# Patient Record
Sex: FEMALE | Race: BLACK OR AFRICAN AMERICAN | NOT HISPANIC OR LATINO | URBAN - METROPOLITAN AREA
[De-identification: names, ages, dates, MRNs, and addresses within clinical notes are randomized per-mention and may not be internally consistent; named-entity substitution may affect disease eponyms.]

---

## 2017-03-02 ENCOUNTER — OUTPATIENT (OUTPATIENT)
Dept: OUTPATIENT SERVICES | Facility: HOSPITAL | Age: 72
LOS: 1 days | Discharge: HOME | End: 2017-03-02

## 2017-03-02 ENCOUNTER — APPOINTMENT (OUTPATIENT)
Dept: WOUND CARE | Facility: HOSPITAL | Age: 72
End: 2017-03-02

## 2017-03-02 DIAGNOSIS — L89.90 PRESSURE ULCER OF UNSPECIFIED SITE, UNSPECIFIED STAGE: ICD-10-CM

## 2017-03-02 PROBLEM — Z00.00 ENCOUNTER FOR PREVENTIVE HEALTH EXAMINATION: Status: ACTIVE | Noted: 2017-03-02

## 2017-03-07 DIAGNOSIS — Z79.899 OTHER LONG TERM (CURRENT) DRUG THERAPY: ICD-10-CM

## 2017-03-07 DIAGNOSIS — L89.90 PRESSURE ULCER OF UNSPECIFIED SITE, UNSPECIFIED STAGE: ICD-10-CM

## 2017-03-07 DIAGNOSIS — Z74.1 NEED FOR ASSISTANCE WITH PERSONAL CARE: ICD-10-CM

## 2017-03-07 DIAGNOSIS — I10 ESSENTIAL (PRIMARY) HYPERTENSION: ICD-10-CM

## 2017-03-07 DIAGNOSIS — M19.90 UNSPECIFIED OSTEOARTHRITIS, UNSPECIFIED SITE: ICD-10-CM

## 2017-03-07 DIAGNOSIS — L81.9 DISORDER OF PIGMENTATION, UNSPECIFIED: ICD-10-CM

## 2017-03-07 DIAGNOSIS — Z82.49 FAMILY HISTORY OF ISCHEMIC HEART DISEASE AND OTHER DISEASES OF THE CIRCULATORY SYSTEM: ICD-10-CM

## 2017-03-07 DIAGNOSIS — Z84.1 FAMILY HISTORY OF DISORDERS OF KIDNEY AND URETER: ICD-10-CM

## 2017-03-07 DIAGNOSIS — I89.0 LYMPHEDEMA, NOT ELSEWHERE CLASSIFIED: ICD-10-CM

## 2017-03-07 DIAGNOSIS — L97.112 NON-PRESSURE CHRONIC ULCER OF RIGHT THIGH WITH FAT LAYER EXPOSED: ICD-10-CM

## 2017-03-10 ENCOUNTER — APPOINTMENT (OUTPATIENT)
Dept: WOUND CARE | Facility: HOSPITAL | Age: 72
End: 2017-03-10

## 2017-03-10 ENCOUNTER — OUTPATIENT (OUTPATIENT)
Dept: OUTPATIENT SERVICES | Facility: HOSPITAL | Age: 72
LOS: 1 days | Discharge: HOME | End: 2017-03-10

## 2017-03-10 DIAGNOSIS — L89.90 PRESSURE ULCER OF UNSPECIFIED SITE, UNSPECIFIED STAGE: ICD-10-CM

## 2017-03-16 ENCOUNTER — OUTPATIENT (OUTPATIENT)
Dept: OUTPATIENT SERVICES | Facility: HOSPITAL | Age: 72
LOS: 1 days | Discharge: HOME | End: 2017-03-16

## 2017-03-16 ENCOUNTER — APPOINTMENT (OUTPATIENT)
Dept: WOUND CARE | Facility: HOSPITAL | Age: 72
End: 2017-03-16

## 2017-03-16 DIAGNOSIS — L89.90 PRESSURE ULCER OF UNSPECIFIED SITE, UNSPECIFIED STAGE: ICD-10-CM

## 2017-03-16 DIAGNOSIS — L97.112 NON-PRESSURE CHRONIC ULCER OF RIGHT THIGH WITH FAT LAYER EXPOSED: ICD-10-CM

## 2017-03-16 DIAGNOSIS — Z79.899 OTHER LONG TERM (CURRENT) DRUG THERAPY: ICD-10-CM

## 2017-03-16 DIAGNOSIS — I89.0 LYMPHEDEMA, NOT ELSEWHERE CLASSIFIED: ICD-10-CM

## 2017-03-16 DIAGNOSIS — I10 ESSENTIAL (PRIMARY) HYPERTENSION: ICD-10-CM

## 2017-03-21 DIAGNOSIS — I10 ESSENTIAL (PRIMARY) HYPERTENSION: ICD-10-CM

## 2017-03-21 DIAGNOSIS — L89.90 PRESSURE ULCER OF UNSPECIFIED SITE, UNSPECIFIED STAGE: ICD-10-CM

## 2017-03-21 DIAGNOSIS — I89.0 LYMPHEDEMA, NOT ELSEWHERE CLASSIFIED: ICD-10-CM

## 2017-03-21 DIAGNOSIS — Z79.899 OTHER LONG TERM (CURRENT) DRUG THERAPY: ICD-10-CM

## 2017-03-21 DIAGNOSIS — L97.112 NON-PRESSURE CHRONIC ULCER OF RIGHT THIGH WITH FAT LAYER EXPOSED: ICD-10-CM

## 2017-03-23 ENCOUNTER — OUTPATIENT (OUTPATIENT)
Dept: OUTPATIENT SERVICES | Facility: HOSPITAL | Age: 72
LOS: 1 days | Discharge: HOME | End: 2017-03-23

## 2017-03-23 ENCOUNTER — APPOINTMENT (OUTPATIENT)
Dept: WOUND CARE | Facility: HOSPITAL | Age: 72
End: 2017-03-23

## 2017-03-23 DIAGNOSIS — L89.90 PRESSURE ULCER OF UNSPECIFIED SITE, UNSPECIFIED STAGE: ICD-10-CM

## 2017-03-27 DIAGNOSIS — I89.0 LYMPHEDEMA, NOT ELSEWHERE CLASSIFIED: ICD-10-CM

## 2017-03-27 DIAGNOSIS — L89.90 PRESSURE ULCER OF UNSPECIFIED SITE, UNSPECIFIED STAGE: ICD-10-CM

## 2017-03-27 DIAGNOSIS — Z79.899 OTHER LONG TERM (CURRENT) DRUG THERAPY: ICD-10-CM

## 2017-03-27 DIAGNOSIS — L97.112 NON-PRESSURE CHRONIC ULCER OF RIGHT THIGH WITH FAT LAYER EXPOSED: ICD-10-CM

## 2017-03-27 DIAGNOSIS — I10 ESSENTIAL (PRIMARY) HYPERTENSION: ICD-10-CM

## 2017-03-30 ENCOUNTER — APPOINTMENT (OUTPATIENT)
Dept: WOUND CARE | Facility: HOSPITAL | Age: 72
End: 2017-03-30

## 2017-03-30 ENCOUNTER — OUTPATIENT (OUTPATIENT)
Dept: OUTPATIENT SERVICES | Facility: HOSPITAL | Age: 72
LOS: 1 days | Discharge: HOME | End: 2017-03-30

## 2017-03-30 DIAGNOSIS — L89.90 PRESSURE ULCER OF UNSPECIFIED SITE, UNSPECIFIED STAGE: ICD-10-CM

## 2017-04-05 ENCOUNTER — OUTPATIENT (OUTPATIENT)
Dept: OUTPATIENT SERVICES | Facility: HOSPITAL | Age: 72
LOS: 1 days | Discharge: HOME | End: 2017-04-05

## 2017-04-05 ENCOUNTER — APPOINTMENT (OUTPATIENT)
Dept: WOUND CARE | Facility: HOSPITAL | Age: 72
End: 2017-04-05

## 2017-04-05 DIAGNOSIS — I10 ESSENTIAL (PRIMARY) HYPERTENSION: ICD-10-CM

## 2017-04-05 DIAGNOSIS — L97.112 NON-PRESSURE CHRONIC ULCER OF RIGHT THIGH WITH FAT LAYER EXPOSED: ICD-10-CM

## 2017-04-05 DIAGNOSIS — L89.90 PRESSURE ULCER OF UNSPECIFIED SITE, UNSPECIFIED STAGE: ICD-10-CM

## 2017-04-05 DIAGNOSIS — I89.0 LYMPHEDEMA, NOT ELSEWHERE CLASSIFIED: ICD-10-CM

## 2017-04-05 DIAGNOSIS — Z79.899 OTHER LONG TERM (CURRENT) DRUG THERAPY: ICD-10-CM

## 2017-04-05 DIAGNOSIS — L92.2 GRANULOMA FACIALE [EOSINOPHILIC GRANULOMA OF SKIN]: ICD-10-CM

## 2017-04-07 DIAGNOSIS — I10 ESSENTIAL (PRIMARY) HYPERTENSION: ICD-10-CM

## 2017-04-07 DIAGNOSIS — L92.9 GRANULOMATOUS DISORDER OF THE SKIN AND SUBCUTANEOUS TISSUE, UNSPECIFIED: ICD-10-CM

## 2017-04-07 DIAGNOSIS — I89.0 LYMPHEDEMA, NOT ELSEWHERE CLASSIFIED: ICD-10-CM

## 2017-04-07 DIAGNOSIS — L89.90 PRESSURE ULCER OF UNSPECIFIED SITE, UNSPECIFIED STAGE: ICD-10-CM

## 2017-04-07 DIAGNOSIS — L97.112 NON-PRESSURE CHRONIC ULCER OF RIGHT THIGH WITH FAT LAYER EXPOSED: ICD-10-CM

## 2017-04-07 DIAGNOSIS — Z79.899 OTHER LONG TERM (CURRENT) DRUG THERAPY: ICD-10-CM

## 2017-04-12 ENCOUNTER — APPOINTMENT (OUTPATIENT)
Dept: WOUND CARE | Facility: HOSPITAL | Age: 72
End: 2017-04-12

## 2017-04-12 ENCOUNTER — OUTPATIENT (OUTPATIENT)
Dept: OUTPATIENT SERVICES | Facility: HOSPITAL | Age: 72
LOS: 1 days | Discharge: HOME | End: 2017-04-12

## 2017-04-12 DIAGNOSIS — L89.90 PRESSURE ULCER OF UNSPECIFIED SITE, UNSPECIFIED STAGE: ICD-10-CM

## 2017-04-14 DIAGNOSIS — L89.90 PRESSURE ULCER OF UNSPECIFIED SITE, UNSPECIFIED STAGE: ICD-10-CM

## 2017-04-14 DIAGNOSIS — L97.112 NON-PRESSURE CHRONIC ULCER OF RIGHT THIGH WITH FAT LAYER EXPOSED: ICD-10-CM

## 2017-04-14 DIAGNOSIS — Z79.899 OTHER LONG TERM (CURRENT) DRUG THERAPY: ICD-10-CM

## 2017-04-14 DIAGNOSIS — I89.0 LYMPHEDEMA, NOT ELSEWHERE CLASSIFIED: ICD-10-CM

## 2017-04-14 DIAGNOSIS — I10 ESSENTIAL (PRIMARY) HYPERTENSION: ICD-10-CM

## 2017-04-19 ENCOUNTER — OUTPATIENT (OUTPATIENT)
Dept: OUTPATIENT SERVICES | Facility: HOSPITAL | Age: 72
LOS: 1 days | Discharge: HOME | End: 2017-04-19

## 2017-04-19 ENCOUNTER — APPOINTMENT (OUTPATIENT)
Dept: WOUND CARE | Facility: HOSPITAL | Age: 72
End: 2017-04-19

## 2017-04-19 DIAGNOSIS — L89.90 PRESSURE ULCER OF UNSPECIFIED SITE, UNSPECIFIED STAGE: ICD-10-CM

## 2017-04-21 DIAGNOSIS — I89.0 LYMPHEDEMA, NOT ELSEWHERE CLASSIFIED: ICD-10-CM

## 2017-04-21 DIAGNOSIS — L97.112 NON-PRESSURE CHRONIC ULCER OF RIGHT THIGH WITH FAT LAYER EXPOSED: ICD-10-CM

## 2017-04-21 DIAGNOSIS — L89.90 PRESSURE ULCER OF UNSPECIFIED SITE, UNSPECIFIED STAGE: ICD-10-CM

## 2017-04-21 DIAGNOSIS — I10 ESSENTIAL (PRIMARY) HYPERTENSION: ICD-10-CM

## 2017-04-21 DIAGNOSIS — Z79.899 OTHER LONG TERM (CURRENT) DRUG THERAPY: ICD-10-CM

## 2017-04-26 ENCOUNTER — OUTPATIENT (OUTPATIENT)
Dept: OUTPATIENT SERVICES | Facility: HOSPITAL | Age: 72
LOS: 1 days | Discharge: HOME | End: 2017-04-26

## 2017-04-26 ENCOUNTER — APPOINTMENT (OUTPATIENT)
Dept: WOUND CARE | Facility: HOSPITAL | Age: 72
End: 2017-04-26

## 2017-04-26 DIAGNOSIS — L89.90 PRESSURE ULCER OF UNSPECIFIED SITE, UNSPECIFIED STAGE: ICD-10-CM

## 2017-05-03 ENCOUNTER — OUTPATIENT (OUTPATIENT)
Dept: OUTPATIENT SERVICES | Facility: HOSPITAL | Age: 72
LOS: 1 days | Discharge: HOME | End: 2017-05-03

## 2017-05-03 ENCOUNTER — APPOINTMENT (OUTPATIENT)
Dept: WOUND CARE | Facility: HOSPITAL | Age: 72
End: 2017-05-03

## 2017-05-03 DIAGNOSIS — L89.90 PRESSURE ULCER OF UNSPECIFIED SITE, UNSPECIFIED STAGE: ICD-10-CM

## 2017-05-03 DIAGNOSIS — Z79.899 OTHER LONG TERM (CURRENT) DRUG THERAPY: ICD-10-CM

## 2017-05-03 DIAGNOSIS — I89.0 LYMPHEDEMA, NOT ELSEWHERE CLASSIFIED: ICD-10-CM

## 2017-05-03 DIAGNOSIS — I10 ESSENTIAL (PRIMARY) HYPERTENSION: ICD-10-CM

## 2017-05-10 ENCOUNTER — OUTPATIENT (OUTPATIENT)
Dept: OUTPATIENT SERVICES | Facility: HOSPITAL | Age: 72
LOS: 1 days | Discharge: HOME | End: 2017-05-10

## 2017-05-10 ENCOUNTER — APPOINTMENT (OUTPATIENT)
Age: 72
End: 2017-05-10

## 2017-05-10 DIAGNOSIS — I10 ESSENTIAL (PRIMARY) HYPERTENSION: ICD-10-CM

## 2017-05-10 DIAGNOSIS — I89.0 LYMPHEDEMA, NOT ELSEWHERE CLASSIFIED: ICD-10-CM

## 2017-05-10 DIAGNOSIS — L89.90 PRESSURE ULCER OF UNSPECIFIED SITE, UNSPECIFIED STAGE: ICD-10-CM

## 2017-05-10 DIAGNOSIS — L97.112 NON-PRESSURE CHRONIC ULCER OF RIGHT THIGH WITH FAT LAYER EXPOSED: ICD-10-CM

## 2017-05-10 DIAGNOSIS — Z79.899 OTHER LONG TERM (CURRENT) DRUG THERAPY: ICD-10-CM

## 2017-05-17 ENCOUNTER — APPOINTMENT (OUTPATIENT)
Age: 72
End: 2017-05-17

## 2017-05-17 ENCOUNTER — OUTPATIENT (OUTPATIENT)
Dept: OUTPATIENT SERVICES | Facility: HOSPITAL | Age: 72
LOS: 1 days | Discharge: HOME | End: 2017-05-17

## 2017-05-17 DIAGNOSIS — L89.90 PRESSURE ULCER OF UNSPECIFIED SITE, UNSPECIFIED STAGE: ICD-10-CM

## 2017-05-19 DIAGNOSIS — I10 ESSENTIAL (PRIMARY) HYPERTENSION: ICD-10-CM

## 2017-05-19 DIAGNOSIS — Z79.899 OTHER LONG TERM (CURRENT) DRUG THERAPY: ICD-10-CM

## 2017-05-19 DIAGNOSIS — L97.112 NON-PRESSURE CHRONIC ULCER OF RIGHT THIGH WITH FAT LAYER EXPOSED: ICD-10-CM

## 2017-05-19 DIAGNOSIS — I89.0 LYMPHEDEMA, NOT ELSEWHERE CLASSIFIED: ICD-10-CM

## 2017-05-24 ENCOUNTER — OUTPATIENT (OUTPATIENT)
Dept: OUTPATIENT SERVICES | Facility: HOSPITAL | Age: 72
LOS: 1 days | Discharge: HOME | End: 2017-05-24

## 2017-05-24 ENCOUNTER — APPOINTMENT (OUTPATIENT)
Age: 72
End: 2017-05-24

## 2017-05-24 DIAGNOSIS — L89.90 PRESSURE ULCER OF UNSPECIFIED SITE, UNSPECIFIED STAGE: ICD-10-CM

## 2017-05-26 DIAGNOSIS — I89.0 LYMPHEDEMA, NOT ELSEWHERE CLASSIFIED: ICD-10-CM

## 2017-05-26 DIAGNOSIS — I10 ESSENTIAL (PRIMARY) HYPERTENSION: ICD-10-CM

## 2017-05-26 DIAGNOSIS — L97.112 NON-PRESSURE CHRONIC ULCER OF RIGHT THIGH WITH FAT LAYER EXPOSED: ICD-10-CM

## 2017-05-26 DIAGNOSIS — Z79.899 OTHER LONG TERM (CURRENT) DRUG THERAPY: ICD-10-CM

## 2017-05-26 DIAGNOSIS — L92.9 GRANULOMATOUS DISORDER OF THE SKIN AND SUBCUTANEOUS TISSUE, UNSPECIFIED: ICD-10-CM

## 2017-05-31 ENCOUNTER — OUTPATIENT (OUTPATIENT)
Dept: OUTPATIENT SERVICES | Facility: HOSPITAL | Age: 72
LOS: 1 days | Discharge: HOME | End: 2017-05-31

## 2017-05-31 ENCOUNTER — APPOINTMENT (OUTPATIENT)
Age: 72
End: 2017-05-31

## 2017-05-31 DIAGNOSIS — L89.90 PRESSURE ULCER OF UNSPECIFIED SITE, UNSPECIFIED STAGE: ICD-10-CM

## 2017-06-09 DIAGNOSIS — I10 ESSENTIAL (PRIMARY) HYPERTENSION: ICD-10-CM

## 2017-06-09 DIAGNOSIS — L97.112 NON-PRESSURE CHRONIC ULCER OF RIGHT THIGH WITH FAT LAYER EXPOSED: ICD-10-CM

## 2017-06-09 DIAGNOSIS — I89.0 LYMPHEDEMA, NOT ELSEWHERE CLASSIFIED: ICD-10-CM

## 2017-06-09 DIAGNOSIS — Z79.899 OTHER LONG TERM (CURRENT) DRUG THERAPY: ICD-10-CM

## 2017-06-14 ENCOUNTER — OUTPATIENT (OUTPATIENT)
Dept: OUTPATIENT SERVICES | Facility: HOSPITAL | Age: 72
LOS: 1 days | Discharge: HOME | End: 2017-06-14

## 2017-06-14 DIAGNOSIS — I10 ESSENTIAL (PRIMARY) HYPERTENSION: ICD-10-CM

## 2017-06-14 DIAGNOSIS — I89.0 LYMPHEDEMA, NOT ELSEWHERE CLASSIFIED: ICD-10-CM

## 2017-06-14 DIAGNOSIS — Z79.899 OTHER LONG TERM (CURRENT) DRUG THERAPY: ICD-10-CM

## 2017-06-14 DIAGNOSIS — L89.90 PRESSURE ULCER OF UNSPECIFIED SITE, UNSPECIFIED STAGE: ICD-10-CM

## 2017-06-14 DIAGNOSIS — L97.112 NON-PRESSURE CHRONIC ULCER OF RIGHT THIGH WITH FAT LAYER EXPOSED: ICD-10-CM

## 2017-06-28 ENCOUNTER — OUTPATIENT (OUTPATIENT)
Dept: OUTPATIENT SERVICES | Facility: HOSPITAL | Age: 72
LOS: 1 days | Discharge: HOME | End: 2017-06-28

## 2017-06-28 DIAGNOSIS — L89.90 PRESSURE ULCER OF UNSPECIFIED SITE, UNSPECIFIED STAGE: ICD-10-CM

## 2017-06-30 DIAGNOSIS — I89.0 LYMPHEDEMA, NOT ELSEWHERE CLASSIFIED: ICD-10-CM

## 2017-06-30 DIAGNOSIS — Z79.899 OTHER LONG TERM (CURRENT) DRUG THERAPY: ICD-10-CM

## 2017-06-30 DIAGNOSIS — I10 ESSENTIAL (PRIMARY) HYPERTENSION: ICD-10-CM

## 2017-06-30 DIAGNOSIS — L97.112 NON-PRESSURE CHRONIC ULCER OF RIGHT THIGH WITH FAT LAYER EXPOSED: ICD-10-CM

## 2017-07-12 ENCOUNTER — OUTPATIENT (OUTPATIENT)
Dept: OUTPATIENT SERVICES | Facility: HOSPITAL | Age: 72
LOS: 1 days | Discharge: HOME | End: 2017-07-12

## 2017-07-12 DIAGNOSIS — Z79.899 OTHER LONG TERM (CURRENT) DRUG THERAPY: ICD-10-CM

## 2017-07-12 DIAGNOSIS — I89.0 LYMPHEDEMA, NOT ELSEWHERE CLASSIFIED: ICD-10-CM

## 2017-07-12 DIAGNOSIS — L89.90 PRESSURE ULCER OF UNSPECIFIED SITE, UNSPECIFIED STAGE: ICD-10-CM

## 2017-07-12 DIAGNOSIS — L97.112 NON-PRESSURE CHRONIC ULCER OF RIGHT THIGH WITH FAT LAYER EXPOSED: ICD-10-CM

## 2017-07-12 DIAGNOSIS — I10 ESSENTIAL (PRIMARY) HYPERTENSION: ICD-10-CM

## 2017-07-26 ENCOUNTER — OUTPATIENT (OUTPATIENT)
Dept: OUTPATIENT SERVICES | Facility: HOSPITAL | Age: 72
LOS: 1 days | Discharge: HOME | End: 2017-07-26

## 2017-07-26 DIAGNOSIS — L89.90 PRESSURE ULCER OF UNSPECIFIED SITE, UNSPECIFIED STAGE: ICD-10-CM

## 2017-08-07 DIAGNOSIS — L97.112 NON-PRESSURE CHRONIC ULCER OF RIGHT THIGH WITH FAT LAYER EXPOSED: ICD-10-CM

## 2017-08-07 DIAGNOSIS — I89.0 LYMPHEDEMA, NOT ELSEWHERE CLASSIFIED: ICD-10-CM

## 2017-08-07 DIAGNOSIS — I10 ESSENTIAL (PRIMARY) HYPERTENSION: ICD-10-CM

## 2017-08-07 DIAGNOSIS — Z79.899 OTHER LONG TERM (CURRENT) DRUG THERAPY: ICD-10-CM

## 2017-08-09 ENCOUNTER — OUTPATIENT (OUTPATIENT)
Dept: OUTPATIENT SERVICES | Facility: HOSPITAL | Age: 72
LOS: 1 days | Discharge: HOME | End: 2017-08-09

## 2017-08-09 DIAGNOSIS — L89.899 PRESSURE ULCER OF OTHER SITE, UNSPECIFIED STAGE: ICD-10-CM

## 2017-08-11 DIAGNOSIS — I89.0 LYMPHEDEMA, NOT ELSEWHERE CLASSIFIED: ICD-10-CM

## 2017-08-11 DIAGNOSIS — L97.112 NON-PRESSURE CHRONIC ULCER OF RIGHT THIGH WITH FAT LAYER EXPOSED: ICD-10-CM

## 2017-08-11 DIAGNOSIS — Z79.899 OTHER LONG TERM (CURRENT) DRUG THERAPY: ICD-10-CM

## 2017-08-11 DIAGNOSIS — I10 ESSENTIAL (PRIMARY) HYPERTENSION: ICD-10-CM

## 2017-08-23 ENCOUNTER — RESULT REVIEW (OUTPATIENT)
Age: 72
End: 2017-08-23

## 2017-08-23 ENCOUNTER — OUTPATIENT (OUTPATIENT)
Dept: OUTPATIENT SERVICES | Facility: HOSPITAL | Age: 72
LOS: 1 days | Discharge: HOME | End: 2017-08-23
Payer: MEDICARE

## 2017-08-23 DIAGNOSIS — L89.90 PRESSURE ULCER OF UNSPECIFIED SITE, UNSPECIFIED STAGE: ICD-10-CM

## 2017-08-23 PROCEDURE — 88305 TISSUE EXAM BY PATHOLOGIST: CPT | Mod: 26

## 2017-08-25 DIAGNOSIS — I89.0 LYMPHEDEMA, NOT ELSEWHERE CLASSIFIED: ICD-10-CM

## 2017-08-25 DIAGNOSIS — Z79.899 OTHER LONG TERM (CURRENT) DRUG THERAPY: ICD-10-CM

## 2017-08-25 DIAGNOSIS — L92.2 GRANULOMA FACIALE [EOSINOPHILIC GRANULOMA OF SKIN]: ICD-10-CM

## 2017-08-25 DIAGNOSIS — L97.112 NON-PRESSURE CHRONIC ULCER OF RIGHT THIGH WITH FAT LAYER EXPOSED: ICD-10-CM

## 2017-08-25 DIAGNOSIS — I10 ESSENTIAL (PRIMARY) HYPERTENSION: ICD-10-CM

## 2017-08-25 LAB — SURGICAL PATHOLOGY FINAL REPORT - CH: SIGNIFICANT CHANGE UP

## 2017-08-30 ENCOUNTER — OUTPATIENT (OUTPATIENT)
Dept: OUTPATIENT SERVICES | Facility: HOSPITAL | Age: 72
LOS: 1 days | Discharge: HOME | End: 2017-08-30

## 2017-08-30 DIAGNOSIS — I89.0 LYMPHEDEMA, NOT ELSEWHERE CLASSIFIED: ICD-10-CM

## 2017-08-30 DIAGNOSIS — L97.112 NON-PRESSURE CHRONIC ULCER OF RIGHT THIGH WITH FAT LAYER EXPOSED: ICD-10-CM

## 2017-08-30 DIAGNOSIS — Z79.899 OTHER LONG TERM (CURRENT) DRUG THERAPY: ICD-10-CM

## 2017-08-30 DIAGNOSIS — I10 ESSENTIAL (PRIMARY) HYPERTENSION: ICD-10-CM

## 2017-08-30 DIAGNOSIS — L92.2 GRANULOMA FACIALE [EOSINOPHILIC GRANULOMA OF SKIN]: ICD-10-CM

## 2017-08-30 DIAGNOSIS — L89.90 PRESSURE ULCER OF UNSPECIFIED SITE, UNSPECIFIED STAGE: ICD-10-CM

## 2017-09-13 ENCOUNTER — OUTPATIENT (OUTPATIENT)
Dept: OUTPATIENT SERVICES | Facility: HOSPITAL | Age: 72
LOS: 1 days | Discharge: HOME | End: 2017-09-13

## 2017-09-13 DIAGNOSIS — L89.90 PRESSURE ULCER OF UNSPECIFIED SITE, UNSPECIFIED STAGE: ICD-10-CM

## 2017-09-18 DIAGNOSIS — Z79.899 OTHER LONG TERM (CURRENT) DRUG THERAPY: ICD-10-CM

## 2017-09-18 DIAGNOSIS — I89.0 LYMPHEDEMA, NOT ELSEWHERE CLASSIFIED: ICD-10-CM

## 2017-09-18 DIAGNOSIS — L97.112 NON-PRESSURE CHRONIC ULCER OF RIGHT THIGH WITH FAT LAYER EXPOSED: ICD-10-CM

## 2017-09-18 DIAGNOSIS — I10 ESSENTIAL (PRIMARY) HYPERTENSION: ICD-10-CM

## 2017-09-20 ENCOUNTER — OUTPATIENT (OUTPATIENT)
Dept: OUTPATIENT SERVICES | Facility: HOSPITAL | Age: 72
LOS: 1 days | Discharge: HOME | End: 2017-09-20

## 2017-09-20 DIAGNOSIS — L89.90 PRESSURE ULCER OF UNSPECIFIED SITE, UNSPECIFIED STAGE: ICD-10-CM

## 2017-09-22 DIAGNOSIS — I89.0 LYMPHEDEMA, NOT ELSEWHERE CLASSIFIED: ICD-10-CM

## 2017-09-22 DIAGNOSIS — L97.112 NON-PRESSURE CHRONIC ULCER OF RIGHT THIGH WITH FAT LAYER EXPOSED: ICD-10-CM

## 2017-09-22 DIAGNOSIS — Z79.899 OTHER LONG TERM (CURRENT) DRUG THERAPY: ICD-10-CM

## 2017-09-22 DIAGNOSIS — I10 ESSENTIAL (PRIMARY) HYPERTENSION: ICD-10-CM

## 2017-09-27 ENCOUNTER — OUTPATIENT (OUTPATIENT)
Dept: OUTPATIENT SERVICES | Facility: HOSPITAL | Age: 72
LOS: 1 days | Discharge: HOME | End: 2017-09-27

## 2017-09-27 DIAGNOSIS — L89.90 PRESSURE ULCER OF UNSPECIFIED SITE, UNSPECIFIED STAGE: ICD-10-CM

## 2017-09-29 DIAGNOSIS — L97.112 NON-PRESSURE CHRONIC ULCER OF RIGHT THIGH WITH FAT LAYER EXPOSED: ICD-10-CM

## 2017-09-29 DIAGNOSIS — I89.0 LYMPHEDEMA, NOT ELSEWHERE CLASSIFIED: ICD-10-CM

## 2017-09-29 DIAGNOSIS — Z79.899 OTHER LONG TERM (CURRENT) DRUG THERAPY: ICD-10-CM

## 2017-09-29 DIAGNOSIS — I10 ESSENTIAL (PRIMARY) HYPERTENSION: ICD-10-CM

## 2017-10-11 ENCOUNTER — OUTPATIENT (OUTPATIENT)
Dept: OUTPATIENT SERVICES | Facility: HOSPITAL | Age: 72
LOS: 1 days | Discharge: HOME | End: 2017-10-11

## 2017-10-11 DIAGNOSIS — L89.90 PRESSURE ULCER OF UNSPECIFIED SITE, UNSPECIFIED STAGE: ICD-10-CM

## 2017-10-13 DIAGNOSIS — Z79.899 OTHER LONG TERM (CURRENT) DRUG THERAPY: ICD-10-CM

## 2017-10-13 DIAGNOSIS — L97.112 NON-PRESSURE CHRONIC ULCER OF RIGHT THIGH WITH FAT LAYER EXPOSED: ICD-10-CM

## 2017-10-13 DIAGNOSIS — I89.0 LYMPHEDEMA, NOT ELSEWHERE CLASSIFIED: ICD-10-CM

## 2017-10-13 DIAGNOSIS — I10 ESSENTIAL (PRIMARY) HYPERTENSION: ICD-10-CM

## 2017-10-18 ENCOUNTER — OUTPATIENT (OUTPATIENT)
Dept: OUTPATIENT SERVICES | Facility: HOSPITAL | Age: 72
LOS: 1 days | Discharge: HOME | End: 2017-10-18

## 2017-10-18 DIAGNOSIS — L89.90 PRESSURE ULCER OF UNSPECIFIED SITE, UNSPECIFIED STAGE: ICD-10-CM

## 2017-10-20 LAB
CULTURE RESULTS: SIGNIFICANT CHANGE UP
SPECIMEN SOURCE: SIGNIFICANT CHANGE UP

## 2017-10-23 DIAGNOSIS — L89.90 PRESSURE ULCER OF UNSPECIFIED SITE, UNSPECIFIED STAGE: ICD-10-CM

## 2017-11-01 ENCOUNTER — OUTPATIENT (OUTPATIENT)
Dept: OUTPATIENT SERVICES | Facility: HOSPITAL | Age: 72
LOS: 1 days | Discharge: HOME | End: 2017-11-01

## 2017-11-01 DIAGNOSIS — L89.90 PRESSURE ULCER OF UNSPECIFIED SITE, UNSPECIFIED STAGE: ICD-10-CM

## 2017-11-02 DIAGNOSIS — Z79.899 OTHER LONG TERM (CURRENT) DRUG THERAPY: ICD-10-CM

## 2017-11-02 DIAGNOSIS — L97.112 NON-PRESSURE CHRONIC ULCER OF RIGHT THIGH WITH FAT LAYER EXPOSED: ICD-10-CM

## 2017-11-02 DIAGNOSIS — I10 ESSENTIAL (PRIMARY) HYPERTENSION: ICD-10-CM

## 2017-11-02 DIAGNOSIS — I89.0 LYMPHEDEMA, NOT ELSEWHERE CLASSIFIED: ICD-10-CM

## 2017-11-03 DIAGNOSIS — I89.0 LYMPHEDEMA, NOT ELSEWHERE CLASSIFIED: ICD-10-CM

## 2017-11-03 DIAGNOSIS — L97.112 NON-PRESSURE CHRONIC ULCER OF RIGHT THIGH WITH FAT LAYER EXPOSED: ICD-10-CM

## 2017-11-03 DIAGNOSIS — Z79.899 OTHER LONG TERM (CURRENT) DRUG THERAPY: ICD-10-CM

## 2017-11-03 DIAGNOSIS — I10 ESSENTIAL (PRIMARY) HYPERTENSION: ICD-10-CM

## 2017-11-08 ENCOUNTER — OUTPATIENT (OUTPATIENT)
Dept: OUTPATIENT SERVICES | Facility: HOSPITAL | Age: 72
LOS: 1 days | Discharge: HOME | End: 2017-11-08

## 2017-11-08 DIAGNOSIS — L89.90 PRESSURE ULCER OF UNSPECIFIED SITE, UNSPECIFIED STAGE: ICD-10-CM

## 2017-11-08 DIAGNOSIS — I10 ESSENTIAL (PRIMARY) HYPERTENSION: ICD-10-CM

## 2017-11-08 DIAGNOSIS — I89.0 LYMPHEDEMA, NOT ELSEWHERE CLASSIFIED: ICD-10-CM

## 2017-11-08 DIAGNOSIS — Z79.899 OTHER LONG TERM (CURRENT) DRUG THERAPY: ICD-10-CM

## 2017-11-08 DIAGNOSIS — L97.112 NON-PRESSURE CHRONIC ULCER OF RIGHT THIGH WITH FAT LAYER EXPOSED: ICD-10-CM

## 2017-11-15 ENCOUNTER — OUTPATIENT (OUTPATIENT)
Dept: OUTPATIENT SERVICES | Facility: HOSPITAL | Age: 72
LOS: 1 days | Discharge: HOME | End: 2017-11-15

## 2017-11-15 DIAGNOSIS — L89.90 PRESSURE ULCER OF UNSPECIFIED SITE, UNSPECIFIED STAGE: ICD-10-CM

## 2017-11-17 DIAGNOSIS — Z79.899 OTHER LONG TERM (CURRENT) DRUG THERAPY: ICD-10-CM

## 2017-11-17 DIAGNOSIS — I89.0 LYMPHEDEMA, NOT ELSEWHERE CLASSIFIED: ICD-10-CM

## 2017-11-17 DIAGNOSIS — I10 ESSENTIAL (PRIMARY) HYPERTENSION: ICD-10-CM

## 2017-11-17 DIAGNOSIS — L97.112 NON-PRESSURE CHRONIC ULCER OF RIGHT THIGH WITH FAT LAYER EXPOSED: ICD-10-CM

## 2017-11-29 ENCOUNTER — OUTPATIENT (OUTPATIENT)
Dept: OUTPATIENT SERVICES | Facility: HOSPITAL | Age: 72
LOS: 1 days | Discharge: HOME | End: 2017-11-29

## 2017-11-29 DIAGNOSIS — L89.90 PRESSURE ULCER OF UNSPECIFIED SITE, UNSPECIFIED STAGE: ICD-10-CM

## 2017-12-06 ENCOUNTER — OUTPATIENT (OUTPATIENT)
Dept: OUTPATIENT SERVICES | Facility: HOSPITAL | Age: 72
LOS: 1 days | Discharge: HOME | End: 2017-12-06

## 2017-12-06 DIAGNOSIS — L89.90 PRESSURE ULCER OF UNSPECIFIED SITE, UNSPECIFIED STAGE: ICD-10-CM

## 2017-12-07 DIAGNOSIS — I89.0 LYMPHEDEMA, NOT ELSEWHERE CLASSIFIED: ICD-10-CM

## 2017-12-07 DIAGNOSIS — L97.112 NON-PRESSURE CHRONIC ULCER OF RIGHT THIGH WITH FAT LAYER EXPOSED: ICD-10-CM

## 2017-12-07 DIAGNOSIS — I10 ESSENTIAL (PRIMARY) HYPERTENSION: ICD-10-CM

## 2017-12-07 DIAGNOSIS — Z79.899 OTHER LONG TERM (CURRENT) DRUG THERAPY: ICD-10-CM

## 2017-12-13 ENCOUNTER — OUTPATIENT (OUTPATIENT)
Dept: OUTPATIENT SERVICES | Facility: HOSPITAL | Age: 72
LOS: 1 days | Discharge: HOME | End: 2017-12-13

## 2017-12-13 DIAGNOSIS — I89.0 LYMPHEDEMA, NOT ELSEWHERE CLASSIFIED: ICD-10-CM

## 2017-12-13 DIAGNOSIS — Z79.899 OTHER LONG TERM (CURRENT) DRUG THERAPY: ICD-10-CM

## 2017-12-13 DIAGNOSIS — I10 ESSENTIAL (PRIMARY) HYPERTENSION: ICD-10-CM

## 2017-12-13 DIAGNOSIS — L97.112 NON-PRESSURE CHRONIC ULCER OF RIGHT THIGH WITH FAT LAYER EXPOSED: ICD-10-CM

## 2017-12-13 DIAGNOSIS — L89.90 PRESSURE ULCER OF UNSPECIFIED SITE, UNSPECIFIED STAGE: ICD-10-CM

## 2017-12-18 DIAGNOSIS — I89.0 LYMPHEDEMA, NOT ELSEWHERE CLASSIFIED: ICD-10-CM

## 2017-12-18 DIAGNOSIS — Z79.899 OTHER LONG TERM (CURRENT) DRUG THERAPY: ICD-10-CM

## 2017-12-18 DIAGNOSIS — L97.112 NON-PRESSURE CHRONIC ULCER OF RIGHT THIGH WITH FAT LAYER EXPOSED: ICD-10-CM

## 2017-12-18 DIAGNOSIS — I10 ESSENTIAL (PRIMARY) HYPERTENSION: ICD-10-CM

## 2017-12-27 ENCOUNTER — OUTPATIENT (OUTPATIENT)
Dept: OUTPATIENT SERVICES | Facility: HOSPITAL | Age: 72
LOS: 1 days | Discharge: HOME | End: 2017-12-27

## 2017-12-27 DIAGNOSIS — L89.90 PRESSURE ULCER OF UNSPECIFIED SITE, UNSPECIFIED STAGE: ICD-10-CM

## 2017-12-29 DIAGNOSIS — I89.0 LYMPHEDEMA, NOT ELSEWHERE CLASSIFIED: ICD-10-CM

## 2017-12-29 DIAGNOSIS — I10 ESSENTIAL (PRIMARY) HYPERTENSION: ICD-10-CM

## 2017-12-29 DIAGNOSIS — Z79.899 OTHER LONG TERM (CURRENT) DRUG THERAPY: ICD-10-CM

## 2017-12-29 DIAGNOSIS — L97.112 NON-PRESSURE CHRONIC ULCER OF RIGHT THIGH WITH FAT LAYER EXPOSED: ICD-10-CM

## 2018-01-10 ENCOUNTER — INPATIENT (INPATIENT)
Facility: HOSPITAL | Age: 73
LOS: 8 days | Discharge: HOME | End: 2018-01-19
Attending: INTERNAL MEDICINE | Admitting: INTERNAL MEDICINE
Payer: MEDICARE

## 2018-01-10 ENCOUNTER — OUTPATIENT (OUTPATIENT)
Dept: OUTPATIENT SERVICES | Facility: HOSPITAL | Age: 73
LOS: 1 days | Discharge: HOME | End: 2018-01-10

## 2018-01-10 VITALS
RESPIRATION RATE: 16 BRPM | HEART RATE: 84 BPM | SYSTOLIC BLOOD PRESSURE: 149 MMHG | TEMPERATURE: 98 F | WEIGHT: 293 LBS | HEIGHT: 63 IN | DIASTOLIC BLOOD PRESSURE: 66 MMHG | OXYGEN SATURATION: 99 %

## 2018-01-10 DIAGNOSIS — I10 ESSENTIAL (PRIMARY) HYPERTENSION: ICD-10-CM

## 2018-01-10 DIAGNOSIS — L03.115 CELLULITIS OF RIGHT LOWER LIMB: ICD-10-CM

## 2018-01-10 DIAGNOSIS — E66.01 MORBID (SEVERE) OBESITY DUE TO EXCESS CALORIES: ICD-10-CM

## 2018-01-10 DIAGNOSIS — D50.8 OTHER IRON DEFICIENCY ANEMIAS: ICD-10-CM

## 2018-01-10 DIAGNOSIS — L89.90 PRESSURE ULCER OF UNSPECIFIED SITE, UNSPECIFIED STAGE: ICD-10-CM

## 2018-01-10 LAB
ALBUMIN SERPL ELPH-MCNC: 3.4 G/DL — SIGNIFICANT CHANGE UP (ref 3.3–5)
ALP SERPL-CCNC: 120 U/L — SIGNIFICANT CHANGE UP (ref 40–120)
ALT FLD-CCNC: 49 U/L — SIGNIFICANT CHANGE UP (ref 12–78)
ANION GAP SERPL CALC-SCNC: 8 MMOL/L — SIGNIFICANT CHANGE UP (ref 5–17)
AST SERPL-CCNC: 26 U/L — SIGNIFICANT CHANGE UP (ref 15–37)
BILIRUB SERPL-MCNC: 0.8 MG/DL — SIGNIFICANT CHANGE UP (ref 0.2–1.2)
BUN SERPL-MCNC: 9 MG/DL — SIGNIFICANT CHANGE UP (ref 7–23)
CALCIUM SERPL-MCNC: 8.4 MG/DL — LOW (ref 8.5–10.1)
CHLORIDE SERPL-SCNC: 106 MMOL/L — SIGNIFICANT CHANGE UP (ref 96–108)
CO2 SERPL-SCNC: 26 MMOL/L — SIGNIFICANT CHANGE UP (ref 22–31)
CREAT SERPL-MCNC: 0.75 MG/DL — SIGNIFICANT CHANGE UP (ref 0.5–1.3)
GLUCOSE SERPL-MCNC: 114 MG/DL — HIGH (ref 70–99)
HCT VFR BLD CALC: 31.8 % — LOW (ref 34.5–45)
HGB BLD-MCNC: 10.6 G/DL — LOW (ref 11.5–15.5)
HYPOCHROMIA BLD QL: SLIGHT — SIGNIFICANT CHANGE UP
LACTATE SERPL-SCNC: 1.1 MMOL/L — SIGNIFICANT CHANGE UP (ref 0.7–2)
LYMPHOCYTES # BLD AUTO: 11 % — LOW (ref 13–44)
MCHC RBC-ENTMCNC: 29.2 PG — SIGNIFICANT CHANGE UP (ref 27–34)
MCHC RBC-ENTMCNC: 33.3 GM/DL — SIGNIFICANT CHANGE UP (ref 32–36)
MCV RBC AUTO: 87.5 FL — SIGNIFICANT CHANGE UP (ref 80–100)
MONOCYTES NFR BLD AUTO: 9 % — SIGNIFICANT CHANGE UP (ref 2–14)
NEUTROPHILS NFR BLD AUTO: 80 % — HIGH (ref 43–77)
PLAT MORPH BLD: NORMAL — SIGNIFICANT CHANGE UP
PLATELET # BLD AUTO: 210 K/UL — SIGNIFICANT CHANGE UP (ref 150–400)
POTASSIUM SERPL-MCNC: 3.5 MMOL/L — SIGNIFICANT CHANGE UP (ref 3.5–5.3)
POTASSIUM SERPL-SCNC: 3.5 MMOL/L — SIGNIFICANT CHANGE UP (ref 3.5–5.3)
PROT SERPL-MCNC: 8.1 GM/DL — SIGNIFICANT CHANGE UP (ref 6–8.3)
RBC # BLD: 3.64 M/UL — LOW (ref 3.8–5.2)
RBC # FLD: 13.8 % — SIGNIFICANT CHANGE UP (ref 11–15)
RBC BLD AUTO: SIGNIFICANT CHANGE UP
SODIUM SERPL-SCNC: 140 MMOL/L — SIGNIFICANT CHANGE UP (ref 135–145)
WBC # BLD: 12.6 K/UL — HIGH (ref 3.8–10.5)
WBC # FLD AUTO: 12.6 K/UL — HIGH (ref 3.8–10.5)

## 2018-01-10 PROCEDURE — 99285 EMERGENCY DEPT VISIT HI MDM: CPT

## 2018-01-10 PROCEDURE — 71045 X-RAY EXAM CHEST 1 VIEW: CPT | Mod: 26

## 2018-01-10 RX ORDER — FOLIC ACID 0.8 MG
1 TABLET ORAL DAILY
Qty: 0 | Refills: 0 | Status: DISCONTINUED | OUTPATIENT
Start: 2018-01-10 | End: 2018-01-19

## 2018-01-10 RX ORDER — CEFAZOLIN SODIUM 1 G
2000 VIAL (EA) INJECTION EVERY 8 HOURS
Qty: 0 | Refills: 0 | Status: DISCONTINUED | OUTPATIENT
Start: 2018-01-10 | End: 2018-01-11

## 2018-01-10 RX ORDER — VANCOMYCIN HCL 1 G
1000 VIAL (EA) INTRAVENOUS EVERY 12 HOURS
Qty: 0 | Refills: 0 | Status: DISCONTINUED | OUTPATIENT
Start: 2018-01-10 | End: 2018-01-19

## 2018-01-10 RX ORDER — LOSARTAN POTASSIUM 100 MG/1
25 TABLET, FILM COATED ORAL DAILY
Qty: 0 | Refills: 0 | Status: DISCONTINUED | OUTPATIENT
Start: 2018-01-10 | End: 2018-01-19

## 2018-01-10 RX ORDER — ENOXAPARIN SODIUM 100 MG/ML
40 INJECTION SUBCUTANEOUS EVERY 24 HOURS
Qty: 0 | Refills: 0 | Status: DISCONTINUED | OUTPATIENT
Start: 2018-01-10 | End: 2018-01-19

## 2018-01-10 RX ORDER — FUROSEMIDE 40 MG
40 TABLET ORAL DAILY
Qty: 0 | Refills: 0 | Status: DISCONTINUED | OUTPATIENT
Start: 2018-01-10 | End: 2018-01-19

## 2018-01-10 RX ADMIN — Medication 100 MILLIGRAM(S): at 20:35

## 2018-01-10 RX ADMIN — Medication 40 MILLIGRAM(S): at 20:36

## 2018-01-10 RX ADMIN — Medication 1 MILLIGRAM(S): at 20:36

## 2018-01-10 RX ADMIN — LOSARTAN POTASSIUM 25 MILLIGRAM(S): 100 TABLET, FILM COATED ORAL at 20:36

## 2018-01-10 RX ADMIN — Medication 250 MILLIGRAM(S): at 23:37

## 2018-01-10 RX ADMIN — ENOXAPARIN SODIUM 40 MILLIGRAM(S): 100 INJECTION SUBCUTANEOUS at 20:42

## 2018-01-10 RX ADMIN — Medication 100 MILLIGRAM(S): at 23:00

## 2018-01-10 NOTE — H&P ADULT - NSHPPHYSICALEXAM_GEN_ALL_CORE
GENERAL: NAD, well-groomed, Morbid Obesity  HEAD:  Atraumatic, Normocephalic  EYES: EOMI, PERRL., conjunctiva and sclera clear  ENMT:  Moist mucous membranes, , No lesions  NECK: Supple, No JVD, Normal thyroid  NERVOUS SYSTEM:  Alert & Oriented X3, Good concentration; Motor Strength 5/5 B/L upper and lower extremities; DTRs 2+ intact and symmetric  CHEST/LUNG: Clear to percussion bilaterally; No rales, rhonchi, wheezing, or rubs  HEART: Regular rate and rhythm; No murmurs, rubs, or gallops  ABDOMEN: Soft, Nontender, Nondistended; Bowel sounds present. Obese  EXTREMITIES:  2+ Peripheral Pulses, No clubbing, cyanosis, Massive edema both legs. Wound Rt. Leg, Calf.  LYMPH: No lymphadenopathy noted  SKIN: No rashes or lesions

## 2018-01-10 NOTE — ED PROVIDER NOTE - OBJECTIVE STATEMENT
71 y/o female with PMH HTN here for wound check RLE. pt states she had this wound for about a year and has been coming here to the wound clinic for check-up. pt states when she went today to the wound clinic, the dr told her it was infected and needs to be admitted for iv abx. pt reports fever last night, she took tylenol and it went down. pt has no other complaints. no change in sensation. no known trauma or injury.     ROS:  fever last night, No chills. No eye pain/changes in vision, No ear pain/sore throat/dysphagia, No chest pain/palpitations. No SOB/cough/. No abdominal pain, N/V/D, no black/bloody bm. No dysuria/frequency/discharge, No headache. No Dizziness.    +wound RLE. No numbness/tingling/weakness.

## 2018-01-10 NOTE — ED PROVIDER NOTE - MEDICAL DECISION MAKING DETAILS
71 y/o female sent from wound clinic for admission for infected wound. will order labs, blood cultures, lactate, iv abx

## 2018-01-10 NOTE — CONSULT NOTE ADULT - SUBJECTIVE AND OBJECTIVE BOX
71 y/o female with PMH HTN here for wound check RLE. pt states she had this wound for about a year and has been coming here to the wound clinic for check-up. pt states when she went today to the wound clinic, the dr told her it was infected and needs to be admitted for iv abx. pt reports fever last night, she took tylenol and it went down. pt has no other complaints. no change in sensation. no known trauma or injury.    ROS:  fever last night, No chills. No eye pain/changes in vision, No ear pain/sore throat/dysphagia, No chest pain/palpitations. No SOB/cough/. No abdominal pain, N/V/D, no black/bloody bm. No dysuria/frequency/discharge, No headache. No Dizziness.    +wound RLE. No numbness/tingling/weakness. (10 Kaden 2018 15:07)  has been coming to wound care for months             PAST MEDICAL & SURGICAL HISTORY:  Other iron deficiency anemia  Morbid obesity due to excess calories  Essential hypertension  No significant past surgical history      SOCHX:   never tobacco,  -no  alcohol    FMHX: FA/MO  -non contributory       Recent Travel:    Immunizations:    Allergies    No Known Allergies    Intolerances        MEDICATIONS  (STANDING):  enoxaparin Injectable 40 milliGRAM(s) SubCutaneous every 24 hours  folic acid 1 milliGRAM(s) Oral daily  furosemide   Injectable 40 milliGRAM(s) IV Push daily  losartan 25 milliGRAM(s) Oral daily    MEDICATIONS  (PRN):      REVIEW OF SYSTEMS:  CONSTITUTIONAL: plus  fever  no chills , weight loss, or fatigue  EYES: No eye pain, visual disturbances, or discharge  ENMT:  No difficulty hearing, tinnitus, vertigo; No sinus or throat pain  NECK: No pain or stiffness  BREASTS: No pain, masses, or nipple discharge  RESPIRATORY: No cough, wheezing, chills or hemoptysis; No shortness of breath  CARDIOVASCULAR: No chest pain, palpitations, dizziness, or leg swelling  GASTROINTESTINAL: No abdominal or epigastric pain. No nausea, vomiting, or hematemesis; No diarrhea or constipation. No melena or hematochezia.  GENITOURINARY: No dysuria, frequency, hematuria, or incontinence  NEUROLOGICAL: No headaches, memory loss, loss of strength, numbness, or tremors  SKIN:open wound leg   LYMPH NODES: No enlarged glands  ENDOCRINE: No heat or cold intolerance; No hair loss  MUSCULOSKELETAL: No joint pain or swelling; No muscle, back, or extremity pain  has pain rt leg   PSYCHIATRIC: No depression, anxiety, mood swings, or difficulty sleeping  HEME/LYMPH: No easy bruising, or bleeding gums  ALLERGY AND IMMUNOLOGIC: No hives or eczema    VITAL SIGNS:    T(C): 37.8 (01-10-18 @ 21:27), Max: 37.8 (01-10-18 @ 21:27)  T(F): 100.1 (01-10-18 @ 21:27), Max: 100.1 (01-10-18 @ 21:27)  HR: 100 (01-10-18 @ 21:27) (83 - 100)  BP: 145/75 (01-10-18 @ 21:27) (145/75 - 168/94)  RR: 16 (01-10-18 @ 21:27) (16 - 20)  SpO2: 99% (01-10-18 @ 21:27) (96% - 99%)    PHYSICAL EXAM:    GENERAL: NAD, well-groomed, well-developed  morbidly obese   HEAD:  Atraumatic, Normocephalic  EYES: EOMI, PERRLA, conjunctiva and sclera clear  ENMT:  Moist mucous membranes, NECK: Supple, No JVD, Normal thyroid  NERVOUS SYSTEM:  Alert & Oriented X3, Good concentration;   limited as leg is painful   CHEST/LUNG: Clear to auscultation bilaterally; No rales, rhonchi, wheezing bilaterally  HEART: Regular rate and rhythm; No murmurs, rubs, or gallops  ABDOMEN: Soft, Nontender, Nondistended; Bowel sounds present  EXTREMITIES:  2+ Peripheral Pulses, No clubbing, cyanosis, or edema  rt leg is huge hot red around the chronic wound   LYMPH: No lymphadenopathy noted  SKIN: open wound   BACK: no pressor sore     LABS:                         10.6   12.6  )-----------( 210      ( 10 Kaden 2018 18:58 )             31.8     01-10    140  |  106  |  9   ----------------------------<  114<H>  3.5   |  26  |  0.75    Ca    8.4<L>      10 Kaden 2018 18:58    TPro  8.1  /  Alb  3.4  /  TBili  0.8  /  DBili  x   /  AST  26  /  ALT  49  /  AlkPhos  120  01-10    LIVER FUNCTIONS - ( 10 Kaden 2018 18:58 )  Alb: 3.4 g/dL / Pro: 8.1 gm/dL / ALK PHOS: 120 U/L / ALT: 49 U/L / AST: 26 U/L / GGT: x                                                 Radiology:    < from: Xray Chest 1 View AP/PA. (01.10.18 @ 13:49) >  MPRESSION:   Negative for acute cardiopulmonary process.                MOHINDER PELAEZ M.D., ATTENDING RADIOLOGIST  This document has been electronically signed. Kaden 10 2018  2:09PM    < end of copied text >

## 2018-01-10 NOTE — CONSULT NOTE ADULT - ASSESSMENT
super infected chronic wound   no recent antibiotics   coming to wound care for month   consider re biopsy ?? calciphyllaxis ; say was bx by [facility] months ago ?? no result

## 2018-01-10 NOTE — ED ADULT NURSE NOTE - OBJECTIVE STATEMENT
pt states she was at wound center was told her wound  under right leg was infected was referred to the ER for admission, wound is clean  there is no redness or oozing noted

## 2018-01-10 NOTE — ED PROVIDER NOTE - PHYSICAL EXAMINATION
Gen: Alert, NAD, obese, not toxic  Head: NC, AT, PERRL, EOMI, normal lids/conjunctiva  ENT: B TM WNL, normal hearing, patent oropharynx without erythema/exudate, uvula midline  Neck: +supple, no tenderness/meningismus/JVD, +Trachea midline  Pulm: Bilateral BS, normal resp effort, no wheeze/stridor/retractions  CV: RRR, no M/R/G, +dist pulses  Abd: soft, NT/ND, +BS, no hepatosplenomegaly  Mskel: R leg with mild erythema no edema/cyanosis  Skin: [id number] wound posterior R thigh, no drainage, warmth or erythema  Neuro: AAOx3, no sensory/motor deficits, CN 2-12 intact

## 2018-01-10 NOTE — H&P ADULT - ASSESSMENT
73 y/o female with PMH HTN here for wound check RLE. pt states she had this wound for about a year and has been coming here to the wound clinic for check-up. pt states when she went today to the wound clinic, the dr told her it was infected and needs to be admitted for iv abx. pt reports fever last night, she took tylenol and it went down. pt has no other complaints. no change in sensation. no known trauma or injury.   Start on IV antibiotics, will get ID Consult. Wound Care F/U.

## 2018-01-10 NOTE — H&P ADULT - HISTORY OF PRESENT ILLNESS
· HPI : 73 y/o female with PMH HTN here for wound check RLE. pt states she had this wound for about a year and has been coming here to the wound clinic for check-up. pt states when she went today to the wound clinic, the dr told her it was infected and needs to be admitted for iv abx. pt reports fever last night, she took tylenol and it went down. pt has no other complaints. no change in sensation. no known trauma or injury.    ROS:  fever last night, No chills. No eye pain/changes in vision, No ear pain/sore throat/dysphagia, No chest pain/palpitations. No SOB/cough/. No abdominal pain, N/V/D, no black/bloody bm. No dysuria/frequency/discharge, No headache. No Dizziness.    +wound RLE. No numbness/tingling/weakness.

## 2018-01-10 NOTE — PROGRESS NOTE ADULT - SUBJECTIVE AND OBJECTIVE BOX
71 y/o female with PMH HTN here for wound check RLE. pt states she had this wound for about a year and has been coming here to the wound clinic for check-up. pt states when she went today to the wound clinic, the dr told her it was infected and needs to be admitted for iv abx. pt reports fever last night, she took tylenol and it went down. pt has no other complaints. no change in sensation. no known trauma or injury.    ROS:  fever last night, No chills. No eye pain/changes in vision, No ear pain/sore throat/dysphagia, No chest pain/palpitations. No SOB/cough/. No abdominal pain, N/V/D, no black/bloody bm. No dysuria/frequency/discharge, No headache. No Dizziness.    +wound RLE. No numbness/tingling/weakness. (10 Kaden 2018 15:07)      Allergies    No Known Allergies    Intolerances        MEDICATIONS  (STANDING):  enoxaparin Injectable 40 milliGRAM(s) SubCutaneous every 24 hours  folic acid 1 milliGRAM(s) Oral daily  furosemide   Injectable 40 milliGRAM(s) IV Push daily  losartan 25 milliGRAM(s) Oral daily    MEDICATIONS  (PRN):      REVIEW OF SYSTEMS:    CONSTITUTIONAL: last night  fever, chills,  no  weight loss, or fatigue  HEENT: No sore throat, runny nose, ear ache  RESPIRATORY: No cough, wheezing, No shortness of breath  CARDIOVASCULAR: No chest pain, palpitations, dizziness  GASTROINTESTINAL: No abdominal pain. No nausea, vomiting, diarrhea  GENITOURINARY: No dysuria, increase frequency, hematuria, or incontinence  NEUROLOGICAL: No headaches, memory loss, loss of strength, numbness, or tremors, no weakness  EXTREMITY: No pedal edema BLE  SKIN: No itching, burning, rashes, or lesions     VITAL SIGNS:  T(C): 37.2 (01-10-18 @ 20:33), Max: 37.2 (01-10-18 @ 20:33)  T(F): 99 (01-10-18 @ 20:33), Max: 99 (01-10-18 @ 20:33)  HR: 89 (01-10-18 @ 20:33) (83 - 89)  BP: 157/80 (01-10-18 @ 20:33) (149/66 - 168/94)  RR: 20 (01-10-18 @ 20:33) (16 - 20)  SpO2: 99% (01-10-18 @ 20:33) (96% - 99%)  Wt(kg): --    PHYSICAL EXAM:    GENERAL: not in any distress  HEENT: Neck is supple, normocephalic, atraumatic   CHEST/LUNG: Clear to auscultation bilaterally; No rales, rhonchi, wheezing  HEART: Regular rate and rhythm; No murmurs, rubs, or gallops  ABDOMEN: Soft, Nontender, Nondistended; Bowel sounds present, no rebound   EXTREMITIES:  2+ Peripheral Pulses, No clubbing, cyanosis, or edema  GENITOURINARY:   SKIN: No rashes or lesions  BACK: no pressor sore   NERVOUS SYSTEM:  Alert & Oriented X3, Good concentration  PSYCH: normal affect     LABS:                         10.6   12.6  )-----------( 210      ( 10 Kaden 2018 18:58 )             31.8     01-10    140  |  106  |  9   ----------------------------<  114<H>  3.5   |  26  |  0.75    Ca    8.4<L>      10 Kaden 2018 18:58    TPro  8.1  /  Alb  3.4  /  TBili  0.8  /  DBili  x   /  AST  26  /  ALT  49  /  AlkPhos  120  01-10    LIVER FUNCTIONS - ( 10 Kaden 2018 18:58 )  Alb: 3.4 g/dL / Pro: 8.1 gm/dL / ALK PHOS: 120 U/L / ALT: 49 U/L / AST: 26 U/L / GGT: x                                                 Radiology:    < from: Xray Chest 1 View AP/PA. (01.10.18 @ 13:49) >  IMPRESSION:   Negative for acute cardiopulmonary process.                MOHINDER PELAEZ M.D., ATTENDING RADIOLOGIST  This document has been electronically signed. Kaden 10 2018  2:09PM          < end of copied text >

## 2018-01-10 NOTE — ED PROVIDER NOTE - ATTENDING CONTRIBUTION TO CARE
I have seen the patient with the PA and agree with above examination and assessment and plan with the following addendum:    Focused PE:   General: NAD, alert and oriented  Head: Normocephalic, atraumatic  Eyes: PERRLA, EOMI  Cardiac: RRR, no murmurs, rubs or gallops  Resp: CTA, no wheezes, rales or ronchi  GI: Nondistended, nontender, no rebound or guarding  MSK: RLE has lymphedema, has decubitus ulcer 6cm x 6cm on R. posterior thigh. No discharge. Some erythema.   Neuro: Alert and oriented, no focal deficits. Normal gait  Ext: Non edematous, nontender.

## 2018-01-11 LAB
ANION GAP SERPL CALC-SCNC: 8 MMOL/L — SIGNIFICANT CHANGE UP (ref 5–17)
BASOPHILS # BLD AUTO: 0.1 K/UL — SIGNIFICANT CHANGE UP (ref 0–0.2)
BASOPHILS NFR BLD AUTO: 0.6 % — SIGNIFICANT CHANGE UP (ref 0–2)
BUN SERPL-MCNC: 10 MG/DL — SIGNIFICANT CHANGE UP (ref 7–23)
CALCIUM SERPL-MCNC: 8.2 MG/DL — LOW (ref 8.5–10.1)
CHLORIDE SERPL-SCNC: 104 MMOL/L — SIGNIFICANT CHANGE UP (ref 96–108)
CO2 SERPL-SCNC: 29 MMOL/L — SIGNIFICANT CHANGE UP (ref 22–31)
CREAT SERPL-MCNC: 0.87 MG/DL — SIGNIFICANT CHANGE UP (ref 0.5–1.3)
EOSINOPHIL # BLD AUTO: 0.2 K/UL — SIGNIFICANT CHANGE UP (ref 0–0.5)
EOSINOPHIL NFR BLD AUTO: 1.8 % — SIGNIFICANT CHANGE UP (ref 0–6)
GLUCOSE SERPL-MCNC: 109 MG/DL — HIGH (ref 70–99)
HCT VFR BLD CALC: 30.2 % — LOW (ref 34.5–45)
HGB BLD-MCNC: 9.5 G/DL — LOW (ref 11.5–15.5)
LYMPHOCYTES # BLD AUTO: 1.3 K/UL — SIGNIFICANT CHANGE UP (ref 1–3.3)
LYMPHOCYTES # BLD AUTO: 11.3 % — LOW (ref 13–44)
MCHC RBC-ENTMCNC: 27.6 PG — SIGNIFICANT CHANGE UP (ref 27–34)
MCHC RBC-ENTMCNC: 31.5 GM/DL — LOW (ref 32–36)
MCV RBC AUTO: 87.7 FL — SIGNIFICANT CHANGE UP (ref 80–100)
MONOCYTES # BLD AUTO: 1 K/UL — HIGH (ref 0–0.9)
MONOCYTES NFR BLD AUTO: 9.2 % — SIGNIFICANT CHANGE UP (ref 2–14)
NEUTROPHILS # BLD AUTO: 8.8 K/UL — HIGH (ref 1.8–7.4)
NEUTROPHILS NFR BLD AUTO: 77.2 % — HIGH (ref 43–77)
PLATELET # BLD AUTO: 203 K/UL — SIGNIFICANT CHANGE UP (ref 150–400)
POTASSIUM SERPL-MCNC: 3.4 MMOL/L — LOW (ref 3.5–5.3)
POTASSIUM SERPL-SCNC: 3.4 MMOL/L — LOW (ref 3.5–5.3)
RBC # BLD: 3.44 M/UL — LOW (ref 3.8–5.2)
RBC # FLD: 14.4 % — SIGNIFICANT CHANGE UP (ref 11–15)
SODIUM SERPL-SCNC: 141 MMOL/L — SIGNIFICANT CHANGE UP (ref 135–145)
WBC # BLD: 11.4 K/UL — HIGH (ref 3.8–10.5)
WBC # FLD AUTO: 11.4 K/UL — HIGH (ref 3.8–10.5)

## 2018-01-11 RX ORDER — ACETAMINOPHEN 500 MG
650 TABLET ORAL EVERY 6 HOURS
Qty: 0 | Refills: 0 | Status: DISCONTINUED | OUTPATIENT
Start: 2018-01-11 | End: 2018-01-19

## 2018-01-11 RX ORDER — PIPERACILLIN AND TAZOBACTAM 4; .5 G/20ML; G/20ML
3.38 INJECTION, POWDER, LYOPHILIZED, FOR SOLUTION INTRAVENOUS EVERY 8 HOURS
Qty: 0 | Refills: 0 | Status: DISCONTINUED | OUTPATIENT
Start: 2018-01-11 | End: 2018-01-19

## 2018-01-11 RX ORDER — POTASSIUM CHLORIDE 20 MEQ
40 PACKET (EA) ORAL ONCE
Qty: 0 | Refills: 0 | Status: COMPLETED | OUTPATIENT
Start: 2018-01-11 | End: 2018-01-11

## 2018-01-11 RX ADMIN — Medication 40 MILLIGRAM(S): at 05:34

## 2018-01-11 RX ADMIN — Medication 100 MILLIGRAM(S): at 15:58

## 2018-01-11 RX ADMIN — Medication 650 MILLIGRAM(S): at 18:52

## 2018-01-11 RX ADMIN — Medication 40 MILLIEQUIVALENT(S): at 12:18

## 2018-01-11 RX ADMIN — PIPERACILLIN AND TAZOBACTAM 12.5 GRAM(S): 4; .5 INJECTION, POWDER, LYOPHILIZED, FOR SOLUTION INTRAVENOUS at 23:21

## 2018-01-11 RX ADMIN — Medication 1 MILLIGRAM(S): at 12:18

## 2018-01-11 RX ADMIN — LOSARTAN POTASSIUM 25 MILLIGRAM(S): 100 TABLET, FILM COATED ORAL at 12:18

## 2018-01-11 RX ADMIN — Medication 100 MILLIGRAM(S): at 05:34

## 2018-01-11 RX ADMIN — ENOXAPARIN SODIUM 40 MILLIGRAM(S): 100 INJECTION SUBCUTANEOUS at 22:01

## 2018-01-11 RX ADMIN — Medication 250 MILLIGRAM(S): at 12:18

## 2018-01-11 RX ADMIN — Medication 250 MILLIGRAM(S): at 23:21

## 2018-01-11 NOTE — PROGRESS NOTE ADULT - ASSESSMENT
infected leg wound   persistent fever   need sx follow up ?? debride  ct leg am   will follow with you thanks

## 2018-01-11 NOTE — PROGRESS NOTE ADULT - SUBJECTIVE AND OBJECTIVE BOX
73 y/o female with PMH HTN here for wound check RLE. pt states she had this wound for about a year and has been coming here to the wound clinic for check-up. pt states when she went today to the wound clinic, the dr told her it was infected and needs to be admitted for iv abx. pt reports fever last night, she took tylenol and it went down.     +wound RLE. No numbness/tingling/weakness    No Known Allergies    Intolerances        MEDICATIONS  (STANDING):  ceFAZolin   IVPB 2000 milliGRAM(s) IV Intermittent every 8 hours  enoxaparin Injectable 40 milliGRAM(s) SubCutaneous every 24 hours  folic acid 1 milliGRAM(s) Oral daily  furosemide   Injectable 40 milliGRAM(s) IV Push daily  losartan 25 milliGRAM(s) Oral daily  vancomycin  IVPB 1000 milliGRAM(s) IV Intermittent every 12 hours    MEDICATIONS  (PRN):  acetaminophen   Tablet 650 milliGRAM(s) Oral every 6 hours PRN For Temp greater than 38 C (100.4 F)      REVIEW OF SYSTEMS:  feels better   decreased pain   still febrile   VITAL SIGNS:  T(C): 38.4 (01-11-18 @ 18:14), Max: 38.4 (01-11-18 @ 18:14)  T(F): 101.1 (01-11-18 @ 18:14), Max: 101.1 (01-11-18 @ 18:14)  HR: 86 (01-11-18 @ 18:14) (78 - 98)  BP: 122/49 (01-11-18 @ 18:14) (119/56 - 141/82)  RR: 17 (01-11-18 @ 18:14) (17 - 18)  SpO2: 98% (01-11-18 @ 18:14) (97% - 99%)  Wt(kg): --    PHYSICAL EXAM:    GENERAL: not in any distress  HEENT: Neck is supple, normocephalic, atraumatic   CHEST/LUNG: Clear to auscultation bilaterally; No rales, rhonchi, wheezing  HEART: Regular rate and rhythm; No murmurs, rubs, or gallops  ABDOMEN: Soft, Nontender, Nondistended; Bowel sounds present, no rebound   EXTREMITIES:  2+ Peripheral Pulses, No clubbing, cyanosis,  rt leg wound no change in status ; no change in status of erythema or induration  GENITOURINARY: NEGATIVE   SKIN: no change in status   BACK: no pressor sore   NERVOUS SYSTEM:  Alert & Oriented X3, Good concentration  PSYCH: normal affect     LABS:                         9.5    11.4  )-----------( 203      ( 11 Jan 2018 07:31 )             30.2     01-11    141  |  104  |  10  ----------------------------<  109<H>  3.4<L>   |  29  |  0.87    Ca    8.2<L>      11 Jan 2018 07:30    TPro  8.1  /  Alb  3.4  /  TBili  0.8  /  DBili  x   /  AST  26  /  ALT  49  /  AlkPhos  120  01-10    LIVER FUNCTIONS - ( 10 Kaden 2018 18:58 )  Alb: 3.4 g/dL / Pro: 8.1 gm/dL / ALK PHOS: 120 U/L / ALT: 49 U/L / AST: 26 U/L / GGT: x                                                 Radiology:

## 2018-01-11 NOTE — PROGRESS NOTE ADULT - SUBJECTIVE AND OBJECTIVE BOX
Patient is a 72y old  Female who presents with a chief complaint of Infected wound rt. leg (10 Kaden 2018 15:07)      INTERVAL HPI/OVERNIGHT EVENTS:    MEDICATIONS  (STANDING):  ceFAZolin   IVPB 2000 milliGRAM(s) IV Intermittent every 8 hours  enoxaparin Injectable 40 milliGRAM(s) SubCutaneous every 24 hours  folic acid 1 milliGRAM(s) Oral daily  furosemide   Injectable 40 milliGRAM(s) IV Push daily  losartan 25 milliGRAM(s) Oral daily  vancomycin  IVPB 1000 milliGRAM(s) IV Intermittent every 12 hours    MEDICATIONS  (PRN):      Allergies    No Known Allergies    Intolerances        REVIEW OF SYSTEMS:  CONSTITUTIONAL: No fever, weight loss, or fatigue  EYES: No eye pain, visual disturbances, or discharge  ENMT:  No difficulty hearing, tinnitus, vertigo; No sinus or throat pain  NECK: No pain or stiffness  BREASTS: No pain, masses, or nipple discharge  RESPIRATORY: No cough, wheezing, chills or hemoptysis; No shortness of breath  CARDIOVASCULAR: No chest pain, palpitations, dizziness, or leg swelling  GASTROINTESTINAL: No abdominal or epigastric pain. No nausea, vomiting, or hematemesis; No diarrhea or constipation. No melena or hematochezia.  GENITOURINARY: No dysuria, frequency, hematuria, or incontinence  NEUROLOGICAL: No headaches, memory loss, loss of strength, numbness, or tremors  SKIN: No itching, burning, rashes, or lesions   LYMPH NODES: No enlarged glands  ENDOCRINE: No heat or cold intolerance; No hair loss  MUSCULOSKELETAL: No joint pain or swelling; No muscle, back, or extremity pain  PSYCHIATRIC: No depression, anxiety, mood swings, or difficulty sleeping  HEME/LYMPH: No easy bruising, or bleeding gums  ALLERGY AND IMMUNOLOGIC: No hives or eczema    Vital Signs Last 24 Hrs  T(C): 37.9 (11 Jan 2018 04:47), Max: 38.3 (11 Jan 2018 00:53)  T(F): 100.3 (11 Jan 2018 04:47), Max: 100.9 (11 Jan 2018 00:53)  HR: 86 (11 Jan 2018 04:47) (83 - 100)  BP: 119/56 (11 Jan 2018 04:47) (119/56 - 168/94)  BP(mean): --  RR: 18 (11 Jan 2018 04:47) (16 - 20)  SpO2: 97% (11 Jan 2018 04:47) (96% - 99%)    PHYSICAL EXAM:  GENERAL: NAD, well-groomed, Obese  HEAD:  Atraumatic, Normocephalic  EYES: EOMI, PERRL, conjunctiva and sclera clear  ENMT:  Moist mucous membranes, Good dentition, No lesions  NECK: Supple, No JVD, Normal thyroid  NERVOUS SYSTEM:  Alert & Oriented X3, Good concentration; Motor Strength 5/5 B/L upper and lower extremities; DTRs 2+ intact and symmetric  CHEST/LUNG: Clear to percussion bilaterally; No rales, rhonchi, wheezing, or rubs  HEART: Regular rate and rhythm; No murmurs, rubs, or gallops  ABDOMEN: Soft, Nontender, Nondistended; Bowel sounds present. Obese  EXTREMITIES:  2+ Peripheral Pulses, No clubbing, cyanosis, or edema  LYMPH: No lymphadenopathy noted  SKIN: No rashes or lesions    LABS:                        9.5    11.4  )-----------( 203      ( 11 Jan 2018 07:31 )             30.2     01-11    141  |  104  |  10  ----------------------------<  109<H>  3.4<L>   |  29  |  0.87    Ca    8.2<L>      11 Jan 2018 07:30    TPro  8.1  /  Alb  3.4  /  TBili  0.8  /  DBili  x   /  AST  26  /  ALT  49  /  AlkPhos  120  01-10        CAPILLARY BLOOD GLUCOSE      RADIOLOGY & ADDITIONAL TESTS:  < from: Xray Chest 1 View AP/PA. (01.10.18 @ 13:49) >    EXAM:  XR CHEST AP OR PA 1V                            PROCEDURE DATE:  01/10/2018          INTERPRETATION:    DATE OF EXAM:  1/10/18.    COMPARISON: None.    CLINICAL INDICATION: 72-yo-female with right leg cellulitis. Assess for   chest process.    TECHNIQUE: Portable chest.    FINDINGS:  The study is limited by patient positioning and low lung volumes.  The cardiac and mediastinal contours are prominent, which may be due to   exaggeration from AP technique and shallow inspiration.   Increased reticular/interstitial markings seen bilaterally.  No clear-cut, bilateral focal infiltrates seen.  No significant pleural effusion. No pneumothorax.  There are degenerative changes of the thoracic spine.    IMPRESSION:   Negative for acute cardiopulmonary process.    MOHINDER PELAEZ M.D., ATTENDING RADIOLOGIST  This document has been electronically signed. Kaden 10 2018  2:09PM       < end of copied text >    Imaging Personally Reviewed:  [ ] YES  [ ] NO    Consultant(s) Notes Reviewed:  [ ] YES  [ ] NO    Care Discussed with Consultants/Other Providers [ ] YES  [ ] NO    PROBLEMS:  CELLULITIS IN ABSCESS OF RIGHT LEG  CELLULITIS RIGHT LEG  Cellulitis and abscess of right leg  Other iron deficiency anemia  Morbid obesity due to excess calories  Essential hypertension  Cellulitis and abscess of right leg      Care discussed with family,         [  ]   yes  [  ]  No    imp:    stable[ ]    unstable[  ]     improving [   ]       unchanged  [  ]                Plans:  Continue present plans  [  ]               New consult [  ]   specialty  .......               order annika[  ]    test name.                  Discharge Planning  [  ]

## 2018-01-11 NOTE — PROGRESS NOTE ADULT - ASSESSMENT
71 y/o female with PMH HTN here for wound check RLE. pt states she had this wound for about a year and has been coming here to the wound clinic for check-up. pt states when she went today to the wound clinic, the dr told her it was infected and needs to be admitted for iv abx. pt reports fever last night, she took tylenol and it went down. pt has no other complaints. no change in sensation. no known trauma or injury.   Start on IV antibiotics, will get ID Consult. Wound Care F/U.  01/11/18 : Pt. started on IV Ancef and Vancomycin . ID consult noted. continue monitoring. Wound care F/U.

## 2018-01-12 LAB
ANION GAP SERPL CALC-SCNC: 10 MMOL/L — SIGNIFICANT CHANGE UP (ref 5–17)
BUN SERPL-MCNC: 12 MG/DL — SIGNIFICANT CHANGE UP (ref 7–23)
CALCIUM SERPL-MCNC: 8.2 MG/DL — LOW (ref 8.5–10.1)
CHLORIDE SERPL-SCNC: 106 MMOL/L — SIGNIFICANT CHANGE UP (ref 96–108)
CO2 SERPL-SCNC: 24 MMOL/L — SIGNIFICANT CHANGE UP (ref 22–31)
CREAT SERPL-MCNC: 0.78 MG/DL — SIGNIFICANT CHANGE UP (ref 0.5–1.3)
GLUCOSE SERPL-MCNC: 98 MG/DL — SIGNIFICANT CHANGE UP (ref 70–99)
HCT VFR BLD CALC: 30.7 % — LOW (ref 34.5–45)
HGB BLD-MCNC: 9.9 G/DL — LOW (ref 11.5–15.5)
MCHC RBC-ENTMCNC: 28.2 PG — SIGNIFICANT CHANGE UP (ref 27–34)
MCHC RBC-ENTMCNC: 32.3 GM/DL — SIGNIFICANT CHANGE UP (ref 32–36)
MCV RBC AUTO: 87.3 FL — SIGNIFICANT CHANGE UP (ref 80–100)
PLATELET # BLD AUTO: 167 K/UL — SIGNIFICANT CHANGE UP (ref 150–400)
POTASSIUM SERPL-MCNC: 3.5 MMOL/L — SIGNIFICANT CHANGE UP (ref 3.5–5.3)
POTASSIUM SERPL-SCNC: 3.5 MMOL/L — SIGNIFICANT CHANGE UP (ref 3.5–5.3)
RBC # BLD: 3.52 M/UL — LOW (ref 3.8–5.2)
RBC # FLD: 14 % — SIGNIFICANT CHANGE UP (ref 11–15)
SODIUM SERPL-SCNC: 140 MMOL/L — SIGNIFICANT CHANGE UP (ref 135–145)
VANCOMYCIN TROUGH SERPL-MCNC: 7.7 UG/ML — LOW (ref 10–20)
WBC # BLD: 8.9 K/UL — SIGNIFICANT CHANGE UP (ref 3.8–10.5)
WBC # FLD AUTO: 8.9 K/UL — SIGNIFICANT CHANGE UP (ref 3.8–10.5)

## 2018-01-12 PROCEDURE — 73700 CT LOWER EXTREMITY W/O DYE: CPT | Mod: 26,RT

## 2018-01-12 RX ADMIN — PIPERACILLIN AND TAZOBACTAM 12.5 GRAM(S): 4; .5 INJECTION, POWDER, LYOPHILIZED, FOR SOLUTION INTRAVENOUS at 05:45

## 2018-01-12 RX ADMIN — PIPERACILLIN AND TAZOBACTAM 12.5 GRAM(S): 4; .5 INJECTION, POWDER, LYOPHILIZED, FOR SOLUTION INTRAVENOUS at 21:56

## 2018-01-12 RX ADMIN — Medication 1 MILLIGRAM(S): at 11:12

## 2018-01-12 RX ADMIN — Medication 250 MILLIGRAM(S): at 20:49

## 2018-01-12 RX ADMIN — ENOXAPARIN SODIUM 40 MILLIGRAM(S): 100 INJECTION SUBCUTANEOUS at 21:08

## 2018-01-12 RX ADMIN — PIPERACILLIN AND TAZOBACTAM 12.5 GRAM(S): 4; .5 INJECTION, POWDER, LYOPHILIZED, FOR SOLUTION INTRAVENOUS at 14:18

## 2018-01-12 RX ADMIN — LOSARTAN POTASSIUM 25 MILLIGRAM(S): 100 TABLET, FILM COATED ORAL at 12:23

## 2018-01-12 RX ADMIN — Medication 40 MILLIGRAM(S): at 05:45

## 2018-01-12 NOTE — DIETITIAN INITIAL EVALUATION ADULT. - ENERGY NEEDS
Height (cm): 160.02 (01-10)  Weight (kg): 146.6 (01-10)  BMI (kg/m2): 57.3 (01-10)  IBW: 52.1 kg        % IBW: 281%    UBW: 134.7    %UBW: 108% ( as per adm wt. 01-10)  edema noted

## 2018-01-12 NOTE — PROGRESS NOTE ADULT - SUBJECTIVE AND OBJECTIVE BOX
HPI:  · HPI : 71 y/o female with PMH HTN here for wound check RLE. pt states she had this wound for about a year and has been coming here to the wound clinic for check-up. pt states when she went today to the wound clinic, the dr told her it was infected and needs to be admitted for iv abx. pt reports fever last night, she took tylenol and it went down. pt has no other complaints. no change in sensation. no known trauma or injury.    ROS:  fever last night, No chills. No eye pain/changes in vision, No ear pain/sore throat/dysphagia, No chest pain/palpitations. No SOB/cough/. No abdominal pain, N/V/D, no black/bloody bm. No dysuria/frequency/discharge, No headache. No Dizziness.    +wound RLE. No numbness/tingling/weakness. (10 Kaden 2018 15:07)  low vanco level   missed dose am     Allergies    No Known Allergies    Intolerances        MEDICATIONS  (STANDING):  enoxaparin Injectable 40 milliGRAM(s) SubCutaneous every 24 hours  folic acid 1 milliGRAM(s) Oral daily  furosemide   Injectable 40 milliGRAM(s) IV Push daily  losartan 25 milliGRAM(s) Oral daily  piperacillin/tazobactam IVPB. 3.375 Gram(s) IV Intermittent every 8 hours  vancomycin  IVPB 1000 milliGRAM(s) IV Intermittent every 12 hours    MEDICATIONS  (PRN):  acetaminophen   Tablet 650 milliGRAM(s) Oral every 6 hours PRN For Temp greater than 38 C (100.4 F)      REVIEW OF SYSTEMS:    severe pain leg   burning itching all plus   VITAL SIGNS:  T(C): 37.8 (01-12-18 @ 19:27), Max: 37.8 (01-12-18 @ 19:27)  T(F): 100 (01-12-18 @ 19:27), Max: 100 (01-12-18 @ 19:27)  HR: 80 (01-12-18 @ 19:08) (77 - 92)  BP: 140/62 (01-12-18 @ 19:08) (107/51 - 152/87)  RR: 18 (01-12-18 @ 19:08) (16 - 18)  SpO2: 98% (01-12-18 @ 19:08) (97% - 99%)  Wt(kg): --    PHYSICAL EXAM:    GENERAL: not in any distress  HEENT: Neck is supple, normocephalic, atraumatic   CHEST/LUNG: Clear to auscultation bilaterally; No rales, rhonchi, wheezing  HEART: Regular rate and rhythm; No murmurs, rubs, or gallops  ABDOMEN: Soft, Nontender, Nondistended; Bowel sounds present, no rebound   EXTREMITIES:  2+ Peripheral Pulses, No clubbing, cyanosis, or edema  not seen today   SKIN: extensive ulcerations not really infected  BACK: no pressor sore   NERVOUS SYSTEM:  Alert & Oriented X3, Good concentration  PSYCH: normal affect     LABS:                         9.9    8.9   )-----------( 167      ( 12 Jan 2018 07:10 )             30.7     01-12    140  |  106  |  12  ----------------------------<  98  3.5   |  24  |  0.78    Ca    8.2<L>      12 Jan 2018 07:10                          Vancomycin Level, Trough: 7.7 ug/mL (01-12 @ 19:20)        Culture Results:   No growth to date. (01-11 @ 09:27)  Culture Results:   No growth to date. (01-11 @ 08:41)  Culture Results:   No growth to date. (01-10 @ 23:56)                Radiology:    < from: CT Lower Extremity No Cont, Right (01.12.18 @ 18:23) >  Impression: Marked subcutaneous edema and confluent fluid at the level of   the left distal thigh and knee which is only partially included on this   study and consistent with cellulitis. No CT evidenceof osteomyelitis.                MOY MCDUFFIE M.D., ATTENDING RADIOLOGIST  This document has been electronically signed. Jan 12 2018  9:06PM                < end of copied text >

## 2018-01-12 NOTE — DIETITIAN INITIAL EVALUATION ADULT. - NS AS NUTRI INTERV ED CONTENT
Purpose of the nutrition education/Educate on Low sodium / HTN nutrition therapy, wt. loss nutrition therapy Purpose of the nutrition education/Educate on Low sodium / HTN nutrition therapy, wt. loss nutrition therapy, provide Directory of ambulatory nutrition services for outpatient management for wt. loss

## 2018-01-12 NOTE — PROGRESS NOTE ADULT - ASSESSMENT
infected leg wound   persistent fever   optimize vanco   need sx follow up ?? debride  discussed with nursing   very poor access   will get midline for short course antibiotics   will follow with you thanks

## 2018-01-12 NOTE — PROGRESS NOTE ADULT - SUBJECTIVE AND OBJECTIVE BOX
Patient is a 72y old  Female who presents with a chief complaint of Infected wound rt. leg (10 Kaden 2018 15:07)      INTERVAL HPI/OVERNIGHT EVENTS: Afebrile.     MEDICATIONS  (STANDING):  enoxaparin Injectable 40 milliGRAM(s) SubCutaneous every 24 hours  folic acid 1 milliGRAM(s) Oral daily  furosemide   Injectable 40 milliGRAM(s) IV Push daily  losartan 25 milliGRAM(s) Oral daily  piperacillin/tazobactam IVPB. 3.375 Gram(s) IV Intermittent every 8 hours  vancomycin  IVPB 1000 milliGRAM(s) IV Intermittent every 12 hours    MEDICATIONS  (PRN):  acetaminophen   Tablet 650 milliGRAM(s) Oral every 6 hours PRN For Temp greater than 38 C (100.4 F)      Allergies    No Known Allergies    Intolerances        REVIEW OF SYSTEMS:  CONSTITUTIONAL: No fever, weight loss, or fatigue  EYES: No eye pain, visual disturbances, or discharge  ENMT:  No difficulty hearing, tinnitus, vertigo; No sinus or throat pain  NECK: No pain or stiffness  BREASTS: No pain, masses, or nipple discharge  RESPIRATORY: No cough, wheezing, chills or hemoptysis; No shortness of breath  CARDIOVASCULAR: No chest pain, palpitations, dizziness, or leg swelling  GASTROINTESTINAL: No abdominal or epigastric pain. No nausea, vomiting, or hematemesis; No diarrhea or constipation. No melena or hematochezia.  GENITOURINARY: No dysuria, frequency, hematuria, or incontinence  NEUROLOGICAL: No headaches, memory loss, loss of strength, numbness, or tremors  SKIN: No itching, burning, rashes, or lesions   LYMPH NODES: No enlarged glands  ENDOCRINE: No heat or cold intolerance; No hair loss  MUSCULOSKELETAL: No joint pain or swelling; No muscle, back, or extremity pain  PSYCHIATRIC: No depression, anxiety, mood swings, or difficulty sleeping  HEME/LYMPH: No easy bruising, or bleeding gums  ALLERGY AND IMMUNOLOGIC: No hives or eczema    Vital Signs Last 24 Hrs  T(C): 37 (12 Jan 2018 05:08), Max: 38.4 (11 Jan 2018 18:14)  T(F): 98.6 (12 Jan 2018 05:08), Max: 101.1 (11 Jan 2018 18:14)  HR: 77 (12 Jan 2018 05:08) (77 - 86)  BP: 107/51 (12 Jan 2018 05:08) (107/51 - 138/72)  BP(mean): --  RR: 16 (12 Jan 2018 05:08) (16 - 18)  SpO2: 98% (12 Jan 2018 05:08) (98% - 99%)    PHYSICAL EXAM:  GENERAL: NAD, well-groomed, Morbid Obesity.  HEAD:  Atraumatic, Normocephalic  EYES: EOMI, PERRL, conjunctiva and sclera clear  ENMT:  Moist mucous membranes, Good dentition, No lesions  NECK: Supple, No JVD, Normal thyroid  NERVOUS SYSTEM:  Alert & Oriented X3, Good concentration; Motor Strength 5/5 B/L upper and lower extremities; DTRs 2+ intact and symmetric  CHEST/LUNG: Clear to percussion bilaterally; No rales, rhonchi, wheezing, or rubs  HEART: Regular rate and rhythm; No murmurs, rubs, or gallops  ABDOMEN: Soft, Nontender, Nondistended; Bowel sounds present. Obese  EXTREMITIES:  2+ Peripheral Pulses, No clubbing, cyanosis, Bilateral edema.  LYMPH: No lymphadenopathy noted  SKIN: No rashes or lesions    LABS:                        9.9    8.9   )-----------( 167      ( 12 Jan 2018 07:10 )             30.7     01-12    140  |  106  |  12  ----------------------------<  98  3.5   |  24  |  0.78    Ca    8.2<L>      12 Jan 2018 07:10    TPro  8.1  /  Alb  3.4  /  TBili  0.8  /  DBili  x   /  AST  26  /  ALT  49  /  AlkPhos  120  01-10        CAPILLARY BLOOD GLUCOSE        Culture - Blood (collected 10 Kaden 2018 23:56)  Source: .Blood Blood  Preliminary Report (12 Jan 2018 01:05):    No growth to date.        RADIOLOGY & ADDITIONAL TESTS:    Imaging Personally Reviewed:  [ ] YES  [ ] NO    Consultant(s) Notes Reviewed:  [x ] YES  [ ] NO    Care Discussed with Consultants/Other Providers [ ] YES  [ ] NO    PROBLEMS:  CELLULITIS IN ABSCESS OF RIGHT LEG  CELLULITIS RIGHT LEG  Cellulitis and abscess of right leg  Other iron deficiency anemia  Morbid obesity due to excess calories  Essential hypertension  Cellulitis and abscess of right leg      Care discussed with family,         [  ]   yes  [  ]  No    imp:    stable[x ]    unstable[  ]     improving [   ]       unchanged  [  ]                Plans:  Continue present plans  [  ]               New consult [  ]   specialty  .......               order annika[  ]    test name.                  Discharge Planning  [  ]

## 2018-01-12 NOTE — PROGRESS NOTE ADULT - ASSESSMENT
73 y/o female with PMH HTN here for wound check RLE. pt states she had this wound for about a year and has been coming here to the wound clinic for check-up. pt states when she went today to the wound clinic, the dr told her it was infected and needs to be admitted for iv abx. pt reports fever last night, she took tylenol and it went down. pt has no other complaints. no change in sensation. no known trauma or injury.   Start on IV antibiotics, will get ID Consult. Wound Care F/U.  01/11/18 : Pt. started on IV Ancef and Vancomycin . ID consult noted. continue monitoring. Wound care F/U.  01/12/18 : Pt. on Zosyn and Vancomycin now. Afebrile at present. ID f/u noted for CT scan of rt. lower extremity.

## 2018-01-12 NOTE — DIETITIAN INITIAL EVALUATION ADULT. - OTHER INFO
Pt seen due to RN consult for BMI> 40.  Pt reports that she has been trying to follow low fat , calorie controlled diet by baking instead of frying, eating leaner meats & avoiding sweets.   Diet hx provided indicates pt to consume 1 slice of rivas, egg and 2 slices of toast for breakfast, fruit for lunch & regular meal for dinner c meat, starch & vegetable or hearty chicken soup.  Pt snacks on fruits and walnuts.  Pt reports to consume foods high in sodium ie., sausage, araiza.  Pt reports that she used to weigh 346 LBS and lost weight during hospitalization from december 2106-January 2017 when her weight had gone down to 302 LBS.  Pt had further lost weight through diet control and was 297 LBS until recent wt. gain.  Pt c good appetite, consuming > 75% of meals.  Pt was on Iron & folic acid supplement PTA.

## 2018-01-12 NOTE — DIETITIAN INITIAL EVALUATION ADULT. - ADHERENCE
fair/Low sodium, low fat diet, pt did the cooking, grand daughter did the shopping , pt lived c spouse and grand daughter PTA

## 2018-01-12 NOTE — DIETITIAN INITIAL EVALUATION ADULT. - PERTINENT LABORATORY DATA
01-12 Na140 mmol/L Glu 98 mg/dL K+ 3.5 mmol/L Cr  0.78 mg/dL BUN 12 mg/dL Est GFR (AA)89 mL/min/1.73M2 Est Phos n/a   Ca 8.2 mg/dL<L> Hgb 9.9 g/dL<L> Hct 30.7 %<L> mean cell volume, mean cell hemoglobin & mean cell hemoglobin concentration WNL 01-10 Glucose 114 mg/dL<H> Alb 3.4 g/dL<L>

## 2018-01-13 RX ORDER — ACETAMINOPHEN 500 MG
650 TABLET ORAL EVERY 6 HOURS
Qty: 0 | Refills: 0 | Status: DISCONTINUED | OUTPATIENT
Start: 2018-01-13 | End: 2018-01-19

## 2018-01-13 RX ADMIN — Medication 650 MILLIGRAM(S): at 23:50

## 2018-01-13 RX ADMIN — Medication 250 MILLIGRAM(S): at 11:36

## 2018-01-13 RX ADMIN — Medication 1 MILLIGRAM(S): at 11:35

## 2018-01-13 RX ADMIN — PIPERACILLIN AND TAZOBACTAM 12.5 GRAM(S): 4; .5 INJECTION, POWDER, LYOPHILIZED, FOR SOLUTION INTRAVENOUS at 05:25

## 2018-01-13 RX ADMIN — PIPERACILLIN AND TAZOBACTAM 12.5 GRAM(S): 4; .5 INJECTION, POWDER, LYOPHILIZED, FOR SOLUTION INTRAVENOUS at 15:11

## 2018-01-13 RX ADMIN — ENOXAPARIN SODIUM 40 MILLIGRAM(S): 100 INJECTION SUBCUTANEOUS at 22:20

## 2018-01-13 RX ADMIN — PIPERACILLIN AND TAZOBACTAM 12.5 GRAM(S): 4; .5 INJECTION, POWDER, LYOPHILIZED, FOR SOLUTION INTRAVENOUS at 22:20

## 2018-01-13 RX ADMIN — Medication 250 MILLIGRAM(S): at 23:39

## 2018-01-13 NOTE — PROGRESS NOTE ADULT - ASSESSMENT
infected leg wound   persistent fever   optimize vanco   need sx follow up ?? debride  discussed with nursing   very poor access   will get midline for short course antibiotics   will follow with you thanks infected leg wound   persistent fever   vanco missed dose yesterday level only 7   resumed and will recheck   discussed with nursing   very poor access   will get midline for short course antibiotics   will follow with you thanks

## 2018-01-13 NOTE — PROGRESS NOTE ADULT - SUBJECTIVE AND OBJECTIVE BOX
Patient is a 72y old  Female who presents with a chief complaint of Infected wound rt. leg (10 Kaden 2018 15:07) Patient was sent to ED by me for celulitis and lymphyangitis of the right thigh and rt leg, with non healing right thigh ulcer. Pt improved with the ABX, NO pain and no fevers.    piperacillin/tazobactam IVPB. 3.375  vancomycin  IVPB 1000  enoxaparin Injectable 40  furosemide   Injectable 40  losartan 25  piperacillin/tazobactam IVPB. 3.375  vancomycin  IVPB 1000    Allergies    No Known Allergies    Intolerances        Vital Signs Last 24 Hrs  T(C): 37.1 (13 Jan 2018 11:27), Max: 37.8 (12 Jan 2018 19:27)  T(F): 98.7 (13 Jan 2018 11:27), Max: 100 (12 Jan 2018 19:27)  HR: 77 (13 Jan 2018 11:27) (77 - 83)  BP: 107/50 (13 Jan 2018 11:27) (104/52 - 140/62)  BP(mean): --  RR: 18 (13 Jan 2018 11:27) (18 - 18)  SpO2: 98% (13 Jan 2018 11:27) (94% - 98%)  I&O's Detail    12 Jan 2018 07:01  -  13 Jan 2018 07:00  --------------------------------------------------------  IN:    Oral Fluid: 500 mL    Solution: 100 mL    Solution: 250 mL  Total IN: 850 mL    OUT:  Total OUT: 0 mL    Total NET: 850 mL      13 Jan 2018 07:01  -  13 Jan 2018 14:20  --------------------------------------------------------  IN:    Oral Fluid: 750 mL  Total IN: 750 mL    OUT:  Total OUT: 0 mL    Total NET: 750 mL          Physical Exam: Right thigh with localised lymphedema and ulcer on the right post thigh, the ulce is clean and stable. the cellulitis is improved,  General: NAD, resting comfortably in bed  Pulmonary: Nonlabored breathing, no respiratory distress  Cardiovascular: NSR  Abdominal: soft, NT/ND  Extremities: WWP  Pulses:   Right:                                                                          Left:  FEM [ ]2+ [ ]1+ [ ]doppler                                             FEM [ ]2+ [ ]1+ [ ]doppler    POP [ ]2+ [ ]1+ [ ]doppler                                             POP [ ]2+ [ ]1+ [ ]doppler    DP [ ]2+ [ ]1+ [ ]doppler                                                DP [ ]2+ [ ]1+ [ ]doppler  PT[ ]2+ [ ]1+ [ ]doppler                                                  PT [ ]2+ [ ]1+ [ ]doppler      LABS:                        9.9    8.9   )-----------( 167      ( 12 Jan 2018 07:10 )             30.7     01-12    140  |  106  |  12  ----------------------------<  98  3.5   |  24  |  0.78    Ca    8.2<L>      12 Jan 2018 07:10        CAPILLARY BLOOD GLUCOSE        Radiology and Additional Studies:    Assessment and Plan: 72yFemaleCELLULITIS IN ABSCESS OF RIGHT LEG  CELLULITIS RIGHT LEG    Other iron deficiency anemia  Morbid obesity due to excess calories  Essential hypertension  No pertinent past medical history  No significant past surgical history

## 2018-01-13 NOTE — PROGRESS NOTE ADULT - SUBJECTIVE AND OBJECTIVE BOX
Patient is a 72y old  Female who presents with a chief complaint of Infected wound rt. leg (10 Kaden 2018 15:07)      INTERVAL HPI/OVERNIGHT EVENTS: Leg edema improving.    MEDICATIONS  (STANDING):  enoxaparin Injectable 40 milliGRAM(s) SubCutaneous every 24 hours  folic acid 1 milliGRAM(s) Oral daily  furosemide   Injectable 40 milliGRAM(s) IV Push daily  losartan 25 milliGRAM(s) Oral daily  piperacillin/tazobactam IVPB. 3.375 Gram(s) IV Intermittent every 8 hours  vancomycin  IVPB 1000 milliGRAM(s) IV Intermittent every 12 hours    MEDICATIONS  (PRN):  acetaminophen   Tablet 650 milliGRAM(s) Oral every 6 hours PRN For Temp greater than 38 C (100.4 F)      Allergies    No Known Allergies    Intolerances        REVIEW OF SYSTEMS:  CONSTITUTIONAL: No fever, weight loss, or fatigue  EYES: No eye pain, visual disturbances, or discharge  ENMT:  No difficulty hearing, tinnitus, vertigo; No sinus or throat pain  NECK: No pain or stiffness  BREASTS: No pain, masses, or nipple discharge  RESPIRATORY: No cough, wheezing, chills or hemoptysis; No shortness of breath  CARDIOVASCULAR: No chest pain, palpitations, dizziness, or leg swelling  GASTROINTESTINAL: No abdominal or epigastric pain. No nausea, vomiting, or hematemesis; No diarrhea or constipation. No melena or hematochezia.  GENITOURINARY: No dysuria, frequency, hematuria, or incontinence  NEUROLOGICAL: No headaches, memory loss, loss of strength, numbness, or tremors  SKIN: No itching, burning, rashes, or lesions   LYMPH NODES: No enlarged glands  ENDOCRINE: No heat or cold intolerance; No hair loss  MUSCULOSKELETAL: No joint pain or swelling; No muscle, back, or extremity pain  PSYCHIATRIC: No depression, anxiety, mood swings, or difficulty sleeping  HEME/LYMPH: No easy bruising, or bleeding gums  ALLERGY AND IMMUNOLOGIC: No hives or eczema    Vital Signs Last 24 Hrs  T(C): 37.4 (13 Jan 2018 05:16), Max: 37.8 (12 Jan 2018 19:27)  T(F): 99.4 (13 Jan 2018 05:16), Max: 100 (12 Jan 2018 19:27)  HR: 81 (13 Jan 2018 05:16) (80 - 92)  BP: 104/52 (13 Jan 2018 05:16) (104/52 - 152/87)  BP(mean): --  RR: 18 (13 Jan 2018 05:16) (17 - 18)  SpO2: 94% (13 Jan 2018 05:16) (94% - 98%)    PHYSICAL EXAM:  GENERAL: NAD, well-groomed, well-developed  HEAD:  Atraumatic, Normocephalic  EYES: EOMI, PERRL, conjunctiva and sclera clear  ENMT:  Moist mucous membranes, Good dentition, No lesions  NECK: Supple, No JVD, Normal thyroid  NERVOUS SYSTEM:  Alert & Oriented X3, Good concentration; Motor Strength 5/5 B/L upper and lower extremities; DTRs 2+ intact and symmetric  CHEST/LUNG: Clear to percussion bilaterally; No rales, rhonchi, wheezing, or rubs  HEART: Regular rate and rhythm; No murmurs, rubs, or gallops  ABDOMEN: Soft, Nontender, Nondistended; Bowel sounds present  EXTREMITIES:  2+ Peripheral Pulses, No clubbing, cyanosis, or edema  LYMPH: No lymphadenopathy noted  SKIN: No rashes or lesions    LABS:                        9.9    8.9   )-----------( 167      ( 12 Jan 2018 07:10 )             30.7     01-12    140  |  106  |  12  ----------------------------<  98  3.5   |  24  |  0.78    Ca    8.2<L>      12 Jan 2018 07:10          CAPILLARY BLOOD GLUCOSE        Culture - Blood (collected 11 Jan 2018 09:27)  Source: .Blood  Preliminary Report (12 Jan 2018 10:01):    No growth to date.    Culture - Blood (collected 11 Jan 2018 08:41)  Source: .Blood Blood  Preliminary Report (12 Jan 2018 09:00):    No growth to date.    Culture - Blood (collected 10 Kaden 2018 23:56)  Source: .Blood Blood  Preliminary Report (12 Jan 2018 01:05):    No growth to date.        RADIOLOGY & ADDITIONAL TESTS:  < from: CT Lower Extremity No Cont, Right (01.12.18 @ 18:23) >    EXAM:  CT LWR EXT RT                            PROCEDURE DATE:  01/12/2018          INTERPRETATION:  Clinical information: Fever and infected wound.    Comparison: None available.    Technique: CT scan of the right femur was performed utilizing axial   helical imaging with coronal and sagittal reconstructions. No intravenous   contrast was administered. Three dimensional reconstructions were created.    Findings:    There is marked subcutaneous edema inclusive of confluent edema   posteriorly at the level of the distal femur and knee posteriorly which   is not fully included on this study. These findings are consistent with   cellulitis. Evaluation for soft tissue infection and a superimposed   abscess is markedly limited without intravenous contrast although no   definite focal collection is identified. There is no CT evidence of   osteomyelitis.     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 There is no evidence of   osteolysis. There is moderate spurring at the quadriceps insertion. 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     Impression: Marked subcutaneous edema and confluent fluid at the level of   the left distal thigh and knee which is only partially included on this   study and consistent with cellulitis. No CT evidenceof osteomyelitis.      MOY MCDUFFIE M.D., ATTENDING RADIOLOGIST  This document has been electronically signed. Jan 12 2018  9:06PM       < end of copied text >    Imaging Personally Reviewed:  [x ] YES  [ ] NO    Consultant(s) Notes Reviewed:  [x ] YES  [ ] NO    Care Discussed with Consultants/Other Providers [ ] YES  [ ] NO    PROBLEMS:  CELLULITIS IN ABSCESS OF RIGHT LEG  CELLULITIS RIGHT LEG  Cellulitis and abscess of right leg  Other iron deficiency anemia  Morbid obesity due to excess calories  Essential hypertension  Cellulitis and abscess of right leg      Care discussed with family,         [  ]   yes  [  ]  No    imp:    stable[ ]    unstable[  ]     improving [   ]       unchanged  [  ]                Plans:  Continue present plans  [  ]               New consult [  ]   specialty  .......               order annika[  ]    test name.                  Discharge Planning  [  ]

## 2018-01-13 NOTE — PROGRESS NOTE ADULT - SUBJECTIVE AND OBJECTIVE BOX
HPI:  · HPI : 73 y/o female with PMH HTN here for wound check RLE. pt states she had this wound for about a year and has been coming here to the wound clinic for check-up. pt states when she went today to the wound clinic, the dr told her it was infected and needs to be admitted for iv abx. pt reports fever last night,/discharge,      +wound RLE. No numbness/tingling/weakness. (10 Kaden 2018 15:07)  vascular note seen     Allergies    No Known Allergies    Intolerances        MEDICATIONS  (STANDING):  enoxaparin Injectable 40 milliGRAM(s) SubCutaneous every 24 hours  folic acid 1 milliGRAM(s) Oral daily  furosemide   Injectable 40 milliGRAM(s) IV Push daily  losartan 25 milliGRAM(s) Oral daily  piperacillin/tazobactam IVPB. 3.375 Gram(s) IV Intermittent every 8 hours  vancomycin  IVPB 1000 milliGRAM(s) IV Intermittent every 12 hours    MEDICATIONS  (PRN):  acetaminophen   Tablet 650 milliGRAM(s) Oral every 6 hours PRN For Temp greater than 38 C (100.4 F)  acetaminophen   Tablet. 650 milliGRAM(s) Oral every 6 hours PRN Mild Pain (1 - 3)      REVIEW OF SYSTEMS:    feels better ; leg is better     VITAL SIGNS:  T(C): 37.1 (01-14-18 @ 00:41), Max: 37.4 (01-13-18 @ 05:16)  T(F): 98.8 (01-14-18 @ 00:41), Max: 99.4 (01-13-18 @ 05:16)  HR: 75 (01-14-18 @ 00:41) (75 - 81)  BP: 100/52 (01-14-18 @ 00:41) (100/52 - 109/58)  RR: 18 (01-14-18 @ 00:41) (18 - 18)  SpO2: 99% (01-14-18 @ 00:41) (94% - 99%)  Wt(kg): --    PHYSICAL EXAM:    GENERAL: not in any distress  HEENT: Neck is supple, normocephalic, atraumatic   CHEST/LUNG: Clear to auscultation bilaterally; No rales, rhonchi, wheezing  HEART: Regular rate and rhythm; No murmurs, rubs, or gallops  ABDOMEN: Soft, Nontender, Nondistended; Bowel sounds present, no rebound   EXTREMITIES:  2+ Peripheral Pulses, No clubbing, cyanosis, or edema  rt>> left   wound plus   BACK: no pressor sore   NERVOUS SYSTEM:  Alert & Oriented X3, Good concentration  morbid obesity    LABS:                         9.9    8.9   )-----------( 167      ( 12 Jan 2018 07:10 )             30.7     01-12    140  |  106  |  12  ----------------------------<  98  3.5   |  24  |  0.78    Ca    8.2<L>      12 Jan 2018 07:10                          Vancomycin Level, Trough: 7.7 ug/mL (01-12 @ 19:20)        Culture Results:   No growth to date. (01-11 @ 09:27)  Culture Results:   No growth to date. (01-11 @ 08:41)  Culture Results:   No growth to date. (01-10 @ 23:56)                Radiology:

## 2018-01-13 NOTE — PROGRESS NOTE ADULT - ASSESSMENT
73 y/o female with PMH HTN here for wound check RLE. pt states she had this wound for about a year and has been coming here to the wound clinic for check-up. pt states when she went today to the wound clinic, the dr told her it was infected and needs to be admitted for iv abx. pt reports fever last night, she took tylenol and it went down. pt has no other complaints. no change in sensation. no known trauma or injury.   Start on IV antibiotics, will get ID Consult. Wound Care F/U.  01/11/18 : Pt. started on IV Ancef and Vancomycin . ID consult noted. continue monitoring. Wound care F/U.  01/12/18 : Pt. on Zosyn and Vancomycin now. Afebrile at present. ID f/u noted for CT scan of rt. lower extremity.  01/13/18 : CT scan rt. leg noted, Consistent with Cellulitis. No OM. Still has temp. On Zosyn and Vancomycin. ID f/u noted.

## 2018-01-14 LAB — VANCOMYCIN TROUGH SERPL-MCNC: 15.4 UG/ML — SIGNIFICANT CHANGE UP (ref 10–20)

## 2018-01-14 RX ADMIN — Medication 650 MILLIGRAM(S): at 00:16

## 2018-01-14 RX ADMIN — ENOXAPARIN SODIUM 40 MILLIGRAM(S): 100 INJECTION SUBCUTANEOUS at 21:00

## 2018-01-14 RX ADMIN — Medication 40 MILLIGRAM(S): at 11:53

## 2018-01-14 RX ADMIN — PIPERACILLIN AND TAZOBACTAM 12.5 GRAM(S): 4; .5 INJECTION, POWDER, LYOPHILIZED, FOR SOLUTION INTRAVENOUS at 21:00

## 2018-01-14 RX ADMIN — Medication 1 MILLIGRAM(S): at 11:53

## 2018-01-14 RX ADMIN — PIPERACILLIN AND TAZOBACTAM 12.5 GRAM(S): 4; .5 INJECTION, POWDER, LYOPHILIZED, FOR SOLUTION INTRAVENOUS at 05:09

## 2018-01-14 RX ADMIN — Medication 250 MILLIGRAM(S): at 11:52

## 2018-01-14 RX ADMIN — PIPERACILLIN AND TAZOBACTAM 12.5 GRAM(S): 4; .5 INJECTION, POWDER, LYOPHILIZED, FOR SOLUTION INTRAVENOUS at 14:19

## 2018-01-14 NOTE — PROGRESS NOTE ADULT - ASSESSMENT
71 y/o female with PMH HTN here for wound check RLE. pt states she had this wound for about a year and has been coming here to the wound clinic for check-up. pt states when she went today to the wound clinic, the dr told her it was infected and needs to be admitted for iv abx. pt reports fever last night, she took tylenol and it went down. pt has no other complaints. no change in sensation. no known trauma or injury.   Start on IV antibiotics, will get ID Consult. Wound Care F/U.  01/11/18 : Pt. started on IV Ancef and Vancomycin . ID consult noted. continue monitoring. Wound care F/U.  01/12/18 : Pt. on Zosyn and Vancomycin now. Afebrile at present. ID f/u noted for CT scan of rt. lower extremity.  01/13/18 : CT scan rt. leg noted, Consistent with Cellulitis. No OM. Still has temp. On Zosyn and Vancomycin. ID f/u noted.   01/14/18 : Afebrile , Cellulitis resolving, still has induration rt. thigh. Edema resolving. Continue IV antibiotics, ID f/u noted.

## 2018-01-14 NOTE — PROGRESS NOTE ADULT - SUBJECTIVE AND OBJECTIVE BOX
Patient is a 72y old  Female who presents with a chief complaint of Infected wound rt. leg (10 Kaden 2018 15:07)      INTERVAL HPI/OVERNIGHT EVENTS:    MEDICATIONS  (STANDING):  enoxaparin Injectable 40 milliGRAM(s) SubCutaneous every 24 hours  folic acid 1 milliGRAM(s) Oral daily  furosemide   Injectable 40 milliGRAM(s) IV Push daily  losartan 25 milliGRAM(s) Oral daily  piperacillin/tazobactam IVPB. 3.375 Gram(s) IV Intermittent every 8 hours  vancomycin  IVPB 1000 milliGRAM(s) IV Intermittent every 12 hours    MEDICATIONS  (PRN):  acetaminophen   Tablet 650 milliGRAM(s) Oral every 6 hours PRN For Temp greater than 38 C (100.4 F)  acetaminophen   Tablet. 650 milliGRAM(s) Oral every 6 hours PRN Mild Pain (1 - 3)      Allergies    No Known Allergies    Intolerances        REVIEW OF SYSTEMS:  CONSTITUTIONAL: No fever, weight loss, or fatigue  EYES: No eye pain, visual disturbances, or discharge  ENMT:  No difficulty hearing, tinnitus, vertigo; No sinus or throat pain  NECK: No pain or stiffness  BREASTS: No pain, masses, or nipple discharge  RESPIRATORY: No cough, wheezing, chills or hemoptysis; No shortness of breath  CARDIOVASCULAR: No chest pain, palpitations, dizziness, or leg swelling  GASTROINTESTINAL: No abdominal or epigastric pain. No nausea, vomiting, or hematemesis; No diarrhea or constipation. No melena or hematochezia.  GENITOURINARY: No dysuria, frequency, hematuria, or incontinence  NEUROLOGICAL: No headaches, memory loss, loss of strength, numbness, or tremors  SKIN: No itching, burning, rashes, or lesions   LYMPH NODES: No enlarged glands  ENDOCRINE: No heat or cold intolerance; No hair loss  MUSCULOSKELETAL: No joint pain or swelling; No muscle, back, or extremity pain  PSYCHIATRIC: No depression, anxiety, mood swings, or difficulty sleeping  HEME/LYMPH: No easy bruising, or bleeding gums  ALLERGY AND IMMUNOLOGIC: No hives or eczema    Vital Signs Last 24 Hrs  T(C): 37 (14 Jan 2018 04:50), Max: 37.3 (13 Jan 2018 16:29)  T(F): 98.6 (14 Jan 2018 04:50), Max: 99.1 (13 Jan 2018 16:29)  HR: 71 (14 Jan 2018 09:10) (68 - 77)  BP: 95/68 (14 Jan 2018 09:10) (95/68 - 109/58)  BP(mean): --  RR: 18 (14 Jan 2018 04:50) (18 - 18)  SpO2: 100% (14 Jan 2018 04:50) (98% - 100%)    PHYSICAL EXAM:  GENERAL: NAD, well-groomed, well-developed. Obese  HEAD:  Atraumatic, Normocephalic  EYES: EOMI, PERRL, conjunctiva and sclera clear  ENMT:  Moist mucous membranes, Good dentition, No lesions  NECK: Supple, No JVD, Normal thyroid  NERVOUS SYSTEM:  Alert & Oriented X3, Good concentration; Motor Strength 5/5 B/L upper and lower extremities; DTRs 2+ intact and symmetric  CHEST/LUNG: Clear to percussion bilaterally; No rales, rhonchi, wheezing, or rubs  HEART: Regular rate and rhythm; No murmurs, rubs, or gallops  ABDOMEN: Soft, Nontender, Nondistended; Bowel sounds present. Obese  EXTREMITIES:  2+ Peripheral Pulses, No clubbing, cyanosis, Edema improving  LYMPH: No lymphadenopathy noted  SKIN: No rashes . Cellulitis woth induration rt. thigh.    LABS:              CAPILLARY BLOOD GLUCOSE                      Culture - Blood (collected 11 Jan 2018 09:27)  Source: .Blood  Preliminary Report (12 Jan 2018 10:01):    No growth to date.    Culture - Blood (collected 11 Jan 2018 08:41)  Source: .Blood Blood  Preliminary Report (12 Jan 2018 09:00):    No growth to date.    Culture - Blood (collected 10 Kaden 2018 23:56)  Source: .Blood Blood  Preliminary Report (12 Jan 2018 01:05):    No growth to date.        RADIOLOGY & ADDITIONAL TESTS:  < from: CT Lower Extremity No Cont, Right (01.12.18 @ 18:23) >    EXAM:  CT LWR EXT RT                            PROCEDURE DATE:  01/12/2018          INTERPRETATION:  Clinical information: Fever and infected wound.    Comparison: None available.    Technique: CT scan of the right femur was performed utilizing axial   helical imaging with coronal and sagittal reconstructions. No intravenous   contrast was administered. Three dimensional reconstructions were created.    Findings:    There is marked subcutaneous edema inclusive of confluent edema   posteriorly at the level of the distal femur and knee posteriorly which   is not fully included on this study. These findings are consistent with   cellulitis. Evaluation for soft tissue infection and a superimposed   abscess is markedly limited without intravenous contrast although no   definite focal collection is identified. There is no CT evidence of   osteomyelitis.     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 There is no evidence of   osteolysis. There is moderate spurring at the quadriceps insertion. 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     Impression: Marked subcutaneous edema and confluent fluid at the level of   the left distal thigh and knee which is only partially included on this   study and consistent with cellulitis. No CT evidenceof osteomyelitis.                MOY MCDUFFIE M.D., ATTENDING RADIOLOGIST  This document has been electronically signed. Jan 12 2018  9:06PM                < end of copied text >    Imaging Personally Reviewed:  [ ] YES  [ ] NO    Consultant(s) Notes Reviewed:  [ ] YES  [ ] NO    Care Discussed with Consultants/Other Providers [ ] YES  [ ] NO    PROBLEMS:  CELLULITIS IN ABSCESS OF RIGHT LEG  CELLULITIS RIGHT LEG  Cellulitis and abscess of right leg  Other iron deficiency anemia  Morbid obesity due to excess calories  Essential hypertension  Cellulitis and abscess of right leg      Care discussed with family,         [  ]   yes  [  ]  No    imp:    stable[ ]    unstable[  ]     improving [   ]       unchanged  [  ]                Plans:  Continue present plans  [  ]               New consult [  ]   specialty  .......               order annika[  ]    test name.                  Discharge Planning  [  ]

## 2018-01-15 RX ADMIN — Medication 250 MILLIGRAM(S): at 23:18

## 2018-01-15 RX ADMIN — LOSARTAN POTASSIUM 25 MILLIGRAM(S): 100 TABLET, FILM COATED ORAL at 05:10

## 2018-01-15 RX ADMIN — Medication 650 MILLIGRAM(S): at 23:18

## 2018-01-15 RX ADMIN — Medication 40 MILLIGRAM(S): at 05:11

## 2018-01-15 RX ADMIN — Medication 250 MILLIGRAM(S): at 13:37

## 2018-01-15 RX ADMIN — PIPERACILLIN AND TAZOBACTAM 12.5 GRAM(S): 4; .5 INJECTION, POWDER, LYOPHILIZED, FOR SOLUTION INTRAVENOUS at 21:23

## 2018-01-15 RX ADMIN — ENOXAPARIN SODIUM 40 MILLIGRAM(S): 100 INJECTION SUBCUTANEOUS at 20:41

## 2018-01-15 RX ADMIN — Medication 1 MILLIGRAM(S): at 11:46

## 2018-01-15 RX ADMIN — Medication 650 MILLIGRAM(S): at 20:41

## 2018-01-15 RX ADMIN — PIPERACILLIN AND TAZOBACTAM 12.5 GRAM(S): 4; .5 INJECTION, POWDER, LYOPHILIZED, FOR SOLUTION INTRAVENOUS at 16:53

## 2018-01-15 RX ADMIN — Medication 250 MILLIGRAM(S): at 00:58

## 2018-01-15 NOTE — PROGRESS NOTE ADULT - ASSESSMENT
73 y/o female with PMH HTN here for wound check RLE. pt states she had this wound for about a year and has been coming here to the wound clinic for check-up. pt states when she went today to the wound clinic, the dr told her it was infected and needs to be admitted for iv abx. pt reports fever last night, she took tylenol and it went down. pt has no other complaints. no change in sensation. no known trauma or injury.   Start on IV antibiotics, will get ID Consult. Wound Care F/U.  01/11/18 : Pt. started on IV Ancef and Vancomycin . ID consult noted. continue monitoring. Wound care F/U.  01/12/18 : Pt. on Zosyn and Vancomycin now. Afebrile at present. ID f/u noted for CT scan of rt. lower extremity.  01/13/18 : CT scan rt. leg noted, Consistent with Cellulitis. No OM. Still has temp. On Zosyn and Vancomycin. ID f/u noted.   01/14/18 : Afebrile , Cellulitis resolving, still has induration rt. thigh. Edema resolving. Continue IV antibiotics, ID f/u noted.  01/15/18 : Afebrile. No IV access, needs midline. ID f/u.

## 2018-01-15 NOTE — PROGRESS NOTE ADULT - SUBJECTIVE AND OBJECTIVE BOX
HPI:  · HPI : 73 y/o female with PMH HTN here for wound check RLE. pt states she had this wound for about a year and has been coming here to the wound clinic for check-up. pt states when she went today to the wound clinic, the dr told her it was infected and needs to be admitted for iv abx. pt reports fever last night, she took tylenol and it went down. pt has no other complaints. no change in sensation. no known trauma or injury.    ROS:  fever last night, No chills. No eye pain/changes in vision, No ear pain/sore throat/dysphagia, No chest pain/palpitations. No SOB/cough/. No abdominal pain, N/V/D, no black/bloody bm. No dysuria/frequency/discharge, No headache. No Dizziness.    +wound RLE. No numbness/tingling/weakness. (10 Kaden 2018 15:07)      Allergies    No Known Allergies    Intolerances        MEDICATIONS  (STANDING):  enoxaparin Injectable 40 milliGRAM(s) SubCutaneous every 24 hours  folic acid 1 milliGRAM(s) Oral daily  furosemide   Injectable 40 milliGRAM(s) IV Push daily  losartan 25 milliGRAM(s) Oral daily  piperacillin/tazobactam IVPB. 3.375 Gram(s) IV Intermittent every 8 hours  vancomycin  IVPB 1000 milliGRAM(s) IV Intermittent every 12 hours    MEDICATIONS  (PRN):  acetaminophen   Tablet 650 milliGRAM(s) Oral every 6 hours PRN For Temp greater than 38 C (100.4 F)  acetaminophen   Tablet. 650 milliGRAM(s) Oral every 6 hours PRN Mild Pain (1 - 3)      REVIEW OF SYSTEMS:    CONSTITUTIONAL: No fever, chills, weight loss, or fatigue  HEENT: No sore throat, runny nose, ear ache  RESPIRATORY: No cough, wheezing, No shortness of breath  CARDIOVASCULAR: No chest pain, palpitations, dizziness  GASTROINTESTINAL: No abdominal pain. No nausea, vomiting, diarrhea  GENITOURINARY: No dysuria, increase frequency, hematuria, or incontinence  NEUROLOGICAL: No headaches, memory loss, loss of strength, numbness, or tremors, no weakness  EXTREMITY: No pedal edema BLE  SKIN: No itching, burning, rashes, or lesions     VITAL SIGNS:  T(C): 36.4 (01-15-18 @ 12:01), Max: 37 (01-15-18 @ 05:15)  T(F): 97.5 (01-15-18 @ 12:01), Max: 98.6 (01-15-18 @ 05:15)  HR: 76 (01-15-18 @ 12:01) (74 - 76)  BP: 130/76 (01-15-18 @ 12:01) (119/56 - 144/69)  RR: 18 (01-15-18 @ 12:01) (18 - 18)  SpO2: 99% (01-15-18 @ 12:01) (96% - 99%)  Wt(kg): --    PHYSICAL EXAM:    GENERAL: not in any distress  HEENT: Neck is supple, normocephalic, atraumatic   CHEST/LUNG: Clear to auscultation bilaterally; No rales, rhonchi, wheezing  HEART: Regular rate and rhythm; No murmurs, rubs, or gallops  ABDOMEN: Soft, Nontender, Nondistended; Bowel sounds present, no rebound   EXTREMITIES:  2+ Peripheral Pulses, No clubbing, cyanosis, or edema  GENITOURINARY:   SKIN: No rashes or lesions  BACK: no pressor sore   NERVOUS SYSTEM:  Alert & Oriented X3, Good concentration  PSYCH: normal affect     LABS:                               Vancomycin Level, Trough: 15.4 ug/mL (01-14 @ 20:42)        Culture Results:   No growth to date. (01-11 @ 09:27)  Culture Results:   No growth to date. (01-11 @ 08:41)  Culture Results:   No growth to date. (01-10 @ 23:56)                Radiology: HPI:  · HPI : 71 y/o female with PMH HTN here for wound check RLE. pt states she had this wound for about a year and has been coming here to the wound clinic for check-up. pt states when she went today to the wound clinic, the dr told her it was infected and needs to be admitted for iv abx. pt reports fever last night, she took tylenol and it went down. pt has no other complaints. no change in sensation. no known trauma or injury.     patient feels better   vascular note seen     Allergies    No Known Allergies    Intolerances        MEDICATIONS  (STANDING):  enoxaparin Injectable 40 milliGRAM(s) SubCutaneous every 24 hours  folic acid 1 milliGRAM(s) Oral daily  furosemide   Injectable 40 milliGRAM(s) IV Push daily  losartan 25 milliGRAM(s) Oral daily  piperacillin/tazobactam IVPB. 3.375 Gram(s) IV Intermittent every 8 hours  vancomycin  IVPB 1000 milliGRAM(s) IV Intermittent every 12 hours    MEDICATIONS  (PRN):  acetaminophen   Tablet 650 milliGRAM(s) Oral every 6 hours PRN For Temp greater than 38 C (100.4 F)  acetaminophen   Tablet. 650 milliGRAM(s) Oral every 6 hours PRN Mild Pain (1 - 3)      REVIEW OF SYSTEMS:  feels better     VITAL SIGNS:  T(C): 36.4 (01-15-18 @ 12:01), Max: 37 (01-15-18 @ 05:15)  T(F): 97.5 (01-15-18 @ 12:01), Max: 98.6 (01-15-18 @ 05:15)  HR: 76 (01-15-18 @ 12:01) (74 - 76)  BP: 130/76 (01-15-18 @ 12:01) (119/56 - 144/69)  RR: 18 (01-15-18 @ 12:01) (18 - 18)  SpO2: 99% (01-15-18 @ 12:01) (96% - 99%)  Wt(kg): --    PHYSICAL EXAM:    GENERAL: not in any distress  HEENT: Neck is supple, normocephalic, atraumatic   CHEST/LUNG: Clear to auscultation bilaterally; No rales, rhonchi, wheezing  HEART: Regular rate and rhythm; No murmurs, rubs, or gallops  ABDOMEN: Soft, Nontender, Nondistended; Bowel sounds present, no rebound   EXTREMITIES:  2+ Peripheral Pulses, No clubbing, cyanosis,   edema plus ; wound status quo   SKIN: large ulcer  BACK: no pressor sore   NERVOUS SYSTEM:  Alert & Oriented X3,LABS:                               Vancomycin Level, Trough: 15.4 ug/mL (01-14 @ 20:42)        Culture Results:   No growth to date. (01-11 @ 09:27)  Culture Results:   No growth to date. (01-11 @ 08:41)  Culture Results:   No growth to date. (01-10 @ 23:56)                Radiology:

## 2018-01-15 NOTE — PROGRESS NOTE ADULT - SUBJECTIVE AND OBJECTIVE BOX
Patient is a 72y old  Female who presents with a chief complaint of Infected wound rt. leg (10 Kaden 2018 15:07)      INTERVAL HPI/OVERNIGHT EVENTS:    MEDICATIONS  (STANDING):  enoxaparin Injectable 40 milliGRAM(s) SubCutaneous every 24 hours  folic acid 1 milliGRAM(s) Oral daily  furosemide   Injectable 40 milliGRAM(s) IV Push daily  losartan 25 milliGRAM(s) Oral daily  piperacillin/tazobactam IVPB. 3.375 Gram(s) IV Intermittent every 8 hours  vancomycin  IVPB 1000 milliGRAM(s) IV Intermittent every 12 hours    MEDICATIONS  (PRN):  acetaminophen   Tablet 650 milliGRAM(s) Oral every 6 hours PRN For Temp greater than 38 C (100.4 F)  acetaminophen   Tablet. 650 milliGRAM(s) Oral every 6 hours PRN Mild Pain (1 - 3)      Allergies    No Known Allergies    Intolerances        REVIEW OF SYSTEMS:  CONSTITUTIONAL: No fever, weight loss, or fatigue  EYES: No eye pain, visual disturbances, or discharge  ENMT:  No difficulty hearing, tinnitus, vertigo; No sinus or throat pain  NECK: No pain or stiffness  BREASTS: No pain, masses, or nipple discharge  RESPIRATORY: No cough, wheezing, chills or hemoptysis; No shortness of breath  CARDIOVASCULAR: No chest pain, palpitations, dizziness, or leg swelling  GASTROINTESTINAL: No abdominal or epigastric pain. No nausea, vomiting, or hematemesis; No diarrhea or constipation. No melena or hematochezia.  GENITOURINARY: No dysuria, frequency, hematuria, or incontinence  NEUROLOGICAL: No headaches, memory loss, loss of strength, numbness, or tremors  SKIN: No itching, burning, rashes, or lesions   LYMPH NODES: No enlarged glands  ENDOCRINE: No heat or cold intolerance; No hair loss  MUSCULOSKELETAL: No joint pain or swelling; No muscle, back, or extremity pain  PSYCHIATRIC: No depression, anxiety, mood swings, or difficulty sleeping  HEME/LYMPH: No easy bruising, or bleeding gums  ALLERGY AND IMMUNOLOGIC: No hives or eczema    Vital Signs Last 24 Hrs  T(C): 37 (15 Kaden 2018 05:15), Max: 37 (15 Kaden 2018 05:15)  T(F): 98.6 (15 Kaden 2018 05:15), Max: 98.6 (15 Kaden 2018 05:15)  HR: 74 (15 Kaden 2018 05:15) (71 - 74)  BP: 119/56 (15 Kaden 2018 05:15) (95/68 - 144/69)  BP(mean): --  RR: 18 (15 Kaden 2018 05:15) (16 - 18)  SpO2: 96% (15 Kaden 2018 05:15) (96% - 99%)    PHYSICAL EXAM:  GENERAL: NAD, well-groomed, well-developed  HEAD:  Atraumatic, Normocephalic  EYES: EOMI, PERRL, conjunctiva and sclera clear  ENMT:  Moist mucous membranes, Good dentition, No lesions  NECK: Supple, No JVD, Normal thyroid  NERVOUS SYSTEM:  Alert & Oriented X3, Good concentration; Motor Strength 5/5 B/L upper and lower extremities; DTRs 2+ intact and symmetric  CHEST/LUNG: Clear to percussion bilaterally; No rales, rhonchi, wheezing, or rubs  HEART: Regular rate and rhythm; No murmurs, rubs, or gallops  ABDOMEN: Soft, Nontender, Nondistended; Bowel sounds present  EXTREMITIES:  2+ Peripheral Pulses, No clubbing, cyanosis, or edema  LYMPH: No lymphadenopathy noted  SKIN: No rashes or lesions    LABS:              CAPILLARY BLOOD GLUCOSE                      Culture - Blood (collected 11 Jan 2018 09:27)  Source: .Blood  Preliminary Report (12 Jan 2018 10:01):    No growth to date.    Culture - Blood (collected 11 Jan 2018 08:41)  Source: .Blood Blood  Preliminary Report (12 Jan 2018 09:00):    No growth to date.    Culture - Blood (collected 10 Kaden 2018 23:56)  Source: .Blood Blood  Preliminary Report (12 Jan 2018 01:05):    No growth to date.        RADIOLOGY & ADDITIONAL TESTS:    Imaging Personally Reviewed:  [ ] YES  [ ] NO    Consultant(s) Notes Reviewed:  [ ] YES  [ ] NO    Care Discussed with Consultants/Other Providers [ ] YES  [ ] NO    PROBLEMS:  CELLULITIS IN ABSCESS OF RIGHT LEG  CELLULITIS RIGHT LEG  Cellulitis and abscess of right leg  Other iron deficiency anemia  Morbid obesity due to excess calories  Essential hypertension  Cellulitis and abscess of right leg      Care discussed with family,         [  ]   yes  [  ]  No    imp:    stable[ ]    unstable[  ]     improving [   ]       unchanged  [  ]                Plans:  Continue present plans  [  ]               New consult [  ]   specialty  .......               order annika[  ]    test name.                  Discharge Planning  [  ]

## 2018-01-15 NOTE — CHART NOTE - NSCHARTNOTEFT_GEN_A_CORE
Patient seen and exmined at bedside with Dr. Montoya. Patient with severe lymphedema admitted with cellulitis and abscess. Skin abscess to posterior right leg is likely a pressure wound from compression. Wound evaluated, improving from prior examinations. Recommend daily dressing change with rinse with normal saline and mepelex dressing. Advise patient to elevate RLE and allow edematous area to dangle without compression to allow healing.

## 2018-01-15 NOTE — PROGRESS NOTE ADULT - ASSESSMENT
infected leg wound ;;  Patient with severe lymphedema admitted with cellulitis and abscess. Skin abscess to posterior right leg is likely a pressure wound from compression. Wound evaluated, improving from prior examinations. Recommend daily dressing change with rinse with normal saline and mepelex dressing. Advise patient to elevate RLE and allow edematous area to dangle without compression to allow healing.  resumed and will recheck   vascular noted infected leg wound ;;  Patient with severe lymphedema admitted with cellulitis and abscess. Skin abscess to posterior right leg is likely a pressure wound from compression. Wound evaluated, improving from prior examinations. Recommend daily dressing change with rinse with normal saline and mepelex dressing. Advise patient to elevate RLE and allow edematous area to dangle without compression to allow healing.  good vanco levels now   vascular noted

## 2018-01-16 LAB
CULTURE RESULTS: SIGNIFICANT CHANGE UP
SPECIMEN SOURCE: SIGNIFICANT CHANGE UP
VANCOMYCIN TROUGH SERPL-MCNC: 16.4 UG/ML — SIGNIFICANT CHANGE UP (ref 10–20)

## 2018-01-16 RX ORDER — COLCHICINE 0.6 MG
0.6 TABLET ORAL DAILY
Qty: 0 | Refills: 0 | Status: DISCONTINUED | OUTPATIENT
Start: 2018-01-16 | End: 2018-01-19

## 2018-01-16 RX ADMIN — PIPERACILLIN AND TAZOBACTAM 12.5 GRAM(S): 4; .5 INJECTION, POWDER, LYOPHILIZED, FOR SOLUTION INTRAVENOUS at 18:32

## 2018-01-16 RX ADMIN — PIPERACILLIN AND TAZOBACTAM 12.5 GRAM(S): 4; .5 INJECTION, POWDER, LYOPHILIZED, FOR SOLUTION INTRAVENOUS at 05:34

## 2018-01-16 RX ADMIN — Medication 250 MILLIGRAM(S): at 13:04

## 2018-01-16 RX ADMIN — LOSARTAN POTASSIUM 25 MILLIGRAM(S): 100 TABLET, FILM COATED ORAL at 05:33

## 2018-01-16 RX ADMIN — PIPERACILLIN AND TAZOBACTAM 12.5 GRAM(S): 4; .5 INJECTION, POWDER, LYOPHILIZED, FOR SOLUTION INTRAVENOUS at 22:35

## 2018-01-16 RX ADMIN — ENOXAPARIN SODIUM 40 MILLIGRAM(S): 100 INJECTION SUBCUTANEOUS at 22:36

## 2018-01-16 RX ADMIN — Medication 40 MILLIGRAM(S): at 05:34

## 2018-01-16 RX ADMIN — Medication 1 MILLIGRAM(S): at 13:02

## 2018-01-16 RX ADMIN — Medication 0.6 MILLIGRAM(S): at 13:02

## 2018-01-16 NOTE — PROGRESS NOTE ADULT - SUBJECTIVE AND OBJECTIVE BOX
73 y/o female with PMH HTN here for wound check RLE. pt states she had this wound for about a year and has been coming here to the wound clinic for check-up. pt states when she went today to the wound clinic, the dr told her it was infected and needs to be admitted for iv abx. pt reports fever last night, she took tylenol and it went down. pt has no other complaints. no change in sensation. no known trauma or injury.     patient feels better   discussed with vascular dr banerjee today        MEDICATIONS  (STANDING):  colchicine 0.6 milliGRAM(s) Oral daily  enoxaparin Injectable 40 milliGRAM(s) SubCutaneous every 24 hours  folic acid 1 milliGRAM(s) Oral daily  furosemide   Injectable 40 milliGRAM(s) IV Push daily  losartan 25 milliGRAM(s) Oral daily  piperacillin/tazobactam IVPB. 3.375 Gram(s) IV Intermittent every 8 hours  vancomycin  IVPB 1000 milliGRAM(s) IV Intermittent every 12 hours    MEDICATIONS  (PRN):  acetaminophen   Tablet 650 milliGRAM(s) Oral every 6 hours PRN For Temp greater than 38 C (100.4 F)  acetaminophen   Tablet. 650 milliGRAM(s) Oral every 6 hours PRN Mild Pain (1 - 3)      REVIEW OF SYSTEMS:    no change in status     VITAL SIGNS:  T(C): 37.1 (01-16-18 @ 17:40), Max: 37.3 (01-15-18 @ 23:59)  T(F): 98.7 (01-16-18 @ 17:40), Max: 99.2 (01-15-18 @ 23:59)  HR: 78 (01-16-18 @ 17:40) (78 - 82)  BP: 126/67 (01-16-18 @ 17:40) (103/51 - 133/66)  RR: 17 (01-16-18 @ 17:40) (17 - 18)  SpO2: 96% (01-16-18 @ 17:40) (96% - 97%)  Wt(kg): --    PHYSICAL EXAM:    GENERAL: not in any distress  HEENT: Neck is supple, normocephalic, atraumatic   CHEST/LUNG: Clear to auscultation bilaterally; No rales, rhonchi, wheezing  HEART: Regular rate and rhythm; No murmurs, rubs, or gallops  ABDOMEN: Soft, Nontender, Nondistended; Bowel sounds present, no rebound   EXTREMITIES:  2+ Peripheral Pulses, No clubbing, cyanosis, or edema  leg is unchanged at this point   pain is no change in status   SKIN: No rashes or lesions  BACK: no pressor sore   NERVOUS SYSTEM:  Alert & Oriented X3,   LABS:                               Vancomycin Level, Trough: 15.4 ug/mL (01-14 @ 20:42)        Culture Results:   No growth at 5 days. (01-11 @ 09:27)  Culture Results:   No growth at 5 days. (01-11 @ 08:41)  Culture Results:   No growth at 5 days. (01-10 @ 23:56)                Radiology:

## 2018-01-16 NOTE — PROGRESS NOTE ADULT - ASSESSMENT
infected leg wound ;;  Patient with severe lymphedema admitted with cellulitis and abscess. Skin abscess to posterior right leg is likely a pressure wound from compression. Wound evaluated, improving from prior examinations. Recommend daily dressing change with rinse with normal saline and mepelex dressing. Advise patient to elevate RLE and allow edematous area to dangle without compression to allow healing.  good vanco levels now   vascular noted infected leg wound ;;  Patient with severe lymphedema admitted with cellulitis and abscess. Skin abscess to posterior right leg is likely a pressure wound from compression. Wound evaluated, improving from prior examinations. Recommend daily dressing change with rinse with normal saline and mepelex dressing. Advise patient to elevate RLE and allow edematous area to dangle without compression to allow healing.  good vanco levels now   vascular noted  discharge plan per vascular   is much better now

## 2018-01-16 NOTE — PROGRESS NOTE ADULT - SUBJECTIVE AND OBJECTIVE BOX
Patient is a 72y old  Unspecified who presents with a chief complaint of Infected wound rt. leg (10 Kaden 2018 15:07)      INTERVAL HPI/OVERNIGHT EVENTS:    MEDICATIONS  (STANDING):  enoxaparin Injectable 40 milliGRAM(s) SubCutaneous every 24 hours  folic acid 1 milliGRAM(s) Oral daily  furosemide   Injectable 40 milliGRAM(s) IV Push daily  losartan 25 milliGRAM(s) Oral daily  piperacillin/tazobactam IVPB. 3.375 Gram(s) IV Intermittent every 8 hours  vancomycin  IVPB 1000 milliGRAM(s) IV Intermittent every 12 hours    MEDICATIONS  (PRN):  acetaminophen   Tablet 650 milliGRAM(s) Oral every 6 hours PRN For Temp greater than 38 C (100.4 F)  acetaminophen   Tablet. 650 milliGRAM(s) Oral every 6 hours PRN Mild Pain (1 - 3)      Allergies    No Known Allergies    Intolerances        REVIEW OF SYSTEMS:  CONSTITUTIONAL: No fever, weight loss, or fatigue  EYES: No eye pain, visual disturbances, or discharge  ENMT:  No difficulty hearing, tinnitus, vertigo; No sinus or throat pain  NECK: No pain or stiffness  BREASTS: No pain, masses, or nipple discharge  RESPIRATORY: No cough, wheezing, chills or hemoptysis; No shortness of breath  CARDIOVASCULAR: No chest pain, palpitations, dizziness, or leg swelling  GASTROINTESTINAL: No abdominal or epigastric pain. No nausea, vomiting, or hematemesis; No diarrhea or constipation. No melena or hematochezia.  GENITOURINARY: No dysuria, frequency, hematuria, or incontinence  NEUROLOGICAL: No headaches, memory loss, loss of strength, numbness, or tremors  SKIN: No itching, burning, rashes, or lesions   LYMPH NODES: No enlarged glands  ENDOCRINE: No heat or cold intolerance; No hair loss  MUSCULOSKELETAL: No joint pain or swelling; No muscle, back, or extremity pain  PSYCHIATRIC: No depression, anxiety, mood swings, or difficulty sleeping  HEME/LYMPH: No easy bruising, or bleeding gums  ALLERGY AND IMMUNOLOGIC: No hives or eczema    Vital Signs Last 24 Hrs  T(C): 37 (16 Jan 2018 10:35), Max: 37.8 (15 Kaden 2018 17:45)  T(F): 98.6 (16 Jan 2018 10:35), Max: 100 (15 Kaden 2018 17:45)  HR: 82 (16 Jan 2018 10:35) (76 - 82)  BP: 103/51 (16 Jan 2018 10:35) (103/51 - 136/62)  BP(mean): --  RR: 17 (16 Jan 2018 10:35) (17 - 18)  SpO2: 97% (16 Jan 2018 10:35) (95% - 99%)    PHYSICAL EXAM:  GENERAL: NAD, well-groomed, Obese  HEAD:  Atraumatic, Normocephalic  EYES: EOMI, PERRL, conjunctiva and sclera clear  ENMT:  Moist mucous membranes, Good dentition, No lesions  NECK: Supple, No JVD, Normal thyroid  NERVOUS SYSTEM:  Alert & Oriented X3, Good concentration; Motor Strength 5/5 B/L upper and lower extremities; DTRs 2+ intact and symmetric  CHEST/LUNG: Clear to percussion bilaterally; No rales, rhonchi, wheezing, or rubs  HEART: Regular rate and rhythm; No murmurs, rubs, or gallops  ABDOMEN: Soft, Nontender, Nondistended; Bowel sounds present  EXTREMITIES:  2+ Peripheral Pulses, No clubbing, cyanosis, Lymphedema Rt. lower ext.  LYMPH: No lymphadenopathy noted  SKIN: No rashes or lesions    LABS:      CAPILLARY BLOOD GLUCOSE        Culture - Blood (collected 11 Jan 2018 09:27)  Source: .Blood  Final Report (16 Jan 2018 10:01):    No growth at 5 days.    Culture - Blood (collected 11 Jan 2018 08:41)  Source: .Blood Blood  Final Report (16 Jan 2018 09:01):    No growth at 5 days.    Culture - Blood (collected 10 Kaden 2018 23:56)  Source: .Blood Blood  Final Report (16 Jan 2018 01:01):    No growth at 5 days.        RADIOLOGY & ADDITIONAL TESTS:    Imaging Personally Reviewed:  [ ] YES  [ ] NO    Consultant(s) Notes Reviewed:  [ ] YES  [ ] NO    Care Discussed with Consultants/Other Providers [ ] YES  [ ] NO    PROBLEMS:  CELLULITIS IN ABSCESS OF RIGHT LEG  CELLULITIS RIGHT LEG  Cellulitis and abscess of right leg  Other iron deficiency anemia  Morbid obesity due to excess calories  Essential hypertension  Cellulitis and abscess of right leg      Care discussed with family,         [  ]   yes  [  ]  No    imp:    stable[ ]    unstable[  ]     improving [   ]       unchanged  [  ]                Plans:  Continue present plans  [  ]               New consult [  ]   specialty  .......               order annika[  ]    test name.                  Discharge Planning  [  ] Patient is a 72y old  Unspecified who presents with a chief complaint of Infected wound rt. leg (10 Kaden 2018 15:07)      INTERVAL HPI/OVERNIGHT EVENTS:    MEDICATIONS  (STANDING):  enoxaparin Injectable 40 milliGRAM(s) SubCutaneous every 24 hours  folic acid 1 milliGRAM(s) Oral daily  furosemide   Injectable 40 milliGRAM(s) IV Push daily  losartan 25 milliGRAM(s) Oral daily  piperacillin/tazobactam IVPB. 3.375 Gram(s) IV Intermittent every 8 hours  vancomycin  IVPB 1000 milliGRAM(s) IV Intermittent every 12 hours    MEDICATIONS  (PRN):  acetaminophen   Tablet 650 milliGRAM(s) Oral every 6 hours PRN For Temp greater than 38 C (100.4 F)  acetaminophen   Tablet. 650 milliGRAM(s) Oral every 6 hours PRN Mild Pain (1 - 3)      Allergies    No Known Allergies    Intolerances        REVIEW OF SYSTEMS:  CONSTITUTIONAL: No fever, weight loss, or fatigue  EYES: No eye pain, visual disturbances, or discharge  ENMT:  No difficulty hearing, tinnitus, vertigo; No sinus or throat pain  NECK: No pain or stiffness  BREASTS: No pain, masses, or nipple discharge  RESPIRATORY: No cough, wheezing, chills or hemoptysis; No shortness of breath  CARDIOVASCULAR: No chest pain, palpitations, dizziness, or leg swelling  GASTROINTESTINAL: No abdominal or epigastric pain. No nausea, vomiting, or hematemesis; No diarrhea or constipation. No melena or hematochezia.  GENITOURINARY: No dysuria, frequency, hematuria, or incontinence  NEUROLOGICAL: No headaches, memory loss, loss of strength, numbness, or tremors  SKIN: No itching, burning, rashes, or lesions   LYMPH NODES: No enlarged glands  ENDOCRINE: No heat or cold intolerance; No hair loss  MUSCULOSKELETAL:  No muscle, back, or extremity pain. C/O pain rt. elbow.  PSYCHIATRIC: No depression, anxiety, mood swings, or difficulty sleeping  HEME/LYMPH: No easy bruising, or bleeding gums  ALLERGY AND IMMUNOLOGIC: No hives or eczema    Vital Signs Last 24 Hrs  T(C): 37 (16 Jan 2018 10:35), Max: 37.8 (15 Kaden 2018 17:45)  T(F): 98.6 (16 Jan 2018 10:35), Max: 100 (15 Kaden 2018 17:45)  HR: 82 (16 Jan 2018 10:35) (76 - 82)  BP: 103/51 (16 Jan 2018 10:35) (103/51 - 136/62)  BP(mean): --  RR: 17 (16 Jan 2018 10:35) (17 - 18)  SpO2: 97% (16 Jan 2018 10:35) (95% - 99%)    PHYSICAL EXAM:  GENERAL: NAD, well-groomed, Obese  HEAD:  Atraumatic, Normocephalic  EYES: EOMI, PERRL, conjunctiva and sclera clear  ENMT:  Moist mucous membranes, Good dentition, No lesions  NECK: Supple, No JVD, Normal thyroid  NERVOUS SYSTEM:  Alert & Oriented X3, Good concentration; Motor Strength 5/5 B/L upper and lower extremities; DTRs 2+ intact and symmetric  CHEST/LUNG: Clear to percussion bilaterally; No rales, rhonchi, wheezing, or rubs  HEART: Regular rate and rhythm; No murmurs, rubs, or gallops  ABDOMEN: Soft, Nontender, Nondistended; Bowel sounds present  EXTREMITIES:  2+ Peripheral Pulses, No clubbing, cyanosis, Lymphedema Rt. lower ext. tender rt. elbow, no swelling, ROM restricted.  LYMPH: No lymphadenopathy noted  SKIN: No rashes or lesions    LABS:      CAPILLARY BLOOD GLUCOSE        Culture - Blood (collected 11 Jan 2018 09:27)  Source: .Blood  Final Report (16 Jan 2018 10:01):    No growth at 5 days.    Culture - Blood (collected 11 Jan 2018 08:41)  Source: .Blood Blood  Final Report (16 Jan 2018 09:01):    No growth at 5 days.    Culture - Blood (collected 10 Kaden 2018 23:56)  Source: .Blood Blood  Final Report (16 Jan 2018 01:01):    No growth at 5 days.        RADIOLOGY & ADDITIONAL TESTS:    Imaging Personally Reviewed:  [ ] YES  [ ] NO    Consultant(s) Notes Reviewed:  [ ] YES  [ ] NO    Care Discussed with Consultants/Other Providers [ ] YES  [ ] NO    PROBLEMS:  CELLULITIS IN ABSCESS OF RIGHT LEG  CELLULITIS RIGHT LEG  Cellulitis and abscess of right leg  Other iron deficiency anemia  Morbid obesity due to excess calories  Essential hypertension  Cellulitis and abscess of right leg      Care discussed with family,         [  ]   yes  [  ]  No    imp:    stable[ ]    unstable[  ]     improving [   ]       unchanged  [  ]                Plans:  Continue present plans  [  ]               New consult [  ]   specialty  .......               order annika[  ]    test name.                  Discharge Planning  [  ]

## 2018-01-16 NOTE — PROGRESS NOTE ADULT - ASSESSMENT
71 y/o female with PMH HTN here for wound check RLE. pt states she had this wound for about a year and has been coming here to the wound clinic for check-up. pt states when she went today to the wound clinic, the dr told her it was infected and needs to be admitted for iv abx. pt reports fever last night, she took tylenol and it went down. pt has no other complaints. no change in sensation. no known trauma or injury.   Start on IV antibiotics, will get ID Consult. Wound Care F/U.  01/11/18 : Pt. started on IV Ancef and Vancomycin . ID consult noted. continue monitoring. Wound care F/U.  01/12/18 : Pt. on Zosyn and Vancomycin now. Afebrile at present. ID f/u noted for CT scan of rt. lower extremity.  01/13/18 : CT scan rt. leg noted, Consistent with Cellulitis. No OM. Still has temp. On Zosyn and Vancomycin. ID f/u noted.   01/14/18 : Afebrile , Cellulitis resolving, still has induration rt. thigh. Edema resolving. Continue IV antibiotics, ID f/u noted.  01/15/18 : Afebrile. No IV access, needs midline. ID f/u.  01/16/18 : Lymphedema improving. Vascular surgery F/U noted. ID f/u noted. Continue IV antibiotics. 71 y/o female with PMH HTN here for wound check RLE. pt states she had this wound for about a year and has been coming here to the wound clinic for check-up. pt states when she went today to the wound clinic, the dr told her it was infected and needs to be admitted for iv abx. pt reports fever last night, she took tylenol and it went down. pt has no other complaints. no change in sensation. no known trauma or injury.   Start on IV antibiotics, will get ID Consult. Wound Care F/U.  01/11/18 : Pt. started on IV Ancef and Vancomycin . ID consult noted. continue monitoring. Wound care F/U.  01/12/18 : Pt. on Zosyn and Vancomycin now. Afebrile at present. ID f/u noted for CT scan of rt. lower extremity.  01/13/18 : CT scan rt. leg noted, Consistent with Cellulitis. No OM. Still has temp. On Zosyn and Vancomycin. ID f/u noted.   01/14/18 : Afebrile , Cellulitis resolving, still has induration rt. thigh. Edema resolving. Continue IV antibiotics, ID f/u noted.  01/15/18 : Afebrile. No IV access, needs midline. ID f/u.  01/16/18 : Lymphedema improving. Vascular surgery F/U noted. ID f/u noted. Continue IV antibiotics. Rt. elbow tenderness. Try colcrys.

## 2018-01-17 LAB
ANION GAP SERPL CALC-SCNC: 9 MMOL/L — SIGNIFICANT CHANGE UP (ref 5–17)
BUN SERPL-MCNC: 21 MG/DL — SIGNIFICANT CHANGE UP (ref 7–23)
CALCIUM SERPL-MCNC: 8 MG/DL — LOW (ref 8.5–10.1)
CHLORIDE SERPL-SCNC: 102 MMOL/L — SIGNIFICANT CHANGE UP (ref 96–108)
CO2 SERPL-SCNC: 28 MMOL/L — SIGNIFICANT CHANGE UP (ref 22–31)
CREAT SERPL-MCNC: 0.86 MG/DL — SIGNIFICANT CHANGE UP (ref 0.5–1.3)
GLUCOSE SERPL-MCNC: 102 MG/DL — HIGH (ref 70–99)
POTASSIUM SERPL-MCNC: 3.5 MMOL/L — SIGNIFICANT CHANGE UP (ref 3.5–5.3)
POTASSIUM SERPL-SCNC: 3.5 MMOL/L — SIGNIFICANT CHANGE UP (ref 3.5–5.3)
SODIUM SERPL-SCNC: 139 MMOL/L — SIGNIFICANT CHANGE UP (ref 135–145)

## 2018-01-17 RX ADMIN — Medication 250 MILLIGRAM(S): at 00:47

## 2018-01-17 RX ADMIN — Medication 250 MILLIGRAM(S): at 13:28

## 2018-01-17 RX ADMIN — PIPERACILLIN AND TAZOBACTAM 12.5 GRAM(S): 4; .5 INJECTION, POWDER, LYOPHILIZED, FOR SOLUTION INTRAVENOUS at 17:51

## 2018-01-17 RX ADMIN — Medication 40 MILLIGRAM(S): at 05:18

## 2018-01-17 RX ADMIN — Medication 0.6 MILLIGRAM(S): at 11:43

## 2018-01-17 RX ADMIN — Medication 1 MILLIGRAM(S): at 11:43

## 2018-01-17 RX ADMIN — PIPERACILLIN AND TAZOBACTAM 12.5 GRAM(S): 4; .5 INJECTION, POWDER, LYOPHILIZED, FOR SOLUTION INTRAVENOUS at 05:18

## 2018-01-17 RX ADMIN — ENOXAPARIN SODIUM 40 MILLIGRAM(S): 100 INJECTION SUBCUTANEOUS at 21:39

## 2018-01-17 RX ADMIN — Medication 250 MILLIGRAM(S): at 23:37

## 2018-01-17 RX ADMIN — LOSARTAN POTASSIUM 25 MILLIGRAM(S): 100 TABLET, FILM COATED ORAL at 05:19

## 2018-01-17 NOTE — PROGRESS NOTE ADULT - SUBJECTIVE AND OBJECTIVE BOX
Patient is a 72y old  Female who presents with a chief complaint of Infected wound rt. leg (10 Kaden 2018 15:07)      INTERVAL HPI/OVERNIGHT EVENTS: Improving cellulitis. Improving Rt. elbow pain.    MEDICATIONS  (STANDING):  colchicine 0.6 milliGRAM(s) Oral daily  enoxaparin Injectable 40 milliGRAM(s) SubCutaneous every 24 hours  folic acid 1 milliGRAM(s) Oral daily  furosemide   Injectable 40 milliGRAM(s) IV Push daily  losartan 25 milliGRAM(s) Oral daily  piperacillin/tazobactam IVPB. 3.375 Gram(s) IV Intermittent every 8 hours  vancomycin  IVPB 1000 milliGRAM(s) IV Intermittent every 12 hours    MEDICATIONS  (PRN):  acetaminophen   Tablet 650 milliGRAM(s) Oral every 6 hours PRN For Temp greater than 38 C (100.4 F)  acetaminophen   Tablet. 650 milliGRAM(s) Oral every 6 hours PRN Mild Pain (1 - 3)      Allergies    No Known Allergies    Intolerances        REVIEW OF SYSTEMS:  CONSTITUTIONAL: No fever, weight loss, or fatigue  EYES: No eye pain, visual disturbances, or discharge  ENMT:  No difficulty hearing, tinnitus, vertigo; No sinus or throat pain  NECK: No pain or stiffness  BREASTS: No pain, masses, or nipple discharge  RESPIRATORY: No cough, wheezing, chills or hemoptysis; No shortness of breath  CARDIOVASCULAR: No chest pain, palpitations, dizziness, or leg swelling  GASTROINTESTINAL: No abdominal or epigastric pain. No nausea, vomiting, or hematemesis; No diarrhea or constipation. No melena or hematochezia.  GENITOURINARY: No dysuria, frequency, hematuria, or incontinence  NEUROLOGICAL: No headaches, memory loss, loss of strength, numbness, or tremors  SKIN: No itching, burning, rashes, or lesions   LYMPH NODES: No enlarged glands  ENDOCRINE: No heat or cold intolerance; No hair loss  MUSCULOSKELETAL : rt. elbow pain improving.  PSYCHIATRIC: No depression, anxiety, mood swings, or difficulty sleeping  HEME/LYMPH: No easy bruising, or bleeding gums  ALLERGY AND IMMUNOLOGIC: No hives or eczema    Vital Signs Last 24 Hrs  T(C): 37.3 (17 Jan 2018 05:25), Max: 37.8 (16 Jan 2018 23:42)  T(F): 99.2 (17 Jan 2018 05:25), Max: 100 (16 Jan 2018 23:42)  HR: 80 (17 Jan 2018 05:25) (78 - 84)  BP: 141/58 (17 Jan 2018 05:25) (110/55 - 141/58)  BP(mean): --  RR: 18 (17 Jan 2018 05:25) (16 - 18)  SpO2: 98% (17 Jan 2018 05:25) (96% - 98%)    PHYSICAL EXAM:  GENERAL: NAD, well-groomed, well-developed, Obese  HEAD:  Atraumatic, Normocephalic  EYES: EOMI, PERRL, conjunctiva and sclera clear  ENMT:  Moist mucous membranes, Good dentition, No lesions  NECK: Supple, No JVD, Normal thyroid  NERVOUS SYSTEM:  Alert & Oriented X3, Good concentration; Motor Strength 5/5 B/L upper and lower extremities; DTRs 2+ intact and symmetric  CHEST/LUNG: Clear to percussion bilaterally; No rales, rhonchi, wheezing, or rubs  HEART: Regular rate and rhythm; No murmurs, rubs, or gallops  ABDOMEN: Soft, Nontender, Nondistended; Bowel sounds present  EXTREMITIES:  2+ Peripheral Pulses, No clubbing, cyanosis, or edema. ROM rt. elbow improved.  LYMPH: No lymphadenopathy noted  SKIN: No rashes or lesions    LABS:    01-17    139  |  102  |  21  ----------------------------<  102<H>  3.5   |  28  |  0.86    Ca    8.0<L>      17 Jan 2018 07:52          CAPILLARY BLOOD GLUCOSE      Culture - Blood (collected 11 Jan 2018 09:27)  Source: .Blood  Final Report (16 Jan 2018 10:01):    No growth at 5 days.    Culture - Blood (collected 11 Jan 2018 08:41)  Source: .Blood Blood  Final Report (16 Jan 2018 09:01):    No growth at 5 days.    Culture - Blood (collected 10 Kaden 2018 23:56)  Source: .Blood Blood  Final Report (16 Jan 2018 01:01):    No growth at 5 days.        RADIOLOGY & ADDITIONAL TESTS:    Imaging Personally Reviewed:  [ ] YES  [ ] NO    Consultant(s) Notes Reviewed:  [ ] YES  [ ] NO    Care Discussed with Consultants/Other Providers [ ] YES  [ ] NO    PROBLEMS:  CELLULITIS IN ABSCESS OF RIGHT LEG  CELLULITIS RIGHT LEG  Cellulitis and abscess of right leg  Other iron deficiency anemia  Morbid obesity due to excess calories  Essential hypertension  Cellulitis and abscess of right leg      Care discussed with family,         [  ]   yes  [  ]  No    imp:    stable[ ]    unstable[  ]     improving [   ]       unchanged  [  ]                Plans:  Continue present plans  [  ]               New consult [  ]   specialty  .......               order annika[  ]    test name.                  Discharge Planning  [  ]

## 2018-01-17 NOTE — PROGRESS NOTE ADULT - SUBJECTIVE AND OBJECTIVE BOX
71 y/o female with PMH HTN here for wound check RLE. pt states she had this wound for about a year and has been coming here to the wound clinic for check-up. pt states when she went today to the wound clinic, the dr told her it was infected and needs to be admitted for iv abx. pt reports fever last night, she took tylenol and it went down. pt has no other complaints. no change in sensation. no known trauma or injury.     patient feels better   vascular note seen     Allergies    No Known Allergies    Intolerances        MEDICATIONS  (STANDING):  colchicine 0.6 milliGRAM(s) Oral daily  enoxaparin Injectable 40 milliGRAM(s) SubCutaneous every 24 hours  folic acid 1 milliGRAM(s) Oral daily  furosemide   Injectable 40 milliGRAM(s) IV Push daily  losartan 25 milliGRAM(s) Oral daily  piperacillin/tazobactam IVPB. 3.375 Gram(s) IV Intermittent every 8 hours  vancomycin  IVPB 1000 milliGRAM(s) IV Intermittent every 12 hours    MEDICATIONS  (PRN):  acetaminophen   Tablet 650 milliGRAM(s) Oral every 6 hours PRN For Temp greater than 38 C (100.4 F)  acetaminophen   Tablet. 650 milliGRAM(s) Oral every 6 hours PRN Mild Pain (1 - 3)      REVIEW OF SYSTEMS:    CONSTITUTIONAL: No fever, chills, weight loss, or fatigue  HEENT: No sore throat, runny nose, ear ache  RESPIRATORY: No cough, wheezing, No shortness of breath  CARDIOVASCULAR: No chest pain, palpitations, dizziness  GASTROINTESTINAL: No abdominal pain. No nausea, vomiting, diarrhea  GENITOURINARY: No dysuria, increase frequency, hematuria, or incontinence  NEUROLOGICAL: No headaches, memory loss, loss of strength, numbness, or tremors, no weakness  EXTREMITY: No pedal edema BLE  SKIN: No itching, burning, rashes, or lesions     VITAL SIGNS:  T(C): 37 (01-17-18 @ 17:09), Max: 37.8 (01-16-18 @ 23:42)  T(F): 98.6 (01-17-18 @ 17:09), Max: 100 (01-16-18 @ 23:42)  HR: 76 (01-17-18 @ 17:09) (76 - 84)  BP: 108/55 (01-17-18 @ 17:09) (108/55 - 141/58)  RR: 16 (01-17-18 @ 17:09) (16 - 18)  SpO2: 97% (01-17-18 @ 17:09) (97% - 98%)  Wt(kg): --    PHYSICAL EXAM:    GENERAL: not in any distress  HEENT: Neck is supple, normocephalic, atraumatic   CHEST/LUNG: Clear to auscultation bilaterally; No rales, rhonchi, wheezing  HEART: Regular rate and rhythm; No murmurs, rubs, or gallops  ABDOMEN: Soft, Nontender, Nondistended; Bowel sounds present, no rebound   EXTREMITIES:  2+ Peripheral Pulses, No clubbing, cyanosis, or edema  GENITOURINARY:   SKIN: No rashes or lesions  BACK: no pressor sore   NERVOUS SYSTEM:  Alert & Oriented X3, Good concentration  PSYCH: normal affect     LABS:     01-17    139  |  102  |  21  ----------------------------<  102<H>  3.5   |  28  |  0.86    Ca    8.0<L>      17 Jan 2018 07:52                          Vancomycin Level, Trough: 16.4 ug/mL (01-16 @ 21:34)        Culture Results:   No growth at 5 days. (01-11 @ 09:27)  Culture Results:   No growth at 5 days. (01-11 @ 08:41)  Culture Results:   No growth at 5 days. (01-10 @ 23:56)                Radiology: 73 y/o female with PMH HTN here for wound check RLE. pt states she had this wound for about a year and has been coming here to the wound clinic for check-up. pt states when she went today to the wound clinic, the dr told her it was infected and needs to be admitted for iv abx. pt reports fever last night, she took tylenol and it went down. pt has no other complaints. no change in sensation. no known trauma or injury.     patient feels better   vascular note seen     Allergies    No Known Allergies    Intolerances        MEDICATIONS  (STANDING):  colchicine 0.6 milliGRAM(s) Oral daily  enoxaparin Injectable 40 milliGRAM(s) SubCutaneous every 24 hours  folic acid 1 milliGRAM(s) Oral daily  furosemide   Injectable 40 milliGRAM(s) IV Push daily  losartan 25 milliGRAM(s) Oral daily  piperacillin/tazobactam IVPB. 3.375 Gram(s) IV Intermittent every 8 hours  vancomycin  IVPB 1000 milliGRAM(s) IV Intermittent every 12 hours    MEDICATIONS  (PRN):  acetaminophen   Tablet 650 milliGRAM(s) Oral every 6 hours PRN For Temp greater than 38 C (100.4 F)  acetaminophen   Tablet. 650 milliGRAM(s) Oral every 6 hours PRN Mild Pain (1 - 3)      REVIEW OF SYSTEMS:    feels better   leg is still huge swollen     VITAL SIGNS:  T(C): 37 (01-17-18 @ 17:09), Max: 37.8 (01-16-18 @ 23:42)  T(F): 98.6 (01-17-18 @ 17:09), Max: 100 (01-16-18 @ 23:42)  HR: 76 (01-17-18 @ 17:09) (76 - 84)  BP: 108/55 (01-17-18 @ 17:09) (108/55 - 141/58)  RR: 16 (01-17-18 @ 17:09) (16 - 18)  SpO2: 97% (01-17-18 @ 17:09) (97% - 98%)  Wt(kg): --    PHYSICAL EXAM:    GENERAL: not in any distress  HEENT: Neck is supple, normocephalic, atraumatic   CHEST/LUNG: Clear to auscultation bilaterally; No rales, rhonchi, wheezing  HEART: Regular rate and rhythm; No murmurs, rubs, or gallops  ABDOMEN: Soft, Nontender, Nondistended; Bowel sounds present, no rebound   EXTREMITIES:  2+ Peripheral Pulses, No clubbing, cyanosis, or edema  rt leg huge indurated mass less hot ; superficial ulceration  SKIN: No rashes or lesions  BACK: no pressor sore   NERVOUS SYSTEM:  Alert & Oriented X3, Good concentration  PSYCH: normal affect     LABS:     01-17    139  |  102  |  21  ----------------------------<  102<H>  3.5   |  28  |  0.86    Ca    8.0<L>      17 Jan 2018 07:52                          Vancomycin Level, Trough: 16.4 ug/mL (01-16 @ 21:34)        Culture Results:   No growth at 5 days. (01-11 @ 09:27)  Culture Results:   No growth at 5 days. (01-11 @ 08:41)  Culture Results:   No growth at 5 days. (01-10 @ 23:56)                Radiology:

## 2018-01-17 NOTE — PROCEDURE NOTE - ADDITIONAL PROCEDURE DETAILS
Patient seen at bedside for midline catheter placement.  Consent obtained from patient after risks/benefits/alternatives explained.   Upper Left extremity prepped and draped in usual sterile fashion. Time out performed with RN. 4Fr 1 cm single lumen midline catheter placed using ultrasound guided seldinger technique, R basilic vein. Flushes well, with good venous return. Patient tolerated procedure well without complication and was left in no acute distress.

## 2018-01-17 NOTE — PROCEDURE NOTE - NSPROCDETAILS_GEN_ALL_CORE
Doug Arora   2017 12:15 PM   Office Visit   MRN: [id number]    Department:  [facility]   Dept Phone:  [phone]    Description:  Female : 1996   Provider:  Wild Lou PA-C           Reason for Visit     Constipation x 2-3 days, contipation and abdominal cramping    Pharyngitis x today, sore throat, pain to swallow      Allergies as of 2017     No Known Allergies      You were diagnosed with     Tonsillitis   [691153]       Other constipation   [564.09.ICD-9-CM]         Vital Signs     Blood Pressure Pulse Temperature Respirations Weight Oxygen Saturation    98/64 mmHg 60 36.9 °C (98.4 °F) 16 48.081 kg (106 lb) 99%    Smoking Status                   Never Smoker            Basic Information     Date Of Birth Sex Race Ethnicity Preferred Language    1996 Female White Non- English      Health Maintenance        Date Due Completion Dates    IMM HEP B VACCINE (1 of 3 - Primary Series) 1997 ---    IMM HEP A VACCINE (1 of 2 - Standard Series) 1998 ---    IMM HPV VACCINE (1 of 3 - Female 3 Dose Series) 2008 ---    IMM VARICELLA (CHICKENPOX) VACCINE (1 of 2 - 2 Dose Adolescent Series) 2010 ---    IMM MENINGOCOCCAL VACCINE (MCV4) (1 of 1) 2013 ---    IMM INFLUENZA (1) 2016 ---    IMM DTaP/Tdap/Td Vaccine (1 - Tdap) 2016 ---            Results     POCT Rapid Strep A      Component    Rapid Strep Screen    Negative    Internal Control Positive    Positive    Internal Control Negative    Negative                        Current Immunizations     No immunizations on file.      Below and/or attached are the medications your provider expects you to take. Review all of your home medications and newly ordered medications with your provider and/or pharmacist. Follow medication instructions as directed by your provider and/or pharmacist. Please keep your medication list with you and share with your provider. Update the information  when medications are discontinued, doses are changed, or new medications (including over-the-counter products) are added; and carry medication information at all times in the event of emergency situations     Allergies:  No Known Allergies          Medications  Valid as of: January 07, 2017 - 12:43 PM    Generic Name Brand Name Tablet Size Instructions for use    Azithromycin (Tab) ZITHROMAX 250 MG z-ruby; U.D.        Cefuroxime Axetil (Tab) CEFTIN 500 MG Take 1 Tab by mouth 2 times a day for 7 days.        HydrOXYzine HCl (Tab) ATARAX 25 MG Take 1 Tab by mouth at bedtime as needed for Anxiety (or difficulty sleeping).        Loratadine   Take  by mouth.        Propranolol HCl (Tab) INDERAL 10 MG Take 1 Tab by mouth 2 times a day as needed (for heart palpitations).        Triamcinolone Acetonide (Cream) KENALOG 0.1 % AAA twice daily for at least 2 weeks.  Restart with reoccurance of symptoms        .                 Medicines prescribed today were sent to:     [id number] - [place], [place] - [address]    [address] [facility] [address]    Phone: [phone] Fax: [phone]    Open 24 Hours?: No    [facility] (), [place] - [address]    [address] () [place] [address]    Phone: [phone] Fax: [phone]    Open 24 Hours?: No      Medication refill instructions:       If your prescription bottle indicates you have medication refills left, it is not necessary to call your provider’s office. Please contact your pharmacy and they will refill your medication.    If your prescription bottle indicates you do not have any refills left, you may request refills at any time through one of the following ways: The online Extraprise system (except Urgent Care), by calling your provider’s office, or by asking your pharmacy to contact your provider’s office with a refill request. Medication refills are processed only during regular business hours and may not be available until the next business  day. Your provider may request additional information or to have a follow-up visit with you prior to refilling your medication.   *Please Note: Medication refills are assigned a new Rx number when refilled electronically. Your pharmacy may indicate that no refills were authorized even though a new prescription for the same medication is available at the pharmacy. Please request the medicine by name with the pharmacy before contacting your provider for a refill.           Oyokey Access Code: [email or web address]  Expires: 2/6/2017 12:43 PM    Oyokey  A secure, online tool to manage your health information     [employer]’s Oyokey® is a secure, online tool that connects you to your personalized health information from the privacy of your home -- day or night - making it very easy for you to manage your healthcare. Once the activation process is completed, you can even access your medical information using the Oyokey karla, which is available for free in the Apple Karla store or Google Play store.     Oyokey provides the following levels of access (as shown below):   My Chart Features   [facility] Doctor [facility]  Specialists [facility]  Urgent  Care Non-[facility]  Primary Care  Doctor   Email your healthcare team securely and privately 24/7 X X X    Manage appointments: schedule your next appointment; view details of past/upcoming appointments X      Request prescription refills. X      View recent personal medical records, including lab and immunizations X X X X   View health record, including health history, allergies, medications X X X X   Read reports about your outpatient visits, procedures, consult and ER notes X X X X   See your discharge summary, which is a recap of your hospital and/or ER visit that includes your diagnosis, lab results, and care plan. X X       How to register for Oyokey:  1. Go to  [email or web address].  2. Click on the Sign Up Now box, which takes you to the New Member Sign Up page.  You will need to provide the following information:  a. Enter your Sokoos Access Code exactly as it appears at the top of this page. (You will not need to use this code after you’ve completed the sign-up process. If you do not sign up before the expiration date, you must request a new code.)   b. Enter your date of birth.   c. Enter your home email address.   d. Click Submit, and follow the next screen’s instructions.  3. Create a Yesmywinet ID. This will be your Sokoos login ID and cannot be changed, so think of one that is secure and easy to remember.  4. Create a Sokoos password. You can change your password at any time.  5. Enter your Password Reset Question and Answer. This can be used at a later time if you forget your password.   6. Enter your e-mail address. This allows you to receive e-mail notifications when new information is available in Sokoos.  7. Click Sign Up. You can now view your health information.    For assistance activating your Sokoos account, call [phone]         dressing applied/ultrasound utilization/secured in place/sterile technique, catheter placed/location identified, draped/prepped, sterile technique used/blood seen on insertion/flushes easily

## 2018-01-17 NOTE — PROGRESS NOTE ADULT - ASSESSMENT
infected leg wound ;;  Patient with severe lymphedema admitted with cellulitis and abscess. Skin abscess to posterior right leg is likely a pressure wound from compression. Wound evaluated, improving from prior examinations. Recommend daily dressing change with rinse with normal saline and mepelex dressing. Advise patient to elevate RLE and allow edematous area to dangle without compression to allow healing.  good vanco levels now   vascular noted  discharge plan per vascular   is much better now

## 2018-01-17 NOTE — PROCEDURE NOTE - PROCEDURE
<<-----Click on this checkbox to enter Procedure Midline catheter insertion  01/17/2018    Active  [id number]

## 2018-01-17 NOTE — PROGRESS NOTE ADULT - ASSESSMENT
73 y/o female with PMH HTN here for wound check RLE. pt states she had this wound for about a year and has been coming here to the wound clinic for check-up. pt states when she went today to the wound clinic, the dr told her it was infected and needs to be admitted for iv abx. pt reports fever last night, she took tylenol and it went down. pt has no other complaints. no change in sensation. no known trauma or injury.   Start on IV antibiotics, will get ID Consult. Wound Care F/U.  01/11/18 : Pt. started on IV Ancef and Vancomycin . ID consult noted. continue monitoring. Wound care F/U.  01/12/18 : Pt. on Zosyn and Vancomycin now. Afebrile at present. ID f/u noted for CT scan of rt. lower extremity.  01/13/18 : CT scan rt. leg noted, Consistent with Cellulitis. No OM. Still has temp. On Zosyn and Vancomycin. ID f/u noted.   01/14/18 : Afebrile , Cellulitis resolving, still has induration rt. thigh. Edema resolving. Continue IV antibiotics, ID f/u noted.  01/15/18 : Afebrile. No IV access, needs midline. ID f/u.  01/16/18 : Lymphedema improving. Vascular surgery F/U noted. ID f/u noted. Continue IV antibiotics. Rt. elbow tenderness. Try colcrys.  01/17/18 : ID f/u noted, Overall improvement in cellulitis. Rt. elbow pain and ROM better. Discharge planning as per Vascular surgery.

## 2018-01-18 DIAGNOSIS — M10.9 GOUT, UNSPECIFIED: ICD-10-CM

## 2018-01-18 RX ADMIN — PIPERACILLIN AND TAZOBACTAM 12.5 GRAM(S): 4; .5 INJECTION, POWDER, LYOPHILIZED, FOR SOLUTION INTRAVENOUS at 19:09

## 2018-01-18 RX ADMIN — Medication 1 MILLIGRAM(S): at 11:58

## 2018-01-18 RX ADMIN — Medication 250 MILLIGRAM(S): at 13:00

## 2018-01-18 RX ADMIN — ENOXAPARIN SODIUM 40 MILLIGRAM(S): 100 INJECTION SUBCUTANEOUS at 20:11

## 2018-01-18 RX ADMIN — Medication 0.6 MILLIGRAM(S): at 11:58

## 2018-01-18 RX ADMIN — PIPERACILLIN AND TAZOBACTAM 12.5 GRAM(S): 4; .5 INJECTION, POWDER, LYOPHILIZED, FOR SOLUTION INTRAVENOUS at 01:33

## 2018-01-18 RX ADMIN — PIPERACILLIN AND TAZOBACTAM 12.5 GRAM(S): 4; .5 INJECTION, POWDER, LYOPHILIZED, FOR SOLUTION INTRAVENOUS at 09:36

## 2018-01-18 RX ADMIN — Medication 40 MILLIGRAM(S): at 10:17

## 2018-01-18 RX ADMIN — Medication 250 MILLIGRAM(S): at 23:48

## 2018-01-18 NOTE — PROGRESS NOTE ADULT - SUBJECTIVE AND OBJECTIVE BOX
Patient is a 72y old  Female who presents with a chief complaint of Infected wound rt. leg (10 Kaden 2018 15:07)      INTERVAL HPI/OVERNIGHT EVENTS: Mid line inserted    MEDICATIONS  (STANDING):  colchicine 0.6 milliGRAM(s) Oral daily  enoxaparin Injectable 40 milliGRAM(s) SubCutaneous every 24 hours  folic acid 1 milliGRAM(s) Oral daily  furosemide   Injectable 40 milliGRAM(s) IV Push daily  losartan 25 milliGRAM(s) Oral daily  piperacillin/tazobactam IVPB. 3.375 Gram(s) IV Intermittent every 8 hours  vancomycin  IVPB 1000 milliGRAM(s) IV Intermittent every 12 hours    MEDICATIONS  (PRN):  acetaminophen   Tablet 650 milliGRAM(s) Oral every 6 hours PRN For Temp greater than 38 C (100.4 F)  acetaminophen   Tablet. 650 milliGRAM(s) Oral every 6 hours PRN Mild Pain (1 - 3)      Allergies    No Known Allergies    Intolerances        REVIEW OF SYSTEMS:  CONSTITUTIONAL: No fever, weight loss, or fatigue  EYES: No eye pain, visual disturbances, or discharge  ENMT:  No difficulty hearing, tinnitus, vertigo; No sinus or throat pain  NECK: No pain or stiffness  BREASTS: No pain, masses, or nipple discharge  RESPIRATORY: No cough, wheezing, chills or hemoptysis; No shortness of breath  CARDIOVASCULAR: No chest pain, palpitations, dizziness, or leg swelling  GASTROINTESTINAL: No abdominal or epigastric pain. No nausea, vomiting, or hematemesis; No diarrhea or constipation. No melena or hematochezia.  GENITOURINARY: No dysuria, frequency, hematuria, or incontinence  NEUROLOGICAL: No headaches, memory loss, loss of strength, numbness, or tremors  SKIN: No itching, burning, rashes, or lesions   LYMPH NODES: No enlarged glands  ENDOCRINE: No heat or cold intolerance; No hair loss  MUSCULOSKELETAL: No joint pain or swelling; No muscle, back, or extremity pain  PSYCHIATRIC: No depression, anxiety, mood swings, or difficulty sleeping  HEME/LYMPH: No easy bruising, or bleeding gums  ALLERGY AND IMMUNOLOGIC: No hives or eczema    Vital Signs Last 24 Hrs  T(C): 36.6 (18 Jan 2018 11:27), Max: 37 (17 Jan 2018 17:09)  T(F): 97.8 (18 Jan 2018 11:27), Max: 98.6 (17 Jan 2018 17:09)  HR: 79 (18 Jan 2018 11:27) (76 - 79)  BP: 114/61 (18 Jan 2018 11:27) (102/55 - 114/61)  BP(mean): --  RR: 16 (18 Jan 2018 11:27) (16 - 16)  SpO2: 98% (18 Jan 2018 11:27) (97% - 98%)    PHYSICAL EXAM:  GENERAL: NAD, well-groomed, Obese  HEAD:  Atraumatic, Normocephalic  EYES: EOMI, PERRL, conjunctiva and sclera clear  ENMT:  Moist mucous membranes, Good dentition, No lesions  NECK: Supple, No JVD, Normal thyroid  NERVOUS SYSTEM:  Alert & Oriented X 3, Good concentration; Motor Strength 5/5 B/L upper and lower extremities; DTRs 2+ intact and symmetric  CHEST/LUNG: Clear to percussion bilaterally; No rales, rhonchi, wheezing, or rubs  HEART: Regular rate and rhythm; No murmurs, rubs, or gallops  ABDOMEN: Soft, Nontender, Nondistended; Bowel sounds present  EXTREMITIES:  2+ Peripheral Pulses, No clubbing, cyanosis, or edema. Rt. elbow tenderness resolved. ROM normalized. Rt. pos. thigh induration present, Cellulitis resolved.  LYMPH: No lymphadenopathy noted  SKIN: No rashes or lesions    LABS:    01-17    139  |  102  |  21  ----------------------------<  102<H>  3.5   |  28  |  0.86    Ca    8.0<L>      17 Jan 2018 07:52          CAPILLARY BLOOD GLUCOSE        Culture - Blood (collected 11 Jan 2018 09:27)  Source: .Blood  Final Report (16 Jan 2018 10:01):    No growth at 5 days.    Culture - Blood (collected 11 Jan 2018 08:41)  Source: .Blood Blood  Final Report (16 Jan 2018 09:01):    No growth at 5 days.    Culture - Blood (collected 10 Kaden 2018 23:56)  Source: .Blood Blood  Final Report (16 Jan 2018 01:01):    No growth at 5 days.        RADIOLOGY & ADDITIONAL TESTS:    Imaging Personally Reviewed:  [ ] YES  [ ] NO    Consultant(s) Notes Reviewed:  [x ] YES  [ ] NO    Care Discussed with Consultants/Other Providers [ ] YES  [ ] NO    PROBLEMS:  CELLULITIS IN ABSCESS OF RIGHT LEG  CELLULITIS RIGHT LEG  Cellulitis and abscess of right leg  Other iron deficiency anemia  Morbid obesity due to excess calories  Essential hypertension  Cellulitis and abscess of right leg      Care discussed with family,         [  ]   yes  [  ]  No    imp:    stable[ ]    unstable[  ]     improving [   ]       unchanged  [  ]                Plans:  Continue present plans  [  ]               New consult [  ]   specialty  .......               order annika[  ]    test name.                  Discharge Planning  [  ]

## 2018-01-18 NOTE — PROGRESS NOTE ADULT - ASSESSMENT
73 y/o female with PMH HTN here for wound check RLE. pt states she had this wound for about a year and has been coming here to the wound clinic for check-up. pt states when she went today to the wound clinic, the dr told her it was infected and needs to be admitted for iv abx. pt reports fever last night, she took tylenol and it went down. pt has no other complaints. no change in sensation. no known trauma or injury.   Start on IV antibiotics, will get ID Consult. Wound Care F/U.  01/11/18 : Pt. started on IV Ancef and Vancomycin . ID consult noted. continue monitoring. Wound care F/U.  01/12/18 : Pt. on Zosyn and Vancomycin now. Afebrile at present. ID f/u noted for CT scan of rt. lower extremity.  01/13/18 : CT scan rt. leg noted, Consistent with Cellulitis. No OM. Still has temp. On Zosyn and Vancomycin. ID f/u noted.   01/14/18 : Afebrile , Cellulitis resolving, still has induration rt. thigh. Edema resolving. Continue IV antibiotics, ID f/u noted.  01/15/18 : Afebrile. No IV access, needs midline. ID f/u.  01/16/18 : Lymphedema improving. Vascular surgery F/U noted. ID f/u noted. Continue IV antibiotics. Rt. elbow tenderness. Try colcrys.  01/17/18 : ID f/u noted, Overall improvement in cellulitis. Rt. elbow pain and ROM better. Discharge planning as per Vascular surgery.  01/18/18 : Pt. alert, Rt. elbow pain and tenderness resolved. ROM normalized. Discussed with ID, Discharge planning if OK with surgery.

## 2018-01-19 ENCOUNTER — TRANSCRIPTION ENCOUNTER (OUTPATIENT)
Age: 73
End: 2018-01-19

## 2018-01-19 VITALS
DIASTOLIC BLOOD PRESSURE: 71 MMHG | TEMPERATURE: 98 F | HEART RATE: 77 BPM | OXYGEN SATURATION: 99 % | SYSTOLIC BLOOD PRESSURE: 129 MMHG | RESPIRATION RATE: 16 BRPM

## 2018-01-19 LAB — VANCOMYCIN TROUGH SERPL-MCNC: 16.5 UG/ML — SIGNIFICANT CHANGE UP (ref 10–20)

## 2018-01-19 RX ADMIN — LOSARTAN POTASSIUM 25 MILLIGRAM(S): 100 TABLET, FILM COATED ORAL at 05:42

## 2018-01-19 RX ADMIN — PIPERACILLIN AND TAZOBACTAM 12.5 GRAM(S): 4; .5 INJECTION, POWDER, LYOPHILIZED, FOR SOLUTION INTRAVENOUS at 02:11

## 2018-01-19 RX ADMIN — PIPERACILLIN AND TAZOBACTAM 12.5 GRAM(S): 4; .5 INJECTION, POWDER, LYOPHILIZED, FOR SOLUTION INTRAVENOUS at 09:00

## 2018-01-19 RX ADMIN — Medication 0.6 MILLIGRAM(S): at 12:42

## 2018-01-19 RX ADMIN — Medication 40 MILLIGRAM(S): at 05:42

## 2018-01-19 RX ADMIN — Medication 1 MILLIGRAM(S): at 12:42

## 2018-01-19 NOTE — PROGRESS NOTE ADULT - PROBLEM SELECTOR PLAN 4
Ferrous Sulfate.

## 2018-01-19 NOTE — DISCHARGE NOTE ADULT - MEDICATION SUMMARY - MEDICATIONS TO TAKE
I will START or STAY ON the medications listed below when I get home from the hospital:    losartan 25 mg oral tablet  -- 1 tab(s) by mouth once a day  -- Indication: For HTN    Lasix  -- 20 milligram(s) by mouth once a day  -- Indication: For HTN    ferrous sulfate  -- 325 milligram(s) by mouth 2 times a day  -- Indication: For Supplement    Augmentin 875 mg-125 mg oral tablet  -- 1 tab(s) by mouth every 12 hours  -- Indication: For Cellulitis and abscess of right leg    folic acid 1 mg oral tablet  -- 1 tab(s) by mouth once a day  -- Indication: For Supplemrnt

## 2018-01-19 NOTE — PROGRESS NOTE ADULT - ASSESSMENT
73 y/o female with PMH HTN here for wound check RLE. pt states she had this wound for about a year and has been coming here to the wound clinic for check-up. pt states when she went today to the wound clinic, the dr told her it was infected and needs to be admitted for iv abx. pt reports fever last night, she took tylenol and it went down. pt has no other complaints. no change in sensation. no known trauma or injury.   Start on IV antibiotics, will get ID Consult. Wound Care F/U.  01/11/18 : Pt. started on IV Ancef and Vancomycin . ID consult noted. continue monitoring. Wound care F/U.  01/12/18 : Pt. on Zosyn and Vancomycin now. Afebrile at present. ID f/u noted for CT scan of rt. lower extremity.  01/13/18 : CT scan rt. leg noted, Consistent with Cellulitis. No OM. Still has temp. On Zosyn and Vancomycin. ID f/u noted.   01/14/18 : Afebrile , Cellulitis resolving, still has induration rt. thigh. Edema resolving. Continue IV antibiotics, ID f/u noted.  01/15/18 : Afebrile. No IV access, needs midline. ID f/u.  01/16/18 : Lymphedema improving. Vascular surgery F/U noted. ID f/u noted. Continue IV antibiotics. Rt. elbow tenderness. Try colcrys.  01/17/18 : ID f/u noted, Overall improvement in cellulitis. Rt. elbow pain and ROM better. Discharge planning as per Vascular surgery.  01/18/18 : Pt. alert, Rt. elbow pain and tenderness resolved. ROM normalized. Discussed with ID, Discharge planning if OK with surgery.  01/19/18 : Alert, awake, Asymptomatic. Discharge home on Augmentin. To F/U with wound care, on Wednesday.

## 2018-01-19 NOTE — PROGRESS NOTE ADULT - PROBLEM SELECTOR PLAN 1
I.V. Antibiotics.

## 2018-01-19 NOTE — PROGRESS NOTE ADULT - PROBLEM SELECTOR PLAN 2
Monitor BP, Losartan.

## 2018-01-19 NOTE — DISCHARGE NOTE ADULT - SECONDARY DIAGNOSIS.
Essential hypertension Morbid obesity due to excess calories Other iron deficiency anemia Acute gout of right elbow, unspecified cause

## 2018-01-19 NOTE — DISCHARGE NOTE ADULT - HOSPITAL COURSE
73 y/o female with PMH HTN here for wound check RLE. pt states she had this wound for about a year and has been coming here to the wound clinic for check-up. pt states when she went today to the wound clinic, the dr told her it was infected and needs to be admitted for iv abx. pt reports fever last night, she took tylenol and it went down. pt has no other complaints. no change in sensation. no known trauma or injury.   Start on IV antibiotics, will get ID Consult. Wound Care F/U.  01/11/18 : Pt. started on IV Ancef and Vancomycin . ID consult noted. continue monitoring. Wound care F/U.  01/12/18 : Pt. on Zosyn and Vancomycin now. Afebrile at present. ID f/u noted for CT scan of rt. lower extremity.  01/13/18 : CT scan rt. leg noted, Consistent with Cellulitis. No OM. Still has temp. On Zosyn and Vancomycin. ID f/u noted.   01/14/18 : Afebrile , Cellulitis resolving, still has induration rt. thigh. Edema resolving. Continue IV antibiotics, ID f/u noted.  01/15/18 : Afebrile. No IV access, needs midline. ID f/u.  01/16/18 : Lymphedema improving. Vascular surgery F/U noted. ID f/u noted. Continue IV antibiotics. Rt. elbow tenderness. Try colcrys.  01/17/18 : ID f/u noted, Overall improvement in cellulitis. Rt. elbow pain and ROM better. Discharge planning as per Vascular surgery.  01/18/18 : Pt. alert, Rt. elbow pain and tenderness resolved. ROM normalized. Discussed with ID, Discharge planning if OK with surgery.  01/19/18 : Alert, awake, Asymptomatic. Discharge home on Augmentin. To F/U with wound care, on Wednesday.     Problem/Plan - 1:  ·  Problem: Cellulitis and abscess of right leg.  Discharge on Augmentin     Problem/Plan - 2:  ·  Problem: Essential hypertension.  Plan: Monitor BP, Losartan.      Problem/Plan - 3:  ·  Problem: Morbid obesity due to excess calories.  Plan: Nutrition consult.      Problem/Plan - 4:  ·  Problem: Other iron deficiency anemia.  Plan: Ferrous Sulfate.      Problem/Plan - 5:  ·  Problem: Acute gout of right elbow, unspecified cause.  Plan: Resolved.

## 2018-01-19 NOTE — DISCHARGE NOTE ADULT - PLAN OF CARE
Monitor BP, Continue Meds. Diet control Ferrous Sulfate. Resolving Augmentin, F/U with wound clinic. Resolved

## 2018-01-19 NOTE — DISCHARGE NOTE ADULT - PATIENT PORTAL LINK FT
“You can access the Follow[facility] Patient Portal, offered by [facility], by registering with the following website: [email or web address]”

## 2018-01-19 NOTE — PROGRESS NOTE ADULT - PROVIDER SPECIALTY LIST ADULT
Infectious Disease
Internal Medicine
Wound Care
Internal Medicine

## 2018-01-19 NOTE — PROGRESS NOTE ADULT - ASSESSMENT
infected leg wound ;;  Patient with severe lymphedema admitted with cellulitis and abscess. Skin abscess to posterior right leg is likely a pressure wound from compression. Wound evaluated, improving from prior examinations. Recommend daily dressing change with rinse with normal saline and mepelex dressing. Advise patient to elevate RLE and allow edematous area to dangle without compression to allow healing.  good vanco levels now   is much better now   discharge on oral   out patient follow up with vasular

## 2018-01-19 NOTE — PROGRESS NOTE ADULT - SUBJECTIVE AND OBJECTIVE BOX
Patient is a 72y old  Female who presents with a chief complaint of Infected wound rt. leg (10 Kaden 2018 15:07)      INTERVAL HPI/OVERNIGHT EVENTS:    MEDICATIONS  (STANDING):  colchicine 0.6 milliGRAM(s) Oral daily  enoxaparin Injectable 40 milliGRAM(s) SubCutaneous every 24 hours  folic acid 1 milliGRAM(s) Oral daily  furosemide   Injectable 40 milliGRAM(s) IV Push daily  losartan 25 milliGRAM(s) Oral daily  piperacillin/tazobactam IVPB. 3.375 Gram(s) IV Intermittent every 8 hours  vancomycin  IVPB 1000 milliGRAM(s) IV Intermittent every 12 hours    MEDICATIONS  (PRN):  acetaminophen   Tablet 650 milliGRAM(s) Oral every 6 hours PRN For Temp greater than 38 C (100.4 F)  acetaminophen   Tablet. 650 milliGRAM(s) Oral every 6 hours PRN Mild Pain (1 - 3)      Allergies    No Known Allergies    Intolerances        REVIEW OF SYSTEMS:  CONSTITUTIONAL: No fever, weight loss, or fatigue  EYES: No eye pain, visual disturbances, or discharge  ENMT:  No difficulty hearing, tinnitus, vertigo; No sinus or throat pain  NECK: No pain or stiffness  BREASTS: No pain, masses, or nipple discharge  RESPIRATORY: No cough, wheezing, chills or hemoptysis; No shortness of breath  CARDIOVASCULAR: No chest pain, palpitations, dizziness, or leg swelling  GASTROINTESTINAL: No abdominal or epigastric pain. No nausea, vomiting, or hematemesis; No diarrhea or constipation. No melena or hematochezia.  GENITOURINARY: No dysuria, frequency, hematuria, or incontinence  NEUROLOGICAL: No headaches, memory loss, loss of strength, numbness, or tremors  SKIN: No itching, burning, rashes, or lesions   LYMPH NODES: No enlarged glands  ENDOCRINE: No heat or cold intolerance; No hair loss  MUSCULOSKELETAL: No joint pain or swelling; No muscle, back, or extremity pain  PSYCHIATRIC: No depression, anxiety, mood swings, or difficulty sleeping  HEME/LYMPH: No easy bruising, or bleeding gums  ALLERGY AND IMMUNOLOGIC: No hives or eczema    Vital Signs Last 24 Hrs  T(C): 36.8 (19 Jan 2018 05:11), Max: 37 (18 Jan 2018 23:39)  T(F): 98.2 (19 Jan 2018 05:11), Max: 98.6 (18 Jan 2018 23:39)  HR: 76 (19 Jan 2018 05:11) (76 - 80)  BP: 132/57 (19 Jan 2018 05:11) (114/61 - 136/71)  BP(mean): --  RR: 16 (19 Jan 2018 05:11) (16 - 16)  SpO2: 98% (19 Jan 2018 05:11) (96% - 99%)    PHYSICAL EXAM:  GENERAL: NAD, well-groomed, well-developed, Obese  HEAD:  Atraumatic, Normocephalic  EYES: EOMI, PERRL, conjunctiva and sclera clear  ENMT:  Moist mucous membranes, Good dentition, No lesions  NECK: Supple, No JVD, Normal thyroid  NERVOUS SYSTEM:  Alert & Oriented Xx3, Good concentration; Motor Strength 5/5 B/L upper and lower extremities; DTRs 2+ intact and symmetric  CHEST/LUNG: Clear to percussion bilaterally; No rales, rhonchi, wheezing, or rubs  HEART: Regular rate and rhythm; No murmurs, rubs, or gallops  ABDOMEN: Soft, Nontender, Nondistended; Bowel sounds present  EXTREMITIES:  2+ Peripheral Pulses, No clubbing, cyanosis, or edema  LYMPH: No lymphadenopathy noted  SKIN: No rashes or lesions    LABS:      CAPILLARY BLOOD GLUCOSE    Culture - Blood (collected 11 Jan 2018 09:27)  Source: .Blood  Final Report (16 Jan 2018 10:01):    No growth at 5 days.    Culture - Blood (collected 11 Jan 2018 08:41)  Source: .Blood Blood  Final Report (16 Jan 2018 09:01):    No growth at 5 days.    Culture - Blood (collected 10 Kaden 2018 23:56)  Source: .Blood Blood  Final Report (16 Jan 2018 01:01):    No growth at 5 days.        RADIOLOGY & ADDITIONAL TESTS:    Imaging Personally Reviewed:  [ ] YES  [ ] NO    Consultant(s) Notes Reviewed:  [ ] YES  [ ] NO    Care Discussed with Consultants/Other Providers [ ] YES  [ ] NO    PROBLEMS:  CELLULITIS IN ABSCESS OF RIGHT LEG  CELLULITIS RIGHT LEG  Cellulitis and abscess of right leg  Acute gout of right elbow, unspecified cause  Other iron deficiency anemia  Morbid obesity due to excess calories  Essential hypertension  Cellulitis and abscess of right leg      Care discussed with family,         [  ]   yes  [  ]  No    imp:    stable[ ]    unstable[  ]     improving [   ]       unchanged  [  ]                Plans:  Continue present plans  [  ]               New consult [  ]   specialty  .......               order annika[  ]    test name.                  Discharge Planning  [  ]

## 2018-01-19 NOTE — PROGRESS NOTE ADULT - SUBJECTIVE AND OBJECTIVE BOX
71 y/o female with PMH HTN here for wound check RLE. pt states she had this wound for about a year and has been coming here to the wound clinic for check-up. pt states when she went today to the wound clinic, the dr told her it was infected and needs to be admitted for iv abx. pt reports fever last night, she took tylenol and it went down. pt has no other complaints. no change in sensation. no known trauma or injury.     patient feels better   vascular note seen   discussed with primary care physician re discharge     No Known Allergies    Intolerances        MEDICATIONS  (STANDING):  colchicine 0.6 milliGRAM(s) Oral daily  enoxaparin Injectable 40 milliGRAM(s) SubCutaneous every 24 hours  folic acid 1 milliGRAM(s) Oral daily  furosemide   Injectable 40 milliGRAM(s) IV Push daily  losartan 25 milliGRAM(s) Oral daily  piperacillin/tazobactam IVPB. 3.375 Gram(s) IV Intermittent every 8 hours  vancomycin  IVPB 1000 milliGRAM(s) IV Intermittent every 12 hours    MEDICATIONS  (PRN):  acetaminophen   Tablet 650 milliGRAM(s) Oral every 6 hours PRN For Temp greater than 38 C (100.4 F)  acetaminophen   Tablet. 650 milliGRAM(s) Oral every 6 hours PRN Mild Pain (1 - 3)      REVIEW OF SYSTEMS:    feels much better   no nausea vomiting diarrhea   no cough no short of breath   leg is back to baseline     VITAL SIGNS:  T(C): 36.7 (01-19-18 @ 11:10), Max: 37 (01-18-18 @ 23:39)  T(F): 98 (01-19-18 @ 11:10), Max: 98.6 (01-18-18 @ 23:39)  HR: 77 (01-19-18 @ 11:10) (76 - 80)  BP: 129/71 (01-19-18 @ 11:10) (118/47 - 136/71)  RR: 16 (01-19-18 @ 11:10) (16 - 16)  SpO2: 99% (01-19-18 @ 11:10) (96% - 99%)  Wt(kg): --    PHYSICAL EXAM:    GENERAL: not in any distress  HEENT: Neck is supple, normocephalic, atraumatic   CHEST/LUNG: Clear to auscultation bilaterally; No rales, rhonchi, wheezing  HEART: Regular rate and rhythm; No murmurs, rubs, or gallops  ABDOMEN: Soft, Nontender, Nondistended; Bowel sounds present, no rebound   EXTREMITIES:  2+ Peripheral Pulses, No clubbing, cyanosis, or edema  no change in status of large hard induration  SKIN: No rashes or lesions  BACK: no pressor sore   NERVOUS SYSTEM:  Alert & Oriented X3, Good concentration  PSYCH: normal affect     LABS:                               Vancomycin Level, Trough: 16.5 ug/mL (01-19 @ 10:53)                      Radiology:

## 2018-01-19 NOTE — PROGRESS NOTE ADULT - PROBLEM SELECTOR PROBLEM 2
Essential hypertension
Morbid obesity due to excess calories
Morbid obesity due to excess calories
Essential hypertension
Morbid obesity due to excess calories
Essential hypertension

## 2018-01-19 NOTE — PROGRESS NOTE ADULT - PROBLEM SELECTOR PLAN 3
Nutrition consult

## 2018-01-19 NOTE — PROGRESS NOTE ADULT - PROBLEM SELECTOR PROBLEM 1
Cellulitis and abscess of right leg
Essential hypertension
Other iron deficiency anemia
Cellulitis and abscess of right leg
Other iron deficiency anemia
Cellulitis and abscess of right leg
Cellulitis and abscess of right leg

## 2018-01-19 NOTE — PROGRESS NOTE ADULT - PROBLEM SELECTOR PROBLEM 4
Other iron deficiency anemia

## 2018-01-19 NOTE — DISCHARGE NOTE ADULT - CARE PLAN
Principal Discharge DX:	Cellulitis and abscess of right leg  Goal:	Resolving  Assessment and plan of treatment:	Augmentin, F/U with wound clinic.  Secondary Diagnosis:	Acute gout of right elbow, unspecified cause  Goal:	Resolved  Secondary Diagnosis:	Essential hypertension  Goal:	Monitor BP, Continue Meds.  Secondary Diagnosis:	Morbid obesity due to excess calories  Goal:	Diet control  Secondary Diagnosis:	Other iron deficiency anemia  Assessment and plan of treatment:	Ferrous Sulfate.

## 2018-01-19 NOTE — DISCHARGE NOTE ADULT - CARE PROVIDER_API CALL
Vee Baker), Internal Medicine  [address]  [address]  [place] [address]  Phone: [phone]  Fax: [phone]    Toñito Montoya), Surgery  [address]  [address] [place] [address]  Phone: [phone]  Fax: [phone]

## 2018-01-23 DIAGNOSIS — L03.115 CELLULITIS OF RIGHT LOWER LIMB: ICD-10-CM

## 2018-01-23 DIAGNOSIS — I89.0 LYMPHEDEMA, NOT ELSEWHERE CLASSIFIED: ICD-10-CM

## 2018-01-23 DIAGNOSIS — D50.9 IRON DEFICIENCY ANEMIA, UNSPECIFIED: ICD-10-CM

## 2018-01-23 DIAGNOSIS — L02.415 CUTANEOUS ABSCESS OF RIGHT LOWER LIMB: ICD-10-CM

## 2018-01-23 DIAGNOSIS — M10.021 IDIOPATHIC GOUT, RIGHT ELBOW: ICD-10-CM

## 2018-01-23 DIAGNOSIS — I10 ESSENTIAL (PRIMARY) HYPERTENSION: ICD-10-CM

## 2018-01-23 DIAGNOSIS — L97.119 NON-PRESSURE CHRONIC ULCER OF RIGHT THIGH WITH UNSPECIFIED SEVERITY: ICD-10-CM

## 2018-01-23 DIAGNOSIS — E66.01 MORBID (SEVERE) OBESITY DUE TO EXCESS CALORIES: ICD-10-CM

## 2018-01-23 DIAGNOSIS — R23.4 CHANGES IN SKIN TEXTURE: ICD-10-CM

## 2018-01-24 ENCOUNTER — OUTPATIENT (OUTPATIENT)
Dept: OUTPATIENT SERVICES | Facility: HOSPITAL | Age: 73
LOS: 1 days | Discharge: HOME | End: 2018-01-24

## 2018-01-24 DIAGNOSIS — L89.90 PRESSURE ULCER OF UNSPECIFIED SITE, UNSPECIFIED STAGE: ICD-10-CM

## 2018-02-01 DIAGNOSIS — Z84.1 FAMILY HISTORY OF DISORDERS OF KIDNEY AND URETER: ICD-10-CM

## 2018-02-01 DIAGNOSIS — Z79.899 OTHER LONG TERM (CURRENT) DRUG THERAPY: ICD-10-CM

## 2018-02-01 DIAGNOSIS — I89.0 LYMPHEDEMA, NOT ELSEWHERE CLASSIFIED: ICD-10-CM

## 2018-02-01 DIAGNOSIS — I10 ESSENTIAL (PRIMARY) HYPERTENSION: ICD-10-CM

## 2018-02-01 DIAGNOSIS — L97.112 NON-PRESSURE CHRONIC ULCER OF RIGHT THIGH WITH FAT LAYER EXPOSED: ICD-10-CM

## 2018-02-14 ENCOUNTER — OUTPATIENT (OUTPATIENT)
Dept: OUTPATIENT SERVICES | Facility: HOSPITAL | Age: 73
LOS: 1 days | Discharge: HOME | End: 2018-02-14

## 2018-02-14 DIAGNOSIS — L89.90 PRESSURE ULCER OF UNSPECIFIED SITE, UNSPECIFIED STAGE: ICD-10-CM

## 2018-02-21 ENCOUNTER — OUTPATIENT (OUTPATIENT)
Dept: OUTPATIENT SERVICES | Facility: HOSPITAL | Age: 73
LOS: 1 days | Discharge: HOME | End: 2018-02-21

## 2018-02-21 DIAGNOSIS — L89.90 PRESSURE ULCER OF UNSPECIFIED SITE, UNSPECIFIED STAGE: ICD-10-CM

## 2018-02-22 DIAGNOSIS — I89.0 LYMPHEDEMA, NOT ELSEWHERE CLASSIFIED: ICD-10-CM

## 2018-02-22 DIAGNOSIS — R22.41 LOCALIZED SWELLING, MASS AND LUMP, RIGHT LOWER LIMB: ICD-10-CM

## 2018-02-22 DIAGNOSIS — Z79.899 OTHER LONG TERM (CURRENT) DRUG THERAPY: ICD-10-CM

## 2018-02-22 DIAGNOSIS — I10 ESSENTIAL (PRIMARY) HYPERTENSION: ICD-10-CM

## 2018-02-22 DIAGNOSIS — L97.122 NON-PRESSURE CHRONIC ULCER OF LEFT THIGH WITH FAT LAYER EXPOSED: ICD-10-CM

## 2018-02-27 DIAGNOSIS — I10 ESSENTIAL (PRIMARY) HYPERTENSION: ICD-10-CM

## 2018-02-27 DIAGNOSIS — I89.0 LYMPHEDEMA, NOT ELSEWHERE CLASSIFIED: ICD-10-CM

## 2018-02-27 DIAGNOSIS — L97.112 NON-PRESSURE CHRONIC ULCER OF RIGHT THIGH WITH FAT LAYER EXPOSED: ICD-10-CM

## 2018-02-27 DIAGNOSIS — Z79.899 OTHER LONG TERM (CURRENT) DRUG THERAPY: ICD-10-CM

## 2018-02-27 DIAGNOSIS — R22.41 LOCALIZED SWELLING, MASS AND LUMP, RIGHT LOWER LIMB: ICD-10-CM

## 2018-02-28 ENCOUNTER — OUTPATIENT (OUTPATIENT)
Dept: OUTPATIENT SERVICES | Facility: HOSPITAL | Age: 73
LOS: 1 days | Discharge: HOME | End: 2018-02-28

## 2018-02-28 DIAGNOSIS — L89.90 PRESSURE ULCER OF UNSPECIFIED SITE, UNSPECIFIED STAGE: ICD-10-CM

## 2018-02-28 DIAGNOSIS — Z79.899 OTHER LONG TERM (CURRENT) DRUG THERAPY: ICD-10-CM

## 2018-02-28 DIAGNOSIS — R22.41 LOCALIZED SWELLING, MASS AND LUMP, RIGHT LOWER LIMB: ICD-10-CM

## 2018-02-28 DIAGNOSIS — L97.112 NON-PRESSURE CHRONIC ULCER OF RIGHT THIGH WITH FAT LAYER EXPOSED: ICD-10-CM

## 2018-02-28 DIAGNOSIS — I89.0 LYMPHEDEMA, NOT ELSEWHERE CLASSIFIED: ICD-10-CM

## 2018-02-28 DIAGNOSIS — I10 ESSENTIAL (PRIMARY) HYPERTENSION: ICD-10-CM

## 2018-03-02 ENCOUNTER — OUTPATIENT (OUTPATIENT)
Dept: OUTPATIENT SERVICES | Facility: HOSPITAL | Age: 73
LOS: 1 days | Discharge: HOME | End: 2018-03-02

## 2018-03-02 VITALS
HEART RATE: 80 BPM | TEMPERATURE: 98 F | DIASTOLIC BLOOD PRESSURE: 83 MMHG | SYSTOLIC BLOOD PRESSURE: 148 MMHG | HEIGHT: 63 IN | RESPIRATION RATE: 16 BRPM | WEIGHT: 293 LBS | OXYGEN SATURATION: 99 %

## 2018-03-02 DIAGNOSIS — Z96.653 PRESENCE OF ARTIFICIAL KNEE JOINT, BILATERAL: Chronic | ICD-10-CM

## 2018-03-02 DIAGNOSIS — Z01.818 ENCOUNTER FOR OTHER PREPROCEDURAL EXAMINATION: ICD-10-CM

## 2018-03-02 DIAGNOSIS — I10 ESSENTIAL (PRIMARY) HYPERTENSION: ICD-10-CM

## 2018-03-02 DIAGNOSIS — E66.01 MORBID (SEVERE) OBESITY DUE TO EXCESS CALORIES: ICD-10-CM

## 2018-03-02 DIAGNOSIS — C49.22 MALIGNANT NEOPLASM OF CONNECTIVE AND SOFT TISSUE OF LEFT LOWER LIMB, INCLUDING HIP: ICD-10-CM

## 2018-03-02 DIAGNOSIS — D50.8 OTHER IRON DEFICIENCY ANEMIAS: ICD-10-CM

## 2018-03-02 RX ORDER — FOLIC ACID 0.8 MG
1 TABLET ORAL
Qty: 0 | Refills: 0 | COMMUNITY

## 2018-03-02 RX ORDER — FERROUS SULFATE 325(65) MG
325 TABLET ORAL
Qty: 0 | Refills: 0 | COMMUNITY

## 2018-03-02 NOTE — H&P PST ADULT - NSANTHOSAYNRD_GEN_A_CORE
No. ASHISH screening performed.  STOP BANG Legend: 0-2 = LOW Risk; 3-4 = INTERMEDIATE Risk; 5-8 = HIGH Risk

## 2018-03-12 ENCOUNTER — INPATIENT (INPATIENT)
Facility: HOSPITAL | Age: 73
LOS: 1 days | Discharge: HOME | End: 2018-03-14
Attending: SURGERY | Admitting: SURGERY
Payer: MEDICARE

## 2018-03-12 ENCOUNTER — RESULT REVIEW (OUTPATIENT)
Age: 73
End: 2018-03-12

## 2018-03-12 VITALS
SYSTOLIC BLOOD PRESSURE: 154 MMHG | OXYGEN SATURATION: 100 % | HEIGHT: 63 IN | WEIGHT: 293 LBS | RESPIRATION RATE: 18 BRPM | HEART RATE: 76 BPM | TEMPERATURE: 98 F | DIASTOLIC BLOOD PRESSURE: 77 MMHG

## 2018-03-12 DIAGNOSIS — Z96.653 PRESENCE OF ARTIFICIAL KNEE JOINT, BILATERAL: Chronic | ICD-10-CM

## 2018-03-12 PROCEDURE — 88304 TISSUE EXAM BY PATHOLOGIST: CPT | Mod: 26

## 2018-03-12 PROCEDURE — 88305 TISSUE EXAM BY PATHOLOGIST: CPT | Mod: 26

## 2018-03-12 RX ORDER — FUROSEMIDE 40 MG
20 TABLET ORAL
Qty: 0 | Refills: 0 | COMMUNITY

## 2018-03-12 RX ORDER — HYDROMORPHONE HYDROCHLORIDE 2 MG/ML
1 INJECTION INTRAMUSCULAR; INTRAVENOUS; SUBCUTANEOUS ONCE
Qty: 0 | Refills: 0 | Status: DISCONTINUED | OUTPATIENT
Start: 2018-03-12 | End: 2018-03-14

## 2018-03-12 RX ORDER — OXYCODONE AND ACETAMINOPHEN 5; 325 MG/1; MG/1
1 TABLET ORAL EVERY 4 HOURS
Qty: 0 | Refills: 0 | Status: DISCONTINUED | OUTPATIENT
Start: 2018-03-12 | End: 2018-03-14

## 2018-03-12 RX ORDER — SODIUM CHLORIDE 9 MG/ML
1000 INJECTION, SOLUTION INTRAVENOUS
Qty: 0 | Refills: 0 | Status: DISCONTINUED | OUTPATIENT
Start: 2018-03-12 | End: 2018-03-13

## 2018-03-12 RX ORDER — HEPARIN SODIUM 5000 [USP'U]/ML
5000 INJECTION INTRAVENOUS; SUBCUTANEOUS EVERY 12 HOURS
Qty: 0 | Refills: 0 | Status: DISCONTINUED | OUTPATIENT
Start: 2018-03-12 | End: 2018-03-14

## 2018-03-12 RX ORDER — CEFAZOLIN SODIUM 1 G
3000 VIAL (EA) INJECTION ONCE
Qty: 0 | Refills: 0 | Status: COMPLETED | OUTPATIENT
Start: 2018-03-12 | End: 2018-03-13

## 2018-03-12 RX ORDER — LOSARTAN POTASSIUM 100 MG/1
1 TABLET, FILM COATED ORAL
Qty: 0 | Refills: 0 | COMMUNITY

## 2018-03-12 RX ORDER — ONDANSETRON 8 MG/1
4 TABLET, FILM COATED ORAL EVERY 6 HOURS
Qty: 0 | Refills: 0 | Status: DISCONTINUED | OUTPATIENT
Start: 2018-03-12 | End: 2018-03-14

## 2018-03-12 RX ORDER — ACETAMINOPHEN WITH CODEINE 300MG-30MG
1 TABLET ORAL
Qty: 24 | Refills: 0 | OUTPATIENT
Start: 2018-03-12

## 2018-03-12 RX ADMIN — SODIUM CHLORIDE 75 MILLILITER(S): 9 INJECTION, SOLUTION INTRAVENOUS at 20:30

## 2018-03-12 NOTE — PROGRESS NOTE ADULT - SUBJECTIVE AND OBJECTIVE BOX
Surgical Postop Check:    73yo Female seen and examined at bedside. Patient resting comfortably, denies pain or complaints. Tolerating reg. diet.   Denies chest pain, shortness of breath, dizziness.    Vital Signs Last 24 Hrs  T(F): 95.5 (03-12-18 @ 22:00), Max: 98 (03-12-18 @ 15:29)  HR: 77 (03-12-18 @ 22:00)  BP: 130/64 (03-12-18 @ 22:00)  RR: 18 (03-12-18 @ 22:00)  SpO2: 96% (03-12-18 @ 22:00)    Physical Exam:    GENERAL: A&Ox3, NAD  CHEST/LUNG: Clear to auscultation bilaterally, respirations nonlabored  HEART: S1S2, Regular rate and rhythm  ABDOMEN: Morbidly Obese, ND/NT, soft, +BS  EXTREMITIES: 2+ peripheral pulses b/l. Calf soft, NT b/l. Dressing to right thigh C/D/I.    Assessment: 72F with right thigh mass s/p Biopsy of right thigh mass POD 0, admitted for observation overnight, hemodynamically stable.    Plan:  -Reg diet  -Continue local wound care  -pain management prn  -Prophylactic measures: DVT prophylaxis, encourage Incentive Spirometer, OOB, Ambulating  -continue current medical management/supportive care  -D/C planning in AM  -Discussed with Dr. Marquez

## 2018-03-12 NOTE — BRIEF OPERATIVE NOTE - PROCEDURE
<<-----Click on this checkbox to enter Procedure Biopsy of mass of right thigh  03/12/2018    Active  REE

## 2018-03-12 NOTE — ASU DISCHARGE PLAN (ADULT/PEDIATRIC). - MEDICATION SUMMARY - MEDICATIONS TO TAKE
I will START or STAY ON the medications listed below when I get home from the hospital:    Tylenol with Codeine #3 oral tablet  -- 1 tab(s) by mouth every 6 hours MDD:4  -- Caution federal law prohibits the transfer of this drug to any person other  than the person for whom it was prescribed.  May cause drowsiness.  Alcohol may intensify this effect.  Use care when operating dangerous machinery.  This product contains acetaminophen.  Do not use  with any other product containing acetaminophen to prevent possible liver damage.  Using more of this medication than prescribed may cause serious breathing problems.    -- Indication: For pain

## 2018-03-13 ENCOUNTER — TRANSCRIPTION ENCOUNTER (OUTPATIENT)
Age: 73
End: 2018-03-13

## 2018-03-13 RX ORDER — FUROSEMIDE 40 MG
1 TABLET ORAL
Qty: 0 | Refills: 0 | COMMUNITY
Start: 2018-03-13

## 2018-03-13 RX ORDER — LOSARTAN POTASSIUM 100 MG/1
25 TABLET, FILM COATED ORAL DAILY
Qty: 0 | Refills: 0 | Status: DISCONTINUED | OUTPATIENT
Start: 2018-03-13 | End: 2018-03-14

## 2018-03-13 RX ORDER — FUROSEMIDE 40 MG
20 TABLET ORAL DAILY
Qty: 0 | Refills: 0 | Status: DISCONTINUED | OUTPATIENT
Start: 2018-03-13 | End: 2018-03-14

## 2018-03-13 RX ORDER — LOSARTAN POTASSIUM 100 MG/1
1 TABLET, FILM COATED ORAL
Qty: 0 | Refills: 0 | COMMUNITY
Start: 2018-03-13

## 2018-03-13 RX ADMIN — OXYCODONE AND ACETAMINOPHEN 1 TABLET(S): 5; 325 TABLET ORAL at 12:20

## 2018-03-13 RX ADMIN — HEPARIN SODIUM 5000 UNIT(S): 5000 INJECTION INTRAVENOUS; SUBCUTANEOUS at 17:58

## 2018-03-13 RX ADMIN — OXYCODONE AND ACETAMINOPHEN 1 TABLET(S): 5; 325 TABLET ORAL at 05:24

## 2018-03-13 RX ADMIN — HEPARIN SODIUM 5000 UNIT(S): 5000 INJECTION INTRAVENOUS; SUBCUTANEOUS at 05:21

## 2018-03-13 RX ADMIN — OXYCODONE AND ACETAMINOPHEN 1 TABLET(S): 5; 325 TABLET ORAL at 06:24

## 2018-03-13 RX ADMIN — Medication 200 MILLIGRAM(S): at 03:12

## 2018-03-13 RX ADMIN — OXYCODONE AND ACETAMINOPHEN 1 TABLET(S): 5; 325 TABLET ORAL at 01:20

## 2018-03-13 NOTE — DISCHARGE NOTE ADULT - PATIENT PORTAL LINK FT
You can access the [facility] Patient Portal, offered by [facility], by registering with the following website: [email or web address]

## 2018-03-13 NOTE — DISCHARGE NOTE ADULT - CARE PROVIDER_API CALL
Estephania Marquez), Surgery  [address] [facility] [address] [place] [address]  Phone: [phone]  Fax: [phone]

## 2018-03-13 NOTE — DISCHARGE NOTE ADULT - MEDICATION SUMMARY - MEDICATIONS TO TAKE
I will START or STAY ON the medications listed below when I get home from the hospital:    Tylenol with Codeine #3 oral tablet  -- 1 tab(s) by mouth every 6 hours MDD:4  -- Caution federal law prohibits the transfer of this drug to any person other  than the person for whom it was prescribed.  May cause drowsiness.  Alcohol may intensify this effect.  Use care when operating dangerous machinery.  This product contains acetaminophen.  Do not use  with any other product containing acetaminophen to prevent possible liver damage.  Using more of this medication than prescribed may cause serious breathing problems.    -- Indication: For EXCISON OF RIGHT THIGH MASS    losartan 25 mg oral tablet  -- 1 tab(s) by mouth once a day  -- Indication: For hypertension    furosemide 20 mg oral tablet  -- 1 tab(s) by mouth once a day  -- Indication: For hypertension

## 2018-03-13 NOTE — DISCHARGE NOTE ADULT - NS AS ACTIVITY OBS
No Heavy lifting/straining/Walking-Indoors allowed/Do not make important decisions/Do not drive or operate machinery

## 2018-03-13 NOTE — DISCHARGE NOTE ADULT - PLAN OF CARE
Drink plenty of fluids to prevent constipation and fruit juices without pulp that are high in vitamin C. Rest as needed. Do not lift anything heavier than 10 pounds. You may take motrin or advil for mild pain as needed, in addition to prescribed narcotic medication. Call for any fever over 101, nausea, vomiting, severe pain, no passing of gas or bowel movement. Keep dressing dry. healing of the wound continue medical treatment

## 2018-03-13 NOTE — DISCHARGE NOTE ADULT - HOSPITAL COURSE
72 year old female with large right thigh mass admitted s/p attempted excision. Biopsy was taken in the OR. Postoperatively patients pain was well controlled and she was cleared for discharge when she was able to ambulate and services were arranged.

## 2018-03-13 NOTE — PROGRESS NOTE ADULT - SUBJECTIVE AND OBJECTIVE BOX
Patient seen and examined at bedside. Admits to pain, somewhat well controlled with medication.     Vital Signs Last 24 Hrs  T(F): 97.8 (03-13-18 @ 12:22), Max: 98.4 (03-13-18 @ 00:00)  HR: 76 (03-13-18 @ 12:22)  BP: 141/72 (03-13-18 @ 12:22)  RR: 17 (03-13-18 @ 12:22)  SpO2: 99% (03-13-18 @ 12:22)    GENERAL: Alert, NAD  CHEST/LUNG: respirations nonlabored  EXTREMITIES: Dressing clean dry and intact. Surgical site covered with dry sterile dressing and should be changed every other day. Old area of skin necrosis/ pressure injury to be dressing with Alginate and covered with DSD.     I&O's Detail    12 Mar 2018 07:01  -  13 Mar 2018 07:00  --------------------------------------------------------  IN:    lactated ringers.: 75 mL  Total IN: 75 mL    OUT:  Total OUT: 0 mL    Total NET: 75 mL    72y old Female s/p biopsy of right lower extremity mass, POD#0  with PMH Other iron deficiency anemia, Morbid obesity due to excess calories, Essential hypertension    - continue local wound care with dry sterile dressing every other day to right Surgical site. Continue Calcium Alginate to pressure ulcer/ DTI on RLE.   - DVT prophylaxis, Incentive Spirometer, OOB, Ambulating, pain control  - discharge planning in AM  - discussed with Dr. Marquez

## 2018-03-13 NOTE — DISCHARGE NOTE ADULT - CARE PLAN
Principal Discharge DX:	Mass of right lower extremity  Assessment and plan of treatment:	Drink plenty of fluids to prevent constipation and fruit juices without pulp that are high in vitamin C. Rest as needed. Do not lift anything heavier than 10 pounds. You may take motrin or advil for mild pain as needed, in addition to prescribed narcotic medication. Call for any fever over 101, nausea, vomiting, severe pain, no passing of gas or bowel movement. Keep dressing dry.  Secondary Diagnosis:	Essential hypertension Principal Discharge DX:	Mass of right lower extremity  Goal:	healing of the wound  Assessment and plan of treatment:	Drink plenty of fluids to prevent constipation and fruit juices without pulp that are high in vitamin C. Rest as needed. Do not lift anything heavier than 10 pounds. You may take motrin or advil for mild pain as needed, in addition to prescribed narcotic medication. Call for any fever over 101, nausea, vomiting, severe pain, no passing of gas or bowel movement. Keep dressing dry.  Secondary Diagnosis:	Essential hypertension  Assessment and plan of treatment:	continue medical treatment

## 2018-03-14 VITALS
SYSTOLIC BLOOD PRESSURE: 125 MMHG | RESPIRATION RATE: 15 BRPM | OXYGEN SATURATION: 98 % | TEMPERATURE: 96 F | DIASTOLIC BLOOD PRESSURE: 71 MMHG | HEART RATE: 70 BPM

## 2018-03-14 LAB
ANION GAP SERPL CALC-SCNC: 7 MMOL/L — SIGNIFICANT CHANGE UP (ref 5–17)
BUN SERPL-MCNC: 17 MG/DL — SIGNIFICANT CHANGE UP (ref 7–23)
CALCIUM SERPL-MCNC: 7.9 MG/DL — LOW (ref 8.5–10.1)
CHLORIDE SERPL-SCNC: 108 MMOL/L — SIGNIFICANT CHANGE UP (ref 96–108)
CO2 SERPL-SCNC: 26 MMOL/L — SIGNIFICANT CHANGE UP (ref 22–31)
CREAT SERPL-MCNC: 0.7 MG/DL — SIGNIFICANT CHANGE UP (ref 0.5–1.3)
GLUCOSE SERPL-MCNC: 100 MG/DL — HIGH (ref 70–99)
HCT VFR BLD CALC: 29.3 % — LOW (ref 34.5–45)
HGB BLD-MCNC: 9.1 G/DL — LOW (ref 11.5–15.5)
MCHC RBC-ENTMCNC: 26.7 PG — LOW (ref 27–34)
MCHC RBC-ENTMCNC: 31.1 GM/DL — LOW (ref 32–36)
MCV RBC AUTO: 85.9 FL — SIGNIFICANT CHANGE UP (ref 80–100)
NRBC # BLD: 0 /100 WBCS — SIGNIFICANT CHANGE UP (ref 0–0)
PLATELET # BLD AUTO: 225 K/UL — SIGNIFICANT CHANGE UP (ref 150–400)
POTASSIUM SERPL-MCNC: 4 MMOL/L — SIGNIFICANT CHANGE UP (ref 3.5–5.3)
POTASSIUM SERPL-SCNC: 4 MMOL/L — SIGNIFICANT CHANGE UP (ref 3.5–5.3)
RBC # BLD: 3.41 M/UL — LOW (ref 3.8–5.2)
RBC # FLD: 15.9 % — HIGH (ref 10.3–14.5)
SODIUM SERPL-SCNC: 141 MMOL/L — SIGNIFICANT CHANGE UP (ref 135–145)
WBC # BLD: 6.46 K/UL — SIGNIFICANT CHANGE UP (ref 3.8–10.5)
WBC # FLD AUTO: 6.46 K/UL — SIGNIFICANT CHANGE UP (ref 3.8–10.5)

## 2018-03-14 RX ADMIN — Medication 20 MILLIGRAM(S): at 06:07

## 2018-03-14 RX ADMIN — OXYCODONE AND ACETAMINOPHEN 1 TABLET(S): 5; 325 TABLET ORAL at 08:53

## 2018-03-14 RX ADMIN — LOSARTAN POTASSIUM 25 MILLIGRAM(S): 100 TABLET, FILM COATED ORAL at 06:07

## 2018-03-14 RX ADMIN — HEPARIN SODIUM 5000 UNIT(S): 5000 INJECTION INTRAVENOUS; SUBCUTANEOUS at 06:07

## 2018-03-14 RX ADMIN — OXYCODONE AND ACETAMINOPHEN 1 TABLET(S): 5; 325 TABLET ORAL at 09:32

## 2018-03-19 ENCOUNTER — TRANSCRIPTION ENCOUNTER (OUTPATIENT)
Age: 73
End: 2018-03-19

## 2018-03-19 NOTE — PATIENT PROFILE ADULT. - FUNCTIONAL SCREEN CURRENT LEVEL: BATHING, MLM
- Admit to Burn ICU  - LWC: Silvadene, Adaptic, Kerlix and gauze bid   - Analgesics for Pain  - Versed 0.5 mg bid prn for wound care  - IVF: LR @ 60cc/hr  - IV Nafcillin  - STAT Labs
(0) independent

## 2018-03-23 LAB — SURGICAL PATHOLOGY FINAL REPORT - CH: SIGNIFICANT CHANGE UP

## 2018-03-26 DIAGNOSIS — R22.41 LOCALIZED SWELLING, MASS AND LUMP, RIGHT LOWER LIMB: ICD-10-CM

## 2018-03-26 DIAGNOSIS — Z96.653 PRESENCE OF ARTIFICIAL KNEE JOINT, BILATERAL: ICD-10-CM

## 2018-03-26 DIAGNOSIS — I10 ESSENTIAL (PRIMARY) HYPERTENSION: ICD-10-CM

## 2018-03-26 DIAGNOSIS — D50.9 IRON DEFICIENCY ANEMIA, UNSPECIFIED: ICD-10-CM

## 2018-03-26 DIAGNOSIS — E66.01 MORBID (SEVERE) OBESITY DUE TO EXCESS CALORIES: ICD-10-CM

## 2018-03-28 ENCOUNTER — OUTPATIENT (OUTPATIENT)
Dept: OUTPATIENT SERVICES | Facility: HOSPITAL | Age: 73
LOS: 1 days | Discharge: HOME | End: 2018-03-28

## 2018-03-28 DIAGNOSIS — L89.90 PRESSURE ULCER OF UNSPECIFIED SITE, UNSPECIFIED STAGE: ICD-10-CM

## 2018-03-28 DIAGNOSIS — Z96.653 PRESENCE OF ARTIFICIAL KNEE JOINT, BILATERAL: Chronic | ICD-10-CM

## 2018-04-02 DIAGNOSIS — R22.41 LOCALIZED SWELLING, MASS AND LUMP, RIGHT LOWER LIMB: ICD-10-CM

## 2018-04-02 DIAGNOSIS — I10 ESSENTIAL (PRIMARY) HYPERTENSION: ICD-10-CM

## 2018-04-02 DIAGNOSIS — Z79.899 OTHER LONG TERM (CURRENT) DRUG THERAPY: ICD-10-CM

## 2018-04-02 DIAGNOSIS — L97.112 NON-PRESSURE CHRONIC ULCER OF RIGHT THIGH WITH FAT LAYER EXPOSED: ICD-10-CM

## 2018-04-02 DIAGNOSIS — I89.0 LYMPHEDEMA, NOT ELSEWHERE CLASSIFIED: ICD-10-CM

## 2018-04-05 ENCOUNTER — OUTPATIENT (OUTPATIENT)
Dept: OUTPATIENT SERVICES | Facility: HOSPITAL | Age: 73
LOS: 1 days | Discharge: HOME | End: 2018-04-05

## 2018-04-05 DIAGNOSIS — L89.90 PRESSURE ULCER OF UNSPECIFIED SITE, UNSPECIFIED STAGE: ICD-10-CM

## 2018-04-05 DIAGNOSIS — Z96.653 PRESENCE OF ARTIFICIAL KNEE JOINT, BILATERAL: Chronic | ICD-10-CM

## 2018-04-12 ENCOUNTER — OUTPATIENT (OUTPATIENT)
Dept: OUTPATIENT SERVICES | Facility: HOSPITAL | Age: 73
LOS: 1 days | Discharge: HOME | End: 2018-04-12

## 2018-04-12 DIAGNOSIS — L89.90 PRESSURE ULCER OF UNSPECIFIED SITE, UNSPECIFIED STAGE: ICD-10-CM

## 2018-04-12 DIAGNOSIS — Z96.653 PRESENCE OF ARTIFICIAL KNEE JOINT, BILATERAL: Chronic | ICD-10-CM

## 2018-04-13 DIAGNOSIS — I89.0 LYMPHEDEMA, NOT ELSEWHERE CLASSIFIED: ICD-10-CM

## 2018-04-13 DIAGNOSIS — L97.112 NON-PRESSURE CHRONIC ULCER OF RIGHT THIGH WITH FAT LAYER EXPOSED: ICD-10-CM

## 2018-04-13 DIAGNOSIS — Z79.899 OTHER LONG TERM (CURRENT) DRUG THERAPY: ICD-10-CM

## 2018-04-13 DIAGNOSIS — I10 ESSENTIAL (PRIMARY) HYPERTENSION: ICD-10-CM

## 2018-04-18 DIAGNOSIS — L97.112 NON-PRESSURE CHRONIC ULCER OF RIGHT THIGH WITH FAT LAYER EXPOSED: ICD-10-CM

## 2018-04-18 DIAGNOSIS — I10 ESSENTIAL (PRIMARY) HYPERTENSION: ICD-10-CM

## 2018-04-18 DIAGNOSIS — Z79.899 OTHER LONG TERM (CURRENT) DRUG THERAPY: ICD-10-CM

## 2018-04-18 DIAGNOSIS — I89.0 LYMPHEDEMA, NOT ELSEWHERE CLASSIFIED: ICD-10-CM

## 2018-05-10 ENCOUNTER — OUTPATIENT (OUTPATIENT)
Dept: OUTPATIENT SERVICES | Facility: HOSPITAL | Age: 73
LOS: 1 days | Discharge: HOME | End: 2018-05-10

## 2018-05-10 DIAGNOSIS — Z96.653 PRESENCE OF ARTIFICIAL KNEE JOINT, BILATERAL: Chronic | ICD-10-CM

## 2018-05-10 DIAGNOSIS — Z79.899 OTHER LONG TERM (CURRENT) DRUG THERAPY: ICD-10-CM

## 2018-05-10 DIAGNOSIS — I89.0 LYMPHEDEMA, NOT ELSEWHERE CLASSIFIED: ICD-10-CM

## 2018-05-10 DIAGNOSIS — L97.112 NON-PRESSURE CHRONIC ULCER OF RIGHT THIGH WITH FAT LAYER EXPOSED: ICD-10-CM

## 2018-05-10 DIAGNOSIS — L89.90 PRESSURE ULCER OF UNSPECIFIED SITE, UNSPECIFIED STAGE: ICD-10-CM

## 2018-05-10 DIAGNOSIS — I10 ESSENTIAL (PRIMARY) HYPERTENSION: ICD-10-CM

## 2018-05-12 LAB
CULTURE RESULTS: SIGNIFICANT CHANGE UP
SPECIMEN SOURCE: SIGNIFICANT CHANGE UP

## 2018-05-17 ENCOUNTER — OUTPATIENT (OUTPATIENT)
Dept: OUTPATIENT SERVICES | Facility: HOSPITAL | Age: 73
LOS: 1 days | Discharge: HOME | End: 2018-05-17

## 2018-05-17 DIAGNOSIS — Z96.653 PRESENCE OF ARTIFICIAL KNEE JOINT, BILATERAL: Chronic | ICD-10-CM

## 2018-05-17 DIAGNOSIS — L89.90 PRESSURE ULCER OF UNSPECIFIED SITE, UNSPECIFIED STAGE: ICD-10-CM

## 2018-05-22 DIAGNOSIS — Z79.899 OTHER LONG TERM (CURRENT) DRUG THERAPY: ICD-10-CM

## 2018-05-22 DIAGNOSIS — L97.115: ICD-10-CM

## 2018-05-22 DIAGNOSIS — L03.115 CELLULITIS OF RIGHT LOWER LIMB: ICD-10-CM

## 2018-05-22 DIAGNOSIS — I10 ESSENTIAL (PRIMARY) HYPERTENSION: ICD-10-CM

## 2018-05-22 DIAGNOSIS — I89.0 LYMPHEDEMA, NOT ELSEWHERE CLASSIFIED: ICD-10-CM

## 2018-05-22 DIAGNOSIS — L97.112 NON-PRESSURE CHRONIC ULCER OF RIGHT THIGH WITH FAT LAYER EXPOSED: ICD-10-CM

## 2018-05-24 ENCOUNTER — OUTPATIENT (OUTPATIENT)
Dept: OUTPATIENT SERVICES | Facility: HOSPITAL | Age: 73
LOS: 1 days | Discharge: HOME | End: 2018-05-24

## 2018-05-24 DIAGNOSIS — L89.90 PRESSURE ULCER OF UNSPECIFIED SITE, UNSPECIFIED STAGE: ICD-10-CM

## 2018-05-24 DIAGNOSIS — Z96.653 PRESENCE OF ARTIFICIAL KNEE JOINT, BILATERAL: Chronic | ICD-10-CM

## 2018-05-31 ENCOUNTER — OUTPATIENT (OUTPATIENT)
Dept: OUTPATIENT SERVICES | Facility: HOSPITAL | Age: 73
LOS: 1 days | Discharge: HOME | End: 2018-05-31

## 2018-05-31 DIAGNOSIS — I89.0 LYMPHEDEMA, NOT ELSEWHERE CLASSIFIED: ICD-10-CM

## 2018-05-31 DIAGNOSIS — L97.115: ICD-10-CM

## 2018-05-31 DIAGNOSIS — L97.112 NON-PRESSURE CHRONIC ULCER OF RIGHT THIGH WITH FAT LAYER EXPOSED: ICD-10-CM

## 2018-05-31 DIAGNOSIS — Z79.899 OTHER LONG TERM (CURRENT) DRUG THERAPY: ICD-10-CM

## 2018-05-31 DIAGNOSIS — I10 ESSENTIAL (PRIMARY) HYPERTENSION: ICD-10-CM

## 2018-05-31 DIAGNOSIS — Z96.653 PRESENCE OF ARTIFICIAL KNEE JOINT, BILATERAL: Chronic | ICD-10-CM

## 2018-05-31 DIAGNOSIS — L89.90 PRESSURE ULCER OF UNSPECIFIED SITE, UNSPECIFIED STAGE: ICD-10-CM

## 2018-05-31 DIAGNOSIS — L03.115 CELLULITIS OF RIGHT LOWER LIMB: ICD-10-CM

## 2018-06-01 DIAGNOSIS — L03.115 CELLULITIS OF RIGHT LOWER LIMB: ICD-10-CM

## 2018-06-01 DIAGNOSIS — L97.115: ICD-10-CM

## 2018-06-01 DIAGNOSIS — I89.0 LYMPHEDEMA, NOT ELSEWHERE CLASSIFIED: ICD-10-CM

## 2018-06-01 DIAGNOSIS — Z79.899 OTHER LONG TERM (CURRENT) DRUG THERAPY: ICD-10-CM

## 2018-06-01 DIAGNOSIS — L97.112 NON-PRESSURE CHRONIC ULCER OF RIGHT THIGH WITH FAT LAYER EXPOSED: ICD-10-CM

## 2018-06-01 DIAGNOSIS — I10 ESSENTIAL (PRIMARY) HYPERTENSION: ICD-10-CM

## 2018-06-07 ENCOUNTER — OUTPATIENT (OUTPATIENT)
Dept: OUTPATIENT SERVICES | Facility: HOSPITAL | Age: 73
LOS: 1 days | Discharge: HOME | End: 2018-06-07

## 2018-06-07 DIAGNOSIS — L89.90 PRESSURE ULCER OF UNSPECIFIED SITE, UNSPECIFIED STAGE: ICD-10-CM

## 2018-06-07 DIAGNOSIS — Z96.653 PRESENCE OF ARTIFICIAL KNEE JOINT, BILATERAL: Chronic | ICD-10-CM

## 2018-06-11 DIAGNOSIS — I10 ESSENTIAL (PRIMARY) HYPERTENSION: ICD-10-CM

## 2018-06-11 DIAGNOSIS — L97.115: ICD-10-CM

## 2018-06-11 DIAGNOSIS — Z79.899 OTHER LONG TERM (CURRENT) DRUG THERAPY: ICD-10-CM

## 2018-06-11 DIAGNOSIS — L97.112 NON-PRESSURE CHRONIC ULCER OF RIGHT THIGH WITH FAT LAYER EXPOSED: ICD-10-CM

## 2018-06-11 DIAGNOSIS — I89.0 LYMPHEDEMA, NOT ELSEWHERE CLASSIFIED: ICD-10-CM

## 2018-06-11 DIAGNOSIS — L03.115 CELLULITIS OF RIGHT LOWER LIMB: ICD-10-CM

## 2018-06-12 NOTE — PATIENT PROFILE ADULT. - TEACHING/LEARNING FACTORS IMPACT ABILITY TO LEARN
LEVEMIR AND NOVOLOG    PT IS REQUESTING SAMPLES OF BOTH OF THESE INSULINS.  CAN YOU CHECK TO SEE IF WE HAVE ANY AND CALL THE PATIENT BACK -[phone].      SHE WOULD LIKE TO  THE SAMPLES ON Thursday.   
Returned pt's call and informed her that I have placed samples in the fridge for her to  at her convenience  
none

## 2018-06-21 ENCOUNTER — OUTPATIENT (OUTPATIENT)
Dept: OUTPATIENT SERVICES | Facility: HOSPITAL | Age: 73
LOS: 1 days | Discharge: HOME | End: 2018-06-21

## 2018-06-21 DIAGNOSIS — Z96.653 PRESENCE OF ARTIFICIAL KNEE JOINT, BILATERAL: Chronic | ICD-10-CM

## 2018-06-21 DIAGNOSIS — L89.90 PRESSURE ULCER OF UNSPECIFIED SITE, UNSPECIFIED STAGE: ICD-10-CM

## 2018-06-22 ENCOUNTER — INPATIENT (INPATIENT)
Facility: HOSPITAL | Age: 73
LOS: 7 days | Discharge: HOME HEALTH | End: 2018-06-30
Attending: INTERNAL MEDICINE | Admitting: INTERNAL MEDICINE
Payer: MEDICARE

## 2018-06-22 VITALS
RESPIRATION RATE: 18 BRPM | SYSTOLIC BLOOD PRESSURE: 145 MMHG | OXYGEN SATURATION: 100 % | TEMPERATURE: 99 F | HEART RATE: 94 BPM | DIASTOLIC BLOOD PRESSURE: 67 MMHG

## 2018-06-22 DIAGNOSIS — Z96.653 PRESENCE OF ARTIFICIAL KNEE JOINT, BILATERAL: Chronic | ICD-10-CM

## 2018-06-22 LAB
BASE EXCESS BLDV CALC-SCNC: 0.4 MMOL/L — SIGNIFICANT CHANGE UP
BASOPHILS # BLD AUTO: 0.07 K/UL — SIGNIFICANT CHANGE UP (ref 0–0.2)
BASOPHILS NFR BLD AUTO: 0.4 % — SIGNIFICANT CHANGE UP (ref 0–2)
BLOOD GAS VENOUS - CREATININE: 0.71 MG/DL — SIGNIFICANT CHANGE UP (ref 0.5–1.3)
CHLORIDE BLDV-SCNC: 104 MMOL/L — SIGNIFICANT CHANGE UP (ref 96–108)
EOSINOPHIL # BLD AUTO: 0.14 K/UL — SIGNIFICANT CHANGE UP (ref 0–0.5)
EOSINOPHIL NFR BLD AUTO: 0.7 % — SIGNIFICANT CHANGE UP (ref 0–6)
GAS PNL BLDV: 138 MMOL/L — SIGNIFICANT CHANGE UP (ref 136–146)
GLUCOSE BLDV-MCNC: 96 — SIGNIFICANT CHANGE UP (ref 70–99)
HCO3 BLDV-SCNC: 24 MMOL/L — SIGNIFICANT CHANGE UP (ref 20–27)
HCT VFR BLD CALC: 28.1 % — LOW (ref 34.5–45)
HCT VFR BLDV CALC: 30.6 % — LOW (ref 34.5–45)
HGB BLD-MCNC: 9.3 G/DL — LOW (ref 11.5–15.5)
HGB BLDV-MCNC: 9.9 G/DL — LOW (ref 11.5–15.5)
IMM GRANULOCYTES # BLD AUTO: 0.17 # — SIGNIFICANT CHANGE UP
IMM GRANULOCYTES NFR BLD AUTO: 0.9 % — SIGNIFICANT CHANGE UP (ref 0–1.5)
LACTATE BLDV-MCNC: 1.8 MMOL/L — SIGNIFICANT CHANGE UP (ref 0.5–2)
LYMPHOCYTES # BLD AUTO: 11.1 % — LOW (ref 13–44)
LYMPHOCYTES # BLD AUTO: 2.13 K/UL — SIGNIFICANT CHANGE UP (ref 1–3.3)
MCHC RBC-ENTMCNC: 26.2 PG — LOW (ref 27–34)
MCHC RBC-ENTMCNC: 33.1 % — SIGNIFICANT CHANGE UP (ref 32–36)
MCV RBC AUTO: 79.2 FL — LOW (ref 80–100)
MONOCYTES # BLD AUTO: 1.58 K/UL — HIGH (ref 0–0.9)
MONOCYTES NFR BLD AUTO: 8.2 % — SIGNIFICANT CHANGE UP (ref 2–14)
NEUTROPHILS # BLD AUTO: 15.1 K/UL — HIGH (ref 1.8–7.4)
NEUTROPHILS NFR BLD AUTO: 78.7 % — HIGH (ref 43–77)
NRBC # FLD: 0 — SIGNIFICANT CHANGE UP
PCO2 BLDV: 45 MMHG — SIGNIFICANT CHANGE UP (ref 41–51)
PH BLDV: 7.37 PH — SIGNIFICANT CHANGE UP (ref 7.32–7.43)
PLATELET # BLD AUTO: 402 K/UL — HIGH (ref 150–400)
PMV BLD: 9.7 FL — SIGNIFICANT CHANGE UP (ref 7–13)
PO2 BLDV: 26 MMHG — LOW (ref 35–40)
POTASSIUM BLDV-SCNC: 5 MMOL/L — HIGH (ref 3.4–4.5)
RBC # BLD: 3.55 M/UL — LOW (ref 3.8–5.2)
RBC # FLD: 15.9 % — HIGH (ref 10.3–14.5)
REVIEW TO FOLLOW: YES — SIGNIFICANT CHANGE UP
SAO2 % BLDV: 39.4 % — LOW (ref 60–85)
WBC # BLD: 19.19 K/UL — HIGH (ref 3.8–10.5)
WBC # FLD AUTO: 19.19 K/UL — HIGH (ref 3.8–10.5)

## 2018-06-22 RX ORDER — ACETAMINOPHEN 500 MG
975 TABLET ORAL ONCE
Qty: 0 | Refills: 0 | Status: COMPLETED | OUTPATIENT
Start: 2018-06-22 | End: 2018-06-22

## 2018-06-22 RX ORDER — VANCOMYCIN HCL 1 G
1000 VIAL (EA) INTRAVENOUS ONCE
Qty: 0 | Refills: 0 | Status: COMPLETED | OUTPATIENT
Start: 2018-06-22 | End: 2018-06-23

## 2018-06-22 RX ORDER — PIPERACILLIN AND TAZOBACTAM 4; .5 G/20ML; G/20ML
3.38 INJECTION, POWDER, LYOPHILIZED, FOR SOLUTION INTRAVENOUS ONCE
Qty: 0 | Refills: 0 | Status: COMPLETED | OUTPATIENT
Start: 2018-06-22 | End: 2018-06-22

## 2018-06-22 RX ADMIN — Medication 975 MILLIGRAM(S): at 23:42

## 2018-06-22 RX ADMIN — PIPERACILLIN AND TAZOBACTAM 200 GRAM(S): 4; .5 INJECTION, POWDER, LYOPHILIZED, FOR SOLUTION INTRAVENOUS at 23:42

## 2018-06-22 NOTE — ED PROVIDER NOTE - ATTENDING CONTRIBUTION TO CARE
[place]  pt with RLE lymphedema   here with drainage from wound over rt calf  (being cared for by wound care) also with pain and increased swelling  (area chronically swollen  so unclear how much worse it is)    exam  R calf swollen overlying erythema  and warmth  small wound with packing in center  distal pulse in tack able to range ankle      Plan IV antibiotics  CT to eval for deep infection [place]  pt with RLE lymphedema   here with drainage from wound over rt calf  (being cared for by wound care) also with pain and increased swelling  (area chronically swollen  so unclear how much worse it is)    exam  R calf swollen overlying erythema  and warmth  small wound with packing in center  distal pulse in tack able to range ankle      Plan IV antibiotics  CT to eval for deep infection  signed over prior to completion of evauation

## 2018-06-22 NOTE — ED PROVIDER NOTE - OBJECTIVE STATEMENT
74 yo F PMHx HTN, chronic lymphedema with RLE wound being cared for by wound care MD and VNS p/w RLE wound with purulent discharge, pain, and swelling. RLE is typically larger than left but pt states for the past several days she has had worsening swelling in the leg and drainage. A visiting nurse changed the dressing today and there was pus and malodorous discharge. She has been on PO Ciprofloxacin 500 mg BID for the past 3 weeks for the wound. She saw her wound care MD yesterday.

## 2018-06-22 NOTE — ED PROVIDER NOTE - MEDICAL DECISION MAKING DETAILS
74 yo F PMHx HTN, chronic lymphedema with RLE wound being cared for by wound care MD and VNS p/w RLE wound with purulent discharge, pain, and swelling with overlying cellulitis, concern for deeper abscess, will obtain basic labs + cultures, IVF, CT lower ext, reevaluate

## 2018-06-22 NOTE — ED ADULT TRIAGE NOTE - CHIEF COMPLAINT QUOTE
Pt with vascular disease lymphedema to lower extremities. Pt had a procedure to right leg which left a wound to right lower leg. pt states its draining and pus like drainage with odor.

## 2018-06-22 NOTE — ED PROVIDER NOTE - PHYSICAL EXAMINATION
Gen: AAOx3, non-toxic  Head: NCAT  HEENT: EOMI, oral mucosa moist, normal conjunctiva  Lung: CTAB, no respiratory distress, no wheezes/rhonchi/rales B/L, speaking in full sentences  CV: RRR, no murmurs, rubs or gallops  Abd: soft, NTND, no guarding  MSK: no visible deformities, RLE with taut, shiny, erythematous skin, warm to touch  Neuro: No focal sensory or motor deficits  Skin: Warm, well perfused, no rash  Psych: normal affect.   ~Cesar Pond M.D. Resident

## 2018-06-22 NOTE — ED PROVIDER NOTE - NS ED ROS FT
GENERAL: No fever or chills, EYES: no change in vision, HEENT: no trouble swallowing or speaking, CARDIAC: no chest pain, PULMONARY: no cough or SOB, GI: no abdominal pain, no nausea, no vomiting, no diarrhea or constipation, : No changes in urination, SKIN: no rashes, NEURO: no headache,  MSK: RLE wound and pain ~Cesar Pond M.D. Resident

## 2018-06-22 NOTE — ED ADULT NURSE NOTE - OBJECTIVE STATEMENT
Pt rcvd to room 27 c/o pain to RLE, Pt rcvd to room 27 c/o pain to RLE, pt has hx of lymphedema to RLE.. had a recent procedure done to RLE and has also had a wound for past 2 years as per daughter. Pt also has mass to calve that has not been removed due to vascularity of area. Ambulatory with cane/walker at home. Arrives with dressing to R colton. States she's been having purulent discharge from wound and was told she had low grade fevers with chills. Currently afebrile. Evaluated by MD. Awaiting lab orders. Will continue to monitor.

## 2018-06-22 NOTE — ED PROVIDER NOTE - PROGRESS NOTE DETAILS
Cesar Pond M.D. Resident: Called Dr. Marquez regarding patient's CT read. Cesar Pond M.D. Resident: Called Dr. Marquez regarding patient's CT read. Will admit patient to medicine for antibiotic treatment. Cesar Pond M.D. Resident: Called Dr. Gutierrez regarding admission, no response, will call back. Cesar Pond M.D. Resident: Called Dr. Gutierrez regarding admission, no response, will admit to hospitalist.

## 2018-06-23 DIAGNOSIS — L03.90 CELLULITIS, UNSPECIFIED: ICD-10-CM

## 2018-06-23 DIAGNOSIS — I10 ESSENTIAL (PRIMARY) HYPERTENSION: ICD-10-CM

## 2018-06-23 DIAGNOSIS — Z29.9 ENCOUNTER FOR PROPHYLACTIC MEASURES, UNSPECIFIED: ICD-10-CM

## 2018-06-23 DIAGNOSIS — L03.115 CELLULITIS OF RIGHT LOWER LIMB: ICD-10-CM

## 2018-06-23 DIAGNOSIS — R79.89 OTHER SPECIFIED ABNORMAL FINDINGS OF BLOOD CHEMISTRY: ICD-10-CM

## 2018-06-23 DIAGNOSIS — A41.9 SEPSIS, UNSPECIFIED ORGANISM: ICD-10-CM

## 2018-06-23 DIAGNOSIS — E66.01 MORBID (SEVERE) OBESITY DUE TO EXCESS CALORIES: ICD-10-CM

## 2018-06-23 LAB
ALBUMIN SERPL ELPH-MCNC: 3.3 G/DL — SIGNIFICANT CHANGE UP (ref 3.3–5)
ALP SERPL-CCNC: 195 U/L — HIGH (ref 40–120)
ALT FLD-CCNC: 53 U/L — HIGH (ref 4–33)
AST SERPL-CCNC: 39 U/L — HIGH (ref 4–32)
BASOPHILS NFR SPEC: 0 % — SIGNIFICANT CHANGE UP (ref 0–2)
BILIRUB SERPL-MCNC: 0.7 MG/DL — SIGNIFICANT CHANGE UP (ref 0.2–1.2)
BUN SERPL-MCNC: 11 MG/DL — SIGNIFICANT CHANGE UP (ref 7–23)
CALCIUM SERPL-MCNC: 8.9 MG/DL — SIGNIFICANT CHANGE UP (ref 8.4–10.5)
CHLORIDE SERPL-SCNC: 98 MMOL/L — SIGNIFICANT CHANGE UP (ref 98–107)
CO2 SERPL-SCNC: 24 MMOL/L — SIGNIFICANT CHANGE UP (ref 22–31)
CREAT SERPL-MCNC: 0.7 MG/DL — SIGNIFICANT CHANGE UP (ref 0.5–1.3)
EOSINOPHIL NFR FLD: 1.7 % — SIGNIFICANT CHANGE UP (ref 0–6)
GIANT PLATELETS BLD QL SMEAR: PRESENT — SIGNIFICANT CHANGE UP
GLUCOSE SERPL-MCNC: 97 MG/DL — SIGNIFICANT CHANGE UP (ref 70–99)
LYMPHOCYTES NFR SPEC AUTO: 1.7 % — LOW (ref 13–44)
MANUAL SMEAR VERIFICATION: SIGNIFICANT CHANGE UP
METAMYELOCYTES # FLD: 0.9 % — SIGNIFICANT CHANGE UP (ref 0–1)
MONOCYTES NFR BLD: 11.3 % — HIGH (ref 2–9)
MORPHOLOGY BLD-IMP: NORMAL — SIGNIFICANT CHANGE UP
MYELOCYTES NFR BLD: 0.9 % — HIGH (ref 0–0)
NEUTROPHIL AB SER-ACNC: 73.1 % — SIGNIFICANT CHANGE UP (ref 43–77)
PLATELET COUNT - ESTIMATE: NORMAL — SIGNIFICANT CHANGE UP
POTASSIUM SERPL-MCNC: 3.9 MMOL/L — SIGNIFICANT CHANGE UP (ref 3.5–5.3)
POTASSIUM SERPL-SCNC: 3.9 MMOL/L — SIGNIFICANT CHANGE UP (ref 3.5–5.3)
PROT SERPL-MCNC: 8.5 G/DL — HIGH (ref 6–8.3)
SODIUM SERPL-SCNC: 138 MMOL/L — SIGNIFICANT CHANGE UP (ref 135–145)
VARIANT LYMPHS # BLD: 10.4 % — SIGNIFICANT CHANGE UP

## 2018-06-23 PROCEDURE — 73701 CT LOWER EXTREMITY W/DYE: CPT | Mod: 26,RT

## 2018-06-23 PROCEDURE — 99222 1ST HOSP IP/OBS MODERATE 55: CPT | Mod: GC

## 2018-06-23 RX ORDER — PIPERACILLIN AND TAZOBACTAM 4; .5 G/20ML; G/20ML
3.38 INJECTION, POWDER, LYOPHILIZED, FOR SOLUTION INTRAVENOUS EVERY 8 HOURS
Qty: 0 | Refills: 0 | Status: DISCONTINUED | OUTPATIENT
Start: 2018-06-23 | End: 2018-06-28

## 2018-06-23 RX ORDER — SODIUM CHLORIDE 9 MG/ML
1000 INJECTION INTRAMUSCULAR; INTRAVENOUS; SUBCUTANEOUS
Qty: 0 | Refills: 0 | Status: DISCONTINUED | OUTPATIENT
Start: 2018-06-23 | End: 2018-06-27

## 2018-06-23 RX ORDER — ACETAMINOPHEN 500 MG
650 TABLET ORAL EVERY 6 HOURS
Qty: 0 | Refills: 0 | Status: DISCONTINUED | OUTPATIENT
Start: 2018-06-23 | End: 2018-06-30

## 2018-06-23 RX ORDER — ACETAMINOPHEN 500 MG
975 TABLET ORAL ONCE
Qty: 0 | Refills: 0 | Status: COMPLETED | OUTPATIENT
Start: 2018-06-23 | End: 2018-06-23

## 2018-06-23 RX ORDER — VANCOMYCIN HCL 1 G
1000 VIAL (EA) INTRAVENOUS EVERY 12 HOURS
Qty: 0 | Refills: 0 | Status: DISCONTINUED | OUTPATIENT
Start: 2018-06-23 | End: 2018-06-24

## 2018-06-23 RX ORDER — ENOXAPARIN SODIUM 100 MG/ML
40 INJECTION SUBCUTANEOUS EVERY 24 HOURS
Qty: 0 | Refills: 0 | Status: DISCONTINUED | OUTPATIENT
Start: 2018-06-23 | End: 2018-06-30

## 2018-06-23 RX ORDER — OXYCODONE AND ACETAMINOPHEN 5; 325 MG/1; MG/1
1 TABLET ORAL EVERY 6 HOURS
Qty: 0 | Refills: 0 | Status: DISCONTINUED | OUTPATIENT
Start: 2018-06-23 | End: 2018-06-30

## 2018-06-23 RX ADMIN — SODIUM CHLORIDE 100 MILLILITER(S): 9 INJECTION INTRAMUSCULAR; INTRAVENOUS; SUBCUTANEOUS at 14:52

## 2018-06-23 RX ADMIN — ENOXAPARIN SODIUM 40 MILLIGRAM(S): 100 INJECTION SUBCUTANEOUS at 13:07

## 2018-06-23 RX ADMIN — Medication 975 MILLIGRAM(S): at 07:34

## 2018-06-23 RX ADMIN — Medication 250 MILLIGRAM(S): at 01:26

## 2018-06-23 RX ADMIN — OXYCODONE AND ACETAMINOPHEN 1 TABLET(S): 5; 325 TABLET ORAL at 22:05

## 2018-06-23 RX ADMIN — Medication 975 MILLIGRAM(S): at 00:42

## 2018-06-23 RX ADMIN — OXYCODONE AND ACETAMINOPHEN 1 TABLET(S): 5; 325 TABLET ORAL at 14:42

## 2018-06-23 RX ADMIN — PIPERACILLIN AND TAZOBACTAM 25 GRAM(S): 4; .5 INJECTION, POWDER, LYOPHILIZED, FOR SOLUTION INTRAVENOUS at 13:07

## 2018-06-23 RX ADMIN — OXYCODONE AND ACETAMINOPHEN 1 TABLET(S): 5; 325 TABLET ORAL at 21:24

## 2018-06-23 RX ADMIN — Medication 250 MILLIGRAM(S): at 16:58

## 2018-06-23 RX ADMIN — PIPERACILLIN AND TAZOBACTAM 25 GRAM(S): 4; .5 INJECTION, POWDER, LYOPHILIZED, FOR SOLUTION INTRAVENOUS at 22:28

## 2018-06-23 NOTE — H&P ADULT - NSHPSOCIALHISTORY_GEN_ALL_CORE
Pt lives at home with  and granddaughter, she feels safe. Has never smoked, no illicit drug use and no alcohol. Pt does not currently work

## 2018-06-23 NOTE — H&P ADULT - PROBLEM SELECTOR PLAN 3
- unknown origin, last LFTs in March normal  - will trend CMP daily, likely in setting of sepsis  - no signs of abdominal pain or jaundice

## 2018-06-23 NOTE — H&P ADULT - NSHPLABSRESULTS_GEN_ALL_CORE
06-22    138  |  98  |  11  ----------------------------<  97  3.9   |  24  |  0.70    Ca    8.9      22 Jun 2018 23:20    TPro  8.5<H>  /  Alb  3.3  /  TBili  0.7  /  DBili  x   /  AST  39<H>  /  ALT  53<H>  /  AlkPhos  195<H>  06-22                          9.3    19.19 )-----------( 402      ( 22 Jun 2018 23:20 )             28.1     LIVER FUNCTIONS - ( 22 Jun 2018 23:20 )  Alb: 3.3 g/dL / Pro: 8.5 g/dL / ALK PHOS: 195 u/L / ALT: 53 u/L / AST: 39 u/L / GGT: x 06-22    138  |  98  |  11  ----------------------------<  97  3.9   |  24  |  0.70    Ca    8.9      22 Jun 2018 23:20    TPro  8.5<H>  /  Alb  3.3  /  TBili  0.7  /  DBili  x   /  AST  39<H>  /  ALT  53<H>  /  AlkPhos  195<H>  06-22                          9.3    19.19 )-----------( 402      ( 22 Jun 2018 23:20 )             28.1     LIVER FUNCTIONS - ( 22 Jun 2018 23:20 )  Alb: 3.3 g/dL / Pro: 8.5 g/dL / ALK PHOS: 195 u/L / ALT: 53 u/L / AST: 39 u/L / GGT: x      CT RLE:  Increased extent of stranding and skin thickening in the right thigh soft   tissue since 1/12/2018, which represent edema and/or cellulitis.   Recommend clinical correlation. A 3.4 x 2.6 x 5.2 cm rim-enhancing fluid   collection containing a focus of air.    Nonspecific lymphadenopathy in the visualized right proximal anteromedial   thigh soft tissue.

## 2018-06-23 NOTE — PROGRESS NOTE ADULT - SUBJECTIVE AND OBJECTIVE BOX
Patient was seen and evaluated at bedside in the ED with Dr COLLETTE Almeida. Low clinical suspicion for necrotizing soft tissue infection. Patient most recently had wound care by Dr. Estephania Marquez. Patient advised to follow up with him or another wound care surgeon regarding wound. No intervention warranted at this time.

## 2018-06-23 NOTE — H&P ADULT - PROBLEM SELECTOR PLAN 2
- follow up blood and wound cultures  - tylenol for fever/pain  - IVF @100 x 12 hours  - broad spectrum abx  - monitor labs daily

## 2018-06-23 NOTE — H&P ADULT - NSHPPHYSICALEXAM_GEN_ALL_CORE
PHYSICAL EXAM:  GENERAL: morbidly obese  HEAD: Atraumatic, Normocephalic  EYES: EOMI, PERRLA, conjunctiva and sclera clear  NECK: Supple, No JVD  CHEST/LUNG: Clear to auscultation bilaterally; No wheezes/rales/rhonchi  HEART: Regular rate and rhythm; No murmurs, rubs, or gallops  ABDOMEN: soft, Nontender, Nondistended; Bowel sounds present  EXTREMITIES: marked lymphedema of RLE with skin thickening in the postero-medial area. Pt has a wound currently with packing in that area. Foul purulent material is draining around the packing. Marked erythema of the RLE in this same area.   PSYCH: reactive affect  NEUROLOGY: AAOx3, non-focal  SKIN: see above

## 2018-06-23 NOTE — H&P ADULT - ASSESSMENT
74 yo AA with F PMHx of HTN, chronic lymphedema with RLE wound. now p/w worsening of her RLE wound with purulent discharge a/w foul odor, pain, and swelling

## 2018-06-23 NOTE — H&P ADULT - HISTORY OF PRESENT ILLNESS
72 yo AA with F PMHx of HTN, chronic lymphedema with RLE wound. now p/w worsening of her RLE wound with purulent discharge a/w foul odor, pain, and swelling. RLE is typically larger than left but pt states for the past several days she has had worsening swelling in the leg and purulent drainage. A visiting nurse changed the dressing this morning and there was pus and malodorous discharge when the wound was uncovered. She has been on PO Ciprofloxacin 500 mg BID for the past 3 weeks for the wound as given to her by her wound MD but there has been progressive worsening of the area. The patient denies any trauma to the area, she has not bathed in stagnant water or in a hot tube. Pt has had mild fevers and chills at home as well. Otherwise no localizing symptoms no CP/SOB/HA/Cough/Urinary retention/dysuria.  Pt at home is ambulatory, able to perform most of her ADLs, their granddaughter lives with them and helps her with cooking/cleaning.    Of note, pt was here in March of 2018 when she underwent a biopsy of her RLE for eval of lymphadema cause which was determined to be her morbid obesity as the biopsies showed edema and necrotic fat tissue.    In the ED, pt hemodynaimcally stable, Tmax 100.6, given vanc/zosyn and cultures sent.

## 2018-06-23 NOTE — H&P ADULT - ATTENDING COMMENTS
73 F morbid obesity, a/w sepsis 2/2 RLE cellulitis vs abscess.  c/w broad spectrum abx, follow-up surgery for role of I & d, wound draining now.  Wound Care to see.

## 2018-06-23 NOTE — H&P ADULT - NSHPREVIEWOFSYSTEMS_GEN_ALL_CORE
Constitutional: +fevers/chills  HEENT: denies visual changes, hearing changes, rhinitis, odynophagia, or dysphagia  Cardiovascular: denies palpitations, chest pain, edema  Respiratory: denies SOB, wheezing  Gastrointestinal: denies N/V/D, abdominal pain, hematochezia, melena  : denies dysuria, hematuria  MSK: denies weakness, joint pain  Skin: + worsening RLE edema and purulence  Psych: denies hallucinations, anxiety, depression  Neuro: denies tremors, sensory or motor deficits

## 2018-06-23 NOTE — H&P ADULT - PROBLEM SELECTOR PLAN 1
- significant purulence concern for worsening infection  - c/w van/zosyn in the setting of no response to clinda x 3 weeks, as pt now may be resistant  - wound care team consulted  - surgery consulted by ED, will follow up if any role to I&d of fluid pocket identified on CT

## 2018-06-24 ENCOUNTER — TRANSCRIPTION ENCOUNTER (OUTPATIENT)
Age: 73
End: 2018-06-24

## 2018-06-24 LAB
ALBUMIN SERPL ELPH-MCNC: 2.7 G/DL — LOW (ref 3.3–5)
ALP SERPL-CCNC: 194 U/L — HIGH (ref 40–120)
ALT FLD-CCNC: 40 U/L — HIGH (ref 4–33)
AST SERPL-CCNC: 38 U/L — HIGH (ref 4–32)
BASOPHILS # BLD AUTO: 0.06 K/UL — SIGNIFICANT CHANGE UP (ref 0–0.2)
BASOPHILS NFR BLD AUTO: 0.4 % — SIGNIFICANT CHANGE UP (ref 0–2)
BILIRUB SERPL-MCNC: 0.7 MG/DL — SIGNIFICANT CHANGE UP (ref 0.2–1.2)
BUN SERPL-MCNC: 11 MG/DL — SIGNIFICANT CHANGE UP (ref 7–23)
CALCIUM SERPL-MCNC: 8.1 MG/DL — LOW (ref 8.4–10.5)
CHLORIDE SERPL-SCNC: 100 MMOL/L — SIGNIFICANT CHANGE UP (ref 98–107)
CO2 SERPL-SCNC: 19 MMOL/L — LOW (ref 22–31)
CREAT SERPL-MCNC: 0.75 MG/DL — SIGNIFICANT CHANGE UP (ref 0.5–1.3)
EOSINOPHIL # BLD AUTO: 0.39 K/UL — SIGNIFICANT CHANGE UP (ref 0–0.5)
EOSINOPHIL NFR BLD AUTO: 2.5 % — SIGNIFICANT CHANGE UP (ref 0–6)
GLUCOSE SERPL-MCNC: 111 MG/DL — HIGH (ref 70–99)
GRAM STN SPEC: SIGNIFICANT CHANGE UP
HCT VFR BLD CALC: 27.1 % — LOW (ref 34.5–45)
HGB BLD-MCNC: 8.8 G/DL — LOW (ref 11.5–15.5)
IMM GRANULOCYTES # BLD AUTO: 0.18 # — SIGNIFICANT CHANGE UP
IMM GRANULOCYTES NFR BLD AUTO: 1.1 % — SIGNIFICANT CHANGE UP (ref 0–1.5)
INR BLD: 1.12 — SIGNIFICANT CHANGE UP (ref 0.88–1.17)
LYMPHOCYTES # BLD AUTO: 1.96 K/UL — SIGNIFICANT CHANGE UP (ref 1–3.3)
LYMPHOCYTES # BLD AUTO: 12.4 % — LOW (ref 13–44)
MAGNESIUM SERPL-MCNC: 2.2 MG/DL — SIGNIFICANT CHANGE UP (ref 1.6–2.6)
MCHC RBC-ENTMCNC: 27 PG — SIGNIFICANT CHANGE UP (ref 27–34)
MCHC RBC-ENTMCNC: 32.5 % — SIGNIFICANT CHANGE UP (ref 32–36)
MCV RBC AUTO: 83.1 FL — SIGNIFICANT CHANGE UP (ref 80–100)
MONOCYTES # BLD AUTO: 1.29 K/UL — HIGH (ref 0–0.9)
MONOCYTES NFR BLD AUTO: 8.2 % — SIGNIFICANT CHANGE UP (ref 2–14)
NEUTROPHILS # BLD AUTO: 11.92 K/UL — HIGH (ref 1.8–7.4)
NEUTROPHILS NFR BLD AUTO: 75.4 % — SIGNIFICANT CHANGE UP (ref 43–77)
NRBC # FLD: 0 — SIGNIFICANT CHANGE UP
PHOSPHATE SERPL-MCNC: 3.6 MG/DL — SIGNIFICANT CHANGE UP (ref 2.5–4.5)
PLATELET # BLD AUTO: 327 K/UL — SIGNIFICANT CHANGE UP (ref 150–400)
PMV BLD: 10.9 FL — SIGNIFICANT CHANGE UP (ref 7–13)
POTASSIUM SERPL-MCNC: 3.8 MMOL/L — SIGNIFICANT CHANGE UP (ref 3.5–5.3)
POTASSIUM SERPL-SCNC: 3.8 MMOL/L — SIGNIFICANT CHANGE UP (ref 3.5–5.3)
PROT SERPL-MCNC: 7.6 G/DL — SIGNIFICANT CHANGE UP (ref 6–8.3)
PROTHROM AB SERPL-ACNC: 12.9 SEC — SIGNIFICANT CHANGE UP (ref 9.8–13.1)
RBC # BLD: 3.26 M/UL — LOW (ref 3.8–5.2)
RBC # FLD: 15.9 % — HIGH (ref 10.3–14.5)
SODIUM SERPL-SCNC: 134 MMOL/L — LOW (ref 135–145)
SPECIMEN SOURCE: SIGNIFICANT CHANGE UP
SPECIMEN SOURCE: SIGNIFICANT CHANGE UP
VANCOMYCIN TROUGH SERPL-MCNC: 9 UG/ML — LOW (ref 10–20)
WBC # BLD: 15.8 K/UL — HIGH (ref 3.8–10.5)
WBC # FLD AUTO: 15.8 K/UL — HIGH (ref 3.8–10.5)

## 2018-06-24 PROCEDURE — 99232 SBSQ HOSP IP/OBS MODERATE 35: CPT | Mod: GC

## 2018-06-24 RX ORDER — VANCOMYCIN HCL 1 G
1250 VIAL (EA) INTRAVENOUS EVERY 12 HOURS
Qty: 0 | Refills: 0 | Status: DISCONTINUED | OUTPATIENT
Start: 2018-06-24 | End: 2018-06-27

## 2018-06-24 RX ORDER — LOSARTAN POTASSIUM 100 MG/1
25 TABLET, FILM COATED ORAL DAILY
Qty: 0 | Refills: 0 | Status: DISCONTINUED | OUTPATIENT
Start: 2018-06-24 | End: 2018-06-27

## 2018-06-24 RX ORDER — FUROSEMIDE 40 MG
20 TABLET ORAL DAILY
Qty: 0 | Refills: 0 | Status: DISCONTINUED | OUTPATIENT
Start: 2018-06-24 | End: 2018-06-27

## 2018-06-24 RX ADMIN — OXYCODONE AND ACETAMINOPHEN 1 TABLET(S): 5; 325 TABLET ORAL at 12:21

## 2018-06-24 RX ADMIN — PIPERACILLIN AND TAZOBACTAM 25 GRAM(S): 4; .5 INJECTION, POWDER, LYOPHILIZED, FOR SOLUTION INTRAVENOUS at 13:12

## 2018-06-24 RX ADMIN — PIPERACILLIN AND TAZOBACTAM 25 GRAM(S): 4; .5 INJECTION, POWDER, LYOPHILIZED, FOR SOLUTION INTRAVENOUS at 06:47

## 2018-06-24 RX ADMIN — OXYCODONE AND ACETAMINOPHEN 1 TABLET(S): 5; 325 TABLET ORAL at 20:40

## 2018-06-24 RX ADMIN — Medication 250 MILLIGRAM(S): at 05:04

## 2018-06-24 RX ADMIN — OXYCODONE AND ACETAMINOPHEN 1 TABLET(S): 5; 325 TABLET ORAL at 11:51

## 2018-06-24 RX ADMIN — ENOXAPARIN SODIUM 40 MILLIGRAM(S): 100 INJECTION SUBCUTANEOUS at 13:12

## 2018-06-24 RX ADMIN — PIPERACILLIN AND TAZOBACTAM 25 GRAM(S): 4; .5 INJECTION, POWDER, LYOPHILIZED, FOR SOLUTION INTRAVENOUS at 23:00

## 2018-06-24 RX ADMIN — Medication 20 MILLIGRAM(S): at 13:12

## 2018-06-24 RX ADMIN — LOSARTAN POTASSIUM 25 MILLIGRAM(S): 100 TABLET, FILM COATED ORAL at 11:51

## 2018-06-24 RX ADMIN — Medication 166.67 MILLIGRAM(S): at 19:49

## 2018-06-24 RX ADMIN — OXYCODONE AND ACETAMINOPHEN 1 TABLET(S): 5; 325 TABLET ORAL at 19:51

## 2018-06-24 NOTE — PROGRESS NOTE ADULT - PROBLEM SELECTOR PLAN 3
- unknown origin, last LFTs in March normal  - will trend CMP daily, likely in setting of sepsis  - no signs of abdominal pain or jaundice - unknown origin, last LFTs in March normal, possibly due to sepsis vs fatty steatosis?   - trend CMP daily  - no signs of abdominal pain or jaundice - unknown origin, last LFTs in March normal, possibly due to sepsis vs fatty steatosis?   - trend CMP daily  - no signs of abdominal pain or jaundice  -if further concerns consider RUQ US

## 2018-06-24 NOTE — DISCHARGE NOTE ADULT - OTHER SIGNIFICANT FINDINGS
< from: CT Lower Extremity w/ IV Cont, Right (06.23.18 @ 02:01) >  Soft tissue: Extensive stranding predominantly along the posteromedial   right thigh/knee soft tissue with overlying skin thickening, increased in   extent since prior study. Stranding extending inferiorly to the right leg   soft tissue. Drainage/sinus tract along the posteromedial aspect of the   distal thigh soft tissue. A 3.4 x 2.6 x 5.2 cm rim-enhancing fluid   collection containing a focus of air in the right medial knee soft tissue.    Diffuse muscle bulk loss with fatty replacement. Nonspecific, enlarged   lymph nodes measuring up to 2.8 x 1.8 cm in the right anteromedial   proximal thigh soft tissue. Achilles enthesopathy.    Bones: Status post right total knee arthroplasty. Artifact from the   prosthesis degrading images. Plantar calcaneal spur. No obvious bone   erosion or periosteal reaction.    IMPRESSION:     Increased extent of stranding and skin thickening in the right thigh soft   tissue since 1/12/2018, which represent edema and/or cellulitis.   Recommend clinical correlation. A 3.4 x 2.6 x 5.2 cm rim-enhancing fluid   collection containing a focus of air.    Nonspecific lymphadenopathy in the visualized right proximal anteromedial   thigh soft tissue.    < end of copied text > < from: CT Lower Extremity w/ IV Cont, Right (06.23.18 @ 02:01) >    IMPRESSION:     Increased extent of stranding and skin thickening in the right thigh soft   tissue since 1/12/2018, which represent edema and/or cellulitis.   Recommend clinical correlation. A 3.4 x 2.6 x 5.2 cm rim-enhancing fluid   collection containing a focus of air.    Nonspecific lymphadenopathy in the visualized right proximal anteromedial   thigh soft tissue.    < end of copied text >      < from: US Duplex Venous Lower Ext Ltd, Right (06.27.18 @ 10:38) >  IMPRESSION:     No evidence of right lower extremity deep venous thrombosis.      < end of copied text >

## 2018-06-24 NOTE — DISCHARGE NOTE ADULT - MEDICATION SUMMARY - MEDICATIONS TO STOP TAKING
I will STOP taking the medications listed below when I get home from the hospital:    Tylenol with Codeine #3 oral tablet  -- 1 tab(s) by mouth every 6 hours MDD:4  -- Caution federal law prohibits the transfer of this drug to any person other  than the person for whom it was prescribed.  May cause drowsiness.  Alcohol may intensify this effect.  Use care when operating dangerous machinery.  This product contains acetaminophen.  Do not use  with any other product containing acetaminophen to prevent possible liver damage.  Using more of this medication than prescribed may cause serious breathing problems. I will STOP taking the medications listed below when I get home from the hospital:    Tylenol with Codeine #3 oral tablet  -- 1 tab(s) by mouth every 6 hours MDD:4  -- Caution federal law prohibits the transfer of this drug to any person other  than the person for whom it was prescribed.  May cause drowsiness.  Alcohol may intensify this effect.  Use care when operating dangerous machinery.  This product contains acetaminophen.  Do not use  with any other product containing acetaminophen to prevent possible liver damage.  Using more of this medication than prescribed may cause serious breathing problems.    losartan 25 mg oral tablet  -- 1 tab(s) by mouth once a day    furosemide 20 mg oral tablet  -- 1 tab(s) by mouth once a day

## 2018-06-24 NOTE — DISCHARGE NOTE ADULT - HOME CARE AGENCY
[facility] [place] [phone]. Nurse to visit on the day following discharge. Other appropriate services to be arranged thereafter.   Please contact the home care agency at the above phone number if you have not heard from them by approximately 12 noon on the day after your hospital discharge. [facility]  [phone] .   Nurse to call and visit day after discharge 7/1/2018

## 2018-06-24 NOTE — DISCHARGE NOTE ADULT - CONDITIONS AT DISCHARGE
Ms Aquino is stable for discharge Home, alert and oriented x 4; can be out of bed with the Walker and one Person's help; vital signs are stable; pain mis managed with Dilaudid as needed.  Wound Care did evaluate the Patient's Skin , she has mixed venous and surgical , 2cm x2cm x 9.4cm , fibrinous and small amount of serosanguinous drainage with hypopigmentation; at 6 O'clock 4cmaway.  Right Medial Surgical 1cm x0.6cm x10.5cm large amount serosanguinous , no odor ; at 5-7 [id number] away.  The Right Medial Thigh has a Frictional wound which opens and closes since 33990.5cm x3cm x0.4cm, irregular border with clots ; Normal saline , dried, 3M barrier Film and Medihoney at the base of the Wound, Mepilex Foam with Borders coverage.  Discharge Instructions and Supplies for dressing changes are provided.

## 2018-06-24 NOTE — PROGRESS NOTE ADULT - PROBLEM SELECTOR PLAN 5
- hold lasix and losartan in setting of sepsis and low/normal pressures -sBP well controlled and stable  -c/w home PO lasix 20mg qd and losartan 25mg qd

## 2018-06-24 NOTE — PROGRESS NOTE ADULT - PROBLEM SELECTOR PLAN 1
- significant purulence concern for worsening infection  - c/w van/zosyn in the setting of no response to clinda x 3 weeks, as pt now may be resistant  - wound care team consulted  - surgery consulted by ED, will follow up if any role to I&d of fluid pocket identified on CT - significant purulence concern for worsening infection, prior 5/2018 w/ multi-organism w/ group B strep on R medial leg  - c/w van/zosyn in the setting of no response to clinda x 3 weeks, as pt now may be resistant  - wound care team consulted  - surgery consulted by ED, will follow up if any role to I&d of fluid pocket identified on CT -on admission w/ significant purulence, CT w/ fluid collection R medial knee (3.4 x2.6 x5.2), had prior 5/2018 culture w/ multi-organism w/ group B strep on R medial leg, has chronic lymphedema for the last 2 yrs w/ unknown etiology, euvolemic on exam but on home lasix  - c/w van/zosyn (6/23-)(day 2) in the setting of no response to cipro and clinda x 3 weeks, as pt now may be resistant  -c/w wound care team recs  -f/u surgery recs if any role to I&d of fluid pocket identified on CT\  -f/u TTE  -restart home PO lasix 20mg qd, uptitrate if increased vol status -on admission w/ significant purulence, CT w/ fluid collection R medial knee (3.4 x2.6 x5.2), had prior 5/2018 culture w/ multi-organism w/ group B strep on R medial leg, has chronic lymphedema for the last 2 yrs w/ unknown etiology, euvolemic on exam but on home lasix  - c/w van/zosyn (6/23-)(day 2) in the setting of no response to cipro x 3 weeks, as pt now may be resistant  -c/w wound care team recs  -f/u surgery recs if any role to I&d of fluid pocket identified on CT  -f/u TTE  -restart home PO lasix 20mg qd, uptitrate if increased vol status -on admission w/ significant purulence, CT w/ fluid collection R medial knee (3.4 x2.6 x5.2), had prior 5/2018 culture w/ multi-organism w/ group B strep on R medial leg, has chronic lymphedema for the last 2 yrs w/ unknown etiology, euvolemic on exam but on home lasix  - c/w van/zosyn (6/23-)(day 2) in the setting of no response to cipro x 3 weeks, as pt now may be resistant  -c/w wound care team recs  -surgery recs: no interventions at this time needed  -f/u TTE, r/o HF, no prior TTE but no sx of HF either but on lasix  -restart home PO lasix 20mg qd, uptitrate if increased vol status -on admission w/ significant purulence, CT w/ fluid collection R medial knee (3.4 x2.6 x5.2), had prior 5/2018 culture w/ multi-organism w/ group B strep on R medial leg, has chronic lymphedema for the last 2 yrs w/ unknown etiology, euvolemic on exam but on home lasix  - c/w van/zosyn (6/23-)(day 2) in the setting of no response to cipro x 3 weeks, as pt now may be resistant, can probably downgrade to PO gram + coverage when cultures come back to guide management  -c/w wound care team recs  -surgery recs: no interventions at this time needed  -f/u TTE, r/o HF, no prior TTE but no sx of HF either but on lasix  -restart home PO lasix 20mg qd, uptitrate if increased vol status

## 2018-06-24 NOTE — DISCHARGE NOTE ADULT - HOSPITAL COURSE
72 yo AA with F PMHx of HTN, chronic lymphedema with RLE wound. now p/w worsening of her RLE wound with purulent discharge a/w foul odor, pain, and swelling. RLE is typically larger than left but pt states for the past several days she has had worsening swelling in the leg and purulent drainage. A visiting nurse changed the dressing this morning and there was pus and malodorous discharge when the wound was uncovered. She has been on PO Ciprofloxacin 500 mg BID for the past 3 weeks for the wound as given to her by her wound MD but there has been progressive worsening of the area. The patient denies any trauma to the area, she has not bathed in stagnant water or in a hot tube. Pt has had mild fevers and chills at home as well. Otherwise no localizing symptoms no CP/SOB/HA/Cough/Urinary retention/dysuria. Pt at home is ambulatory, able to perform most of her ADLs, their granddaughter lives with them and helps her with cooking/cleaning. Of note, pt was here in March of 2018 when she underwent a biopsy of her RLE for eval of lymphadema cause which was determined to be her morbid obesity as the biopsies showed edema and necrotic fat tissue. In the ED, pt hemodynaimcally stable, Tmax 100.6, given vanc/zosyn and cultures sent. CT of LE w/ increased stranding and skin thickening of R thigh soft tissue edema/cellulitis w/ 3.4 x 2.6x 5.2 rim enhancing fluid collection, Currently still has massive lymphedema with R>L, with BC showing no growth till date for now and WBC downtrending and afebrile throughout the hospital course for now. Restarted on home PO Lasix 20mg and Losartan 25mg and pending a baseline Echocardiogram but euvolemic on exam. 74 yo AA with F PMHx of HTN, chronic lymphedema with RLE wound now p/w worsening of her RLE wound with purulent discharge a/w foul odor, pain, and swelling. RLE is typically larger than left but pt states for the past several days she has had worsening swelling in the leg and purulent drainage. A visiting nurse changed the dressing this morning and there was pus and malodorous discharge when the wound was uncovered. She has been on PO Ciprofloxacin 500 mg BID for the past 3 weeks for the wound as given to her by her wound MD but there has been progressive worsening of the area. The patient denies any trauma to the area, she has not bathed in stagnant water or in a hot tube. Pt has had mild fevers and chills at home as well. Otherwise no localizing symptoms no CP/SOB/HA/Cough/Urinary retention/dysuria. Pt at home is ambulatory, able to perform most of her ADLs, their granddaughter lives with them and helps her with cooking/cleaning. Of note, pt was here in March of 2018 when she underwent a biopsy of her RLE for eval of lymphadema cause which was determined to be her morbid obesity as the biopsies showed edema and necrotic fat tissue. In the ED, pt hemodynaimcally stable, Tmax 100.6, given vanc/zosyn and cultures sent. CT of LE w/ increased stranding and skin thickening of R thigh soft tissue edema/cellulitis w/ 3.4 x 2.6x 5.2 rim enhancing fluid collection, Currently still has massive lymphedema with R>L, with BC showing no growth till date for now and WBC downtrending and afebrile throughout the hospital course for now. Restarted on home PO Lasix 20mg and Losartan 25mg and pending a baseline Echocardiogram but euvolemic on exam. 72 yo AA with F PMHx of HTN, and chronic lymphedema s/p surgical procedure that removed excess tissue who presented with worsening of her RLE surgical site wound.  She had already been on PO Ciprofloxacin 500 mg BID for the past 3 weeks for the wound. At admission, patient was found to meet SIRS criteria and was started on vanc/zosyn. Blood and wound cultures were negative. Sepsis resolved throughout hospital course. CT was positive for a 3.4 x 2.6x 5.2 rim enhancing fluid collection at the site of the wound. Duplex of the right lower extremity showed no evidence of DVT. Per vascular consult recommendation, no acute vascular surgical intervention was needed. Patient developed CAROL likely related to vancomycin and zosyn so antibiotics were switched to clindamycin. Patient's creatinine trended down during this hospitalization. Patient is currently hemodynamically stable and ready for discharge. 74 yo AA with F PMHx of HTN, and chronic lymphedema s/p surgical procedure that removed excess tissue who presented with worsening of her RLE surgical site wound.  She had already been on PO Ciprofloxacin 500 mg BID for the past 3 weeks for the wound. At admission, patient was found to meet SIRS criteria and was started on vanc/zosyn. Blood and wound cultures were negative. Sepsis resolved throughout hospital course. CT was positive for a 3.4 x 2.6x 5.2 rim enhancing fluid collection at the site of the wound. Duplex of the right lower extremity showed no evidence of DVT. Per vascular consult recommendation, no acute vascular surgical intervention was needed. Patient developed CAROL likely related to vancomycin and zosyn so antibiotics were switched to clindamycin and lasix and losartan stopped. Patient's creatinine trended down during this hospitalization. Patient is currently hemodynamically stable and ready for discharge.

## 2018-06-24 NOTE — DISCHARGE NOTE ADULT - PLAN OF CARE
Treated with vancomycin and zosyn and improved leukocytosis and stable vital signs, would followup closely with Dr. Marquez Need to followup closely with Dr. Maruqez, your wound care doctor within 1 week of discharge. Would need to continue antibiotic ________________for _____________ days/weeks. Continue with the lasix 20mg once a day and the Echo showed_________________. Ongoing management You were admitted for a worsening surgical site wound on your right lower extremity. You were treated wound care,  vancomycin, and zosyn with improved leukocytosis and stable vital signs. You had a vancomycin related acute kidney injury, so you were switched to clindamycin. Please continue IV clindamycin for an additional ___ days for a total of 14 days of antibiotics.  Please would followup closely with Dr. Marquez for wound care and your outpatient primary care provider. Resolved During your hospitalization, you were found to have an acute kidney injury likely related to receiving vancomycin. You were switched to Clindamycin and your creatinine levels improved. Please follow up with your outpatient primary care provider. You were found to have a fever and a high heart rate, meeting SIRS criteria. You were given broad spectrum antibiotics. Your symptoms resolved and your blood and wound cultures were found to be negative. Please follow up with your outpatient primary care provider Your blood pressure medications were initially held in the setting of sepsis but they were restarted once your symptoms resolved. Please continue your home doses of lasix and losartan as prescribed. Please follow up with your outpatient primary care physician. You were admitted for a worsening surgical site wound on your right lower extremity. You were treated wound care,  vancomycin, and zosyn with improved leukocytosis and stable vital signs. You had a vancomycin related acute kidney injury, so you were switched to clindamycin. Please continue IV clindamycin for an additional 6 days for a total of 14 days of antibiotics (STOP: July 6th, 2018). Please continue wound care as indicated below. Please followup closely with Dr. Marquez for wound care and your outpatient primary care provider. During your hospitalization, you were found to have an acute kidney injury related to receiving vancomycin. You were switched to Clindamycin and your creatinine levels improved. Please follow up with your outpatient primary care provider. During your hospitalization, you were found to have an acute kidney injury related to receiving vancomycin. You were switched to Clindamycin and your creatinine levels improved. Please avoid NSAIDs if possible. Please follow up with your outpatient primary care provider. improved During your hospitalization, you were found to have an acute kidney injury related to receiving vancomycin. You were switched to Clindamycin and your creatinine levels improved with fluids. Please avoid NSAIDs: no motrin, ibuprofen, aleve, advil. Please follow up with your outpatient primary care provider. stop losartan and lasix given your elevated kidney function  followup with your PMD within 1 week to recheck your BP and kidney function

## 2018-06-24 NOTE — DISCHARGE NOTE ADULT - INSTRUCTIONS
Low Salt , low Cholestreol Topical Recommendations     Right proximal and distal lower leg: Clean with Saf-Clens. Rinse with NS. Pat dry. Apply Liquid barrier film to periwound skin. Lighly pack depth with Aquacel AG Ribbon, Cover with foam with borders. Change daily or PRN.     Right medial thigh: Clean with Saf-Clens. Rinse with NS. Pat dry. Apply Liquid barrier film to periwound skin. Apply Medihoney gel to wound bed. Cover with foam with borders. Change daily.     Continue low air loss bed therapy, continue heel elevation with Z-flex fluidized positioning boots while in bed,  continue moisture management with barrier creams & single breathable pad, continue measures to decrease friction/shear/pressure. Continue with nutritional support as per dietary/orders.  Plan discussed with patient. Patient educated on topical wound  therapy and on dietary recommendations (high protein, low sugar diet) to optimize wound healing. Questions answers.     Patient  on the importance of weight shift every hour  while in the bed or chair.

## 2018-06-24 NOTE — PROGRESS NOTE ADULT - SUBJECTIVE AND OBJECTIVE BOX
Wyatt Borrego  PGY1 Internal Medicine  Pager [phone] or [phone]    Patient is a 73y old  Female who presents with a chief complaint of RLE drainage (23 Jun 2018 10:12)    SUBJECTIVE / OVERNIGHT EVENTS:  RLE still actively draining, no f/n/v/d/CP/SOB    MEDICATIONS  (STANDING):  enoxaparin Injectable 40 milliGRAM(s) SubCutaneous every 24 hours  piperacillin/tazobactam IVPB. 3.375 Gram(s) IV Intermittent every 8 hours  sodium chloride 0.9%. 1000 milliLiter(s) (100 mL/Hr) IV Continuous <Continuous>  vancomycin  IVPB 1000 milliGRAM(s) IV Intermittent every 12 hours    MEDICATIONS  (PRN):  acetaminophen   Tablet 650 milliGRAM(s) Oral every 6 hours PRN For Temp greater than 38 C (100.4 F)  acetaminophen   Tablet. 650 milliGRAM(s) Oral every 6 hours PRN Mild Pain (1 - 3)  oxyCODONE    5 mG/acetaminophen 325 mG 1 Tablet(s) Oral every 6 hours PRN moderate-severe pain      T(C): 37.8 (06-23-18 @ 23:10), Max: 38.1 (06-23-18 @ 07:23)  HR: 94 (06-23-18 @ 23:10) (84 - 94)  BP: 109/53 (06-23-18 @ 23:10) (109/53 - 130/60)  RR: 18 (06-23-18 @ 23:10) (18 - 19)  SpO2: 99% (06-23-18 @ 23:10) (96% - 99%)  CAPILLARY BLOOD GLUCOSE        I&O's Summary    PHYSICAL EXAM:  	GENERAL: morbidly obese  	HEAD: Atraumatic, Normocephalic  	EYES: EOMI, PERRLA, conjunctiva and sclera clear  	NECK: Supple, No JVD  	CHEST/LUNG: Clear to auscultation bilaterally; No wheezes/rales/rhonchi  	HEART: Regular rate and rhythm; No murmurs, rubs, or gallops  	ABDOMEN: soft, Nontender, Nondistended; Bowel sounds present  	EXTREMITIES: marked lymphedema of RLE with skin thickening in the postero-medial area. Pt has a wound currently with packing in that area. Foul purulent material is draining around the packing. Marked erythema of the RLE in this same area.   	PSYCH: reactive affect  	NEUROLOGY: AAOx3, non-focal  SKIN: see above    LABS:                        9.3    19.19 )-----------( 402      ( 22 Jun 2018 23:20 )             28.1     06-22    138  |  98  |  11  ----------------------------<  97  3.9   |  24  |  0.70    Ca    8.9      22 Jun 2018 23:20    TPro  8.5<H>  /  Alb  3.3  /  TBili  0.7  /  DBili  x   /  AST  39<H>  /  ALT  53<H>  /  AlkPhos  195<H>  06-22              Micro:      RADIOLOGY & ADDITIONAL TESTS:    Imaging Personally Reviewed:    Consultant(s) Notes Reviewed:      Care Discussed with Consultants/Other Providers:

## 2018-06-24 NOTE — PROGRESS NOTE ADULT - PROBLEM SELECTOR PLAN 6
- lovenox 40 qd -DVT ppx: lovenox 40 qd -DVT ppx: lovenox 40 qd\  -Dispo: pending PT sal, did come from home, uses a walker to ambualte

## 2018-06-24 NOTE — DISCHARGE NOTE ADULT - MEDICATION SUMMARY - MEDICATIONS TO TAKE
I will START or STAY ON the medications listed below when I get home from the hospital:    oxyCODONE-acetaminophen 5 mg-325 mg oral tablet  -- 1 tab(s) by mouth every 6 hours, As needed, moderate-severe pain MDD:4 tabs  -- Indication: For Pain    losartan 25 mg oral tablet  -- 1 tab(s) by mouth once a day  -- Indication: For hypertension    furosemide 20 mg oral tablet  -- 1 tab(s) by mouth once a day  -- Indication: For hypertension    clindamycin 300 mg oral capsule  -- 2 cap(s) by mouth every 8 hours   -- Finish all this medication unless otherwise directed by prescriber.  Medication should be taken with plenty of water.    -- Indication: For Cellulitis and abscess of right lower extremity I will START or STAY ON the medications listed below when I get home from the hospital:    oxyCODONE-acetaminophen 5 mg-325 mg oral tablet  -- 1 tab(s) by mouth every 6 hours, As needed, moderate-severe pain MDD:4 tabs  -- Indication: For Severe pain    clindamycin 300 mg oral capsule  -- 2 cap(s) by mouth every 8 hours   -- Finish all this medication unless otherwise directed by prescriber.  Medication should be taken with plenty of water.    -- Indication: For Cellulitis and abscess of right lower extremity

## 2018-06-24 NOTE — DISCHARGE NOTE ADULT - CARE PLAN
Principal Discharge DX:	Cellulitis of right lower extremity  Goal:	Treated with vancomycin and zosyn and improved leukocytosis and stable vital signs, would followup closely with Dr. Marquez  Assessment and plan of treatment:	Need to followup closely with Dr. Marquez, your wound care doctor within 1 week of discharge. Would need to continue antibiotic ________________for _____________ days/weeks. Continue with the lasix 20mg once a day and the Echo showed_________________. Principal Discharge DX:	Cellulitis and abscess of right lower extremity  Goal:	Ongoing management  Assessment and plan of treatment:	You were admitted for a worsening surgical site wound on your right lower extremity. You were treated wound care,  vancomycin, and zosyn with improved leukocytosis and stable vital signs. You had a vancomycin related acute kidney injury, so you were switched to clindamycin. Please continue IV clindamycin for an additional ___ days for a total of 14 days of antibiotics.  Please would followup closely with Dr. Marquez for wound care and your outpatient primary care provider.  Secondary Diagnosis:	CAROL (acute kidney injury)  Goal:	Resolved  Assessment and plan of treatment:	During your hospitalization, you were found to have an acute kidney injury likely related to receiving vancomycin. You were switched to Clindamycin and your creatinine levels improved. Please follow up with your outpatient primary care provider.  Secondary Diagnosis:	Sepsis due to cellulitis  Goal:	Resolved  Assessment and plan of treatment:	You were found to have a fever and a high heart rate, meeting SIRS criteria. You were given broad spectrum antibiotics. Your symptoms resolved and your blood and wound cultures were found to be negative. Please follow up with your outpatient primary care provider  Secondary Diagnosis:	Essential hypertension  Goal:	Ongoing management  Assessment and plan of treatment:	Your blood pressure medications were initially held in the setting of sepsis but they were restarted once your symptoms resolved. Please continue your home doses of lasix and losartan as prescribed. Please follow up with your outpatient primary care physician. Principal Discharge DX:	Cellulitis and abscess of right lower extremity  Goal:	Ongoing management  Assessment and plan of treatment:	You were admitted for a worsening surgical site wound on your right lower extremity. You were treated wound care,  vancomycin, and zosyn with improved leukocytosis and stable vital signs. You had a vancomycin related acute kidney injury, so you were switched to clindamycin. Please continue IV clindamycin for an additional 6 days for a total of 14 days of antibiotics (STOP: July 6th, 2018). Please continue wound care as indicated below. Please followup closely with Dr. Marquez for wound care and your outpatient primary care provider.  Secondary Diagnosis:	CAROL (acute kidney injury)  Goal:	Resolved  Assessment and plan of treatment:	During your hospitalization, you were found to have an acute kidney injury related to receiving vancomycin. You were switched to Clindamycin and your creatinine levels improved. Please follow up with your outpatient primary care provider.  Secondary Diagnosis:	Sepsis due to cellulitis  Goal:	Resolved  Assessment and plan of treatment:	You were found to have a fever and a high heart rate, meeting SIRS criteria. You were given broad spectrum antibiotics. Your symptoms resolved and your blood and wound cultures were found to be negative. Please follow up with your outpatient primary care provider  Secondary Diagnosis:	Essential hypertension  Goal:	Ongoing management  Assessment and plan of treatment:	Your blood pressure medications were initially held in the setting of sepsis but they were restarted once your symptoms resolved. Please continue your home doses of lasix and losartan as prescribed. Please follow up with your outpatient primary care physician. Principal Discharge DX:	Cellulitis and abscess of right lower extremity  Goal:	Ongoing management  Assessment and plan of treatment:	You were admitted for a worsening surgical site wound on your right lower extremity. You were treated wound care,  vancomycin, and zosyn with improved leukocytosis and stable vital signs. You had a vancomycin related acute kidney injury, so you were switched to clindamycin. Please continue IV clindamycin for an additional 6 days for a total of 14 days of antibiotics (STOP: July 6th, 2018). Please continue wound care as indicated below. Please followup closely with Dr. Marquez for wound care and your outpatient primary care provider.  Secondary Diagnosis:	CAROL (acute kidney injury)  Goal:	Resolved  Assessment and plan of treatment:	During your hospitalization, you were found to have an acute kidney injury related to receiving vancomycin. You were switched to Clindamycin and your creatinine levels improved. Please avoid NSAIDs if possible. Please follow up with your outpatient primary care provider.  Secondary Diagnosis:	Sepsis due to cellulitis  Goal:	Resolved  Assessment and plan of treatment:	You were found to have a fever and a high heart rate, meeting SIRS criteria. You were given broad spectrum antibiotics. Your symptoms resolved and your blood and wound cultures were found to be negative. Please follow up with your outpatient primary care provider  Secondary Diagnosis:	Essential hypertension  Goal:	Ongoing management  Assessment and plan of treatment:	Your blood pressure medications were initially held in the setting of sepsis but they were restarted once your symptoms resolved. Please continue your home doses of lasix and losartan as prescribed. Please follow up with your outpatient primary care physician. Principal Discharge DX:	Cellulitis and abscess of right lower extremity  Goal:	Ongoing management  Assessment and plan of treatment:	You were admitted for a worsening surgical site wound on your right lower extremity. You were treated wound care,  vancomycin, and zosyn with improved leukocytosis and stable vital signs. You had a vancomycin related acute kidney injury, so you were switched to clindamycin. Please continue IV clindamycin for an additional 6 days for a total of 14 days of antibiotics (STOP: July 6th, 2018). Please continue wound care as indicated below. Please followup closely with Dr. Marquez for wound care and your outpatient primary care provider.  Secondary Diagnosis:	CAROL (acute kidney injury)  Goal:	improved  Assessment and plan of treatment:	During your hospitalization, you were found to have an acute kidney injury related to receiving vancomycin. You were switched to Clindamycin and your creatinine levels improved with fluids. Please avoid NSAIDs: no motrin, ibuprofen, aleve, advil. Please follow up with your outpatient primary care provider.  Secondary Diagnosis:	Essential hypertension  Goal:	Resolved  Assessment and plan of treatment:	stop losartan and lasix given your elevated kidney function  followup with your PMD within 1 week to recheck your BP and kidney function

## 2018-06-24 NOTE — DISCHARGE NOTE ADULT - PATIENT PORTAL LINK FT
You can access the [facility] Patient Portal, offered by , by registering with the following website: [email or web address]

## 2018-06-24 NOTE — DISCHARGE NOTE ADULT - ADDITIONAL INSTRUCTIONS
Topical Recommendations     Right proximal and distal lower leg: Clean with Saf-Clens. Rinse with NS. Pat dry. Apply Liquid barrier film to periwound skin. Lighly pack depth with Aquacel AG Ribbon, Cover with foam with borders. Change daily or PRN.     Right medial thigh: Clean with Saf-Clens. Rinse with NS. Pat dry. Apply Liquid barrier film to periwound skin. Apply Medihoney gel to wound bed. Cover with foam with borders. Change daily.     Continue low air loss bed therapy, continue heel elevation with Z-flex fluidized positioning boots while in bed,  continue moisture management with barrier creams & single breathable pad, continue measures to decrease friction/shear/pressure. Continue with nutritional support as per dietary/orders.  Plan discussed with patient. Patient educated on topical wound  therapy and on dietary recommendations (high protein, low sugar diet) to optimize wound healing. Questions answers.     Patient  on the importance of weight shift every hour  while in the bed or chair.

## 2018-06-24 NOTE — DISCHARGE NOTE ADULT - CARE PROVIDER_API CALL
Estephania Marquez), Surgery  [address] [facility]  [address] [place] [address]  Phone: [phone]  Fax: [phone] Estephania Marquez), Surgery  [address] [facility]  [address]  [place] [address]  Phone: [phone]  Fax: [phone]    Vee Baker), Internal Medicine  [address] [facility]  [address] [place] [address]  Phone: [phone]  Fax: [phone]

## 2018-06-24 NOTE — PROGRESS NOTE ADULT - ASSESSMENT
74 yo AA with F PMHx of HTN, chronic lymphedema with RLE wound. now p/w worsening of her RLE wound with purulent discharge a/w foul odor, pain, and swelling 72 yo AA with F PMHx of HTN, chronic lymphedema with RLE wound. now p/w worsening of her RLE wound with purulent discharge a/w foul odor, pain, and swelling w/ fluid collection in R medial knee on CT and currently on Zosyn/Vanco

## 2018-06-25 DIAGNOSIS — R74.0 NONSPECIFIC ELEVATION OF LEVELS OF TRANSAMINASE AND LACTIC ACID DEHYDROGENASE [LDH]: ICD-10-CM

## 2018-06-25 DIAGNOSIS — L03.115 CELLULITIS OF RIGHT LOWER LIMB: ICD-10-CM

## 2018-06-25 DIAGNOSIS — L03.90 CELLULITIS, UNSPECIFIED: ICD-10-CM

## 2018-06-25 LAB
ALBUMIN SERPL ELPH-MCNC: 2.8 G/DL — LOW (ref 3.3–5)
ALP SERPL-CCNC: 185 U/L — HIGH (ref 40–120)
ALT FLD-CCNC: 38 U/L — HIGH (ref 4–33)
AST SERPL-CCNC: 33 U/L — HIGH (ref 4–32)
BASOPHILS # BLD AUTO: 0.05 K/UL — SIGNIFICANT CHANGE UP (ref 0–0.2)
BASOPHILS NFR BLD AUTO: 0.3 % — SIGNIFICANT CHANGE UP (ref 0–2)
BILIRUB SERPL-MCNC: 0.6 MG/DL — SIGNIFICANT CHANGE UP (ref 0.2–1.2)
BUN SERPL-MCNC: 10 MG/DL — SIGNIFICANT CHANGE UP (ref 7–23)
CALCIUM SERPL-MCNC: 8.3 MG/DL — LOW (ref 8.4–10.5)
CHLORIDE SERPL-SCNC: 98 MMOL/L — SIGNIFICANT CHANGE UP (ref 98–107)
CO2 SERPL-SCNC: 24 MMOL/L — SIGNIFICANT CHANGE UP (ref 22–31)
CREAT SERPL-MCNC: 0.78 MG/DL — SIGNIFICANT CHANGE UP (ref 0.5–1.3)
EOSINOPHIL # BLD AUTO: 0.49 K/UL — SIGNIFICANT CHANGE UP (ref 0–0.5)
EOSINOPHIL NFR BLD AUTO: 3.2 % — SIGNIFICANT CHANGE UP (ref 0–6)
GLUCOSE SERPL-MCNC: 127 MG/DL — HIGH (ref 70–99)
HCT VFR BLD CALC: 28 % — LOW (ref 34.5–45)
HGB BLD-MCNC: 9 G/DL — LOW (ref 11.5–15.5)
IMM GRANULOCYTES # BLD AUTO: 0.27 # — SIGNIFICANT CHANGE UP
IMM GRANULOCYTES NFR BLD AUTO: 1.8 % — HIGH (ref 0–1.5)
LYMPHOCYTES # BLD AUTO: 1.96 K/UL — SIGNIFICANT CHANGE UP (ref 1–3.3)
LYMPHOCYTES # BLD AUTO: 13 % — SIGNIFICANT CHANGE UP (ref 13–44)
MAGNESIUM SERPL-MCNC: 2.1 MG/DL — SIGNIFICANT CHANGE UP (ref 1.6–2.6)
MCHC RBC-ENTMCNC: 26.9 PG — LOW (ref 27–34)
MCHC RBC-ENTMCNC: 32.1 % — SIGNIFICANT CHANGE UP (ref 32–36)
MCV RBC AUTO: 83.6 FL — SIGNIFICANT CHANGE UP (ref 80–100)
MONOCYTES # BLD AUTO: 1.27 K/UL — HIGH (ref 0–0.9)
MONOCYTES NFR BLD AUTO: 8.4 % — SIGNIFICANT CHANGE UP (ref 2–14)
NEUTROPHILS # BLD AUTO: 11.06 K/UL — HIGH (ref 1.8–7.4)
NEUTROPHILS NFR BLD AUTO: 73.3 % — SIGNIFICANT CHANGE UP (ref 43–77)
NRBC # FLD: 0 — SIGNIFICANT CHANGE UP
PHOSPHATE SERPL-MCNC: 3.8 MG/DL — SIGNIFICANT CHANGE UP (ref 2.5–4.5)
PLATELET # BLD AUTO: 395 K/UL — SIGNIFICANT CHANGE UP (ref 150–400)
PMV BLD: 9.9 FL — SIGNIFICANT CHANGE UP (ref 7–13)
POTASSIUM SERPL-MCNC: 4.1 MMOL/L — SIGNIFICANT CHANGE UP (ref 3.5–5.3)
POTASSIUM SERPL-SCNC: 4.1 MMOL/L — SIGNIFICANT CHANGE UP (ref 3.5–5.3)
PROT SERPL-MCNC: 7.8 G/DL — SIGNIFICANT CHANGE UP (ref 6–8.3)
RBC # BLD: 3.35 M/UL — LOW (ref 3.8–5.2)
RBC # FLD: 15.8 % — HIGH (ref 10.3–14.5)
SODIUM SERPL-SCNC: 134 MMOL/L — LOW (ref 135–145)
WBC # BLD: 15.1 K/UL — HIGH (ref 3.8–10.5)
WBC # FLD AUTO: 15.1 K/UL — HIGH (ref 3.8–10.5)

## 2018-06-25 PROCEDURE — 99233 SBSQ HOSP IP/OBS HIGH 50: CPT | Mod: GC

## 2018-06-25 RX ADMIN — PIPERACILLIN AND TAZOBACTAM 25 GRAM(S): 4; .5 INJECTION, POWDER, LYOPHILIZED, FOR SOLUTION INTRAVENOUS at 16:16

## 2018-06-25 RX ADMIN — Medication 20 MILLIGRAM(S): at 06:02

## 2018-06-25 RX ADMIN — PIPERACILLIN AND TAZOBACTAM 25 GRAM(S): 4; .5 INJECTION, POWDER, LYOPHILIZED, FOR SOLUTION INTRAVENOUS at 08:26

## 2018-06-25 RX ADMIN — OXYCODONE AND ACETAMINOPHEN 1 TABLET(S): 5; 325 TABLET ORAL at 06:02

## 2018-06-25 RX ADMIN — ENOXAPARIN SODIUM 40 MILLIGRAM(S): 100 INJECTION SUBCUTANEOUS at 13:07

## 2018-06-25 RX ADMIN — Medication 166.67 MILLIGRAM(S): at 17:28

## 2018-06-25 RX ADMIN — LOSARTAN POTASSIUM 25 MILLIGRAM(S): 100 TABLET, FILM COATED ORAL at 06:02

## 2018-06-25 RX ADMIN — OXYCODONE AND ACETAMINOPHEN 1 TABLET(S): 5; 325 TABLET ORAL at 18:28

## 2018-06-25 RX ADMIN — OXYCODONE AND ACETAMINOPHEN 1 TABLET(S): 5; 325 TABLET ORAL at 17:28

## 2018-06-25 RX ADMIN — OXYCODONE AND ACETAMINOPHEN 1 TABLET(S): 5; 325 TABLET ORAL at 06:45

## 2018-06-25 RX ADMIN — Medication 166.67 MILLIGRAM(S): at 06:02

## 2018-06-25 RX ADMIN — PIPERACILLIN AND TAZOBACTAM 25 GRAM(S): 4; .5 INJECTION, POWDER, LYOPHILIZED, FOR SOLUTION INTRAVENOUS at 22:43

## 2018-06-25 NOTE — PROGRESS NOTE ADULT - PROBLEM SELECTOR PLAN 2
Continue current regimen and medications. -Patient's abscess appears to be clinically improving as there is decreased erythema and swelling.   -Today is day 4 of Vancomycin 1.25 mg and zosyn 3.375 mg   -WBC is 15 which is trending down from __  -Consult wound care

## 2018-06-25 NOTE — PROGRESS NOTE ADULT - PROBLEM SELECTOR PROBLEM 1
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

## 2018-06-25 NOTE — PROGRESS NOTE ADULT - ASSESSMENT
Patient is a 73 year old female with a history of HTN and chronic lymphedema s/p debridement on __ who presented with increased swelling, redness, and purulent discharge from her right medial knee wound secondary to cellulitis with abscess. Patient is a 73 year old female with a history of HTN and chronic lymphedema with right medial knee wound who presented with increased swelling, redness, and purulent discharge from her wound secondary to cellulitis with abscess.

## 2018-06-25 NOTE — PHYSICAL THERAPY INITIAL EVALUATION ADULT - GENERAL OBSERVATIONS, REHAB EVAL
Patient received seated at the EOB , (+) IV fluids, (+) RLE swelling ,(+) Lymphedema , morbidly obese, (+) dressing on RLE.

## 2018-06-25 NOTE — PROGRESS NOTE ADULT - PROBLEM SELECTOR PLAN 6
-Patient on DVT prophylaxis: lovenox 40 mg SQ q24h  Disposition: Patient had come from home. Pending PT evaluation.

## 2018-06-25 NOTE — PROGRESS NOTE ADULT - PROBLEM SELECTOR PLAN 3
Continue current regimen and medications. -Blood pressure is controlled at 116/65  -Continue furosemide 20 mg PO daily, losartan 25 mg PO daily  -Currently on DASH diet

## 2018-06-25 NOTE — PROGRESS NOTE ADULT - ATTENDING COMMENTS
Patient was seen and examined personally by me. I have discussed the plan and reviewed the resident's note and agree with the above physical exam findings including assessment and plan except as indicated below.    Pending wound care consult  Continue IV antibiotics, WBC downtrending, Bcx NGTD  PT: home with no needs

## 2018-06-25 NOTE — PHYSICAL THERAPY INITIAL EVALUATION ADULT - ACTIVE RANGE OF MOTION EXAMINATION, REHAB EVAL
except L shoulder active assisted flexion 0-80 degrees/bilateral upper extremity Active ROM was WFL (within functional limits)/bilateral  lower extremity Active ROM was WFL (within functional limits)

## 2018-06-25 NOTE — PROGRESS NOTE ADULT - PROBLEM SELECTOR PLAN 4
Continue current regimen and medications. -DASH/TLC diet. Restricting sodium and cholesterol stable, likely elevated due to hepatic steatosis

## 2018-06-25 NOTE — PHYSICAL THERAPY INITIAL EVALUATION ADULT - DISCHARGE DISPOSITION, PT EVAL
home/no skilled PT needs/pt refused PT intervention at home, almost functioning at baseline now as per patient.

## 2018-06-25 NOTE — PROGRESS NOTE ADULT - PROBLEM SELECTOR PLAN 5
-Patient on DVT prophylaxis: lovenox 40 mg SQ q24h  Disposition: Patient had come from home. Pending PT evaluation. -DASH/TLC diet. Restricting sodium and cholesterol

## 2018-06-25 NOTE — PHYSICAL THERAPY INITIAL EVALUATION ADULT - PERTINENT HX OF CURRENT PROBLEM, REHAB EVAL
This is a 74 yo AA with F PMHx of HTN, chronic lymphedema with RLE wound. now p/w worsening of her RLE wound with purulent discharge a/w foul odor, pain, and swelling

## 2018-06-25 NOTE — PROGRESS NOTE ADULT - PROBLEM SELECTOR PLAN 1
excision of right thigh mass -Patient met SIRS criteria on __  -Currently no longer septic -Patient met SIRS criteria on at admission  -Currently no longer septic. Afebrile, downtrending leukocytosis, and normal heart rate and respiratory rate.   -Tylenol PRN and oxycodone PRN for fever and pain

## 2018-06-26 DIAGNOSIS — I10 ESSENTIAL (PRIMARY) HYPERTENSION: ICD-10-CM

## 2018-06-26 DIAGNOSIS — I89.0 LYMPHEDEMA, NOT ELSEWHERE CLASSIFIED: ICD-10-CM

## 2018-06-26 DIAGNOSIS — Z79.899 OTHER LONG TERM (CURRENT) DRUG THERAPY: ICD-10-CM

## 2018-06-26 DIAGNOSIS — L97.112 NON-PRESSURE CHRONIC ULCER OF RIGHT THIGH WITH FAT LAYER EXPOSED: ICD-10-CM

## 2018-06-26 DIAGNOSIS — L97.115: ICD-10-CM

## 2018-06-26 DIAGNOSIS — L03.115 CELLULITIS OF RIGHT LOWER LIMB: ICD-10-CM

## 2018-06-26 LAB
BASOPHILS # BLD AUTO: 0.04 K/UL — SIGNIFICANT CHANGE UP (ref 0–0.2)
BASOPHILS NFR BLD AUTO: 0.3 % — SIGNIFICANT CHANGE UP (ref 0–2)
BUN SERPL-MCNC: 11 MG/DL — SIGNIFICANT CHANGE UP (ref 7–23)
CALCIUM SERPL-MCNC: 8 MG/DL — LOW (ref 8.4–10.5)
CHLORIDE SERPL-SCNC: 98 MMOL/L — SIGNIFICANT CHANGE UP (ref 98–107)
CO2 SERPL-SCNC: 25 MMOL/L — SIGNIFICANT CHANGE UP (ref 22–31)
CREAT SERPL-MCNC: 0.9 MG/DL — SIGNIFICANT CHANGE UP (ref 0.5–1.3)
EOSINOPHIL # BLD AUTO: 0.66 K/UL — HIGH (ref 0–0.5)
EOSINOPHIL NFR BLD AUTO: 4.8 % — SIGNIFICANT CHANGE UP (ref 0–6)
GLUCOSE SERPL-MCNC: 110 MG/DL — HIGH (ref 70–99)
HCT VFR BLD CALC: 26.5 % — LOW (ref 34.5–45)
HGB BLD-MCNC: 8.4 G/DL — LOW (ref 11.5–15.5)
IMM GRANULOCYTES # BLD AUTO: 0.22 # — SIGNIFICANT CHANGE UP
IMM GRANULOCYTES NFR BLD AUTO: 1.6 % — HIGH (ref 0–1.5)
LYMPHOCYTES # BLD AUTO: 1.8 K/UL — SIGNIFICANT CHANGE UP (ref 1–3.3)
LYMPHOCYTES # BLD AUTO: 13 % — SIGNIFICANT CHANGE UP (ref 13–44)
MAGNESIUM SERPL-MCNC: 2.1 MG/DL — SIGNIFICANT CHANGE UP (ref 1.6–2.6)
MCHC RBC-ENTMCNC: 26.5 PG — LOW (ref 27–34)
MCHC RBC-ENTMCNC: 31.7 % — LOW (ref 32–36)
MCV RBC AUTO: 83.6 FL — SIGNIFICANT CHANGE UP (ref 80–100)
MONOCYTES # BLD AUTO: 1.26 K/UL — HIGH (ref 0–0.9)
MONOCYTES NFR BLD AUTO: 9.1 % — SIGNIFICANT CHANGE UP (ref 2–14)
NEUTROPHILS # BLD AUTO: 9.82 K/UL — HIGH (ref 1.8–7.4)
NEUTROPHILS NFR BLD AUTO: 71.2 % — SIGNIFICANT CHANGE UP (ref 43–77)
NRBC # FLD: 0 — SIGNIFICANT CHANGE UP
PHOSPHATE SERPL-MCNC: 3.8 MG/DL — SIGNIFICANT CHANGE UP (ref 2.5–4.5)
PLATELET # BLD AUTO: 411 K/UL — HIGH (ref 150–400)
PMV BLD: 9.6 FL — SIGNIFICANT CHANGE UP (ref 7–13)
POTASSIUM SERPL-MCNC: 3.6 MMOL/L — SIGNIFICANT CHANGE UP (ref 3.5–5.3)
POTASSIUM SERPL-SCNC: 3.6 MMOL/L — SIGNIFICANT CHANGE UP (ref 3.5–5.3)
RBC # BLD: 3.17 M/UL — LOW (ref 3.8–5.2)
RBC # FLD: 15.8 % — HIGH (ref 10.3–14.5)
SODIUM SERPL-SCNC: 133 MMOL/L — LOW (ref 135–145)
VANCOMYCIN TROUGH SERPL-MCNC: 14.1 UG/ML — SIGNIFICANT CHANGE UP (ref 10–20)
WBC # BLD: 13.8 K/UL — HIGH (ref 3.8–10.5)
WBC # FLD AUTO: 13.8 K/UL — HIGH (ref 3.8–10.5)

## 2018-06-26 PROCEDURE — 99223 1ST HOSP IP/OBS HIGH 75: CPT

## 2018-06-26 PROCEDURE — 99233 SBSQ HOSP IP/OBS HIGH 50: CPT | Mod: GC

## 2018-06-26 RX ADMIN — ENOXAPARIN SODIUM 40 MILLIGRAM(S): 100 INJECTION SUBCUTANEOUS at 13:53

## 2018-06-26 RX ADMIN — Medication 166.67 MILLIGRAM(S): at 08:09

## 2018-06-26 RX ADMIN — Medication 166.67 MILLIGRAM(S): at 18:21

## 2018-06-26 RX ADMIN — OXYCODONE AND ACETAMINOPHEN 1 TABLET(S): 5; 325 TABLET ORAL at 08:39

## 2018-06-26 RX ADMIN — OXYCODONE AND ACETAMINOPHEN 1 TABLET(S): 5; 325 TABLET ORAL at 08:09

## 2018-06-26 RX ADMIN — OXYCODONE AND ACETAMINOPHEN 1 TABLET(S): 5; 325 TABLET ORAL at 23:21

## 2018-06-26 RX ADMIN — OXYCODONE AND ACETAMINOPHEN 1 TABLET(S): 5; 325 TABLET ORAL at 15:17

## 2018-06-26 RX ADMIN — OXYCODONE AND ACETAMINOPHEN 1 TABLET(S): 5; 325 TABLET ORAL at 15:32

## 2018-06-26 RX ADMIN — PIPERACILLIN AND TAZOBACTAM 25 GRAM(S): 4; .5 INJECTION, POWDER, LYOPHILIZED, FOR SOLUTION INTRAVENOUS at 13:51

## 2018-06-26 RX ADMIN — LOSARTAN POTASSIUM 25 MILLIGRAM(S): 100 TABLET, FILM COATED ORAL at 05:00

## 2018-06-26 RX ADMIN — Medication 650 MILLIGRAM(S): at 04:55

## 2018-06-26 RX ADMIN — Medication 650 MILLIGRAM(S): at 05:25

## 2018-06-26 RX ADMIN — Medication 20 MILLIGRAM(S): at 08:09

## 2018-06-26 RX ADMIN — OXYCODONE AND ACETAMINOPHEN 1 TABLET(S): 5; 325 TABLET ORAL at 23:51

## 2018-06-26 RX ADMIN — PIPERACILLIN AND TAZOBACTAM 25 GRAM(S): 4; .5 INJECTION, POWDER, LYOPHILIZED, FOR SOLUTION INTRAVENOUS at 04:54

## 2018-06-26 RX ADMIN — PIPERACILLIN AND TAZOBACTAM 25 GRAM(S): 4; .5 INJECTION, POWDER, LYOPHILIZED, FOR SOLUTION INTRAVENOUS at 22:08

## 2018-06-26 NOTE — PROGRESS NOTE ADULT - ASSESSMENT
Patient is a 73 year old female with a history of HTN and chronic lymphedema with right medial knee wound who presented with increased swelling, redness, and purulent discharge from her wound secondary to cellulitis with abscess.

## 2018-06-26 NOTE — PROGRESS NOTE ADULT - ATTENDING COMMENTS
Patient was seen and examined personally by me. I have discussed the plan and reviewed the resident's note and agree with the above physical exam findings including assessment and plan except as indicated below.    Appreciate wound consult-recommended vascular sx consult  Wound MD, Dr. Butts also consulted  Continue IV antibiotics, WBC downtrending, Bcx NGTD.  Will likely need prolonged course of Abx given rim enhancing fluid collection seen on CT leg  PT: home with no needs

## 2018-06-26 NOTE — PROGRESS NOTE ADULT - SUBJECTIVE AND OBJECTIVE BOX
CHIEF COMPLAINT: Increased swelling and pain of right medial knee    Interval Events:  No acute events overnight. Reports 0/10 leg pain. Denies fever/chills, N/V.     REVIEW OF SYSTEMS:  Constitutional: [x] negative [ ] fevers [ ] chills [ ] weight loss [ ] weight gain  HEENT: [ ] negative [ ] dry eyes [ ] eye irritation [ ] postnasal drip [ ] nasal congestion  CV: [x] negative  [ ] chest pain [ ] orthopnea [ ] palpitations [ ] murmur  Resp: [x] negative [ ] cough [ ] shortness of breath [ ] dyspnea [ ] wheezing [ ] sputum [ ] hemoptysis  GI: [ ] negative [ ] nausea [ ] vomiting [ ] diarrhea [ ] constipation [ ] abd pain [ ] dysphagia   : [ ] negative [ ] dysuria [ ] nocturia [ ] hematuria [ ] increased urinary frequency  Musculoskeletal: [ ] negative [ ] back pain [ ] myalgias [ ] arthralgias [ ] fracture  Skin: [ ] negative [ ] rash [ ] itch [x] lesion  Neurological: [ ] negative [ ] headache [ ] dizziness [ ] syncope [ ] weakness [ ] numbness  Psychiatric: [x] negative [ ] anxiety [ ] depression  Endocrine: [ ] negative [ ] diabetes [ ] thyroid problem  Hematologic/Lymphatic: [ ] negative [ ] anemia [ ] bleeding problem  Allergic/Immunologic: [ ] negative [ ] itchy eyes [ ] nasal discharge [ ] hives [ ] angioedema  [ ] All other systems negative  [ ] Unable to assess ROS because ________    OBJECTIVE:  ICU Vital Signs Last 24 Hrs  T(C): 37.2 (26 Jun 2018 04:50), Max: 37.7 (25 Jun 2018 22:10)  T(F): 99 (26 Jun 2018 04:50), Max: 99.8 (25 Jun 2018 22:10)  HR: 85 (26 Jun 2018 04:50) (83 - 85)  BP: 115/60 (26 Jun 2018 04:50) (115/60 - 139/74)  RR: 18 (26 Jun 2018 04:50) (18 - 18)  SpO2: 100% (26 Jun 2018 04:50) (98% - 100%)        06-25 @ 07:01  -  06-26 @ 07:00  --------------------------------------------------------  IN: 550 mL / OUT: 0 mL / NET: 550 mL        PHYSICAL EXAM:  GENERAL: NAD, well-developed  HEAD:  Atraumatic, Normocephalic  EYES: EOMI, PERRLA, conjunctiva and sclera clear  NECK: Supple, No JVD  CHEST/LUNG: Clear to auscultation bilaterally; No wheeze  HEART: Regular rate and rhythm; No murmurs, rubs, or gallops  ABDOMEN: Soft, Nontender, Nondistended; Bowel sounds present  EXTREMITIES: Medial aspect of right knee is bandaged and appears swollen compared to the left leg. Patient had lesion that was bleeding despite being packed. Tenderness upon palpation of wound.   PSYCH: AAOx3. Pleasant. Appropriate mood and affect.   NEUROLOGY: CN grossly intact non-focal      HOSPITAL MEDICATIONS:  enoxaparin Injectable 40 milliGRAM(s) SubCutaneous every 24 hours    piperacillin/tazobactam IVPB. 3.375 Gram(s) IV Intermittent every 8 hours  vancomycin  IVPB 1250 milliGRAM(s) IV Intermittent every 12 hours    furosemide    Tablet 20 milliGRAM(s) Oral daily  losartan 25 milliGRAM(s) Oral daily        acetaminophen   Tablet 650 milliGRAM(s) Oral every 6 hours PRN  acetaminophen   Tablet. 650 milliGRAM(s) Oral every 6 hours PRN  oxyCODONE    5 mG/acetaminophen 325 mG 1 Tablet(s) Oral every 6 hours PRN          sodium chloride 0.9%. 1000 milliLiter(s) IV Continuous <Continuous>            LABS:                        8.4    13.80 )-----------( 411      ( 26 Jun 2018 05:12 )             26.5     Hgb Trend: 8.4<--, 9.0<--, 8.8<--, 9.3<--  06-26    133<L>  |  98  |  11  ----------------------------<  110<H>  3.6   |  25  |  0.90    Ca    8.0<L>      26 Jun 2018 05:12  Phos  3.8     06-26  Mg     2.1     06-26    TPro  7.8  /  Alb  2.8<L>  /  TBili  0.6  /  DBili  x   /  AST  33<H>  /  ALT  38<H>  /  AlkPhos  185<H>  06-25    Creatinine Trend: 0.90<--, 0.78<--, 0.75<--, 0.70<-- CHIEF COMPLAINT: Increased swelling and pain of right medial knee    Interval Events:  No acute events overnight. Reports 0/10 leg pain. Denies fever/chills, N/V.     REVIEW OF SYSTEMS:  Constitutional: [x] negative [ ] fevers [ ] chills [ ] weight loss [ ] weight gain  HEENT: [ ] negative [ ] dry eyes [ ] eye irritation [ ] postnasal drip [ ] nasal congestion  CV: [x] negative  [ ] chest pain [ ] orthopnea [ ] palpitations [ ] murmur  Resp: [x] negative [ ] cough [ ] shortness of breath [ ] dyspnea [ ] wheezing [ ] sputum [ ] hemoptysis  GI: [ ] negative [ ] nausea [ ] vomiting [ ] diarrhea [ ] constipation [ ] abd pain [ ] dysphagia   : [ ] negative [ ] dysuria [ ] nocturia [ ] hematuria [ ] increased urinary frequency  Musculoskeletal: [ ] negative [ ] back pain [ ] myalgias [ ] arthralgias [ ] fracture  Skin: [ ] negative [ ] rash [ ] itch [x] lesion  Neurological: [ ] negative [ ] headache [ ] dizziness [ ] syncope [ ] weakness [ ] numbness  Psychiatric: [x] negative [ ] anxiety [ ] depression  Endocrine: [ ] negative [ ] diabetes [ ] thyroid problem  Hematologic/Lymphatic: [ ] negative [ ] anemia [ ] bleeding problem  Allergic/Immunologic: [ ] negative [ ] itchy eyes [ ] nasal discharge [ ] hives [ ] angioedema  [ ] All other systems negative  [ ] Unable to assess ROS because ________    OBJECTIVE:  ICU Vital Signs Last 24 Hrs  T(C): 37.2 (26 Jun 2018 04:50), Max: 37.7 (25 Jun 2018 22:10)  T(F): 99 (26 Jun 2018 04:50), Max: 99.8 (25 Jun 2018 22:10)  HR: 85 (26 Jun 2018 04:50) (83 - 85)  BP: 115/60 (26 Jun 2018 04:50) (115/60 - 139/74)  RR: 18 (26 Jun 2018 04:50) (18 - 18)  SpO2: 100% (26 Jun 2018 04:50) (98% - 100%)        06-25 @ 07:01  -  06-26 @ 07:00  --------------------------------------------------------  IN: 550 mL / OUT: 0 mL / NET: 550 mL        PHYSICAL EXAM:  GENERAL: NAD, well-developed, obese  HEAD:  Atraumatic, Normocephalic  EYES: EOMI, PERRLA, conjunctiva and sclera clear  NECK: Supple, No JVD  CHEST/LUNG: Clear to auscultation bilaterally; No wheeze  HEART: Regular rate and rhythm; No murmurs, rubs, or gallops  ABDOMEN: Soft, Nontender, Nondistended; Bowel sounds present  EXTREMITIES: Medial aspect of right knee is bandaged and appears swollen compared to the left leg. Patient had lesion that was bleeding despite being packed. Tenderness upon palpation of wound.   PSYCH: AAOx3. Pleasant. Appropriate mood and affect.   NEUROLOGY: CN grossly intact non-focal      HOSPITAL MEDICATIONS:  enoxaparin Injectable 40 milliGRAM(s) SubCutaneous every 24 hours    piperacillin/tazobactam IVPB. 3.375 Gram(s) IV Intermittent every 8 hours  vancomycin  IVPB 1250 milliGRAM(s) IV Intermittent every 12 hours    furosemide    Tablet 20 milliGRAM(s) Oral daily  losartan 25 milliGRAM(s) Oral daily        acetaminophen   Tablet 650 milliGRAM(s) Oral every 6 hours PRN  acetaminophen   Tablet. 650 milliGRAM(s) Oral every 6 hours PRN  oxyCODONE    5 mG/acetaminophen 325 mG 1 Tablet(s) Oral every 6 hours PRN          sodium chloride 0.9%. 1000 milliLiter(s) IV Continuous <Continuous>            LABS:                        8.4    13.80 )-----------( 411      ( 26 Jun 2018 05:12 )             26.5     Hgb Trend: 8.4<--, 9.0<--, 8.8<--, 9.3<--  06-26    133<L>  |  98  |  11  ----------------------------<  110<H>  3.6   |  25  |  0.90    Ca    8.0<L>      26 Jun 2018 05:12  Phos  3.8     06-26  Mg     2.1     06-26    TPro  7.8  /  Alb  2.8<L>  /  TBili  0.6  /  DBili  x   /  AST  33<H>  /  ALT  38<H>  /  AlkPhos  185<H>  06-25    Creatinine Trend: 0.90<--, 0.78<--, 0.75<--, 0.70<--

## 2018-06-26 NOTE — CONSULT NOTE ADULT - SUBJECTIVE AND OBJECTIVE BOX
please see full consult to follow  suggest dopplers and packing changes twice a day with ace and tubi compression

## 2018-06-26 NOTE — PROGRESS NOTE ADULT - PROBLEM SELECTOR PLAN 6
-Patient on DVT prophylaxis: lovenox 40 mg SQ q24h  Disposition:  Patient's wound is profusely bleeding and must be addressed before discharge. Consulted surgery

## 2018-06-26 NOTE — PROGRESS NOTE ADULT - PROBLEM SELECTOR PLAN 3
-Blood pressure is controlled   -Continue furosemide 20 mg PO daily, losartan 25 mg PO daily  -Currently on DASH diet

## 2018-06-26 NOTE — PROGRESS NOTE ADULT - PROBLEM SELECTOR PLAN 2
-Patient's wound is profusely bleeding despite packing.   -Consult surgery   -Today is day 4 of Vancomycin 1.25 mg and zosyn 3.375 mg   -Per wound care, patient's wound is bleeding profusely. recommends surgery consult before patient is discharged.

## 2018-06-26 NOTE — PROGRESS NOTE ADULT - PROBLEM SELECTOR PLAN 1
-Patient met SIRS criteria on at admission  -Currently no longer septic. Afebrile, downtrending leukocytosis, and normal heart rate and respiratory rate. WBC is 13.8  -Tylenol PRN and oxycodone PRN for fever and pain

## 2018-06-26 NOTE — ADVANCED PRACTICE NURSE CONSULT - REASON FOR CONSULT
Patient seen on skin care rounds after wound care referral received for assessment of skin impairment and recommendations of topical management. Chart reviewed: Serum albumin 2.8g/dl, Total protein 7.8, BMI 64.8kg/m2, WBC 13.80, Cooper 14-20, patient interviewed, Patient reports that she underwent a " lymphedema procedure" in [facility] on March 2018. Report procedure was not able to be completed as she was told " there were too many vessels in the area", subsequently they stitch the surgical site and since then it never healed. She reports that she has had cellulitis in the right leg in the past, and has being treated two times in the hospital for wound infection. She reports she follows up weekly last 6/22/2018 with Dr. Platt and Dr. Douglas in [place] wound care clinic. She also endorses receiving VNS for wound care and currently packing with " a dressing in a brown bottle". Patient H/O of HTN, chronic lymphedema with RLE wound. Patient admitted with worsening right leg wound (purulent drainage and foul smell). Patient is being seeing by surgery for right leg wound (low suspicion of necrotizing fascitis as per team), PT and IM. Patient seen on skin care rounds after wound care referral received for assessment of skin impairment and recommendations of topical management. Chart reviewed: Serum albumin 2.8g/dl, Total protein 7.8, see results of CT of the right lower extremities, BMI 64.8kg/m2, WBC 13.80, Cooper 14-20, patient interviewed, Patient reports that she underwent a " lymphedema procedure" in [facility] on March 2018. Report procedure was not able to be completed as she was told " there were too many vessels in the area", subsequently they stitch the surgical site and since then it never healed. She reports that she has had cellulitis in the right leg in the past, and has being treated two times in the hospital for wound infection. She reports she follows up weekly last 6/22/2018 with Dr. Platt and Dr. Douglas in [place] wound care clinic. She also endorses receiving VNS for wound care and currently packing with " a dressing in a brown bottle". Patient H/O of HTN, chronic lymphedema with RLE wound. Patient admitted with worsening right leg wound (purulent drainage and foul smell). Patient is being seeing by surgery for right leg wound (low suspicion of necrotizing fascitis as per team), PT and IM. Patient seen on skin care rounds after wound care referral received for assessment of skin impairment and recommendations of topical management. Chart reviewed: Serum albumin 2.8g/dl, Total protein 7.8, see results of CT of the right lower extremities, BMI 64.8kg/m2, WBC 13.80, Cooper 14-20, patient interviewed, Patient reports that she underwent a " lymphedema procedure" in [facility] on March 2018. Report procedure was not able to be completed as she was told " there were too many vessels in the area", subsequently they stitch the surgical site and since then it never healed. She reports that she has had cellulitis in the right leg in the past, and has being treated two times in the hospital for wound infection. She reports she follows up weekly last 6/22/2018 with Dr. Platt and Dr. Douglas in [place] wound care clinic. She also endorses receiving VNS for wound care and currently packing with " a dressing in a brown bottle". Patient H/O of HTN, chronic lymphedema with RLE wound. Patient admitted with worsening right leg wound (purulent drainage and foul smell). Patient is being seeing by surgery for right leg wound (low suspicion of necrotizing fascitis as per team), PT and IM. Patient receiving IV antibiotics, pending wound culture results.

## 2018-06-26 NOTE — ADVANCED PRACTICE NURSE CONSULT - ASSESSMENT
A&Ox4, up and rocio in room with one person assistance, continent of urine and stool. Skin warm, dry with increased moisture in intertriginous folds, adequate skin turgor, scattered areas of hyperpigmentation and hypopigmentation, dryness in bilateral plantar feet.     risks for Moisture Associated dermatitis in bilateral breasts/pannus and groin. No suspected candidiasis. Skin intact.     Bilateral lower extremities with scattered areas of hyper and hypopigmentation. Scar in left knee. Lichenification. Dry, flaky skin. TNo temperature changes noted. Lymphedema of both lower extremities. Capillary refill <3 seconds. DP/PT pulses 2+, with biphasic doppler sounds. Patient reports legs pain upon wound cleansing.     Right medial lower leg along linear scar two open ulcers     Proximal right medial lower leg with mixed venous ulcer and surgical wound measures [id number]. No dead space. Wound bed unable to be visualized due to narrow opening and depth. Wound walls noted to be with granulation and yellow translucent fibrin film. Small serosanguinous drainage. No odor. Periwound skin with scar tissue and hypopigmentation No increased warmth, no erythema, no induration Goals of care: Monitor for tissue type changes, Lightly pack depth, manage bioburden protect periwound skin, manage exudate.      At 6 o'clock 4cm away from proximal wound distal right medial lower leg with mixed venous ulcer and surgical wound measures 1cmx0.6cmx10.5cm. No dead space. Wound bed unable to be visualized due to narrow opening and depth. Gross clot is visualized at the surface of the wound. Large sanguinous drainage. No odor.  Periwound skin with scar tissue and hypopigmentation. Induration and tenderness from 5-7 o'clock extends 3cm. No increased warmth, no erythema. Goals of care: Monitor for tissue type changes, Lightly pack depth, protect periwound skin, manage exudate, achieve hemostasis.    Right medial thigh frictional wound(Patient reports she was told it was a " bed sore" at [facility], now appearing as a healing frictional wound, patient with excess skin from lymphedema measuring 1.4yzo5xbz8.4cm (Patient reports onset was in 2016) and she reports wound heals and then reopen. Measures 1.9ugw0opf5.4cm. Irregular borders. Tissue type presents as 100% flat agranular and scattered fibrin film. Scant serosanguinous drainage. No odor. Periwound skin with hypopigmentation. Goals fo care: Decrease friction, monitor for tissue type changes, decrease/manage bioburden protect periwound skin.     Notify Intern Dr. Borrego about moderate sanguinous drainage and gross blood clot observed at distal wound and the concern for vessel damage at the site. Findings discussed with Attending. A&Ox4, up and rocio in room with one person assistance, obesity, continent of urine and stool. Skin warm, dry with increased moisture in intertriginous folds, adequate skin turgor, scattered areas of hyperpigmentation and hypopigmentation, dryness in bilateral plantar feet.     risks for Moisture Associated dermatitis in bilateral breasts/pannus and groin. No suspected candidiasis. Skin intact.     Bilateral lower extremities with scattered areas of hyper and hypopigmentation. Scar in left knee. Lichenification. Dry, flaky skin. No temperature changes noted. Lymphedema of both lower extremities. Capillary refill <3 seconds. DP/PT pulses 2+, with biphasic doppler sounds. Patient reports legs pain upon wound cleansing.     Right medial lower leg along linear scar two open ulcers     Proximal right medial lower leg with mixed venous ulcer and surgical wound measures 7sgg4ofo0.4cm. No dead space. Wound bed unable to be visualized due to narrow opening and depth. Wound walls noted to be with granulation and yellow translucent fibrin film. Small serosanguinous drainage. No odor. Periwound skin with scar tissue and hypopigmentation No increased warmth, no erythema, no induration Goals of care: Monitor for tissue type changes, Lightly pack depth, manage bioburden protect periwound skin, manage exudate.      At 6 o'clock 4cm away from proximal wound distal right medial lower leg with mixed venous ulcer and surgical wound measures 1cmx0.6cmx10.5cm. No dead space. Wound bed unable to be visualized due to narrow opening and depth. Gross clot is visualized at the surface of the wound. Large sanguinous drainage. No odor.  Periwound skin with scar tissue and hypopigmentation. Induration and tenderness from 5-7 o'clock extends 3cm. No increased warmth, no erythema. Goals of care: Monitor for tissue type changes, Lightly pack depth, protect periwound skin, manage exudate, achieve hemostasis.    Right medial thigh frictional wound(Patient reports she was told it was a " bed sore" at [facility], now appearing as a healing frictional wound, patient with excess skin from lymphedema measuring 1.8jku6afi1.4cm (Patient reports onset was in 2016) and she reports wound heals and then reopen. Measures 1.8lwp9hoy8.4cm. Irregular borders. Tissue type presents as 100% flat agranular and scattered fibrin film. Scant serosanguinous drainage. No odor. Periwound skin with hypopigmentation. Goals fo care: Decrease friction, monitor for tissue type changes, decrease/manage bioburden protect periwound skin.     Notify Intern Dr. Mitchell about moderate sanguinous drainage and gross blood clot observed at distal wound and the concern for vessel damage at the site. Findings discussed with Attending. A&Ox4, up and rocio in room with one person assistance, obesity, continent of urine and stool. Skin warm, dry with increased moisture in intertriginous folds, adequate skin turgor, scattered areas of hyperpigmentation and hypopigmentation, dryness in bilateral plantar feet.     risks for Moisture Associated dermatitis in bilateral breasts/pannus and groin. No suspected candidiasis. Skin intact.     Bilateral lower extremities with scattered areas of hyper and hypopigmentation. Scar in left knee. Lichenification. Dry, flaky skin. No temperature changes noted. Lymphedema of both lower extremities. Capillary refill <3 seconds. DP/PT pulses 2+, with biphasic doppler sounds. Patient reports legs pain upon wound cleansing.     Right medial lower leg along linear scar two open ulcers     Proximal right medial lower leg with mixed venous ulcer and surgical wound measures 1ozm9fbu3.4cm. No dead space. Wound bed unable to be visualized due to narrow opening and depth. Wound walls noted to be with granulation and yellow translucent fibrin film. Small serosanguinous drainage. No odor. Periwound skin with scar tissue and hypopigmentation No increased warmth, no erythema, no induration Goals of care: Monitor for tissue type changes, Lightly pack depth, manage bioburden protect periwound skin, manage exudate.      At 6 o'clock 4cm away from proximal wound distal right medial lower leg with mixed venous ulcer and surgical wound measures 1cmx0.6cmx10.5cm. No dead space. Wound bed unable to be visualized due to narrow opening and depth. Gross clot is visualized at the surface of the wound. Large sanguinous drainage. No odor.  Periwound skin with scar tissue and hypopigmentation. Induration and tenderness from 5-7 o'clock extends 3cm. No increased warmth, no erythema. Goals of care: Monitor for tissue type changes, Lightly pack depth, protect periwound skin, manage exudate, achieve hemostasis.    Right medial thigh frictional wound(Patient reports she was told it was a " bed sore" at [facility], now appearing as a healing frictional wound, patient with excess skin from lymphedema measuring 1.9eco7ulo3.4cm (Patient reports onset was in 2016) and she reports wound heals and then reopen. Measures 1.7aiq6rbh1.4cm. Irregular borders. Tissue type presents as 100% flat agranular and scattered fibrin film. Scant serosanguinous drainage. No odor. Periwound skin with hypopigmentation. Goals fo care: Decrease friction, monitor for tissue type changes, decrease/manage bioburden protect periwound skin.     Unable to use Hemostatic dressing (Kaltostat) due to narrow ulcer openings.    Notify Intern Dr. Mitchell about moderate sanguinous drainage and gross blood clot observed at distal wound and the concern for vessel damage at the site. Findings discussed with Attending.

## 2018-06-26 NOTE — ADVANCED PRACTICE NURSE CONSULT - RECOMMEDATIONS
Will recommend Vascular consult to evaluate right leg wounds Will recommend Vascular consult to evaluate right leg wounds and CT findings   Continue to follow up with wound care clinic     Topical Recommendations     Right proximal and distal lower leg: Clean with Saf-Clens. Rinse with NS. Pat dry. Apply Liquid barrier film to periwound skin. Lighly pack depth with Aquacel AG Ribbon, Cover with foam with borders. Change daily or PRN.     Right medial thigh: Clean with Saf-Clens. Rinse with NS. Pat dry. Apply Liquid barrier film to periwound skin. Apply Medihoney gel to wound bed. Cover with foam with borders. Change daily.     Continue low air loss bed therapy, continue heel elevation with Z-flex fluidized positioning boots while in bed,  continue moisture management with barrier creams & single breathable pad, continue measures to decrease friction/shear/pressure. Continue with nutritional support as per dietary/orders.  Plan discussed with patient. Patient educated on topical wound  therapy and on dietary recommendations (high protein, low sugar diet) to optimize wound healing. Questions answers.     Patient  on the importance of weight shift every hour  while in the bed or chair.     Please contact Wound Care Service Line if we can be of further assistance (ext [phone]). Will recommend Vascular consult to evaluate right leg wounds and CT findings   Continue to follow up with wound care clinic   Continue to Monitor for signs of bleeding at the wound site, patient receiving Lovenox     Topical Recommendations     Right proximal and distal lower leg: Clean with Saf-Clens. Rinse with NS. Pat dry. Apply Liquid barrier film to periwound skin. Lighly pack depth with Aquacel AG Ribbon, Cover with foam with borders. Change daily or PRN.     Right medial thigh: Clean with Saf-Clens. Rinse with NS. Pat dry. Apply Liquid barrier film to periwound skin. Apply Medihoney gel to wound bed. Cover with foam with borders. Change daily.     Continue low air loss bed therapy, continue heel elevation with Z-flex fluidized positioning boots while in bed,  continue moisture management with barrier creams & single breathable pad, continue measures to decrease friction/shear/pressure. Continue with nutritional support as per dietary/orders.  Plan discussed with patient. Patient educated on topical wound  therapy and on dietary recommendations (high protein, low sugar diet) to optimize wound healing. Questions answers.     Patient  on the importance of weight shift every hour  while in the bed or chair.    Please contact Wound Care Service Line if we can be of further assistance (ext [phone]).

## 2018-06-27 DIAGNOSIS — N17.9 ACUTE KIDNEY FAILURE, UNSPECIFIED: ICD-10-CM

## 2018-06-27 LAB
BASOPHILS # BLD AUTO: 0.04 K/UL — SIGNIFICANT CHANGE UP (ref 0–0.2)
BASOPHILS NFR BLD AUTO: 0.4 % — SIGNIFICANT CHANGE UP (ref 0–2)
BUN SERPL-MCNC: 16 MG/DL — SIGNIFICANT CHANGE UP (ref 7–23)
CALCIUM SERPL-MCNC: 8.4 MG/DL — SIGNIFICANT CHANGE UP (ref 8.4–10.5)
CHLORIDE SERPL-SCNC: 99 MMOL/L — SIGNIFICANT CHANGE UP (ref 98–107)
CO2 SERPL-SCNC: 23 MMOL/L — SIGNIFICANT CHANGE UP (ref 22–31)
CREAT SERPL-MCNC: 1.37 MG/DL — HIGH (ref 0.5–1.3)
EOSINOPHIL # BLD AUTO: 0.7 K/UL — HIGH (ref 0–0.5)
EOSINOPHIL NFR BLD AUTO: 6.6 % — HIGH (ref 0–6)
GLUCOSE SERPL-MCNC: 107 MG/DL — HIGH (ref 70–99)
HCT VFR BLD CALC: 26.8 % — LOW (ref 34.5–45)
HGB BLD-MCNC: 8.3 G/DL — LOW (ref 11.5–15.5)
IMM GRANULOCYTES # BLD AUTO: 0.15 # — SIGNIFICANT CHANGE UP
IMM GRANULOCYTES NFR BLD AUTO: 1.4 % — SIGNIFICANT CHANGE UP (ref 0–1.5)
LYMPHOCYTES # BLD AUTO: 1.45 K/UL — SIGNIFICANT CHANGE UP (ref 1–3.3)
LYMPHOCYTES # BLD AUTO: 13.6 % — SIGNIFICANT CHANGE UP (ref 13–44)
MCHC RBC-ENTMCNC: 26.3 PG — LOW (ref 27–34)
MCHC RBC-ENTMCNC: 31 % — LOW (ref 32–36)
MCV RBC AUTO: 85.1 FL — SIGNIFICANT CHANGE UP (ref 80–100)
MONOCYTES # BLD AUTO: 1.19 K/UL — HIGH (ref 0–0.9)
MONOCYTES NFR BLD AUTO: 11.2 % — SIGNIFICANT CHANGE UP (ref 2–14)
NEUTROPHILS # BLD AUTO: 7.11 K/UL — SIGNIFICANT CHANGE UP (ref 1.8–7.4)
NEUTROPHILS NFR BLD AUTO: 66.8 % — SIGNIFICANT CHANGE UP (ref 43–77)
NRBC # FLD: 0 — SIGNIFICANT CHANGE UP
PLATELET # BLD AUTO: 428 K/UL — HIGH (ref 150–400)
PMV BLD: 9.5 FL — SIGNIFICANT CHANGE UP (ref 7–13)
POTASSIUM SERPL-MCNC: 3.9 MMOL/L — SIGNIFICANT CHANGE UP (ref 3.5–5.3)
POTASSIUM SERPL-SCNC: 3.9 MMOL/L — SIGNIFICANT CHANGE UP (ref 3.5–5.3)
RBC # BLD: 3.15 M/UL — LOW (ref 3.8–5.2)
RBC # FLD: 15.9 % — HIGH (ref 10.3–14.5)
SODIUM SERPL-SCNC: 137 MMOL/L — SIGNIFICANT CHANGE UP (ref 135–145)
VANCOMYCIN FLD-MCNC: 24 UG/ML — SIGNIFICANT CHANGE UP
WBC # BLD: 10.64 K/UL — HIGH (ref 3.8–10.5)
WBC # FLD AUTO: 10.64 K/UL — HIGH (ref 3.8–10.5)

## 2018-06-27 PROCEDURE — 93971 EXTREMITY STUDY: CPT | Mod: 26,LT

## 2018-06-27 PROCEDURE — 99233 SBSQ HOSP IP/OBS HIGH 50: CPT | Mod: GC

## 2018-06-27 RX ORDER — SODIUM CHLORIDE 9 MG/ML
250 INJECTION INTRAMUSCULAR; INTRAVENOUS; SUBCUTANEOUS ONCE
Qty: 0 | Refills: 0 | Status: COMPLETED | OUTPATIENT
Start: 2018-06-27 | End: 2018-06-27

## 2018-06-27 RX ADMIN — OXYCODONE AND ACETAMINOPHEN 1 TABLET(S): 5; 325 TABLET ORAL at 22:44

## 2018-06-27 RX ADMIN — Medication 100 MILLIGRAM(S): at 11:11

## 2018-06-27 RX ADMIN — ENOXAPARIN SODIUM 40 MILLIGRAM(S): 100 INJECTION SUBCUTANEOUS at 13:27

## 2018-06-27 RX ADMIN — Medication 166.67 MILLIGRAM(S): at 06:47

## 2018-06-27 RX ADMIN — PIPERACILLIN AND TAZOBACTAM 25 GRAM(S): 4; .5 INJECTION, POWDER, LYOPHILIZED, FOR SOLUTION INTRAVENOUS at 14:17

## 2018-06-27 RX ADMIN — PIPERACILLIN AND TAZOBACTAM 25 GRAM(S): 4; .5 INJECTION, POWDER, LYOPHILIZED, FOR SOLUTION INTRAVENOUS at 22:53

## 2018-06-27 RX ADMIN — SODIUM CHLORIDE 1000 MILLILITER(S): 9 INJECTION INTRAMUSCULAR; INTRAVENOUS; SUBCUTANEOUS at 09:03

## 2018-06-27 RX ADMIN — OXYCODONE AND ACETAMINOPHEN 1 TABLET(S): 5; 325 TABLET ORAL at 12:10

## 2018-06-27 RX ADMIN — Medication 100 MILLIGRAM(S): at 22:08

## 2018-06-27 RX ADMIN — OXYCODONE AND ACETAMINOPHEN 1 TABLET(S): 5; 325 TABLET ORAL at 22:14

## 2018-06-27 RX ADMIN — OXYCODONE AND ACETAMINOPHEN 1 TABLET(S): 5; 325 TABLET ORAL at 11:19

## 2018-06-27 RX ADMIN — PIPERACILLIN AND TAZOBACTAM 25 GRAM(S): 4; .5 INJECTION, POWDER, LYOPHILIZED, FOR SOLUTION INTRAVENOUS at 04:46

## 2018-06-27 NOTE — PROGRESS NOTE ADULT - ATTENDING COMMENTS
Patient was seen and examined personally by me. I have discussed the plan and reviewed the resident's note and agree with the above physical exam findings including assessment and plan except as indicated below.    Appreciate wound consult, mild oozing from Right leg  B/l pedal pulses intact, will hold off vascular consult and US RLE negative for DVT  CAROL possible medication induced. DC Vanc/Zosyn, switch to Clinda, check vanco level, hold lasix and losartan  Will likely need prolonged course of Abx given rim enhancing fluid collection seen on CT leg-will likely do 10-14 days total  PT: home with no needs  Dispo pending improvement in Cr

## 2018-06-27 NOTE — PROGRESS NOTE ADULT - PROBLEM SELECTOR PLAN 2
-Patient's wound is bleeding despite packing.   -Per wound care rec, suggest doppler and packing changes BID  -Today is day 4 of Vancomycin 1.25 mg and zosyn 3.375 mg   -Per wound care, patient's wound is bleeding profusely. recommends surgery consult before patient is discharged. -Patient's wound is bleeding despite packing.   -Per wound care rec, will get dopplers of right leg today   -Today is day 4 of Vancomycin 1.25 mg and zosyn 3.375 mg   -Per wound care, patient's wound is bleeding profusely. recommends surgery consult before patient is discharged. -Patient's wound is bleeding despite packing.   -Per wound care rec, patient received duplex. Findings showed no evidence of DVT    -Today is day 4 of Vancomycin 1.25 mg and zosyn 3.375 mg   -Started clindamycin   -Per wound care, patient's wound is bleeding profusely. recommends surgery consult before patient is discharged.

## 2018-06-27 NOTE — PROGRESS NOTE ADULT - SUBJECTIVE AND OBJECTIVE BOX
CHIEF COMPLAINT: increased swelling and pain of right medial knee    Interval Events:  No acute events overnight. Denies leg pain, fever/chills, or N/V. Reports constipation, last bowel movement was last night.     REVIEW OF SYSTEMS:  Constitutional: [x] negative [ ] fevers [ ] chills [ ] weight loss [ ] weight gain  HEENT: [ ] negative [ ] dry eyes [ ] eye irritation [ ] postnasal drip [ ] nasal congestion  CV: [x] negative  [ ] chest pain [ ] orthopnea [ ] palpitations [ ] murmur  Resp: [x] negative [ ] cough [ ] shortness of breath [ ] dyspnea [ ] wheezing [ ] sputum [ ] hemoptysis  GI: [ ] negative [ ] nausea [ ] vomiting [ ] diarrhea [x] constipation [ ] abd pain [ ] dysphagia   : [ ] negative [ ] dysuria [ ] nocturia [ ] hematuria [ ] increased urinary frequency  Musculoskeletal: [ ] negative [ ] back pain [ ] myalgias [ ] arthralgias [ ] fracture  Skin: [ ] negative [ ] rash [ ] itch  Neurological: [ ] negative [ ] headache [ ] dizziness [ ] syncope [ ] weakness [ ] numbness  Psychiatric: [x] negative [ ] anxiety [ ] depression  Endocrine: [ ] negative [ ] diabetes [ ] thyroid problem  Hematologic/Lymphatic: [ ] negative [ ] anemia [ ] bleeding problem  Allergic/Immunologic: [ ] negative [ ] itchy eyes [ ] nasal discharge [ ] hives [ ] angioedema  [ ] All other systems negative  [ ] Unable to assess ROS because ________    OBJECTIVE:  ICU Vital Signs Last 24 Hrs  T(C): 37.2 (27 Jun 2018 04:37), Max: 37.2 (27 Jun 2018 04:37)  T(F): 98.9 (27 Jun 2018 04:37), Max: 98.9 (27 Jun 2018 04:37)  HR: 74 (27 Jun 2018 04:37) (74 - 79)  BP: 98/52 (27 Jun 2018 04:37) (98/52 - 119/63)  RR: 18 (27 Jun 2018 04:37) (18 - 18)  SpO2: 100% (27 Jun 2018 04:37) (100% - 100%)        06-26 @ 07:01  -  06-27 @ 07:00  --------------------------------------------------------  IN: 300 mL / OUT: 0 mL / NET: 300 mL      CAPILLARY BLOOD GLUCOSE          PHYSICAL EXAM:  General:   HEENT:   Lymph Nodes:  Neck:   Respiratory:   Cardiovascular:   Abdomen:   Extremities:   Skin:   Neurological:  Psychiatry:    LINES:    HOSPITAL MEDICATIONS:  enoxaparin Injectable 40 milliGRAM(s) SubCutaneous every 24 hours    piperacillin/tazobactam IVPB. 3.375 Gram(s) IV Intermittent every 8 hours  vancomycin  IVPB 1250 milliGRAM(s) IV Intermittent every 12 hours    furosemide    Tablet 20 milliGRAM(s) Oral daily  losartan 25 milliGRAM(s) Oral daily        acetaminophen   Tablet 650 milliGRAM(s) Oral every 6 hours PRN  acetaminophen   Tablet. 650 milliGRAM(s) Oral every 6 hours PRN  oxyCODONE    5 mG/acetaminophen 325 mG 1 Tablet(s) Oral every 6 hours PRN          sodium chloride 0.9%. 1000 milliLiter(s) IV Continuous <Continuous>            LABS:                        8.3    10.64 )-----------( 428      ( 27 Jun 2018 05:30 )             26.8     Hgb Trend: 8.3<--, 8.4<--, 9.0<--, 8.8<--, 9.3<--  06-27    137  |  99  |  16  ----------------------------<  107<H>  3.9   |  23  |  1.37<H>    Ca    8.4      27 Jun 2018 05:30  Phos  3.8     06-26  Mg     2.1     06-26      Creatinine Trend: 1.37<--, 0.90<--, 0.78<--, 0.75<--, 0.70<--            MICROBIOLOGY:     RADIOLOGY:  [ ] Reviewed and interpreted by me    EKG: CHIEF COMPLAINT: increased swelling and pain of right medial knee    Interval Events:  No acute events overnight. Denies leg pain, fever/chills, or N/V. Reports constipation, last bowel movement was last night.     REVIEW OF SYSTEMS:  Constitutional: [x] negative [ ] fevers [ ] chills [ ] weight loss [ ] weight gain  HEENT: [ ] negative [ ] dry eyes [ ] eye irritation [ ] postnasal drip [ ] nasal congestion  CV: [x] negative  [ ] chest pain [ ] orthopnea [ ] palpitations [ ] murmur  Resp: [x] negative [ ] cough [ ] shortness of breath [ ] dyspnea [ ] wheezing [ ] sputum [ ] hemoptysis  GI: [ ] negative [ ] nausea [ ] vomiting [ ] diarrhea [x] constipation [ ] abd pain [ ] dysphagia   : [ ] negative [ ] dysuria [ ] nocturia [ ] hematuria [ ] increased urinary frequency  Musculoskeletal: [ ] negative [ ] back pain [ ] myalgias [ ] arthralgias [ ] fracture  Skin: [ ] negative [ ] rash [ ] itch  Neurological: [ ] negative [ ] headache [ ] dizziness [ ] syncope [ ] weakness [ ] numbness  Psychiatric: [x] negative [ ] anxiety [ ] depression  Endocrine: [ ] negative [ ] diabetes [ ] thyroid problem  Hematologic/Lymphatic: [ ] negative [ ] anemia [ ] bleeding problem  Allergic/Immunologic: [ ] negative [ ] itchy eyes [ ] nasal discharge [ ] hives [ ] angioedema  [ ] All other systems negative  [ ] Unable to assess ROS because ________    OBJECTIVE:  ICU Vital Signs Last 24 Hrs  T(C): 37.2 (27 Jun 2018 04:37), Max: 37.2 (27 Jun 2018 04:37)  T(F): 98.9 (27 Jun 2018 04:37), Max: 98.9 (27 Jun 2018 04:37)  HR: 74 (27 Jun 2018 04:37) (74 - 79)  BP: 98/52 (27 Jun 2018 04:37) (98/52 - 119/63)  RR: 18 (27 Jun 2018 04:37) (18 - 18)  SpO2: 100% (27 Jun 2018 04:37) (100% - 100%)        06-26 @ 07:01  -  06-27 @ 07:00  --------------------------------------------------------  IN: 300 mL / OUT: 0 mL / NET: 300 mL      PHYSICAL EXAM:  GENERAL: NAD, well-developed  HEAD:  Atraumatic, Normocephalic  EYES: conjunctiva and sclera clear. Pupils round and equal  NECK: Supple, No JVD, no cervical or supraclavicular lymphadenpathy  CHEST/LUNG: Clear to auscultation bilaterally; No wheezes or rubs  HEART: Regular rate and rhythm; No murmurs, rubs, or gallops. Radial pulses 2+   ABDOMEN: Soft, Nontender, Nondistended; Bowel sounds present  EXTREMITIES:  Right lower extremity wound bleeding when pressure applied. Mildly tender upon palpation. Right lower extremity larger than left lower extremity. Pedal pulses 2+ bilaterally.   PSYCH:  AAOx3, pleasant  NEUROLOGY: non-focal, CN grossly intact      HOSPITAL MEDICATIONS:  enoxaparin Injectable 40 milliGRAM(s) SubCutaneous every 24 hours    piperacillin/tazobactam IVPB. 3.375 Gram(s) IV Intermittent every 8 hours  vancomycin  IVPB 1250 milliGRAM(s) IV Intermittent every 12 hours    furosemide    Tablet 20 milliGRAM(s) Oral daily  losartan 25 milliGRAM(s) Oral daily        acetaminophen   Tablet 650 milliGRAM(s) Oral every 6 hours PRN  acetaminophen   Tablet. 650 milliGRAM(s) Oral every 6 hours PRN  oxyCODONE    5 mG/acetaminophen 325 mG 1 Tablet(s) Oral every 6 hours PRN          sodium chloride 0.9%. 1000 milliLiter(s) IV Continuous <Continuous>            LABS:                        8.3    10.64 )-----------( 428      ( 27 Jun 2018 05:30 )             26.8     Hgb Trend: 8.3<--, 8.4<--, 9.0<--, 8.8<--, 9.3<--  06-27    137  |  99  |  16  ----------------------------<  107<H>  3.9   |  23  |  1.37<H>    Ca    8.4      27 Jun 2018 05:30  Phos  3.8     06-26  Mg     2.1     06-26      Creatinine Trend: 1.37<--, 0.90<--, 0.78<--, 0.75<--, 0.70<--            MICROBIOLOGY:     RADIOLOGY:  [ ] Reviewed and interpreted by me    EKG:

## 2018-06-27 NOTE — PROGRESS NOTE ADULT - PROBLEM SELECTOR PLAN 1
-Patient met SIRS criteria on at admission  -Currently no longer septic. Afebrile, downtrending leukocytosis, and normal heart rate and respiratory rate. WBC is 10.64  -Tylenol PRN and oxycodone PRN for fever and pain

## 2018-06-28 LAB
-  CEFTRIAXONE: SIGNIFICANT CHANGE UP
-  CLINDAMYCIN: SIGNIFICANT CHANGE UP
-  ERYTHROMYCIN: SIGNIFICANT CHANGE UP
-  PENICILLIN G: SIGNIFICANT CHANGE UP
-  VANCOMYCIN: SIGNIFICANT CHANGE UP
APPEARANCE UR: CLEAR — SIGNIFICANT CHANGE UP
BACTERIA # UR AUTO: SIGNIFICANT CHANGE UP
BACTERIA BLD CULT: SIGNIFICANT CHANGE UP
BILIRUB UR-MCNC: NEGATIVE — SIGNIFICANT CHANGE UP
BLOOD UR QL VISUAL: NEGATIVE — SIGNIFICANT CHANGE UP
BUN SERPL-MCNC: 20 MG/DL — SIGNIFICANT CHANGE UP (ref 7–23)
CALCIUM SERPL-MCNC: 8.4 MG/DL — SIGNIFICANT CHANGE UP (ref 8.4–10.5)
CHLORIDE SERPL-SCNC: 101 MMOL/L — SIGNIFICANT CHANGE UP (ref 98–107)
CO2 SERPL-SCNC: 21 MMOL/L — LOW (ref 22–31)
COLOR SPEC: SIGNIFICANT CHANGE UP
CREAT SERPL-MCNC: 1.63 MG/DL — HIGH (ref 0.5–1.3)
CULTURE RESULTS: SIGNIFICANT CHANGE UP
GLUCOSE SERPL-MCNC: 96 MG/DL — SIGNIFICANT CHANGE UP (ref 70–99)
GLUCOSE UR-MCNC: NEGATIVE — SIGNIFICANT CHANGE UP
GRAM STN SPEC: SIGNIFICANT CHANGE UP
HCT VFR BLD CALC: 26.8 % — LOW (ref 34.5–45)
HGB BLD-MCNC: 8.5 G/DL — LOW (ref 11.5–15.5)
KETONES UR-MCNC: NEGATIVE — SIGNIFICANT CHANGE UP
LEUKOCYTE ESTERASE UR-ACNC: SIGNIFICANT CHANGE UP
MCHC RBC-ENTMCNC: 25.9 PG — LOW (ref 27–34)
MCHC RBC-ENTMCNC: 31.7 % — LOW (ref 32–36)
MCV RBC AUTO: 81.7 FL — SIGNIFICANT CHANGE UP (ref 80–100)
METHOD TYPE: SIGNIFICANT CHANGE UP
NITRITE UR-MCNC: NEGATIVE — SIGNIFICANT CHANGE UP
NRBC # FLD: 0 — SIGNIFICANT CHANGE UP
ORGANISM # SPEC MICROSCOPIC CNT: SIGNIFICANT CHANGE UP
OSMOLALITY UR: 198 MOSMO/KG — SIGNIFICANT CHANGE UP (ref 50–1200)
PH UR: 6 — SIGNIFICANT CHANGE UP (ref 4.6–8)
PLATELET # BLD AUTO: 439 K/UL — HIGH (ref 150–400)
PMV BLD: 9.3 FL — SIGNIFICANT CHANGE UP (ref 7–13)
POTASSIUM SERPL-MCNC: 4.5 MMOL/L — SIGNIFICANT CHANGE UP (ref 3.5–5.3)
POTASSIUM SERPL-SCNC: 4.5 MMOL/L — SIGNIFICANT CHANGE UP (ref 3.5–5.3)
PROT UR-MCNC: 10 MG/DL — SIGNIFICANT CHANGE UP
RBC # BLD: 3.28 M/UL — LOW (ref 3.8–5.2)
RBC # FLD: 16.2 % — HIGH (ref 10.3–14.5)
RBC CASTS # UR COMP ASSIST: SIGNIFICANT CHANGE UP (ref 0–?)
SODIUM SERPL-SCNC: 138 MMOL/L — SIGNIFICANT CHANGE UP (ref 135–145)
SODIUM UR-SCNC: 30 MMOL/L — SIGNIFICANT CHANGE UP
SP GR SPEC: 1.01 — SIGNIFICANT CHANGE UP (ref 1–1.04)
SQUAMOUS # UR AUTO: SIGNIFICANT CHANGE UP
UROBILINOGEN FLD QL: NORMAL MG/DL — SIGNIFICANT CHANGE UP
WBC # BLD: 10.14 K/UL — SIGNIFICANT CHANGE UP (ref 3.8–10.5)
WBC # FLD AUTO: 10.14 K/UL — SIGNIFICANT CHANGE UP (ref 3.8–10.5)
WBC UR QL: SIGNIFICANT CHANGE UP (ref 0–?)

## 2018-06-28 PROCEDURE — 99233 SBSQ HOSP IP/OBS HIGH 50: CPT | Mod: GC

## 2018-06-28 RX ORDER — SODIUM CHLORIDE 9 MG/ML
1 INJECTION INTRAMUSCULAR; INTRAVENOUS; SUBCUTANEOUS ONCE
Qty: 0 | Refills: 0 | Status: DISCONTINUED | OUTPATIENT
Start: 2018-06-28 | End: 2018-06-28

## 2018-06-28 RX ORDER — SODIUM CHLORIDE 9 MG/ML
1000 INJECTION INTRAMUSCULAR; INTRAVENOUS; SUBCUTANEOUS ONCE
Qty: 0 | Refills: 0 | Status: COMPLETED | OUTPATIENT
Start: 2018-06-28 | End: 2018-06-28

## 2018-06-28 RX ORDER — SODIUM CHLORIDE 9 MG/ML
1000 INJECTION INTRAMUSCULAR; INTRAVENOUS; SUBCUTANEOUS
Qty: 0 | Refills: 0 | Status: DISCONTINUED | OUTPATIENT
Start: 2018-06-28 | End: 2018-06-29

## 2018-06-28 RX ADMIN — OXYCODONE AND ACETAMINOPHEN 1 TABLET(S): 5; 325 TABLET ORAL at 11:08

## 2018-06-28 RX ADMIN — OXYCODONE AND ACETAMINOPHEN 1 TABLET(S): 5; 325 TABLET ORAL at 21:45

## 2018-06-28 RX ADMIN — OXYCODONE AND ACETAMINOPHEN 1 TABLET(S): 5; 325 TABLET ORAL at 12:04

## 2018-06-28 RX ADMIN — ENOXAPARIN SODIUM 40 MILLIGRAM(S): 100 INJECTION SUBCUTANEOUS at 13:48

## 2018-06-28 RX ADMIN — Medication 100 MILLIGRAM(S): at 21:10

## 2018-06-28 RX ADMIN — Medication 100 MILLIGRAM(S): at 05:12

## 2018-06-28 RX ADMIN — Medication 650 MILLIGRAM(S): at 03:55

## 2018-06-28 RX ADMIN — PIPERACILLIN AND TAZOBACTAM 25 GRAM(S): 4; .5 INJECTION, POWDER, LYOPHILIZED, FOR SOLUTION INTRAVENOUS at 06:00

## 2018-06-28 RX ADMIN — Medication 650 MILLIGRAM(S): at 04:25

## 2018-06-28 RX ADMIN — SODIUM CHLORIDE 100 MILLILITER(S): 9 INJECTION INTRAMUSCULAR; INTRAVENOUS; SUBCUTANEOUS at 18:00

## 2018-06-28 RX ADMIN — Medication 100 MILLIGRAM(S): at 13:48

## 2018-06-28 RX ADMIN — SODIUM CHLORIDE 1000 MILLILITER(S): 9 INJECTION INTRAMUSCULAR; INTRAVENOUS; SUBCUTANEOUS at 11:25

## 2018-06-28 RX ADMIN — OXYCODONE AND ACETAMINOPHEN 1 TABLET(S): 5; 325 TABLET ORAL at 21:15

## 2018-06-28 NOTE — PROGRESS NOTE ADULT - PROBLEM SELECTOR PLAN 3
Patient's creatinine increased to 1.63 (yesterday was 1.37)   -Vancomycin level within appropriate range at 24  -Patient was given NS fluid bolus  -switched to clindamycin   -held losartan so doesn't exacerbate CAROL -Blood pressure is controlled   -Continue furosemide 20 mg PO daily, losartan 25 mg PO daily  -Currently on DASH diet

## 2018-06-28 NOTE — PROGRESS NOTE ADULT - PROBLEM SELECTOR PLAN 2
-Pending wound care recs.   -duplex findings showed no evidence of DVT    -Today is day 2 of Clindamycin wound care recs appreciated, will call vascular consult  -duplex findings showed no evidence of DVT    -Today is day 2 of Clindamycin, day 6 total Abx

## 2018-06-28 NOTE — PROGRESS NOTE ADULT - PROBLEM SELECTOR PLAN 6
-DASH/TLC diet. Restricting sodium and cholesterol -Patient on DVT prophylaxis: lovenox 40 mg SQ q24h    Disposition: patient's creatinine is uptrending.

## 2018-06-28 NOTE — PROGRESS NOTE ADULT - PROBLEM SELECTOR PLAN 7
-Patient on DVT prophylaxis: lovenox 40 mg SQ q24h    Disposition: patient's creatinine is uptrending.
-Patient on DVT prophylaxis: lovenox 40 mg SQ q24h  Disposition:  Patient's wound is profusely bleeding and must be addressed before discharge. Will obtain dopplers.

## 2018-06-28 NOTE — CONSULT NOTE ADULT - SUBJECTIVE AND OBJECTIVE BOX
[facility] Vascular Surgery Consultation     Patient is a 73y old  Female who presents with a chief complaint of RLE drainage       HPI:  74 yo AA with F PMHx of HTN, chronic lymphedema with RLE wound p/w worsening of her RLE wound with purulent discharge a/w foul odor, pain, and swelling. RLE is typically larger than left but pt states for the past several days she has had worsening swelling in the leg and purulent drainage. A visiting nurse changed the dressing there was pus and malodorous discharge when the wound was uncovered. She has been on PO Ciprofloxacin 500 mg BID for the past 3 weeks for the wound as given to her by her wound MD but there has been progressive worsening of the area. The patient denies any trauma to the area, she has not bathed in stagnant water or in a hot tube. Pt has had mild fevers and chills at home as well. Otherwise no localizing symptoms no CP/SOB/HA/Cough/Urinary retention/dysuria.  Pt at home is ambulatory, able to perform most of her ADLs, their granddaughter lives with them and helps her with cooking/cleaning.    Of note, pt was here in 2018 when she underwent a biopsy of her RLE for eval of lymphadema cause which was determined to be her morbid obesity as the biopsies showed edema and necrotic fat tissue.    In the ED, pt hemodynaimcally stable, Tmax 100.6, given vanc/zosyn and cultures sent. (2018 10:12)      PAST MEDICAL & SURGICAL HISTORY:  Other iron deficiency anemia  Morbid obesity due to excess calories  Essential hypertension  H/O total knee replacement, bilateral      FAMILY HISTORY:  No pertinent family history in first degree relatives      SOCIAL HISTORY:    MEDICATIONS  (STANDING):  clindamycin IVPB      clindamycin IVPB 600 milliGRAM(s) IV Intermittent every 8 hours  enoxaparin Injectable 40 milliGRAM(s) SubCutaneous every 24 hours  sodium chloride 0.9%. 1000 milliLiter(s) (100 mL/Hr) IV Continuous <Continuous>    MEDICATIONS  (PRN):  acetaminophen   Tablet 650 milliGRAM(s) Oral every 6 hours PRN For Temp greater than 38 C (100.4 F)  acetaminophen   Tablet. 650 milliGRAM(s) Oral every 6 hours PRN Mild Pain (1 - 3)  oxyCODONE    5 mG/acetaminophen 325 mG 1 Tablet(s) Oral every 6 hours PRN moderate-severe pain    Allergies    No Known Allergies    Intolerances        Vital Signs Last 24 Hrs  T(C): 37 (2018 13:57), Max: 37 (2018 21:43)  T(F): 98.6 (2018 13:57), Max: 98.6 (2018 21:43)  HR: 77 (2018 13:57) (70 - 77)  BP: 145/80 (2018 13:57) (105/51 - 145/80)  BP(mean): --  RR: 18 (2018 13:57) (18 - 18)  SpO2: 100% (2018 13:57) (100% - 100%)  Daily     Daily Weight in k.7 (2018 02:05)                            8.5    10.14 )-----------( 439      ( 2018 07:35 )             26.8     06-28    138  |  101  |  20  ----------------------------<  96  4.5   |  21<L>  |  1.63<H>    Ca    8.4      2018 05:38        Urinalysis Basic - ( 2018 10:26 )    Color: PLYEL / Appearance: CLEAR / S.008 / pH: 6.0  Gluc: NEGATIVE / Ketone: NEGATIVE  / Bili: NEGATIVE / Urobili: NORMAL mg/dL   Blood: NEGATIVE / Protein: 10 mg/dL / Nitrite: NEGATIVE   Leuk Esterase: TRACE / RBC: 0-2 / WBC 2-5   Sq Epi: OCC / Non Sq Epi: x / Bacteria: FEW        Radiographic Findings:       Assessment: [facility] Vascular Surgery Consultation     Patient is a 73y old  Female who presents with a chief complaint of RLE drainage       HPI:  74 yo AA with F PMHx of HTN, chronic lymphedema with RLE wound p/w worsening of her RLE wound with purulent discharge a/w foul odor, pain, and swelling. RLE is typically larger than left but pt states for the past several days she has had worsening swelling in the leg and purulent drainage. A visiting nurse changed the dressing there was pus and malodorous discharge when the wound was uncovered. She has been on PO Ciprofloxacin 500 mg BID for the past 3 weeks for the wound as given to her by her wound MD but there has been progressive worsening of the area. She is ambulatory at baseline. She has been following with a wound care doctor at [place] who recommended topical treatment. She was then referred to a plastic surgeon (Dr. Oakes) for evaluation, who recommended no surgical intervention and continue supportive care for the cellulitis in her wound in the medial aspect of RLE just below knee. She was seen by a surgeon at a hospital in [facility], attempted to perform removal of the excess tissue (?panniculectomy) but it was aborted. She had 8 stiches from that surgery. The wound has not healed. Today, the medical team called vascular surgery consult to assess the blood flow to the wound and possibly provide vascular surgery intervention.     PAST MEDICAL & SURGICAL HISTORY:  Other iron deficiency anemia  Morbid obesity due to excess calories  Essential hypertension  H/O total knee replacement, bilateral      FAMILY HISTORY:  No pertinent family history in first degree relatives      SOCIAL HISTORY: not smoker     MEDICATIONS  (STANDING):  clindamycin IVPB      clindamycin IVPB 600 milliGRAM(s) IV Intermittent every 8 hours  enoxaparin Injectable 40 milliGRAM(s) SubCutaneous every 24 hours  sodium chloride 0.9%. 1000 milliLiter(s) (100 mL/Hr) IV Continuous <Continuous>    MEDICATIONS  (PRN):  acetaminophen   Tablet 650 milliGRAM(s) Oral every 6 hours PRN For Temp greater than 38 C (100.4 F)  acetaminophen   Tablet. 650 milliGRAM(s) Oral every 6 hours PRN Mild Pain (1 - 3)  oxyCODONE    5 mG/acetaminophen 325 mG 1 Tablet(s) Oral every 6 hours PRN moderate-severe pain    Allergies    No Known Allergies    Intolerances        Vital Signs Last 24 Hrs  T(C): 37 (2018 13:57), Max: 37 (2018 21:43)  T(F): 98.6 (2018 13:57), Max: 98.6 (2018 21:43)  HR: 77 (2018 13:57) (70 - 77)  BP: 145/80 (2018 13:57) (105/51 - 145/80)  BP(mean): --  RR: 18 (2018 13:57) (18 - 18)  SpO2: 100% (2018 13:57) (100% - 100%)  Daily     Daily Weight in k.7 (2018 02:05)      Physical exam:   Gen: NAD  Resp: CTA b/l   CV: RRR  Abd: soft, obese  Ext: warm extremities, palp DP/PT, motor and sensation intact, tamayo of skin with induration in the medial right leg just below knee. No pus, no fluctuance. dressing clean. Normal exam in LLE.   No skin breakdown or ulcers in feet                         8.5    10.14 )-----------( 439      ( 2018 07:35 )             26.8     06-    138  |  101  |  20  ----------------------------<  96  4.5   |  21<L>  |  1.63<H>    Ca    8.4      2018 05:38        Urinalysis Basic - ( 2018 10:26 )    Color: PLYEL / Appearance: CLEAR / S.008 / pH: 6.0  Gluc: NEGATIVE / Ketone: NEGATIVE  / Bili: NEGATIVE / Urobili: NORMAL mg/dL   Blood: NEGATIVE / Protein: 10 mg/dL / Nitrite: NEGATIVE   Leuk Esterase: TRACE / RBC: 0-2 / WBC 2-5   Sq Epi: OCC / Non Sq Epi: x / Bacteria: FEW        Radiographic Findings:   FINDINGS:    There is normal compressibility of the right common femoral, femoral and   popliteal veins. The right peroneal vein is not identified, otherwise no   calf vein thrombosis is detected.    The contralateral common femoral vein is patent.    Doppler examination shows normal spontaneous and phasic flow.    IMPRESSION:     No evidence of right lower extremity deep venous thrombosis.      Assessment:   This is a 73 year old female with morbid obesity, has had surgery in medial side of RLE, and the wound is not healing due to infection, this is consistent with surgical site infection not PVD as the pulses are palpable and the symptoms are not consistent with PVD    - Continue local wound care, continue IV Abx, appreciate wound care (Dr. Butts) recommendation. Keep legs elevated   - Call back with any questions. No vascular intervention needed for the patient.     [id number] [facility] Vascular Surgery Consultation     Patient is a 73y old  Female who presents with a chief complaint of RLE drainage       HPI:  74 yo AA with F PMHx of HTN, chronic lymphedema with RLE wound p/w worsening of her RLE wound with purulent discharge a/w foul odor, pain, and swelling. RLE is typically larger than left but pt states for the past several days she has had worsening swelling in the leg and purulent drainage. A visiting nurse changed the dressing there was pus and malodorous discharge when the wound was uncovered. She has been on PO Ciprofloxacin 500 mg BID for the past 3 weeks for the wound as given to her by her wound MD but there has been progressive worsening of the area. She is ambulatory at baseline. She has been following with a wound care doctor at [place] who recommended topical treatment. She was then referred to a plastic surgeon (Dr. Oakes) for evaluation, who recommended no surgical intervention and continue supportive care for the cellulitis in her wound in the medial aspect of RLE just below knee. She was seen by a surgeon at a hospital in [facility], attempted to perform removal of the excess tissue (?panniculectomy) but it was aborted. She had 8 stiches from that surgery. The wound has not healed. Today, the medical team called vascular surgery consult to assess the blood flow to the wound and possibly provide vascular surgery intervention.     PAST MEDICAL & SURGICAL HISTORY:  Other iron deficiency anemia  Morbid obesity due to excess calories  Essential hypertension  H/O total knee replacement, bilateral      FAMILY HISTORY:  No pertinent family history in first degree relatives      SOCIAL HISTORY: not smoker     MEDICATIONS  (STANDING):  clindamycin IVPB      clindamycin IVPB 600 milliGRAM(s) IV Intermittent every 8 hours  enoxaparin Injectable 40 milliGRAM(s) SubCutaneous every 24 hours  sodium chloride 0.9%. 1000 milliLiter(s) (100 mL/Hr) IV Continuous <Continuous>    MEDICATIONS  (PRN):  acetaminophen   Tablet 650 milliGRAM(s) Oral every 6 hours PRN For Temp greater than 38 C (100.4 F)  acetaminophen   Tablet. 650 milliGRAM(s) Oral every 6 hours PRN Mild Pain (1 - 3)  oxyCODONE    5 mG/acetaminophen 325 mG 1 Tablet(s) Oral every 6 hours PRN moderate-severe pain    Allergies    No Known Allergies    REVIEW OF SYSTEMS:  CONSTITUTIONAL: No weakness, fevers or chills  EYES/ENT: No visual changes;  No vertigo or throat pain   NECK: No pain or stiffness  RESPIRATORY: no shortness of breath  CARDIOVASCULAR: No chest pain or palpitations at present  GASTROINTESTINAL: No abdominal or epigastric pain. No nausea, vomiting, or hematemesis; No diarrhea or constipation. No melena or hematochezia.  GENITOURINARY: No dysuria, frequency, foamy urine, urinary urgency, incontinence or hematuria  NEUROLOGICAL: No numbness or weakness  SKIN: r leg wound  All other review of systems is negative unless indicated above.      Vital Signs Last 24 Hrs  T(C): 37 (2018 13:57), Max: 37 (2018 21:43)  T(F): 98.6 (2018 13:57), Max: 98.6 (2018 21:43)  HR: 77 (2018 13:57) (70 - 77)  BP: 145/80 (2018 13:57) (105/51 - 145/80)  BP(mean): --  RR: 18 (2018 13:57) (18 - 18)  SpO2: 100% (2018 13:57) (100% - 100%)  Daily     Daily Weight in k.7 (2018 02:05)      Physical exam:   Gen: NAD  Resp: CTA b/l   CV: RRR  Abd: soft, obese  Ext: warm extremities, palp DP/PT, motor and sensation intact, tamayo of skin with induration in the medial right leg just below knee. No pus, no fluctuance. dressing clean. Normal exam in LLE.   No skin breakdown or ulcers in feet   Neuro: AOX3  Psych calm                         8.5    10.14 )-----------( 439      ( 2018 07:35 )             26.8     06-28    138  |  101  |  20  ----------------------------<  96  4.5   |  21<L>  |  1.63<H>    Ca    8.4      2018 05:38        Urinalysis Basic - ( 2018 10:26 )    Color: PLYEL / Appearance: CLEAR / S.008 / pH: 6.0  Gluc: NEGATIVE / Ketone: NEGATIVE  / Bili: NEGATIVE / Urobili: NORMAL mg/dL   Blood: NEGATIVE / Protein: 10 mg/dL / Nitrite: NEGATIVE   Leuk Esterase: TRACE / RBC: 0-2 / WBC 2-5   Sq Epi: OCC / Non Sq Epi: x / Bacteria: FEW        Radiographic Findings:   FINDINGS:    There is normal compressibility of the right common femoral, femoral and   popliteal veins. The right peroneal vein is not identified, otherwise no   calf vein thrombosis is detected.    The contralateral common femoral vein is patent.    Doppler examination shows normal spontaneous and phasic flow.    IMPRESSION:     No evidence of right lower extremity deep venous thrombosis.      Assessment:   This is a 73 year old female with morbid obesity, has had surgery in medial side of RLE, and the wound is not healing due to infection, this is consistent with surgical site infection not PVD as the pulses are palpable and the symptoms are not consistent with PVD    - Continue local wound care, continue IV Abx, appreciate wound care (Dr. Butts) recommendation. Keep legs elevated   - Call back with any questions. No vascular intervention needed for the patient.     [id number]

## 2018-06-28 NOTE — PROGRESS NOTE ADULT - PROBLEM SELECTOR PLAN 4
-Blood pressure is controlled   -Continue furosemide 20 mg PO daily, losartan 25 mg PO daily  -Currently on DASH diet stable, likely elevated due to hepatic steatosis

## 2018-06-28 NOTE — CONSULT NOTE ADULT - ATTENDING COMMENTS
Pt. was seen and examined. Agree with above. Plan d/w the team.  morbidly obese pt. s/p R LE plastic surgery now with wound infection.   Pt. has palpable pulses through out the extremity  denies symptoms of claudication or rest pain  NO sings of PVD based on history and exam  no need for vascular tests at this time

## 2018-06-28 NOTE — PROGRESS NOTE ADULT - PROBLEM SELECTOR PLAN 1
-Patient met SIRS criteria on at admission. Now resolved.  -Currently no longer septic. Afebrile, downtrending leukocytosis WBC is 10.14  -Tylenol PRN and oxycodone PRN for fever and pain Patient's creatinine increased to 1.63 (yesterday was 1.37)   -Vancomycin level within appropriate range at 24  -Patient was given NS fluid bolus  -switched to clindamycin   -held losartan so doesn't exacerbate CAROL Patient's creatinine increased to 1.63 (yesterday was 1.37)   -Vancomycin level elevated at 24  -Patient was given NS fluid bolus  -switched to clindamycin   -held losartan so doesn't exacerbate CAROL

## 2018-06-28 NOTE — PROGRESS NOTE ADULT - SUBJECTIVE AND OBJECTIVE BOX
CHIEF COMPLAINT: Increased swelling and pain of right medial knee    Interval Events:  No acute events overnight. Denies fever/chills, N/V, leg pain, dysuria, or hematuria.     REVIEW OF SYSTEMS:  Constitutional: [x] negative [ ] fevers [ ] chills [ ] weight loss [ ] weight gain  HEENT: [ ] negative [ ] dry eyes [ ] eye irritation [ ] postnasal drip [ ] nasal congestion  CV: [x] negative  [ ] chest pain [ ] orthopnea [ ] palpitations [ ] murmur  Resp: [x] negative [ ] cough [ ] shortness of breath [ ] dyspnea [ ] wheezing [ ] sputum [ ] hemoptysis  GI: [ ] negative [ ] nausea [ ] vomiting [ ] diarrhea [ ] constipation [ ] abd pain [ ] dysphagia   : [ ] negative [ ] dysuria [ ] nocturia [ ] hematuria [ ] increased urinary frequency  Musculoskeletal: [ ] negative [ ] back pain [ ] myalgias [ ] arthralgias [ ] fracture  Skin: [ ] negative [ ] rash [ ] itch  Neurological: [ ] negative [ ] headache [ ] dizziness [ ] syncope [ ] weakness [ ] numbness  Psychiatric: [x] negative [ ] anxiety [ ] depression  Endocrine: [ ] negative [ ] diabetes [ ] thyroid problem  Hematologic/Lymphatic: [ ] negative [ ] anemia [ ] bleeding problem  Allergic/Immunologic: [ ] negative [ ] itchy eyes [ ] nasal discharge [ ] hives [ ] angioedema  [ ] All other systems negative  [ ] Unable to assess ROS because ________    OBJECTIVE:  ICU Vital Signs Last 24 Hrs  T(C): 36.5 (28 Jun 2018 05:11), Max: 37 (27 Jun 2018 21:43)  T(F): 97.7 (28 Jun 2018 05:11), Max: 98.6 (27 Jun 2018 21:43)  HR: 73 (28 Jun 2018 05:11) (73 - 78)  BP: 105/51 (28 Jun 2018 05:11) (105/51 - 154/78)  RR: 18 (28 Jun 2018 05:11) (18 - 18)  SpO2: 100% (28 Jun 2018 05:11) (100% - 100%)        06-27 @ 07:01  -  06-28 @ 07:00  --------------------------------------------------------  IN: 1250 mL / OUT: 1800 mL / NET: -550 m          PHYSICAL EXAM:  GENERAL: NAD, well-developed  HEAD:  Atraumatic, Normocephalic  EYES:  conjunctiva and sclera clear, pupils round and equal  NECK: Supple, No JVD, no cervical lymphadenopathy  CHEST/LUNG: Clear to auscultation bilaterally; No wheezes  HEART: Regular rate and rhythm; No murmurs, rubs, or gallops  ABDOMEN: Soft, Nontender, Nondistended; Bowel sounds present  EXTREMITIES: Swelling of left lower extremity, wound is covered with bandages and compressed. No pain to palpation.   PSYCH: pleasant, appropriate mood and affect  NEUROLOGY: CN grossly intact, non-focal      HOSPITAL MEDICATIONS:  enoxaparin Injectable 40 milliGRAM(s) SubCutaneous every 24 hours    clindamycin IVPB      clindamycin IVPB 600 milliGRAM(s) IV Intermittent every 8 hours          acetaminophen   Tablet 650 milliGRAM(s) Oral every 6 hours PRN  acetaminophen   Tablet. 650 milliGRAM(s) Oral every 6 hours PRN  oxyCODONE    5 mG/acetaminophen 325 mG 1 Tablet(s) Oral every 6 hours PRN          sodium chloride 0.9% Bolus 1 milliLiter(s) IV Bolus once            LABS:                        8.5    10.14 )-----------( 439      ( 28 Jun 2018 07:35 )             26.8     Hgb Trend: 8.5<--, 8.3<--, 8.4<--, 9.0<--, 8.8<--  06-28    138  |  101  |  20  ----------------------------<  96  4.5   |  21<L>  |  1.63<H>    Ca    8.4      28 Jun 2018 05:38      Creatinine Trend: 1.63<--, 1.37<--, 0.90<--, 0.78<--, 0.75<--, 0.70<--            MICROBIOLOGY:     RADIOLOGY:  [ ] Reviewed and interpreted by me    EKG: CHIEF COMPLAINT: Increased swelling and pain of right medial knee    Interval Events:  No acute events overnight. Denies fever/chills, N/V, leg pain, dysuria, or hematuria.     REVIEW OF SYSTEMS:  Constitutional: [x] negative [ ] fevers [ ] chills [ ] weight loss [ ] weight gain  HEENT: [ ] negative [ ] dry eyes [ ] eye irritation [ ] postnasal drip [ ] nasal congestion  CV: [x] negative  [ ] chest pain [ ] orthopnea [ ] palpitations [ ] murmur  Resp: [x] negative [ ] cough [ ] shortness of breath [ ] dyspnea [ ] wheezing [ ] sputum [ ] hemoptysis  GI: [ ] negative [ ] nausea [ ] vomiting [ ] diarrhea [ ] constipation [ ] abd pain [ ] dysphagia   : [ ] negative [ ] dysuria [ ] nocturia [ ] hematuria [ ] increased urinary frequency  Musculoskeletal: [ ] negative [ ] back pain [ ] myalgias [ ] arthralgias [ ] fracture  Skin: [ ] negative [ ] rash [ ] itch  Neurological: [ ] negative [ ] headache [ ] dizziness [ ] syncope [ ] weakness [ ] numbness  Psychiatric: [x] negative [ ] anxiety [ ] depression  Endocrine: [ ] negative [ ] diabetes [ ] thyroid problem  Hematologic/Lymphatic: [ ] negative [ ] anemia [ ] bleeding problem  Allergic/Immunologic: [ ] negative [ ] itchy eyes [ ] nasal discharge [ ] hives [ ] angioedema  [ ] All other systems negative  [ ] Unable to assess ROS because ________    OBJECTIVE:  ICU Vital Signs Last 24 Hrs  T(C): 36.5 (28 Jun 2018 05:11), Max: 37 (27 Jun 2018 21:43)  T(F): 97.7 (28 Jun 2018 05:11), Max: 98.6 (27 Jun 2018 21:43)  HR: 73 (28 Jun 2018 05:11) (73 - 78)  BP: 105/51 (28 Jun 2018 05:11) (105/51 - 154/78)  RR: 18 (28 Jun 2018 05:11) (18 - 18)  SpO2: 100% (28 Jun 2018 05:11) (100% - 100%)        06-27 @ 07:01  -  06-28 @ 07:00  --------------------------------------------------------  IN: 1250 mL / OUT: 1800 mL / NET: -550 m          PHYSICAL EXAM:  GENERAL: NAD, well-developed  EYES:  conjunctiva and sclera clear, pupils round and equal  NECK: Supple, No JVD, no cervical lymphadenopathy  CHEST/LUNG: Clear to auscultation bilaterally; No wheezes  HEART: Regular rate and rhythm; No murmurs, rubs, or gallops  ABDOMEN: Soft, Nontender, Nondistended; Bowel sounds present  EXTREMITIES: Swelling of left lower extremity> right, wound is covered with bandages and compressed, no bleeding. No pain to palpation.   PSYCH: pleasant, appropriate mood and affect  NEUROLOGY: CN grossly intact, non-focal      HOSPITAL MEDICATIONS:  enoxaparin Injectable 40 milliGRAM(s) SubCutaneous every 24 hours    clindamycin IVPB      clindamycin IVPB 600 milliGRAM(s) IV Intermittent every 8 hours          acetaminophen   Tablet 650 milliGRAM(s) Oral every 6 hours PRN  acetaminophen   Tablet. 650 milliGRAM(s) Oral every 6 hours PRN  oxyCODONE    5 mG/acetaminophen 325 mG 1 Tablet(s) Oral every 6 hours PRN          sodium chloride 0.9% Bolus 1 milliLiter(s) IV Bolus once            LABS:                        8.5    10.14 )-----------( 439      ( 28 Jun 2018 07:35 )             26.8     Hgb Trend: 8.5<--, 8.3<--, 8.4<--, 9.0<--, 8.8<--  06-28    138  |  101  |  20  ----------------------------<  96  4.5   |  21<L>  |  1.63<H>    Ca    8.4      28 Jun 2018 05:38      Creatinine Trend: 1.63<--, 1.37<--, 0.90<--, 0.78<--, 0.75<--, 0.70<--            MICROBIOLOGY:     RADIOLOGY:  [ ] Reviewed and interpreted by me    EKG:

## 2018-06-28 NOTE — PROGRESS NOTE ADULT - ATTENDING COMMENTS
Patient was seen and examined personally by me. I have discussed the plan and reviewed the resident's note and agree with the above physical exam findings including assessment and plan except as indicated below.    Mercedes worsening, suspect medication induced. All offending agents dced. Check FeNa, UA neg for blood or infection  Continue clindamycin-day 6 of total Abx  Will likely need prolonged course of Abx given rim enhancing fluid collection seen on CT leg, vascular consult, total duration for Abx TBD  PT: home with no needs  Dispo pending improvement in Cr Patient was seen and examined personally by me. I have discussed the plan and reviewed the resident's note and agree with the above physical exam findings including assessment and plan except as indicated below.    CAROL worsening, ? AIN, suspect medication induced. All offending agents dced. Check FeNa, UA neg for blood or infection  Continue clindamycin-day 6 of total Abx  Will likely need prolonged course of Abx given rim enhancing fluid collection seen on CT leg, vascular consult, total duration for Abx TBD  PT: home with no needs  Dispo pending improvement in Cr

## 2018-06-29 LAB
BASOPHILS # BLD AUTO: 0.05 K/UL — SIGNIFICANT CHANGE UP (ref 0–0.2)
BASOPHILS NFR BLD AUTO: 0.5 % — SIGNIFICANT CHANGE UP (ref 0–2)
BUN SERPL-MCNC: 20 MG/DL — SIGNIFICANT CHANGE UP (ref 7–23)
CALCIUM SERPL-MCNC: 8.3 MG/DL — LOW (ref 8.4–10.5)
CHLORIDE SERPL-SCNC: 102 MMOL/L — SIGNIFICANT CHANGE UP (ref 98–107)
CO2 SERPL-SCNC: 25 MMOL/L — SIGNIFICANT CHANGE UP (ref 22–31)
CREAT SERPL-MCNC: 1.61 MG/DL — HIGH (ref 0.5–1.3)
EOSINOPHIL # BLD AUTO: 0.61 K/UL — HIGH (ref 0–0.5)
EOSINOPHIL NFR BLD AUTO: 6.6 % — HIGH (ref 0–6)
GLUCOSE SERPL-MCNC: 102 MG/DL — HIGH (ref 70–99)
HCT VFR BLD CALC: 28.9 % — LOW (ref 34.5–45)
HGB BLD-MCNC: 8.8 G/DL — LOW (ref 11.5–15.5)
IMM GRANULOCYTES # BLD AUTO: 0.12 # — SIGNIFICANT CHANGE UP
IMM GRANULOCYTES NFR BLD AUTO: 1.3 % — SIGNIFICANT CHANGE UP (ref 0–1.5)
LYMPHOCYTES # BLD AUTO: 1.43 K/UL — SIGNIFICANT CHANGE UP (ref 1–3.3)
LYMPHOCYTES # BLD AUTO: 15.6 % — SIGNIFICANT CHANGE UP (ref 13–44)
MCHC RBC-ENTMCNC: 26.1 PG — LOW (ref 27–34)
MCHC RBC-ENTMCNC: 30.4 % — LOW (ref 32–36)
MCV RBC AUTO: 85.8 FL — SIGNIFICANT CHANGE UP (ref 80–100)
MONOCYTES # BLD AUTO: 0.91 K/UL — HIGH (ref 0–0.9)
MONOCYTES NFR BLD AUTO: 9.9 % — SIGNIFICANT CHANGE UP (ref 2–14)
NEUTROPHILS # BLD AUTO: 6.06 K/UL — SIGNIFICANT CHANGE UP (ref 1.8–7.4)
NEUTROPHILS NFR BLD AUTO: 66.1 % — SIGNIFICANT CHANGE UP (ref 43–77)
NRBC # FLD: 0 — SIGNIFICANT CHANGE UP
PLATELET # BLD AUTO: 494 K/UL — HIGH (ref 150–400)
PMV BLD: 9.4 FL — SIGNIFICANT CHANGE UP (ref 7–13)
POTASSIUM SERPL-MCNC: 4 MMOL/L — SIGNIFICANT CHANGE UP (ref 3.5–5.3)
POTASSIUM SERPL-SCNC: 4 MMOL/L — SIGNIFICANT CHANGE UP (ref 3.5–5.3)
RBC # BLD: 3.37 M/UL — LOW (ref 3.8–5.2)
RBC # FLD: 16.4 % — HIGH (ref 10.3–14.5)
SODIUM SERPL-SCNC: 139 MMOL/L — SIGNIFICANT CHANGE UP (ref 135–145)
WBC # BLD: 9.18 K/UL — SIGNIFICANT CHANGE UP (ref 3.8–10.5)
WBC # FLD AUTO: 9.18 K/UL — SIGNIFICANT CHANGE UP (ref 3.8–10.5)

## 2018-06-29 PROCEDURE — 99233 SBSQ HOSP IP/OBS HIGH 50: CPT | Mod: GC

## 2018-06-29 PROCEDURE — 99221 1ST HOSP IP/OBS SF/LOW 40: CPT

## 2018-06-29 RX ORDER — SODIUM CHLORIDE 9 MG/ML
1000 INJECTION INTRAMUSCULAR; INTRAVENOUS; SUBCUTANEOUS
Qty: 0 | Refills: 0 | Status: DISCONTINUED | OUTPATIENT
Start: 2018-06-29 | End: 2018-06-30

## 2018-06-29 RX ORDER — OXYCODONE AND ACETAMINOPHEN 5; 325 MG/1; MG/1
1 TABLET ORAL ONCE
Qty: 0 | Refills: 0 | Status: DISCONTINUED | OUTPATIENT
Start: 2018-06-29 | End: 2018-06-29

## 2018-06-29 RX ADMIN — ENOXAPARIN SODIUM 40 MILLIGRAM(S): 100 INJECTION SUBCUTANEOUS at 13:26

## 2018-06-29 RX ADMIN — Medication 100 MILLIGRAM(S): at 22:44

## 2018-06-29 RX ADMIN — Medication 100 MILLIGRAM(S): at 05:52

## 2018-06-29 RX ADMIN — OXYCODONE AND ACETAMINOPHEN 1 TABLET(S): 5; 325 TABLET ORAL at 23:50

## 2018-06-29 RX ADMIN — SODIUM CHLORIDE 100 MILLILITER(S): 9 INJECTION INTRAMUSCULAR; INTRAVENOUS; SUBCUTANEOUS at 22:44

## 2018-06-29 RX ADMIN — Medication 100 MILLIGRAM(S): at 13:25

## 2018-06-29 RX ADMIN — OXYCODONE AND ACETAMINOPHEN 1 TABLET(S): 5; 325 TABLET ORAL at 23:11

## 2018-06-29 RX ADMIN — OXYCODONE AND ACETAMINOPHEN 1 TABLET(S): 5; 325 TABLET ORAL at 02:17

## 2018-06-29 RX ADMIN — OXYCODONE AND ACETAMINOPHEN 1 TABLET(S): 5; 325 TABLET ORAL at 01:47

## 2018-06-29 RX ADMIN — SODIUM CHLORIDE 100 MILLILITER(S): 9 INJECTION INTRAMUSCULAR; INTRAVENOUS; SUBCUTANEOUS at 13:26

## 2018-06-29 RX ADMIN — OXYCODONE AND ACETAMINOPHEN 1 TABLET(S): 5; 325 TABLET ORAL at 16:00

## 2018-06-29 RX ADMIN — OXYCODONE AND ACETAMINOPHEN 1 TABLET(S): 5; 325 TABLET ORAL at 15:07

## 2018-06-29 NOTE — PROGRESS NOTE ADULT - SUBJECTIVE AND OBJECTIVE BOX
CHIEF COMPLAINT: Increased swelling and pain of right medial knee    Interval Events:  No acute events overnight. Denies fever/chills, N/V, leg pain, or dysuria. Reports increased urination since being on maintenance fluids.     REVIEW OF SYSTEMS:  Constitutional: [x] negative [ ] fevers [ ] chills [ ] weight loss [ ] weight gain  HEENT: [ ] negative [ ] dry eyes [ ] eye irritation [ ] postnasal drip [ ] nasal congestion  CV: [x] negative  [ ] chest pain [ ] orthopnea [ ] palpitations [ ] murmur  Resp: [x] negative [ ] cough [ ] shortness of breath [ ] dyspnea [ ] wheezing [ ] sputum [ ] hemoptysis  GI: [x] negative [ ] nausea [ ] vomiting [ ] diarrhea [ ] constipation [ ] abd pain [ ] dysphagia   : [ ] negative [ ] dysuria [ ] nocturia [ ] hematuria [ ] increased urinary frequency  Musculoskeletal: [ ] negative [ ] back pain [ ] myalgias [ ] arthralgias [ ] fracture  Skin: [ ] negative [ ] rash [ ] itch  Neurological: [x] negative [ ] headache [ ] dizziness [ ] syncope [ ] weakness [ ] numbness  Psychiatric: [ ] negative [ ] anxiety [ ] depression  Endocrine: [ ] negative [ ] diabetes [ ] thyroid problem  Hematologic/Lymphatic: [ ] negative [ ] anemia [ ] bleeding problem  Allergic/Immunologic: [ ] negative [ ] itchy eyes [ ] nasal discharge [ ] hives [ ] angioedema  [ ] All other systems negative  [ ] Unable to assess ROS because ________    OBJECTIVE:  ICU Vital Signs Last 24 Hrs  T(C): 36.9 (2018 05:50), Max: 37 (2018 13:57)  T(F): 98.4 (2018 05:50), Max: 98.6 (2018 13:57)  HR: 75 (2018 05:50) (70 - 78)  BP: 126/63 (2018 05:50) (113/64 - 145/80)  RR: 16 (2018 05:50) (16 - 18)  SpO2: 95% (2018 05:50) (95% - 100%)        - @ 07:01  -  - @ 07:00  --------------------------------------------------------  IN: 2350 mL / OUT: 2000 mL / NET: 350 mL     @ 07: @ 08:43  --------------------------------------------------------  IN: 550 mL / OUT: 400 mL / NET: 150 mL      CAPILLARY BLOOD GLUCOSE        PHYSICAL EXAM:  GENERAL: NAD, well-developed  HEAD:  Atraumatic, Normocephalic  EYES: Pupils round and equal, conjunctiva and sclera clear  NECK: Supple, No JVD, no cervical lymphadenopathy  CHEST/LUNG: Clear to auscultation bilaterally; No wheeze or rales  HEART: S1 and S2 present, regular rate and rhythm; No murmurs, rubs, or gallops  ABDOMEN: Soft, Nontender, Nondistended; Bowel sounds present, no suprapubic pain  EXTREMITIES:  Swelling of left lower extremity. Right wound is covered with bandages and compressed. No pain to palpation over bandage. Pedal pulses 2+ bilaterally  PSYCH: Pleasant, appropriate mood and affect  NEUROLOGY: CN grossly intact, non-focal    HOSPITAL MEDICATIONS:  enoxaparin Injectable 40 milliGRAM(s) SubCutaneous every 24 hours    clindamycin IVPB      clindamycin IVPB 600 milliGRAM(s) IV Intermittent every 8 hours          acetaminophen   Tablet 650 milliGRAM(s) Oral every 6 hours PRN  acetaminophen   Tablet. 650 milliGRAM(s) Oral every 6 hours PRN  oxyCODONE    5 mG/acetaminophen 325 mG 1 Tablet(s) Oral every 6 hours PRN          sodium chloride 0.9%. 1000 milliLiter(s) IV Continuous <Continuous>            LABS:                        8.8    9.18  )-----------( 494      ( 2018 07:00 )             28.9     Hgb Trend: 8.8<--, 8.5<--, 8.3<--, 8.4<--, 9.0<--      139  |  102  |  20  ----------------------------<  102<H>  4.0   |  25  |  1.61<H>    Ca    8.3<L>      2018 07:00      Creatinine Trend: 1.61<--, 1.63<--, 1.37<--, 0.90<--, 0.78<--, 0.75<--    Urinalysis Basic - ( 2018 10:26 )    Color: PLYEL / Appearance: CLEAR / S.008 / pH: 6.0  Gluc: NEGATIVE / Ketone: NEGATIVE  / Bili: NEGATIVE / Urobili: NORMAL mg/dL   Blood: NEGATIVE / Protein: 10 mg/dL / Nitrite: NEGATIVE   Leuk Esterase: TRACE / RBC: 0-2 / WBC 2-5   Sq Epi: OCC / Non Sq Epi: x / Bacteria: FEW

## 2018-06-29 NOTE — PROGRESS NOTE ADULT - PROBLEM SELECTOR PLAN 6
-Patient on DVT prophylaxis: lovenox 40 mg SQ q24h    Disposition: Creatinine starting to down trend

## 2018-06-29 NOTE — PROGRESS NOTE ADULT - PROBLEM SELECTOR PLAN 2
-Per Vascular, no vascular intervention needed. SHI not needed as not likely arterial flow issue.   -f/u wound care rec  -duplex findings showed no evidence of DVT    -Today is day 3 of Clindamycin, day 7 total Abx

## 2018-06-29 NOTE — PROGRESS NOTE ADULT - PROBLEM SELECTOR PLAN 1
-Creatinine downtrending today to 1.61 (yesterday 1.63)  -CAROL likely secondary to AIN from vancomycin vs. prerenal  -Patient was given NS maintenace fluid for 12 hours, rate 100 mL/hr  -held losartan so doesn't exacerbate CAROL  -Day 3 of Clindamycin

## 2018-06-29 NOTE — PROGRESS NOTE ADULT - ATTENDING COMMENTS
Patient was seen and examined personally by me. I have discussed the plan and reviewed the resident's note and agree with the above physical exam findings including assessment and plan except as indicated below.    Cr slightly improved from yesterday, suspect AIN from medications: losartan, lasix and Vanc d/yolanda. check FeNa, c/w fluids  Continue clindamycin-day 7 of total Abx  Will likely need prolonged course of Abx given rim enhancing fluid collection seen on CT leg, appreciate vasc recs  PT: home with no needs  Dispo pending improvement in Cr, possible DC home tmw with wound care

## 2018-06-30 VITALS
DIASTOLIC BLOOD PRESSURE: 75 MMHG | SYSTOLIC BLOOD PRESSURE: 141 MMHG | OXYGEN SATURATION: 97 % | RESPIRATION RATE: 18 BRPM | TEMPERATURE: 98 F | HEART RATE: 74 BPM

## 2018-06-30 LAB
BUN SERPL-MCNC: 23 MG/DL — SIGNIFICANT CHANGE UP (ref 7–23)
CALCIUM SERPL-MCNC: 8.3 MG/DL — LOW (ref 8.4–10.5)
CHLORIDE SERPL-SCNC: 103 MMOL/L — SIGNIFICANT CHANGE UP (ref 98–107)
CO2 SERPL-SCNC: 24 MMOL/L — SIGNIFICANT CHANGE UP (ref 22–31)
CREAT ?TM UR-MCNC: 49.9 MG/DL — SIGNIFICANT CHANGE UP
CREAT SERPL-MCNC: 1.54 MG/DL — HIGH (ref 0.5–1.3)
GLUCOSE SERPL-MCNC: 95 MG/DL — SIGNIFICANT CHANGE UP (ref 70–99)
HCT VFR BLD CALC: 27.7 % — LOW (ref 34.5–45)
HGB BLD-MCNC: 8.6 G/DL — LOW (ref 11.5–15.5)
MCHC RBC-ENTMCNC: 26.4 PG — LOW (ref 27–34)
MCHC RBC-ENTMCNC: 31 % — LOW (ref 32–36)
MCV RBC AUTO: 85 FL — SIGNIFICANT CHANGE UP (ref 80–100)
NRBC # FLD: 0 — SIGNIFICANT CHANGE UP
PLATELET # BLD AUTO: 492 K/UL — HIGH (ref 150–400)
PMV BLD: 9.5 FL — SIGNIFICANT CHANGE UP (ref 7–13)
POTASSIUM SERPL-MCNC: 4.5 MMOL/L — SIGNIFICANT CHANGE UP (ref 3.5–5.3)
POTASSIUM SERPL-SCNC: 4.5 MMOL/L — SIGNIFICANT CHANGE UP (ref 3.5–5.3)
RBC # BLD: 3.26 M/UL — LOW (ref 3.8–5.2)
RBC # FLD: 16.4 % — HIGH (ref 10.3–14.5)
SODIUM SERPL-SCNC: 139 MMOL/L — SIGNIFICANT CHANGE UP (ref 135–145)
SODIUM UR-SCNC: 74 MMOL/L — SIGNIFICANT CHANGE UP
UUN UR-MCNC: 371.9 MG/DL — SIGNIFICANT CHANGE UP
WBC # BLD: 9.93 K/UL — SIGNIFICANT CHANGE UP (ref 3.8–10.5)
WBC # FLD AUTO: 9.93 K/UL — SIGNIFICANT CHANGE UP (ref 3.8–10.5)

## 2018-06-30 PROCEDURE — 99239 HOSP IP/OBS DSCHRG MGMT >30: CPT

## 2018-06-30 RX ADMIN — Medication 100 MILLIGRAM(S): at 06:24

## 2018-06-30 RX ADMIN — ENOXAPARIN SODIUM 40 MILLIGRAM(S): 100 INJECTION SUBCUTANEOUS at 14:12

## 2018-06-30 RX ADMIN — OXYCODONE AND ACETAMINOPHEN 1 TABLET(S): 5; 325 TABLET ORAL at 06:24

## 2018-06-30 RX ADMIN — OXYCODONE AND ACETAMINOPHEN 1 TABLET(S): 5; 325 TABLET ORAL at 07:19

## 2018-06-30 RX ADMIN — Medication 100 MILLIGRAM(S): at 14:13

## 2018-06-30 NOTE — PROGRESS NOTE ADULT - PROBLEM SELECTOR PLAN 2
-Day 4 Clindamycin   -Day 8 total antibiotics  -f/u wound care rec  -duplex findings showed no evidence of DVT

## 2018-06-30 NOTE — PROGRESS NOTE ADULT - PROBLEM SELECTOR PLAN 1
-Creatinine downtrending today to 1.54 (yesterday 1.61)  -Patient's FeNa calculated to be 1.6% (Shaheen 74, Ucreatinine 49.9, creatinine 1.54, Na+ 139) indicating an intrinsic acute kidney injury. The etiology is likely vancomycin induced.   -held losartan so doesn't exacerbate CAROL

## 2018-06-30 NOTE — PROGRESS NOTE ADULT - ASSESSMENT
Patient is a 73 year old female with a history of HTN and chronic lymphedema s/p tissue removal surgery of right leg who presented with increased swelling, redness, and purulent discharge from her surgical site wound secondary to cellulitis with abscess.

## 2018-06-30 NOTE — PROGRESS NOTE ADULT - PROBLEM SELECTOR PLAN 3
-Blood pressure is 144/84  -furosemide 20 mg PO daily and losartan 25 mg PO daily were held due to CAROL   -Currently on DASH diet

## 2018-06-30 NOTE — PROGRESS NOTE ADULT - PROVIDER SPECIALTY LIST ADULT
Internal Medicine
Surgery
Internal Medicine

## 2018-06-30 NOTE — PROGRESS NOTE ADULT - SUBJECTIVE AND OBJECTIVE BOX
CHIEF COMPLAINT:  Increased swelling and pain of right medial knee    Interval Events:  No acute events overnight. denies fever/chills, N/V, leg pain, dysuria, or urinary retention    REVIEW OF SYSTEMS:  Constitutional: [x] negative [ ] fevers [ ] chills [ ] weight loss [ ] weight gain  HEENT: [ ] negative [ ] dry eyes [ ] eye irritation [ ] postnasal drip [ ] nasal congestion  CV: [x] negative  [ ] chest pain [ ] orthopnea [ ] palpitations [ ] murmur  Resp: [x] negative [ ] cough [ ] shortness of breath [ ] dyspnea [ ] wheezing [ ] sputum [ ] hemoptysis  GI: [ ] negative [ ] nausea [ ] vomiting [ ] diarrhea [ ] constipation [ ] abd pain [ ] dysphagia   : [x] negative [ ] dysuria [ ] nocturia [ ] hematuria [ ] increased urinary frequency  Musculoskeletal: [ ] negative [ ] back pain [ ] myalgias [ ] arthralgias [ ] fracture  Skin: [ ] negative [ ] rash [ ] itch [x] wound  Neurological: [ ] negative [ ] headache [ ] dizziness [ ] syncope [ ] weakness [ ] numbness  Psychiatric: [ ] negative [ ] anxiety [ ] depression  Endocrine: [ ] negative [ ] diabetes [ ] thyroid problem  Hematologic/Lymphatic: [ ] negative [ ] anemia [ ] bleeding problem  Allergic/Immunologic: [ ] negative [ ] itchy eyes [ ] nasal discharge [ ] hives [ ] angioedema  [ ] All other systems negative  [ ] Unable to assess ROS because ________    OBJECTIVE:  T(C): 36.8 (2018 06:21), Max: 36.8 (2018 15:32)  T(F): 98.3 (2018 06:21), Max: 98.3 (2018 15:32)  HR: 75 (2018 06:21) (75 - 76)  BP: 144/84 (2018 06:21) (129/70 - 144/84)  RR: 18 (2018 06:21) (17 - 18)  SpO2: 100% (2018 06:21) (99% - 100%)         @ 07:01  -   @ 07:00  --------------------------------------------------------  IN: 2200 mL / OUT: 2250 mL / NET: -50 mL      CAPILLARY BLOOD GLUCOSE        PHYSICAL EXAM:  GENERAL: NAD, well-developed, morbidly obese  HEAD:  Atraumatic, Normocephalic  EYES:  conjunctiva and sclera clear  NECK: Supple, No JVD, no cervical lymphadenopathy  CHEST/LUNG: Clear to auscultation bilaterally; No wheeze  HEART: S1 and S2 present, regular rate and rhythm; No murmurs, rubs, or gallops  ABDOMEN: Soft, Nontender, Nondistended; Bowel sounds present  EXTREMITIES: Swelling of right lower extremity, right wound present in right medial knee. Covered with bandage. No pain to palpation over bandage. Pedal pulses 2+ bilaterally  PSYCH: Pleasant, appropriate mood and affect  NEUROLOGY: CN grossly intact        HOSPITAL MEDICATIONS:  enoxaparin Injectable 40 milliGRAM(s) SubCutaneous every 24 hours    clindamycin IVPB      clindamycin IVPB 600 milliGRAM(s) IV Intermittent every 8 hours          acetaminophen   Tablet 650 milliGRAM(s) Oral every 6 hours PRN  acetaminophen   Tablet. 650 milliGRAM(s) Oral every 6 hours PRN  oxyCODONE    5 mG/acetaminophen 325 mG 1 Tablet(s) Oral every 6 hours PRN          sodium chloride 0.9%. 1000 milliLiter(s) IV Continuous <Continuous>            LABS:                        8.6    9.93  )-----------( 492      ( 2018 06:44 )             27.7     Hgb Trend: 8.6<--, 8.8<--, 8.5<--, 8.3<--, 8.4<--  06-30    139  |  103  |  23  ----------------------------<  95  4.5   |  24  |  1.54<H>    Ca    8.3<L>      2018 06:00      Creatinine Trend: 1.54<--, 1.61<--, 1.63<--, 1.37<--, 0.90<--, 0.78<--    Urinalysis Basic - ( 2018 10:26 )    Color: PLYEL / Appearance: CLEAR / S.008 / pH: 6.0  Gluc: NEGATIVE / Ketone: NEGATIVE  / Bili: NEGATIVE / Urobili: NORMAL mg/dL   Blood: NEGATIVE / Protein: 10 mg/dL / Nitrite: NEGATIVE   Leuk Esterase: TRACE / RBC: 0-2 / WBC 2-5   Sq Epi: OCC / Non Sq Epi: x / Bacteria: FEW

## 2018-06-30 NOTE — PROGRESS NOTE ADULT - NSHPATTENDINGPLANDISCUSS_GEN_ALL_CORE
patient, HS team

## 2018-06-30 NOTE — PROGRESS NOTE ADULT - ATTENDING COMMENTS
Patient was seen and examined personally by me. I have discussed the plan and reviewed the resident's note and agree with the above physical exam findings including assessment and plan except as indicated below.    Cr downtrending, suspect AIN from medications: losartan, lasix and Vanc d/yolanda. Advised repeat BMP within 1 week  Continue clindamycin-day 8 of 14 total Abx given rim enhancing fluid collection seen on CT leg, appreciate vasc recs  PT: home with no needs  Dispo stable for DC home today with wound care. DC time: 35 min  Home wound care set up by [facility]

## 2018-06-30 NOTE — PROGRESS NOTE ADULT - PROBLEM SELECTOR PLAN 6
-Patient on DVT prophylaxis: lovenox 40 mg SQ q24h    Disposition: Creatinine starting to down trend.

## 2018-07-12 ENCOUNTER — OUTPATIENT (OUTPATIENT)
Dept: OUTPATIENT SERVICES | Facility: HOSPITAL | Age: 73
LOS: 1 days | Discharge: HOME | End: 2018-07-12

## 2018-07-12 DIAGNOSIS — Z96.653 PRESENCE OF ARTIFICIAL KNEE JOINT, BILATERAL: Chronic | ICD-10-CM

## 2018-07-12 DIAGNOSIS — L89.90 PRESSURE ULCER OF UNSPECIFIED SITE, UNSPECIFIED STAGE: ICD-10-CM

## 2018-07-17 DIAGNOSIS — I89.0 LYMPHEDEMA, NOT ELSEWHERE CLASSIFIED: ICD-10-CM

## 2018-07-17 DIAGNOSIS — I10 ESSENTIAL (PRIMARY) HYPERTENSION: ICD-10-CM

## 2018-07-17 DIAGNOSIS — L03.115 CELLULITIS OF RIGHT LOWER LIMB: ICD-10-CM

## 2018-07-17 DIAGNOSIS — L97.112 NON-PRESSURE CHRONIC ULCER OF RIGHT THIGH WITH FAT LAYER EXPOSED: ICD-10-CM

## 2018-07-17 DIAGNOSIS — Z84.1 FAMILY HISTORY OF DISORDERS OF KIDNEY AND URETER: ICD-10-CM

## 2018-07-17 DIAGNOSIS — L97.511 NON-PRESSURE CHRONIC ULCER OF OTHER PART OF RIGHT FOOT LIMITED TO BREAKDOWN OF SKIN: ICD-10-CM

## 2018-07-17 DIAGNOSIS — Z82.49 FAMILY HISTORY OF ISCHEMIC HEART DISEASE AND OTHER DISEASES OF THE CIRCULATORY SYSTEM: ICD-10-CM

## 2018-07-17 DIAGNOSIS — Z79.899 OTHER LONG TERM (CURRENT) DRUG THERAPY: ICD-10-CM

## 2018-07-18 PROBLEM — D50.8 OTHER IRON DEFICIENCY ANEMIAS: Chronic | Status: ACTIVE | Noted: 2018-01-10

## 2018-07-18 PROBLEM — I10 ESSENTIAL (PRIMARY) HYPERTENSION: Chronic | Status: ACTIVE | Noted: 2018-01-10

## 2018-07-18 PROBLEM — E66.01 MORBID (SEVERE) OBESITY DUE TO EXCESS CALORIES: Chronic | Status: ACTIVE | Noted: 2018-01-10

## 2018-07-19 ENCOUNTER — OUTPATIENT (OUTPATIENT)
Dept: OUTPATIENT SERVICES | Facility: HOSPITAL | Age: 73
LOS: 1 days | Discharge: HOME | End: 2018-07-19

## 2018-07-19 DIAGNOSIS — L97.511 NON-PRESSURE CHRONIC ULCER OF OTHER PART OF RIGHT FOOT LIMITED TO BREAKDOWN OF SKIN: ICD-10-CM

## 2018-07-19 DIAGNOSIS — I89.0 LYMPHEDEMA, NOT ELSEWHERE CLASSIFIED: ICD-10-CM

## 2018-07-19 DIAGNOSIS — L97.112 NON-PRESSURE CHRONIC ULCER OF RIGHT THIGH WITH FAT LAYER EXPOSED: ICD-10-CM

## 2018-07-19 DIAGNOSIS — L03.115 CELLULITIS OF RIGHT LOWER LIMB: ICD-10-CM

## 2018-07-19 DIAGNOSIS — Z79.899 OTHER LONG TERM (CURRENT) DRUG THERAPY: ICD-10-CM

## 2018-07-19 DIAGNOSIS — Z96.653 PRESENCE OF ARTIFICIAL KNEE JOINT, BILATERAL: Chronic | ICD-10-CM

## 2018-07-19 DIAGNOSIS — L89.90 PRESSURE ULCER OF UNSPECIFIED SITE, UNSPECIFIED STAGE: ICD-10-CM

## 2018-07-19 DIAGNOSIS — I10 ESSENTIAL (PRIMARY) HYPERTENSION: ICD-10-CM

## 2018-07-26 ENCOUNTER — OUTPATIENT (OUTPATIENT)
Dept: OUTPATIENT SERVICES | Facility: HOSPITAL | Age: 73
LOS: 1 days | Discharge: HOME | End: 2018-07-26

## 2018-07-26 DIAGNOSIS — Z79.899 OTHER LONG TERM (CURRENT) DRUG THERAPY: ICD-10-CM

## 2018-07-26 DIAGNOSIS — L89.90 PRESSURE ULCER OF UNSPECIFIED SITE, UNSPECIFIED STAGE: ICD-10-CM

## 2018-07-26 DIAGNOSIS — I89.0 LYMPHEDEMA, NOT ELSEWHERE CLASSIFIED: ICD-10-CM

## 2018-07-26 DIAGNOSIS — L97.112 NON-PRESSURE CHRONIC ULCER OF RIGHT THIGH WITH FAT LAYER EXPOSED: ICD-10-CM

## 2018-07-26 DIAGNOSIS — I10 ESSENTIAL (PRIMARY) HYPERTENSION: ICD-10-CM

## 2018-07-26 DIAGNOSIS — L97.511 NON-PRESSURE CHRONIC ULCER OF OTHER PART OF RIGHT FOOT LIMITED TO BREAKDOWN OF SKIN: ICD-10-CM

## 2018-07-26 DIAGNOSIS — L03.115 CELLULITIS OF RIGHT LOWER LIMB: ICD-10-CM

## 2018-07-26 DIAGNOSIS — Z96.653 PRESENCE OF ARTIFICIAL KNEE JOINT, BILATERAL: Chronic | ICD-10-CM

## 2018-08-09 ENCOUNTER — OUTPATIENT (OUTPATIENT)
Dept: OUTPATIENT SERVICES | Facility: HOSPITAL | Age: 73
LOS: 1 days | Discharge: HOME | End: 2018-08-09

## 2018-08-09 DIAGNOSIS — L89.90 PRESSURE ULCER OF UNSPECIFIED SITE, UNSPECIFIED STAGE: ICD-10-CM

## 2018-08-09 DIAGNOSIS — Z96.653 PRESENCE OF ARTIFICIAL KNEE JOINT, BILATERAL: Chronic | ICD-10-CM

## 2018-08-15 DIAGNOSIS — L97.111 NON-PRESSURE CHRONIC ULCER OF RIGHT THIGH LIMITED TO BREAKDOWN OF SKIN: ICD-10-CM

## 2018-08-15 DIAGNOSIS — I89.0 LYMPHEDEMA, NOT ELSEWHERE CLASSIFIED: ICD-10-CM

## 2018-08-15 DIAGNOSIS — Z79.899 OTHER LONG TERM (CURRENT) DRUG THERAPY: ICD-10-CM

## 2018-08-15 DIAGNOSIS — L03.115 CELLULITIS OF RIGHT LOWER LIMB: ICD-10-CM

## 2018-08-15 DIAGNOSIS — L97.112 NON-PRESSURE CHRONIC ULCER OF RIGHT THIGH WITH FAT LAYER EXPOSED: ICD-10-CM

## 2018-08-15 DIAGNOSIS — I10 ESSENTIAL (PRIMARY) HYPERTENSION: ICD-10-CM

## 2018-08-23 ENCOUNTER — OUTPATIENT (OUTPATIENT)
Dept: OUTPATIENT SERVICES | Facility: HOSPITAL | Age: 73
LOS: 1 days | Discharge: HOME | End: 2018-08-23

## 2018-08-23 DIAGNOSIS — Z96.653 PRESENCE OF ARTIFICIAL KNEE JOINT, BILATERAL: Chronic | ICD-10-CM

## 2018-08-23 DIAGNOSIS — L89.90 PRESSURE ULCER OF UNSPECIFIED SITE, UNSPECIFIED STAGE: ICD-10-CM

## 2018-08-31 DIAGNOSIS — Z79.899 OTHER LONG TERM (CURRENT) DRUG THERAPY: ICD-10-CM

## 2018-08-31 DIAGNOSIS — L03.115 CELLULITIS OF RIGHT LOWER LIMB: ICD-10-CM

## 2018-08-31 DIAGNOSIS — L97.111 NON-PRESSURE CHRONIC ULCER OF RIGHT THIGH LIMITED TO BREAKDOWN OF SKIN: ICD-10-CM

## 2018-08-31 DIAGNOSIS — L97.112 NON-PRESSURE CHRONIC ULCER OF RIGHT THIGH WITH FAT LAYER EXPOSED: ICD-10-CM

## 2018-08-31 DIAGNOSIS — I10 ESSENTIAL (PRIMARY) HYPERTENSION: ICD-10-CM

## 2018-08-31 DIAGNOSIS — I89.0 LYMPHEDEMA, NOT ELSEWHERE CLASSIFIED: ICD-10-CM

## 2018-09-06 ENCOUNTER — OUTPATIENT (OUTPATIENT)
Dept: OUTPATIENT SERVICES | Facility: HOSPITAL | Age: 73
LOS: 1 days | Discharge: HOME | End: 2018-09-06

## 2018-09-06 DIAGNOSIS — Z96.653 PRESENCE OF ARTIFICIAL KNEE JOINT, BILATERAL: Chronic | ICD-10-CM

## 2018-09-06 DIAGNOSIS — L89.90 PRESSURE ULCER OF UNSPECIFIED SITE, UNSPECIFIED STAGE: ICD-10-CM

## 2018-09-11 DIAGNOSIS — L03.115 CELLULITIS OF RIGHT LOWER LIMB: ICD-10-CM

## 2018-09-11 DIAGNOSIS — I10 ESSENTIAL (PRIMARY) HYPERTENSION: ICD-10-CM

## 2018-09-11 DIAGNOSIS — Z79.899 OTHER LONG TERM (CURRENT) DRUG THERAPY: ICD-10-CM

## 2018-09-11 DIAGNOSIS — L97.111 NON-PRESSURE CHRONIC ULCER OF RIGHT THIGH LIMITED TO BREAKDOWN OF SKIN: ICD-10-CM

## 2018-09-11 DIAGNOSIS — I89.0 LYMPHEDEMA, NOT ELSEWHERE CLASSIFIED: ICD-10-CM

## 2018-09-11 DIAGNOSIS — L97.112 NON-PRESSURE CHRONIC ULCER OF RIGHT THIGH WITH FAT LAYER EXPOSED: ICD-10-CM

## 2018-09-13 ENCOUNTER — OUTPATIENT (OUTPATIENT)
Dept: OUTPATIENT SERVICES | Facility: HOSPITAL | Age: 73
LOS: 1 days | Discharge: HOME | End: 2018-09-13

## 2018-09-13 DIAGNOSIS — Z96.653 PRESENCE OF ARTIFICIAL KNEE JOINT, BILATERAL: Chronic | ICD-10-CM

## 2018-09-13 DIAGNOSIS — L89.90 PRESSURE ULCER OF UNSPECIFIED SITE, UNSPECIFIED STAGE: ICD-10-CM

## 2018-09-20 DIAGNOSIS — I10 ESSENTIAL (PRIMARY) HYPERTENSION: ICD-10-CM

## 2018-09-20 DIAGNOSIS — I89.0 LYMPHEDEMA, NOT ELSEWHERE CLASSIFIED: ICD-10-CM

## 2018-09-20 DIAGNOSIS — L97.112 NON-PRESSURE CHRONIC ULCER OF RIGHT THIGH WITH FAT LAYER EXPOSED: ICD-10-CM

## 2018-09-20 DIAGNOSIS — L97.111 NON-PRESSURE CHRONIC ULCER OF RIGHT THIGH LIMITED TO BREAKDOWN OF SKIN: ICD-10-CM

## 2018-09-20 DIAGNOSIS — L03.115 CELLULITIS OF RIGHT LOWER LIMB: ICD-10-CM

## 2018-09-20 DIAGNOSIS — Z79.899 OTHER LONG TERM (CURRENT) DRUG THERAPY: ICD-10-CM

## 2018-09-27 ENCOUNTER — OUTPATIENT (OUTPATIENT)
Dept: OUTPATIENT SERVICES | Facility: HOSPITAL | Age: 73
LOS: 1 days | Discharge: HOME | End: 2018-09-27

## 2018-09-27 DIAGNOSIS — Z96.653 PRESENCE OF ARTIFICIAL KNEE JOINT, BILATERAL: Chronic | ICD-10-CM

## 2018-09-27 DIAGNOSIS — L89.90 PRESSURE ULCER OF UNSPECIFIED SITE, UNSPECIFIED STAGE: ICD-10-CM

## 2018-10-03 DIAGNOSIS — L97.111 NON-PRESSURE CHRONIC ULCER OF RIGHT THIGH LIMITED TO BREAKDOWN OF SKIN: ICD-10-CM

## 2018-10-03 DIAGNOSIS — E66.01 MORBID (SEVERE) OBESITY DUE TO EXCESS CALORIES: ICD-10-CM

## 2018-10-03 DIAGNOSIS — I89.0 LYMPHEDEMA, NOT ELSEWHERE CLASSIFIED: ICD-10-CM

## 2018-10-03 DIAGNOSIS — I10 ESSENTIAL (PRIMARY) HYPERTENSION: ICD-10-CM

## 2018-10-03 DIAGNOSIS — L03.115 CELLULITIS OF RIGHT LOWER LIMB: ICD-10-CM

## 2018-10-03 DIAGNOSIS — L97.112 NON-PRESSURE CHRONIC ULCER OF RIGHT THIGH WITH FAT LAYER EXPOSED: ICD-10-CM

## 2018-10-03 DIAGNOSIS — Z79.899 OTHER LONG TERM (CURRENT) DRUG THERAPY: ICD-10-CM

## 2018-10-18 ENCOUNTER — OUTPATIENT (OUTPATIENT)
Dept: OUTPATIENT SERVICES | Facility: HOSPITAL | Age: 73
LOS: 1 days | Discharge: HOME | End: 2018-10-18

## 2018-10-18 DIAGNOSIS — L89.90 PRESSURE ULCER OF UNSPECIFIED SITE, UNSPECIFIED STAGE: ICD-10-CM

## 2018-10-18 DIAGNOSIS — Z96.653 PRESENCE OF ARTIFICIAL KNEE JOINT, BILATERAL: Chronic | ICD-10-CM

## 2018-10-21 NOTE — PROGRESS NOTE ADULT - PROBLEM SELECTOR PROBLEM 3
Morbid obesity due to excess calories
Morbid obesity due to excess calories
Cellulitis and abscess of right leg
Essential hypertension
Morbid obesity due to excess calories
Self

## 2018-10-23 DIAGNOSIS — Z79.899 OTHER LONG TERM (CURRENT) DRUG THERAPY: ICD-10-CM

## 2018-10-23 DIAGNOSIS — I89.0 LYMPHEDEMA, NOT ELSEWHERE CLASSIFIED: ICD-10-CM

## 2018-10-23 DIAGNOSIS — E66.01 MORBID (SEVERE) OBESITY DUE TO EXCESS CALORIES: ICD-10-CM

## 2018-10-23 DIAGNOSIS — L97.112 NON-PRESSURE CHRONIC ULCER OF RIGHT THIGH WITH FAT LAYER EXPOSED: ICD-10-CM

## 2018-10-23 DIAGNOSIS — L03.115 CELLULITIS OF RIGHT LOWER LIMB: ICD-10-CM

## 2018-10-23 DIAGNOSIS — I10 ESSENTIAL (PRIMARY) HYPERTENSION: ICD-10-CM

## 2018-10-23 DIAGNOSIS — Z97.11: ICD-10-CM

## 2018-11-01 ENCOUNTER — OUTPATIENT (OUTPATIENT)
Dept: OUTPATIENT SERVICES | Facility: HOSPITAL | Age: 73
LOS: 1 days | Discharge: HOME | End: 2018-11-01

## 2018-11-01 DIAGNOSIS — E66.01 MORBID (SEVERE) OBESITY DUE TO EXCESS CALORIES: ICD-10-CM

## 2018-11-01 DIAGNOSIS — L89.90 PRESSURE ULCER OF UNSPECIFIED SITE, UNSPECIFIED STAGE: ICD-10-CM

## 2018-11-01 DIAGNOSIS — I89.0 LYMPHEDEMA, NOT ELSEWHERE CLASSIFIED: ICD-10-CM

## 2018-11-01 DIAGNOSIS — Z79.899 OTHER LONG TERM (CURRENT) DRUG THERAPY: ICD-10-CM

## 2018-11-01 DIAGNOSIS — Z96.653 PRESENCE OF ARTIFICIAL KNEE JOINT, BILATERAL: Chronic | ICD-10-CM

## 2018-11-01 DIAGNOSIS — L97.112 NON-PRESSURE CHRONIC ULCER OF RIGHT THIGH WITH FAT LAYER EXPOSED: ICD-10-CM

## 2018-11-01 DIAGNOSIS — I10 ESSENTIAL (PRIMARY) HYPERTENSION: ICD-10-CM

## 2018-11-01 DIAGNOSIS — L03.115 CELLULITIS OF RIGHT LOWER LIMB: ICD-10-CM

## 2018-11-29 ENCOUNTER — OUTPATIENT (OUTPATIENT)
Dept: OUTPATIENT SERVICES | Facility: HOSPITAL | Age: 73
LOS: 1 days | Discharge: HOME | End: 2018-11-29

## 2018-11-29 DIAGNOSIS — L89.90 PRESSURE ULCER OF UNSPECIFIED SITE, UNSPECIFIED STAGE: ICD-10-CM

## 2018-11-29 DIAGNOSIS — Z96.653 PRESENCE OF ARTIFICIAL KNEE JOINT, BILATERAL: Chronic | ICD-10-CM

## 2018-11-30 DIAGNOSIS — I10 ESSENTIAL (PRIMARY) HYPERTENSION: ICD-10-CM

## 2018-11-30 DIAGNOSIS — I89.0 LYMPHEDEMA, NOT ELSEWHERE CLASSIFIED: ICD-10-CM

## 2018-11-30 DIAGNOSIS — L97.112 NON-PRESSURE CHRONIC ULCER OF RIGHT THIGH WITH FAT LAYER EXPOSED: ICD-10-CM

## 2018-11-30 DIAGNOSIS — L03.115 CELLULITIS OF RIGHT LOWER LIMB: ICD-10-CM

## 2018-11-30 DIAGNOSIS — E66.01 MORBID (SEVERE) OBESITY DUE TO EXCESS CALORIES: ICD-10-CM

## 2018-11-30 DIAGNOSIS — Z79.899 OTHER LONG TERM (CURRENT) DRUG THERAPY: ICD-10-CM

## 2018-12-13 ENCOUNTER — OUTPATIENT (OUTPATIENT)
Dept: OUTPATIENT SERVICES | Facility: HOSPITAL | Age: 73
LOS: 1 days | Discharge: HOME | End: 2018-12-13

## 2018-12-13 DIAGNOSIS — Z96.653 PRESENCE OF ARTIFICIAL KNEE JOINT, BILATERAL: Chronic | ICD-10-CM

## 2018-12-13 DIAGNOSIS — L89.90 PRESSURE ULCER OF UNSPECIFIED SITE, UNSPECIFIED STAGE: ICD-10-CM

## 2018-12-18 DIAGNOSIS — I89.0 LYMPHEDEMA, NOT ELSEWHERE CLASSIFIED: ICD-10-CM

## 2018-12-18 DIAGNOSIS — I10 ESSENTIAL (PRIMARY) HYPERTENSION: ICD-10-CM

## 2018-12-18 DIAGNOSIS — Z79.899 OTHER LONG TERM (CURRENT) DRUG THERAPY: ICD-10-CM

## 2018-12-18 DIAGNOSIS — L97.112 NON-PRESSURE CHRONIC ULCER OF RIGHT THIGH WITH FAT LAYER EXPOSED: ICD-10-CM

## 2018-12-18 DIAGNOSIS — L03.115 CELLULITIS OF RIGHT LOWER LIMB: ICD-10-CM

## 2018-12-18 DIAGNOSIS — E66.01 MORBID (SEVERE) OBESITY DUE TO EXCESS CALORIES: ICD-10-CM

## 2018-12-18 DIAGNOSIS — L97.111 NON-PRESSURE CHRONIC ULCER OF RIGHT THIGH LIMITED TO BREAKDOWN OF SKIN: ICD-10-CM

## 2019-01-03 ENCOUNTER — OUTPATIENT (OUTPATIENT)
Dept: OUTPATIENT SERVICES | Facility: HOSPITAL | Age: 74
LOS: 1 days | Discharge: HOME | End: 2019-01-03

## 2019-01-03 DIAGNOSIS — Z96.653 PRESENCE OF ARTIFICIAL KNEE JOINT, BILATERAL: Chronic | ICD-10-CM

## 2019-01-03 DIAGNOSIS — L89.90 PRESSURE ULCER OF UNSPECIFIED SITE, UNSPECIFIED STAGE: ICD-10-CM

## 2019-01-15 DIAGNOSIS — I10 ESSENTIAL (PRIMARY) HYPERTENSION: ICD-10-CM

## 2019-01-15 DIAGNOSIS — Z79.899 OTHER LONG TERM (CURRENT) DRUG THERAPY: ICD-10-CM

## 2019-01-15 DIAGNOSIS — I89.0 LYMPHEDEMA, NOT ELSEWHERE CLASSIFIED: ICD-10-CM

## 2019-01-15 DIAGNOSIS — L03.115 CELLULITIS OF RIGHT LOWER LIMB: ICD-10-CM

## 2019-01-15 DIAGNOSIS — L97.111 NON-PRESSURE CHRONIC ULCER OF RIGHT THIGH LIMITED TO BREAKDOWN OF SKIN: ICD-10-CM

## 2019-01-15 DIAGNOSIS — L97.112 NON-PRESSURE CHRONIC ULCER OF RIGHT THIGH WITH FAT LAYER EXPOSED: ICD-10-CM

## 2019-01-15 DIAGNOSIS — E66.01 MORBID (SEVERE) OBESITY DUE TO EXCESS CALORIES: ICD-10-CM

## 2019-01-24 ENCOUNTER — OUTPATIENT (OUTPATIENT)
Dept: OUTPATIENT SERVICES | Facility: HOSPITAL | Age: 74
LOS: 1 days | Discharge: HOME | End: 2019-01-24

## 2019-01-24 DIAGNOSIS — L89.90 PRESSURE ULCER OF UNSPECIFIED SITE, UNSPECIFIED STAGE: ICD-10-CM

## 2019-01-24 DIAGNOSIS — Z96.653 PRESENCE OF ARTIFICIAL KNEE JOINT, BILATERAL: Chronic | ICD-10-CM

## 2019-01-31 DIAGNOSIS — E66.01 MORBID (SEVERE) OBESITY DUE TO EXCESS CALORIES: ICD-10-CM

## 2019-01-31 DIAGNOSIS — Z79.899 OTHER LONG TERM (CURRENT) DRUG THERAPY: ICD-10-CM

## 2019-01-31 DIAGNOSIS — L97.112 NON-PRESSURE CHRONIC ULCER OF RIGHT THIGH WITH FAT LAYER EXPOSED: ICD-10-CM

## 2019-01-31 DIAGNOSIS — I89.0 LYMPHEDEMA, NOT ELSEWHERE CLASSIFIED: ICD-10-CM

## 2019-01-31 DIAGNOSIS — L03.115 CELLULITIS OF RIGHT LOWER LIMB: ICD-10-CM

## 2019-01-31 DIAGNOSIS — L97.111 NON-PRESSURE CHRONIC ULCER OF RIGHT THIGH LIMITED TO BREAKDOWN OF SKIN: ICD-10-CM

## 2019-01-31 DIAGNOSIS — I10 ESSENTIAL (PRIMARY) HYPERTENSION: ICD-10-CM

## 2019-02-21 ENCOUNTER — OUTPATIENT (OUTPATIENT)
Dept: OUTPATIENT SERVICES | Facility: HOSPITAL | Age: 74
LOS: 1 days | Discharge: HOME | End: 2019-02-21

## 2019-02-21 DIAGNOSIS — L89.90 PRESSURE ULCER OF UNSPECIFIED SITE, UNSPECIFIED STAGE: ICD-10-CM

## 2019-02-21 DIAGNOSIS — Z96.653 PRESENCE OF ARTIFICIAL KNEE JOINT, BILATERAL: Chronic | ICD-10-CM

## 2019-03-05 DIAGNOSIS — I89.0 LYMPHEDEMA, NOT ELSEWHERE CLASSIFIED: ICD-10-CM

## 2019-03-05 DIAGNOSIS — I10 ESSENTIAL (PRIMARY) HYPERTENSION: ICD-10-CM

## 2019-03-05 DIAGNOSIS — Z79.899 OTHER LONG TERM (CURRENT) DRUG THERAPY: ICD-10-CM

## 2019-03-05 DIAGNOSIS — E66.01 MORBID (SEVERE) OBESITY DUE TO EXCESS CALORIES: ICD-10-CM

## 2019-03-05 DIAGNOSIS — L97.112 NON-PRESSURE CHRONIC ULCER OF RIGHT THIGH WITH FAT LAYER EXPOSED: ICD-10-CM

## 2019-03-05 DIAGNOSIS — L03.115 CELLULITIS OF RIGHT LOWER LIMB: ICD-10-CM

## 2019-03-05 DIAGNOSIS — L97.111 NON-PRESSURE CHRONIC ULCER OF RIGHT THIGH LIMITED TO BREAKDOWN OF SKIN: ICD-10-CM

## 2019-03-14 ENCOUNTER — OUTPATIENT (OUTPATIENT)
Dept: OUTPATIENT SERVICES | Facility: HOSPITAL | Age: 74
LOS: 1 days | Discharge: HOME | End: 2019-03-14

## 2019-03-14 DIAGNOSIS — L89.90 PRESSURE ULCER OF UNSPECIFIED SITE, UNSPECIFIED STAGE: ICD-10-CM

## 2019-03-14 DIAGNOSIS — Z96.653 PRESENCE OF ARTIFICIAL KNEE JOINT, BILATERAL: Chronic | ICD-10-CM

## 2019-03-26 DIAGNOSIS — L97.112 NON-PRESSURE CHRONIC ULCER OF RIGHT THIGH WITH FAT LAYER EXPOSED: ICD-10-CM

## 2019-03-26 DIAGNOSIS — I10 ESSENTIAL (PRIMARY) HYPERTENSION: ICD-10-CM

## 2019-03-26 DIAGNOSIS — I89.0 LYMPHEDEMA, NOT ELSEWHERE CLASSIFIED: ICD-10-CM

## 2019-03-26 DIAGNOSIS — L03.115 CELLULITIS OF RIGHT LOWER LIMB: ICD-10-CM

## 2019-03-26 DIAGNOSIS — E66.01 MORBID (SEVERE) OBESITY DUE TO EXCESS CALORIES: ICD-10-CM

## 2019-03-26 DIAGNOSIS — Z79.899 OTHER LONG TERM (CURRENT) DRUG THERAPY: ICD-10-CM

## 2019-04-04 ENCOUNTER — OUTPATIENT (OUTPATIENT)
Dept: OUTPATIENT SERVICES | Facility: HOSPITAL | Age: 74
LOS: 1 days | Discharge: HOME | End: 2019-04-04

## 2019-04-04 DIAGNOSIS — Z96.653 PRESENCE OF ARTIFICIAL KNEE JOINT, BILATERAL: Chronic | ICD-10-CM

## 2019-04-04 DIAGNOSIS — L89.90 PRESSURE ULCER OF UNSPECIFIED SITE, UNSPECIFIED STAGE: ICD-10-CM

## 2019-04-10 DIAGNOSIS — L03.115 CELLULITIS OF RIGHT LOWER LIMB: ICD-10-CM

## 2019-04-10 DIAGNOSIS — I89.0 LYMPHEDEMA, NOT ELSEWHERE CLASSIFIED: ICD-10-CM

## 2019-04-10 DIAGNOSIS — E66.01 MORBID (SEVERE) OBESITY DUE TO EXCESS CALORIES: ICD-10-CM

## 2019-04-10 DIAGNOSIS — Z79.899 OTHER LONG TERM (CURRENT) DRUG THERAPY: ICD-10-CM

## 2019-04-10 DIAGNOSIS — L97.112 NON-PRESSURE CHRONIC ULCER OF RIGHT THIGH WITH FAT LAYER EXPOSED: ICD-10-CM

## 2019-04-10 DIAGNOSIS — I10 ESSENTIAL (PRIMARY) HYPERTENSION: ICD-10-CM

## 2019-04-25 ENCOUNTER — OUTPATIENT (OUTPATIENT)
Dept: OUTPATIENT SERVICES | Facility: HOSPITAL | Age: 74
LOS: 1 days | Discharge: HOME | End: 2019-04-25

## 2019-04-25 DIAGNOSIS — L89.90 PRESSURE ULCER OF UNSPECIFIED SITE, UNSPECIFIED STAGE: ICD-10-CM

## 2019-04-25 DIAGNOSIS — Z96.653 PRESENCE OF ARTIFICIAL KNEE JOINT, BILATERAL: Chronic | ICD-10-CM

## 2019-05-02 DIAGNOSIS — I89.0 LYMPHEDEMA, NOT ELSEWHERE CLASSIFIED: ICD-10-CM

## 2019-05-02 DIAGNOSIS — I10 ESSENTIAL (PRIMARY) HYPERTENSION: ICD-10-CM

## 2019-05-02 DIAGNOSIS — E66.01 MORBID (SEVERE) OBESITY DUE TO EXCESS CALORIES: ICD-10-CM

## 2019-05-02 DIAGNOSIS — L97.112 NON-PRESSURE CHRONIC ULCER OF RIGHT THIGH WITH FAT LAYER EXPOSED: ICD-10-CM

## 2019-05-02 DIAGNOSIS — Z79.899 OTHER LONG TERM (CURRENT) DRUG THERAPY: ICD-10-CM

## 2019-05-02 DIAGNOSIS — L03.115 CELLULITIS OF RIGHT LOWER LIMB: ICD-10-CM

## 2019-05-23 ENCOUNTER — OUTPATIENT (OUTPATIENT)
Dept: OUTPATIENT SERVICES | Facility: HOSPITAL | Age: 74
LOS: 1 days | Discharge: HOME | End: 2019-05-23

## 2019-05-23 DIAGNOSIS — L89.90 PRESSURE ULCER OF UNSPECIFIED SITE, UNSPECIFIED STAGE: ICD-10-CM

## 2019-05-23 DIAGNOSIS — Z96.653 PRESENCE OF ARTIFICIAL KNEE JOINT, BILATERAL: Chronic | ICD-10-CM

## 2019-06-07 DIAGNOSIS — I89.0 LYMPHEDEMA, NOT ELSEWHERE CLASSIFIED: ICD-10-CM

## 2019-06-07 DIAGNOSIS — E66.01 MORBID (SEVERE) OBESITY DUE TO EXCESS CALORIES: ICD-10-CM

## 2019-06-07 DIAGNOSIS — I10 ESSENTIAL (PRIMARY) HYPERTENSION: ICD-10-CM

## 2019-06-07 DIAGNOSIS — Z79.899 OTHER LONG TERM (CURRENT) DRUG THERAPY: ICD-10-CM

## 2019-06-07 DIAGNOSIS — L03.115 CELLULITIS OF RIGHT LOWER LIMB: ICD-10-CM

## 2019-06-07 DIAGNOSIS — L97.112 NON-PRESSURE CHRONIC ULCER OF RIGHT THIGH WITH FAT LAYER EXPOSED: ICD-10-CM

## 2019-06-20 ENCOUNTER — OUTPATIENT (OUTPATIENT)
Dept: OUTPATIENT SERVICES | Facility: HOSPITAL | Age: 74
LOS: 1 days | Discharge: HOME | End: 2019-06-20

## 2019-06-20 DIAGNOSIS — Z96.653 PRESENCE OF ARTIFICIAL KNEE JOINT, BILATERAL: Chronic | ICD-10-CM

## 2019-06-20 DIAGNOSIS — L89.90 PRESSURE ULCER OF UNSPECIFIED SITE, UNSPECIFIED STAGE: ICD-10-CM

## 2019-06-25 DIAGNOSIS — Z79.899 OTHER LONG TERM (CURRENT) DRUG THERAPY: ICD-10-CM

## 2019-06-25 DIAGNOSIS — L97.112 NON-PRESSURE CHRONIC ULCER OF RIGHT THIGH WITH FAT LAYER EXPOSED: ICD-10-CM

## 2019-06-25 DIAGNOSIS — I89.0 LYMPHEDEMA, NOT ELSEWHERE CLASSIFIED: ICD-10-CM

## 2019-06-25 DIAGNOSIS — I10 ESSENTIAL (PRIMARY) HYPERTENSION: ICD-10-CM

## 2019-06-25 DIAGNOSIS — E66.01 MORBID (SEVERE) OBESITY DUE TO EXCESS CALORIES: ICD-10-CM

## 2019-07-18 ENCOUNTER — OUTPATIENT (OUTPATIENT)
Dept: OUTPATIENT SERVICES | Facility: HOSPITAL | Age: 74
LOS: 1 days | Discharge: HOME | End: 2019-07-18

## 2019-07-18 DIAGNOSIS — L89.90 PRESSURE ULCER OF UNSPECIFIED SITE, UNSPECIFIED STAGE: ICD-10-CM

## 2019-07-18 DIAGNOSIS — I89.0 LYMPHEDEMA, NOT ELSEWHERE CLASSIFIED: ICD-10-CM

## 2019-07-18 DIAGNOSIS — L03.115 CELLULITIS OF RIGHT LOWER LIMB: ICD-10-CM

## 2019-07-18 DIAGNOSIS — Z79.899 OTHER LONG TERM (CURRENT) DRUG THERAPY: ICD-10-CM

## 2019-07-18 DIAGNOSIS — L97.112 NON-PRESSURE CHRONIC ULCER OF RIGHT THIGH WITH FAT LAYER EXPOSED: ICD-10-CM

## 2019-07-18 DIAGNOSIS — E66.01 MORBID (SEVERE) OBESITY DUE TO EXCESS CALORIES: ICD-10-CM

## 2019-07-18 DIAGNOSIS — I10 ESSENTIAL (PRIMARY) HYPERTENSION: ICD-10-CM

## 2019-07-18 DIAGNOSIS — Z96.653 PRESENCE OF ARTIFICIAL KNEE JOINT, BILATERAL: Chronic | ICD-10-CM

## 2019-08-15 ENCOUNTER — OUTPATIENT (OUTPATIENT)
Dept: OUTPATIENT SERVICES | Facility: HOSPITAL | Age: 74
LOS: 1 days | Discharge: HOME | End: 2019-08-15

## 2019-08-15 DIAGNOSIS — L89.90 PRESSURE ULCER OF UNSPECIFIED SITE, UNSPECIFIED STAGE: ICD-10-CM

## 2019-08-15 DIAGNOSIS — Z96.653 PRESENCE OF ARTIFICIAL KNEE JOINT, BILATERAL: Chronic | ICD-10-CM

## 2019-08-30 DIAGNOSIS — I10 ESSENTIAL (PRIMARY) HYPERTENSION: ICD-10-CM

## 2019-08-30 DIAGNOSIS — E66.01 MORBID (SEVERE) OBESITY DUE TO EXCESS CALORIES: ICD-10-CM

## 2019-08-30 DIAGNOSIS — I89.0 LYMPHEDEMA, NOT ELSEWHERE CLASSIFIED: ICD-10-CM

## 2019-08-30 DIAGNOSIS — L03.115 CELLULITIS OF RIGHT LOWER LIMB: ICD-10-CM

## 2019-08-30 DIAGNOSIS — L97.112 NON-PRESSURE CHRONIC ULCER OF RIGHT THIGH WITH FAT LAYER EXPOSED: ICD-10-CM

## 2019-08-30 DIAGNOSIS — Z79.899 OTHER LONG TERM (CURRENT) DRUG THERAPY: ICD-10-CM

## 2019-09-05 ENCOUNTER — OUTPATIENT (OUTPATIENT)
Dept: OUTPATIENT SERVICES | Facility: HOSPITAL | Age: 74
LOS: 1 days | Discharge: HOME | End: 2019-09-05

## 2019-09-05 DIAGNOSIS — Z96.653 PRESENCE OF ARTIFICIAL KNEE JOINT, BILATERAL: Chronic | ICD-10-CM

## 2019-09-05 DIAGNOSIS — L89.90 PRESSURE ULCER OF UNSPECIFIED SITE, UNSPECIFIED STAGE: ICD-10-CM

## 2019-09-13 DIAGNOSIS — L03.115 CELLULITIS OF RIGHT LOWER LIMB: ICD-10-CM

## 2019-09-13 DIAGNOSIS — I89.0 LYMPHEDEMA, NOT ELSEWHERE CLASSIFIED: ICD-10-CM

## 2019-09-13 DIAGNOSIS — Z79.899 OTHER LONG TERM (CURRENT) DRUG THERAPY: ICD-10-CM

## 2019-09-13 DIAGNOSIS — I10 ESSENTIAL (PRIMARY) HYPERTENSION: ICD-10-CM

## 2019-09-13 DIAGNOSIS — E66.01 MORBID (SEVERE) OBESITY DUE TO EXCESS CALORIES: ICD-10-CM

## 2019-09-13 DIAGNOSIS — L97.112 NON-PRESSURE CHRONIC ULCER OF RIGHT THIGH WITH FAT LAYER EXPOSED: ICD-10-CM

## 2019-09-26 ENCOUNTER — OUTPATIENT (OUTPATIENT)
Dept: OUTPATIENT SERVICES | Facility: HOSPITAL | Age: 74
LOS: 1 days | Discharge: HOME | End: 2019-09-26

## 2019-09-26 DIAGNOSIS — L89.90 PRESSURE ULCER OF UNSPECIFIED SITE, UNSPECIFIED STAGE: ICD-10-CM

## 2019-09-26 DIAGNOSIS — Z96.653 PRESENCE OF ARTIFICIAL KNEE JOINT, BILATERAL: Chronic | ICD-10-CM

## 2019-10-02 DIAGNOSIS — L03.115 CELLULITIS OF RIGHT LOWER LIMB: ICD-10-CM

## 2019-10-02 DIAGNOSIS — Z79.899 OTHER LONG TERM (CURRENT) DRUG THERAPY: ICD-10-CM

## 2019-10-02 DIAGNOSIS — I10 ESSENTIAL (PRIMARY) HYPERTENSION: ICD-10-CM

## 2019-10-02 DIAGNOSIS — L97.112 NON-PRESSURE CHRONIC ULCER OF RIGHT THIGH WITH FAT LAYER EXPOSED: ICD-10-CM

## 2019-10-02 DIAGNOSIS — I89.0 LYMPHEDEMA, NOT ELSEWHERE CLASSIFIED: ICD-10-CM

## 2019-10-02 DIAGNOSIS — E66.01 MORBID (SEVERE) OBESITY DUE TO EXCESS CALORIES: ICD-10-CM

## 2019-10-24 ENCOUNTER — OUTPATIENT (OUTPATIENT)
Dept: OUTPATIENT SERVICES | Facility: HOSPITAL | Age: 74
LOS: 1 days | Discharge: HOME | End: 2019-10-24

## 2019-10-24 DIAGNOSIS — L89.90 PRESSURE ULCER OF UNSPECIFIED SITE, UNSPECIFIED STAGE: ICD-10-CM

## 2019-10-24 DIAGNOSIS — Z96.653 PRESENCE OF ARTIFICIAL KNEE JOINT, BILATERAL: Chronic | ICD-10-CM

## 2019-11-01 DIAGNOSIS — L97.112 NON-PRESSURE CHRONIC ULCER OF RIGHT THIGH WITH FAT LAYER EXPOSED: ICD-10-CM

## 2019-11-01 DIAGNOSIS — L03.115 CELLULITIS OF RIGHT LOWER LIMB: ICD-10-CM

## 2019-11-01 DIAGNOSIS — I89.0 LYMPHEDEMA, NOT ELSEWHERE CLASSIFIED: ICD-10-CM

## 2019-11-01 DIAGNOSIS — Z79.899 OTHER LONG TERM (CURRENT) DRUG THERAPY: ICD-10-CM

## 2019-11-01 DIAGNOSIS — E66.01 MORBID (SEVERE) OBESITY DUE TO EXCESS CALORIES: ICD-10-CM

## 2019-11-01 DIAGNOSIS — I10 ESSENTIAL (PRIMARY) HYPERTENSION: ICD-10-CM

## 2019-11-14 ENCOUNTER — OUTPATIENT (OUTPATIENT)
Dept: OUTPATIENT SERVICES | Facility: HOSPITAL | Age: 74
LOS: 1 days | Discharge: HOME | End: 2019-11-14

## 2019-11-14 DIAGNOSIS — Z96.653 PRESENCE OF ARTIFICIAL KNEE JOINT, BILATERAL: Chronic | ICD-10-CM

## 2019-11-14 DIAGNOSIS — L89.90 PRESSURE ULCER OF UNSPECIFIED SITE, UNSPECIFIED STAGE: ICD-10-CM

## 2019-11-29 DIAGNOSIS — E66.01 MORBID (SEVERE) OBESITY DUE TO EXCESS CALORIES: ICD-10-CM

## 2019-11-29 DIAGNOSIS — Z79.899 OTHER LONG TERM (CURRENT) DRUG THERAPY: ICD-10-CM

## 2019-11-29 DIAGNOSIS — I10 ESSENTIAL (PRIMARY) HYPERTENSION: ICD-10-CM

## 2019-11-29 DIAGNOSIS — L03.115 CELLULITIS OF RIGHT LOWER LIMB: ICD-10-CM

## 2019-11-29 DIAGNOSIS — L97.112 NON-PRESSURE CHRONIC ULCER OF RIGHT THIGH WITH FAT LAYER EXPOSED: ICD-10-CM

## 2019-11-29 DIAGNOSIS — I89.0 LYMPHEDEMA, NOT ELSEWHERE CLASSIFIED: ICD-10-CM

## 2019-12-05 ENCOUNTER — OUTPATIENT (OUTPATIENT)
Dept: OUTPATIENT SERVICES | Facility: HOSPITAL | Age: 74
LOS: 1 days | Discharge: HOME | End: 2019-12-05

## 2019-12-05 DIAGNOSIS — Z96.653 PRESENCE OF ARTIFICIAL KNEE JOINT, BILATERAL: Chronic | ICD-10-CM

## 2019-12-05 DIAGNOSIS — L89.90 PRESSURE ULCER OF UNSPECIFIED SITE, UNSPECIFIED STAGE: ICD-10-CM

## 2019-12-20 DIAGNOSIS — L03.115 CELLULITIS OF RIGHT LOWER LIMB: ICD-10-CM

## 2019-12-20 DIAGNOSIS — I89.0 LYMPHEDEMA, NOT ELSEWHERE CLASSIFIED: ICD-10-CM

## 2019-12-20 DIAGNOSIS — I10 ESSENTIAL (PRIMARY) HYPERTENSION: ICD-10-CM

## 2019-12-20 DIAGNOSIS — E66.01 MORBID (SEVERE) OBESITY DUE TO EXCESS CALORIES: ICD-10-CM

## 2019-12-20 DIAGNOSIS — Z79.899 OTHER LONG TERM (CURRENT) DRUG THERAPY: ICD-10-CM

## 2019-12-20 DIAGNOSIS — L97.112 NON-PRESSURE CHRONIC ULCER OF RIGHT THIGH WITH FAT LAYER EXPOSED: ICD-10-CM

## 2020-01-02 ENCOUNTER — OUTPATIENT (OUTPATIENT)
Dept: OUTPATIENT SERVICES | Facility: HOSPITAL | Age: 75
LOS: 1 days | Discharge: HOME | End: 2020-01-02

## 2020-01-02 DIAGNOSIS — L89.90 PRESSURE ULCER OF UNSPECIFIED SITE, UNSPECIFIED STAGE: ICD-10-CM

## 2020-01-02 DIAGNOSIS — Z96.653 PRESENCE OF ARTIFICIAL KNEE JOINT, BILATERAL: Chronic | ICD-10-CM

## 2020-01-28 DIAGNOSIS — Z79.899 OTHER LONG TERM (CURRENT) DRUG THERAPY: ICD-10-CM

## 2020-01-28 DIAGNOSIS — I10 ESSENTIAL (PRIMARY) HYPERTENSION: ICD-10-CM

## 2020-01-28 DIAGNOSIS — I89.0 LYMPHEDEMA, NOT ELSEWHERE CLASSIFIED: ICD-10-CM

## 2020-01-28 DIAGNOSIS — E66.01 MORBID (SEVERE) OBESITY DUE TO EXCESS CALORIES: ICD-10-CM

## 2020-01-28 DIAGNOSIS — L97.112 NON-PRESSURE CHRONIC ULCER OF RIGHT THIGH WITH FAT LAYER EXPOSED: ICD-10-CM

## 2020-01-30 ENCOUNTER — OUTPATIENT (OUTPATIENT)
Dept: OUTPATIENT SERVICES | Facility: HOSPITAL | Age: 75
LOS: 1 days | Discharge: HOME | End: 2020-01-30

## 2020-01-30 DIAGNOSIS — Z96.653 PRESENCE OF ARTIFICIAL KNEE JOINT, BILATERAL: Chronic | ICD-10-CM

## 2020-01-30 DIAGNOSIS — L89.90 PRESSURE ULCER OF UNSPECIFIED SITE, UNSPECIFIED STAGE: ICD-10-CM

## 2020-02-06 ENCOUNTER — OUTPATIENT (OUTPATIENT)
Dept: OUTPATIENT SERVICES | Facility: HOSPITAL | Age: 75
LOS: 1 days | Discharge: HOME | End: 2020-02-06

## 2020-02-06 DIAGNOSIS — I89.0 LYMPHEDEMA, NOT ELSEWHERE CLASSIFIED: ICD-10-CM

## 2020-02-06 DIAGNOSIS — L97.112 NON-PRESSURE CHRONIC ULCER OF RIGHT THIGH WITH FAT LAYER EXPOSED: ICD-10-CM

## 2020-02-06 DIAGNOSIS — I10 ESSENTIAL (PRIMARY) HYPERTENSION: ICD-10-CM

## 2020-02-06 DIAGNOSIS — Z79.899 OTHER LONG TERM (CURRENT) DRUG THERAPY: ICD-10-CM

## 2020-02-06 DIAGNOSIS — L89.90 PRESSURE ULCER OF UNSPECIFIED SITE, UNSPECIFIED STAGE: ICD-10-CM

## 2020-02-06 DIAGNOSIS — L03.115 CELLULITIS OF RIGHT LOWER LIMB: ICD-10-CM

## 2020-02-06 DIAGNOSIS — E66.01 MORBID (SEVERE) OBESITY DUE TO EXCESS CALORIES: ICD-10-CM

## 2020-02-06 DIAGNOSIS — Z96.653 PRESENCE OF ARTIFICIAL KNEE JOINT, BILATERAL: Chronic | ICD-10-CM

## 2020-02-13 ENCOUNTER — OUTPATIENT (OUTPATIENT)
Dept: OUTPATIENT SERVICES | Facility: HOSPITAL | Age: 75
LOS: 1 days | Discharge: HOME | End: 2020-02-13

## 2020-02-13 DIAGNOSIS — Z96.653 PRESENCE OF ARTIFICIAL KNEE JOINT, BILATERAL: Chronic | ICD-10-CM

## 2020-02-13 DIAGNOSIS — L89.90 PRESSURE ULCER OF UNSPECIFIED SITE, UNSPECIFIED STAGE: ICD-10-CM

## 2020-02-20 ENCOUNTER — OUTPATIENT (OUTPATIENT)
Dept: OUTPATIENT SERVICES | Facility: HOSPITAL | Age: 75
LOS: 1 days | Discharge: HOME | End: 2020-02-20

## 2020-02-20 DIAGNOSIS — Z96.653 PRESENCE OF ARTIFICIAL KNEE JOINT, BILATERAL: Chronic | ICD-10-CM

## 2020-02-20 DIAGNOSIS — L89.90 PRESSURE ULCER OF UNSPECIFIED SITE, UNSPECIFIED STAGE: ICD-10-CM

## 2020-02-25 DIAGNOSIS — Z79.899 OTHER LONG TERM (CURRENT) DRUG THERAPY: ICD-10-CM

## 2020-02-25 DIAGNOSIS — I10 ESSENTIAL (PRIMARY) HYPERTENSION: ICD-10-CM

## 2020-02-25 DIAGNOSIS — L03.115 CELLULITIS OF RIGHT LOWER LIMB: ICD-10-CM

## 2020-02-25 DIAGNOSIS — I89.0 LYMPHEDEMA, NOT ELSEWHERE CLASSIFIED: ICD-10-CM

## 2020-02-25 DIAGNOSIS — L97.112 NON-PRESSURE CHRONIC ULCER OF RIGHT THIGH WITH FAT LAYER EXPOSED: ICD-10-CM

## 2020-02-25 DIAGNOSIS — Z82.49 FAMILY HISTORY OF ISCHEMIC HEART DISEASE AND OTHER DISEASES OF THE CIRCULATORY SYSTEM: ICD-10-CM

## 2020-02-25 DIAGNOSIS — Z84.1 FAMILY HISTORY OF DISORDERS OF KIDNEY AND URETER: ICD-10-CM

## 2020-02-25 DIAGNOSIS — E66.01 MORBID (SEVERE) OBESITY DUE TO EXCESS CALORIES: ICD-10-CM

## 2020-02-27 ENCOUNTER — OUTPATIENT (OUTPATIENT)
Dept: OUTPATIENT SERVICES | Facility: HOSPITAL | Age: 75
LOS: 1 days | Discharge: HOME | End: 2020-02-27

## 2020-02-27 DIAGNOSIS — Z96.653 PRESENCE OF ARTIFICIAL KNEE JOINT, BILATERAL: Chronic | ICD-10-CM

## 2020-02-27 DIAGNOSIS — L89.90 PRESSURE ULCER OF UNSPECIFIED SITE, UNSPECIFIED STAGE: ICD-10-CM

## 2020-03-03 DIAGNOSIS — Z79.899 OTHER LONG TERM (CURRENT) DRUG THERAPY: ICD-10-CM

## 2020-03-03 DIAGNOSIS — L97.112 NON-PRESSURE CHRONIC ULCER OF RIGHT THIGH WITH FAT LAYER EXPOSED: ICD-10-CM

## 2020-03-03 DIAGNOSIS — E66.01 MORBID (SEVERE) OBESITY DUE TO EXCESS CALORIES: ICD-10-CM

## 2020-03-03 DIAGNOSIS — I89.0 LYMPHEDEMA, NOT ELSEWHERE CLASSIFIED: ICD-10-CM

## 2020-03-03 DIAGNOSIS — I10 ESSENTIAL (PRIMARY) HYPERTENSION: ICD-10-CM

## 2020-03-03 DIAGNOSIS — L03.115 CELLULITIS OF RIGHT LOWER LIMB: ICD-10-CM

## 2020-03-05 ENCOUNTER — OUTPATIENT (OUTPATIENT)
Dept: OUTPATIENT SERVICES | Facility: HOSPITAL | Age: 75
LOS: 1 days | Discharge: HOME | End: 2020-03-05

## 2020-03-05 DIAGNOSIS — L03.115 CELLULITIS OF RIGHT LOWER LIMB: ICD-10-CM

## 2020-03-05 DIAGNOSIS — Z96.653 PRESENCE OF ARTIFICIAL KNEE JOINT, BILATERAL: Chronic | ICD-10-CM

## 2020-03-05 DIAGNOSIS — L97.112 NON-PRESSURE CHRONIC ULCER OF RIGHT THIGH WITH FAT LAYER EXPOSED: ICD-10-CM

## 2020-03-05 DIAGNOSIS — E66.01 MORBID (SEVERE) OBESITY DUE TO EXCESS CALORIES: ICD-10-CM

## 2020-03-05 DIAGNOSIS — I10 ESSENTIAL (PRIMARY) HYPERTENSION: ICD-10-CM

## 2020-03-05 DIAGNOSIS — L89.90 PRESSURE ULCER OF UNSPECIFIED SITE, UNSPECIFIED STAGE: ICD-10-CM

## 2020-03-05 DIAGNOSIS — I89.0 LYMPHEDEMA, NOT ELSEWHERE CLASSIFIED: ICD-10-CM

## 2020-03-05 DIAGNOSIS — Z79.899 OTHER LONG TERM (CURRENT) DRUG THERAPY: ICD-10-CM

## 2020-03-13 DIAGNOSIS — L03.115 CELLULITIS OF RIGHT LOWER LIMB: ICD-10-CM

## 2020-03-13 DIAGNOSIS — I10 ESSENTIAL (PRIMARY) HYPERTENSION: ICD-10-CM

## 2020-03-13 DIAGNOSIS — E66.01 MORBID (SEVERE) OBESITY DUE TO EXCESS CALORIES: ICD-10-CM

## 2020-03-13 DIAGNOSIS — L97.112 NON-PRESSURE CHRONIC ULCER OF RIGHT THIGH WITH FAT LAYER EXPOSED: ICD-10-CM

## 2020-03-13 DIAGNOSIS — Z79.899 OTHER LONG TERM (CURRENT) DRUG THERAPY: ICD-10-CM

## 2020-03-13 DIAGNOSIS — I89.0 LYMPHEDEMA, NOT ELSEWHERE CLASSIFIED: ICD-10-CM

## 2020-06-04 ENCOUNTER — OUTPATIENT (OUTPATIENT)
Dept: OUTPATIENT SERVICES | Facility: HOSPITAL | Age: 75
LOS: 1 days | Discharge: HOME | End: 2020-06-04

## 2020-06-04 DIAGNOSIS — L89.90 PRESSURE ULCER OF UNSPECIFIED SITE, UNSPECIFIED STAGE: ICD-10-CM

## 2020-06-04 DIAGNOSIS — Z96.653 PRESENCE OF ARTIFICIAL KNEE JOINT, BILATERAL: Chronic | ICD-10-CM

## 2020-06-04 LAB
GRAM STN FLD: SIGNIFICANT CHANGE UP
SPECIMEN SOURCE: SIGNIFICANT CHANGE UP

## 2020-06-05 DIAGNOSIS — I89.0 LYMPHEDEMA, NOT ELSEWHERE CLASSIFIED: ICD-10-CM

## 2020-06-05 DIAGNOSIS — L97.112 NON-PRESSURE CHRONIC ULCER OF RIGHT THIGH WITH FAT LAYER EXPOSED: ICD-10-CM

## 2020-06-05 DIAGNOSIS — Z79.899 OTHER LONG TERM (CURRENT) DRUG THERAPY: ICD-10-CM

## 2020-06-05 DIAGNOSIS — E66.9 OBESITY, UNSPECIFIED: ICD-10-CM

## 2020-06-05 DIAGNOSIS — L03.115 CELLULITIS OF RIGHT LOWER LIMB: ICD-10-CM

## 2020-06-05 DIAGNOSIS — I10 ESSENTIAL (PRIMARY) HYPERTENSION: ICD-10-CM

## 2020-06-06 LAB
-  AMIKACIN: SIGNIFICANT CHANGE UP
-  AMOXICILLIN/CLAVULANIC ACID: SIGNIFICANT CHANGE UP
-  AMPICILLIN/SULBACTAM: SIGNIFICANT CHANGE UP
-  AMPICILLIN: SIGNIFICANT CHANGE UP
-  AZTREONAM: SIGNIFICANT CHANGE UP
-  CEFAZOLIN: SIGNIFICANT CHANGE UP
-  CEFEPIME: SIGNIFICANT CHANGE UP
-  CEFOXITIN: SIGNIFICANT CHANGE UP
-  CEFTAZIDIME: SIGNIFICANT CHANGE UP
-  CEFTRIAXONE: SIGNIFICANT CHANGE UP
-  CIPROFLOXACIN: SIGNIFICANT CHANGE UP
-  ERTAPENEM: SIGNIFICANT CHANGE UP
-  GENTAMICIN: SIGNIFICANT CHANGE UP
-  IMIPENEM: SIGNIFICANT CHANGE UP
-  LEVOFLOXACIN: SIGNIFICANT CHANGE UP
-  LEVOFLOXACIN: SIGNIFICANT CHANGE UP
-  MEROPENEM: SIGNIFICANT CHANGE UP
-  PIPERACILLIN/TAZOBACTAM: SIGNIFICANT CHANGE UP
-  TOBRAMYCIN: SIGNIFICANT CHANGE UP
-  TRIMETHOPRIM/SULFAMETHOXAZOLE: SIGNIFICANT CHANGE UP
-  TRIMETHOPRIM/SULFAMETHOXAZOLE: SIGNIFICANT CHANGE UP
CULTURE RESULTS: SIGNIFICANT CHANGE UP
GRAM STN FLD: SIGNIFICANT CHANGE UP
METHOD TYPE: SIGNIFICANT CHANGE UP
METHOD TYPE: SIGNIFICANT CHANGE UP
ORGANISM # SPEC MICROSCOPIC CNT: SIGNIFICANT CHANGE UP
SPECIMEN SOURCE: SIGNIFICANT CHANGE UP

## 2020-06-25 ENCOUNTER — OUTPATIENT (OUTPATIENT)
Dept: OUTPATIENT SERVICES | Facility: HOSPITAL | Age: 75
LOS: 1 days | Discharge: HOME | End: 2020-06-25

## 2020-06-25 DIAGNOSIS — L89.90 PRESSURE ULCER OF UNSPECIFIED SITE, UNSPECIFIED STAGE: ICD-10-CM

## 2020-06-25 DIAGNOSIS — Z96.653 PRESENCE OF ARTIFICIAL KNEE JOINT, BILATERAL: Chronic | ICD-10-CM

## 2020-07-02 DIAGNOSIS — I89.0 LYMPHEDEMA, NOT ELSEWHERE CLASSIFIED: ICD-10-CM

## 2020-07-02 DIAGNOSIS — Z79.899 OTHER LONG TERM (CURRENT) DRUG THERAPY: ICD-10-CM

## 2020-07-02 DIAGNOSIS — L97.112 NON-PRESSURE CHRONIC ULCER OF RIGHT THIGH WITH FAT LAYER EXPOSED: ICD-10-CM

## 2020-07-02 DIAGNOSIS — L03.115 CELLULITIS OF RIGHT LOWER LIMB: ICD-10-CM

## 2020-07-02 DIAGNOSIS — I10 ESSENTIAL (PRIMARY) HYPERTENSION: ICD-10-CM

## 2020-07-02 DIAGNOSIS — E66.9 OBESITY, UNSPECIFIED: ICD-10-CM

## 2020-07-23 ENCOUNTER — OUTPATIENT (OUTPATIENT)
Dept: OUTPATIENT SERVICES | Facility: HOSPITAL | Age: 75
LOS: 1 days | Discharge: HOME | End: 2020-07-23

## 2020-07-23 DIAGNOSIS — L89.90 PRESSURE ULCER OF UNSPECIFIED SITE, UNSPECIFIED STAGE: ICD-10-CM

## 2020-07-23 DIAGNOSIS — Z96.653 PRESENCE OF ARTIFICIAL KNEE JOINT, BILATERAL: Chronic | ICD-10-CM

## 2020-08-04 DIAGNOSIS — I10 ESSENTIAL (PRIMARY) HYPERTENSION: ICD-10-CM

## 2020-08-04 DIAGNOSIS — E66.01 MORBID (SEVERE) OBESITY DUE TO EXCESS CALORIES: ICD-10-CM

## 2020-08-04 DIAGNOSIS — L03.115 CELLULITIS OF RIGHT LOWER LIMB: ICD-10-CM

## 2020-08-04 DIAGNOSIS — I89.0 LYMPHEDEMA, NOT ELSEWHERE CLASSIFIED: ICD-10-CM

## 2020-08-04 DIAGNOSIS — Z79.899 OTHER LONG TERM (CURRENT) DRUG THERAPY: ICD-10-CM

## 2020-08-04 DIAGNOSIS — L97.112 NON-PRESSURE CHRONIC ULCER OF RIGHT THIGH WITH FAT LAYER EXPOSED: ICD-10-CM

## 2020-08-20 ENCOUNTER — OUTPATIENT (OUTPATIENT)
Dept: OUTPATIENT SERVICES | Facility: HOSPITAL | Age: 75
LOS: 1 days | Discharge: HOME | End: 2020-08-20

## 2020-08-20 DIAGNOSIS — Z96.653 PRESENCE OF ARTIFICIAL KNEE JOINT, BILATERAL: Chronic | ICD-10-CM

## 2020-08-20 DIAGNOSIS — L89.90 PRESSURE ULCER OF UNSPECIFIED SITE, UNSPECIFIED STAGE: ICD-10-CM

## 2020-08-25 DIAGNOSIS — E66.01 MORBID (SEVERE) OBESITY DUE TO EXCESS CALORIES: ICD-10-CM

## 2020-08-25 DIAGNOSIS — I89.0 LYMPHEDEMA, NOT ELSEWHERE CLASSIFIED: ICD-10-CM

## 2020-08-25 DIAGNOSIS — I10 ESSENTIAL (PRIMARY) HYPERTENSION: ICD-10-CM

## 2020-08-25 DIAGNOSIS — L97.112 NON-PRESSURE CHRONIC ULCER OF RIGHT THIGH WITH FAT LAYER EXPOSED: ICD-10-CM

## 2020-08-25 DIAGNOSIS — L03.115 CELLULITIS OF RIGHT LOWER LIMB: ICD-10-CM

## 2020-08-25 DIAGNOSIS — Z79.899 OTHER LONG TERM (CURRENT) DRUG THERAPY: ICD-10-CM

## 2020-09-17 ENCOUNTER — OUTPATIENT (OUTPATIENT)
Dept: OUTPATIENT SERVICES | Facility: HOSPITAL | Age: 75
LOS: 1 days | Discharge: HOME | End: 2020-09-17

## 2020-09-17 DIAGNOSIS — L89.90 PRESSURE ULCER OF UNSPECIFIED SITE, UNSPECIFIED STAGE: ICD-10-CM

## 2020-09-17 DIAGNOSIS — Z96.653 PRESENCE OF ARTIFICIAL KNEE JOINT, BILATERAL: Chronic | ICD-10-CM

## 2020-09-23 DIAGNOSIS — I10 ESSENTIAL (PRIMARY) HYPERTENSION: ICD-10-CM

## 2020-09-23 DIAGNOSIS — I89.0 LYMPHEDEMA, NOT ELSEWHERE CLASSIFIED: ICD-10-CM

## 2020-09-23 DIAGNOSIS — E66.01 MORBID (SEVERE) OBESITY DUE TO EXCESS CALORIES: ICD-10-CM

## 2020-09-23 DIAGNOSIS — L03.115 CELLULITIS OF RIGHT LOWER LIMB: ICD-10-CM

## 2020-09-23 DIAGNOSIS — Z79.899 OTHER LONG TERM (CURRENT) DRUG THERAPY: ICD-10-CM

## 2020-09-23 DIAGNOSIS — L97.112 NON-PRESSURE CHRONIC ULCER OF RIGHT THIGH WITH FAT LAYER EXPOSED: ICD-10-CM

## 2020-10-15 ENCOUNTER — OUTPATIENT (OUTPATIENT)
Dept: OUTPATIENT SERVICES | Facility: HOSPITAL | Age: 75
LOS: 1 days | Discharge: HOME | End: 2020-10-15

## 2020-10-15 DIAGNOSIS — L89.90 PRESSURE ULCER OF UNSPECIFIED SITE, UNSPECIFIED STAGE: ICD-10-CM

## 2020-10-15 DIAGNOSIS — Z96.653 PRESENCE OF ARTIFICIAL KNEE JOINT, BILATERAL: Chronic | ICD-10-CM

## 2020-10-22 DIAGNOSIS — L97.112 NON-PRESSURE CHRONIC ULCER OF RIGHT THIGH WITH FAT LAYER EXPOSED: ICD-10-CM

## 2020-10-22 DIAGNOSIS — E66.01 MORBID (SEVERE) OBESITY DUE TO EXCESS CALORIES: ICD-10-CM

## 2020-10-22 DIAGNOSIS — L03.115 CELLULITIS OF RIGHT LOWER LIMB: ICD-10-CM

## 2020-10-22 DIAGNOSIS — I89.0 LYMPHEDEMA, NOT ELSEWHERE CLASSIFIED: ICD-10-CM

## 2020-10-22 DIAGNOSIS — Z79.899 OTHER LONG TERM (CURRENT) DRUG THERAPY: ICD-10-CM

## 2020-10-22 DIAGNOSIS — I10 ESSENTIAL (PRIMARY) HYPERTENSION: ICD-10-CM

## 2020-11-05 ENCOUNTER — OUTPATIENT (OUTPATIENT)
Dept: OUTPATIENT SERVICES | Facility: HOSPITAL | Age: 75
LOS: 1 days | Discharge: HOME | End: 2020-11-05

## 2020-11-05 DIAGNOSIS — L89.90 PRESSURE ULCER OF UNSPECIFIED SITE, UNSPECIFIED STAGE: ICD-10-CM

## 2020-11-05 DIAGNOSIS — Z96.653 PRESENCE OF ARTIFICIAL KNEE JOINT, BILATERAL: Chronic | ICD-10-CM

## 2020-11-11 DIAGNOSIS — L03.115 CELLULITIS OF RIGHT LOWER LIMB: ICD-10-CM

## 2020-11-11 DIAGNOSIS — I89.0 LYMPHEDEMA, NOT ELSEWHERE CLASSIFIED: ICD-10-CM

## 2020-11-11 DIAGNOSIS — Z79.899 OTHER LONG TERM (CURRENT) DRUG THERAPY: ICD-10-CM

## 2020-11-11 DIAGNOSIS — L97.112 NON-PRESSURE CHRONIC ULCER OF RIGHT THIGH WITH FAT LAYER EXPOSED: ICD-10-CM

## 2020-11-11 DIAGNOSIS — I10 ESSENTIAL (PRIMARY) HYPERTENSION: ICD-10-CM

## 2020-11-11 DIAGNOSIS — E66.1 DRUG-INDUCED OBESITY: ICD-10-CM

## 2020-12-03 ENCOUNTER — OUTPATIENT (OUTPATIENT)
Dept: OUTPATIENT SERVICES | Facility: HOSPITAL | Age: 75
LOS: 1 days | Discharge: HOME | End: 2020-12-03

## 2020-12-03 DIAGNOSIS — Z96.653 PRESENCE OF ARTIFICIAL KNEE JOINT, BILATERAL: Chronic | ICD-10-CM

## 2020-12-03 DIAGNOSIS — L89.90 PRESSURE ULCER OF UNSPECIFIED SITE, UNSPECIFIED STAGE: ICD-10-CM

## 2020-12-04 DIAGNOSIS — I89.0 LYMPHEDEMA, NOT ELSEWHERE CLASSIFIED: ICD-10-CM

## 2020-12-04 DIAGNOSIS — L97.112 NON-PRESSURE CHRONIC ULCER OF RIGHT THIGH WITH FAT LAYER EXPOSED: ICD-10-CM

## 2020-12-04 DIAGNOSIS — E66.01 MORBID (SEVERE) OBESITY DUE TO EXCESS CALORIES: ICD-10-CM

## 2020-12-04 DIAGNOSIS — L03.115 CELLULITIS OF RIGHT LOWER LIMB: ICD-10-CM

## 2020-12-04 DIAGNOSIS — Z79.899 OTHER LONG TERM (CURRENT) DRUG THERAPY: ICD-10-CM

## 2020-12-04 DIAGNOSIS — I10 ESSENTIAL (PRIMARY) HYPERTENSION: ICD-10-CM

## 2020-12-05 ENCOUNTER — INPATIENT (INPATIENT)
Facility: HOSPITAL | Age: 75
LOS: 15 days | Discharge: NURSING, REHAB OR LONG-TERM CARE | End: 2020-12-21
Attending: INTERNAL MEDICINE | Admitting: INTERNAL MEDICINE
Payer: MEDICARE

## 2020-12-05 VITALS
SYSTOLIC BLOOD PRESSURE: 113 MMHG | OXYGEN SATURATION: 98 % | TEMPERATURE: 99 F | DIASTOLIC BLOOD PRESSURE: 56 MMHG | HEART RATE: 100 BPM | WEIGHT: 293 LBS | RESPIRATION RATE: 17 BRPM | HEIGHT: 63 IN

## 2020-12-05 DIAGNOSIS — Z96.653 PRESENCE OF ARTIFICIAL KNEE JOINT, BILATERAL: Chronic | ICD-10-CM

## 2020-12-05 LAB
ANION GAP SERPL CALC-SCNC: 8 MMOL/L — SIGNIFICANT CHANGE UP (ref 5–17)
APTT BLD: 33.2 SEC — SIGNIFICANT CHANGE UP (ref 27.5–35.5)
BUN SERPL-MCNC: 20 MG/DL — SIGNIFICANT CHANGE UP (ref 7–23)
CALCIUM SERPL-MCNC: 8.7 MG/DL — SIGNIFICANT CHANGE UP (ref 8.5–10.1)
CHLORIDE SERPL-SCNC: 106 MMOL/L — SIGNIFICANT CHANGE UP (ref 96–108)
CO2 SERPL-SCNC: 25 MMOL/L — SIGNIFICANT CHANGE UP (ref 22–31)
CREAT SERPL-MCNC: 1.11 MG/DL — SIGNIFICANT CHANGE UP (ref 0.5–1.3)
GLUCOSE SERPL-MCNC: 91 MG/DL — SIGNIFICANT CHANGE UP (ref 70–99)
HCT VFR BLD CALC: 33.9 % — LOW (ref 34.5–45)
HGB BLD-MCNC: 10.7 G/DL — LOW (ref 11.5–15.5)
INR BLD: 1.69 RATIO — HIGH (ref 0.88–1.16)
MCHC RBC-ENTMCNC: 28 PG — SIGNIFICANT CHANGE UP (ref 27–34)
MCHC RBC-ENTMCNC: 31.6 GM/DL — LOW (ref 32–36)
MCV RBC AUTO: 88.7 FL — SIGNIFICANT CHANGE UP (ref 80–100)
NRBC # BLD: 0 /100 WBCS — SIGNIFICANT CHANGE UP (ref 0–0)
PLATELET # BLD AUTO: 220 K/UL — SIGNIFICANT CHANGE UP (ref 150–400)
POTASSIUM SERPL-MCNC: 3.4 MMOL/L — LOW (ref 3.5–5.3)
POTASSIUM SERPL-SCNC: 3.4 MMOL/L — LOW (ref 3.5–5.3)
PROTHROM AB SERPL-ACNC: 19.1 SEC — HIGH (ref 10.6–13.6)
RBC # BLD: 3.82 M/UL — SIGNIFICANT CHANGE UP (ref 3.8–5.2)
RBC # FLD: 15.5 % — HIGH (ref 10.3–14.5)
SODIUM SERPL-SCNC: 139 MMOL/L — SIGNIFICANT CHANGE UP (ref 135–145)
WBC # BLD: 9.32 K/UL — SIGNIFICANT CHANGE UP (ref 3.8–10.5)
WBC # FLD AUTO: 9.32 K/UL — SIGNIFICANT CHANGE UP (ref 3.8–10.5)

## 2020-12-05 PROCEDURE — 99222 1ST HOSP IP/OBS MODERATE 55: CPT | Mod: AI

## 2020-12-05 PROCEDURE — 73701 CT LOWER EXTREMITY W/DYE: CPT | Mod: 26,RT,MA

## 2020-12-05 PROCEDURE — 99285 EMERGENCY DEPT VISIT HI MDM: CPT | Mod: CS

## 2020-12-05 PROCEDURE — 93010 ELECTROCARDIOGRAM REPORT: CPT

## 2020-12-05 RX ORDER — CEFTRIAXONE 500 MG/1
1000 INJECTION, POWDER, FOR SOLUTION INTRAMUSCULAR; INTRAVENOUS EVERY 24 HOURS
Refills: 0 | Status: COMPLETED | OUTPATIENT
Start: 2020-12-05 | End: 2020-12-12

## 2020-12-05 RX ORDER — CEFTRIAXONE 500 MG/1
1000 INJECTION, POWDER, FOR SOLUTION INTRAMUSCULAR; INTRAVENOUS ONCE
Refills: 0 | Status: COMPLETED | OUTPATIENT
Start: 2020-12-05 | End: 2020-12-05

## 2020-12-05 RX ORDER — MORPHINE SULFATE 50 MG/1
4 CAPSULE, EXTENDED RELEASE ORAL ONCE
Refills: 0 | Status: DISCONTINUED | OUTPATIENT
Start: 2020-12-05 | End: 2020-12-05

## 2020-12-05 RX ORDER — ENOXAPARIN SODIUM 100 MG/ML
40 INJECTION SUBCUTANEOUS DAILY
Refills: 0 | Status: DISCONTINUED | OUTPATIENT
Start: 2020-12-05 | End: 2020-12-13

## 2020-12-05 RX ORDER — SODIUM CHLORIDE 9 MG/ML
1000 INJECTION INTRAMUSCULAR; INTRAVENOUS; SUBCUTANEOUS ONCE
Refills: 0 | Status: COMPLETED | OUTPATIENT
Start: 2020-12-05 | End: 2020-12-05

## 2020-12-05 RX ADMIN — CEFTRIAXONE 1000 MILLIGRAM(S): 500 INJECTION, POWDER, FOR SOLUTION INTRAMUSCULAR; INTRAVENOUS at 23:00

## 2020-12-05 RX ADMIN — SODIUM CHLORIDE 1000 MILLILITER(S): 9 INJECTION INTRAMUSCULAR; INTRAVENOUS; SUBCUTANEOUS at 16:38

## 2020-12-05 RX ADMIN — MORPHINE SULFATE 4 MILLIGRAM(S): 50 CAPSULE, EXTENDED RELEASE ORAL at 16:38

## 2020-12-05 RX ADMIN — Medication 100 MILLIGRAM(S): at 23:14

## 2020-12-05 RX ADMIN — CEFTRIAXONE 100 MILLIGRAM(S): 500 INJECTION, POWDER, FOR SOLUTION INTRAMUSCULAR; INTRAVENOUS at 22:14

## 2020-12-05 RX ADMIN — SODIUM CHLORIDE 1000 MILLILITER(S): 9 INJECTION INTRAMUSCULAR; INTRAVENOUS; SUBCUTANEOUS at 21:55

## 2020-12-05 RX ADMIN — MORPHINE SULFATE 4 MILLIGRAM(S): 50 CAPSULE, EXTENDED RELEASE ORAL at 21:55

## 2020-12-05 NOTE — ED PROVIDER NOTE - OBJECTIVE STATEMENT
74 y/o F with a PMHx of HTN and Lymphedema on the RT lower extremities w/ chronic wounds presents to the ED c/o pain, swelling and warmth to the area since Thursday. Patient states that on Thursday she went for her routine cleaning and debriefing and since then had progressively worsening symptoms. Denies fever, recent travel or illness.

## 2020-12-05 NOTE — H&P ADULT - NSICDXPASTMEDICALHX_GEN_ALL_CORE_FT
PAST MEDICAL HISTORY:  Essential hypertension     Morbid obesity due to excess calories     Other iron deficiency anemia

## 2020-12-05 NOTE — H&P ADULT - HISTORY OF PRESENT ILLNESS
Pt is a 76 y/o female w/pmhx of HTN, chronic lymphedema with RLE wound.  Pt states thursday she went for wound care and debrieding and since has been geting worse with pain, swelling and redness and comes for evaluation.  pt denies any fever, chills, sob, cp, palpitations, n/v/d/c no travel or sick contacts.

## 2020-12-05 NOTE — ED ADULT NURSE NOTE - OBJECTIVE STATEMENT
Pt c/o pain to lle,.  Pt with lymphedema to lle and existing wound s/p surgery to relieve lymphedema.  Pt reports that MD cleans wound regularly every other month.  Pt had wound cleaned 3 days ago which caused significant pain which has worsened.  Pt also noted to have significant lymphedema to rle.

## 2020-12-05 NOTE — ED ADULT NURSE NOTE - ED STAT RN HANDOFF DETAILS
assisting primary RN Bebe. Report endorsed to hold RN Belkys. Safety checks completed this shift. Safety rounds completed hourly.  IV sites remains WDL. Fall & skin precautions in place. Any issues endorsed to hold RN for follow up. Awaiting bed assignment. primary RN aware that pt needs EKG, covid swab, and revital prior to going to 1D. verbal order for blum given by Dr. Medley, awaiting documented order. Dr. Medley, primary RN, and hold RN aware

## 2020-12-05 NOTE — ED ADULT TRIAGE NOTE - CHIEF COMPLAINT QUOTE
74 y/o female with PMH OF HTN. Presents to the ED with c/c of painful wound on the right leg. pt has lymphedema on the right leg and had wound care at this facility done on Thursday and ever since pt has been complaining of extreme pain unrelieved by the topical lidocaine prescribed.

## 2020-12-05 NOTE — ED ADULT NURSE NOTE - NSIMPLEMENTINTERV_GEN_ALL_ED
Implemented All Fall Risk Interventions:  Martin to call system. Call bell, personal items and telephone within reach. Instruct patient to call for assistance. Room bathroom lighting operational. Non-slip footwear when patient is off stretcher. Physically safe environment: no spills, clutter or unnecessary equipment. Stretcher in lowest position, wheels locked, appropriate side rails in place. Provide visual cue, wrist band, yellow gown, etc. Monitor gait and stability. Monitor for mental status changes and reorient to person, place, and time. Review medications for side effects contributing to fall risk. Reinforce activity limits and safety measures with patient and family.

## 2020-12-05 NOTE — ED ADULT TRIAGE NOTE - TEMPERATURE IN FAHRENHEIT (DEGREES F)
99.1

## 2020-12-05 NOTE — ED PROVIDER NOTE - MUSCULOSKELETAL, MLM
Spine appears normal, range of motion is not limited, no muscle or joint tenderness. Markedly large RT lower extremities with 2 open wound in the posterior thigh and calf with no drainage. Medial calf with significant erythema, induration and tenderness.

## 2020-12-05 NOTE — H&P ADULT - NSHPLABSRESULTS_GEN_ALL_CORE
LABS:                        10.7   9.32  )-----------( 220      ( 05 Dec 2020 17:04 )             33.9     12-05    139  |  106  |  20  ----------------------------<  91  3.4<L>   |  25  |  1.11    Ca    8.7      05 Dec 2020 17:04      PT/INR - ( 05 Dec 2020 17:04 )   PT: 19.1 sec;   INR: 1.69 ratio         PTT - ( 05 Dec 2020 17:04 )  PTT:33.2 sec    CAPILLARY BLOOD GLUCOSE            RADIOLOGY & ADDITIONAL TESTS:    Imaging Personally Reviewed:  [ X] YES  [ ] NO

## 2020-12-05 NOTE — H&P ADULT - NSHPPHYSICALEXAM_GEN_ALL_CORE
Vital Signs Last 24 Hrs  T(C): 36.7 (06 Dec 2020 05:25), Max: 37.9 (06 Dec 2020 00:02)  T(F): 98.1 (06 Dec 2020 05:25), Max: 100.2 (06 Dec 2020 00:02)  HR: 105 (06 Dec 2020 05:25) (90 - 112)  BP: 98/58 (06 Dec 2020 05:25) (98/52 - 142/80)  BP(mean): --  RR: 18 (06 Dec 2020 05:25) (17 - 19)  SpO2: 95% (06 Dec 2020 05:25) (95% - 100%)    PHYSICAL EXAM:    GENERAL: NAD, well-groomed, obese female  HEAD:  Atraumatic, Normocephalic  EYES: EOMI, PERRLA, conjunctiva and sclera clear  ENMT: No tonsillar erythema, exudates, or enlargement; Moist mucous membranes, No lesions  NECK: Supple, No JVD, Normal thyroid  NERVOUS SYSTEM:  Alert & Oriented X3,Motor Strength 5/5 B/L upper and lower extremities; DTRs 2+ intact and symmetric  CHEST/LUNG: Clear to percussion bilaterally; No rales, rhonchi, wheezing, or rubs  HEART: Regular rate and rhythm; No rubs, or gallops, +S1,S2  ABDOMEN: Soft, Nontender, Nondistended; Bowel sounds present  EXTREMITIES:  2+ Peripheral Pulses, No clubbing, cyanosis, +rt le large lymphedema, w/2 open wounds no drainage, +erythema and mild warmth  LYMPH: No cervical adenopathy  RECTAL: deferred  BREAST: deferred  : deferred  SKIN: as above    IMPROVE VTE Individual Risk Assessment        RISK                                                          Points  [  ] Previous VTE                                                3  [  ] Thrombophilia                                             2  [  ] Lower limb paralysis                                    2        (unable to hold up >15 seconds)    [  ] Current Cancer                                             2         (within 6 months)  [  ] Immobilization > 24 hrs                              1  [  ] ICU/CCU stay > 24 hours                            1  [  ] Age > 60                                                    1  IMPROVE VTE Score _________

## 2020-12-05 NOTE — ED ADULT NURSE NOTE - CHPI ED NUR SYMPTOMS NEG
no drainage/no bleeding at site/no pain/no vomiting/no fever/no blood in mucus/no chills/no purulent drainage/no rectal pain/no redness

## 2020-12-06 DIAGNOSIS — Z29.9 ENCOUNTER FOR PROPHYLACTIC MEASURES, UNSPECIFIED: ICD-10-CM

## 2020-12-06 LAB
ALBUMIN SERPL ELPH-MCNC: 2.4 G/DL — LOW (ref 3.3–5)
ALP SERPL-CCNC: 60 U/L — SIGNIFICANT CHANGE UP (ref 40–120)
ALT FLD-CCNC: 24 U/L — SIGNIFICANT CHANGE UP (ref 12–78)
ANION GAP SERPL CALC-SCNC: 10 MMOL/L — SIGNIFICANT CHANGE UP (ref 5–17)
AST SERPL-CCNC: 21 U/L — SIGNIFICANT CHANGE UP (ref 15–37)
BILIRUB SERPL-MCNC: 0.9 MG/DL — SIGNIFICANT CHANGE UP (ref 0.2–1.2)
BUN SERPL-MCNC: 21 MG/DL — SIGNIFICANT CHANGE UP (ref 7–23)
CALCIUM SERPL-MCNC: 8.4 MG/DL — LOW (ref 8.5–10.1)
CHLORIDE SERPL-SCNC: 108 MMOL/L — SIGNIFICANT CHANGE UP (ref 96–108)
CO2 SERPL-SCNC: 22 MMOL/L — SIGNIFICANT CHANGE UP (ref 22–31)
CREAT SERPL-MCNC: 1.08 MG/DL — SIGNIFICANT CHANGE UP (ref 0.5–1.3)
FLUAV AG NPH QL: SIGNIFICANT CHANGE UP COUNTS
FLUBV AG NPH QL: SIGNIFICANT CHANGE UP COUNTS
GLUCOSE SERPL-MCNC: 99 MG/DL — SIGNIFICANT CHANGE UP (ref 70–99)
HCV AB S/CO SERPL IA: 0.06 S/CO — SIGNIFICANT CHANGE UP (ref 0–0.99)
HCV AB SERPL-IMP: SIGNIFICANT CHANGE UP
POTASSIUM SERPL-MCNC: 3.4 MMOL/L — LOW (ref 3.5–5.3)
POTASSIUM SERPL-SCNC: 3.4 MMOL/L — LOW (ref 3.5–5.3)
PROT SERPL-MCNC: 7.2 GM/DL — SIGNIFICANT CHANGE UP (ref 6–8.3)
RSV RNA NPH QL NAA+NON-PROBE: SIGNIFICANT CHANGE UP COUNTS
SARS-COV-2 IGG SERPL QL IA: NEGATIVE — SIGNIFICANT CHANGE UP
SARS-COV-2 IGM SERPL IA-ACNC: <0.1 INDEX — SIGNIFICANT CHANGE UP
SARS-COV-2 RNA SPEC QL NAA+PROBE: SIGNIFICANT CHANGE UP COUNTS
SODIUM SERPL-SCNC: 140 MMOL/L — SIGNIFICANT CHANGE UP (ref 135–145)

## 2020-12-06 PROCEDURE — 71045 X-RAY EXAM CHEST 1 VIEW: CPT | Mod: 26

## 2020-12-06 PROCEDURE — 99232 SBSQ HOSP IP/OBS MODERATE 35: CPT

## 2020-12-06 RX ORDER — ACETAMINOPHEN 500 MG
650 TABLET ORAL ONCE
Refills: 0 | Status: COMPLETED | OUTPATIENT
Start: 2020-12-06 | End: 2020-12-06

## 2020-12-06 RX ORDER — ACETAMINOPHEN 500 MG
650 TABLET ORAL EVERY 4 HOURS
Refills: 0 | Status: DISCONTINUED | OUTPATIENT
Start: 2020-12-06 | End: 2020-12-12

## 2020-12-06 RX ORDER — ACETAMINOPHEN 500 MG
650 TABLET ORAL EVERY 6 HOURS
Refills: 0 | Status: DISCONTINUED | OUTPATIENT
Start: 2020-12-06 | End: 2020-12-06

## 2020-12-06 RX ADMIN — ENOXAPARIN SODIUM 40 MILLIGRAM(S): 100 INJECTION SUBCUTANEOUS at 01:14

## 2020-12-06 RX ADMIN — Medication 100 MILLIGRAM(S): at 08:22

## 2020-12-06 RX ADMIN — Medication 650 MILLIGRAM(S): at 09:25

## 2020-12-06 RX ADMIN — Medication 650 MILLIGRAM(S): at 21:33

## 2020-12-06 RX ADMIN — Medication 650 MILLIGRAM(S): at 17:07

## 2020-12-06 RX ADMIN — Medication 650 MILLIGRAM(S): at 18:06

## 2020-12-06 RX ADMIN — CEFTRIAXONE 100 MILLIGRAM(S): 500 INJECTION, POWDER, FOR SOLUTION INTRAMUSCULAR; INTRAVENOUS at 00:42

## 2020-12-06 RX ADMIN — Medication 100 MILLIGRAM(S): at 01:14

## 2020-12-06 RX ADMIN — ENOXAPARIN SODIUM 40 MILLIGRAM(S): 100 INJECTION SUBCUTANEOUS at 12:07

## 2020-12-06 RX ADMIN — Medication 100 MILLIGRAM(S): at 17:20

## 2020-12-06 RX ADMIN — Medication 650 MILLIGRAM(S): at 09:55

## 2020-12-06 NOTE — PHYSICAL THERAPY INITIAL EVALUATION ADULT - GENERAL OBSERVATIONS, REHAB EVAL
Pt seen supine, AAOx4, +blum catheter, RLE wounds c dressing, comfortable in no distress. Daughter Cassia present.

## 2020-12-06 NOTE — PHYSICAL THERAPY INITIAL EVALUATION ADULT - ACTIVE RANGE OF MOTION EXAMINATION, REHAB EVAL
LLE WFL; R knee 0-20 degrees; otherwise WFL./bilateral upper extremity Active ROM was WFL (within functional limits)

## 2020-12-06 NOTE — CONSULT NOTE ADULT - SUBJECTIVE AND OBJECTIVE BOX
GENERAL SURGERY CONSULT NOTE    Patient is a 75y old  Female who presents with a chief complaint of wound check (06 Dec 2020 10:48)    HPI: Pt is a 74 y/o female w/pmhx of HTN, chronic lymphedema with RLE wound.  Pt states thursday she went for wound care and debrieding and since has been geting worse with pain, swelling and redness and comes for evaluation.  pt denies any fever, chills, sob, cp, palpitations, n/v/d/c no travel or sick contacts. (05 Dec 2020 22:55)    Patient is known to Dr. Marquez for wound care follow-up. Recently had RLE wound debrided in office. She states that her redness and swelling has gone down significantly since her arrival to the ED. Denies pain currently. No numbness, burning or paresthesias in the lower extremities.       PAST MEDICAL & SURGICAL HISTORY:  Morbid obesity due to excess calories  Chronic Lymphedema  Essential hypertension  H/O total knee replacement, bilateral  Other iron deficiency anemia      Review of Systems:    I have reviewed 9 systems with the patient and the only positive findings were current shortness of breath and tachypnea. Denies headache, fever, chills, chest pain, palpiations, nausea, vomiting, diarrhea, or urinary symptoms.     MEDICATIONS  (STANDING):  cefTRIAXone   IVPB 1000 milliGRAM(s) IV Intermittent every 24 hours  clindamycin IVPB 600 milliGRAM(s) IV Intermittent every 8 hours  enoxaparin Injectable 40 milliGRAM(s) SubCutaneous daily    MEDICATIONS  (PRN):  acetaminophen   Tablet .. 650 milliGRAM(s) Oral every 6 hours PRN Moderate Pain (4 - 6), Severe Pain (7 - 10)      Allergies: No Known Allergies      FAMILY HISTORY:  No pertinent family history in first degree relatives    PHYSICAL EXAM:     Vital Signs Last 24 Hrs  T(C): 36.7 (06 Dec 2020 05:25), Max: 37.9 (06 Dec 2020 00:02)  T(F): 98.1 (06 Dec 2020 05:25), Max: 100.2 (06 Dec 2020 00:02)  HR: 107 (06 Dec 2020 14:39) (90 - 112)  BP: 107/63 (06 Dec 2020 14:39) (98/52 - 142/80)  BP(mean): --  RR: 18 (06 Dec 2020 14:39) (18 - 19)  SpO2: 94% (06 Dec 2020 14:39) (94% - 100%)    GENERAL: NAD, well-groomed, obese female  NEURO:  Alert & Oriented X3,Motor Strength 5/5 B/L upper extremities; Lower Extrem 4/5.   CHEST/LUNG: breathing labored with + inspiratory wheeze, no rales or rhonchi.   HEART: Regular rate and rhythm  ABDOMEN: Soft, Nontender, Nondistended; Bowel sounds present  EXTREMITIES:  2+ Peripheral Pulses, No clubbing, cyanosis, +rt le large lymphedema, w/2 open wounds no drainage, +erythema and mild warmth, wounds dressed with mepilex earlier this afternoon. Difficult to fully examine due to stretcher.       LABS:                        10.7   9.32  )-----------( 220      ( 05 Dec 2020 17:04 )             33.9     12-06    140  |  108  |  21  ----------------------------<  99  3.4<L>   |  22  |  1.08    Ca    8.4<L>      06 Dec 2020 12:02    TPro  7.2  /  Alb  2.4<L>  /  TBili  0.9  /  DBili  x   /  AST  21  /  ALT  24  /  AlkPhos  60  12-06    PT/INR - ( 05 Dec 2020 17:04 )   PT: 19.1 sec;   INR: 1.69 ratio    PTT - ( 05 Dec 2020 17:04 )  PTT:33.2 sec      RADIOLOGY & ADDITIONAL STUDIES:

## 2020-12-06 NOTE — PATIENT PROFILE ADULT - OVER THE PAST TWO WEEKS, HAVE YOU FELT LITTLE INTEREST OR PLEASURE IN DOING THINGS?
"Returned call to patient.   Patient stated that she has had worsening of fluid in ankles since visit with  On Friday. She stated that it was painful to walk and she has been sleeping in recliner with her legs elevated, which has helped slightly.   Patient also complains of "severe headaches" on and off with a pain rating of 6.   Patient also complained of extreme fatigue Friday afternoon and night, where she could not "keep her eyes open."   Patient was unsure if this all should be something she should be worried about.   I informed patient I would fwd message to Dr. Nguyễn a high priority and see what she thought.   Pt verbalized understanding.     Message routed to Dr. Nguyễn.         ----- Message from James Polo sent at 2/20/2017  8:13 AM CST -----  Contact: self  Pt states her ankles and legs are swelling, pt also mention having headaches, pt has some concerns and would like to discuss with nurse.  Contact number [phone]    " no

## 2020-12-06 NOTE — PROGRESS NOTE ADULT - SUBJECTIVE AND OBJECTIVE BOX
Patient is a 75y old  Female who presents with a chief complaint of wound check (05 Dec 2020 22:55)      INTERVAL HPI/OVERNIGHT EVENTS: no events     MEDICATIONS  (STANDING):  cefTRIAXone   IVPB 1000 milliGRAM(s) IV Intermittent every 24 hours  clindamycin IVPB 600 milliGRAM(s) IV Intermittent every 8 hours  enoxaparin Injectable 40 milliGRAM(s) SubCutaneous daily    MEDICATIONS  (PRN):  acetaminophen   Tablet .. 650 milliGRAM(s) Oral every 6 hours PRN Moderate Pain (4 - 6), Severe Pain (7 - 10)      Allergies    No Known Allergies    Intolerances       Vital Signs Last 24 Hrs  T(C): 36.7 (06 Dec 2020 05:25), Max: 37.9 (06 Dec 2020 00:02)  T(F): 98.1 (06 Dec 2020 05:25), Max: 100.2 (06 Dec 2020 00:02)  HR: 105 (06 Dec 2020 05:25) (90 - 112)  BP: 98/58 (06 Dec 2020 05:25) (98/52 - 142/80)  BP(mean): --  RR: 18 (06 Dec 2020 05:25) (17 - 19)  SpO2: 95% (06 Dec 2020 05:25) (95% - 100%)    PHYSICAL EXAM:  GENERAL: NAD, well-groomed, obese female  HEAD:  Atraumatic, Normocephalic  EYES: EOMI, PERRLA, conjunctiva and sclera clear  ENMT: No tonsillar erythema, exudates, or enlargement; Moist mucous membranes, No lesions  NECK: Supple, No JVD, Normal thyroid  NERVOUS SYSTEM:  Alert & Oriented X3,Motor Strength 5/5 B/L upper and lower extremities; DTRs 2+ intact and symmetric  CHEST/LUNG: Clear to percussion bilaterally; No rales, rhonchi, wheezing, or rubs  HEART: Regular rate and rhythm; No rubs, or gallops, +S1,S2  ABDOMEN: Soft, Nontender, Nondistended; Bowel sounds present  EXTREMITIES:  2+ Peripheral Pulses, No clubbing, cyanosis, +rt le large lymphedema, w/2 open wounds no drainage, +erythema and mild warmth    LABS:                        10.7   9.32  )-----------( 220      ( 05 Dec 2020 17:04 )             33.9     12-05    139  |  106  |  20  ----------------------------<  91  3.4<L>   |  25  |  1.11    Ca    8.7      05 Dec 2020 17:04      PT/INR - ( 05 Dec 2020 17:04 )   PT: 19.1 sec;   INR: 1.69 ratio         PTT - ( 05 Dec 2020 17:04 )  PTT:33.2 sec    CAPILLARY BLOOD GLUCOSE          RADIOLOGY & ADDITIONAL TESTS:    Imaging Personally Reviewed:  [ X] YES  [ ] NO    Consultant(s) Notes Reviewed:  [ X] YES  [ ] NO    Care Discussed with Consultants/Other Providers [X ] YES  [ ] NO

## 2020-12-06 NOTE — PHYSICAL THERAPY INITIAL EVALUATION ADULT - CRITERIA FOR SKILLED THERAPEUTIC INTERVENTIONS
rehab potential/predicted duration of therapy intervention/functional limitations in following categories/impairments found/risk reduction/prevention/therapy frequency

## 2020-12-06 NOTE — PHYSICAL THERAPY INITIAL EVALUATION ADULT - ADDITIONAL COMMENTS
Pt lives in a private home with her . There are 4 steps to enter c marshall rails going up that are far apart. Inside there are 13 steps c R rail going up. Prior to admission pt reports she was independent in all ADLs and ambulation with device.

## 2020-12-07 LAB
ALBUMIN SERPL ELPH-MCNC: 2.2 G/DL — LOW (ref 3.3–5)
ALP SERPL-CCNC: 70 U/L — SIGNIFICANT CHANGE UP (ref 40–120)
ALT FLD-CCNC: 31 U/L — SIGNIFICANT CHANGE UP (ref 12–78)
ANION GAP SERPL CALC-SCNC: 11 MMOL/L — SIGNIFICANT CHANGE UP (ref 5–17)
ANION GAP SERPL CALC-SCNC: 8 MMOL/L — SIGNIFICANT CHANGE UP (ref 5–17)
ANISOCYTOSIS BLD QL: SLIGHT — SIGNIFICANT CHANGE UP
APPEARANCE UR: ABNORMAL
APPEARANCE UR: ABNORMAL
AST SERPL-CCNC: 38 U/L — HIGH (ref 15–37)
BACTERIA # UR AUTO: ABNORMAL
BACTERIA # UR AUTO: ABNORMAL
BASOPHILS # BLD AUTO: 0 K/UL — SIGNIFICANT CHANGE UP (ref 0–0.2)
BASOPHILS # BLD AUTO: 0 K/UL — SIGNIFICANT CHANGE UP (ref 0–0.2)
BASOPHILS NFR BLD AUTO: 0 % — SIGNIFICANT CHANGE UP (ref 0–2)
BASOPHILS NFR BLD AUTO: 0 % — SIGNIFICANT CHANGE UP (ref 0–2)
BILIRUB SERPL-MCNC: 1.3 MG/DL — HIGH (ref 0.2–1.2)
BILIRUB UR-MCNC: ABNORMAL
BILIRUB UR-MCNC: ABNORMAL
BUN SERPL-MCNC: 22 MG/DL — SIGNIFICANT CHANGE UP (ref 7–23)
BUN SERPL-MCNC: 24 MG/DL — HIGH (ref 7–23)
CALCIUM SERPL-MCNC: 8.1 MG/DL — LOW (ref 8.5–10.1)
CALCIUM SERPL-MCNC: 8.1 MG/DL — LOW (ref 8.5–10.1)
CHLORIDE SERPL-SCNC: 106 MMOL/L — SIGNIFICANT CHANGE UP (ref 96–108)
CHLORIDE SERPL-SCNC: 106 MMOL/L — SIGNIFICANT CHANGE UP (ref 96–108)
CO2 SERPL-SCNC: 22 MMOL/L — SIGNIFICANT CHANGE UP (ref 22–31)
CO2 SERPL-SCNC: 23 MMOL/L — SIGNIFICANT CHANGE UP (ref 22–31)
COLOR SPEC: ABNORMAL
COLOR SPEC: YELLOW — SIGNIFICANT CHANGE UP
CREAT SERPL-MCNC: 1.3 MG/DL — SIGNIFICANT CHANGE UP (ref 0.5–1.3)
CREAT SERPL-MCNC: 1.35 MG/DL — HIGH (ref 0.5–1.3)
DIFF PNL FLD: ABNORMAL
DIFF PNL FLD: ABNORMAL
DOHLE BOD BLD QL SMEAR: PRESENT — SIGNIFICANT CHANGE UP
EOSINOPHIL # BLD AUTO: 0.07 K/UL — SIGNIFICANT CHANGE UP (ref 0–0.5)
EOSINOPHIL # BLD AUTO: 0.08 K/UL — SIGNIFICANT CHANGE UP (ref 0–0.5)
EOSINOPHIL NFR BLD AUTO: 1 % — SIGNIFICANT CHANGE UP (ref 0–6)
EOSINOPHIL NFR BLD AUTO: 1 % — SIGNIFICANT CHANGE UP (ref 0–6)
EPI CELLS # UR: ABNORMAL
EPI CELLS # UR: SIGNIFICANT CHANGE UP
GLUCOSE BLDC GLUCOMTR-MCNC: 138 MG/DL — HIGH (ref 70–99)
GLUCOSE SERPL-MCNC: 120 MG/DL — HIGH (ref 70–99)
GLUCOSE SERPL-MCNC: 124 MG/DL — HIGH (ref 70–99)
GLUCOSE UR QL: NEGATIVE MG/DL — SIGNIFICANT CHANGE UP
GLUCOSE UR QL: NEGATIVE MG/DL — SIGNIFICANT CHANGE UP
HCT VFR BLD CALC: 29.2 % — LOW (ref 34.5–45)
HCT VFR BLD CALC: 30.2 % — LOW (ref 34.5–45)
HGB BLD-MCNC: 9.7 G/DL — LOW (ref 11.5–15.5)
HGB BLD-MCNC: 9.8 G/DL — LOW (ref 11.5–15.5)
HYPOCHROMIA BLD QL: SLIGHT — SIGNIFICANT CHANGE UP
KETONES UR-MCNC: ABNORMAL
KETONES UR-MCNC: ABNORMAL
LACTATE SERPL-SCNC: 2.3 MMOL/L — HIGH (ref 0.7–2)
LACTATE SERPL-SCNC: 2.4 MMOL/L — HIGH (ref 0.7–2)
LEUKOCYTE ESTERASE UR-ACNC: ABNORMAL
LEUKOCYTE ESTERASE UR-ACNC: ABNORMAL
LYMPHOCYTES # BLD AUTO: 0.33 K/UL — LOW (ref 1–3.3)
LYMPHOCYTES # BLD AUTO: 0.64 K/UL — LOW (ref 1–3.3)
LYMPHOCYTES # BLD AUTO: 5 % — LOW (ref 13–44)
LYMPHOCYTES # BLD AUTO: 8 % — LOW (ref 13–44)
MACROCYTES BLD QL: SLIGHT — SIGNIFICANT CHANGE UP
MAGNESIUM SERPL-MCNC: 1.7 MG/DL — SIGNIFICANT CHANGE UP (ref 1.6–2.6)
MAGNESIUM SERPL-MCNC: 1.9 MG/DL — SIGNIFICANT CHANGE UP (ref 1.6–2.6)
MANUAL SMEAR VERIFICATION: SIGNIFICANT CHANGE UP
MANUAL SMEAR VERIFICATION: SIGNIFICANT CHANGE UP
MCHC RBC-ENTMCNC: 28.2 PG — SIGNIFICANT CHANGE UP (ref 27–34)
MCHC RBC-ENTMCNC: 28.4 PG — SIGNIFICANT CHANGE UP (ref 27–34)
MCHC RBC-ENTMCNC: 32.5 GM/DL — SIGNIFICANT CHANGE UP (ref 32–36)
MCHC RBC-ENTMCNC: 33.2 GM/DL — SIGNIFICANT CHANGE UP (ref 32–36)
MCV RBC AUTO: 85.6 FL — SIGNIFICANT CHANGE UP (ref 80–100)
MCV RBC AUTO: 86.8 FL — SIGNIFICANT CHANGE UP (ref 80–100)
METAMYELOCYTES # FLD: 2 % — HIGH (ref 0–0)
MONOCYTES # BLD AUTO: 0.56 K/UL — SIGNIFICANT CHANGE UP (ref 0–0.9)
MONOCYTES # BLD AUTO: 0.73 K/UL — SIGNIFICANT CHANGE UP (ref 0–0.9)
MONOCYTES NFR BLD AUTO: 11 % — SIGNIFICANT CHANGE UP (ref 2–14)
MONOCYTES NFR BLD AUTO: 7 % — SIGNIFICANT CHANGE UP (ref 2–14)
NEUTROPHILS # BLD AUTO: 5.51 K/UL — SIGNIFICANT CHANGE UP (ref 1.8–7.4)
NEUTROPHILS # BLD AUTO: 6.38 K/UL — SIGNIFICANT CHANGE UP (ref 1.8–7.4)
NEUTROPHILS NFR BLD AUTO: 60 % — SIGNIFICANT CHANGE UP (ref 43–77)
NEUTROPHILS NFR BLD AUTO: 79 % — HIGH (ref 43–77)
NEUTS BAND # BLD: 20 % — HIGH (ref 0–8)
NEUTS BAND # BLD: 4 % — SIGNIFICANT CHANGE UP (ref 0–8)
NEUTS VAC BLD QL SMEAR: SLIGHT — SIGNIFICANT CHANGE UP
NITRITE UR-MCNC: NEGATIVE — SIGNIFICANT CHANGE UP
NITRITE UR-MCNC: POSITIVE
NRBC # BLD: 0 /100 — SIGNIFICANT CHANGE UP (ref 0–0)
NRBC # BLD: 0 /100 — SIGNIFICANT CHANGE UP (ref 0–0)
NRBC # BLD: SIGNIFICANT CHANGE UP /100 WBCS (ref 0–0)
NRBC # BLD: SIGNIFICANT CHANGE UP /100 WBCS (ref 0–0)
NT-PROBNP SERPL-SCNC: 2428 PG/ML — HIGH (ref 0–125)
PH UR: 5 — SIGNIFICANT CHANGE UP (ref 5–8)
PH UR: 5 — SIGNIFICANT CHANGE UP (ref 5–8)
PHOSPHATE SERPL-MCNC: 2.5 MG/DL — SIGNIFICANT CHANGE UP (ref 2.5–4.5)
PHOSPHATE SERPL-MCNC: 3.6 MG/DL — SIGNIFICANT CHANGE UP (ref 2.5–4.5)
PLAT MORPH BLD: NORMAL — SIGNIFICANT CHANGE UP
PLAT MORPH BLD: NORMAL — SIGNIFICANT CHANGE UP
PLATELET # BLD AUTO: 162 K/UL — SIGNIFICANT CHANGE UP (ref 150–400)
PLATELET # BLD AUTO: 175 K/UL — SIGNIFICANT CHANGE UP (ref 150–400)
POTASSIUM SERPL-MCNC: 3.3 MMOL/L — LOW (ref 3.5–5.3)
POTASSIUM SERPL-MCNC: 3.9 MMOL/L — SIGNIFICANT CHANGE UP (ref 3.5–5.3)
POTASSIUM SERPL-SCNC: 3.3 MMOL/L — LOW (ref 3.5–5.3)
POTASSIUM SERPL-SCNC: 3.9 MMOL/L — SIGNIFICANT CHANGE UP (ref 3.5–5.3)
PROCALCITONIN SERPL-MCNC: 23 NG/ML — HIGH (ref 0.02–0.1)
PROT SERPL-MCNC: 6.7 GM/DL — SIGNIFICANT CHANGE UP (ref 6–8.3)
PROT UR-MCNC: 100 MG/DL
PROT UR-MCNC: 100 MG/DL
RBC # BLD: 3.41 M/UL — LOW (ref 3.8–5.2)
RBC # BLD: 3.48 M/UL — LOW (ref 3.8–5.2)
RBC # FLD: 15.3 % — HIGH (ref 10.3–14.5)
RBC # FLD: 15.7 % — HIGH (ref 10.3–14.5)
RBC BLD AUTO: ABNORMAL
RBC BLD AUTO: SIGNIFICANT CHANGE UP
RBC CASTS # UR COMP ASSIST: ABNORMAL /HPF (ref 0–4)
RBC CASTS # UR COMP ASSIST: ABNORMAL /HPF (ref 0–4)
SMUDGE CELLS # BLD: PRESENT — SIGNIFICANT CHANGE UP
SMUDGE CELLS # BLD: PRESENT — SIGNIFICANT CHANGE UP
SODIUM SERPL-SCNC: 137 MMOL/L — SIGNIFICANT CHANGE UP (ref 135–145)
SODIUM SERPL-SCNC: 139 MMOL/L — SIGNIFICANT CHANGE UP (ref 135–145)
SP GR SPEC: 1.01 — SIGNIFICANT CHANGE UP (ref 1.01–1.02)
SP GR SPEC: 1.01 — SIGNIFICANT CHANGE UP (ref 1.01–1.02)
TOXIC GRANULES BLD QL SMEAR: PRESENT — SIGNIFICANT CHANGE UP
UROBILINOGEN FLD QL: 8 MG/DL
UROBILINOGEN FLD QL: 8 MG/DL
VARIANT LYMPHS # BLD: 2 % — SIGNIFICANT CHANGE UP (ref 0–6)
WBC # BLD: 6.64 K/UL — SIGNIFICANT CHANGE UP (ref 3.8–10.5)
WBC # BLD: 7.98 K/UL — SIGNIFICANT CHANGE UP (ref 3.8–10.5)
WBC # FLD AUTO: 6.64 K/UL — SIGNIFICANT CHANGE UP (ref 3.8–10.5)
WBC # FLD AUTO: 7.98 K/UL — SIGNIFICANT CHANGE UP (ref 3.8–10.5)
WBC UR QL: ABNORMAL
WBC UR QL: SIGNIFICANT CHANGE UP

## 2020-12-07 PROCEDURE — 99232 SBSQ HOSP IP/OBS MODERATE 35: CPT

## 2020-12-07 PROCEDURE — 73700 CT LOWER EXTREMITY W/O DYE: CPT | Mod: 26,RT

## 2020-12-07 PROCEDURE — 71045 X-RAY EXAM CHEST 1 VIEW: CPT | Mod: 26

## 2020-12-07 PROCEDURE — 74176 CT ABD & PELVIS W/O CONTRAST: CPT | Mod: 26

## 2020-12-07 RX ORDER — SODIUM CHLORIDE 9 MG/ML
1000 INJECTION, SOLUTION INTRAVENOUS ONCE
Refills: 0 | Status: COMPLETED | OUTPATIENT
Start: 2020-12-07 | End: 2020-12-07

## 2020-12-07 RX ORDER — IPRATROPIUM/ALBUTEROL SULFATE 18-103MCG
3 AEROSOL WITH ADAPTER (GRAM) INHALATION EVERY 6 HOURS
Refills: 0 | Status: DISCONTINUED | OUTPATIENT
Start: 2020-12-07 | End: 2020-12-08

## 2020-12-07 RX ORDER — POTASSIUM CHLORIDE 20 MEQ
40 PACKET (EA) ORAL ONCE
Refills: 0 | Status: COMPLETED | OUTPATIENT
Start: 2020-12-07 | End: 2020-12-07

## 2020-12-07 RX ORDER — IBUPROFEN 200 MG
400 TABLET ORAL ONCE
Refills: 0 | Status: COMPLETED | OUTPATIENT
Start: 2020-12-07 | End: 2020-12-07

## 2020-12-07 RX ADMIN — Medication 650 MILLIGRAM(S): at 01:37

## 2020-12-07 RX ADMIN — Medication 650 MILLIGRAM(S): at 13:07

## 2020-12-07 RX ADMIN — Medication 100 MILLIGRAM(S): at 10:22

## 2020-12-07 RX ADMIN — Medication 650 MILLIGRAM(S): at 09:00

## 2020-12-07 RX ADMIN — SODIUM CHLORIDE 2000 MILLILITER(S): 9 INJECTION, SOLUTION INTRAVENOUS at 02:45

## 2020-12-07 RX ADMIN — Medication 40 MILLIEQUIVALENT(S): at 05:02

## 2020-12-07 RX ADMIN — ENOXAPARIN SODIUM 40 MILLIGRAM(S): 100 INJECTION SUBCUTANEOUS at 11:20

## 2020-12-07 RX ADMIN — Medication 650 MILLIGRAM(S): at 14:00

## 2020-12-07 RX ADMIN — Medication 650 MILLIGRAM(S): at 18:55

## 2020-12-07 RX ADMIN — CEFTRIAXONE 100 MILLIGRAM(S): 500 INJECTION, POWDER, FOR SOLUTION INTRAMUSCULAR; INTRAVENOUS at 00:04

## 2020-12-07 RX ADMIN — Medication 650 MILLIGRAM(S): at 08:12

## 2020-12-07 RX ADMIN — Medication 100 MILLIGRAM(S): at 17:38

## 2020-12-07 RX ADMIN — Medication 650 MILLIGRAM(S): at 17:55

## 2020-12-07 RX ADMIN — Medication 100 MILLIGRAM(S): at 00:42

## 2020-12-07 RX ADMIN — CEFTRIAXONE 100 MILLIGRAM(S): 500 INJECTION, POWDER, FOR SOLUTION INTRAMUSCULAR; INTRAVENOUS at 23:20

## 2020-12-07 NOTE — CHART NOTE - NSCHARTNOTEFT_GEN_A_CORE
RRT called for hypotension (77/) / fever(103) / shortness of breath     HPI:  Pt is a 74 y/o female w/pmhx of HTN, chronic lymphedema with RLE wound.  Pt states Thursday she went for wound care and debriding and since has been getting worse with pain, swelling and redness and comes for evaluation.  pt denies any fever, chills, sob, cp, palpitations, n/v/d/c no travel or sick contacts. (05 Dec 2020 22:55)    ICU Vital Signs Last 24 Hrs  T(C): 39.5 (07 Dec 2020 01:35), Max: 39.5 (06 Dec 2020 23:53)  T(F): 103.1 (07 Dec 2020 01:35), Max: 103.2 (06 Dec 2020 23:53)  HR: 109 (06 Dec 2020 23:05) (105 - 115)  BP: 112/68 (06 Dec 2020 23:05) (98/58 - 149/76)  RR: 18 (06 Dec 2020 23:05) (18 - 18)  SpO2: 97% (06 Dec 2020 23:05) (94% - 97%)    EXAM:  General: Pt alert, oriented able to speak in full sentences  Pulm: CTA B/L diminished at bases   CV: S1S2   Abdomen: soft, nontender  Extremities R thigh with wound + purulent discharge , thigh swollen, area marked- redness does not appear to be spread outside demarcation     Assessment & Plan  ===================  75 year old female admitted with cellulitis 2/2 procedure Thursday     Plan as discussed with Dr Bain & medical PA   - LR bolus X 1 stat   - repeat CT scan of lower extremity   -LABS CBC/ lactate BMP  - coooling blanket turned on Pt fever decreased to 101    if no improvement in BP - will consider move to ICU for vasopressors RRT called for hypotension (77/) / fever(103) / shortness of breath     HPI:  Pt is a 76 y/o female w/pmhx of HTN, chronic lymphedema with RLE wound.  Pt states Thursday she went for wound care and debriding and since has been getting worse with pain, swelling and redness and comes for evaluation.  pt denies any fever, chills, sob, cp, palpitations, n/v/d/c no travel or sick contacts. (05 Dec 2020 22:55)    ICU Vital Signs Last 24 Hrs  T(C): 39.5 (07 Dec 2020 01:35), Max: 39.5 (06 Dec 2020 23:53)  T(F): 103.1 (07 Dec 2020 01:35), Max: 103.2 (06 Dec 2020 23:53)  HR: 109 (06 Dec 2020 23:05) (105 - 115)  BP: 112/68 (06 Dec 2020 23:05) (98/58 - 149/76)  RR: 18 (06 Dec 2020 23:05) (18 - 18)  SpO2: 97% (06 Dec 2020 23:05) (94% - 97%)    EXAM:  General: Pt alert, oriented able to speak in full sentences  Pulm: CTA B/L diminished at bases   CV: S1S2   Abdomen: soft, nontender  Extremities R thigh with wound + purulent discharge , thigh swollen, area marked- redness does not appear to be spread outside demarcation     Assessment & Plan  ===================  75 year old female admitted with cellulitis 2/2 procedure Thursday     Plan as discussed with Dr Bain & medical PA   - LR bolus X 1 stat   - repeat CT scan of lower extremity   -LABS CBC/ lactate BMP  - coooling blanket turned on Pt fever decreased to 101    if no improvement in BP - will consider move to ICU for vasopressors    04:00 reevaluate patient after 500cc bbolus BP 99/55 (70)   patient awake alert in no distress, patient will remain on medical floor

## 2020-12-07 NOTE — PROGRESS NOTE ADULT - SUBJECTIVE AND OBJECTIVE BOX
Patient seen and examined at bedside resting comfortably.  States she is feeling well and her right leg pain has improved.  Tolerating diet. Admits to ambulating with PT this morning.  Denies fever, chills, N/V/D, CP, SOB.    Vital Signs Last 24 Hrs  T(F): 98 (12-07-20 @ 15:00), Max: 103.2 (12-06-20 @ 23:53)  HR: 95 (12-07-20 @ 11:44)  BP: 105/66 (12-07-20 @ 11:44)  RR: 22 (12-07-20 @ 11:44)  SpO2: 94% (12-07-20 @ 11:44)  POCT Blood Glucose.: 138 mg/dL (07 Dec 2020 02:25)    PHYSICAL EXAM:  GENERAL: Alert, NAD  CHEST/LUNG: respirations nonlabored  HEART: Regular rate and rhythm; S1 & S2 appreciated  ABDOMEN: soft, NT/ND  : blum catheter draining kiera colored urine  EXTREMITIES: no calf tenderness, lymphedema of Right lower extremity. RLE dressing c/d/i, erythema improved, no warmth, softly tender to palpation.    I&O's Detail    06 Dec 2020 07:01  -  07 Dec 2020 07:00  --------------------------------------------------------  IN:    IV PiggyBack: 50 mL    IV PiggyBack: 50 mL    Lactated Ringers Bolus: 1000 mL  Total IN: 1100 mL    OUT:    Indwelling Catheter - Urethral (mL): 950 mL  Total OUT: 950 mL    Total NET: 150 mL    LABS:                        9.8    7.98  )-----------( 175      ( 07 Dec 2020 08:13 )             30.2     12-07    139  |  106  |  24<H>  ----------------------------<  120<H>  3.9   |  22  |  1.30    Ca    8.1<L>      07 Dec 2020 08:13  Phos  3.6     12-07  Mg     1.7     12-07    TPro  6.7  /  Alb  2.2<L>  /  TBili  1.3<H>  /  DBili  x   /  AST  38<H>  /  ALT  31  /  AlkPhos  70  12-07    PT/INR - ( 05 Dec 2020 17:04 )   PT: 19.1 sec;   INR: 1.69 ratio    PTT - ( 05 Dec 2020 17:04 )  PTT:33.2 sec    RADIOLOGY & ADDITIONAL STUDIES:    CT Abdomen and Pelvis No Cont (12.07.20 @ 03:33)   XAM:  CT ABDOMEN AND PELVIS                            PROCEDURE DATE:  12/07/2020          INTERPRETATION:  CLINICAL INFORMATION:  Abscess  COMPARISON: None.  PROCEDURE:  CT of the Abdomen and Pelvis was performed without intravenous contrast.  Intravenous contrast: None.  Oral contrast: None.  Sagittal and coronal reformats were performed.    FINDINGS:  LIMITATIONS: None.    LOWER CHEST: Within normal limits.  ABDOMINAL WALL: Within normal limits.  VESSELS: Within normal limits.  BOWEL: No evidence of obstruction. Normal distal esophagus, stomach and duodenum. Colonic diverticulosis. Appendix normal.    LIVER: Within normal limits.  BILE DUCTS: Normal caliber  GALLBLADDER: Within normal limits.  SPLEEN: Within normal limits.  PANCREAS: Within normal limits.  ADRENALS: Within normal limits.  KIDNEYS/URETERS: No renal stones or hydronephrosis.    BLADDER: Decompressed by a Blum catheter and containing air.  REPRODUCTIVE ORGANS: Within normal limits.    PERITONEUM: No ascites.  RETROPERITONEUM/LYMPH NODES: No lymphadenopathy.    BONES: Thoracolumbar spinal degeneration.      CT Lower Extremity No Cont, Right (12.07.20 @ 03:33)   EXAM:  CT LWR EXT RT                            PROCEDURE DATE:  12/07/2020      INTERPRETATION:  CLINICAL INFORMATION: Right leg swelling. History of lymphedema in the right leg. Rule out abscess.    TECHNIQUE: CT of the right lower extremity from the hip to the foot was performed with coronal and sagittal reformats without the administration of intravenous contrast.    COMPARISON: CT of the right lower extremity from 12/5/2020 and 6/23/2018.    FINDINGS:    Bone: No fracture or dislocation is demonstrated. The patient is status post a total right knee arthroplasty. The hardware is intact without evidence of a periprosthetic fracture or dislocation. No periprosthetic lucency is seen to suggest loosening. Osteitis condensans ilii is noted. Degenerative changes of the visualized lower lumbar spine and sacroiliac joints are seen.    Soft tissues: Marked soft tissue swelling and subcutaneous inflammatory change is again seen in the medial right thigh and calf, not significantly changed. No associated focal fluid collection is seen to suggest an abscess. There is no evidence of subcutaneous gas. A 2.3 cm skin defect is again noted in the posteromedial left calf which extends approximately 1.0 cm deep to the skin surface. Scarring isseen in the anterior knee subcutaneous tissues. Moderate circumferential soft tissue swelling and subcutaneous inflammatory change in the right lower leg has slightly worsened. Mild subcutaneous inflammatory change and soft tissue swelling in the heel has slightly worsened. Enlarged right inguinal lymph nodes are similar in size.    Miscellaneous: The uterus is unremarkable. No adnexal masses are present. The urinary bladder is collapsed around a Blum catheter balloon. Excreted intravenous contrast is seen within the urinary bladder. A rectal tube is noted. The appendix is unremarkable.    IMPRESSION:  1. Marked soft tissue swelling and subcutaneous inflammatory change in the medial right thigh and calf, not significantly changed. No associated focal fluid collection to suggest an abscess. No evidence of subcutaneous gas.  2. Slight worsening of moderate circumferential soft tissue swelling and subcutaneous inflammatory change in the right lower leg and mild subcutaneous inflammatory change in the right heel.    ASSESSMENT   76yo F w PMH of HTN, chronic lymphedema, admitted with RLE wounds w/cellulitis    PLAN   - local wound care per nursing  - continue Rocephin and Clindamycin  - continue medical management and supportive care  - stable per surgery, will sign off. please reconsult PRN  - will f/u with Dr. Marquez OP  - will discuss with Dr. Marquez

## 2020-12-07 NOTE — PROGRESS NOTE ADULT - ASSESSMENT
74 y/o female w/pmhx of HTN, chronic lymphedema with RLE wounds w/cellulitis.    A/P  Cellulitis of right lower extremity.    - blood cultures: NGTD  - ceftriaxone/clinda  - wound care consult Dr Marquez.   - poss ID eval.    Wheezing.  - nebs treatment.    Morbid obesity due to excess calories.    - nutrition consult.   - weight reduction.    Essential hypertension.    - monitor.     Preventive measure.    - sq lovenox.

## 2020-12-07 NOTE — CHART NOTE - NSCHARTNOTEFT_GEN_A_CORE
Rapid response called overhead  Patient seen and examined at the bedside  RRT called for hypotension  Patient complains of mild dyspnea - improved with oxygen via nasal cannula  Admitted for cellulitis to RLE - hx of chronic lymphedema  Patient states that she recently had a debridement on the leg 5 days ago  Initial vitals 70/43    Lungs clear on respiratory exam.  RLE with chronic swelling in the upper leg    Marked area with mild erythema, painful to palpation on exams  Started patient on IV bolus LR  Vitals improved to - > 95/45   SpO2 99% on 2L via NC however patient appears to have increased work of breathing  Will send for urgent CT scan of the RLE to assess for collection/hematoma  Will continue to monitor  Case discussed with ICU PA - will determine if patient needs transfer

## 2020-12-07 NOTE — PROGRESS NOTE ADULT - SUBJECTIVE AND OBJECTIVE BOX
Patient is a 75y old  Female who presents with a chief complaint of wound check (06 Dec 2020 16:09), complain of wheezing, denies any chest pain.    OVERNIGHT EVENTS: none    MEDICATIONS  (STANDING):  cefTRIAXone   IVPB 1000 milliGRAM(s) IV Intermittent every 24 hours  clindamycin IVPB 600 milliGRAM(s) IV Intermittent every 8 hours  enoxaparin Injectable 40 milliGRAM(s) SubCutaneous daily    MEDICATIONS  (PRN):  acetaminophen   Tablet .. 650 milliGRAM(s) Oral every 4 hours PRN Temp greater or equal to 38C (100.4F), Moderate Pain (4 - 6), Severe Pain (7 - 10)    REVIEW OF SYSTEMS:  CONSTITUTIONAL: No fever,    EYES: No eye pain,    ENMT:   No sinus or throat pain  NECK: No pain or stiffness  RESPIRATORY:  wheezing,  No shortness of breath  CARDIOVASCULAR: b/l leg swelling  GASTROINTESTINAL: No abdominal or epigastric pain.   GENITOURINARY: No dysuria, frequency, hematuria, or incontinence  NEUROLOGICAL: No headaches, memory loss,  SKIN: No itching, burning, rashes, or lesions      Vital Signs Last 24 Hrs  T(C): 37.7 (07 Dec 2020 11:44), Max: 39.5 (06 Dec 2020 23:53)  T(F): 99.8 (07 Dec 2020 11:44), Max: 103.2 (06 Dec 2020 23:53)  HR: 95 (07 Dec 2020 11:44) (89 - 115)  BP: 105/66 (07 Dec 2020 11:44) (83/45 - 149/76)  BP(mean): --  RR: 22 (07 Dec 2020 11:44) (18 - 22)  SpO2: 94% (07 Dec 2020 11:44) (94% - 100%)    PHYSICAL EXAM:  GENERAL: NAD, obese female  HEAD:  Atraumatic, Normocephalic  EYES: PERRLA, conjunctiva and sclera clear  ENMT: Moist mucous membranes, No lesions  NECK: Supple, No JVD, Normal thyroid  NERVOUS SYSTEM:  Alert & Oriented X 3, Motor Strength 5/5 B/L upper and lower extremities;   CHEST/LUNG: Fair air entry bilaterally, (+) wheezing, or rubs  HEART: Regular rate and rhythm; No rubs, or gallops, +S1,S2  ABDOMEN: Soft, Nontender, Nondistended; Bowel sounds present  EXTREMITIES:  2+ Peripheral Pulses, No clubbing, cyanosis, +rt le large lymphedema, 2/2 open wounds no drainage, (+) erythema and mild warmth.      LABS:                        9.8    7.98  )-----------( 175      ( 07 Dec 2020 08:13 )             30.2     12-    139  |  106  |  24<H>  ----------------------------<  120<H>  3.9   |  22  |  1.30    Ca    8.1<L>      07 Dec 2020 08:13  Phos  3.6       Mg     1.7         TPro  6.7  /  Alb  2.2<L>  /  TBili  1.3<H>  /  DBili  x   /  AST  38<H>  /  ALT  31  /  AlkPhos  70  12-    PT/INR - ( 05 Dec 2020 17:04 )   PT: 19.1 sec;   INR: 1.69 ratio         PTT - ( 05 Dec 2020 17:04 )  PTT:33.2 sec   cardiac markers   Urinalysis Basic - ( 07 Dec 2020 03:06 )    Color: Yellow / Appearance: Slightly Turbid / S.015 / pH: x  Gluc: x / Ketone: Trace  / Bili: Small / Urobili: 8 mg/dL   Blood: x / Protein: 100 mg/dL / Nitrite: Negative   Leuk Esterase: Small / RBC: 11-25 /HPF / WBC 3-5   Sq Epi: x / Non Sq Epi: Few / Bacteria: Few      CAPILLARY BLOOD GLUCOSE      POCT Blood Glucose.: 138 mg/dL (07 Dec 2020 02:25)    RADIOLOGY & ADDITIONAL TESTS:    Imaging Personally Reviewed:  [ x] YES  [ ] NO    Consultant(s) Notes Reviewed:  [ x] YES  [ ] NO    Care Discussed with Consultants/Other Providers [ ] YES  [ ] NO

## 2020-12-08 LAB
CULTURE RESULTS: SIGNIFICANT CHANGE UP
SPECIMEN SOURCE: SIGNIFICANT CHANGE UP

## 2020-12-08 PROCEDURE — 99232 SBSQ HOSP IP/OBS MODERATE 35: CPT

## 2020-12-08 RX ORDER — IPRATROPIUM/ALBUTEROL SULFATE 18-103MCG
3 AEROSOL WITH ADAPTER (GRAM) INHALATION THREE TIMES A DAY
Refills: 0 | Status: DISCONTINUED | OUTPATIENT
Start: 2020-12-08 | End: 2020-12-09

## 2020-12-08 RX ADMIN — Medication 3 MILLILITER(S): at 09:54

## 2020-12-08 RX ADMIN — Medication 100 MILLIGRAM(S): at 18:50

## 2020-12-08 RX ADMIN — Medication 20 MILLIGRAM(S): at 18:50

## 2020-12-08 RX ADMIN — Medication 100 MILLIGRAM(S): at 09:42

## 2020-12-08 RX ADMIN — Medication 3 MILLILITER(S): at 21:39

## 2020-12-08 RX ADMIN — ENOXAPARIN SODIUM 40 MILLIGRAM(S): 100 INJECTION SUBCUTANEOUS at 11:11

## 2020-12-08 RX ADMIN — Medication 100 MILLIGRAM(S): at 00:27

## 2020-12-08 RX ADMIN — CEFTRIAXONE 100 MILLIGRAM(S): 500 INJECTION, POWDER, FOR SOLUTION INTRAMUSCULAR; INTRAVENOUS at 23:04

## 2020-12-08 NOTE — PROGRESS NOTE ADULT - SUBJECTIVE AND OBJECTIVE BOX
Patient is a 75y old  Female who presents with a chief complaint of wound check (08 Dec 2020 11:38), feeling better today.       MEDICATIONS  (STANDING):  albuterol/ipratropium for Nebulization. 3 milliLiter(s) Nebulizer three times a day  cefTRIAXone   IVPB 1000 milliGRAM(s) IV Intermittent every 24 hours  clindamycin IVPB 600 milliGRAM(s) IV Intermittent every 8 hours  enoxaparin Injectable 40 milliGRAM(s) SubCutaneous daily    MEDICATIONS  (PRN):  acetaminophen   Tablet .. 650 milliGRAM(s) Oral every 4 hours PRN Temp greater or equal to 38C (100.4F), Moderate Pain (4 - 6), Severe Pain (7 - 10)        REVIEW OF SYSTEMS:  CONSTITUTIONAL: No fever, weight loss, or fatigue  EYES: No eye pain, visual disturbances, or discharge  ENMT:    No sinus or throat pain  NECK: No pain or stiffness  RESPIRATORY:  wheezing, No shortness of breath  CARDIOVASCULAR:  leg swelling  GASTROINTESTINAL: No abdominal or epigastric pain. No nausea, vomiting, or hematemesis;    GENITOURINARY: No dysuria, frequency, hematuria, or incontinence  NEUROLOGICAL: No headaches, memory loss, loss of strength, numbness, or tremors  SKIN: No itching, burning, rashes, or lesions      Vital Signs Last 24 Hrs  T(C): 37.1 (08 Dec 2020 12:05), Max: 38 (07 Dec 2020 17:00)  T(F): 98.7 (08 Dec 2020 12:05), Max: 100.4 (07 Dec 2020 17:00)  HR: 98 (08 Dec 2020 12:05) (65 - 98)  BP: 115/73 (08 Dec 2020 12:05) (91/66 - 134/84)  BP(mean): --  RR: 18 (08 Dec 2020 12:05) (18 - 20)  SpO2: 98% (08 Dec 2020 12:05) (95% - 100%)    PHYSICAL EXAM:   GENERAL: NAD, obese female  HEAD:  Atraumatic, Normocephalic  EYES: PERRLA, conjunctiva and sclera clear  ENMT: Moist mucous membranes, No lesions  NECK: Supple, No JVD, Normal thyroid  NERVOUS SYSTEM:  Alert & Oriented X 3, Motor Strength 5/5 B/L upper and lower extremities;   CHEST/LUNG: Fair air entry bilaterally, (+) wheezing, or rubs  HEART: Regular rate and rhythm; No rubs, or gallops, +S1,S2  ABDOMEN: Soft, Nontender, Nondistended; Bowel sounds present  EXTREMITIES:  2+ Peripheral Pulses, B/L LE R>L, right LE large lymphedema, 2/2 open wounds no drainage,    LABS:                        9.8    7.98  )-----------( 175      ( 07 Dec 2020 08:13 )             30.2     12-    139  |  106  |  24<H>  ----------------------------<  120<H>  3.9   |  22  |  1.30    Ca    8.1<L>      07 Dec 2020 08:13  Phos  3.6     12-  Mg     1.7         TPro  6.7  /  Alb  2.2<L>  /  TBili  1.3<H>  /  DBili  x   /  AST  38<H>  /  ALT  31  /  AlkPhos  70  12       cardiac markers   Urinalysis Basic - ( 07 Dec 2020 21:44 )    Color: Faby / Appearance: Slightly Turbid / S.015 / pH: x  Gluc: x / Ketone: Trace  / Bili: Small / Urobili: 8 mg/dL   Blood: x / Protein: 100 mg/dL / Nitrite: Positive   Leuk Esterase: Small / RBC: 25-50 /HPF / WBC 6-10   Sq Epi: x / Non Sq Epi: Moderate / Bacteria: Many      CAPILLARY BLOOD GLUCOSE        Cultures    RADIOLOGY & ADDITIONAL TESTS:    Imaging Personally Reviewed:  [ ] YES  [ ] NO    Consultant(s) Notes Reviewed:  [ ] YES  [ ] NO    Care Discussed with Consultants/Other Providers [ ] YES  [ ] NO

## 2020-12-08 NOTE — DIETITIAN INITIAL EVALUATION ADULT. - PERTINENT MEDS FT
MEDICATIONS  (STANDING):  albuterol/ipratropium for Nebulization. 3 milliLiter(s) Nebulizer three times a day  cefTRIAXone   IVPB 1000 milliGRAM(s) IV Intermittent every 24 hours  clindamycin IVPB 600 milliGRAM(s) IV Intermittent every 8 hours  enoxaparin Injectable 40 milliGRAM(s) SubCutaneous daily    MEDICATIONS  (PRN):  acetaminophen   Tablet .. 650 milliGRAM(s) Oral every 4 hours PRN Temp greater or equal to 38C (100.4F), Moderate Pain (4 - 6), Severe Pain (7 - 10)

## 2020-12-08 NOTE — DIETITIAN INITIAL EVALUATION ADULT. - PERTINENT LABORATORY DATA
12-07 Na139 mmol/L Glu 120 mg/dL<H> K+ 3.9 mmol/L Cr  1.30 mg/dL BUN 24 mg/dL<H> 12-07 Phos 3.6 mg/dL 12-07 Alb 2.2 g/dL<L>

## 2020-12-08 NOTE — CHART NOTE - TREATMENT: THE FOLLOWING DIET HAS BEEN RECOMMENDED
Diet, DASH/TLC:   Sodium & Cholesterol Restricted  Soft (12-08-20 @ 11:55) [Pending Verification By Attending]  Diet, DASH/TLC:   Sodium & Cholesterol Restricted (12-06-20 @ 03:45) [Active]

## 2020-12-08 NOTE — DIETITIAN INITIAL EVALUATION ADULT. - OTHER INFO
Pt adm for wound check, per chart review, PMHx includes HTN, chronic lymphedema w/RLE wound. Met w/pt at bedside, pt reports good appetite and PO intake. Pt denies N/V/D/C or difficulty swallowing. Pt reports mild difficulty chewing hard foods due to gums being sore. Pt reports having her own natural teeth. Pt receptive to offer of Soft foods; dietary kitchen notified. PTA pt reports eating well, avoiding fatty foods. Pt reports her grand dtr does the food shopping and pt cooks herself. Pt reports UBW of ~350# w/some fluctuations due to ongoing edema. Diet education on Low Sodium, DASH/TLC portion-controlled diet provided and compliance encouraged. Encouraged pt to maintain good PO intake w/adequate lean protein w/meals/snacks to aide in wound healing. Discussed food sources of high sodium content. Pt verbalized comprehension.

## 2020-12-09 LAB
CRP SERPL-MCNC: 31.61 MG/DL — HIGH (ref 0–0.4)
ERYTHROCYTE [SEDIMENTATION RATE] IN BLOOD: 108 MM/HR — HIGH (ref 0–20)
HCT VFR BLD CALC: 27.3 % — LOW (ref 34.5–45)
HGB BLD-MCNC: 9 G/DL — LOW (ref 11.5–15.5)
LACTATE SERPL-SCNC: 1.8 MMOL/L — SIGNIFICANT CHANGE UP (ref 0.7–2)
MCHC RBC-ENTMCNC: 27.7 PG — SIGNIFICANT CHANGE UP (ref 27–34)
MCHC RBC-ENTMCNC: 33 GM/DL — SIGNIFICANT CHANGE UP (ref 32–36)
MCV RBC AUTO: 84 FL — SIGNIFICANT CHANGE UP (ref 80–100)
NRBC # BLD: 0 /100 WBCS — SIGNIFICANT CHANGE UP (ref 0–0)
PLATELET # BLD AUTO: 245 K/UL — SIGNIFICANT CHANGE UP (ref 150–400)
PROCALCITONIN SERPL-MCNC: 7.99 NG/ML — HIGH (ref 0.02–0.1)
RBC # BLD: 3.25 M/UL — LOW (ref 3.8–5.2)
RBC # FLD: 16.1 % — HIGH (ref 10.3–14.5)
WBC # BLD: 16.79 K/UL — HIGH (ref 3.8–10.5)
WBC # FLD AUTO: 16.79 K/UL — HIGH (ref 3.8–10.5)

## 2020-12-09 PROCEDURE — 99233 SBSQ HOSP IP/OBS HIGH 50: CPT

## 2020-12-09 PROCEDURE — 99223 1ST HOSP IP/OBS HIGH 75: CPT

## 2020-12-09 RX ORDER — VANCOMYCIN HCL 1 G
1000 VIAL (EA) INTRAVENOUS EVERY 12 HOURS
Refills: 0 | Status: DISCONTINUED | OUTPATIENT
Start: 2020-12-09 | End: 2020-12-11

## 2020-12-09 RX ORDER — IPRATROPIUM/ALBUTEROL SULFATE 18-103MCG
3 AEROSOL WITH ADAPTER (GRAM) INHALATION EVERY 8 HOURS
Refills: 0 | Status: DISCONTINUED | OUTPATIENT
Start: 2020-12-09 | End: 2020-12-21

## 2020-12-09 RX ADMIN — Medication 3 MILLILITER(S): at 23:28

## 2020-12-09 RX ADMIN — ENOXAPARIN SODIUM 40 MILLIGRAM(S): 100 INJECTION SUBCUTANEOUS at 12:54

## 2020-12-09 RX ADMIN — Medication 3 MILLILITER(S): at 05:27

## 2020-12-09 RX ADMIN — CEFTRIAXONE 100 MILLIGRAM(S): 500 INJECTION, POWDER, FOR SOLUTION INTRAMUSCULAR; INTRAVENOUS at 23:58

## 2020-12-09 RX ADMIN — Medication 100 MILLIGRAM(S): at 08:55

## 2020-12-09 RX ADMIN — Medication 20 MILLIGRAM(S): at 05:21

## 2020-12-09 RX ADMIN — Medication 250 MILLIGRAM(S): at 19:11

## 2020-12-09 RX ADMIN — Medication 650 MILLIGRAM(S): at 23:30

## 2020-12-09 RX ADMIN — Medication 100 MILLIGRAM(S): at 00:41

## 2020-12-09 NOTE — PROGRESS NOTE ADULT - SUBJECTIVE AND OBJECTIVE BOX
Patient is a 75y old  Female who presents with a chief complaint of wound check (08 Dec 2020 11:38), feeling better today.       MEDICATIONS  (STANDING):  albuterol/ipratropium for Nebulization. 3 milliLiter(s) Nebulizer three times a day  cefTRIAXone   IVPB 1000 milliGRAM(s) IV Intermittent every 24 hours  clindamycin IVPB 600 milliGRAM(s) IV Intermittent every 8 hours  enoxaparin Injectable 40 milliGRAM(s) SubCutaneous daily    MEDICATIONS  (PRN):  acetaminophen   Tablet .. 650 milliGRAM(s) Oral every 4 hours PRN Temp greater or equal to 38C (100.4F), Moderate Pain (4 - 6), Severe Pain (7 - 10)        REVIEW OF SYSTEMS:  CONSTITUTIONAL: No fever, weight loss, or fatigue  EYES: No eye pain, visual disturbances, or discharge  ENMT:    No sinus or throat pain  NECK: No pain or stiffness  RESPIRATORY:  wheezing, No shortness of breath  CARDIOVASCULAR:  leg swelling  GASTROINTESTINAL: No abdominal or epigastric pain. No nausea, vomiting, or hematemesis;    GENITOURINARY: No dysuria, frequency, hematuria, or incontinence  NEUROLOGICAL: No headaches, memory loss, loss of strength, numbness, or tremors  SKIN: No itching, burning, rashes, or lesions      Vital Signs Last 24 Hrs  T(C): 36.7 (09 Dec 2020 17:33), Max: 37.5 (09 Dec 2020 00:11)  T(F): 98 (09 Dec 2020 17:33), Max: 99.5 (09 Dec 2020 00:11)  HR: 85 (09 Dec 2020 17:33) (82 - 90)  BP: 122/75 (09 Dec 2020 17:33) (91/53 - 122/75)  BP(mean): --  RR: 18 (09 Dec 2020 17:33) (18 - 19)  SpO2: 98% (09 Dec 2020 17:33) (98% - 100%)    PHYSICAL EXAM:   GENERAL: NAD, obese female  HEAD:  Atraumatic, Normocephalic  EYES: PERRLA, conjunctiva and sclera clear  ENMT: Moist mucous membranes, No lesions  NECK: Supple, No JVD, Normal thyroid  NERVOUS SYSTEM:  Alert & Oriented X 3, Motor Strength 5/5 B/L upper and lower extremities;   CHEST/LUNG: Fair air entry bilaterally, (+) wheezing, or rubs  HEART: Regular rate and rhythm; No rubs, or gallops, +S1,S2  ABDOMEN: Soft, Nontender, Nondistended; Bowel sounds present  EXTREMITIES:  2+ Peripheral Pulses, B/L LE R>L, right LE large lymphedema, 2/2 open wounds no drainage,    LABS:                        9.8    7.98  )-----------( 175      ( 07 Dec 2020 08:13 )             30.2     12-    139  |  106  |  24<H>  ----------------------------<  120<H>  3.9   |  22  |  1.30    Ca    8.1<L>      07 Dec 2020 08:13  Phos  3.6     12-  Mg     1.7         TPro  6.7  /  Alb  2.2<L>  /  TBili  1.3<H>  /  DBili  x   /  AST  38<H>  /  ALT  31  /  AlkPhos  70  12       cardiac markers   Urinalysis Basic - ( 07 Dec 2020 21:44 )    Color: Faby / Appearance: Slightly Turbid / S.015 / pH: x  Gluc: x / Ketone: Trace  / Bili: Small / Urobili: 8 mg/dL   Blood: x / Protein: 100 mg/dL / Nitrite: Positive   Leuk Esterase: Small / RBC: 25-50 /HPF / WBC 6-10   Sq Epi: x / Non Sq Epi: Moderate / Bacteria: Many      CAPILLARY BLOOD GLUCOSE        Cultures    RADIOLOGY & ADDITIONAL TESTS:    Imaging Personally Reviewed:  [ ] YES  [ ] NO    Consultant(s) Notes Reviewed:  [ ] YES  [ ] NO    Care Discussed with Consultants/Other Providers [ ] YES  [ ] NO Patient is a 75y old  Female who presents with a chief complaint of wound check (08 Dec 2020 11:38), feeling better today.    Patient seen and examined bedside.   no overnight events.   states pain is controlled   +constipation     Denies fever, chills, N/V, dizziness, HA, cough, CP, palpitations, SOB, abdominal pain, dysuria.       MEDICATIONS  (STANDING):  albuterol/ipratropium for Nebulization. 3 milliLiter(s) Nebulizer three times a day  cefTRIAXone   IVPB 1000 milliGRAM(s) IV Intermittent every 24 hours  clindamycin IVPB 600 milliGRAM(s) IV Intermittent every 8 hours  enoxaparin Injectable 40 milliGRAM(s) SubCutaneous daily    MEDICATIONS  (PRN):  acetaminophen   Tablet .. 650 milliGRAM(s) Oral every 4 hours PRN Temp greater or equal to 38C (100.4F), Moderate Pain (4 - 6), Severe Pain (7 - 10)         Vital Signs Last 24 Hrs  T(C): 36.7 (09 Dec 2020 17:33), Max: 37.5 (09 Dec 2020 00:11)  T(F): 98 (09 Dec 2020 17:33), Max: 99.5 (09 Dec 2020 00:11)  HR: 85 (09 Dec 2020 17:33) (82 - 90)  BP: 122/75 (09 Dec 2020 17:33) (91/53 - 122/75)  BP(mean): --  RR: 18 (09 Dec 2020 17:33) (18 - 19)  SpO2: 98% (09 Dec 2020 17:33) (98% - 100%)    PHYSICAL EXAM:   GENERAL: NAD, obese female  HEAD:  Atraumatic, Normocephalic  EYES: PERRLA, conjunctiva and sclera clear  ENMT: Moist mucous membranes, No lesions  NECK: Supple, No JVD  NERVOUS SYSTEM:  Alert & Oriented X 3, no focal neuro deficits   CHEST/LUNG: good b/l air entry, + scattered exp wheezing   HEART: Regular rate and rhythm; No rubs, or gallops, +S1,S2  ABDOMEN: Soft, Nontender, Nondistended; Bowel sounds present  EXTREMITIES:  2+ Peripheral Pulses b/l, right LE  open wounds with  drainage,  b/l lymphedema     LABS:                                   9.0    16.79 )-----------( 245      ( 09 Dec 2020 10:36 )             27.3   12-    138  |  x   |  x   ----------------------------<  x   x    |  x   |  1.09      TPro  x   /  Alb  x   /  TBili  0.8  /  DBili  x   /  AST  x   /  ALT  x   /  AlkPhos  x   12-         cardiac markers   Urinalysis Basic - ( 07 Dec 2020 21:44 )    Color: Faby / Appearance: Slightly Turbid / S.015 / pH: x  Gluc: x / Ketone: Trace  / Bili: Small / Urobili: 8 mg/dL   Blood: x / Protein: 100 mg/dL / Nitrite: Positive   Leuk Esterase: Small / RBC: 25-50 /HPF / WBC 6-10   Sq Epi: x / Non Sq Epi: Moderate / Bacteria: Many      CAPILLARY BLOOD GLUCOSE        Cultures    RADIOLOGY & ADDITIONAL TESTS:    Imaging Personally Reviewed:  [ ] YES  [ ] NO    Consultant(s) Notes Reviewed:  [x ] YES  [ ] NO    Care Discussed with Consultants/Other Providers [x ] YES  [ ] NO    Care discussed in detail with patient.  All questions and concerns addressed

## 2020-12-09 NOTE — CONSULT NOTE ADULT - SUBJECTIVE AND OBJECTIVE BOX
Patient is a 75y old  Female who presents with a chief complaint of wound check (08 Dec 2020 16:49)      HPI:  Pt is a 74 y/o female w/pmhx of HTN, chronic lymphedema with RLE wound.  Pt states thursday she went for wound care and debrieding and since has been geting worse with pain, swelling and redness and comes for evaluation.  pt denies any fever, chills, sob, cp, palpitations, n/v/d/c no travel or sick contacts. (05 Dec 2020 22:55)      ID consulted for workup and antibiotic management     PAST MEDICAL & SURGICAL HISTORY:  Other iron deficiency anemia    Morbid obesity due to excess calories    Essential hypertension    H/O total knee replacement, bilateral        Allergies  No Known Allergies        ANTIMICROBIALS:  cefTRIAXone   IVPB 1000 every 24 hours  clindamycin IVPB 600 every 8 hours      MEDICATIONS  (STANDING):  cefTRIAXone   IVPB   100 mL/Hr IV Intermittent (20 @ 23:04)   100 mL/Hr IV Intermittent (20 @ 23:20)   100 mL/Hr IV Intermittent (20 @ 00:04)   100 mL/Hr IV Intermittent (20 @ 00:42)    cefTRIAXone   IVPB   100 mL/Hr IV Intermittent (20 @ 22:14)    clindamycin IVPB   100 mL/Hr IV Intermittent (20 @ 08:55)   100 mL/Hr IV Intermittent (20 @ 00:41)   100 mL/Hr IV Intermittent (20 @ 18:50)   100 mL/Hr IV Intermittent (20 @ 09:42)   100 mL/Hr IV Intermittent (20 @ 00:27)   100 mL/Hr IV Intermittent (20 @ 17:38)   100 mL/Hr IV Intermittent (20 @ 10:22)   100 mL/Hr IV Intermittent (20 @ 00:42)   100 mL/Hr IV Intermittent (20 @ 17:20)   100 mL/Hr IV Intermittent (20 @ 08:22)   100 mL/Hr IV Intermittent (20 @ 01:14)    clindamycin IVPB   100 mL/Hr IV Intermittent (20 @ 23:14)        OTHER MEDS: MEDICATIONS  (STANDING):  acetaminophen   Tablet .. 650 every 4 hours PRN  albuterol/ipratropium for Nebulization. 3 three times a day  enoxaparin Injectable 40 daily  methylPREDNISolone sodium succinate Injectable 20 two times a day      SOCIAL HISTORY:       FAMILY HISTORY:  No pertinent family history in first degree relatives        REVIEW OF SYSTEMS  [  ] ROS unobtainable because:    [  ] All other systems negative except as noted below:	    Constitutional:  [ ] fever [ ] chills  [ ] weight loss  [ ] weakness  Skin:  [ ] rash [ ] phlebitis	  Eyes: [ ] icterus [ ] pain  [ ] discharge	  ENMT: [ ] sore throat  [ ] thrush [ ] ulcers [ ] exudates  Respiratory: [ ] dyspnea [ ] hemoptysis [ ] cough [ ] sputum	  Cardiovascular:  [ ] chest pain [ ] palpitations [ ] edema	  Gastrointestinal:  [ ] nausea [ ] vomiting [ ] diarrhea [ ] constipation [ ] pain	  Genitourinary:  [ ] dysuria [ ] frequency [ ] hematuria [ ] discharge [ ] flank pain  [ ] incontinence  Musculoskeletal:  [ ] myalgias [ ] arthralgias [ ] arthritis  [ ] back pain  Neurological:  [ ] headache [ ] seizures  [ ] confusion/altered mental status  Psychiatric:  [ ] anxiety [ ] depression	  Hematology/Lymphatics:  [ ] lymphadenopathy  Endocrine:  [ ] adrenal [ ] thyroid  Allergic/Immunologic:	 [ ] transplant [ ] seasonal    Vital Signs Last 24 Hrs  T(F): 97.9 (20 @ 11:16), Max: 103.2 (20 @ 23:53)    Vital Signs Last 24 Hrs  HR: 85 (20 @ 11:16) (82 - 104)  BP: 103/70 (20 @ 11:16) (91/53 - 132/82)  RR: 18 (20 @ 11:16)  SpO2: 100% (20 @ 11:16) (98% - 100%)  Wt(kg): --    PHYSICAL EXAM:  Constitutional: non-toxic, no distress  HEAD/EYES: anicteric, no conjunctival injection  ENT:  supple, no thrush  Cardiovascular:   normal S1, S2, no murmur, no edema  Respiratory:  clear BS bilaterally, no wheezes, no rales  GI:  soft, non-tender, normal bowel sounds  :  no blum, no CVA tenderness  Musculoskeletal:  no synovitis, normal ROM  Neurologic: awake and alert, normal strength, no focal findings  Skin:  no rash, no erythema, no phlebitis  Heme/Onc: no lymphadenopathy   Psychiatric:  awake, alert, appropriate mood          WBC Count: 16.79 K/uL ( @ 10:36)  WBC Count: 7.98 K/uL ( @ 08:13)  WBC Count: 6.64 K/uL ( @ 02:48)  WBC Count: 9.32 K/uL ( @ 17:04)                            9.0    16.79 )-----------( 245      ( 09 Dec 2020 10:36 )             27.3           138  |  x   |  x   ----------------------------<  x   x    |  x   |  1.09      TPro  x   /  Alb  x   /  TBili  0.8  /  DBili  x   /  AST  x   /  ALT  x   /  AlkPhos  x         Creatinine Trend: 1.09<--, 1.30<--, 1.35<--, 1.08<--, 1.11<--    Urinalysis Basic - ( 07 Dec 2020 21:44 )    Color: Faby / Appearance: Slightly Turbid / S.015 / pH: x  Gluc: x / Ketone: Trace  / Bili: Small / Urobili: 8 mg/dL   Blood: x / Protein: 100 mg/dL / Nitrite: Positive   Leuk Esterase: Small / RBC: 25-50 /HPF / WBC 6-10   Sq Epi: x / Non Sq Epi: Moderate / Bacteria: Many        MICROBIOLOGY:    Culture - Urine (collected 20 @ 08:16)  Source: .Urine Catheterized  Final Report (20 @ 07:12):    <10,000 CFU/mL Normal Urogenital Andreea            v                    Procalcitonin, Serum: 23.00 (20 @ 08:18)    RADIOLOGY:               Patient is a 75y old  Female who presents with a chief complaint of wound check (08 Dec 2020 16:49)      HPI:  Pt is a 76 y/o female w/pmhx of HTN, chronic lymphedema with RLE wound.  Pt states thursday she went for wound care and debrieding and since has been geting worse with pain, swelling and redness and comes for evaluation.  pt denies any fever, chills, sob, cp, palpitations, n/v/d/c no travel or sick contacts. (05 Dec 2020 22:55)    Above reviewed. Pt has had a chronic leg wound due to excess weight/chronic lymphedema nd following in wound care center. had small debridement and since then continued to get worse with severe pain and swelling. Had been febrile on admission and persistently initially. She still complains of pain and also had wheezing/difficulty breathing and thus was started on steroids and her WBC went up around the same time.   ID consulted for workup and antibiotic management     PAST MEDICAL & SURGICAL HISTORY:  Other iron deficiency anemia    Morbid obesity due to excess calories    Essential hypertension    H/O total knee replacement, bilateral        Allergies  No Known Allergies        ANTIMICROBIALS:  cefTRIAXone   IVPB 1000 every 24 hours  clindamycin IVPB 600 every 8 hours      MEDICATIONS  (STANDING):  cefTRIAXone   IVPB   100 mL/Hr IV Intermittent (20 @ 23:04)   100 mL/Hr IV Intermittent (20 @ 23:20)   100 mL/Hr IV Intermittent (20 @ 00:04)   100 mL/Hr IV Intermittent (20 @ 00:42)    cefTRIAXone   IVPB   100 mL/Hr IV Intermittent (20 @ 22:14)    clindamycin IVPB   100 mL/Hr IV Intermittent (20 @ 08:55)   100 mL/Hr IV Intermittent (20 @ 00:41)   100 mL/Hr IV Intermittent (20 @ 18:50)   100 mL/Hr IV Intermittent (20 @ 09:42)   100 mL/Hr IV Intermittent (20 @ 00:27)   100 mL/Hr IV Intermittent (20 @ 17:38)   100 mL/Hr IV Intermittent (20 @ 10:22)   100 mL/Hr IV Intermittent (20 @ 00:42)   100 mL/Hr IV Intermittent (20 @ 17:20)   100 mL/Hr IV Intermittent (20 @ 08:22)   100 mL/Hr IV Intermittent (20 @ 01:14)    clindamycin IVPB   100 mL/Hr IV Intermittent (20 @ 23:14)        OTHER MEDS: MEDICATIONS  (STANDING):  acetaminophen   Tablet .. 650 every 4 hours PRN  albuterol/ipratropium for Nebulization. 3 three times a day  enoxaparin Injectable 40 daily  methylPREDNISolone sodium succinate Injectable 20 two times a day      SOCIAL HISTORY:     Denies smoking drinking or drugs    FAMILY HISTORY:  Mom had ESRD      REVIEW OF SYSTEMS  [  ] ROS unobtainable because:    [ X ] All other systems negative except as noted below:	    Constitutional:  [ X] fever [ ] chills  [ ] weight loss  [ ] weakness  Skin:  [ X] rash [ ] phlebitis	  Eyes: [ ] icterus [ ] pain  [ ] discharge	  ENMT: [ ] sore throat  [ ] thrush [ ] ulcers [ ] exudates  Respiratory: [X ] dyspnea [ ] hemoptysis [ ] cough [ ] sputum	  Cardiovascular:  [ ] chest pain [ ] palpitations [ ] edema	  Gastrointestinal:  [ ] nausea [ ] vomiting [ ] diarrhea [ ] constipation [ ] pain	  Genitourinary:  [ ] dysuria [ ] frequency [ ] hematuria [ ] discharge [ ] flank pain  [ ] incontinence  Musculoskeletal:  [ ] myalgias [ ] arthralgias [ ] arthritis  [ ] back pain  Neurological:  [ ] headache [ ] seizures  [ ] confusion/altered mental status  Psychiatric:  [ ] anxiety [ ] depression	  Hematology/Lymphatics:  [ ] lymphadenopathy  Endocrine:  [ ] adrenal [ ] thyroid  Allergic/Immunologic:	 [ ] transplant [ ] seasonal    Vital Signs Last 24 Hrs  T(F): 97.9 (20 @ 11:16), Max: 103.2 (20 @ 23:53)    Vital Signs Last 24 Hrs  HR: 85 (20 @ 11:16) (82 - 104)  BP: 103/70 (20 @ 11:16) (91/53 - 132/82)  RR: 18 (20 @ 11:16)  SpO2: 100% (20 @ 11:16) (98% - 100%)  Wt(kg): --    PHYSICAL EXAM:  Constitutional: non-toxic, no distress, obese  HEAD/EYES: anicteric, no conjunctival injection  ENT:  supple, no thrush  Cardiovascular:   normal S1, S2, no murmur  Respiratory:  clear BS bilaterally, + wheezes, no rales  GI:  soft, non-tender, normal bowel sounds  :  no blum, no CVA tenderness  Musculoskeletal: large lower extremities with chronic lymphedema   Neurologic: awake and alert, normal strength, no focal findings  Skin:  the right LE is warm, mildly tender, edematous and there is a large and deep wound/ulcer with pus at the base of the ulcer  Heme/Onc: no cervical lymphadenopathy   Psychiatric:  awake, alert, appropriate mood          WBC Count: 16.79 K/uL ( @ 10:36)  WBC Count: 7.98 K/uL ( @ 08:13)  WBC Count: 6.64 K/uL ( @ 02:48)  WBC Count: 9.32 K/uL ( @ 17:04)    Manual Differential (20 @ 08:13)    Band Neutrophils %: 20.0 %                              9.0    16.79 )-----------( 245      ( 09 Dec 2020 10:36 )             27.3           138  |  x   |  x   ----------------------------<  x   x    |  x   |  1.09      TPro  x   /  Alb  x   /  TBili  0.8  /  DBili  x   /  AST  x   /  ALT  x   /  AlkPhos  x         Creatinine Trend: 1.09<--, 1.30<--, 1.35<--, 1.08<--, 1.11<--    Urinalysis Basic - ( 07 Dec 2020 21:44 )    Color: Faby / Appearance: Slightly Turbid / S.015 / pH: x  Gluc: x / Ketone: Trace  / Bili: Small / Urobili: 8 mg/dL   Blood: x / Protein: 100 mg/dL / Nitrite: Positive   Leuk Esterase: Small / RBC: 25-50 /HPF / WBC 6-10   Sq Epi: x / Non Sq Epi: Moderate / Bacteria: Many        MICROBIOLOGY:    Culture - Urine (collected 20 @ 08:16)  Source: .Urine Catheterized  Final Report (20 @ 07:12):    <10,000 CFU/mL Normal Urogenital Andreea    Procalcitonin, Serum: 23.00 (20 @ 08:18)    RADIOLOGY:  CT Lower Extremity No Cont, Right (20 @ 03:33)  IMPRESSION:  1. Marked soft tissue swelling and subcutaneous inflammatory change in the medial right thigh and calf, not significantly changed. No associated focal fluid collection to suggest an abscess. No evidence of subcutaneous gas.  2. Slight worsening of moderate circumferential soft tissue swelling and subcutaneous inflammatory change in the right lower leg and mild subcutaneous inflammatory change in the right heel.

## 2020-12-09 NOTE — PROGRESS NOTE ADULT - ASSESSMENT
74 y/o female w/pmhx of HTN, chronic lymphedema with RLE wounds w/cellulitis.    A/P  Cellulitis of right lower extremity.    - blood cultures: NGTD  - ceftriaxone/clinda  - wound care consult Dr Marquez noted.   - surgical team has signed off.    Wheezing.  - nebs treatment.  - add low dose steroids.    Morbid obesity due to excess calories.    - nutrition consult.   - weight reduction.    Essential hypertension.    - monitor.     Preventive measure.    - sq lovenox.     PT eval was completed.     76 y/o female w/pmhx of HTN, chronic lymphedema with RLE wounds w/cellulitis.    A/P  Cellulitis with open draining wound of right lower extremity.    - blood cultures: NGTD  -cángel gomezo , follow vanc trough (pre 4th dose)   change clindamycin to ceftriaxone   -will discuss with vascular re need for debridement     Wheezing, atelectasis?  OHS?   - nebs treatment, steroids     Morbid obesity due to excess calories.    - nutrition consult.   - weight reduction.    Essential hypertension.    - monitor.     Preventive measure.    - sq lovenox.   PT   fall precautions   HOBE

## 2020-12-09 NOTE — CONSULT NOTE ADULT - ASSESSMENT
ASSESSMENT: 74 y/o female w/pmhx of HTN, chronic lymphedema, here now with RLE wounds w/cellulitis    PLAN:    - local wound care  - continue IV antibiotics   - will evaluate once patient in regular bed and able to adequately evaluate the posterior aspect of the RLE   - will d/w Dr. Marquez          Surgery Team   [id number]
74 y/o female w/pmhx of HTN, chronic lymphedema with RLE wounds w/cellulitis.  Fever, tachycardia  Normal WBC initially but today jumped to 16 with bandemia (perhaps in the setting of solumedrol)  Blood culture negative  UA positive but she denies any urinary symptoms   CT (I personally reviewed) lower extremity with marked edema and inflammatory changes  She has been on clinda and ceftriaxone   The wound appears to need more debridement and would benefit from a scraping and then culture of the base of the wound  Would continue antibiotics but change clindamycin to vanco for better and more optimal MRSA coverage     Suggest:  -stop clindamycin   -start Vancomycin 2g q24hrs  -obtain Vanco trough before the 3rd dose  -Continue ceftriaxone   -wound care evaluation   -question for wound care regarding wound vac to help with healing   -weight loss program (consider [facility] obesity clinic referral once ready for discharge)   -offloading to ensure no further wound development     Discussed with Dr. Roldan and wound care    Que Barnes DO  Cell: [phone]  Pager [phone]  Infectious Disease Attending  After 5pm/weekends please call [phone] for all inquiries and new consults

## 2020-12-10 LAB
ALBUMIN SERPL ELPH-MCNC: 1.7 G/DL — LOW (ref 3.3–5)
ALP SERPL-CCNC: 126 U/L — HIGH (ref 40–120)
ALT FLD-CCNC: 58 U/L — SIGNIFICANT CHANGE UP (ref 12–78)
ANION GAP SERPL CALC-SCNC: 8 MMOL/L — SIGNIFICANT CHANGE UP (ref 5–17)
ANISOCYTOSIS BLD QL: SLIGHT — SIGNIFICANT CHANGE UP
AST SERPL-CCNC: 49 U/L — HIGH (ref 15–37)
BASOPHILS # BLD AUTO: 0 K/UL — SIGNIFICANT CHANGE UP (ref 0–0.2)
BASOPHILS NFR BLD AUTO: 0 % — SIGNIFICANT CHANGE UP (ref 0–2)
BILIRUB SERPL-MCNC: 0.4 MG/DL — SIGNIFICANT CHANGE UP (ref 0.2–1.2)
BUN SERPL-MCNC: 41 MG/DL — HIGH (ref 7–23)
CALCIUM SERPL-MCNC: 8.4 MG/DL — LOW (ref 8.5–10.1)
CHLORIDE SERPL-SCNC: 106 MMOL/L — SIGNIFICANT CHANGE UP (ref 96–108)
CO2 SERPL-SCNC: 21 MMOL/L — LOW (ref 22–31)
CREAT SERPL-MCNC: 0.86 MG/DL — SIGNIFICANT CHANGE UP (ref 0.5–1.3)
DOHLE BOD BLD QL SMEAR: PRESENT — SIGNIFICANT CHANGE UP
ELLIPTOCYTES BLD QL SMEAR: SLIGHT — SIGNIFICANT CHANGE UP
EOSINOPHIL # BLD AUTO: 0 K/UL — SIGNIFICANT CHANGE UP (ref 0–0.5)
EOSINOPHIL NFR BLD AUTO: 0 % — SIGNIFICANT CHANGE UP (ref 0–6)
GLUCOSE SERPL-MCNC: 197 MG/DL — HIGH (ref 70–99)
HCT VFR BLD CALC: 30.8 % — LOW (ref 34.5–45)
HGB BLD-MCNC: 9.9 G/DL — LOW (ref 11.5–15.5)
HYPOCHROMIA BLD QL: SLIGHT — SIGNIFICANT CHANGE UP
LYMPHOCYTES # BLD AUTO: 1.3 K/UL — SIGNIFICANT CHANGE UP (ref 1–3.3)
LYMPHOCYTES # BLD AUTO: 5 % — LOW (ref 13–44)
MACROCYTES BLD QL: SLIGHT — SIGNIFICANT CHANGE UP
MANUAL SMEAR VERIFICATION: SIGNIFICANT CHANGE UP
MCHC RBC-ENTMCNC: 27.9 PG — SIGNIFICANT CHANGE UP (ref 27–34)
MCHC RBC-ENTMCNC: 32.1 GM/DL — SIGNIFICANT CHANGE UP (ref 32–36)
MCV RBC AUTO: 86.8 FL — SIGNIFICANT CHANGE UP (ref 80–100)
METAMYELOCYTES # FLD: 2 % — HIGH (ref 0–0)
MONOCYTES # BLD AUTO: 1.56 K/UL — HIGH (ref 0–0.9)
MONOCYTES NFR BLD AUTO: 6 % — SIGNIFICANT CHANGE UP (ref 2–14)
NEUTROPHILS # BLD AUTO: 22.42 K/UL — HIGH (ref 1.8–7.4)
NEUTROPHILS NFR BLD AUTO: 84 % — HIGH (ref 43–77)
NEUTS BAND # BLD: 2 % — SIGNIFICANT CHANGE UP (ref 0–8)
NEUTS VAC BLD QL SMEAR: SLIGHT — SIGNIFICANT CHANGE UP
NRBC # BLD: 0 /100 — SIGNIFICANT CHANGE UP (ref 0–0)
NRBC # BLD: SIGNIFICANT CHANGE UP /100 WBCS (ref 0–0)
OVALOCYTES BLD QL SMEAR: SLIGHT — SIGNIFICANT CHANGE UP
PHOSPHATE SERPL-MCNC: 3.5 MG/DL — SIGNIFICANT CHANGE UP (ref 2.5–4.5)
PLAT MORPH BLD: NORMAL — SIGNIFICANT CHANGE UP
PLATELET # BLD AUTO: 257 K/UL — SIGNIFICANT CHANGE UP (ref 150–400)
PLATELET COUNT - ESTIMATE: NORMAL — SIGNIFICANT CHANGE UP
POIKILOCYTOSIS BLD QL AUTO: SLIGHT — SIGNIFICANT CHANGE UP
POLYCHROMASIA BLD QL SMEAR: SLIGHT — SIGNIFICANT CHANGE UP
POTASSIUM SERPL-MCNC: 4.3 MMOL/L — SIGNIFICANT CHANGE UP (ref 3.5–5.3)
POTASSIUM SERPL-SCNC: 4.3 MMOL/L — SIGNIFICANT CHANGE UP (ref 3.5–5.3)
PROT SERPL-MCNC: 6.7 GM/DL — SIGNIFICANT CHANGE UP (ref 6–8.3)
RBC # BLD: 3.55 M/UL — LOW (ref 3.8–5.2)
RBC # FLD: 16 % — HIGH (ref 10.3–14.5)
RBC BLD AUTO: ABNORMAL
SARS-COV-2 RNA SPEC QL NAA+PROBE: SIGNIFICANT CHANGE UP
SCHISTOCYTES BLD QL AUTO: SLIGHT — SIGNIFICANT CHANGE UP
SMUDGE CELLS # BLD: PRESENT — SIGNIFICANT CHANGE UP
SODIUM SERPL-SCNC: 135 MMOL/L — SIGNIFICANT CHANGE UP (ref 135–145)
TARGETS BLD QL SMEAR: SLIGHT — SIGNIFICANT CHANGE UP
TOXIC GRANULES BLD QL SMEAR: PRESENT — SIGNIFICANT CHANGE UP
VARIANT LYMPHS # BLD: 1 % — SIGNIFICANT CHANGE UP (ref 0–6)
WBC # BLD: 26.07 K/UL — HIGH (ref 3.8–10.5)
WBC # FLD AUTO: 26.07 K/UL — HIGH (ref 3.8–10.5)

## 2020-12-10 PROCEDURE — 99232 SBSQ HOSP IP/OBS MODERATE 35: CPT

## 2020-12-10 RX ORDER — POLYETHYLENE GLYCOL 3350 17 G/17G
17 POWDER, FOR SOLUTION ORAL DAILY
Refills: 0 | Status: DISCONTINUED | OUTPATIENT
Start: 2020-12-10 | End: 2020-12-21

## 2020-12-10 RX ORDER — SENNA PLUS 8.6 MG/1
2 TABLET ORAL AT BEDTIME
Refills: 0 | Status: DISCONTINUED | OUTPATIENT
Start: 2020-12-10 | End: 2020-12-21

## 2020-12-10 RX ORDER — MULTIVIT WITH MIN/MFOLATE/K2 340-15/3 G
1 POWDER (GRAM) ORAL ONCE
Refills: 0 | Status: COMPLETED | OUTPATIENT
Start: 2020-12-10 | End: 2020-12-10

## 2020-12-10 RX ADMIN — POLYETHYLENE GLYCOL 3350 17 GRAM(S): 17 POWDER, FOR SOLUTION ORAL at 11:39

## 2020-12-10 RX ADMIN — Medication 3 MILLILITER(S): at 14:34

## 2020-12-10 RX ADMIN — Medication 250 MILLIGRAM(S): at 06:04

## 2020-12-10 RX ADMIN — Medication 250 MILLIGRAM(S): at 17:19

## 2020-12-10 RX ADMIN — ENOXAPARIN SODIUM 40 MILLIGRAM(S): 100 INJECTION SUBCUTANEOUS at 11:39

## 2020-12-10 RX ADMIN — Medication 650 MILLIGRAM(S): at 00:30

## 2020-12-10 RX ADMIN — Medication 1 BOTTLE: at 08:26

## 2020-12-10 RX ADMIN — Medication 3 MILLILITER(S): at 23:34

## 2020-12-10 RX ADMIN — SENNA PLUS 2 TABLET(S): 8.6 TABLET ORAL at 21:37

## 2020-12-10 RX ADMIN — Medication 20 MILLIGRAM(S): at 06:05

## 2020-12-10 RX ADMIN — Medication 3 MILLILITER(S): at 05:13

## 2020-12-10 NOTE — PROGRESS NOTE ADULT - ASSESSMENT
74 y/o female w/pmhx of HTN, chronic lymphedema with RLE wounds w/cellulitis.    A/P  Cellulitis with open draining wound of right lower extremity.    - blood cultures: NGTD  -cángel gomezo , follow vanc trough (pre 4th dose)   change clindamycin to ceftriaxone   -will discuss with vascular re need for debridement     Wheezing, atelectasis?  OHS?   - nebs treatment, steroids     Morbid obesity due to excess calories.    - nutrition consult.   - weight reduction.    Essential hypertension.    - monitor.     Preventive measure.    - sq lovenox.   PT   fall precautions   HOBE      76 y/o female w/pmhx of HTN, chronic lymphedema with RLE wounds w/cellulitis.    Assessment and Plan:     Cellulitis with open draining wound of right lower extremity   massive chronic lymphedema   elevated WBC with bandemia   - blood cultures: NGTD  A1c 6.3%  -c.w vanco , follow vanc trough (pre 4th dose)   continue with  ceftriaxone   discussed with Dr. Marquez, no need for debridement at this time, continue with abx, daily wound care dressing changes  analgesics prn     Wheezing, atelectasis?  OHS?   - nebs treatment, wheezing resolved   s/p short course of steroids  , will d/c steroids     Morbid obesity due to excess calories.    - nutrition eval appreciated   - weight reduction.    Essential hypertension, controlled off meds   - monitor.     Preventive measure.    - sq lovenox. -dvt ppx  PT :PRASHANT  fall precautions   HOBE      76 y/o female w/pmhx of HTN, chronic lymphedema with RLE wounds w/cellulitis.    Assessment and Plan:     Cellulitis with open draining wound of right lower extremity   massive chronic lymphedema   elevated WBC with bandemia   - blood cultures: NGTD  A1c 6.3%  -c.w vanco , follow vanc trough (pre 4th dose)   continue with  ceftriaxone   discussed with Dr. Marquez, no need for debridement at this time, continue with abx, daily wound care dressing changes  analgesics prn     Wheezing, atelectasis?  OHS?   - nebs treatment, wheezing resolved   s/p short course of steroids  , will d/c steroids     Morbid obesity due to excess calories.    - nutrition eval appreciated   - weight reduction.    Essential hypertension, controlled off meds   - monitor.     Hypophosphatemia --replete and monitor     Preventive measure.    - sq lovenox. -dvt ppx  PT :PRASHANT  fall precautions   HOBE

## 2020-12-10 NOTE — PROGRESS NOTE ADULT - SUBJECTIVE AND OBJECTIVE BOX
HOD # 5    SUBJECTIVE:  74 y/o F seen and examined at bedside. Pt c/o mild RLE discomfort stating inciting pain when touched, however in NAD. PT tolerating dash diet without N/V. PT denies any fevers, chills, chest pain, shortness of breath, abdominal pain, nausea, vomiting or diarrhea.    Vital Signs Last 24 Hrs  T(C): 36.8 (10 Dec 2020 04:35), Max: 36.8 (10 Dec 2020 04:35)  T(F): 98.2 (10 Dec 2020 04:35), Max: 98.2 (10 Dec 2020 04:35)  HR: 86 (10 Dec 2020 05:47) (75 - 86)  BP: 102/69 (10 Dec 2020 04:35) (102/69 - 122/75)  BP(mean): --  RR: 18 (10 Dec 2020 04:35) (18 - 18)  SpO2: 95% (10 Dec 2020 05:47) (95% - 100%)    PHYSICAL EXAM:  GENERAL: Morbidly obese, in NAD  CHEST/LUNG: CTABL, no ronchi, rales or wheezing  HEART: RRR, no MRGs  EXTREMITIES: RLE: Markedly enlarged RLE, warm , mildly ttp, dressing in place c/d/i, previously outlined area of receding erythema/cellulitis noted,   LLE: enlarged LLE,       I&O's Summary    09 Dec 2020 07:01  -  10 Dec 2020 07:00  --------------------------------------------------------  IN: 668 mL / OUT: 900 mL / NET: -232 mL      I&O's Detail    09 Dec 2020 07:01  -  10 Dec 2020 07:00  --------------------------------------------------------  IN:    Oral Fluid: 668 mL  Total IN: 668 mL    OUT:    Voided (mL): 900 mL  Total OUT: 900 mL    Total NET: -232 mL    MEDICATIONS  (STANDING):  albuterol/ipratropium for Nebulization 3 milliLiter(s) Nebulizer every 8 hours  cefTRIAXone   IVPB 1000 milliGRAM(s) IV Intermittent every 24 hours  enoxaparin Injectable 40 milliGRAM(s) SubCutaneous daily  polyethylene glycol 3350 17 Gram(s) Oral daily  predniSONE   Tablet 20 milliGRAM(s) Oral daily  senna 2 Tablet(s) Oral at bedtime  vancomycin  IVPB 1000 milliGRAM(s) IV Intermittent every 12 hours    MEDICATIONS  (PRN):  acetaminophen   Tablet .. 650 milliGRAM(s) Oral every 4 hours PRN Temp greater or equal to 38C (100.4F), Moderate Pain (4 - 6), Severe Pain (7 - 10)    LABS:                        9.9    26.07 )-----------( 257      ( 10 Dec 2020 08:15 )             30.8     12-10    135  |  106  |  41<H>  ----------------------------<  197<H>  4.3   |  21<L>  |  0.86    Ca    8.4<L>      10 Dec 2020 07:44  Phos  3.5     12-10    TPro  6.7  /  Alb  1.7<L>  /  TBili  0.4  /  DBili  x   /  AST  49<H>  /  ALT  58  /  AlkPhos  126<H>  12-10    PT/INR - ( 09 Dec 2020 07:56 )   PT: 15.3 sec;   INR: 1.34 ratio        ASSESSMENT  74 y/o F HOD # 5 admitted for fevers, with RLE cellulitis, known from local wound care s/p debridement of RLE wound, now with elevated wbc of 16.79 with bandemia, however afebrile, possibly due to use of prednisone, also with UA positive for UTI as possible source of wbc,     PLAN  - daily wound care, dressing changes  - cont care per primary team  - pain control, supportive care  - IV abx  - f/u am labs, trend wbc  - OOB, with assistance, PT   - to be discussed with Dr. Marquez

## 2020-12-10 NOTE — PROGRESS NOTE ADULT - ASSESSMENT
74 y/o female w/pmhx of HTN, chronic lymphedema with RLE wounds w/cellulitis.  Fever, tachycardia  Normal WBC initially but today jumped to 16 with bandemia (perhaps in the setting of solumedrol)  Blood culture negative  UA positive but she denies any urinary symptoms   CT (I personally reviewed) lower extremity with marked edema and inflammatory changes  She has been on clinda and ceftriaxone   The wound appears to need more debridement and would benefit from a scraping and then culture of the base of the wound  Would continue antibiotics but change clindamycin to vanco for better and more optimal MRSA coverage     12/10: WBC rising but perhaps in the setting of steroids though to this extent is questionable, afebrile, evaluated again by surgery who wants to watch on antibiotics, though she had a positive UA she denies any dysuria or increased frequency. Discussed with hospitalist to perhaps stop steroids and if WBC remains elevated to repeat blood cultures and obtain CT right LE with IV contrast.    Suggest:  -continue Vancomycin 1g q12hrs  -vanco trough tomorrow AM with target trough 10-15  -Continue ceftriaxone   -wound care evaluation appreciated  -question for wound care regarding wound vac to help with healing once infection better controlled  -obtain CBC with diff tomorrow, if not improved please send 2 sets of blood cultures and CT LE with IV contrast   -weight loss program (consider [facility] clinic referral once ready for discharge)   -offloading to ensure no further wound development   -HbA1c in the AM  -good but not too tight glycemic control especially in the setting of steroids use and infection    Discussed with Dr. Roldan and wound care PA    Que Barnes DO  Cell: [phone]  Pager [phone]  Infectious Disease Attending  After 5pm/weekends please call [phone] for all inquiries and new consults

## 2020-12-10 NOTE — PROGRESS NOTE ADULT - SUBJECTIVE AND OBJECTIVE BOX
ALICIA GONZALEZ  MRN-[id number]    Follow Up:  ID following for cellulitis and wound    Interval History: afebrile, WBC still elevated, not complaining of SOB    ROS:    [ ] Unobtainable because:  [ X] All other systems negative    Constitutional: no fever, no chills  Head: no trauma  Eyes: no vision changes, no eye pain  ENT:  no sore throat, no rhinorrhea  Cardiovascular:  no chest pain, no palpitation  Respiratory:  no SOB, no cough  GI:  no abd pain, no vomiting, no diarrhea  urinary: no dysuria, no hematuria, no flank pain  musculoskeletal:  no joint pain, no joint swelling  skin:  no rash  neurology:  no headache, no seizure, no change in mental status  psych: no anxiety, no depression         Allergies  No Known Allergies        ANTIMICROBIALS:  cefTRIAXone   IVPB 1000 every 24 hours  vancomycin  IVPB 1000 every 12 hours      OTHER MEDS:  acetaminophen   Tablet .. 650 milliGRAM(s) Oral every 4 hours PRN  albuterol/ipratropium for Nebulization 3 milliLiter(s) Nebulizer every 8 hours  enoxaparin Injectable 40 milliGRAM(s) SubCutaneous daily  polyethylene glycol 3350 17 Gram(s) Oral daily  predniSONE   Tablet 20 milliGRAM(s) Oral daily  senna 2 Tablet(s) Oral at bedtime      Physical Exam:  Vital Signs Last 24 Hrs  T(C): 36.3 (10 Dec 2020 17:03), Max: 36.8 (10 Dec 2020 04:35)  T(F): 97.4 (10 Dec 2020 17:03), Max: 98.2 (10 Dec 2020 04:35)  HR: 80 (10 Dec 2020 17:03) (75 - 86)  BP: 122/78 (10 Dec 2020 17:03) (102/69 - 122/78)  BP(mean): --  RR: 18 (10 Dec 2020 17:03) (17 - 18)  SpO2: 100% (10 Dec 2020 17:03) (95% - 100%)    General:    NAD,  non toxic, morbidly obese  Head: atraumatic, normocephalic  Eye: normal sclera and conjunctiva  ENT:    no oral lesions, neck supple  Cardio:     regular S1, S2,  no murmur  Respiratory:    clear b/l,    no wheezing today  abd:     soft,   BS +,   no tenderness  :   no CVAT,  no suprapubic tenderness,   no  blum  Musculoskeletal:   no joint swelling,   +edema  vascular: no central lines, +PIV   Skin:    large and deep ulcer on the posteromedial aspect of right leg with some discharge, chronic lymphedema, the surrounding skin is still warm and edematous   Neurologic:     no focal deficit  psych: normal affect    WBC Count: 26.07 K/uL (12-10 @ 08:15)  WBC Count: 16.79 K/uL (12-09 @ 10:36)  WBC Count: 7.98 K/uL (12-07 @ 08:13)  WBC Count: 6.64 K/uL (12-07 @ 02:48)  WBC Count: 9.32 K/uL (12-05 @ 17:04)                            9.9    26.07 )-----------( 257      ( 10 Dec 2020 08:15 )             30.8       12-10    135  |  106  |  41<H>  ----------------------------<  197<H>  4.3   |  21<L>  |  0.86    Ca    8.4<L>      10 Dec 2020 07:44  Phos  3.5     12-10    TPro  6.7  /  Alb  1.7<L>  /  TBili  0.4  /  DBili  x   /  AST  49<H>  /  ALT  58  /  AlkPhos  126<H>  12-10          Creatinine Trend: 0.86<--, 1.09<--, 1.30<--, 1.35<--, 1.08<--, 1.11<--  Lactate, Blood: 1.8 mmol/L (12-09-20 @ 10:36)      MICROBIOLOGY:    .Urine Catheterized  12-07-20   <10,000 CFU/mL Normal Urogenital Andreea  --  --      .Blood Blood-Peripheral  12-06-20   No growth to date.  --  --    C-Reactive Protein, Serum: 31.61 (12-09)    Procalcitonin, Serum: 7.99 (12-09-20 @ 14:51)  Procalcitonin, Serum: 23.00 (12-07-20 @ 08:18)      RADIOLOGY:

## 2020-12-10 NOTE — PROGRESS NOTE ADULT - SUBJECTIVE AND OBJECTIVE BOX
Patient is a 75y old  Female who presents with a chief complaint of wound check (08 Dec 2020 11:38), feeling better today.    Patient seen and examined bedside.   no overnight events.   states pain is controlled   + large BM     Denies fever, chills, N/V, dizziness, HA, cough, CP, palpitations, SOB, abdominal pain, dysuria.       MEDICATIONS  (STANDING):  albuterol/ipratropium for Nebulization. 3 milliLiter(s) Nebulizer three times a day  cefTRIAXone   IVPB 1000 milliGRAM(s) IV Intermittent every 24 hours  clindamycin IVPB 600 milliGRAM(s) IV Intermittent every 8 hours  enoxaparin Injectable 40 milliGRAM(s) SubCutaneous daily    MEDICATIONS  (PRN):  acetaminophen   Tablet .. 650 milliGRAM(s) Oral every 4 hours PRN Temp greater or equal to 38C (100.4F), Moderate Pain (4 - 6), Severe Pain (7 - 10)         Vital Signs Last 24 Hrs  T(C): 36.3 (10 Dec 2020 17:03), Max: 36.8 (10 Dec 2020 04:35)  T(F): 97.4 (10 Dec 2020 17:03), Max: 98.2 (10 Dec 2020 04:35)  HR: 80 (10 Dec 2020 17:03) (75 - 86)  BP: 122/78 (10 Dec 2020 17:03) (102/69 - 122/78)  BP(mean): --  RR: 18 (10 Dec 2020 17:03) (17 - 18)  SpO2: 100% (10 Dec 2020 17:03) (95% - 100%)    PHYSICAL EXAM:   GENERAL: NAD, obese female  HEAD:  Atraumatic, Normocephalic  EYES: PERRLA, conjunctiva and sclera clear  ENMT: Moist mucous membranes, No lesions  NECK: Supple, No JVD  NERVOUS SYSTEM:  Alert & Oriented X 3, no focal neuro deficits   CHEST/LUNG: good b/l air entry, + scattered exp wheezing   HEART: Regular rate and rhythm; No rubs, or gallops, +S1,S2  ABDOMEN: Soft, Nontender, Nondistended; Bowel sounds present  EXTREMITIES:  2+ Peripheral Pulses b/l, right LE  open wounds with  drainage,  b/l lymphedema     LABS:                                              9.9    26.07 )-----------( 257      ( 10 Dec 2020 08:15 )             30.8   12-10    135  |  106  |  41<H>  ----------------------------<  197<H>  4.3   |  21<L>  |  0.86    Ca    8.4<L>      10 Dec 2020 07:44  Phos  3.5     12-10    TPro  6.7  /  Alb  1.7<L>  /  TBili  0.4  /  DBili  x   /  AST  49<H>  /  ALT  58  /  AlkPhos  126<H>  12-10       cardiac markers   Urinalysis Basic - ( 07 Dec 2020 21:44 )    Color: Faby / Appearance: Slightly Turbid / S.015 / pH: x  Gluc: x / Ketone: Trace  / Bili: Small / Urobili: 8 mg/dL   Blood: x / Protein: 100 mg/dL / Nitrite: Positive   Leuk Esterase: Small / RBC: 25-50 /HPF / WBC 6-10   Sq Epi: x / Non Sq Epi: Moderate / Bacteria: Many      CAPILLARY BLOOD GLUCOSE        Cultures    RADIOLOGY & ADDITIONAL TESTS:    Imaging Personally Reviewed:  [ ] YES  [ ] NO    Consultant(s) Notes Reviewed:  [x ] YES  [ ] NO    Care Discussed with Consultants/Other Providers [x ] YES  [ ] NO    Care discussed in detail with patient.  All questions and concerns addressed

## 2020-12-11 LAB
A1C WITH ESTIMATED AVERAGE GLUCOSE RESULT: 6.3 % — HIGH (ref 4–5.6)
ANION GAP SERPL CALC-SCNC: 3 MMOL/L — LOW (ref 5–17)
BASOPHILS # BLD AUTO: 0.16 K/UL — SIGNIFICANT CHANGE UP (ref 0–0.2)
BASOPHILS NFR BLD AUTO: 0.6 % — SIGNIFICANT CHANGE UP (ref 0–2)
BUN SERPL-MCNC: 35 MG/DL — HIGH (ref 7–23)
CALCIUM SERPL-MCNC: 8.4 MG/DL — LOW (ref 8.5–10.1)
CHLORIDE SERPL-SCNC: 102 MMOL/L — SIGNIFICANT CHANGE UP (ref 96–108)
CO2 SERPL-SCNC: 30 MMOL/L — SIGNIFICANT CHANGE UP (ref 22–31)
CREAT SERPL-MCNC: 0.74 MG/DL — SIGNIFICANT CHANGE UP (ref 0.5–1.3)
CULTURE RESULTS: SIGNIFICANT CHANGE UP
CULTURE RESULTS: SIGNIFICANT CHANGE UP
EOSINOPHIL # BLD AUTO: 0.01 K/UL — SIGNIFICANT CHANGE UP (ref 0–0.5)
EOSINOPHIL NFR BLD AUTO: 0 % — SIGNIFICANT CHANGE UP (ref 0–6)
ESTIMATED AVERAGE GLUCOSE: 134 MG/DL — HIGH (ref 68–114)
GLUCOSE SERPL-MCNC: 192 MG/DL — HIGH (ref 70–99)
HCT VFR BLD CALC: 29.6 % — LOW (ref 34.5–45)
HGB BLD-MCNC: 9.2 G/DL — LOW (ref 11.5–15.5)
IMM GRANULOCYTES NFR BLD AUTO: 8.6 % — HIGH (ref 0–1.5)
LYMPHOCYTES # BLD AUTO: 1.6 K/UL — SIGNIFICANT CHANGE UP (ref 1–3.3)
LYMPHOCYTES # BLD AUTO: 6 % — LOW (ref 13–44)
MAGNESIUM SERPL-MCNC: 2.7 MG/DL — HIGH (ref 1.6–2.6)
MCHC RBC-ENTMCNC: 27.7 PG — SIGNIFICANT CHANGE UP (ref 27–34)
MCHC RBC-ENTMCNC: 31.1 GM/DL — LOW (ref 32–36)
MCV RBC AUTO: 89.2 FL — SIGNIFICANT CHANGE UP (ref 80–100)
MONOCYTES # BLD AUTO: 1.07 K/UL — HIGH (ref 0–0.9)
MONOCYTES NFR BLD AUTO: 4 % — SIGNIFICANT CHANGE UP (ref 2–14)
NEUTROPHILS # BLD AUTO: 21.39 K/UL — HIGH (ref 1.8–7.4)
NEUTROPHILS NFR BLD AUTO: 80.8 % — HIGH (ref 43–77)
NRBC # BLD: 0 /100 WBCS — SIGNIFICANT CHANGE UP (ref 0–0)
PHOSPHATE SERPL-MCNC: 2.4 MG/DL — LOW (ref 2.5–4.5)
PLATELET # BLD AUTO: 295 K/UL — SIGNIFICANT CHANGE UP (ref 150–400)
POTASSIUM SERPL-MCNC: 4.4 MMOL/L — SIGNIFICANT CHANGE UP (ref 3.5–5.3)
POTASSIUM SERPL-SCNC: 4.4 MMOL/L — SIGNIFICANT CHANGE UP (ref 3.5–5.3)
RBC # BLD: 3.32 M/UL — LOW (ref 3.8–5.2)
RBC # FLD: 16.3 % — HIGH (ref 10.3–14.5)
SODIUM SERPL-SCNC: 135 MMOL/L — SIGNIFICANT CHANGE UP (ref 135–145)
SPECIMEN SOURCE: SIGNIFICANT CHANGE UP
SPECIMEN SOURCE: SIGNIFICANT CHANGE UP
VANCOMYCIN TROUGH SERPL-MCNC: 6.1 UG/ML — LOW (ref 10–20)
WBC # BLD: 26.52 K/UL — HIGH (ref 3.8–10.5)
WBC # FLD AUTO: 26.52 K/UL — HIGH (ref 3.8–10.5)

## 2020-12-11 PROCEDURE — 73701 CT LOWER EXTREMITY W/DYE: CPT | Mod: 26,RT

## 2020-12-11 PROCEDURE — 99233 SBSQ HOSP IP/OBS HIGH 50: CPT

## 2020-12-11 PROCEDURE — 99232 SBSQ HOSP IP/OBS MODERATE 35: CPT

## 2020-12-11 RX ORDER — VANCOMYCIN HCL 1 G
1250 VIAL (EA) INTRAVENOUS EVERY 12 HOURS
Refills: 0 | Status: DISCONTINUED | OUTPATIENT
Start: 2020-12-11 | End: 2020-12-14

## 2020-12-11 RX ADMIN — Medication 3 MILLILITER(S): at 05:23

## 2020-12-11 RX ADMIN — POLYETHYLENE GLYCOL 3350 17 GRAM(S): 17 POWDER, FOR SOLUTION ORAL at 13:04

## 2020-12-11 RX ADMIN — Medication 250 MILLIGRAM(S): at 07:04

## 2020-12-11 RX ADMIN — ENOXAPARIN SODIUM 40 MILLIGRAM(S): 100 INJECTION SUBCUTANEOUS at 13:05

## 2020-12-11 RX ADMIN — Medication 650 MILLIGRAM(S): at 07:12

## 2020-12-11 RX ADMIN — Medication 3 MILLILITER(S): at 21:00

## 2020-12-11 RX ADMIN — CEFTRIAXONE 100 MILLIGRAM(S): 500 INJECTION, POWDER, FOR SOLUTION INTRAMUSCULAR; INTRAVENOUS at 23:31

## 2020-12-11 RX ADMIN — Medication 166.67 MILLIGRAM(S): at 19:32

## 2020-12-11 RX ADMIN — Medication 62.5 MILLIMOLE(S): at 13:04

## 2020-12-11 RX ADMIN — SENNA PLUS 2 TABLET(S): 8.6 TABLET ORAL at 21:27

## 2020-12-11 NOTE — PROGRESS NOTE ADULT - SUBJECTIVE AND OBJECTIVE BOX
Patient is a 75y old  Female who presents with a chief complaint of wound check (08 Dec 2020 11:38), feeling better today.    Patient seen and examined bedside.   no overnight events.   states pain is controlled   no new complaints.     Denies fever, chills, N/V, dizziness, HA, cough, CP, palpitations, SOB, abdominal pain, dysuria.       MEDICATIONS  (STANDING):  albuterol/ipratropium for Nebulization. 3 milliLiter(s) Nebulizer three times a day  cefTRIAXone   IVPB 1000 milliGRAM(s) IV Intermittent every 24 hours  clindamycin IVPB 600 milliGRAM(s) IV Intermittent every 8 hours  enoxaparin Injectable 40 milliGRAM(s) SubCutaneous daily    MEDICATIONS  (PRN):  acetaminophen   Tablet .. 650 milliGRAM(s) Oral every 4 hours PRN Temp greater or equal to 38C (100.4F), Moderate Pain (4 - 6), Severe Pain (7 - 10)         Vital Signs Last 24 Hrs  T(C): 37.4 (11 Dec 2020 17:13), Max: 37.4 (11 Dec 2020 17:13)  T(F): 99.3 (11 Dec 2020 17:13), Max: 99.3 (11 Dec 2020 17:13)  HR: 92 (11 Dec 2020 17:13) (81 - 92)  BP: 143/66 (11 Dec 2020 17:13) (103/68 - 143/66)  BP(mean): --  RR: 18 (11 Dec 2020 17:13) (18 - 19)  SpO2: 95% (11 Dec 2020 17:13) (95% - 100%)    PHYSICAL EXAM:   GENERAL: NAD, obese female  HEAD:  Atraumatic, Normocephalic  EYES: PERRLA, conjunctiva and sclera clear  ENMT: Moist mucous membranes, No lesions  NECK: Supple, No JVD  NERVOUS SYSTEM:  Alert & Oriented X 3, no focal neuro deficits   CHEST/LUNG: good b/l air entry, + scattered exp wheezing   HEART: Regular rate and rhythm; No rubs, or gallops, +S1,S2  ABDOMEN: Soft, Nontender, Nondistended; Bowel sounds present  EXTREMITIES:  2+ Peripheral Pulses b/l, right LE  wound dressing c/d/i    b/l massive lymphedema     LABS:                                   9.2    26.52 )-----------( 295      ( 11 Dec 2020 06:23 )             29.6       135  |  102  |  35<H>  ----------------------------<  192<H>  4.4   |  30  |  0.74    Ca    8.4<L>      11 Dec 2020 06:22  Phos  2.4       Mg     2.7         TPro  6.7  /  Alb  1.7<L>  /  TBili  0.4  /  DBili  x   /  AST  49<H>  /  ALT  58  /  AlkPhos  126<H>  12-10       cardiac markers   Urinalysis Basic - ( 07 Dec 2020 21:44 )    Color: Faby / Appearance: Slightly Turbid / S.015 / pH: x  Gluc: x / Ketone: Trace  / Bili: Small / Urobili: 8 mg/dL   Blood: x / Protein: 100 mg/dL / Nitrite: Positive   Leuk Esterase: Small / RBC: 25-50 /HPF / WBC 6-10   Sq Epi: x / Non Sq Epi: Moderate / Bacteria: Many      Vancomycin Level, Trough: 6.1 ug/mL (20 @ 06:22)    .Urine Catheterized  20   <10,000 CFU/mL Normal Urogenital Andreea  --  --      .Blood Blood-Peripheral  20   No growth to date.  --  --      COVID-19 PCR: NotDetec ()    C-Reactive Protein, Serum: 31.61 ()          Procalcitonin, Serum: 7.99 (20 @ 14:51)  Procalcitonin, Serum: 23.00 (20 @ 08:18)        Cultures  Culture - Blood (20 @ 12:00)    Specimen Source: .Blood Blood-Peripheral    Culture Results:   No Growth Final          RADIOLOGY & ADDITIONAL TESTS:  < from: CT Lower Extremity w/ IV Cont, Right (20 @ 14:54) >  INTERPRETATION:  CT OF THE RIGHT LOWER EXTREMITY    CLINICAL INFORMATION: Right lower extremity draining wound in patient with morbid obesity. Increasing white blood cell count. Evaluate for soft tissue infection and compared to prior study.  TECHNIQUE: Multidetector CT of the right lower extremity was performed from the level of the hip through the level of the ankle. The study was performed following the intravenous administration of 90 ml Omnipaque 350 (10 ml discarded) . Multiplanar reformats were generated for review.    COMPARISON: Right lower extremity CT 2020.    FINDINGS:    BONE: Patient status post right total knee arthroplasty. No CT evidence of acute hardware complication. No acute fracture or dislocation. No cortical destruction or periosteal new bone formation.    SOFT TISSUE: As on prior examination, there is extensive swelling along the medial aspect of the right lower extremity, favored to represent massive localized lymphedema (MLL). A soft tissue wound is seen along the posteromedial aspect of the MLL mass that measures approximately 24 x 20 mm, slightly increased in size when compared to prior study. As on prior examination, there is diffuse enhancement and subcutaneous edema along the medial thigh. No rim-enhancing treatable fluid collection is identified. No tracking subcutaneous emphysema. Unchanged appearance of the muscles. Unchanged neurovascular structures.        IMPRESSION:  1.  Massive localized lymphedema of the right lower extremity, overall similar in appearance to prior study.  2.  Soft tissue wound is again seen along the posteromedial aspect of the massive localized lymphedema swelling. Superimposed cellulitis is difficult to exclude on imaging. No subcutaneous abscess or tracking subcutaneous emphysema.    < end of copied text >      Imaging Personally Reviewed:  [ ] YES  [ ] NO    Consultant(s) Notes Reviewed:  [x ] YES  [ ] NO    Care Discussed with Consultants/Other Providers [x ] YES  [ ] NO    Care discussed in detail with patient.  All questions and concerns addressed

## 2020-12-11 NOTE — PROGRESS NOTE ADULT - ASSESSMENT
74 y/o female w/pmhx of HTN, chronic lymphedema with RLE wounds w/cellulitis.    UTI ruled out.   UCx no growth  patient asymptomatic     Assessment and Plan:     Cellulitis with open draining wound of right lower extremity   massive chronic lymphedema   elevated WBC with bandemia   remains afebrile   - blood cultures: NGTD  A1c 6.3%  -vanc trough was subtherapeutic, vanc dose increased to 1500mg Q12H   follow vanc trough prior to 4th dose, goal is 15-20  continue with  ceftriaxone   discussed with Dr. Marquez, no need for debridement at this time, continue with abx, daily wound care dressing changes  analgesics prn   discussed with ID, due to persistently elevated WBC with bandemia, blood Cx repeated today ; CT RLE done today w/o e/o abscess or tracking subcut emphysema   12/11 CT RLE C+: Massive localized lymphedema of the right lower extremity, overall similar in appearance to prior study.  Soft tissue wound is again seen along the posteromedial aspect of the massive localized lymphedema swelling. Superimposed cellulitis is difficult to exclude on imaging. No subcutaneous abscess or tracking subcutaneous emphysema.  continue with wound care  follow repeat blood Cx         Wheezing, atelectasis?  OHS?   - nebs treatment, wheezing resolved   s/p short course of steroids  , will d/c steroids     Morbid obesity due to excess calories.    - nutrition eval appreciated   -weight loss program (consider [facility] obesity clinic referral once ready for discharge)       Essential hypertension, controlled off meds   - monitor.     Hypophosphatemia --replete and monitor     Preventive measure.    - sq lovenox. -dvt ppx  PT :PRASHANT  fall precautions   HOBE      76 y/o female w/pmhx of HTN, chronic lymphedema with RLE wounds w/cellulitis.    UTI ruled out.   UCx no growth  patient asymptomatic     Assessment and Plan:     Cellulitis with open draining wound of right lower extremity   massive chronic lymphedema   elevated WBC with bandemia   remains afebrile   - blood cultures: NGTD  A1c 6.3%  -vanc trough was subtherapeutic, vanc dose increased to 1250mg Q12H   follow vanc trough prior to 4th dose, goal is 15-20  continue with  ceftriaxone   discussed with Dr. Marquez, no need for debridement at this time, continue with abx, daily wound care dressing changes  analgesics prn   discussed with ID, due to persistently elevated WBC with bandemia, blood Cx repeated today ; CT RLE done today w/o e/o abscess or tracking subcut emphysema   12/11 CT RLE C+: Massive localized lymphedema of the right lower extremity, overall similar in appearance to prior study.  Soft tissue wound is again seen along the posteromedial aspect of the massive localized lymphedema swelling. Superimposed cellulitis is difficult to exclude on imaging. No subcutaneous abscess or tracking subcutaneous emphysema.  continue with wound care  follow repeat blood Cx         Wheezing, atelectasis?  OHS?   - nebs treatment, wheezing resolved   s/p short course of steroids  , will d/c steroids     Morbid obesity due to excess calories.    - nutrition eval appreciated   -weight loss program (consider [facility] obesity clinic referral once ready for discharge)       Essential hypertension, controlled off meds   - monitor.     Hypophosphatemia --replete and monitor     Preventive measure.    - sq lovenox. -dvt ppx  PT :PRASHANT  fall precautions   HOBE

## 2020-12-11 NOTE — PROGRESS NOTE ADULT - SUBJECTIVE AND OBJECTIVE BOX
ALICIA GONZALEZ  MRN-[id number]    Follow Up:  right lower extremity cellulitis and wound    Interval History: The pt is in bed, no distress, on supplemental O2 via nasal cannula, states that she gets short of breath at times without supplemental O2. The pt is afebrile, WBC 26.52, yesterday 26.07 with bandemia, Vanco trough 6.1 this am. Pt's right lower extremity wound is clean with no drainage.     ROS:    [ ] Unobtainable because:  [ ] All other systems negative    Constitutional: no fever, no chills  Head: no trauma  Eyes: no vision changes, no eye pain  ENT:  no sore throat, no rhinorrhea  Cardiovascular:  no chest pain, no palpitation  Respiratory:  no SOB at the moment, no cough  GI:  no abd pain, no vomiting, no diarrhea  urinary: no dysuria, no hematuria, no flank pain  musculoskeletal:  no joint pain, no joint swelling  skin:  no rash  neurology:  no headache, no seizure, no change in mental status  psych: no anxiety, no depression         Allergies  No Known Allergies        ANTIMICROBIALS:  cefTRIAXone   IVPB 1000 every 24 hours  vancomycin  IVPB 1000 every 12 hours      OTHER MEDS:  acetaminophen   Tablet .. 650 milliGRAM(s) Oral every 4 hours PRN  albuterol/ipratropium for Nebulization 3 milliLiter(s) Nebulizer every 8 hours  enoxaparin Injectable 40 milliGRAM(s) SubCutaneous daily  polyethylene glycol 3350 17 Gram(s) Oral daily  senna 2 Tablet(s) Oral at bedtime  sodium phosphate IVPB 15 milliMole(s) IV Intermittent once      Vital Signs Last 24 Hrs  T(C): 36.5 (11 Dec 2020 05:08), Max: 36.7 (11 Dec 2020 00:05)  T(F): 97.7 (11 Dec 2020 05:08), Max: 98.1 (11 Dec 2020 00:05)  HR: 86 (11 Dec 2020 06:05) (80 - 89)  BP: 125/78 (11 Dec 2020 05:08) (108/72 - 125/78)  BP(mean): --  RR: 18 (11 Dec 2020 05:08) (17 - 18)  SpO2: 98% (11 Dec 2020 06:05) (95% - 100%)    Physical Exam:  General:    NAD,  non toxic, morbidly obese  Head: atraumatic, normocephalic  Eye: normal sclera and conjunctiva  ENT:    no oral lesions, neck supple  Cardio:     regular S1, S2,  no murmur  Respiratory:    clear b/l,    no wheezing, no rales ro rhonchi   abd:     soft,   BS +,   no tenderness  :   no CVAT,  no suprapubic tenderness,   no  blum  Musculoskeletal:   no joint swelling,   +edema  vascular: no central lines, +PIV   Skin:    large and deep ulcer on the posteromedial aspect of right leg with no discharge, chronic lymphedema, the surrounding skin is still warm  Neurologic:     no focal deficit  psych: normal affect    WBC Count: 26.52 K/uL (12-11 @ 06:23)  WBC Count: 26.07 K/uL (12-10 @ 08:15)  WBC Count: 16.79 K/uL (12-09 @ 10:36)  WBC Count: 7.98 K/uL (12-07 @ 08:13)  WBC Count: 6.64 K/uL (12-07 @ 02:48)  WBC Count: 9.32 K/uL (12-05 @ 17:04)                            9.2    26.52 )-----------( 295      ( 11 Dec 2020 06:23 )             29.6       12-11    135  |  102  |  35<H>  ----------------------------<  192<H>  4.4   |  30  |  0.74    Ca    8.4<L>      11 Dec 2020 06:22  Phos  2.4     12-11  Mg     2.7     12-11    TPro  6.7  /  Alb  1.7<L>  /  TBili  0.4  /  DBili  x   /  AST  49<H>  /  ALT  58  /  AlkPhos  126<H>  12-10          Creatinine Trend: 0.74<--, 0.86<--, 1.09<--, 1.30<--, 1.35<--, 1.08<--  Lactate, Blood: 1.8 mmol/L (12-09-20 @ 10:36)      MICROBIOLOGY:  Vancomycin Level, Trough: 6.1 ug/mL (12-11-20 @ 06:22)  v  .Urine Catheterized  12-07-20   <10,000 CFU/mL Normal Urogenital Andreea  --  --      .Blood Blood-Peripheral  12-06-20   No growth to date.  --  --      COVID-19 PCR: NotDetec (12-09)    C-Reactive Protein, Serum: 31.61 (12-09)          Procalcitonin, Serum: 7.99 (12-09-20 @ 14:51)  Procalcitonin, Serum: 23.00 (12-07-20 @ 08:18)      RADIOLOGY:

## 2020-12-11 NOTE — PROGRESS NOTE ADULT - ASSESSMENT
74 y/o female w/pmhx of HTN, chronic lymphedema with RLE wounds w/cellulitis.  Fever, tachycardia  Normal WBC initially but today jumped to 16 with bandemia (perhaps in the setting of solumedrol)  Blood culture negative  UA positive but she denies any urinary symptoms   CT (I personally reviewed) lower extremity with marked edema and inflammatory changes  She has been on clinda and ceftriaxone   The wound appears to need more debridement and would benefit from a scraping and then culture of the base of the wound  Would continue antibiotics but change clindamycin to vanco for better and more optimal MRSA coverage     12/10: WBC rising but perhaps in the setting of steroids though to this extent is questionable, afebrile, evaluated again by surgery who wants to watch on antibiotics, though she had a positive UA she denies any dysuria or increased frequency. Discussed with hospitalist to perhaps stop steroids and if WBC remains elevated to repeat blood cultures and obtain CT right LE with IV contrast.  12/11: WBC increased slightly, steroids stopped, since WBC still increasing will obtain blood cultures and CT right LE with IV contrast, afebrile, right thigh wound is clean with no drainage.         Suggest:  - increase Vancomycin dose from 1g q12hrs to 1250 mg q 12 hours  -vanco trough target trough 10-15  -Continue ceftriaxone   -wound care evaluation appreciated  -question for wound care regarding wound vac to help with healing once infection better controlled  -send 2 sets of blood cultures and CT LE with IV contrast   -weight loss program (consider [facility] obesity clinic referral once ready for discharge)   -offloading to ensure no further wound development   -HbA1c   -good but not too tight glycemic control especially in the setting of steroids use and infection   74 y/o female w/pmhx of HTN, chronic lymphedema with RLE wounds w/cellulitis.  Fever, tachycardia  Normal WBC initially but today jumped to 16 with bandemia (perhaps in the setting of solumedrol)  Blood culture negative  UA positive but she denies any urinary symptoms   CT (I personally reviewed) lower extremity with marked edema and inflammatory changes  She has been on clinda and ceftriaxone   The wound appears to need more debridement and would benefit from a scraping and then culture of the base of the wound  Would continue antibiotics but change clindamycin to vanco for better and more optimal MRSA coverage     12/10: WBC rising but perhaps in the setting of steroids though to this extent is questionable, afebrile, evaluated again by surgery who wants to watch on antibiotics, though she had a positive UA she denies any dysuria or increased frequency. Discussed with hospitalist to perhaps stop steroids and if WBC remains elevated to repeat blood cultures and obtain CT right LE with IV contrast.  12/11: WBC increased slightly, steroids stopped, since WBC still increasing will obtain blood cultures and CT right LE with IV contrast, afebrile, right thigh wound is clean with no drainage.   Attending Addendum--  Case reviewed with NP Dolores Macias. Her note reviewed and modified as appropriate.   Patient personally assessed and examined.  CT report reviewed no deep drainable focus. Leukocytosis could be still related to steroids, though still has some early forms on diff which would not be readily explicable with steroids effect.  However their presence at this point in time seems to be in contrast to clinical picture which is one of stability. Depending on CBC/AM and clinical course will revisit transfer to rehab in am.         Suggest:  - increase Vancomycin dose from 1g q12hrs to 1250 mg q 12 hours  -vanco trough target trough 10-15  -Continue ceftriaxone   -wound care evaluation appreciated  -question for wound care regarding wound vac to help with healing once infection better controlled  -send 2 sets of blood cultures and CT LE with IV contrast   -weight loss program (consider [facility] obesity clinic referral once ready for discharge)   -offloading to ensure no further wound development   -HbA1c   -good but not too tight glycemic control especially in the setting of recent  steroids use and infection

## 2020-12-12 LAB
ANION GAP SERPL CALC-SCNC: 4 MMOL/L — LOW (ref 5–17)
BASOPHILS # BLD AUTO: 0.15 K/UL — SIGNIFICANT CHANGE UP (ref 0–0.2)
BASOPHILS NFR BLD AUTO: 0.5 % — SIGNIFICANT CHANGE UP (ref 0–2)
BUN SERPL-MCNC: 25 MG/DL — HIGH (ref 7–23)
CALCIUM SERPL-MCNC: 8.4 MG/DL — LOW (ref 8.5–10.1)
CHLORIDE SERPL-SCNC: 101 MMOL/L — SIGNIFICANT CHANGE UP (ref 96–108)
CO2 SERPL-SCNC: 29 MMOL/L — SIGNIFICANT CHANGE UP (ref 22–31)
CREAT SERPL-MCNC: 0.68 MG/DL — SIGNIFICANT CHANGE UP (ref 0.5–1.3)
CRP SERPL-MCNC: 17.02 MG/DL — HIGH (ref 0–0.4)
EOSINOPHIL # BLD AUTO: 0.12 K/UL — SIGNIFICANT CHANGE UP (ref 0–0.5)
EOSINOPHIL NFR BLD AUTO: 0.4 % — SIGNIFICANT CHANGE UP (ref 0–6)
ERYTHROCYTE [SEDIMENTATION RATE] IN BLOOD: 104 MM/HR — HIGH (ref 0–20)
GLUCOSE SERPL-MCNC: 133 MG/DL — HIGH (ref 70–99)
HCT VFR BLD CALC: 27.8 % — LOW (ref 34.5–45)
HGB BLD-MCNC: 8.9 G/DL — LOW (ref 11.5–15.5)
IMM GRANULOCYTES NFR BLD AUTO: 8.8 % — HIGH (ref 0–1.5)
LYMPHOCYTES # BLD AUTO: 1.17 K/UL — SIGNIFICANT CHANGE UP (ref 1–3.3)
LYMPHOCYTES # BLD AUTO: 4.2 % — LOW (ref 13–44)
MAGNESIUM SERPL-MCNC: 2.5 MG/DL — SIGNIFICANT CHANGE UP (ref 1.6–2.6)
MCHC RBC-ENTMCNC: 27.6 PG — SIGNIFICANT CHANGE UP (ref 27–34)
MCHC RBC-ENTMCNC: 32 GM/DL — SIGNIFICANT CHANGE UP (ref 32–36)
MCV RBC AUTO: 86.3 FL — SIGNIFICANT CHANGE UP (ref 80–100)
MONOCYTES # BLD AUTO: 0.67 K/UL — SIGNIFICANT CHANGE UP (ref 0–0.9)
MONOCYTES NFR BLD AUTO: 2.4 % — SIGNIFICANT CHANGE UP (ref 2–14)
NEUTROPHILS # BLD AUTO: 23.22 K/UL — HIGH (ref 1.8–7.4)
NEUTROPHILS NFR BLD AUTO: 83.7 % — HIGH (ref 43–77)
NRBC # BLD: 0 /100 WBCS — SIGNIFICANT CHANGE UP (ref 0–0)
PHOSPHATE SERPL-MCNC: 2.9 MG/DL — SIGNIFICANT CHANGE UP (ref 2.5–4.5)
PLATELET # BLD AUTO: 314 K/UL — SIGNIFICANT CHANGE UP (ref 150–400)
POTASSIUM SERPL-MCNC: 4.5 MMOL/L — SIGNIFICANT CHANGE UP (ref 3.5–5.3)
POTASSIUM SERPL-SCNC: 4.5 MMOL/L — SIGNIFICANT CHANGE UP (ref 3.5–5.3)
RBC # BLD: 3.22 M/UL — LOW (ref 3.8–5.2)
RBC # FLD: 15.9 % — HIGH (ref 10.3–14.5)
SODIUM SERPL-SCNC: 134 MMOL/L — LOW (ref 135–145)
VANCOMYCIN TROUGH SERPL-MCNC: 10.9 UG/ML — SIGNIFICANT CHANGE UP (ref 10–20)
VANCOMYCIN TROUGH SERPL-MCNC: 17.5 UG/ML — SIGNIFICANT CHANGE UP (ref 10–20)
WBC # BLD: 27.77 K/UL — HIGH (ref 3.8–10.5)
WBC # FLD AUTO: 27.77 K/UL — HIGH (ref 3.8–10.5)

## 2020-12-12 PROCEDURE — 99233 SBSQ HOSP IP/OBS HIGH 50: CPT

## 2020-12-12 PROCEDURE — 99232 SBSQ HOSP IP/OBS MODERATE 35: CPT

## 2020-12-12 RX ORDER — ACETAMINOPHEN 500 MG
975 TABLET ORAL EVERY 8 HOURS
Refills: 0 | Status: DISCONTINUED | OUTPATIENT
Start: 2020-12-12 | End: 2020-12-21

## 2020-12-12 RX ORDER — BUDESONIDE AND FORMOTEROL FUMARATE DIHYDRATE 160; 4.5 UG/1; UG/1
2 AEROSOL RESPIRATORY (INHALATION)
Refills: 0 | Status: DISCONTINUED | OUTPATIENT
Start: 2020-12-12 | End: 2020-12-21

## 2020-12-12 RX ORDER — IBUPROFEN 200 MG
400 TABLET ORAL ONCE
Refills: 0 | Status: COMPLETED | OUTPATIENT
Start: 2020-12-12 | End: 2020-12-12

## 2020-12-12 RX ORDER — SODIUM CHLORIDE 9 MG/ML
1000 INJECTION INTRAMUSCULAR; INTRAVENOUS; SUBCUTANEOUS
Refills: 0 | Status: DISCONTINUED | OUTPATIENT
Start: 2020-12-12 | End: 2020-12-13

## 2020-12-12 RX ADMIN — Medication 650 MILLIGRAM(S): at 01:21

## 2020-12-12 RX ADMIN — Medication 3 MILLILITER(S): at 14:01

## 2020-12-12 RX ADMIN — SODIUM CHLORIDE 45 MILLILITER(S): 9 INJECTION INTRAMUSCULAR; INTRAVENOUS; SUBCUTANEOUS at 22:48

## 2020-12-12 RX ADMIN — POLYETHYLENE GLYCOL 3350 17 GRAM(S): 17 POWDER, FOR SOLUTION ORAL at 12:09

## 2020-12-12 RX ADMIN — Medication 3 MILLILITER(S): at 05:14

## 2020-12-12 RX ADMIN — Medication 400 MILLIGRAM(S): at 06:17

## 2020-12-12 RX ADMIN — ENOXAPARIN SODIUM 40 MILLIGRAM(S): 100 INJECTION SUBCUTANEOUS at 12:09

## 2020-12-12 RX ADMIN — Medication 30 MILLILITER(S): at 06:15

## 2020-12-12 RX ADMIN — Medication 400 MILLIGRAM(S): at 06:50

## 2020-12-12 RX ADMIN — Medication 3 MILLILITER(S): at 21:24

## 2020-12-12 RX ADMIN — Medication 166.67 MILLIGRAM(S): at 18:51

## 2020-12-12 RX ADMIN — Medication 650 MILLIGRAM(S): at 02:00

## 2020-12-12 RX ADMIN — Medication 975 MILLIGRAM(S): at 23:30

## 2020-12-12 RX ADMIN — Medication 975 MILLIGRAM(S): at 22:54

## 2020-12-12 RX ADMIN — SENNA PLUS 2 TABLET(S): 8.6 TABLET ORAL at 21:33

## 2020-12-12 RX ADMIN — Medication 166.67 MILLIGRAM(S): at 05:27

## 2020-12-12 RX ADMIN — CEFTRIAXONE 100 MILLIGRAM(S): 500 INJECTION, POWDER, FOR SOLUTION INTRAMUSCULAR; INTRAVENOUS at 23:02

## 2020-12-12 NOTE — PROGRESS NOTE ADULT - SUBJECTIVE AND OBJECTIVE BOX
[facility]  Division of Infectious Diseases  [phone]    Name: ALICIA GONZALEZ  Age: 75y  Gender: Female  MRN: [id number]    Interval History--  Notes reviewed. Patient feel fine. Last night had hallucinations "I thought graciela barlow was in here"  Denies fevers, chills, or rigors. Denies pain. No other new issues.     Past Medical History--  Other iron deficiency anemia    Morbid obesity due to excess calories    Essential hypertension    No pertinent past medical history    H/O total knee replacement, bilateral    No significant past surgical history        For details regarding the patient's social history, family history, and other miscellaneous elements, please refer the initial infectious diseases consultation and/or the admitting history and physical examination for this admission.    Allergies    No Known Allergies    Intolerances        Medications--  Antibiotics:  cefTRIAXone   IVPB 1000 milliGRAM(s) IV Intermittent every 24 hours  vancomycin  IVPB 1250 milliGRAM(s) IV Intermittent every 12 hours    Immunologic:    Other:  acetaminophen   Tablet .. PRN  albuterol/ipratropium for Nebulization  aluminum hydroxide/magnesium hydroxide/simethicone Suspension PRN  enoxaparin Injectable  polyethylene glycol 3350  senna      Review of Systems--  A 10-point review of systems was obtained.   Review of systems otherwise unchanged compared to prior visit except as previously noted.    Physical Examination--  Vital Signs: T(F): 98.7 (12-12-20 @ 05:08), Max: 99.8 (12-12-20 @ 00:43)  HR: 85 (12-12-20 @ 05:41)  BP: 111/70 (12-12-20 @ 05:08)  RR: 18 (12-12-20 @ 05:08)  SpO2: 98% (12-12-20 @ 05:41)  Wt(kg): --  General: Nontoxic-appearing Female in no acute distress. Massively obese.  HEENT: AT/NC. Anicteric. Conjunctiva pink and moist. Oropharynx clear.  Neck: Not rigid. No sense of mass.  Nodes: None palpable.  Lungs: Exam limited by body habitus. Grossly clear bilaterally.  Heart: Exam limited by body habitus. Grossly regular rate and rhythm.   Abdomen: Exam limited by body habitus. Soft. Nondistended. Nontender. No sense of mass.  Extremities: No cyanosis or clubbing. Edema/lymphedema no change. Ulcer no change posterior-medial aspect of calf.  Cellulitis appears to have completely receded from inked margins with only chronic appearing skin changes remaining.   Skin: Warm. Dry. Good turgor. No rash. No vasculitic stigmata.  Psychiatric: Appropriate affect and mood for situation.       Laboratory Studies--  CBC                        8.9    27.77 )-----------( 314      ( 12 Dec 2020 08:10 )             27.8     Auto Neutrophil #: 23.22 K/uL    Auto Lymphocyte #: 1.17 K/uL    Auto Monocyte #: 0.67 K/uL    Auto Eosinophil #: 0.12 K/uL    Auto Basophil #: 0.15 K/uL    Auto Neutrophil %: 83.7: Differential percentages must be correlated with absolute numbers for  clinical significance. %    Auto Lymphocyte %: 4.2 %    Auto Monocyte %: 2.4 %    Auto Eosinophil %: 0.4 %    Auto Basophil %: 0.5 %    Auto Immature Granulocyte %: 8.8 %    Nucleated RBC: 0 /100 WBCs      Chemistries  12-12    134<L>  |  101  |  25<H>  ----------------------------<  133<H>  4.5   |  29  |  0.68    Ca    8.4<L>      12 Dec 2020 08:10  Phos  2.9     12-12  Mg     2.5     12-12        Culture Data    Culture - Urine (collected 07 Dec 2020 08:16)  Source: .Urine Catheterized  Final Report (08 Dec 2020 07:12):    <10,000 CFU/mL Normal Urogenital Andreea    Culture - Blood (collected 06 Dec 2020 12:00)  Source: .Blood Blood-Peripheral  Final Report (11 Dec 2020 13:01):    No Growth Final    Culture - Blood (collected 06 Dec 2020 12:00)  Source: .Blood Blood-Peripheral  Final Report (11 Dec 2020 13:01):    No Growth Final

## 2020-12-12 NOTE — PROGRESS NOTE ADULT - SUBJECTIVE AND OBJECTIVE BOX
Patient is a 75y old  Female who presents with a chief complaint of wound check (08 Dec 2020 11:38), feeling better today.    Patient seen and examined bedside.   no overnight events.   states pain is controlled   no new complaints.     Denies fever, chills, N/V, dizziness, HA, cough, CP, palpitations, SOB, abdominal pain, dysuria.       MEDICATIONS  (STANDING):  albuterol/ipratropium for Nebulization. 3 milliLiter(s) Nebulizer three times a day  cefTRIAXone   IVPB 1000 milliGRAM(s) IV Intermittent every 24 hours  clindamycin IVPB 600 milliGRAM(s) IV Intermittent every 8 hours  enoxaparin Injectable 40 milliGRAM(s) SubCutaneous daily    MEDICATIONS  (PRN):  acetaminophen   Tablet .. 650 milliGRAM(s) Oral every 4 hours PRN Temp greater or equal to 38C (100.4F), Moderate Pain (4 - 6), Severe Pain (7 - 10)         Vital Signs Last 24 Hrs  T(C): 36.6 (12 Dec 2020 18:08), Max: 37.7 (12 Dec 2020 00:43)  T(F): 97.9 (12 Dec 2020 18:08), Max: 99.8 (12 Dec 2020 00:43)  HR: 88 (12 Dec 2020 18:08) (76 - 88)  BP: 118/76 (12 Dec 2020 18:08) (111/70 - 144/79)  BP(mean): --  RR: 17 (12 Dec 2020 18:08) (17 - 18)  SpO2: 95% (12 Dec 2020 18:08) (95% - 98%)    PHYSICAL EXAM:   GENERAL: NAD, obese female  HEAD:  Atraumatic, Normocephalic  EYES: PERRLA, conjunctiva and sclera clear  ENMT: Moist mucous membranes, No lesions  NECK: Supple, No JVD  NERVOUS SYSTEM:  Alert & Oriented X 3, no focal neuro deficits   CHEST/LUNG: good b/l air entry, + scattered exp wheezing   HEART: Regular rate and rhythm; No rubs, or gallops, +S1,S2  ABDOMEN: Soft, Nontender, Nondistended; Bowel sounds present  EXTREMITIES:  2+ Peripheral Pulses b/l, right LE  wound dressing c/d/i    b/l massive lymphedema     LABS:                                         8.9    27.77 )-----------( 314      ( 12 Dec 2020 08:10 )             27.8   12-12    134<L>  |  101  |  25<H>  ----------------------------<  133<H>  4.5   |  29  |  0.68    Ca    8.4<L>      12 Dec 2020 08:10  Phos  2.9     12-12  Mg     2.5     12-12         cardiac markers   Urinalysis Basic - ( 07 Dec 2020 21:44 )    Color: Faby / Appearance: Slightly Turbid / S.015 / pH: x  Gluc: x / Ketone: Trace  / Bili: Small / Urobili: 8 mg/dL   Blood: x / Protein: 100 mg/dL / Nitrite: Positive   Leuk Esterase: Small / RBC: 25-50 /HPF / WBC 6-10   Sq Epi: x / Non Sq Epi: Moderate / Bacteria: Many      Vancomycin Level, Trough: 6.1 ug/mL (20 @ 06:22)    .Urine Catheterized  20   <10,000 CFU/mL Normal Urogenital Andreea  --  --      .Blood Blood-Peripheral  20   No growth to date.  --  --      COVID-19 PCR: NotDetec ()    C-Reactive Protein, Serum: 31.61 ()          Procalcitonin, Serum: 7.99 (20 @ 14:51)  Procalcitonin, Serum: 23.00 (20 @ 08:18)        Cultures  Culture - Blood (20 @ 12:00)    Specimen Source: .Blood Blood-Peripheral    Culture Results:   No Growth Final          RADIOLOGY & ADDITIONAL TESTS:  < from: CT Lower Extremity w/ IV Cont, Right (20 @ 14:54) >  INTERPRETATION:  CT OF THE RIGHT LOWER EXTREMITY    CLINICAL INFORMATION: Right lower extremity draining wound in patient with morbid obesity. Increasing white blood cell count. Evaluate for soft tissue infection and compared to prior study.  TECHNIQUE: Multidetector CT of the right lower extremity was performed from the level of the hip through the level of the ankle. The study was performed following the intravenous administration of 90 ml Omnipaque 350 (10 ml discarded) . Multiplanar reformats were generated for review.    COMPARISON: Right lower extremity CT 2020.    FINDINGS:    BONE: Patient status post right total knee arthroplasty. No CT evidence of acute hardware complication. No acute fracture or dislocation. No cortical destruction or periosteal new bone formation.    SOFT TISSUE: As on prior examination, there is extensive swelling along the medial aspect of the right lower extremity, favored to represent massive localized lymphedema (MLL). A soft tissue wound is seen along the posteromedial aspect of the MLL mass that measures approximately 24 x 20 mm, slightly increased in size when compared to prior study. As on prior examination, there is diffuse enhancement and subcutaneous edema along the medial thigh. No rim-enhancing treatable fluid collection is identified. No tracking subcutaneous emphysema. Unchanged appearance of the muscles. Unchanged neurovascular structures.        IMPRESSION:  1.  Massive localized lymphedema of the right lower extremity, overall similar in appearance to prior study.  2.  Soft tissue wound is again seen along the posteromedial aspect of the massive localized lymphedema swelling. Superimposed cellulitis is difficult to exclude on imaging. No subcutaneous abscess or tracking subcutaneous emphysema.    < end of copied text >      Imaging Personally Reviewed:  [ ] YES  [ ] NO    Consultant(s) Notes Reviewed:  [x ] YES  [ ] NO    Care Discussed with Consultants/Other Providers [x ] YES  [ ] NO    Care discussed in detail with patient.  All questions and concerns addressed

## 2020-12-12 NOTE — PROGRESS NOTE ADULT - ASSESSMENT
74 y/o female w/pmhx of HTN, chronic lymphedema with RLE wounds w/cellulitis.    UTI ruled out.   UCx no growth  patient asymptomatic     Assessment and Plan:     Cellulitis with open draining wound of right lower extremity , draining from wound is very minimal now   massive chronic lymphedema   increasing  WBC with bandemia   remains afebrile   A1c 6.3%  - blood cultures: NGTD, repeat blood Cx also no growth   discussed with Dr. Marquez, no need for debridement at this time, continue with abx, daily wound care dressing changes  continue with vanc 1250mg Q12H   follow vanc trough prior to 4th dose, goal is 15-20  continue with  ceftriaxone   analgesics prn   discussed with ID, due to persistently elevated WBC with bandemia, blood Cx repeated today ;   12/11 CT RLE done today w/o e/o abscess or tracking subcut emphysema       Wheezing, atelectasis?  OHS?   - nebs treatment, wheezing resolved   s/p short steroid course   added symbicort 2/2     Morbid obesity due to excess calories.    - nutrition eval appreciated   -weight loss program (consider [facility] obesity clinic referral once ready for discharge)       Essential hypertension, controlled off meds   - monitor.     Hypophosphatemia --repleted     Preventive measure.    - sq lovenox. -dvt ppx  PT :PRASHANT  fall precautions   HOBE

## 2020-12-12 NOTE — PROGRESS NOTE ADULT - ASSESSMENT
76 y/o female w/pmhx of HTN, chronic lymphedema with RLE wounds w/cellulitis.  Fever, tachycardia  Normal WBC initially but today jumped to 16 with bandemia (perhaps in the setting of solumedrol)  Blood culture negative  UA positive but she denies any urinary symptoms   CT (I personally reviewed) lower extremity with marked edema and inflammatory changes  She has been on clinda and ceftriaxone   The wound appears to need more debridement and would benefit from a scraping and then culture of the base of the wound  Would continue antibiotics but change clindamycin to vanco for better and more optimal MRSA coverage     12/10: WBC rising but perhaps in the setting of steroids though to this extent is questionable, afebrile, evaluated again by surgery who wants to watch on antibiotics, though she had a positive UA she denies any dysuria or increased frequency. Discussed with hospitalist to perhaps stop steroids and if WBC remains elevated to repeat blood cultures and obtain CT right LE with IV contrast.  12/11: WBC increased slightly, steroids stopped, since WBC still increasing will obtain blood cultures and CT right LE with IV contrast, afebrile, right thigh wound is clean with no drainage.   12/12: WBC about the same, still with some early forms. This remains in luna contrast to clinical picture which is one of stability and improvement in LE exam. If cx negative at 24h would no object to transfer to rehab to rehab where she could continue to be observed, complete a course of oral antibiotics, and have her WBC monitored.      Suggestions--  Continye Vancomycin dose from 1g q12hrs to 1250 mg q 12 hours for now with trough target trough 10-15  Continue ceftriaxone for now.  Today is day 7 of antibiotics Would hope to limit to 10 days, with Augmentin 875mg PO Q12H given absent recovery of MRSA here (not gross purulence or abscess to suggest Staphylococcal infection).  F/U Cx data  Will review w Dr. Roldan.    I'm available for issues over the remainder of the weekend, please call ([phone]) if ID input needed.     Hugo Gaines MD  Attending Physician  [facility]  Division of Infectious Diseases  [phone]

## 2020-12-13 LAB
ANION GAP SERPL CALC-SCNC: 7 MMOL/L — SIGNIFICANT CHANGE UP (ref 5–17)
ANISOCYTOSIS BLD QL: SLIGHT — SIGNIFICANT CHANGE UP
BASOPHILS # BLD AUTO: 0 K/UL — SIGNIFICANT CHANGE UP (ref 0–0.2)
BASOPHILS NFR BLD AUTO: 0 % — SIGNIFICANT CHANGE UP (ref 0–2)
BUN SERPL-MCNC: 20 MG/DL — SIGNIFICANT CHANGE UP (ref 7–23)
CALCIUM SERPL-MCNC: 8.2 MG/DL — LOW (ref 8.5–10.1)
CHLORIDE SERPL-SCNC: 100 MMOL/L — SIGNIFICANT CHANGE UP (ref 96–108)
CK SERPL-CCNC: 15 U/L — LOW (ref 26–192)
CO2 SERPL-SCNC: 27 MMOL/L — SIGNIFICANT CHANGE UP (ref 22–31)
CREAT SERPL-MCNC: 0.61 MG/DL — SIGNIFICANT CHANGE UP (ref 0.5–1.3)
CRP SERPL-MCNC: 19.5 MG/DL — HIGH (ref 0–0.4)
D DIMER BLD IA.RAPID-MCNC: 1631 NG/ML DDU — HIGH
EOSINOPHIL # BLD AUTO: 0 K/UL — SIGNIFICANT CHANGE UP (ref 0–0.5)
EOSINOPHIL NFR BLD AUTO: 0 % — SIGNIFICANT CHANGE UP (ref 0–6)
ERYTHROCYTE [SEDIMENTATION RATE] IN BLOOD: 107 MM/HR — HIGH (ref 0–20)
GLUCOSE SERPL-MCNC: 162 MG/DL — HIGH (ref 70–99)
HCT VFR BLD CALC: 28.8 % — LOW (ref 34.5–45)
HGB BLD-MCNC: 9.5 G/DL — LOW (ref 11.5–15.5)
LYMPHOCYTES # BLD AUTO: 1.01 K/UL — SIGNIFICANT CHANGE UP (ref 1–3.3)
LYMPHOCYTES # BLD AUTO: 4 % — LOW (ref 13–44)
MAGNESIUM SERPL-MCNC: 2.4 MG/DL — SIGNIFICANT CHANGE UP (ref 1.6–2.6)
MANUAL SMEAR VERIFICATION: SIGNIFICANT CHANGE UP
MCHC RBC-ENTMCNC: 27.9 PG — SIGNIFICANT CHANGE UP (ref 27–34)
MCHC RBC-ENTMCNC: 33 GM/DL — SIGNIFICANT CHANGE UP (ref 32–36)
MCV RBC AUTO: 84.7 FL — SIGNIFICANT CHANGE UP (ref 80–100)
MONOCYTES # BLD AUTO: 0.5 K/UL — SIGNIFICANT CHANGE UP (ref 0–0.9)
MONOCYTES NFR BLD AUTO: 2 % — SIGNIFICANT CHANGE UP (ref 2–14)
NEUTROPHILS # BLD AUTO: 23.13 K/UL — HIGH (ref 1.8–7.4)
NEUTROPHILS NFR BLD AUTO: 82 % — HIGH (ref 43–77)
NEUTS BAND # BLD: 10 % — HIGH (ref 0–8)
NRBC # BLD: 0 /100 — SIGNIFICANT CHANGE UP (ref 0–0)
NRBC # BLD: SIGNIFICANT CHANGE UP /100 WBCS (ref 0–0)
PHOSPHATE SERPL-MCNC: 3.6 MG/DL — SIGNIFICANT CHANGE UP (ref 2.5–4.5)
PLAT MORPH BLD: NORMAL — SIGNIFICANT CHANGE UP
PLATELET # BLD AUTO: 224 K/UL — SIGNIFICANT CHANGE UP (ref 150–400)
POTASSIUM SERPL-MCNC: 4.9 MMOL/L — SIGNIFICANT CHANGE UP (ref 3.5–5.3)
POTASSIUM SERPL-SCNC: 4.9 MMOL/L — SIGNIFICANT CHANGE UP (ref 3.5–5.3)
RBC # BLD: 3.4 M/UL — LOW (ref 3.8–5.2)
RBC # FLD: 15.7 % — HIGH (ref 10.3–14.5)
RBC BLD AUTO: ABNORMAL
SODIUM SERPL-SCNC: 134 MMOL/L — LOW (ref 135–145)
TROPONIN I SERPL-MCNC: <.015 NG/ML — SIGNIFICANT CHANGE UP (ref 0.01–0.04)
VARIANT LYMPHS # BLD: 2 % — SIGNIFICANT CHANGE UP (ref 0–6)
WBC # BLD: 25.14 K/UL — HIGH (ref 3.8–10.5)
WBC # FLD AUTO: 25.14 K/UL — HIGH (ref 3.8–10.5)

## 2020-12-13 PROCEDURE — 71045 X-RAY EXAM CHEST 1 VIEW: CPT | Mod: 26

## 2020-12-13 PROCEDURE — 93010 ELECTROCARDIOGRAM REPORT: CPT

## 2020-12-13 PROCEDURE — 71275 CT ANGIOGRAPHY CHEST: CPT | Mod: 26

## 2020-12-13 PROCEDURE — 99232 SBSQ HOSP IP/OBS MODERATE 35: CPT

## 2020-12-13 RX ORDER — FUROSEMIDE 40 MG
80 TABLET ORAL ONCE
Refills: 0 | Status: COMPLETED | OUTPATIENT
Start: 2020-12-13 | End: 2020-12-13

## 2020-12-13 RX ORDER — IPRATROPIUM/ALBUTEROL SULFATE 18-103MCG
3 AEROSOL WITH ADAPTER (GRAM) INHALATION EVERY 6 HOURS
Refills: 0 | Status: DISCONTINUED | OUTPATIENT
Start: 2020-12-13 | End: 2020-12-16

## 2020-12-13 RX ORDER — ENOXAPARIN SODIUM 100 MG/ML
40 INJECTION SUBCUTANEOUS EVERY 12 HOURS
Refills: 0 | Status: DISCONTINUED | OUTPATIENT
Start: 2020-12-13 | End: 2020-12-21

## 2020-12-13 RX ADMIN — Medication 166.67 MILLIGRAM(S): at 05:18

## 2020-12-13 RX ADMIN — ENOXAPARIN SODIUM 40 MILLIGRAM(S): 100 INJECTION SUBCUTANEOUS at 21:09

## 2020-12-13 RX ADMIN — Medication 975 MILLIGRAM(S): at 21:39

## 2020-12-13 RX ADMIN — ENOXAPARIN SODIUM 40 MILLIGRAM(S): 100 INJECTION SUBCUTANEOUS at 12:24

## 2020-12-13 RX ADMIN — BUDESONIDE AND FORMOTEROL FUMARATE DIHYDRATE 2 PUFF(S): 160; 4.5 AEROSOL RESPIRATORY (INHALATION) at 05:18

## 2020-12-13 RX ADMIN — SENNA PLUS 2 TABLET(S): 8.6 TABLET ORAL at 21:09

## 2020-12-13 RX ADMIN — BUDESONIDE AND FORMOTEROL FUMARATE DIHYDRATE 2 PUFF(S): 160; 4.5 AEROSOL RESPIRATORY (INHALATION) at 18:07

## 2020-12-13 RX ADMIN — Medication 80 MILLIGRAM(S): at 18:06

## 2020-12-13 RX ADMIN — Medication 166.67 MILLIGRAM(S): at 18:06

## 2020-12-13 RX ADMIN — Medication 3 MILLILITER(S): at 17:13

## 2020-12-13 RX ADMIN — POLYETHYLENE GLYCOL 3350 17 GRAM(S): 17 POWDER, FOR SOLUTION ORAL at 12:24

## 2020-12-13 RX ADMIN — Medication 975 MILLIGRAM(S): at 21:13

## 2020-12-13 RX ADMIN — Medication 3 MILLILITER(S): at 05:14

## 2020-12-13 RX ADMIN — Medication 3 MILLILITER(S): at 13:05

## 2020-12-13 NOTE — PROGRESS NOTE ADULT - ASSESSMENT
76 y/o female w/pmhx of HTN, chronic lymphedema with RLE wounds w/cellulitis.    UTI ruled out.   UCx no growth  patient asymptomatic     12/13 --patient complaining of left sided CP and some SOB.   CE neg x 1   EKG: NSR   CXR : poor inspiratory effort, mildly congested , no e/o infiltrates or effusions (checked myself)  d-dimer elevated 1631  given duonebs   supplement oxygen via NC prn to maintain O2 sat >90 % , currently patient is saturating 96% on 2L NC   obtain CT angio   lasix 80mg IVP x1   d/c IVF       Assessment and Plan:     Cellulitis with open draining wound of right lower extremity , draining from wound is very minimal now   massive chronic lymphedema b/l  increasing  WBC with bandemia   remains afebrile   A1c 6.3%  - blood cultures: NGTD, repeat blood Cx also no growth   discussed with Dr. Marquez, no need for debridement at this time, continue with abx, daily wound care dressing changes  continue with vanc 1250mg Q12H , next vanc trough 12/14 @ 1700   completed   ceftriaxone   analgesics prn   discussed with ID, due to persistently elevated WBC with bandemia, blood Cx repeated today ;   12/11 CT RLE done today w/o e/o abscess or tracking subcut emphysema       Wheezing, atelectasis?  OHS?   - nebs treatment, wheezing resolved   s/p short steroid course    symbicort 2/2     Morbid obesity due to excess calories.    - nutrition eval appreciated   -weight loss program (consider [facility] obesity clinic referral once ready for discharge)       Essential hypertension, controlled off meds   - monitor.     Hypophosphatemia --repleted     Preventive measure.    - sq lovenox bid -dvt ppx  PT :PRASHANT  fall precautions   HOBE      76 y/o female w/pmhx of HTN, chronic lymphedema with RLE wounds w/cellulitis.    UTI ruled out.   UCx no growth  patient asymptomatic     12/13 --patient complaining of left sided CP and some SOB.   CE neg x 1   EKG: NSR   CXR : poor inspiratory effort, mildly congested , no e/o infiltrates or effusions (checked myself)  d-dimer elevated 1631  given duonebs   supplement oxygen via NC prn to maintain O2 sat >90 % , currently patient is saturating 96% on 2L NC   obtain CT angio   lasix 80mg IVP x1   d/c IVF       Assessment and Plan:     Cellulitis with open draining wound of right lower extremity , draining from wound is very minimal now   massive chronic lymphedema b/l  increasing  WBC with bandemia --will obtain MRI RLE   remains afebrile   A1c 6.3%  - blood cultures: NGTD, repeat blood Cx also no growth   discussed with Dr. Marquez, no need for debridement at this time, continue with abx, daily wound care dressing changes  continue with vanc 1250mg Q12H , next vanc trough 12/14 @ 1700   completed   ceftriaxone   analgesics prn   discussed with ID, due to persistently elevated WBC with bandemia, blood Cx repeated today ;   12/11 CT RLE done today w/o e/o abscess or tracking subcut emphysema       Wheezing, atelectasis?  OHS?   - nebs treatment, wheezing resolved   s/p short steroid course    symbicort 2/2     Morbid obesity due to excess calories.    - nutrition eval appreciated   -weight loss program (consider [facility] obesity clinic referral once ready for discharge)       Essential hypertension, controlled off meds   - monitor.     Hypophosphatemia --repleted     Preventive measure.    - sq lovenox bid -dvt ppx  PT :PRASHANT  fall precautions   HOBE

## 2020-12-13 NOTE — PROGRESS NOTE ADULT - SUBJECTIVE AND OBJECTIVE BOX
Patient is a 75y old  Female who presents with a chief complaint of wound check (08 Dec 2020 11:38), feeling better today.    Patient seen and examined bedside.   no overnight events.   states pain is controlled   complaining of some SOB and left sided CP today     Denies fever, chills, N/V, dizziness, HA, cough, CP, palpitations, abdominal pain, dysuria.       MEDICATIONS  (STANDING):  albuterol/ipratropium for Nebulization. 3 milliLiter(s) Nebulizer three times a day  cefTRIAXone   IVPB 1000 milliGRAM(s) IV Intermittent every 24 hours  clindamycin IVPB 600 milliGRAM(s) IV Intermittent every 8 hours  enoxaparin Injectable 40 milliGRAM(s) SubCutaneous daily    MEDICATIONS  (PRN):  acetaminophen   Tablet .. 650 milliGRAM(s) Oral every 4 hours PRN Temp greater or equal to 38C (100.4F), Moderate Pain (4 - 6), Severe Pain (7 - 10)         Vital Signs Last 24 Hrs  T(C): 37.7 (13 Dec 2020 17:32), Max: 37.7 (13 Dec 2020 17:32)  T(F): 99.8 (13 Dec 2020 17:32), Max: 99.8 (13 Dec 2020 17:32)  HR: 98 (13 Dec 2020 17:32) (68 - 98)  BP: 139/76 (13 Dec 2020 17:32) (114/72 - 139/76)  BP(mean): --  RR: 18 (13 Dec 2020 17:32) (17 - 18)  SpO2: 96% (13 Dec 2020 17:32) (95% - 100%)    PHYSICAL EXAM:   GENERAL: NAD, obese, speaking in full sentences, not using accessory muscles   HEAD:  Atraumatic, Normocephalic  EYES: PERRLA, conjunctiva and sclera clear  ENMT: Moist mucous membranes, No lesions  NECK: Supple, No JVD  NERVOUS SYSTEM:  Alert & Oriented X 3, no focal neuro deficits   CHEST/LUNG: good b/l air entry, + scattered exp wheezing   HEART: Regular rate and rhythm; No rubs, or gallops, +S1,S2  ABDOMEN: Soft, Nontender, Nondistended; Bowel sounds present  EXTREMITIES:  2+ Peripheral Pulses b/l, right LE  wound dressing c/d/i     b/l massive lymphedema   RLE warm to touch     LABS:                                   9.5    25.14 )-----------( 224      ( 13 Dec 2020 08:07 )             28.8   12-13    134<L>  |  100  |  20  ----------------------------<  162<H>  4.9   |  27  |  0.61    Ca    8.2<L>      13 Dec 2020 08:07  Phos  3.6     12-13  Mg     2.4     12-13           cardiac markers   Urinalysis Basic - ( 07 Dec 2020 21:44 )    Color: Faby / Appearance: Slightly Turbid / S.015 / pH: x  Gluc: x / Ketone: Trace  / Bili: Small / Urobili: 8 mg/dL   Blood: x / Protein: 100 mg/dL / Nitrite: Positive   Leuk Esterase: Small / RBC: 25-50 /HPF / WBC 6-10   Sq Epi: x / Non Sq Epi: Moderate / Bacteria: Many      Vancomycin Level, Trough: 6.1 ug/mL (20 @ 06:22)    .Urine Catheterized  20   <10,000 CFU/mL Normal Urogenital Andreea  --  --      .Blood Blood-Peripheral  20   No growth to date.  --  --      COVID-19 PCR: NotDetec ()    C-Reactive Protein, Serum: 31.61 ()          Procalcitonin, Serum: 7.99 (20 @ 14:51)  Procalcitonin, Serum: 23.00 (20 @ 08:18)        Cultures  Culture - Blood (20 @ 12:00)    Specimen Source: .Blood Blood-Peripheral    Culture Results:   No Growth Final          RADIOLOGY & ADDITIONAL TESTS:  < from: CT Lower Extremity w/ IV Cont, Right (20 @ 14:54) >  INTERPRETATION:  CT OF THE RIGHT LOWER EXTREMITY    CLINICAL INFORMATION: Right lower extremity draining wound in patient with morbid obesity. Increasing white blood cell count. Evaluate for soft tissue infection and compared to prior study.  TECHNIQUE: Multidetector CT of the right lower extremity was performed from the level of the hip through the level of the ankle. The study was performed following the intravenous administration of 90 ml Omnipaque 350 (10 ml discarded) . Multiplanar reformats were generated for review.    COMPARISON: Right lower extremity CT 2020.    FINDINGS:    BONE: Patient status post right total knee arthroplasty. No CT evidence of acute hardware complication. No acute fracture or dislocation. No cortical destruction or periosteal new bone formation.    SOFT TISSUE: As on prior examination, there is extensive swelling along the medial aspect of the right lower extremity, favored to represent massive localized lymphedema (MLL). A soft tissue wound is seen along the posteromedial aspect of the MLL mass that measures approximately 24 x 20 mm, slightly increased in size when compared to prior study. As on prior examination, there is diffuse enhancement and subcutaneous edema along the medial thigh. No rim-enhancing treatable fluid collection is identified. No tracking subcutaneous emphysema. Unchanged appearance of the muscles. Unchanged neurovascular structures.        IMPRESSION:  1.  Massive localized lymphedema of the right lower extremity, overall similar in appearance to prior study.  2.  Soft tissue wound is again seen along the posteromedial aspect of the massive localized lymphedema swelling. Superimposed cellulitis is difficult to exclude on imaging. No subcutaneous abscess or tracking subcutaneous emphysema.    < end of copied text >      Imaging Personally Reviewed:  [ ] YES  [ ] NO    Consultant(s) Notes Reviewed:  [x ] YES  [ ] NO    Care Discussed with Consultants/Other Providers [x ] YES  [ ] NO    Care discussed in detail with patient.  All questions and concerns addressed

## 2020-12-14 LAB
AMYLASE P1 CFR SERPL: 45 U/L — SIGNIFICANT CHANGE UP (ref 25–115)
ANION GAP SERPL CALC-SCNC: 6 MMOL/L — SIGNIFICANT CHANGE UP (ref 5–17)
BASE EXCESS BLDA CALC-SCNC: 5.1 MMOL/L — HIGH (ref -2–2)
BASOPHILS # BLD AUTO: 0.09 K/UL — SIGNIFICANT CHANGE UP (ref 0–0.2)
BASOPHILS NFR BLD AUTO: 0.4 % — SIGNIFICANT CHANGE UP (ref 0–2)
BLOOD GAS COMMENTS: SIGNIFICANT CHANGE UP
BLOOD GAS COMMENTS: SIGNIFICANT CHANGE UP
BLOOD GAS SOURCE: SIGNIFICANT CHANGE UP
BUN SERPL-MCNC: 18 MG/DL — SIGNIFICANT CHANGE UP (ref 7–23)
CALCIUM SERPL-MCNC: 8.1 MG/DL — LOW (ref 8.5–10.1)
CHLORIDE SERPL-SCNC: 98 MMOL/L — SIGNIFICANT CHANGE UP (ref 96–108)
CO2 SERPL-SCNC: 28 MMOL/L — SIGNIFICANT CHANGE UP (ref 22–31)
CREAT SERPL-MCNC: 0.55 MG/DL — SIGNIFICANT CHANGE UP (ref 0.5–1.3)
CRP SERPL-MCNC: 19.29 MG/DL — HIGH (ref 0–0.4)
EOSINOPHIL # BLD AUTO: 0.09 K/UL — SIGNIFICANT CHANGE UP (ref 0–0.5)
EOSINOPHIL NFR BLD AUTO: 0.4 % — SIGNIFICANT CHANGE UP (ref 0–6)
ERYTHROCYTE [SEDIMENTATION RATE] IN BLOOD: 109 MM/HR — HIGH (ref 0–20)
GLUCOSE SERPL-MCNC: 159 MG/DL — HIGH (ref 70–99)
HCO3 BLDA-SCNC: 28 MMOL/L — SIGNIFICANT CHANGE UP (ref 21–29)
HCT VFR BLD CALC: 27.1 % — LOW (ref 34.5–45)
HGB BLD-MCNC: 9 G/DL — LOW (ref 11.5–15.5)
HOROWITZ INDEX BLDA+IHG-RTO: 0.28 — SIGNIFICANT CHANGE UP
IMM GRANULOCYTES NFR BLD AUTO: 7.4 % — HIGH (ref 0–1.5)
LIDOCAIN IGE QN: 160 U/L — SIGNIFICANT CHANGE UP (ref 73–393)
LYMPHOCYTES # BLD AUTO: 0.83 K/UL — LOW (ref 1–3.3)
LYMPHOCYTES # BLD AUTO: 3.3 % — LOW (ref 13–44)
MAGNESIUM SERPL-MCNC: 2.3 MG/DL — SIGNIFICANT CHANGE UP (ref 1.6–2.6)
MCHC RBC-ENTMCNC: 27.9 PG — SIGNIFICANT CHANGE UP (ref 27–34)
MCHC RBC-ENTMCNC: 33.2 GM/DL — SIGNIFICANT CHANGE UP (ref 32–36)
MCV RBC AUTO: 83.9 FL — SIGNIFICANT CHANGE UP (ref 80–100)
MONOCYTES # BLD AUTO: 0.86 K/UL — SIGNIFICANT CHANGE UP (ref 0–0.9)
MONOCYTES NFR BLD AUTO: 3.4 % — SIGNIFICANT CHANGE UP (ref 2–14)
NEUTROPHILS # BLD AUTO: 21.66 K/UL — HIGH (ref 1.8–7.4)
NEUTROPHILS NFR BLD AUTO: 85.1 % — HIGH (ref 43–77)
NRBC # BLD: 0 /100 WBCS — SIGNIFICANT CHANGE UP (ref 0–0)
PCO2 BLDA: 34 MMHG — SIGNIFICANT CHANGE UP (ref 32–46)
PH BLD: 7.52 — HIGH (ref 7.35–7.45)
PHOSPHATE SERPL-MCNC: 3.7 MG/DL — SIGNIFICANT CHANGE UP (ref 2.5–4.5)
PLATELET # BLD AUTO: 298 K/UL — SIGNIFICANT CHANGE UP (ref 150–400)
PO2 BLDA: 123 MMHG — HIGH (ref 74–108)
POTASSIUM SERPL-MCNC: 4.5 MMOL/L — SIGNIFICANT CHANGE UP (ref 3.5–5.3)
POTASSIUM SERPL-SCNC: 4.5 MMOL/L — SIGNIFICANT CHANGE UP (ref 3.5–5.3)
PROCALCITONIN SERPL-MCNC: 2.01 NG/ML — HIGH (ref 0.02–0.1)
RBC # BLD: 3.23 M/UL — LOW (ref 3.8–5.2)
RBC # FLD: 15.1 % — HIGH (ref 10.3–14.5)
SAO2 % BLDA: 99 % — HIGH (ref 92–96)
SODIUM SERPL-SCNC: 132 MMOL/L — LOW (ref 135–145)
TROPONIN I SERPL-MCNC: <.015 NG/ML — SIGNIFICANT CHANGE UP (ref 0.01–0.04)
VANCOMYCIN TROUGH SERPL-MCNC: 5.4 UG/ML — LOW (ref 10–20)
WBC # BLD: 25.4 K/UL — HIGH (ref 3.8–10.5)
WBC # FLD AUTO: 25.4 K/UL — HIGH (ref 3.8–10.5)

## 2020-12-14 PROCEDURE — 71045 X-RAY EXAM CHEST 1 VIEW: CPT | Mod: 26

## 2020-12-14 PROCEDURE — 93010 ELECTROCARDIOGRAM REPORT: CPT

## 2020-12-14 PROCEDURE — 99232 SBSQ HOSP IP/OBS MODERATE 35: CPT

## 2020-12-14 PROCEDURE — 99233 SBSQ HOSP IP/OBS HIGH 50: CPT

## 2020-12-14 RX ORDER — ACETAMINOPHEN 500 MG
1000 TABLET ORAL ONCE
Refills: 0 | Status: COMPLETED | OUTPATIENT
Start: 2020-12-14 | End: 2020-12-14

## 2020-12-14 RX ORDER — CEFEPIME 1 G/1
2000 INJECTION, POWDER, FOR SOLUTION INTRAMUSCULAR; INTRAVENOUS ONCE
Refills: 0 | Status: COMPLETED | OUTPATIENT
Start: 2020-12-14 | End: 2020-12-14

## 2020-12-14 RX ORDER — PANTOPRAZOLE SODIUM 20 MG/1
40 TABLET, DELAYED RELEASE ORAL DAILY
Refills: 0 | Status: DISCONTINUED | OUTPATIENT
Start: 2020-12-14 | End: 2020-12-21

## 2020-12-14 RX ORDER — FUROSEMIDE 40 MG
80 TABLET ORAL ONCE
Refills: 0 | Status: COMPLETED | OUTPATIENT
Start: 2020-12-14 | End: 2020-12-14

## 2020-12-14 RX ORDER — CEFEPIME 1 G/1
2000 INJECTION, POWDER, FOR SOLUTION INTRAMUSCULAR; INTRAVENOUS EVERY 8 HOURS
Refills: 0 | Status: DISCONTINUED | OUTPATIENT
Start: 2020-12-14 | End: 2020-12-21

## 2020-12-14 RX ORDER — CEFEPIME 1 G/1
INJECTION, POWDER, FOR SOLUTION INTRAMUSCULAR; INTRAVENOUS
Refills: 0 | Status: DISCONTINUED | OUTPATIENT
Start: 2020-12-14 | End: 2020-12-21

## 2020-12-14 RX ORDER — ONDANSETRON 8 MG/1
4 TABLET, FILM COATED ORAL ONCE
Refills: 0 | Status: COMPLETED | OUTPATIENT
Start: 2020-12-14 | End: 2020-12-14

## 2020-12-14 RX ORDER — VANCOMYCIN HCL 1 G
1250 VIAL (EA) INTRAVENOUS EVERY 8 HOURS
Refills: 0 | Status: DISCONTINUED | OUTPATIENT
Start: 2020-12-14 | End: 2020-12-17

## 2020-12-14 RX ORDER — PANTOPRAZOLE SODIUM 20 MG/1
40 TABLET, DELAYED RELEASE ORAL
Refills: 0 | Status: DISCONTINUED | OUTPATIENT
Start: 2020-12-14 | End: 2020-12-14

## 2020-12-14 RX ADMIN — SENNA PLUS 2 TABLET(S): 8.6 TABLET ORAL at 21:49

## 2020-12-14 RX ADMIN — Medication 3 MILLILITER(S): at 00:46

## 2020-12-14 RX ADMIN — ENOXAPARIN SODIUM 40 MILLIGRAM(S): 100 INJECTION SUBCUTANEOUS at 05:33

## 2020-12-14 RX ADMIN — CEFEPIME 100 MILLIGRAM(S): 1 INJECTION, POWDER, FOR SOLUTION INTRAMUSCULAR; INTRAVENOUS at 17:47

## 2020-12-14 RX ADMIN — Medication 3 MILLILITER(S): at 05:32

## 2020-12-14 RX ADMIN — Medication 3 MILLILITER(S): at 11:49

## 2020-12-14 RX ADMIN — BUDESONIDE AND FORMOTEROL FUMARATE DIHYDRATE 2 PUFF(S): 160; 4.5 AEROSOL RESPIRATORY (INHALATION) at 17:53

## 2020-12-14 RX ADMIN — Medication 30 MILLILITER(S): at 02:19

## 2020-12-14 RX ADMIN — Medication 80 MILLIGRAM(S): at 16:43

## 2020-12-14 RX ADMIN — Medication 975 MILLIGRAM(S): at 23:46

## 2020-12-14 RX ADMIN — BUDESONIDE AND FORMOTEROL FUMARATE DIHYDRATE 2 PUFF(S): 160; 4.5 AEROSOL RESPIRATORY (INHALATION) at 05:32

## 2020-12-14 RX ADMIN — Medication 3 MILLILITER(S): at 21:22

## 2020-12-14 RX ADMIN — Medication 400 MILLIGRAM(S): at 05:32

## 2020-12-14 RX ADMIN — Medication 3 MILLILITER(S): at 03:20

## 2020-12-14 RX ADMIN — Medication 166.67 MILLIGRAM(S): at 21:48

## 2020-12-14 RX ADMIN — PANTOPRAZOLE SODIUM 40 MILLIGRAM(S): 20 TABLET, DELAYED RELEASE ORAL at 12:23

## 2020-12-14 RX ADMIN — Medication 166.67 MILLIGRAM(S): at 05:31

## 2020-12-14 RX ADMIN — ENOXAPARIN SODIUM 40 MILLIGRAM(S): 100 INJECTION SUBCUTANEOUS at 17:53

## 2020-12-14 RX ADMIN — CEFEPIME 100 MILLIGRAM(S): 1 INJECTION, POWDER, FOR SOLUTION INTRAMUSCULAR; INTRAVENOUS at 23:20

## 2020-12-14 NOTE — PROGRESS NOTE ADULT - ASSESSMENT
74 y/o female w/pmhx of HTN, chronic lymphedema with RLE wounds w/cellulitis.  Fever, tachycardia  Normal WBC initially but today jumped to 16 with bandemia (perhaps in the setting of solumedrol)  Blood culture negative  UA positive but she denies any urinary symptoms   CT (I personally reviewed) lower extremity with marked edema and inflammatory changes  She has been on clinda and ceftriaxone   The wound appears to need more debridement and would benefit from a scraping and then culture of the base of the wound  Would continue antibiotics but change clindamycin to vanco for better and more optimal MRSA coverage     12/10: WBC rising but perhaps in the setting of steroids though to this extent is questionable, afebrile, evaluated again by surgery who wants to watch on antibiotics, though she had a positive UA she denies any dysuria or increased frequency. Discussed with hospitalist to perhaps stop steroids and if WBC remains elevated to repeat blood cultures and obtain CT right LE with IV contrast.  12/11: WBC increased slightly, steroids stopped, since WBC still increasing will obtain blood cultures and CT right LE with IV contrast, afebrile, right thigh wound is clean with no drainage.   Attending Addendum--  Case reviewed with NP Dolores Macias. Her note reviewed and modified as appropriate.   Patient personally assessed and examined.  CT report reviewed no deep drainable focus. Leukocytosis could be still related to steroids, though still has some early forms on diff which would not be readily explicable with steroids effect.  However their presence at this point in time seems to be in contrast to clinical picture which is one of stability. Depending on CBC/AM and clinical course will revisit transfer to rehab in am.   12/12: WBC about the same, still with some early forms. This remains in luna contrast to clinical picture which is one of stability and improvement in LE exam. If cx negative at 24h would no object to transfer to rehab to rehab where she could continue to be observed, complete a course of oral antibiotics, and have her WBC monitored.  12/14: remains on supplemental O2 via nasal cannula, complains of pain in her right thigh - wound site. The pt is afebrile, leukocytosis is worsening, no new complains, reports productive occasional cough with clear thick sputum. The pt was scheduled for MRI of right lower extremity, unable to perform. CTA was performed over the weekend, negative for PE, + Bilateral lower lobe opacities, suggesting atelectasis.       Suggestions--  Continue Vancomycin dose 1250 mg q 12 hours for now with trough target trough 10-15 (Vancomycin trough 10.9 on 12/12/2020)  - ceftriaxone - 7 days course complete   Today is day 9 of antibiotics Would hope to limit to 10 days, with Augmentin 875mg PO Q12H given absent recovery of MRSA here (not gross purulence or abscess to suggest Staphylococcal infection).  F/U Cx data, repeat blood cultures with no growth  -weight loss program (consider [facility] obesity clinic referral once ready for discharge)   -offloading to ensure no further wound development   -HbA1c - 6.3  -good but not too tight glycemic control especially in the setting of recent  steroids use and infection   76 y/o female w/pmhx of HTN, chronic lymphedema with RLE wounds w/cellulitis.  Fever, tachycardia  Normal WBC initially but today jumped to 16 with bandemia (perhaps in the setting of solumedrol)  Blood culture negative  UA positive but she denies any urinary symptoms   CT (I personally reviewed) lower extremity with marked edema and inflammatory changes  She has been on clinda and ceftriaxone   The wound appears to need more debridement and would benefit from a scraping and then culture of the base of the wound  Would continue antibiotics but change clindamycin to vanco for better and more optimal MRSA coverage     12/10: WBC rising but perhaps in the setting of steroids though to this extent is questionable, afebrile, evaluated again by surgery who wants to watch on antibiotics, though she had a positive UA she denies any dysuria or increased frequency. Discussed with hospitalist to perhaps stop steroids and if WBC remains elevated to repeat blood cultures and obtain CT right LE with IV contrast.  12/11: WBC increased slightly, steroids stopped, since WBC still increasing will obtain blood cultures and CT right LE with IV contrast, afebrile, right thigh wound is clean with no drainage.   Attending Addendum--  Case reviewed with NP Dolores Macias. Her note reviewed and modified as appropriate.   Patient personally assessed and examined.  CT report reviewed no deep drainable focus. Leukocytosis could be still related to steroids, though still has some early forms on diff which would not be readily explicable with steroids effect.  However their presence at this point in time seems to be in contrast to clinical picture which is one of stability. Depending on CBC/AM and clinical course will revisit transfer to rehab in am.   12/12: WBC about the same, still with some early forms. This remains in luna contrast to clinical picture which is one of stability and improvement in LE exam. If cx negative at 24h would no object to transfer to rehab to rehab where she could continue to be observed, complete a course of oral antibiotics, and have her WBC monitored.  12/14: remains on supplemental O2 via nasal cannula, complains of pain in her right thigh - wound site. The pt is afebrile, leukocytosis is worsening, no new complains, reports productive occasional cough with clear thick sputum. The pt was scheduled for MRI of right lower extremity, unable to perform. CTA was performed over the weekend, negative for PE, + Bilateral lower lobe opacities, suggesting atelectasis.       Suggestions--  -increase to Vancomycin dose 1250 mg q 8 hours for now with trough target trough 10-15 (Vancomycin trough 10.9 on 12/12/2020, VT 5.4 on 12/14)  -Given rise in WBC and worsening respiratory status, start Cefepime 2g q8hrs   -Diuresis ordered by hospitalist   -ceftriaxone - 7 days course complete   -F/U Cx data, repeat blood cultures with no growth  -weight loss program (consider [facility] obesity clinic referral once ready for discharge: [phone])   -offloading to ensure no further wound development   -HbA1c - 6.3  -good but not too tight glycemic control especially in the setting of recent  steroids use and infection  -send 2 sets of blood culture sx   -trend CBC with diff   -if no improvement, consider MRI of LE (will need to go to [facility] or [facility] for MRI given body habitus)

## 2020-12-14 NOTE — CHART NOTE - NSCHARTNOTEFT_GEN_A_CORE
Medicine PA Note    Notified by Rn that patient is complaining of left sided chest pain . Patient seen and examined at bedside. Patient stated left sided chest pain similar to previous episode of chest pain, Pain located left sided reproducible to palpation . Pain is exacerbated with deep inspiration. Patient denies sob, nausea, vomiting, diarrhea, constipation, fever, chills, sorethroat, abd pain,  cold, cough or dysuria.     /85 , 93, 18, 95% 98.1    Gen Patient lying in bed mild distress  Head AT/NC  Eyes Perrla EOMI, clear conjunctiva and sclera  ENMT moist mucous membranes , tongue midline  NECK: Supple, No JVD , no thyroid enlargement   CHEST/LUNG:  left sided TTP ,  good b/l air entry, +  exp wheezing   HEART: Regular rate and rhythm; No rubs, or gallops, +S1,S2  ABDOMEN: Soft, Nontender, Nondistended; Bowel sounds present  EXTREMITIES:  2+ Peripheral Pulses b/l, right LE  wound dressing c/d/i    b/l massive lymphedema   Neuro alert, awake and oriented x 3 , no focal deficits    A/P 76 y/o female w/pmhx of HTN, chronic lymphedema with RLE wounds w/cellulitis . Patient now with Left sided reproducible cp  EKG ordered  and similar to previous EKG  maalox  given   trop ordered   duoneb given   Will continue to monitor the patient   IV tynelol given for pain  Dr Bain saw patient as well and reviewed EKG,    [facility]    Addendum  Patient stated that after belching and Nebulizer chest pain resolved.  trop 0.015 similar to previous trop  Will continue to monitor the patient  [facility]

## 2020-12-14 NOTE — PROGRESS NOTE ADULT - SUBJECTIVE AND OBJECTIVE BOX
ALICIA GONZALEZ  MRN-[id number]    Follow Up:  right lower extremity cellulitis and wound    Interval History: The pt is in bed, no distress, remains on supplemental O2 via nasal cannula, complains of pain in her right thigh - wound site. The pt is afebrile, leukocytosis is worsening, no new complains, reports productive occasional cough with clear thick sputum. The pt was scheduled for MRI of right lower extremity, unable to perform.   CTA was performed over the weekend, negative for PE, + Bilateral lower lobe opacities, suggesting atelectasis.     ROS:    [ ] Unobtainable because:  [ ] All other systems negative    Constitutional: no fever, no chills  Head: no trauma  Eyes: no vision changes, no eye pain  ENT:  no sore throat, no rhinorrhea  Cardiovascular:  no chest pain, no palpitation  Respiratory:  + SOB, + cough with clear sputum  GI:  no abd pain, no vomiting, no diarrhea  urinary: no dysuria, no hematuria, no flank pain  musculoskeletal:  no joint pain, no joint swelling  skin:  right thigh wound pain   neurology:  no headache, no seizure, no change in mental status  psych: no anxiety, no depression         Allergies  No Known Allergies        ANTIMICROBIALS:  vancomycin  IVPB 1250 every 12 hours      OTHER MEDS:  acetaminophen   Tablet .. 975 milliGRAM(s) Oral every 8 hours PRN  albuterol/ipratropium for Nebulization 3 milliLiter(s) Nebulizer every 6 hours PRN  albuterol/ipratropium for Nebulization 3 milliLiter(s) Nebulizer every 8 hours  aluminum hydroxide/magnesium hydroxide/simethicone Suspension 30 milliLiter(s) Oral every 4 hours PRN  budesonide 160 MICROgram(s)/formoterol 4.5 MICROgram(s) Inhaler 2 Puff(s) Inhalation two times a day  enoxaparin Injectable 40 milliGRAM(s) SubCutaneous every 12 hours  pantoprazole  Injectable 40 milliGRAM(s) IV Push daily  polyethylene glycol 3350 17 Gram(s) Oral daily  senna 2 Tablet(s) Oral at bedtime      Vital Signs Last 24 Hrs  T(C): 37.8 (14 Dec 2020 05:26), Max: 37.8 (14 Dec 2020 05:26)  T(F): 100.1 (14 Dec 2020 05:26), Max: 100.1 (14 Dec 2020 05:26)  HR: 90 (14 Dec 2020 06:10) (79 - 98)  BP: 110/68 (14 Dec 2020 05:26) (100/55 - 144/85)  BP(mean): --  RR: 18 (14 Dec 2020 05:26) (18 - 18)  SpO2: 95% (14 Dec 2020 06:10) (95% - 98%)    Physical Exam:  General:    NAD,  non toxic, morbidly obese  Head: atraumatic, normocephalic  Eye: normal sclera and conjunctiva  ENT:    no oral lesions, neck supple, mild thrush   Cardio:     regular S1, S2,  no murmur  Respiratory:    clear b/l,    no wheezing, no rales ro rhonchi   abd:     soft,   BS +,   no tenderness  :   no CVAT,  no suprapubic tenderness,   no  blum  Musculoskeletal:   no joint swelling,   +edema / lymphedema no change  vascular: no central lines, +PIV   Skin:    large and deep ulcer on the posteromedial aspect of right leg with no discharge, dressing due for change, chronic lymphedema, the surrounding skin is still warm, chronic appearing skin changes   Neurologic:     no focal deficit  psych: normal affect        WBC Count: 25.40 K/uL (12-14 @ 06:36)  WBC Count: 25.14 K/uL (12-13 @ 08:07)  WBC Count: 27.77 K/uL (12-12 @ 08:10)  WBC Count: 26.52 K/uL (12-11 @ 06:23)  WBC Count: 26.07 K/uL (12-10 @ 08:15)  WBC Count: 16.79 K/uL (12-09 @ 10:36)                            9.0    25.40 )-----------( 298      ( 14 Dec 2020 06:36 )             27.1       12-14    132<L>  |  98  |  18  ----------------------------<  159<H>  4.5   |  28  |  0.55    Ca    8.1<L>      14 Dec 2020 06:36  Phos  3.7     12-14  Mg     2.3     12-14            Creatinine Trend: 0.55<--, 0.61<--, 0.68<--, 0.74<--, 0.86<--, 1.09<--      MICROBIOLOGY:  v  .Blood Blood  12-12-20   No growth to date.  --  --      .Blood Blood  12-11-20   No growth to date.  --  --      .Urine Catheterized  12-07-20   <10,000 CFU/mL Normal Urogenital Andreea  --  --      .Blood Blood-Peripheral  12-06-20   No Growth Final  --  --              COVID-19 PCR: NotDetec (12-09)    C-Reactive Protein, Serum: 19.50 (12-13)  C-Reactive Protein, Serum: 17.02 (12-12)  C-Reactive Protein, Serum: 31.61 (12-09)        D-Dimer Assay, Quantitative: 1631 (12-13)    Procalcitonin, Serum: 7.99 (12-09-20 @ 14:51)      RADIOLOGY:    < from: CT Angio Chest w/ IV Cont (12.13.20 @ 21:47) >  EXAM:  CT ANGIO CHEST (W)AW IC                            PROCEDURE DATE:  12/13/2020          INTERPRETATION:  CLINICAL INFORMATION: Shortness of breath, assess PE.    PROCEDURE: CT angiography of the chest was performed with intravenous contrast utilizing dedicated PE protocol. 90 mls of Omnipaque-350 administered without complication. 10 ml discarded. Coronal and sagittal reconstruction images were obtained. Axial MIP images were obtained from a separate workstation.    COMPARISON: 12/7/2019.    FINDINGS: Artifact from the patient's arms and respiratory motion degrades images.    LUNGS AND AIRWAYS: Patent central airways. Somewhat linear bilateral lower lobe opacities, suggesting atelectasis.  PLEURA: No pleural effusion or pneumothorax.  HEART: Stable cardiomegaly. No pericardial effusion.  VESSELS: Suboptimal contrast opacification of the peripheral pulmonary arteries. No obvious central pulmonary embolus or secondary signs of right heart strain. Normal caliber of the thoracic aorta.  MEDIASTINUM AND SHERON: Subcentimeter mediastinal lymph nodes without lymphadenopathy.  CHEST WALL AND LOWER NECK: Unremarkable.  UPPER ABDOMEN: Prominent wall of the visualized distal esophagus and distal stomach. Colon diverticulosis. Question mild peripancreatic stranding. Nonspecific bilateral perinephric stranding.  BONES: Rightward curvature and degenerative changes of the spine. Mild anterolisthesis of C6 on C7. Likely chronic mild endplate depression in the upper/mid thoracic spine.    IMPRESSION:    Suboptimal evaluation of the peripheral pulmonary arteries. No obvious central pulmonary embolus or secondary signs of right heart strain.    Bilateral lower lobe opacities, suggesting atelectasis. Recommend clinical correlation to assess underlying pneumonia.    Prominent wall of the visualized distal esophagus and distal stomach, which may be due to partial distention. Recommend clinical correlation to assess esophagitis/gastritis. Follow-up endoscopy may be obtained for further evaluation.    Question mild peripancreatic stranding, which may be due to artifact. Recommend clinical correlation to assess acute pancreatitis.    Additional findings as described.        MICHELLE OMER MD; Attending Radiologist  This document has been electronically signed. Dec 13 2020 10:29PM    < end of copied text >    < from: Xray Chest 1 View-PORTABLE IMMEDIATE (Xray Chest 1 View-PORTABLE IMMEDIATE .) (12.13.20 @ 17:17) >  EXAM:  XR CHEST PORTABLE IMMED 1V                            PROCEDURE DATE:  12/13/2020          INTERPRETATION:  DATE OF STUDY: 12/13/2020    PRIOR:12/7/20    CLINICAL INDICATION: Admitted with cellulitis. Compare with prior chest film    TECHNIQUE: portable chest - done upright.    FINDINGS:  The study limited by low lung volumes and by patient positioning.  The heart is magnified by technique.  Mild pulmonary vascular congestive changes suggested.  No bilateral focal consolidations. No large pleural effusion. No pneumothorax  No acute bony finding.    IMPRESSION:  Since 12/7/20 exam mild pulmonary vascular congestive changes have developed.            MOHINDER PELAEZ MD; Attending Radiologist  This document has been electronically signed. Dec 14 2020  9:23AM    < end of copied text >     ALICIA GONZALEZ  MRN-[id number]    Follow Up:  right lower extremity cellulitis and wound    Interval History: The pt is in bed, no distress, remains on supplemental O2 via nasal cannula, complains of pain in her right thigh - wound site. The pt is afebrile, leukocytosis is worsening, no new complains, reports productive occasional cough with clear thick sputum. The pt was scheduled for MRI of right lower extremity, unable to perform.   CTA was performed over the weekend, negative for PE, + Bilateral lower lobe opacities, suggesting atelectasis.     ROS:    [ ] Unobtainable because:  [ ] All other systems negative    Constitutional: no fever, no chills  Head: no trauma  Eyes: no vision changes, no eye pain  ENT:  no sore throat, no rhinorrhea  Cardiovascular:  no chest pain, no palpitation  Respiratory:  + SOB, + cough with clear sputum  GI:  no abd pain, no vomiting, no diarrhea  urinary: no dysuria, no hematuria, no flank pain  musculoskeletal:  no joint pain, no joint swelling  skin:  right thigh wound pain   neurology:  no headache, no seizure, no change in mental status  psych: no anxiety, no depression         Allergies  No Known Allergies        ANTIMICROBIALS:  vancomycin  IVPB 1250 every 12 hours      OTHER MEDS:  acetaminophen   Tablet .. 975 milliGRAM(s) Oral every 8 hours PRN  albuterol/ipratropium for Nebulization 3 milliLiter(s) Nebulizer every 6 hours PRN  albuterol/ipratropium for Nebulization 3 milliLiter(s) Nebulizer every 8 hours  aluminum hydroxide/magnesium hydroxide/simethicone Suspension 30 milliLiter(s) Oral every 4 hours PRN  budesonide 160 MICROgram(s)/formoterol 4.5 MICROgram(s) Inhaler 2 Puff(s) Inhalation two times a day  enoxaparin Injectable 40 milliGRAM(s) SubCutaneous every 12 hours  pantoprazole  Injectable 40 milliGRAM(s) IV Push daily  polyethylene glycol 3350 17 Gram(s) Oral daily  senna 2 Tablet(s) Oral at bedtime      Vital Signs Last 24 Hrs  T(C): 37.8 (14 Dec 2020 05:26), Max: 37.8 (14 Dec 2020 05:26)  T(F): 100.1 (14 Dec 2020 05:26), Max: 100.1 (14 Dec 2020 05:26)  HR: 90 (14 Dec 2020 06:10) (79 - 98)  BP: 110/68 (14 Dec 2020 05:26) (100/55 - 144/85)  BP(mean): --  RR: 18 (14 Dec 2020 05:26) (18 - 18)  SpO2: 95% (14 Dec 2020 06:10) (95% - 98%)    Physical Exam:  General:    NAD,  non toxic, morbidly obese  Head: atraumatic, normocephalic  Eye: normal sclera and conjunctiva  ENT:    no oral lesions, neck supple, mild thrush   Cardio:     regular S1, S2,  no murmur  Respiratory:    clear b/l,    no wheezing, no rales ro rhonchi, on NC, tachypneic   abd:     soft,   BS +,   no tenderness  :   no CVAT,  no suprapubic tenderness,   no  blum  Musculoskeletal:   no joint swelling,   +edema / lymphedema no change  vascular: no central lines, +PIV   Skin:    large and deep ulcer on the posteromedial aspect of right leg with no discharge, dressing due for change, chronic lymphedema, the surrounding skin is still warm, chronic appearing skin changes   Neurologic:     no focal deficit  psych: normal affect        WBC Count: 25.40 K/uL (12-14 @ 06:36)  WBC Count: 25.14 K/uL (12-13 @ 08:07)  WBC Count: 27.77 K/uL (12-12 @ 08:10)  WBC Count: 26.52 K/uL (12-11 @ 06:23)  WBC Count: 26.07 K/uL (12-10 @ 08:15)  WBC Count: 16.79 K/uL (12-09 @ 10:36)                            9.0    25.40 )-----------( 298      ( 14 Dec 2020 06:36 )             27.1       12-14    132<L>  |  98  |  18  ----------------------------<  159<H>  4.5   |  28  |  0.55    Ca    8.1<L>      14 Dec 2020 06:36  Phos  3.7     12-14  Mg     2.3     12-14            Creatinine Trend: 0.55<--, 0.61<--, 0.68<--, 0.74<--, 0.86<--, 1.09<--      MICROBIOLOGY:  v  .Blood Blood  12-12-20   No growth to date.  --  --      .Blood Blood  12-11-20   No growth to date.  --  --      .Urine Catheterized  12-07-20   <10,000 CFU/mL Normal Urogenital Andreea  --  --      .Blood Blood-Peripheral  12-06-20   No Growth Final  --  --              COVID-19 PCR: NotDetec (12-09)    C-Reactive Protein, Serum: 19.50 (12-13)  C-Reactive Protein, Serum: 17.02 (12-12)  C-Reactive Protein, Serum: 31.61 (12-09)        D-Dimer Assay, Quantitative: 1631 (12-13)    Procalcitonin, Serum: 7.99 (12-09-20 @ 14:51)      RADIOLOGY:    < from: CT Angio Chest w/ IV Cont (12.13.20 @ 21:47) >  EXAM:  CT ANGIO CHEST (W)AW IC                            PROCEDURE DATE:  12/13/2020          INTERPRETATION:  CLINICAL INFORMATION: Shortness of breath, assess PE.    PROCEDURE: CT angiography of the chest was performed with intravenous contrast utilizing dedicated PE protocol. 90 mls of Omnipaque-350 administered without complication. 10 ml discarded. Coronal and sagittal reconstruction images were obtained. Axial MIP images were obtained from a separate workstation.    COMPARISON: 12/7/2019.    FINDINGS: Artifact from the patient's arms and respiratory motion degrades images.    LUNGS AND AIRWAYS: Patent central airways. Somewhat linear bilateral lower lobe opacities, suggesting atelectasis.  PLEURA: No pleural effusion or pneumothorax.  HEART: Stable cardiomegaly. No pericardial effusion.  VESSELS: Suboptimal contrast opacification of the peripheral pulmonary arteries. No obvious central pulmonary embolus or secondary signs of right heart strain. Normal caliber of the thoracic aorta.  MEDIASTINUM AND SHERON: Subcentimeter mediastinal lymph nodes without lymphadenopathy.  CHEST WALL AND LOWER NECK: Unremarkable.  UPPER ABDOMEN: Prominent wall of the visualized distal esophagus and distal stomach. Colon diverticulosis. Question mild peripancreatic stranding. Nonspecific bilateral perinephric stranding.  BONES: Rightward curvature and degenerative changes of the spine. Mild anterolisthesis of C6 on C7. Likely chronic mild endplate depression in the upper/mid thoracic spine.    IMPRESSION:    Suboptimal evaluation of the peripheral pulmonary arteries. No obvious central pulmonary embolus or secondary signs of right heart strain.    Bilateral lower lobe opacities, suggesting atelectasis. Recommend clinical correlation to assess underlying pneumonia.    Prominent wall of the visualized distal esophagus and distal stomach, which may be due to partial distention. Recommend clinical correlation to assess esophagitis/gastritis. Follow-up endoscopy may be obtained for further evaluation.    Question mild peripancreatic stranding, which may be due to artifact. Recommend clinical correlation to assess acute pancreatitis.    Additional findings as described.        MICHELLE OMER MD; Attending Radiologist  This document has been electronically signed. Dec 13 2020 10:29PM    < end of copied text >    < from: Xray Chest 1 View-PORTABLE IMMEDIATE (Xray Chest 1 View-PORTABLE IMMEDIATE .) (12.13.20 @ 17:17) >  EXAM:  XR CHEST PORTABLE IMMED 1V                            PROCEDURE DATE:  12/13/2020          INTERPRETATION:  DATE OF STUDY: 12/13/2020    PRIOR:12/7/20    CLINICAL INDICATION: Admitted with cellulitis. Compare with prior chest film    TECHNIQUE: portable chest - done upright.    FINDINGS:  The study limited by low lung volumes and by patient positioning.  The heart is magnified by technique.  Mild pulmonary vascular congestive changes suggested.  No bilateral focal consolidations. No large pleural effusion. No pneumothorax  No acute bony finding.    IMPRESSION:  Since 12/7/20 exam mild pulmonary vascular congestive changes have developed.            MOHINDER PELAEZ MD; Attending Radiologist  This document has been electronically signed. Dec 14 2020  9:23AM    < end of copied text >

## 2020-12-14 NOTE — PROGRESS NOTE ADULT - SUBJECTIVE AND OBJECTIVE BOX
Patient is a 75y old  Female who presents with a chief complaint of wound check (08 Dec 2020 11:38), feeling better today.    Patient seen and examined bedside.   no overnight events.   states pain is controlled   complaining of some SOB and left sided CP today     Denies fever, chills, N/V, dizziness, HA, cough, CP, palpitations, abdominal pain, dysuria.       MEDICATIONS  (STANDING):  albuterol/ipratropium for Nebulization. 3 milliLiter(s) Nebulizer three times a day  cefTRIAXone   IVPB 1000 milliGRAM(s) IV Intermittent every 24 hours  clindamycin IVPB 600 milliGRAM(s) IV Intermittent every 8 hours  enoxaparin Injectable 40 milliGRAM(s) SubCutaneous daily    MEDICATIONS  (PRN):  acetaminophen   Tablet .. 650 milliGRAM(s) Oral every 4 hours PRN Temp greater or equal to 38C (100.4F), Moderate Pain (4 - 6), Severe Pain (7 - 10)    Vital Signs Last 24 Hrs  T(C): 37.8 (14 Dec 2020 05:26), Max: 37.8 (14 Dec 2020 05:26)  T(F): 100.1 (14 Dec 2020 05:26), Max: 100.1 (14 Dec 2020 05:26)  HR: 90 (14 Dec 2020 06:10) (79 - 98)  BP: 110/68 (14 Dec 2020 05:26) (100/55 - 144/85)  BP(mean): --  RR: 18 (14 Dec 2020 05:26) (18 - 18)  SpO2: 95% (14 Dec 2020 06:10) (95% - 98%)           PHYSICAL EXAM:   GENERAL: NAD, obese, speaking in full sentences, not using accessory muscles   HEAD:  Atraumatic, Normocephalic  EYES: PERRLA, conjunctiva and sclera clear  ENMT: Moist mucous membranes, No lesions  NECK: Supple, No JVD  NERVOUS SYSTEM:  Alert & Oriented X 3, no focal neuro deficits   CHEST/LUNG: good b/l air entry, + scattered exp wheezing   HEART: Regular rate and rhythm; No rubs, or gallops, +S1,S2  ABDOMEN: Soft, Nontender, Nondistended; Bowel sounds present  EXTREMITIES:  2+ Peripheral Pulses b/l, right LE  wound dressing c/d/i     b/l massive lymphedema   RLE warm to touch     LABS:                                   9.0    25.40 )-----------( 298      ( 14 Dec 2020 06:36 )             27.1   12-14    132<L>  |  98  |  18  ----------------------------<  159<H>  4.5   |  28  |  0.55    Ca    8.1<L>      14 Dec 2020 06:36  Phos  3.7     12-  Mg     2.3     12-14           cardiac markers   Urinalysis Basic - ( 07 Dec 2020 21:44 )    Color: Faby / Appearance: Slightly Turbid / S.015 / pH: x  Gluc: x / Ketone: Trace  / Bili: Small / Urobili: 8 mg/dL   Blood: x / Protein: 100 mg/dL / Nitrite: Positive   Leuk Esterase: Small / RBC: 25-50 /HPF / WBC 6-10   Sq Epi: x / Non Sq Epi: Moderate / Bacteria: Many      Vancomycin Level, Trough: 6.1 ug/mL (20 @ 06:22)    .Urine Catheterized  20   <10,000 CFU/mL Normal Urogenital Andreea  --  --      .Blood Blood-Peripheral  20   No growth to date.  --  --      COVID-19 PCR: NotDetec ()    C-Reactive Protein, Serum: 31.61 ()          Procalcitonin, Serum: 7.99 (20 @ 14:51)  Procalcitonin, Serum: 23.00 (20 @ 08:18)        Cultures  Culture - Blood (20 @ 12:00)    Specimen Source: .Blood Blood-Peripheral    Culture Results:   No Growth Final          RADIOLOGY & ADDITIONAL TESTS:  < from: CT Lower Extremity w/ IV Cont, Right (20 @ 14:54) >  INTERPRETATION:  CT OF THE RIGHT LOWER EXTREMITY    CLINICAL INFORMATION: Right lower extremity draining wound in patient with morbid obesity. Increasing white blood cell count. Evaluate for soft tissue infection and compared to prior study.  TECHNIQUE: Multidetector CT of the right lower extremity was performed from the level of the hip through the level of the ankle. The study was performed following the intravenous administration of 90 ml Omnipaque 350 (10 ml discarded) . Multiplanar reformats were generated for review.    COMPARISON: Right lower extremity CT 2020.    FINDINGS:    BONE: Patient status post right total knee arthroplasty. No CT evidence of acute hardware complication. No acute fracture or dislocation. No cortical destruction or periosteal new bone formation.    SOFT TISSUE: As on prior examination, there is extensive swelling along the medial aspect of the right lower extremity, favored to represent massive localized lymphedema (MLL). A soft tissue wound is seen along the posteromedial aspect of the MLL mass that measures approximately 24 x 20 mm, slightly increased in size when compared to prior study. As on prior examination, there is diffuse enhancement and subcutaneous edema along the medial thigh. No rim-enhancing treatable fluid collection is identified. No tracking subcutaneous emphysema. Unchanged appearance of the muscles. Unchanged neurovascular structures.        IMPRESSION:  1.  Massive localized lymphedema of the right lower extremity, overall similar in appearance to prior study.  2.  Soft tissue wound is again seen along the posteromedial aspect of the massive localized lymphedema swelling. Superimposed cellulitis is difficult to exclude on imaging. No subcutaneous abscess or tracking subcutaneous emphysema.    < end of copied text >      Imaging Personally Reviewed:  [ ] YES  [ ] NO    Consultant(s) Notes Reviewed:  [x ] YES  [ ] NO    Care Discussed with Consultants/Other Providers [x ] YES  [ ] NO    Care discussed in detail with patient.  All questions and concerns addressed Patient is a 75y old  Female who presents with a chief complaint of wound check (08 Dec 2020 11:38), feeling better today.    Patient seen and examined bedside.   states pain is controlled   complaining of some SOB,   left sided CP resolved   remains afebrile, denies chills       Denies  N/V, dizziness, HA, cough, CP, palpitations, abdominal pain, dysuria.       MEDICATIONS  (STANDING):  albuterol/ipratropium for Nebulization. 3 milliLiter(s) Nebulizer three times a day  cefTRIAXone   IVPB 1000 milliGRAM(s) IV Intermittent every 24 hours  clindamycin IVPB 600 milliGRAM(s) IV Intermittent every 8 hours  enoxaparin Injectable 40 milliGRAM(s) SubCutaneous daily    MEDICATIONS  (PRN):  acetaminophen   Tablet .. 650 milliGRAM(s) Oral every 4 hours PRN Temp greater or equal to 38C (100.4F), Moderate Pain (4 - 6), Severe Pain (7 - 10)    Vital Signs Last 24 Hrs  T(C): 37.8 (14 Dec 2020 05:26), Max: 37.8 (14 Dec 2020 05:26)  T(F): 100.1 (14 Dec 2020 05:26), Max: 100.1 (14 Dec 2020 05:26)  HR: 90 (14 Dec 2020 06:10) (79 - 98)  BP: 110/68 (14 Dec 2020 05:26) (100/55 - 144/85)  BP(mean): --  RR: 18 (14 Dec 2020 05:26) (18 - 18)  SpO2: 95% (14 Dec 2020 06:10) (95% - 98%)           PHYSICAL EXAM:   GENERAL: NAD, obese, speaking in full sentences, not using accessory muscles   HEAD:  Atraumatic, Normocephalic  EYES: PERRLA, conjunctiva and sclera clear  ENMT: Moist mucous membranes, No lesions  NECK: Supple, No JVD  NERVOUS SYSTEM:  Alert & Oriented X 3, no focal neuro deficits   CHEST/LUNG: good b/l air entry, + scattered exp wheezing   HEART: Regular rate and rhythm; No rubs, or gallops, +S1,S2  ABDOMEN: Soft, Nontender, Nondistended; Bowel sounds present  EXTREMITIES:  2+ Peripheral Pulses b/l, right LE  wound dressing c/d/i     b/l massive lymphedema   RLE warm to touch     LABS:                                   9.0    25.40 )-----------( 298      ( 14 Dec 2020 06:36 )             27.1   12-14    132<L>  |  98  |  18  ----------------------------<  159<H>  4.5   |  28  |  0.55    Ca    8.1<L>      14 Dec 2020 06:36  Phos  3.7     12-  Mg     2.3     12-14           cardiac markers   Urinalysis Basic - ( 07 Dec 2020 21:44 )    Color: Faby / Appearance: Slightly Turbid / S.015 / pH: x  Gluc: x / Ketone: Trace  / Bili: Small / Urobili: 8 mg/dL   Blood: x / Protein: 100 mg/dL / Nitrite: Positive   Leuk Esterase: Small / RBC: 25-50 /HPF / WBC 6-10   Sq Epi: x / Non Sq Epi: Moderate / Bacteria: Many      Vancomycin Level, Trough: 6.1 ug/mL (20 @ 06:22)    .Urine Catheterized  20   <10,000 CFU/mL Normal Urogenital Andreea  --  --      .Blood Blood-Peripheral  20   No growth to date.  --  --      COVID-19 PCR: NotDetec ()    C-Reactive Protein, Serum: 31.61 ()          Procalcitonin, Serum: 7.99 (20 @ 14:51)  Procalcitonin, Serum: 23.00 (20 @ 08:18)        Cultures  Culture - Blood (20 @ 12:00)    Specimen Source: .Blood Blood-Peripheral    Culture Results:   No Growth Final          RADIOLOGY & ADDITIONAL TESTS:  < from: CT Lower Extremity w/ IV Cont, Right (20 @ 14:54) >  INTERPRETATION:  CT OF THE RIGHT LOWER EXTREMITY    CLINICAL INFORMATION: Right lower extremity draining wound in patient with morbid obesity. Increasing white blood cell count. Evaluate for soft tissue infection and compared to prior study.  TECHNIQUE: Multidetector CT of the right lower extremity was performed from the level of the hip through the level of the ankle. The study was performed following the intravenous administration of 90 ml Omnipaque 350 (10 ml discarded) . Multiplanar reformats were generated for review.    COMPARISON: Right lower extremity CT 2020.    FINDINGS:    BONE: Patient status post right total knee arthroplasty. No CT evidence of acute hardware complication. No acute fracture or dislocation. No cortical destruction or periosteal new bone formation.    SOFT TISSUE: As on prior examination, there is extensive swelling along the medial aspect of the right lower extremity, favored to represent massive localized lymphedema (MLL). A soft tissue wound is seen along the posteromedial aspect of the MLL mass that measures approximately 24 x 20 mm, slightly increased in size when compared to prior study. As on prior examination, there is diffuse enhancement and subcutaneous edema along the medial thigh. No rim-enhancing treatable fluid collection is identified. No tracking subcutaneous emphysema. Unchanged appearance of the muscles. Unchanged neurovascular structures.        IMPRESSION:  1.  Massive localized lymphedema of the right lower extremity, overall similar in appearance to prior study.  2.  Soft tissue wound is again seen along the posteromedial aspect of the massive localized lymphedema swelling. Superimposed cellulitis is difficult to exclude on imaging. No subcutaneous abscess or tracking subcutaneous emphysema.    < end of copied text >      Imaging Personally Reviewed:  [ ] YES  [ ] NO    Consultant(s) Notes Reviewed:  [x ] YES  [ ] NO    Care Discussed with Consultants/Other Providers [x ] YES  [ ] NO    Care discussed in detail with patient.  All questions and concerns addressed

## 2020-12-15 LAB
ALBUMIN SERPL ELPH-MCNC: 1.4 G/DL — LOW (ref 3.3–5)
ALP SERPL-CCNC: 178 U/L — HIGH (ref 40–120)
ALT FLD-CCNC: 197 U/L — HIGH (ref 12–78)
ANION GAP SERPL CALC-SCNC: 5 MMOL/L — SIGNIFICANT CHANGE UP (ref 5–17)
AST SERPL-CCNC: 135 U/L — HIGH (ref 15–37)
BASE EXCESS BLDA CALC-SCNC: 4.3 MMOL/L — HIGH (ref -2–2)
BASOPHILS # BLD AUTO: 0.09 K/UL — SIGNIFICANT CHANGE UP (ref 0–0.2)
BASOPHILS NFR BLD AUTO: 0.3 % — SIGNIFICANT CHANGE UP (ref 0–2)
BILIRUB DIRECT SERPL-MCNC: 1.12 MG/DL — HIGH (ref 0.05–0.2)
BILIRUB SERPL-MCNC: 1.5 MG/DL — HIGH (ref 0.2–1.2)
BLOOD GAS COMMENTS: SIGNIFICANT CHANGE UP
BLOOD GAS COMMENTS: SIGNIFICANT CHANGE UP
BLOOD GAS SOURCE: SIGNIFICANT CHANGE UP
BUN SERPL-MCNC: 16 MG/DL — SIGNIFICANT CHANGE UP (ref 7–23)
CALCIUM SERPL-MCNC: 8.1 MG/DL — LOW (ref 8.5–10.1)
CHLORIDE SERPL-SCNC: 97 MMOL/L — SIGNIFICANT CHANGE UP (ref 96–108)
CO2 SERPL-SCNC: 30 MMOL/L — SIGNIFICANT CHANGE UP (ref 22–31)
CREAT SERPL-MCNC: 0.64 MG/DL — SIGNIFICANT CHANGE UP (ref 0.5–1.3)
CRP SERPL-MCNC: 22.82 MG/DL — HIGH (ref 0–0.4)
EOSINOPHIL # BLD AUTO: 0.14 K/UL — SIGNIFICANT CHANGE UP (ref 0–0.5)
EOSINOPHIL NFR BLD AUTO: 0.5 % — SIGNIFICANT CHANGE UP (ref 0–6)
ERYTHROCYTE [SEDIMENTATION RATE] IN BLOOD: 111 MM/HR — HIGH (ref 0–20)
FERRITIN SERPL-MCNC: 935 NG/ML — HIGH (ref 15–150)
GLUCOSE SERPL-MCNC: 171 MG/DL — HIGH (ref 70–99)
HCO3 BLDA-SCNC: 28 MMOL/L — SIGNIFICANT CHANGE UP (ref 21–29)
HCT VFR BLD CALC: 26.8 % — LOW (ref 34.5–45)
HGB BLD-MCNC: 9 G/DL — LOW (ref 11.5–15.5)
HOROWITZ INDEX BLDA+IHG-RTO: 28 — SIGNIFICANT CHANGE UP
IMM GRANULOCYTES NFR BLD AUTO: 4.5 % — HIGH (ref 0–1.5)
LDH SERPL L TO P-CCNC: 164 U/L — SIGNIFICANT CHANGE UP (ref 50–242)
LYMPHOCYTES # BLD AUTO: 0.67 K/UL — LOW (ref 1–3.3)
LYMPHOCYTES # BLD AUTO: 2.6 % — LOW (ref 13–44)
MAGNESIUM SERPL-MCNC: 2.4 MG/DL — SIGNIFICANT CHANGE UP (ref 1.6–2.6)
MCHC RBC-ENTMCNC: 28.3 PG — SIGNIFICANT CHANGE UP (ref 27–34)
MCHC RBC-ENTMCNC: 33.6 GM/DL — SIGNIFICANT CHANGE UP (ref 32–36)
MCV RBC AUTO: 84.3 FL — SIGNIFICANT CHANGE UP (ref 80–100)
MONOCYTES # BLD AUTO: 1.16 K/UL — HIGH (ref 0–0.9)
MONOCYTES NFR BLD AUTO: 4.5 % — SIGNIFICANT CHANGE UP (ref 2–14)
NEUTROPHILS # BLD AUTO: 22.7 K/UL — HIGH (ref 1.8–7.4)
NEUTROPHILS NFR BLD AUTO: 87.6 % — HIGH (ref 43–77)
NRBC # BLD: 0 /100 WBCS — SIGNIFICANT CHANGE UP (ref 0–0)
PCO2 BLDA: 42 MMHG — SIGNIFICANT CHANGE UP (ref 32–46)
PH BLD: 7.45 — SIGNIFICANT CHANGE UP (ref 7.35–7.45)
PHOSPHATE SERPL-MCNC: 3.6 MG/DL — SIGNIFICANT CHANGE UP (ref 2.5–4.5)
PLATELET # BLD AUTO: 330 K/UL — SIGNIFICANT CHANGE UP (ref 150–400)
PO2 BLDA: 150 MMHG — HIGH (ref 74–108)
POTASSIUM SERPL-MCNC: 4 MMOL/L — SIGNIFICANT CHANGE UP (ref 3.5–5.3)
POTASSIUM SERPL-SCNC: 4 MMOL/L — SIGNIFICANT CHANGE UP (ref 3.5–5.3)
PROCALCITONIN SERPL-MCNC: 1.82 NG/ML — HIGH (ref 0.02–0.1)
PROT SERPL-MCNC: 6.3 GM/DL — SIGNIFICANT CHANGE UP (ref 6–8.3)
RBC # BLD: 3.18 M/UL — LOW (ref 3.8–5.2)
RBC # FLD: 14.8 % — HIGH (ref 10.3–14.5)
SAO2 % BLDA: 99 % — HIGH (ref 92–96)
SODIUM SERPL-SCNC: 132 MMOL/L — LOW (ref 135–145)
WBC # BLD: 25.93 K/UL — HIGH (ref 3.8–10.5)
WBC # FLD AUTO: 25.93 K/UL — HIGH (ref 3.8–10.5)

## 2020-12-15 PROCEDURE — 99233 SBSQ HOSP IP/OBS HIGH 50: CPT

## 2020-12-15 PROCEDURE — 36410 VNPNXR 3YR/> PHY/QHP DX/THER: CPT

## 2020-12-15 PROCEDURE — 71045 X-RAY EXAM CHEST 1 VIEW: CPT | Mod: 26

## 2020-12-15 PROCEDURE — 76937 US GUIDE VASCULAR ACCESS: CPT | Mod: 26

## 2020-12-15 RX ADMIN — ENOXAPARIN SODIUM 40 MILLIGRAM(S): 100 INJECTION SUBCUTANEOUS at 17:17

## 2020-12-15 RX ADMIN — CEFEPIME 100 MILLIGRAM(S): 1 INJECTION, POWDER, FOR SOLUTION INTRAMUSCULAR; INTRAVENOUS at 06:42

## 2020-12-15 RX ADMIN — CEFEPIME 100 MILLIGRAM(S): 1 INJECTION, POWDER, FOR SOLUTION INTRAMUSCULAR; INTRAVENOUS at 16:21

## 2020-12-15 RX ADMIN — Medication 166.67 MILLIGRAM(S): at 23:55

## 2020-12-15 RX ADMIN — ENOXAPARIN SODIUM 40 MILLIGRAM(S): 100 INJECTION SUBCUTANEOUS at 05:29

## 2020-12-15 RX ADMIN — SENNA PLUS 2 TABLET(S): 8.6 TABLET ORAL at 22:14

## 2020-12-15 RX ADMIN — BUDESONIDE AND FORMOTEROL FUMARATE DIHYDRATE 2 PUFF(S): 160; 4.5 AEROSOL RESPIRATORY (INHALATION) at 17:17

## 2020-12-15 RX ADMIN — Medication 166.67 MILLIGRAM(S): at 05:29

## 2020-12-15 RX ADMIN — Medication 166.67 MILLIGRAM(S): at 16:19

## 2020-12-15 RX ADMIN — CEFEPIME 100 MILLIGRAM(S): 1 INJECTION, POWDER, FOR SOLUTION INTRAMUSCULAR; INTRAVENOUS at 22:13

## 2020-12-15 RX ADMIN — Medication 975 MILLIGRAM(S): at 01:33

## 2020-12-15 RX ADMIN — Medication 3 MILLILITER(S): at 05:14

## 2020-12-15 RX ADMIN — BUDESONIDE AND FORMOTEROL FUMARATE DIHYDRATE 2 PUFF(S): 160; 4.5 AEROSOL RESPIRATORY (INHALATION) at 05:29

## 2020-12-15 RX ADMIN — PANTOPRAZOLE SODIUM 40 MILLIGRAM(S): 20 TABLET, DELAYED RELEASE ORAL at 12:43

## 2020-12-15 NOTE — CHART NOTE - NSCHARTNOTEFT_GEN_A_CORE
patient poor IV access, RN and ADP attempted to place peripheral lines without success   patient on IV abx    requested midline to be placed by IR today

## 2020-12-15 NOTE — PROGRESS NOTE ADULT - ASSESSMENT
74 y/o female w/pmhx of HTN, chronic lymphedema with RLE wounds w/cellulitis.  Fever, tachycardia  Normal WBC initially but today jumped to 16 with bandemia (perhaps in the setting of solumedrol)  Blood culture negative  UA positive but she denies any urinary symptoms   CT (I personally reviewed) lower extremity with marked edema and inflammatory changes  She has been on clinda and ceftriaxone   The wound appears to need more debridement and would benefit from a scraping and then culture of the base of the wound  Would continue antibiotics but change clindamycin to vanco for better and more optimal MRSA coverage     12/10: WBC rising but perhaps in the setting of steroids though to this extent is questionable, afebrile, evaluated again by surgery who wants to watch on antibiotics, though she had a positive UA she denies any dysuria or increased frequency. Discussed with hospitalist to perhaps stop steroids and if WBC remains elevated to repeat blood cultures and obtain CT right LE with IV contrast.  12/11: WBC increased slightly, steroids stopped, since WBC still increasing will obtain blood cultures and CT right LE with IV contrast, afebrile, right thigh wound is clean with no drainage.   Attending Addendum--  Case reviewed with NP Dolores Macias. Her note reviewed and modified as appropriate.   Patient personally assessed and examined.  CT report reviewed no deep drainable focus. Leukocytosis could be still related to steroids, though still has some early forms on diff which would not be readily explicable with steroids effect.  However their presence at this point in time seems to be in contrast to clinical picture which is one of stability. Depending on CBC/AM and clinical course will revisit transfer to rehab in am.   12/12: WBC about the same, still with some early forms. This remains in luna contrast to clinical picture which is one of stability and improvement in LE exam. If cx negative at 24h would no object to transfer to rehab to rehab where she could continue to be observed, complete a course of oral antibiotics, and have her WBC monitored.  12/14: remains on supplemental O2 via nasal cannula, complains of pain in her right thigh - wound site. The pt is afebrile, leukocytosis is worsening, no new complains, reports productive occasional cough with clear thick sputum. The pt was scheduled for MRI of right lower extremity, unable to perform. CTA was performed over the weekend, negative for PE, + Bilateral lower lobe opacities, suggesting atelectasis.   12/15: The pt spiked a fever of 101.8 yesterday evening, WBC increased 25.40, Chest Xray with no significant change from prior studies. The pt is in bed, no distress, complains of pain at right lower extremity wound site. The pt still has a cough with thick sputum, will obtain sputum culture. Vancomycin trough was 5.4 yesterday in the evening, Vancomycin dose was changed to 1250 mg IV q 8 hours, Maxipime 2 g IV q 8 hours added, vancomycin trough is scheduled for today prior evening dose. Blood cultures were collected and pending result, the pt will be tested for COVID 19.         Suggestions--  - continue Vancomycin dose 1250 mg q 8 hours for now with trough target trough 10-15 (Vancomycin trough 10.9 on 12/12/2020, VT 5.4 on 12/14)  - continue Cefepime 2g q8hrs   -Diuresis ordered by hospitalist   -ceftriaxone - 7 days course complete   -F/U Cx data, repeat blood cultures with no growth  - awaiting for new blood cultures collected yesterday   - covid 19 testing - pending  -weight loss program (consider [facility] obesity clinic referral once ready for discharge: [phone])   -offloading to ensure no further wound development   -HbA1c - 6.3  -good but not too tight glycemic control especially in the setting of recent  steroids use and infection  -trend CBC with diff   -if no improvement, consider MRI of LE (will need to go to [facility] or [facility] for MRI given body habitus) 74 y/o female w/pmhx of HTN, chronic lymphedema with RLE wounds w/cellulitis.  Fever, tachycardia  Normal WBC initially but today jumped to 16 with bandemia (perhaps in the setting of solumedrol)  Blood culture negative  UA positive but she denies any urinary symptoms   CT (I personally reviewed) lower extremity with marked edema and inflammatory changes  She has been on clinda and ceftriaxone   The wound appears to need more debridement and would benefit from a scraping and then culture of the base of the wound  Would continue antibiotics but change clindamycin to vanco for better and more optimal MRSA coverage     12/10: WBC rising but perhaps in the setting of steroids though to this extent is questionable, afebrile, evaluated again by surgery who wants to watch on antibiotics, though she had a positive UA she denies any dysuria or increased frequency. Discussed with hospitalist to perhaps stop steroids and if WBC remains elevated to repeat blood cultures and obtain CT right LE with IV contrast.  12/11: WBC increased slightly, steroids stopped, since WBC still increasing will obtain blood cultures and CT right LE with IV contrast, afebrile, right thigh wound is clean with no drainage.   Attending Addendum--  Case reviewed with NP Dolores Macias. Her note reviewed and modified as appropriate.   Patient personally assessed and examined.  CT report reviewed no deep drainable focus. Leukocytosis could be still related to steroids, though still has some early forms on diff which would not be readily explicable with steroids effect.  However their presence at this point in time seems to be in contrast to clinical picture which is one of stability. Depending on CBC/AM and clinical course will revisit transfer to rehab in am.   12/12: WBC about the same, still with some early forms. This remains in luna contrast to clinical picture which is one of stability and improvement in LE exam. If cx negative at 24h would no object to transfer to rehab to rehab where she could continue to be observed, complete a course of oral antibiotics, and have her WBC monitored.  12/14: remains on supplemental O2 via nasal cannula, complains of pain in her right thigh - wound site. The pt is afebrile, leukocytosis is worsening, no new complains, reports productive occasional cough with clear thick sputum. The pt was scheduled for MRI of right lower extremity, unable to perform. CTA was performed over the weekend, negative for PE, + Bilateral lower lobe opacities, suggesting atelectasis.   12/15: The pt spiked a fever of 101.8 yesterday evening, WBC increased 25.40, Chest Xray appears worse bilaterally (I personally reviewed) . The pt is in bed, no distress, complains of pain at right lower extremity wound site. The pt still has a cough with thick sputum, will obtain sputum culture. Vancomycin trough was 5.4 yesterday in the evening, Vancomycin dose was changed to 1250 mg IV q 8 hours, Maxipime 2 g IV q 8 hours added, vancomycin trough is scheduled for today prior evening dose. Blood cultures were collected and pending result, the pt will be tested for COVID 19. Reasonable to start steroids in the setting of worsening respiratory status, covid exposure, rising inflammatory markers, fever. Would await covid testing prior to starting remdesivir         Suggestions--  #Fever/SOB/O2 requirement/rule out COVID/Cellulitis with ulcer  - continue Vancomycin dose 1250 mg q 8 hours for now with trough target trough 10-15 (Vancomycin trough 10.9 on 12/12/2020, VT 5.4 on 12/14)  - please send VT ordered for this evening   - continue Cefepime 2g q8hrs   - covid 19 testing - pending  - Diuresis per hospitalist   - F/U Cx data  - awaiting for new blood cultures collected yesterday   - offloading to ensure no further wound development   - trend CBC with diff, LFTs, ddimer, CRP, procalcitonin    - if no improvement, consider MRI of LE (will need to go to [facility] or [facility] for MRI given body habitus)     #Obesity   - weight loss program (consider [facility] obesity clinic referral once ready for discharge: [phone])   - HbA1c - 6.3  - good but not too tight glycemic control especially in the setting of recent  steroids use and infection

## 2020-12-15 NOTE — PROGRESS NOTE ADULT - SUBJECTIVE AND OBJECTIVE BOX
Patient is a 75y old  Female who presents with a chief complaint of wound check (08 Dec 2020 11:38), feeling better today.    Patient seen and examined bedside.   patient febrile 101.8F overnight       Denies  HA, cough, CP, palpitations, abdominal pain, dysuria.       MEDICATIONS  (STANDING):  albuterol/ipratropium for Nebulization. 3 milliLiter(s) Nebulizer three times a day  cefTRIAXone   IVPB 1000 milliGRAM(s) IV Intermittent every 24 hours  clindamycin IVPB 600 milliGRAM(s) IV Intermittent every 8 hours  enoxaparin Injectable 40 milliGRAM(s) SubCutaneous daily    MEDICATIONS  (PRN):  acetaminophen   Tablet .. 650 milliGRAM(s) Oral every 4 hours PRN Temp greater or equal to 38C (100.4F), Moderate Pain (4 - 6), Severe Pain (7 - 10)    Vital Signs Last 24 Hrs  T(C): 37.9 (15 Dec 2020 11:38), Max: 38.8 (14 Dec 2020 23:40)  T(F): 100.2 (15 Dec 2020 11:38), Max: 101.8 (14 Dec 2020 23:40)  HR: 93 (15 Dec 2020 11:38) (78 - 98)  BP: 103/69 (15 Dec 2020 11:38) (103/69 - 139/70)  BP(mean): --  RR: 18 (15 Dec 2020 11:38) (18 - 19)  SpO2: 97% (15 Dec 2020 11:38) (95% - 97%)           PHYSICAL EXAM:   GENERAL: NAD, obese, speaking in full sentences, not using accessory muscles   HEAD:  Atraumatic, Normocephalic  EYES: PERRLA, conjunctiva and sclera clear  ENMT: Moist mucous membranes, No lesions  NECK: Supple, No JVD  NERVOUS SYSTEM:  Alert & Oriented X 3, no focal neuro deficits   CHEST/LUNG: good b/l air entry, + scattered exp wheezing   HEART: Regular rate and rhythm; No rubs, or gallops, +S1,S2  ABDOMEN: Soft, Nontender, Nondistended; Bowel sounds present  EXTREMITIES:  2+ Peripheral Pulses b/l, right LE  wound dressing c/d/i     b/l massive lymphedema   RLE warm to touch     LABS:                                              9.0    25.93 )-----------( 330      ( 15 Dec 2020 10:15 )             26.8   12-15    132<L>  |  97  |  16  ----------------------------<  171<H>  4.0   |  30  |  0.64    Ca    8.1<L>      15 Dec 2020 10:15  Phos  3.6     12-15  Mg     2.4     1215    TPro  6.3  /  Alb  1.4<L>  /  TBili  1.5<H>  /  DBili  x   /  AST  135<H>  /  ALT  197<H>  /  AlkPhos  178<H>  12-15             cardiac markers   Urinalysis Basic - ( 07 Dec 2020 21:44 )    Color: Faby / Appearance: Slightly Turbid / S.015 / pH: x  Gluc: x / Ketone: Trace  / Bili: Small / Urobili: 8 mg/dL   Blood: x / Protein: 100 mg/dL / Nitrite: Positive   Leuk Esterase: Small / RBC: 25-50 /HPF / WBC 6-10   Sq Epi: x / Non Sq Epi: Moderate / Bacteria: Many      Vancomycin Level, Trough: 6.1 ug/mL (20 @ 06:22)    .Urine Catheterized  20   <10,000 CFU/mL Normal Urogenital Andreea  --  --      .Blood Blood-Peripheral  20   No growth to date.  --  --      COVID-19 PCR: NotDetec ()    C-Reactive Protein, Serum: 31.61 ()          Procalcitonin, Serum: 7.99 (20 @ 14:51)  Procalcitonin, Serum: 23.00 (20 @ 08:18)        Cultures  Culture - Blood (20 @ 12:00)    Specimen Source: .Blood Blood-Peripheral    Culture Results:   No Growth Final          RADIOLOGY & ADDITIONAL TESTS:  < from: CT Lower Extremity w/ IV Cont, Right (20 @ 14:54) >  INTERPRETATION:  CT OF THE RIGHT LOWER EXTREMITY    CLINICAL INFORMATION: Right lower extremity draining wound in patient with morbid obesity. Increasing white blood cell count. Evaluate for soft tissue infection and compared to prior study.  TECHNIQUE: Multidetector CT of the right lower extremity was performed from the level of the hip through the level of the ankle. The study was performed following the intravenous administration of 90 ml Omnipaque 350 (10 ml discarded) . Multiplanar reformats were generated for review.    COMPARISON: Right lower extremity CT 2020.    FINDINGS:    BONE: Patient status post right total knee arthroplasty. No CT evidence of acute hardware complication. No acute fracture or dislocation. No cortical destruction or periosteal new bone formation.    SOFT TISSUE: As on prior examination, there is extensive swelling along the medial aspect of the right lower extremity, favored to represent massive localized lymphedema (MLL). A soft tissue wound is seen along the posteromedial aspect of the MLL mass that measures approximately 24 x 20 mm, slightly increased in size when compared to prior study. As on prior examination, there is diffuse enhancement and subcutaneous edema along the medial thigh. No rim-enhancing treatable fluid collection is identified. No tracking subcutaneous emphysema. Unchanged appearance of the muscles. Unchanged neurovascular structures.        IMPRESSION:  1.  Massive localized lymphedema of the right lower extremity, overall similar in appearance to prior study.  2.  Soft tissue wound is again seen along the posteromedial aspect of the massive localized lymphedema swelling. Superimposed cellulitis is difficult to exclude on imaging. No subcutaneous abscess or tracking subcutaneous emphysema.    < end of copied text >      Imaging Personally Reviewed:  [ ] YES  [ ] NO    Consultant(s) Notes Reviewed:  [x ] YES  [ ] NO    Care Discussed with Consultants/Other Providers [x ] YES  [ ] NO    Care discussed in detail with patient.  All questions and concerns addressed

## 2020-12-15 NOTE — PROGRESS NOTE ADULT - SUBJECTIVE AND OBJECTIVE BOX
ALICIA GONZALEZ  MRN-[id number]    Follow Up:  right lower extremity cellulitis and wound    Interval History: The pt spiked a fever of 101.8 yesterday evening, WBC increased 25.40, Chest Xray with no significant change from prior studies. The pt is in bed, no distress, complains of pain at right lower extremity wound site. The pt still has a cough with thick sputum, will obtain sputum culture. Vancomycin trough was 5.4 yesterday in the evening, Vancomycin dose was changed to 1250 mg IV q 8 hours, Maxipime 2 g IV q 8 hours added, vancomycin trough is scheduled for today prior evening dose. Blood cultures were collected and pending result, the pt will be tested for COVID 19.     ROS:    [ ] Unobtainable because:  [ ] All other systems negative    Constitutional: + fever, no chills  Head: no trauma  Eyes: no vision changes, no eye pain  ENT:  no sore throat, no rhinorrhea  Cardiovascular:  no chest pain, no palpitation  Respiratory:  + SOB, + cough  GI:  no abd pain, no vomiting, no diarrhea  urinary: no dysuria, no hematuria, no flank pain  musculoskeletal:  no joint pain, no joint swelling  skin:  no rash  neurology:  no headache, no seizure, no change in mental status  psych: no anxiety, no depression         Allergies  No Known Allergies        ANTIMICROBIALS:  cefepime   IVPB    cefepime   IVPB 2000 every 8 hours  vancomycin  IVPB 1250 every 8 hours      OTHER MEDS:  acetaminophen   Tablet .. 975 milliGRAM(s) Oral every 8 hours PRN  albuterol/ipratropium for Nebulization 3 milliLiter(s) Nebulizer every 6 hours PRN  albuterol/ipratropium for Nebulization 3 milliLiter(s) Nebulizer every 8 hours  aluminum hydroxide/magnesium hydroxide/simethicone Suspension 30 milliLiter(s) Oral every 4 hours PRN  budesonide 160 MICROgram(s)/formoterol 4.5 MICROgram(s) Inhaler 2 Puff(s) Inhalation two times a day  enoxaparin Injectable 40 milliGRAM(s) SubCutaneous every 12 hours  pantoprazole  Injectable 40 milliGRAM(s) IV Push daily  polyethylene glycol 3350 17 Gram(s) Oral daily  senna 2 Tablet(s) Oral at bedtime      Vital Signs Last 24 Hrs  T(C): 37.9 (15 Dec 2020 11:38), Max: 38.8 (14 Dec 2020 23:40)  T(F): 100.2 (15 Dec 2020 11:38), Max: 101.8 (14 Dec 2020 23:40)  HR: 93 (15 Dec 2020 11:38) (78 - 98)  BP: 103/69 (15 Dec 2020 11:38) (103/69 - 139/70)  BP(mean): --  RR: 18 (15 Dec 2020 11:38) (18 - 19)  SpO2: 97% (15 Dec 2020 11:38) (95% - 97%)    Physical Exam:  General:    NAD,  non toxic, morbidly obese  Head: atraumatic, normocephalic  Eye: normal sclera and conjunctiva  ENT:    no oral lesions, neck supple, mild thrush   Cardio:     regular S1, S2,  no murmur  Respiratory:    on NC, mild crackles bilaterally, somewhat diminished on right  abd:     soft,   BS +,   no tenderness  :   no CVAT,  no suprapubic tenderness,   no  blum  Musculoskeletal:   no joint swelling,   +edema / lymphedema no change  vascular: no central lines, +PIV   Skin:    large and deep ulcer on the posteromedial aspect of right leg with no discharge, dressing due for change, chronic lymphedema, the surrounding skin is still warm, chronic appearing skin changes   Neurologic:     no focal deficit  psych: normal affect    WBC Count: 25.93 K/uL (12-15 @ 10:15)  WBC Count: 25.40 K/uL (12-14 @ 06:36)  WBC Count: 25.14 K/uL (12-13 @ 08:07)  WBC Count: 27.77 K/uL (12-12 @ 08:10)  WBC Count: 26.52 K/uL (12-11 @ 06:23)  WBC Count: 26.07 K/uL (12-10 @ 08:15)  WBC Count: 16.79 K/uL (12-09 @ 10:36)                            9.0    25.93 )-----------( 330      ( 15 Dec 2020 10:15 )             26.8       12-15    132<L>  |  97  |  16  ----------------------------<  171<H>  4.0   |  30  |  0.64    Ca    8.1<L>      15 Dec 2020 10:15  Phos  3.6     12-15  Mg     2.4     12-15    TPro  6.3  /  Alb  1.4<L>  /  TBili  1.5<H>  /  DBili  x   /  AST  135<H>  /  ALT  197<H>  /  AlkPhos  178<H>  12-15          Creatinine Trend: 0.64<--, 0.55<--, 0.61<--, 0.68<--, 0.74<--, 0.86<--      MICROBIOLOGY:  Vancomycin Level, Trough: 5.4 ug/mL (12-14-20 @ 20:50)  v  .Blood Blood  12-12-20   No growth to date.  --  --      .Blood Blood  12-11-20   No growth to date.  --  --      .Urine Catheterized  12-07-20   <10,000 CFU/mL Normal Urogenital Andreea  --  --      .Blood Blood-Peripheral  12-06-20   No Growth Final  --  --              COVID-19 PCR: NotDetec (12-09)    C-Reactive Protein, Serum: 19.29 (12-14)  C-Reactive Protein, Serum: 19.50 (12-13)  C-Reactive Protein, Serum: 17.02 (12-12)  C-Reactive Protein, Serum: 31.61 (12-09)        D-Dimer Assay, Quantitative: 1631 (12-13)    Procalcitonin, Serum: 2.01 (12-14-20 @ 17:06)  Procalcitonin, Serum: 7.99 (12-09-20 @ 14:51)      RADIOLOGY:  < from: Xray Chest 1 View-PORTABLE IMMEDIATE (Xray Chest 1 View-PORTABLE IMMEDIATE .) (12.14.20 @ 14:41) >  EXAM:  XR CHEST PORTABLE IMMED 1V                            PROCEDURE DATE:  12/14/2020          INTERPRETATION:  CLINICAL STATEMENT: Follow-up chest pain.    TECHNIQUE: AP view of the chest.    COMPARISON: 12/13/2020    FINDINGS/  IMPRESSION:  Increased interstitial lung markings without significant change. No pleural effusion    Heart size cannot be accurately assessed in this projection.      MEDINA RAMON MD; Attending Radiologist  This document has been electronically signed. Dec 14 2020  2:55PM    < end of copied text >     ALICIA GONZALEZ  MRN-[id number]    Follow Up:  right lower extremity cellulitis and wound    Interval History: The pt spiked a fever of 101.8 yesterday evening, WBC increased 25.40, Chest Xray with no significant change from prior studies. The pt is in bed, no distress, complains of pain at right lower extremity wound site. The pt still has a cough with thick sputum, will obtain sputum culture. Vancomycin trough was 5.4 yesterday in the evening, Vancomycin dose was changed to 1250 mg IV q 8 hours, Maxipime 2 g IV q 8 hours added, vancomycin trough is scheduled for today prior evening dose. Blood cultures were collected and pending result, the pt will be tested for COVID 19. Of note her family member had covid recently and was visiting her before she came to the hospital.     ROS:    [ ] Unobtainable because:  [ ] All other systems negative    Constitutional: + fever, no chills  Head: no trauma  Eyes: no vision changes, no eye pain  ENT:  no sore throat, no rhinorrhea  Cardiovascular:  no chest pain, no palpitation  Respiratory:  + SOB, + cough  GI:  no abd pain, no vomiting, no diarrhea  urinary: no dysuria, no hematuria, no flank pain  musculoskeletal:  no joint pain, no joint swelling  skin:  no rash  neurology:  no headache, no seizure, no change in mental status  psych: no anxiety, no depression         Allergies  No Known Allergies        ANTIMICROBIALS:  cefepime   IVPB    cefepime   IVPB 2000 every 8 hours  vancomycin  IVPB 1250 every 8 hours      OTHER MEDS:  acetaminophen   Tablet .. 975 milliGRAM(s) Oral every 8 hours PRN  albuterol/ipratropium for Nebulization 3 milliLiter(s) Nebulizer every 6 hours PRN  albuterol/ipratropium for Nebulization 3 milliLiter(s) Nebulizer every 8 hours  aluminum hydroxide/magnesium hydroxide/simethicone Suspension 30 milliLiter(s) Oral every 4 hours PRN  budesonide 160 MICROgram(s)/formoterol 4.5 MICROgram(s) Inhaler 2 Puff(s) Inhalation two times a day  enoxaparin Injectable 40 milliGRAM(s) SubCutaneous every 12 hours  pantoprazole  Injectable 40 milliGRAM(s) IV Push daily  polyethylene glycol 3350 17 Gram(s) Oral daily  senna 2 Tablet(s) Oral at bedtime      Vital Signs Last 24 Hrs  T(C): 37.9 (15 Dec 2020 11:38), Max: 38.8 (14 Dec 2020 23:40)  T(F): 100.2 (15 Dec 2020 11:38), Max: 101.8 (14 Dec 2020 23:40)  HR: 93 (15 Dec 2020 11:38) (78 - 98)  BP: 103/69 (15 Dec 2020 11:38) (103/69 - 139/70)  BP(mean): --  RR: 18 (15 Dec 2020 11:38) (18 - 19)  SpO2: 97% (15 Dec 2020 11:38) (95% - 97%)    Physical Exam:  General:    NAD,  non toxic, morbidly obese  Head: atraumatic, normocephalic  Eye: normal sclera and conjunctiva  ENT:    no oral lesions, neck supple, mild thrush   Cardio:     regular S1, S2,  no murmur  Respiratory:    on NC, mild crackles bilaterally, somewhat diminished on right  abd:     soft,   BS +,   no tenderness  :   no CVAT,  no suprapubic tenderness,   no  blum  Musculoskeletal:   no joint swelling,   +edema / lymphedema no change  vascular: no central lines, +PIV   Skin:    large and deep ulcer on the posteromedial aspect of right leg with no discharge, dressing due for change, chronic lymphedema, the surrounding skin is still warm, chronic appearing skin changes   Neurologic:     no focal deficit  psych: normal affect    WBC Count: 25.93 K/uL (12-15 @ 10:15)  WBC Count: 25.40 K/uL (12-14 @ 06:36)  WBC Count: 25.14 K/uL (12-13 @ 08:07)  WBC Count: 27.77 K/uL (12-12 @ 08:10)  WBC Count: 26.52 K/uL (12-11 @ 06:23)  WBC Count: 26.07 K/uL (12-10 @ 08:15)  WBC Count: 16.79 K/uL (12-09 @ 10:36)                            9.0    25.93 )-----------( 330      ( 15 Dec 2020 10:15 )             26.8       12-15    132<L>  |  97  |  16  ----------------------------<  171<H>  4.0   |  30  |  0.64    Ca    8.1<L>      15 Dec 2020 10:15  Phos  3.6     12-15  Mg     2.4     12-15    TPro  6.3  /  Alb  1.4<L>  /  TBili  1.5<H>  /  DBili  x   /  AST  135<H>  /  ALT  197<H>  /  AlkPhos  178<H>  12-15          Creatinine Trend: 0.64<--, 0.55<--, 0.61<--, 0.68<--, 0.74<--, 0.86<--      MICROBIOLOGY:  Vancomycin Level, Trough: 5.4 ug/mL (12-14-20 @ 20:50)  v  .Blood Blood  12-12-20   No growth to date.  --  --      .Blood Blood  12-11-20   No growth to date.  --  --      .Urine Catheterized  12-07-20   <10,000 CFU/mL Normal Urogenital Andreea  --  --      .Blood Blood-Peripheral  12-06-20   No Growth Final  --  --    COVID-19 PCR: NotDetec (12-09)    C-Reactive Protein, Serum: 22.82 (12-15)  C-Reactive Protein, Serum: 19.29 (12-14)  C-Reactive Protein, Serum: 19.50 (12-13)  C-Reactive Protein, Serum: 17.02 (12-12)  C-Reactive Protein, Serum: 31.61 (12-09)    Ferritin, Serum: 935 (12-15)      D-Dimer Assay, Quantitative: 1631 (12-13)    Procalcitonin, Serum: 1.82 (12-15-20 @ 14:36)  Procalcitonin, Serum: 2.01 (12-14-20 @ 17:06)  Procalcitonin, Serum: 7.99 (12-09-20 @ 14:51)        RADIOLOGY:  Xray Chest 1 View-PORTABLE IMMEDIATE (Xray Chest 1 View-PORTABLE IMMEDIATE .) (12.14.20 @ 14:41)  FINDINGS/  IMPRESSION:Increased interstitial lung markings without significant change. No pleural effusion  Heart size cannot be accurately assessed in this projection.      < from: Xray Chest 1 View-PORTABLE IMMEDIATE (Xray Chest 1 View-PORTABLE IMMEDIATE .) (12.15.20 @ 14:49) >  These infiltrates have increased and are now moderate to severe.    IMPRESSION: Increasing bilateral infiltrates.    < end of copied text >   Number Of Stages: 4

## 2020-12-15 NOTE — PROCEDURE NOTE - ADDITIONAL PROCEDURE DETAILS
s/p midline placement inserted into the right brachial vein under US guidance.  4fr x 20cm SL.  No complications  -midline can be accessed

## 2020-12-15 NOTE — PROGRESS NOTE ADULT - ASSESSMENT
74 y/o female w/pmhx of HTN, chronic lymphedema with RLE wounds w/cellulitis.    UTI ruled out.   UCx no growth  patient asymptomatic     12/13 --patient complaining of left sided CP and some SOB.   CE neg x 1   EKG: NSR   CXR : poor inspiratory effort, mildly congested , no e/o infiltrates or effusions (checked myself)  d-dimer elevated 1631  given duonebs   supplement oxygen via NC prn to maintain O2 sat >90 % , currently patient is saturating 96% on 2L NC   CT angio: neg for PE , Somewhat linear bilateral lower lobe opacities, suggesting atelectasis. imaging finding of esophagitis/gastritis (however patient is asymptomatic). Nonspecific bilateral perinephric stranding. however amylase and lipase unremarkable.   lasix 80mg IVP x1   d/c IVF     12/14 WBC is still elevated.   awaiting vanc trough @ 5pm  CXR unchanged.   lasix 80mg IVP x 1 given   added cefepime for broad spectrum coverage     12/15 patient spiked 101.8F overnight   thus far blood Cx have been negative   CXR with questionable opacities vs atelectasis  patient already on Vanco and cefepime   in view of elevated d-dimers/inflamm markers/high fever and transaminitis --will obtain COVID swab   obtain abd US   follow  vanc trough @2100        Assessment and Plan:     Cellulitis with open draining wound of right lower extremity , draining from wound is very minimal now   massive chronic lymphedema b/l  follows with Dr. Marquez outpatient   increasing  WBC with bandemia  remains afebrile   A1c 6.3%  - blood cultures: NGTD, repeat blood Cx also no growth   discussed with Dr. Marquez, no need for debridement at this time, continue with abx, daily wound care dressing changes  vanc trough was subtherapeutic 12/14    increased vanc  to 1250mg Q8H   completed   ceftriaxone   analgesics prn   discussed with ID, due to persistently elevated WBC with bandemia,  12/11 CT RLE done today w/o e/o abscess or tracking subcut emphysema   patient was unable to obtain MRI of LE due to body habitus   thus far blood Cx have been NGTD     Wheezing, atelectasis?  OHS?   - nebs treatment, wheezing resolved   s/p short steroid course    symbicort 2/2     Morbid obesity due to excess calories.    - nutrition eval appreciated   -weight loss program (consider [facility] obesity clinic referral once ready for discharge)       Essential hypertension, controlled off meds   - monitor.     Hypophosphatemia --repleted     Preventive measure.    - sq lovenox bid -dvt ppx  PT :PRASHANT  fall precautions   HOBE      76 y/o female w/pmhx of HTN, chronic lymphedema with RLE wounds w/cellulitis.    UTI ruled out.   UCx no growth  patient asymptomatic     12/13 --patient complaining of left sided CP and some SOB.   CE neg x 1   EKG: NSR   CXR : poor inspiratory effort, mildly congested , no e/o infiltrates or effusions (checked myself)  d-dimer elevated 1631  given duonebs   supplement oxygen via NC prn to maintain O2 sat >90 % , currently patient is saturating 96% on 2L NC   CT angio: neg for PE , Somewhat linear bilateral lower lobe opacities, suggesting atelectasis. imaging finding of esophagitis/gastritis (however patient is asymptomatic). Nonspecific bilateral perinephric stranding. however amylase and lipase unremarkable.   lasix 80mg IVP x1   d/c IVF     12/14 WBC is still elevated.   awaiting vanc trough @ 5pm  CXR unchanged.   lasix 80mg IVP x 1 given   added cefepime for broad spectrum coverage     12/15 patient spiked 101.8F overnight   thus far blood Cx have been negative   CXR with questionable opacities vs atelectasis  patient already on Vanco and cefepime   in view of elevated d-dimers/inflamm markers/high fever and transaminitis --will obtain COVID swab   obtain abd US          Assessment and Plan:     Cellulitis with open draining wound of right lower extremity , draining from wound is very minimal now   massive chronic lymphedema b/l  follows with Dr. Marquez outpatient   increasing  WBC with bandemia  remains afebrile   A1c 6.3%  - blood cultures: NGTD, repeat blood Cx also no growth   discussed with Dr. Marquez, no need for debridement at this time, continue with abx, daily wound care dressing changes  vanc trough was subtherapeutic 12/14    increased vanc  to 1250mg Q8H ,,follow  vanc trough @2100   completed   ceftriaxone   analgesics prn   discussed with ID, due to persistently elevated WBC with bandemia,  12/11 CT RLE done today w/o e/o abscess or tracking subcut emphysema   patient was unable to obtain MRI of LE due to body habitus   thus far blood Cx have been NGTD     Wheezing, atelectasis?  OHS?   - nebs treatment, wheezing resolved   s/p short steroid course    symbicort 2/2     Morbid obesity due to excess calories.    - nutrition eval appreciated   -weight loss program (consider [facility] obesity clinic referral once ready for discharge)       Essential hypertension, controlled off meds   - monitor.     Hypophosphatemia --repleted     Preventive measure.    - sq lovenox bid -dvt ppx  PT :PRASHANT  fall precautions   HOBE      76 y/o female w/pmhx of HTN, chronic lymphedema with RLE wounds w/cellulitis.    UTI ruled out.   UCx no growth  patient asymptomatic     12/13 --patient complaining of left sided CP and some SOB.   CE neg x 1   EKG: NSR   CXR : poor inspiratory effort, mildly congested , no e/o infiltrates or effusions (checked myself)  d-dimer elevated 1631  given duonebs   supplement oxygen via NC prn to maintain O2 sat >90 % , currently patient is saturating 96% on 2L NC   CT angio: neg for PE , Somewhat linear bilateral lower lobe opacities, suggesting atelectasis. imaging finding of esophagitis/gastritis (however patient is asymptomatic). Nonspecific bilateral peripancreatic stranding. however amylase and lipase unremarkable.   lasix 80mg IVP x1   d/c IVF     12/14 WBC is still elevated.   awaiting vanc trough @ 5pm  CXR unchanged.   lasix 80mg IVP x 1 given   added cefepime for broad spectrum coverage     12/15 patient spiked 101.8F overnight   thus far blood Cx have been negative   CXR with questionable opacities vs atelectasis  patient already on Vanco and cefepime   in view of elevated d-dimers/inflamm markers/high fever and transaminitis --will obtain COVID swab   obtain abd US          Assessment and Plan:     Cellulitis with open draining wound of right lower extremity , draining from wound is very minimal now   massive chronic lymphedema b/l  follows with Dr. Marquez outpatient   increasing  WBC with bandemia  remains afebrile   A1c 6.3%  - blood cultures: NGTD, repeat blood Cx also no growth   discussed with Dr. Marquez, no need for debridement at this time, continue with abx, daily wound care dressing changes  vanc trough was subtherapeutic 12/14    increased vanc  to 1250mg Q8H ,,follow  vanc trough @2100   completed   ceftriaxone   analgesics prn   discussed with ID, due to persistently elevated WBC with bandemia,  12/11 CT RLE done today w/o e/o abscess or tracking subcut emphysema   patient was unable to obtain MRI of LE due to body habitus   thus far blood Cx have been NGTD     Wheezing, atelectasis?  OHS?   - nebs treatment, wheezing resolved   s/p short steroid course    symbicort 2/2     Morbid obesity due to excess calories.    - nutrition eval appreciated   -weight loss program (consider [facility] obesity clinic referral once ready for discharge)       Essential hypertension, controlled off meds   - monitor.     Hypophosphatemia --repleted     Preventive measure.    - sq lovenox bid -dvt ppx  PT :PRASHANT  fall precautions   HOBE      74 y/o female w/pmhx of HTN, chronic lymphedema with RLE wounds w/cellulitis.    UTI ruled out.   UCx no growth  patient asymptomatic     12/13 --patient complaining of left sided CP and some SOB.   CE neg x 1   EKG: NSR   CXR : poor inspiratory effort, mildly congested , no e/o infiltrates or effusions (checked myself)  d-dimer elevated 1631  given duonebs   supplement oxygen via NC prn to maintain O2 sat >90 % , currently patient is saturating 96% on 2L NC   CT angio: neg for PE , Somewhat linear bilateral lower lobe opacities, suggesting atelectasis. imaging finding of esophagitis/gastritis (however patient is asymptomatic). Nonspecific bilateral peripancreatic stranding. however amylase and lipase unremarkable.   lasix 80mg IVP x1   d/c IVF     12/14 WBC is still elevated.   awaiting vanc trough @ 5pm  CXR unchanged.   lasix 80mg IVP x 1 given   added cefepime for broad spectrum coverage     12/15 patient spiked 101.8F overnight   thus far blood Cx have been negative   CXR b/l infiltrates   patient already on Vanco and cefepime   in view of elevated d-dimers/inflamm markers/high fever and transaminitis --will obtain COVID swab   obtain abd US     spoke with daughter, she stated that her brother was dx with COVID  2-3 weeks ago and he potentially exposed the patient as he was visiting the patient at home.            Assessment and Plan:     Cellulitis with open draining wound of right lower extremity , draining from wound is very minimal now   massive chronic lymphedema b/l  follows with Dr. Marquez outpatient   increasing  WBC with bandemia  remains afebrile   A1c 6.3%  - blood cultures: NGTD, repeat blood Cx also no growth   discussed with Dr. Marquez, no need for debridement at this time, continue with abx, daily wound care dressing changes  vanc trough was subtherapeutic 12/14    increased vanc  to 1250mg Q8H ,,follow  vanc trough @2100   completed   ceftriaxone   analgesics prn   discussed with ID, due to persistently elevated WBC with bandemia,  12/11 CT RLE done today w/o e/o abscess or tracking subcut emphysema   patient was unable to obtain MRI of LE due to body habitus   thus far blood Cx have been NGTD     Wheezing, atelectasis?  OHS?   - nebs treatment, wheezing resolved   s/p short steroid course    symbicort 2/2     Morbid obesity due to excess calories.    - nutrition eval appreciated   -weight loss program (consider [facility] obesity clinic referral once ready for discharge)       Essential hypertension, controlled off meds   - monitor.     Hypophosphatemia --repleted     Preventive measure.    - sq lovenox bid -dvt ppx  PT :PRASHANT  fall precautions   HOBE

## 2020-12-16 LAB
ALBUMIN SERPL ELPH-MCNC: 1.3 G/DL — LOW (ref 3.3–5)
ALP SERPL-CCNC: 161 U/L — HIGH (ref 40–120)
ALT FLD-CCNC: 181 U/L — HIGH (ref 12–78)
ANION GAP SERPL CALC-SCNC: 6 MMOL/L — SIGNIFICANT CHANGE UP (ref 5–17)
AST SERPL-CCNC: 121 U/L — HIGH (ref 15–37)
BASOPHILS # BLD AUTO: 0.07 K/UL — SIGNIFICANT CHANGE UP (ref 0–0.2)
BASOPHILS NFR BLD AUTO: 0.3 % — SIGNIFICANT CHANGE UP (ref 0–2)
BILIRUB DIRECT SERPL-MCNC: 0.75 MG/DL — HIGH (ref 0.05–0.2)
BILIRUB INDIRECT FLD-MCNC: 0.4 MG/DL — SIGNIFICANT CHANGE UP (ref 0.2–1)
BILIRUB SERPL-MCNC: 1.1 MG/DL — SIGNIFICANT CHANGE UP (ref 0.2–1.2)
BUN SERPL-MCNC: 15 MG/DL — SIGNIFICANT CHANGE UP (ref 7–23)
CALCIUM SERPL-MCNC: 8.1 MG/DL — LOW (ref 8.5–10.1)
CHLORIDE SERPL-SCNC: 97 MMOL/L — SIGNIFICANT CHANGE UP (ref 96–108)
CO2 SERPL-SCNC: 29 MMOL/L — SIGNIFICANT CHANGE UP (ref 22–31)
CREAT SERPL-MCNC: 0.54 MG/DL — SIGNIFICANT CHANGE UP (ref 0.5–1.3)
CRP SERPL-MCNC: 21.81 MG/DL — HIGH (ref 0–0.4)
CULTURE RESULTS: SIGNIFICANT CHANGE UP
EOSINOPHIL # BLD AUTO: 0.24 K/UL — SIGNIFICANT CHANGE UP (ref 0–0.5)
EOSINOPHIL NFR BLD AUTO: 1 % — SIGNIFICANT CHANGE UP (ref 0–6)
FERRITIN SERPL-MCNC: 938 NG/ML — HIGH (ref 15–150)
GLUCOSE SERPL-MCNC: 129 MG/DL — HIGH (ref 70–99)
HCT VFR BLD CALC: 25.9 % — LOW (ref 34.5–45)
HGB BLD-MCNC: 8.6 G/DL — LOW (ref 11.5–15.5)
IMM GRANULOCYTES NFR BLD AUTO: 4.4 % — HIGH (ref 0–1.5)
LDH SERPL L TO P-CCNC: 236 U/L — SIGNIFICANT CHANGE UP (ref 50–242)
LYMPHOCYTES # BLD AUTO: 0.91 K/UL — LOW (ref 1–3.3)
LYMPHOCYTES # BLD AUTO: 3.8 % — LOW (ref 13–44)
MAGNESIUM SERPL-MCNC: 2.5 MG/DL — SIGNIFICANT CHANGE UP (ref 1.6–2.6)
MCHC RBC-ENTMCNC: 28.1 PG — SIGNIFICANT CHANGE UP (ref 27–34)
MCHC RBC-ENTMCNC: 33.2 GM/DL — SIGNIFICANT CHANGE UP (ref 32–36)
MCV RBC AUTO: 84.6 FL — SIGNIFICANT CHANGE UP (ref 80–100)
MONOCYTES # BLD AUTO: 1.43 K/UL — HIGH (ref 0–0.9)
MONOCYTES NFR BLD AUTO: 6 % — SIGNIFICANT CHANGE UP (ref 2–14)
NEUTROPHILS # BLD AUTO: 20.12 K/UL — HIGH (ref 1.8–7.4)
NEUTROPHILS NFR BLD AUTO: 84.5 % — HIGH (ref 43–77)
NRBC # BLD: 0 /100 WBCS — SIGNIFICANT CHANGE UP (ref 0–0)
PHOSPHATE SERPL-MCNC: 3.7 MG/DL — SIGNIFICANT CHANGE UP (ref 2.5–4.5)
PLATELET # BLD AUTO: 319 K/UL — SIGNIFICANT CHANGE UP (ref 150–400)
POTASSIUM SERPL-MCNC: 3.8 MMOL/L — SIGNIFICANT CHANGE UP (ref 3.5–5.3)
POTASSIUM SERPL-SCNC: 3.8 MMOL/L — SIGNIFICANT CHANGE UP (ref 3.5–5.3)
PROT SERPL-MCNC: 6.3 GM/DL — SIGNIFICANT CHANGE UP (ref 6–8.3)
RBC # BLD: 3.06 M/UL — LOW (ref 3.8–5.2)
RBC # FLD: 15 % — HIGH (ref 10.3–14.5)
SARS-COV-2 RNA SPEC QL NAA+PROBE: SIGNIFICANT CHANGE UP
SODIUM SERPL-SCNC: 132 MMOL/L — LOW (ref 135–145)
SPECIMEN SOURCE: SIGNIFICANT CHANGE UP
VANCOMYCIN TROUGH SERPL-MCNC: 17.1 UG/ML — SIGNIFICANT CHANGE UP (ref 10–20)
WBC # BLD: 23.82 K/UL — HIGH (ref 3.8–10.5)
WBC # FLD AUTO: 23.82 K/UL — HIGH (ref 3.8–10.5)

## 2020-12-16 PROCEDURE — 99232 SBSQ HOSP IP/OBS MODERATE 35: CPT

## 2020-12-16 PROCEDURE — 71250 CT THORAX DX C-: CPT | Mod: 26

## 2020-12-16 PROCEDURE — 99233 SBSQ HOSP IP/OBS HIGH 50: CPT

## 2020-12-16 PROCEDURE — 76700 US EXAM ABDOM COMPLETE: CPT | Mod: 26

## 2020-12-16 RX ORDER — ALBUTEROL 90 UG/1
1 AEROSOL, METERED ORAL EVERY 4 HOURS
Refills: 0 | Status: DISCONTINUED | OUTPATIENT
Start: 2020-12-16 | End: 2020-12-21

## 2020-12-16 RX ORDER — TIOTROPIUM BROMIDE 18 UG/1
1 CAPSULE ORAL; RESPIRATORY (INHALATION) DAILY
Refills: 0 | Status: DISCONTINUED | OUTPATIENT
Start: 2020-12-16 | End: 2020-12-21

## 2020-12-16 RX ADMIN — ENOXAPARIN SODIUM 40 MILLIGRAM(S): 100 INJECTION SUBCUTANEOUS at 18:15

## 2020-12-16 RX ADMIN — Medication 975 MILLIGRAM(S): at 15:20

## 2020-12-16 RX ADMIN — SENNA PLUS 2 TABLET(S): 8.6 TABLET ORAL at 22:07

## 2020-12-16 RX ADMIN — Medication 166.67 MILLIGRAM(S): at 17:14

## 2020-12-16 RX ADMIN — Medication 166.67 MILLIGRAM(S): at 10:50

## 2020-12-16 RX ADMIN — ENOXAPARIN SODIUM 40 MILLIGRAM(S): 100 INJECTION SUBCUTANEOUS at 05:16

## 2020-12-16 RX ADMIN — Medication 30 MILLILITER(S): at 22:07

## 2020-12-16 RX ADMIN — CEFEPIME 100 MILLIGRAM(S): 1 INJECTION, POWDER, FOR SOLUTION INTRAMUSCULAR; INTRAVENOUS at 22:06

## 2020-12-16 RX ADMIN — BUDESONIDE AND FORMOTEROL FUMARATE DIHYDRATE 2 PUFF(S): 160; 4.5 AEROSOL RESPIRATORY (INHALATION) at 05:16

## 2020-12-16 RX ADMIN — CEFEPIME 100 MILLIGRAM(S): 1 INJECTION, POWDER, FOR SOLUTION INTRAMUSCULAR; INTRAVENOUS at 13:27

## 2020-12-16 RX ADMIN — Medication 166.67 MILLIGRAM(S): at 23:05

## 2020-12-16 RX ADMIN — PANTOPRAZOLE SODIUM 40 MILLIGRAM(S): 20 TABLET, DELAYED RELEASE ORAL at 13:27

## 2020-12-16 RX ADMIN — Medication 975 MILLIGRAM(S): at 14:52

## 2020-12-16 RX ADMIN — CEFEPIME 100 MILLIGRAM(S): 1 INJECTION, POWDER, FOR SOLUTION INTRAMUSCULAR; INTRAVENOUS at 05:15

## 2020-12-16 RX ADMIN — POLYETHYLENE GLYCOL 3350 17 GRAM(S): 17 POWDER, FOR SOLUTION ORAL at 13:27

## 2020-12-16 RX ADMIN — BUDESONIDE AND FORMOTEROL FUMARATE DIHYDRATE 2 PUFF(S): 160; 4.5 AEROSOL RESPIRATORY (INHALATION) at 18:18

## 2020-12-16 NOTE — PROGRESS NOTE ADULT - SUBJECTIVE AND OBJECTIVE BOX
ALICIA GONZALEZ  MRN-[id number]    Follow Up:  right lower extremity cellulitis and wound, possible pneumonia     Interval History: temperature is better, she has no complaints other then pain in her right leg, CT done b/l LL consolidation which could be atelectasis    ROS:    [ ] Unobtainable because:  [X] All other systems negative    Constitutional: no fever, no chills  Head: no trauma  Eyes: no vision changes, no eye pain  ENT:  no sore throat, no rhinorrhea  Cardiovascular:  no chest pain, no palpitation  Respiratory:  no SOB, no cough  GI:  no abd pain, no vomiting, no diarrhea  urinary: no dysuria, no hematuria, no flank pain  musculoskeletal:  no joint pain, no joint swelling  skin:  no rash  neurology:  no headache, no seizure, no change in mental status  psych: no anxiety, no depression         Allergies  No Known Allergies        ANTIMICROBIALS:  cefepime   IVPB    cefepime   IVPB 2000 every 8 hours  vancomycin  IVPB 1250 every 8 hours      MEDICATIONS  (STANDING):  ALBUTerol    90 MICROgram(s) HFA Inhaler 1 every 4 hours  albuterol/ipratropium for Nebulization 3 every 8 hours  budesonide 160 MICROgram(s)/formoterol 4.5 MICROgram(s) Inhaler 2 two times a day  enoxaparin Injectable 40 every 12 hours  pantoprazole  Injectable 40 daily  polyethylene glycol 3350 17 daily  senna 2 at bedtime  tiotropium 18 MICROgram(s) Capsule 1 daily      PRN  acetaminophen   Tablet .. 975 milliGRAM(s) Oral every 8 hours PRN  aluminum hydroxide/magnesium hydroxide/simethicone Suspension 30 milliLiter(s) Oral every 4 hours PRN      Physical Exam:  Vital Signs Last 24 Hrs  T(F): 98.6 (12-16-20 @ 11:39), Max: 99.5 (12-15-20 @ 20:14)  HR: 81 (12-16-20 @ 11:39)  BP: 102/38 (12-16-20 @ 11:39)  RR: 19 (12-16-20 @ 11:39)  SpO2: 98% (12-16-20 @ 11:39) (95% - 98%)  Wt(kg): --    Physical Exam:  General:    NAD,  non toxic, morbidly obese  Head: atraumatic, normocephalic  Eye: normal sclera and conjunctiva  ENT:    no oral lesions, neck supple, mild thrush   Cardio:     regular S1, S2,  no murmur  Respiratory:    on NC, mild crackles bilaterally, somewhat diminished on right  abd:     soft,   BS +,   no tenderness  :   no CVAT,  no suprapubic tenderness,   no  blum  Musculoskeletal:   no joint swelling,   +edema / lymphedema no change  vascular: no central lines, +PIV   Skin:    large and deep ulcer on the posteromedial aspect of right leg with no discharge, chronic lymphedema, the surrounding skin is still warm, chronic appearing skin changes   Neurologic:     no focal deficit  psych: normal affect    WBC Count: 25.93 K/uL (12-15 @ 10:15)  WBC Count: 25.40 K/uL (12-14 @ 06:36)  WBC Count: 25.14 K/uL (12-13 @ 08:07)  WBC Count: 27.77 K/uL (12-12 @ 08:10)  WBC Count: 26.52 K/uL (12-11 @ 06:23)  WBC Count: 26.07 K/uL (12-10 @ 08:15)  WBC Count: 16.79 K/uL (12-09 @ 10:36)                            9.0    25.93 )-----------( 330      ( 15 Dec 2020 10:15 )             26.8       12-15    132<L>  |  97  |  16  ----------------------------<  171<H>  4.0   |  30  |  0.64    Ca    8.1<L>      15 Dec 2020 10:15  Phos  3.6     12-15  Mg     2.4     12-15    TPro  6.3  /  Alb  1.4<L>  /  TBili  1.5<H>  /  DBili  x   /  AST  135<H>  /  ALT  197<H>  /  AlkPhos  178<H>  12-15          Creatinine Trend: 0.64<--, 0.55<--, 0.61<--, 0.68<--, 0.74<--, 0.86<--      MICROBIOLOGY:  .Blood Blood-Peripheral... one aerobic botle rec'd  12-15-20   No growth to date.  --  --      .Blood Blood  12-12-20   No growth to date.  --  --      .Blood Blood  12-11-20   No growth to date.  --  --      .Urine Catheterized  12-07-20   <10,000 CFU/mL Normal Urogenital Andreea  --  --      .Blood Blood-Peripheral  12-06-20   No Growth Final  --  --    COVID-19 PCR: NotDetec (12-15)  COVID-19 PCR: NotDetec (12-09)    C-Reactive Protein, Serum: 21.81 (12-16)  C-Reactive Protein, Serum: 22.82 (12-15)  C-Reactive Protein, Serum: 19.29 (12-14)  C-Reactive Protein, Serum: 19.50 (12-13)  C-Reactive Protein, Serum: 17.02 (12-12)  C-Reactive Protein, Serum: 31.61 (12-09)    Ferritin, Serum: 938 (12-16)  Ferritin, Serum: 935 (12-15)      D-Dimer Assay, Quantitative: 1631 (12-13)    Procalcitonin, Serum: 1.82 (12-15-20 @ 14:36)  Procalcitonin, Serum: 2.01 (12-14-20 @ 17:06)    RADIOLOGY:  Xray Chest 1 View-PORTABLE IMMEDIATE (Xray Chest 1 View-PORTABLE IMMEDIATE .) (12.14.20 @ 14:41)  FINDINGS/  IMPRESSION:Increased interstitial lung markings without significant change. No pleural effusion  Heart size cannot be accurately assessed in this projection.      Xray Chest 1 View-PORTABLE IMMEDIATE (Xray Chest 1 View-PORTABLE IMMEDIATE .) (12.15.20 @ 14:49)   These infiltrates have increased and are now moderate to severe.    IMPRESSION: Increasing bilateral infiltrates.    CT Chest No Cont (12.16.20 @ 09:59)  IMPRESSION:  Bilateral lower lobe consolidations, which may represent atelectasis and/or pneumonia.

## 2020-12-16 NOTE — CHART NOTE - NSCHARTNOTEFT_GEN_A_CORE
Per chart pt with PMH  HTN, chronic lymphedema w/RLE wounds and cellulitis, presents for wound check, found with cellulitis of RLE. s/p midline insertion (12/15). Pending abdominal ultrasound today (12/16). Unable to see pt- on contact isolation for r/o COVID-19. Information obtained from RN flow sheets and chart.    Factors impacting intake: [x] none [ ] nausea  [ ] vomiting [ ] diarrhea [ ] constipation  [ ]chewing problems [ ] swallowing issues  [ ] other:     Diet Prescription: Diet, NPO after Midnight:      NPO Start Date: 15-Dec-2020,   NPO Start Time: 23:59 (12-15-20 @ 18:21)  Diet, DASH/TLC:   Sodium & Cholesterol Restricted  Soft (12-08-20 @ 11:55)    Intake: NPO after midnight 12/15. Prior to that pt was consuming % documented meals.    Current Weight: (12/15) 161.8kg (12/05) 156.9kg indicates weight gain of 4.9kg  % Weight Change: Unable to assess weights as pt with severe fluctuating edema throughout hospital course    Edema: nonpitting: right and left legs, knees, ankles, feet per flow sheets    Pertinent Medications: MEDICATIONS  (STANDING):  ALBUTerol    90 MICROgram(s) HFA Inhaler 1 Puff(s) Inhalation every 4 hours  albuterol/ipratropium for Nebulization 3 milliLiter(s) Nebulizer every 8 hours  budesonide 160 MICROgram(s)/formoterol 4.5 MICROgram(s) Inhaler 2 Puff(s) Inhalation two times a day  cefepime   IVPB      cefepime   IVPB 2000 milliGRAM(s) IV Intermittent every 8 hours  enoxaparin Injectable 40 milliGRAM(s) SubCutaneous every 12 hours  pantoprazole  Injectable 40 milliGRAM(s) IV Push daily  polyethylene glycol 3350 17 Gram(s) Oral daily  senna 2 Tablet(s) Oral at bedtime  tiotropium 18 MICROgram(s) Capsule 1 Capsule(s) Inhalation daily  vancomycin  IVPB 1250 milliGRAM(s) IV Intermittent every 8 hours    MEDICATIONS  (PRN):  acetaminophen   Tablet .. 975 milliGRAM(s) Oral every 8 hours PRN Temp greater or equal to 38.5C (101.3F), Mild Pain (1 - 3)  aluminum hydroxide/magnesium hydroxide/simethicone Suspension 30 milliLiter(s) Oral every 4 hours PRN Dyspepsia    Pertinent Labs: 12-16 Na132 mmol/L<L> Glu 129 mg/dL<H> K+ 3.8 mmol/L Cr  0.54 mg/dL BUN 15 mg/dL 12-16 Phos 3.7 mg/dL 12-16 Alb 1.3 g/dL<L>    12-11-20 @ 11:04  A1C 6.3    Skin: Left gluteal pressure injury-stage II    Estimated Needs:   [x] no change since previous assessment: 12/08/2020  [ ] recalculated:     Previous Nutrition Diagnosis:   [x] Overweight/Obesity.     Etiology excessive energy intake and lack of physical activity.     Signs/Symptoms BMI= 61.3 (Morbid Obesity).     Goal/Expected Outcome Pt will verbalize need for diet/lifestyle changes to attain gradual wt loss of 1-2#/week. (unmet)    Nutrition Diagnosis is [x] ongoing  [ ] resolved [ ] not applicable     New Nutrition Diagnosis: [x] not applicable      Interventions:   Recommend  [x] Continue current diet as ordered  [ ] Change Diet To:  [ ] Nutrition Supplement  [ ] Nutrition Support  [ ] Other:     Monitoring and Evaluation:   [x] PO intake [ x ] Tolerance to diet prescription [ x ] weights [ x ] labs[ x ] follow up per protocol  [ ] other:

## 2020-12-16 NOTE — PROGRESS NOTE ADULT - ASSESSMENT
74 y/o female w/pmhx of HTN, chronic lymphedema with RLE wounds w/cellulitis.    UTI ruled out.   UCx no growth  patient asymptomatic     12/13 --patient complaining of left sided CP and some SOB.   CE neg x 1   EKG: NSR   CXR : poor inspiratory effort, mildly congested , no e/o infiltrates or effusions (checked myself)  d-dimer elevated 1631  given duonebs   supplement oxygen via NC prn to maintain O2 sat >90 % , currently patient is saturating 96% on 2L NC   CT angio: neg for PE , Somewhat linear bilateral lower lobe opacities, suggesting atelectasis. imaging finding of esophagitis/gastritis (however patient is asymptomatic). Nonspecific bilateral peripancreatic stranding. however amylase and lipase unremarkable.   lasix 80mg IVP x1   d/c IVF     12/14 WBC is still elevated.   awaiting vanc trough @ 5pm  CXR unchanged.   lasix 80mg IVP x 1 given   added cefepime for broad spectrum coverage     12/15 patient spiked 101.8F overnight   thus far blood Cx have been negative   CXR b/l infiltrates   patient already on Vanco and cefepime   in view of elevated d-dimers/inflamm markers/high fever and transaminitis --will obtain COVID swab   obtain abd US     per  daughter, she stated that her brother was dx with COVID  2-3 weeks ago and he potentially exposed the patient as he was visiting the patient at home.     Covid negative    CT chest/us abd done, no acute findings          Assessment and Plan:     Cellulitis with open draining wound of right lower extremity , draining from wound is very minimal now   massive chronic lymphedema b/l  follows with Dr. Marquez outpatient   increasing  WBC with bandemia  remains afebrile   A1c 6.3%  - blood cultures: NGTD, repeat blood Cx also no growth   discussed with Dr. Marquez, no need for debridement at this time, continue with abx, daily wound care dressing changes  vanc trough was subtherapeutic 12/14    increased vanc  to 1250mg Q8H ,,follow  vanc trough @2100   completed   ceftriaxone   analgesics prn   discussed with ID, due to persistently elevated WBC with bandemia,  12/11 CT RLE done today w/o e/o abscess or tracking subcut emphysema   patient was unable to obtain MRI of LE due to body habitus   thus far blood Cx have been NGTD     Wheezing, atelectasis?  OHS?   - nebs treatment, wheezing resolved   s/p short steroid course    symbicort 2/2     Morbid obesity due to excess calories.    - nutrition eval appreciated   -weight loss program (consider [facility] obesity clinic referral once ready for discharge)       Essential hypertension, controlled off meds   - monitor.     Hypophosphatemia --repleted     Preventive measure.    - sq lovenox bid -dvt ppx  PT :PRASHANT  fall precautions   HOBE

## 2020-12-16 NOTE — PROGRESS NOTE ADULT - SUBJECTIVE AND OBJECTIVE BOX
Patient is a 75y old  Female who presents with a chief complaint of wound check (15 Dec 2020 13:38)      INTERVAL HPI/OVERNIGHT EVENTS:    MEDICATIONS  (STANDING):  ALBUTerol    90 MICROgram(s) HFA Inhaler 1 Puff(s) Inhalation every 4 hours  albuterol/ipratropium for Nebulization 3 milliLiter(s) Nebulizer every 8 hours  budesonide 160 MICROgram(s)/formoterol 4.5 MICROgram(s) Inhaler 2 Puff(s) Inhalation two times a day  cefepime   IVPB      cefepime   IVPB 2000 milliGRAM(s) IV Intermittent every 8 hours  enoxaparin Injectable 40 milliGRAM(s) SubCutaneous every 12 hours  pantoprazole  Injectable 40 milliGRAM(s) IV Push daily  polyethylene glycol 3350 17 Gram(s) Oral daily  senna 2 Tablet(s) Oral at bedtime  tiotropium 18 MICROgram(s) Capsule 1 Capsule(s) Inhalation daily  vancomycin  IVPB 1250 milliGRAM(s) IV Intermittent every 8 hours    MEDICATIONS  (PRN):  acetaminophen   Tablet .. 975 milliGRAM(s) Oral every 8 hours PRN Temp greater or equal to 38.5C (101.3F), Mild Pain (1 - 3)  aluminum hydroxide/magnesium hydroxide/simethicone Suspension 30 milliLiter(s) Oral every 4 hours PRN Dyspepsia      Allergies    No Known Allergies    Intolerances             Vital Signs Last 24 Hrs  T(C): 36.7 (16 Dec 2020 06:13), Max: 37.9 (15 Dec 2020 11:38)  T(F): 98.1 (16 Dec 2020 06:13), Max: 100.2 (15 Dec 2020 11:38)  HR: 71 (16 Dec 2020 06:13) (71 - 95)  BP: 107/70 (16 Dec 2020 06:13) (103/69 - 115/52)  BP(mean): --  RR: 18 (16 Dec 2020 06:13) (18 - 18)  SpO2: 97% (16 Dec 2020 06:13) (95% - 97%)    PHYSICAL EXAM:  GENERAL: NAD, well-groomed, well-developed  HEAD:  Atraumatic, Normocephalic  EYES: EOMI, PERRLA, conjunctiva and sclera clear  ENMT: No tonsillar erythema, exudates, or enlargement; Moist mucous membranes, Good dentition, No lesions  NECK: Supple, No JVD, Normal thyroid  NERVOUS SYSTEM:  Alert & Oriented X3, Good concentration; Motor Strength 5/5 B/L upper and lower extremities; DTRs 2+ intact and symmetric  CHEST/LUNG: Clear to percussion bilaterally; No rales, rhonchi, wheezing, or rubs  HEART: Regular rate and rhythm; No murmurs, rubs, or gallops  ABDOMEN: Soft, Nontender, Nondistended; Bowel sounds present  EXTREMITIES:  2+ Peripheral Pulses b/l, right LE  wound dressing c/d/i     b/l  lymphedema   LYMPH: No lymphadenopathy noted  SKIN: No rashes or lesions    LABS:                        8.6    23.82 )-----------( 319      ( 16 Dec 2020 07:52 )             25.9     12-16    132<L>  |  97  |  15  ----------------------------<  129<H>  3.8   |  29  |  0.54    Ca    8.1<L>      16 Dec 2020 07:52  Phos  3.7     12-16  Mg     2.5     12-16    TPro  6.3  /  Alb  1.3<L>  /  TBili  1.1  /  DBili  .75<H>  /  AST  121<H>  /  ALT  181<H>  /  AlkPhos  161<H>  12-16        CAPILLARY BLOOD GLUCOSE          RADIOLOGY & ADDITIONAL TESTS:    Imaging Personally Reviewed:  [ X] YES  [ ] NO    Consultant(s) Notes Reviewed:  [ X] YES  [ ] NO    Care Discussed with Consultants/Other Providers [X ] YES  [ ] NO

## 2020-12-16 NOTE — PROGRESS NOTE ADULT - ASSESSMENT
74 y/o female w/pmhx of HTN, chronic lymphedema with RLE wounds w/cellulitis.  Fever, tachycardia  Normal WBC initially but today jumped to 16 with bandemia (perhaps in the setting of solumedrol)  Blood culture negative  UA positive but she denies any urinary symptoms   CT (I personally reviewed) lower extremity with marked edema and inflammatory changes  She has been on clinda and ceftriaxone   The wound appears to need more debridement and would benefit from a scraping and then culture of the base of the wound  Would continue antibiotics but change clindamycin to vanco for better and more optimal MRSA coverage     12/10: WBC rising but perhaps in the setting of steroids though to this extent is questionable, afebrile, evaluated again by surgery who wants to watch on antibiotics, though she had a positive UA she denies any dysuria or increased frequency. Discussed with hospitalist to perhaps stop steroids and if WBC remains elevated to repeat blood cultures and obtain CT right LE with IV contrast.  12/11: WBC increased slightly, steroids stopped, since WBC still increasing will obtain blood cultures and CT right LE with IV contrast, afebrile, right thigh wound is clean with no drainage.   Attending Addendum--  Case reviewed with NP Dolores Macias. Her note reviewed and modified as appropriate.   Patient personally assessed and examined.  CT report reviewed no deep drainable focus. Leukocytosis could be still related to steroids, though still has some early forms on diff which would not be readily explicable with steroids effect.  However their presence at this point in time seems to be in contrast to clinical picture which is one of stability. Depending on CBC/AM and clinical course will revisit transfer to rehab in am.   12/12: WBC about the same, still with some early forms. This remains in luna contrast to clinical picture which is one of stability and improvement in LE exam. If cx negative at 24h would no object to transfer to rehab to rehab where she could continue to be observed, complete a course of oral antibiotics, and have her WBC monitored.  12/14: remains on supplemental O2 via nasal cannula, complains of pain in her right thigh - wound site. The pt is afebrile, leukocytosis is worsening, no new complains, reports productive occasional cough with clear thick sputum. The pt was scheduled for MRI of right lower extremity, unable to perform. CTA was performed over the weekend, negative for PE, + Bilateral lower lobe opacities, suggesting atelectasis.   12/15: The pt spiked a fever of 101.8 yesterday evening, WBC increased 25.40, Chest Xray appears worse bilaterally (I personally reviewed) . The pt is in bed, no distress, complains of pain at right lower extremity wound site. The pt still has a cough with thick sputum, will obtain sputum culture. Vancomycin trough was 5.4 yesterday in the evening, Vancomycin dose was changed to 1250 mg IV q 8 hours, Maxipime 2 g IV q 8 hours added, vancomycin trough is scheduled for today prior evening dose. Blood cultures were collected and pending result, the pt will be tested for COVID 19. Reasonable to start steroids in the setting of worsening respiratory status, covid exposure, rising inflammatory markers, fever. Would await covid testing prior to starting remdesivir   12/16: CT reviewed (I personally reviewed) does not appear like classic covid imaging, COVID is negative, fevers getting better on antibiotics, would not give remdesivir, there are 2 more PCRs pending (not sure why so many). WBC still elevated        Suggestions--  #Fever/SOB/O2 requirement/rule out COVID/Cellulitis with ulcer  - continue Vancomycin dose 1250 mg q 8 hours for now with trough target trough 10-15 (Vancomycin trough 10.9 on 12/12/2020, VT 5.4 on 12/14, VT 17 on 12/15)  -VT therapeutic    - continue Cefepime 2g q8hrs   - covid 19 testing - pending  - Diuresis per hospitalist   - F/U Cx data  - awaiting for new blood cultures collected yesterday   - offloading to ensure no further wound development   - trend CBC with diff, LFTs, ddimer, CRP, procalcitonin    - if no improvement, consider MRI of LE (will need to [facility] or [facility] for [facility] given body habitus)     #Obesity   - weight loss program (consider [facility] obesity clinic referral once ready for discharge: [phone])   - HbA1c - 6.3  - good but not too tight glycemic control especially in the setting of recent  steroids use and infection    D/W dr. cheikh Barnes, DO  Cell: [phone]  Pager [phone]  Infectious Disease Attending  After 5pm/weekends please call [phone] for all inquiries and new consults

## 2020-12-17 LAB
ALBUMIN SERPL ELPH-MCNC: 1.3 G/DL — LOW (ref 3.3–5)
ALP SERPL-CCNC: 154 U/L — HIGH (ref 40–120)
ALT FLD-CCNC: 134 U/L — HIGH (ref 12–78)
ANION GAP SERPL CALC-SCNC: 5 MMOL/L — SIGNIFICANT CHANGE UP (ref 5–17)
AST SERPL-CCNC: 67 U/L — HIGH (ref 15–37)
BASOPHILS # BLD AUTO: 0 K/UL — SIGNIFICANT CHANGE UP (ref 0–0.2)
BASOPHILS NFR BLD AUTO: 0 % — SIGNIFICANT CHANGE UP (ref 0–2)
BILIRUB SERPL-MCNC: 0.7 MG/DL — SIGNIFICANT CHANGE UP (ref 0.2–1.2)
BUN SERPL-MCNC: 16 MG/DL — SIGNIFICANT CHANGE UP (ref 7–23)
CALCIUM SERPL-MCNC: 8 MG/DL — LOW (ref 8.5–10.1)
CHLORIDE SERPL-SCNC: 98 MMOL/L — SIGNIFICANT CHANGE UP (ref 96–108)
CK SERPL-CCNC: 16 U/L — LOW (ref 26–192)
CO2 SERPL-SCNC: 30 MMOL/L — SIGNIFICANT CHANGE UP (ref 22–31)
CREAT SERPL-MCNC: 0.59 MG/DL — SIGNIFICANT CHANGE UP (ref 0.5–1.3)
CULTURE RESULTS: SIGNIFICANT CHANGE UP
EOSINOPHIL # BLD AUTO: 0 K/UL — SIGNIFICANT CHANGE UP (ref 0–0.5)
EOSINOPHIL NFR BLD AUTO: 0 % — SIGNIFICANT CHANGE UP (ref 0–6)
GLUCOSE SERPL-MCNC: 115 MG/DL — HIGH (ref 70–99)
HCT VFR BLD CALC: 25.1 % — LOW (ref 34.5–45)
HGB BLD-MCNC: 8.3 G/DL — LOW (ref 11.5–15.5)
LYMPHOCYTES # BLD AUTO: 1.13 K/UL — SIGNIFICANT CHANGE UP (ref 1–3.3)
LYMPHOCYTES # BLD AUTO: 5 % — LOW (ref 13–44)
MCHC RBC-ENTMCNC: 28.4 PG — SIGNIFICANT CHANGE UP (ref 27–34)
MCHC RBC-ENTMCNC: 33.1 GM/DL — SIGNIFICANT CHANGE UP (ref 32–36)
MCV RBC AUTO: 86 FL — SIGNIFICANT CHANGE UP (ref 80–100)
MONOCYTES # BLD AUTO: 1.13 K/UL — HIGH (ref 0–0.9)
MONOCYTES NFR BLD AUTO: 5 % — SIGNIFICANT CHANGE UP (ref 2–14)
NEUTROPHILS # BLD AUTO: 20.3 K/UL — HIGH (ref 1.8–7.4)
NEUTROPHILS NFR BLD AUTO: 90 % — HIGH (ref 43–77)
NRBC # BLD: SIGNIFICANT CHANGE UP /100 WBCS (ref 0–0)
PLATELET # BLD AUTO: 376 K/UL — SIGNIFICANT CHANGE UP (ref 150–400)
POTASSIUM SERPL-MCNC: 3.8 MMOL/L — SIGNIFICANT CHANGE UP (ref 3.5–5.3)
POTASSIUM SERPL-SCNC: 3.8 MMOL/L — SIGNIFICANT CHANGE UP (ref 3.5–5.3)
PROT SERPL-MCNC: 6.2 GM/DL — SIGNIFICANT CHANGE UP (ref 6–8.3)
RBC # BLD: 2.92 M/UL — LOW (ref 3.8–5.2)
RBC # FLD: 15.1 % — HIGH (ref 10.3–14.5)
SARS-COV-2 RNA SPEC QL NAA+PROBE: SIGNIFICANT CHANGE UP
SODIUM SERPL-SCNC: 133 MMOL/L — LOW (ref 135–145)
SPECIMEN SOURCE: SIGNIFICANT CHANGE UP
VANCOMYCIN TROUGH SERPL-MCNC: 18.7 UG/ML — SIGNIFICANT CHANGE UP (ref 10–20)
VANCOMYCIN TROUGH SERPL-MCNC: 26.2 UG/ML — CRITICAL HIGH (ref 10–20)
WBC # BLD: 22.55 K/UL — HIGH (ref 3.8–10.5)
WBC # FLD AUTO: 22.55 K/UL — HIGH (ref 3.8–10.5)

## 2020-12-17 PROCEDURE — 99233 SBSQ HOSP IP/OBS HIGH 50: CPT

## 2020-12-17 RX ADMIN — BUDESONIDE AND FORMOTEROL FUMARATE DIHYDRATE 2 PUFF(S): 160; 4.5 AEROSOL RESPIRATORY (INHALATION) at 17:01

## 2020-12-17 RX ADMIN — PANTOPRAZOLE SODIUM 40 MILLIGRAM(S): 20 TABLET, DELAYED RELEASE ORAL at 11:41

## 2020-12-17 RX ADMIN — Medication 3 MILLILITER(S): at 21:53

## 2020-12-17 RX ADMIN — ENOXAPARIN SODIUM 40 MILLIGRAM(S): 100 INJECTION SUBCUTANEOUS at 17:01

## 2020-12-17 RX ADMIN — SENNA PLUS 2 TABLET(S): 8.6 TABLET ORAL at 22:07

## 2020-12-17 RX ADMIN — CEFEPIME 100 MILLIGRAM(S): 1 INJECTION, POWDER, FOR SOLUTION INTRAMUSCULAR; INTRAVENOUS at 22:07

## 2020-12-17 RX ADMIN — CEFEPIME 100 MILLIGRAM(S): 1 INJECTION, POWDER, FOR SOLUTION INTRAMUSCULAR; INTRAVENOUS at 14:28

## 2020-12-17 RX ADMIN — Medication 3 MILLILITER(S): at 14:33

## 2020-12-17 RX ADMIN — ENOXAPARIN SODIUM 40 MILLIGRAM(S): 100 INJECTION SUBCUTANEOUS at 06:20

## 2020-12-17 RX ADMIN — CEFEPIME 100 MILLIGRAM(S): 1 INJECTION, POWDER, FOR SOLUTION INTRAMUSCULAR; INTRAVENOUS at 06:20

## 2020-12-17 NOTE — PROGRESS NOTE ADULT - SUBJECTIVE AND OBJECTIVE BOX
Patient is a 75y old  Female who presents with a chief complaint of wound check (16 Dec 2020 15:00)      INTERVAL HPI/OVERNIGHT EVENTS: no events     MEDICATIONS  (STANDING):  ALBUTerol    90 MICROgram(s) HFA Inhaler 1 Puff(s) Inhalation every 4 hours  albuterol/ipratropium for Nebulization 3 milliLiter(s) Nebulizer every 8 hours  budesonide 160 MICROgram(s)/formoterol 4.5 MICROgram(s) Inhaler 2 Puff(s) Inhalation two times a day  cefepime   IVPB      cefepime   IVPB 2000 milliGRAM(s) IV Intermittent every 8 hours  enoxaparin Injectable 40 milliGRAM(s) SubCutaneous every 12 hours  pantoprazole  Injectable 40 milliGRAM(s) IV Push daily  polyethylene glycol 3350 17 Gram(s) Oral daily  senna 2 Tablet(s) Oral at bedtime  tiotropium 18 MICROgram(s) Capsule 1 Capsule(s) Inhalation daily  vancomycin  IVPB 1250 milliGRAM(s) IV Intermittent every 8 hours    MEDICATIONS  (PRN):  acetaminophen   Tablet .. 975 milliGRAM(s) Oral every 8 hours PRN Temp greater or equal to 38.5C (101.3F), Mild Pain (1 - 3)  aluminum hydroxide/magnesium hydroxide/simethicone Suspension 30 milliLiter(s) Oral every 4 hours PRN Dyspepsia      Allergies    No Known Allergies    Intolerances           Vital Signs Last 24 Hrs  T(C): 36.7 (17 Dec 2020 06:46), Max: 37 (16 Dec 2020 11:39)  T(F): 98.1 (17 Dec 2020 06:46), Max: 98.6 (16 Dec 2020 11:39)  HR: 73 (17 Dec 2020 06:46) (72 - 81)  BP: 125/70 (17 Dec 2020 06:46) (102/38 - 125/70)  BP(mean): --  RR: 18 (17 Dec 2020 06:46) (18 - 19)  SpO2: 97% (17 Dec 2020 06:46) (97% - 98%)    PHYSICAL EXAM:  GENERAL: NAD, well-groomed, well-developed  HEAD:  Atraumatic, Normocephalic  EYES: EOMI, PERRLA, conjunctiva and sclera clear  ENMT: No tonsillar erythema, exudates, or enlargement; Moist mucous membranes, Good dentition, No lesions  NECK: Supple, No JVD, Normal thyroid  NERVOUS SYSTEM:  Alert & Oriented X3, Good concentration; Motor Strength 5/5 B/L upper and lower extremities; DTRs 2+ intact and symmetric  CHEST/LUNG: Clear to percussion bilaterally; No rales, rhonchi, wheezing, or rubs  HEART: Regular rate and rhythm; No murmurs, rubs, or gallops  ABDOMEN: Soft, Nontender, Nondistended; Bowel sounds present  EXTREMITIES:  2+ Peripheral Pulses b/l, right LE  wound dressing c/d/i     b/l  lymphedema   LYMPH: No lymphadenopathy noted  SKIN: No rashes or lesions      LABS:                        8.3    22.55 )-----------( 376      ( 17 Dec 2020 08:22 )             25.1     12-17    133<L>  |  98  |  16  ----------------------------<  115<H>  3.8   |  30  |  0.59    Ca    8.0<L>      17 Dec 2020 08:22  Phos  3.7     12-16  Mg     2.5     12-16    TPro  6.2  /  Alb  1.3<L>  /  TBili  0.7  /  DBili  x   /  AST  67<H>  /  ALT  134<H>  /  AlkPhos  154<H>  12-17        CAPILLARY BLOOD GLUCOSE          RADIOLOGY & ADDITIONAL TESTS:    Imaging Personally Reviewed:  [ X] YES  [ ] NO    Consultant(s) Notes Reviewed:  [ X] YES  [ ] NO    Care Discussed with Consultants/Other Providers [X ] YES  [ ] NO

## 2020-12-17 NOTE — PROGRESS NOTE ADULT - ASSESSMENT
76 y/o female w/pmhx of HTN, chronic lymphedema with RLE wounds w/cellulitis.    UTI ruled out.   UCx no growth  patient asymptomatic     12/13 --patient complaining of left sided CP and some SOB.   CE neg x 1   EKG: NSR   CXR : poor inspiratory effort, mildly congested , no e/o infiltrates or effusions (checked myself)  d-dimer elevated 1631  given duonebs   supplement oxygen via NC prn to maintain O2 sat >90 % , currently patient is saturating 96% on 2L NC   CT angio: neg for PE , Somewhat linear bilateral lower lobe opacities, suggesting atelectasis. imaging finding of esophagitis/gastritis (however patient is asymptomatic). Nonspecific bilateral peripancreatic stranding. however amylase and lipase unremarkable.   lasix 80mg IVP x1   d/c IVF     12/14 WBC is still elevated.   awaiting vanc trough @ 5pm  CXR unchanged.   lasix 80mg IVP x 1 given   added cefepime for broad spectrum coverage     12/15 patient spiked 101.8F overnight   thus far blood Cx have been negative   CXR b/l infiltrates   patient already on Vanco and cefepime   in view of elevated d-dimers/inflamm markers/high fever and transaminitis --will obtain COVID swab   obtain abd US     per  daughter, she stated that her brother was dx with COVID  2-3 weeks ago and he potentially exposed the patient as he was visiting the patient at home.     Covid negative    CT chest/us abd done, no acute findings          Assessment and Plan:     Cellulitis with open draining wound of right lower extremity , draining from wound is very minimal now   massive chronic lymphedema b/l  follows with Dr. Marquez outpatient   increasing  WBC with bandemia  remains afebrile   A1c 6.3%  - blood cultures: NGTD, repeat blood Cx also no growth   discussed with Dr. Marquez, no need for debridement at this time, continue with abx, daily wound care dressing changes  vanc trough was subtherapeutic 12/14    increased vanc  to 1250mg Q8H ,,follow  vanc trough @2100   completed   ceftriaxone   analgesics prn   discussed with ID, due to persistently elevated WBC with bandemia,  12/11 CT RLE done today w/o e/o abscess or tracking subcut emphysema   patient was unable to obtain MRI of LE due to body habitus   thus far blood Cx have been NGTD     Wheezing, atelectasis?  OHS?   - nebs treatment, wheezing resolved   s/p short steroid course    symbicort 2/2     Morbid obesity due to excess calories.    - nutrition eval appreciated   -weight loss program (consider [facility] obesity clinic referral once ready for discharge)       Essential hypertension, controlled off meds   - monitor.     Hypophosphatemia --repleted     Preventive measure.    - sq lovenox bid -dvt ppx  PT :PRASHANT  fall precautions   HOBE

## 2020-12-18 LAB
ALBUMIN SERPL ELPH-MCNC: 1.3 G/DL — LOW (ref 3.3–5)
ALP SERPL-CCNC: 148 U/L — HIGH (ref 40–120)
ALT FLD-CCNC: 95 U/L — HIGH (ref 12–78)
ANION GAP SERPL CALC-SCNC: 6 MMOL/L — SIGNIFICANT CHANGE UP (ref 5–17)
AST SERPL-CCNC: 50 U/L — HIGH (ref 15–37)
BASOPHILS # BLD AUTO: 0.1 K/UL — SIGNIFICANT CHANGE UP (ref 0–0.2)
BASOPHILS NFR BLD AUTO: 0.5 % — SIGNIFICANT CHANGE UP (ref 0–2)
BILIRUB SERPL-MCNC: 0.7 MG/DL — SIGNIFICANT CHANGE UP (ref 0.2–1.2)
BUN SERPL-MCNC: 20 MG/DL — SIGNIFICANT CHANGE UP (ref 7–23)
CALCIUM SERPL-MCNC: 8.1 MG/DL — LOW (ref 8.5–10.1)
CHLORIDE SERPL-SCNC: 100 MMOL/L — SIGNIFICANT CHANGE UP (ref 96–108)
CO2 SERPL-SCNC: 30 MMOL/L — SIGNIFICANT CHANGE UP (ref 22–31)
CREAT SERPL-MCNC: 0.65 MG/DL — SIGNIFICANT CHANGE UP (ref 0.5–1.3)
EOSINOPHIL # BLD AUTO: 0.2 K/UL — SIGNIFICANT CHANGE UP (ref 0–0.5)
EOSINOPHIL NFR BLD AUTO: 1 % — SIGNIFICANT CHANGE UP (ref 0–6)
GLUCOSE SERPL-MCNC: 115 MG/DL — HIGH (ref 70–99)
HCT VFR BLD CALC: 24.3 % — LOW (ref 34.5–45)
HGB BLD-MCNC: 7.9 G/DL — LOW (ref 11.5–15.5)
IMM GRANULOCYTES NFR BLD AUTO: 3.6 % — HIGH (ref 0–1.5)
LYMPHOCYTES # BLD AUTO: 1.12 K/UL — SIGNIFICANT CHANGE UP (ref 1–3.3)
LYMPHOCYTES # BLD AUTO: 5.8 % — LOW (ref 13–44)
MCHC RBC-ENTMCNC: 28.2 PG — SIGNIFICANT CHANGE UP (ref 27–34)
MCHC RBC-ENTMCNC: 32.5 GM/DL — SIGNIFICANT CHANGE UP (ref 32–36)
MCV RBC AUTO: 86.8 FL — SIGNIFICANT CHANGE UP (ref 80–100)
MONOCYTES # BLD AUTO: 1.55 K/UL — HIGH (ref 0–0.9)
MONOCYTES NFR BLD AUTO: 8 % — SIGNIFICANT CHANGE UP (ref 2–14)
NEUTROPHILS # BLD AUTO: 15.74 K/UL — HIGH (ref 1.8–7.4)
NEUTROPHILS NFR BLD AUTO: 81.1 % — HIGH (ref 43–77)
NRBC # BLD: 0 /100 WBCS — SIGNIFICANT CHANGE UP (ref 0–0)
PLATELET # BLD AUTO: 406 K/UL — HIGH (ref 150–400)
POTASSIUM SERPL-MCNC: 3.9 MMOL/L — SIGNIFICANT CHANGE UP (ref 3.5–5.3)
POTASSIUM SERPL-SCNC: 3.9 MMOL/L — SIGNIFICANT CHANGE UP (ref 3.5–5.3)
PROT SERPL-MCNC: 6 GM/DL — SIGNIFICANT CHANGE UP (ref 6–8.3)
RBC # BLD: 2.8 M/UL — LOW (ref 3.8–5.2)
RBC # FLD: 14.9 % — HIGH (ref 10.3–14.5)
SODIUM SERPL-SCNC: 136 MMOL/L — SIGNIFICANT CHANGE UP (ref 135–145)
WBC # BLD: 19.41 K/UL — HIGH (ref 3.8–10.5)
WBC # FLD AUTO: 19.41 K/UL — HIGH (ref 3.8–10.5)

## 2020-12-18 PROCEDURE — 99232 SBSQ HOSP IP/OBS MODERATE 35: CPT

## 2020-12-18 PROCEDURE — 99233 SBSQ HOSP IP/OBS HIGH 50: CPT

## 2020-12-18 RX ADMIN — Medication 3 MILLILITER(S): at 05:29

## 2020-12-18 RX ADMIN — BUDESONIDE AND FORMOTEROL FUMARATE DIHYDRATE 2 PUFF(S): 160; 4.5 AEROSOL RESPIRATORY (INHALATION) at 17:30

## 2020-12-18 RX ADMIN — SENNA PLUS 2 TABLET(S): 8.6 TABLET ORAL at 21:58

## 2020-12-18 RX ADMIN — ENOXAPARIN SODIUM 40 MILLIGRAM(S): 100 INJECTION SUBCUTANEOUS at 17:30

## 2020-12-18 RX ADMIN — Medication 975 MILLIGRAM(S): at 21:58

## 2020-12-18 RX ADMIN — CEFEPIME 100 MILLIGRAM(S): 1 INJECTION, POWDER, FOR SOLUTION INTRAMUSCULAR; INTRAVENOUS at 21:58

## 2020-12-18 RX ADMIN — CEFEPIME 100 MILLIGRAM(S): 1 INJECTION, POWDER, FOR SOLUTION INTRAMUSCULAR; INTRAVENOUS at 15:02

## 2020-12-18 RX ADMIN — Medication 975 MILLIGRAM(S): at 07:30

## 2020-12-18 RX ADMIN — CEFEPIME 100 MILLIGRAM(S): 1 INJECTION, POWDER, FOR SOLUTION INTRAMUSCULAR; INTRAVENOUS at 06:12

## 2020-12-18 RX ADMIN — Medication 100 MILLIGRAM(S): at 17:30

## 2020-12-18 RX ADMIN — Medication 975 MILLIGRAM(S): at 06:34

## 2020-12-18 RX ADMIN — BUDESONIDE AND FORMOTEROL FUMARATE DIHYDRATE 2 PUFF(S): 160; 4.5 AEROSOL RESPIRATORY (INHALATION) at 06:13

## 2020-12-18 RX ADMIN — Medication 975 MILLIGRAM(S): at 22:58

## 2020-12-18 RX ADMIN — Medication 3 MILLILITER(S): at 14:06

## 2020-12-18 RX ADMIN — PANTOPRAZOLE SODIUM 40 MILLIGRAM(S): 20 TABLET, DELAYED RELEASE ORAL at 11:53

## 2020-12-18 RX ADMIN — ENOXAPARIN SODIUM 40 MILLIGRAM(S): 100 INJECTION SUBCUTANEOUS at 06:13

## 2020-12-18 NOTE — PROGRESS NOTE ADULT - SUBJECTIVE AND OBJECTIVE BOX
Patient is a 75y old  Female who presents with a chief complaint of wound check (17 Dec 2020 11:19)      INTERVAL HPI/OVERNIGHT EVENTS: no events     MEDICATIONS  (STANDING):  ALBUTerol    90 MICROgram(s) HFA Inhaler 1 Puff(s) Inhalation every 4 hours  albuterol/ipratropium for Nebulization 3 milliLiter(s) Nebulizer every 8 hours  budesonide 160 MICROgram(s)/formoterol 4.5 MICROgram(s) Inhaler 2 Puff(s) Inhalation two times a day  cefepime   IVPB      cefepime   IVPB 2000 milliGRAM(s) IV Intermittent every 8 hours  enoxaparin Injectable 40 milliGRAM(s) SubCutaneous every 12 hours  pantoprazole  Injectable 40 milliGRAM(s) IV Push daily  polyethylene glycol 3350 17 Gram(s) Oral daily  senna 2 Tablet(s) Oral at bedtime  tiotropium 18 MICROgram(s) Capsule 1 Capsule(s) Inhalation daily    MEDICATIONS  (PRN):  acetaminophen   Tablet .. 975 milliGRAM(s) Oral every 8 hours PRN Temp greater or equal to 38.5C (101.3F), Mild Pain (1 - 3)  aluminum hydroxide/magnesium hydroxide/simethicone Suspension 30 milliLiter(s) Oral every 4 hours PRN Dyspepsia      Allergies    No Known Allergies    Intolerances           Vital Signs Last 24 Hrs  T(C): 36.8 (18 Dec 2020 05:18), Max: 36.8 (17 Dec 2020 17:25)  T(F): 98.2 (18 Dec 2020 05:18), Max: 98.2 (17 Dec 2020 17:25)  HR: 77 (18 Dec 2020 06:35) (75 - 87)  BP: 107/74 (18 Dec 2020 05:18) (107/74 - 142/42)  BP(mean): --  RR: 18 (18 Dec 2020 05:18) (17 - 18)  SpO2: 99% (18 Dec 2020 06:35) (96% - 99%)    PHYSICAL EXAM:  GENERAL: NAD, well-groomed, well-developed  HEAD:  Atraumatic, Normocephalic  EYES: EOMI, PERRLA, conjunctiva and sclera clear  ENMT: No tonsillar erythema, exudates, or enlargement; Moist mucous membranes, Good dentition, No lesions  NECK: Supple, No JVD, Normal thyroid  NERVOUS SYSTEM:  Alert & Oriented X3, Good concentration; Motor Strength 5/5 B/L upper and lower extremities; DTRs 2+ intact and symmetric  CHEST/LUNG: Clear to percussion bilaterally; No rales, rhonchi, wheezing, or rubs  HEART: Regular rate and rhythm; No murmurs, rubs, or gallops  ABDOMEN: Soft, Nontender, Nondistended; Bowel sounds present  EXTREMITIES:  2+ Peripheral Pulses b/l, right LE  wound dressing c/d/i     b/l  lymphedema   LYMPH: No lymphadenopathy noted  SKIN: No rashes or lesions  LABS:                        7.9    19.41 )-----------( 406      ( 18 Dec 2020 07:34 )             24.3     12-18    136  |  100  |  20  ----------------------------<  115<H>  3.9   |  30  |  0.65    Ca    8.1<L>      18 Dec 2020 07:34    TPro  6.0  /  Alb  1.3<L>  /  TBili  0.7  /  DBili  x   /  AST  50<H>  /  ALT  95<H>  /  AlkPhos  148<H>  12-18        CAPILLARY BLOOD GLUCOSE          RADIOLOGY & ADDITIONAL TESTS:    Imaging Personally Reviewed:  [ X] YES  [ ] NO    Consultant(s) Notes Reviewed:  [ X] YES  [ ] NO    Care Discussed with Consultants/Other Providers [X ] YES  [ ] NO

## 2020-12-18 NOTE — PROGRESS NOTE ADULT - ATTENDING COMMENTS
Discussed with Dolores Macias NP. I have personally seen, examined and participated in the care of this patient. I have reviewed all pertinent clinical information, including history, physical exam, plan and the NP's note and agree. Note has been reviewed and made any necessary modifications.     Que Barnes DO  Cell: [phone]  Pager [phone]  Infectious Disease Attending  After 5pm/weekends please call [phone] for all inquiries and new consults
Discussed with Dolores Macias NP. I have personally seen, examined and participated in the care of this patient. I have reviewed all pertinent clinical information, including history, physical exam, plan and the NP's note and agree. Note has been reviewed and made any necessary modifications.     Que Barnes DO  Cell: [phone]  Pager [phone]  Infectious Disease Attending  After 5pm/weekends please call [phone] for all inquiries and new consults
Discussed with Dolores Macias NP. I have personally seen, examined and participated in the care of this patient. I have reviewed all pertinent clinical information, including history, physical exam, plan and the NP's note and agree. Note has been reviewed and made any necessary modifications.     Que Barnes DO  Cell: [phone]  Pager [phone]  Infectious Disease Attending  After 5pm/weekends please call [phone] for all inquiries and new consults

## 2020-12-18 NOTE — PROGRESS NOTE ADULT - SUBJECTIVE AND OBJECTIVE BOX
ALICIA GONZALEZ  MRN-[id number]    Follow Up:  right lower extremity cellulitis, wound, possible pneumonia     Interval History: The pt is in bed, no distress, has no new complains. The pt is afebrile, WBC is trending down, Vancomycin trough yesterday 18.7 - therapeutic, blood cultures with no growth, COVID 19 testing from 12/16 is negative, another COVID 19 test is pending result.     ROS:    [ ] Unobtainable because:  [ ] All other systems negative    Constitutional: no fever, no chills  Head: no trauma  Eyes: no vision changes, no eye pain  ENT:  no sore throat, no rhinorrhea  Cardiovascular:  no chest pain, no palpitation  Respiratory:  SOB with activity, on supplemental O2 via NC, mild cough - improving   GI:  no abd pain, no vomiting, no diarrhea  urinary: no dysuria, no hematuria, no flank pain  musculoskeletal:  no joint pain, no joint swelling  skin:  no rash, + pain at the wound site - right LE  neurology:  no headache, no seizure, no change in mental status  psych: no anxiety, no depression         Allergies  No Known Allergies        ANTIMICROBIALS:  cefepime   IVPB    cefepime   IVPB 2000 every 8 hours      OTHER MEDS:  acetaminophen   Tablet .. 975 milliGRAM(s) Oral every 8 hours PRN  ALBUTerol    90 MICROgram(s) HFA Inhaler 1 Puff(s) Inhalation every 4 hours  albuterol/ipratropium for Nebulization 3 milliLiter(s) Nebulizer every 8 hours  aluminum hydroxide/magnesium hydroxide/simethicone Suspension 30 milliLiter(s) Oral every 4 hours PRN  budesonide 160 MICROgram(s)/formoterol 4.5 MICROgram(s) Inhaler 2 Puff(s) Inhalation two times a day  enoxaparin Injectable 40 milliGRAM(s) SubCutaneous every 12 hours  pantoprazole  Injectable 40 milliGRAM(s) IV Push daily  polyethylene glycol 3350 17 Gram(s) Oral daily  senna 2 Tablet(s) Oral at bedtime  tiotropium 18 MICROgram(s) Capsule 1 Capsule(s) Inhalation daily      Vital Signs Last 24 Hrs  T(C): 36.8 (18 Dec 2020 05:18), Max: 36.8 (17 Dec 2020 17:25)  T(F): 98.2 (18 Dec 2020 05:18), Max: 98.2 (17 Dec 2020 17:25)  HR: 77 (18 Dec 2020 06:35) (75 - 87)  BP: 107/74 (18 Dec 2020 05:18) (107/74 - 142/42)  BP(mean): --  RR: 18 (18 Dec 2020 05:18) (17 - 18)  SpO2: 99% (18 Dec 2020 06:35) (96% - 99%)    Physical Exam:  General:    NAD,  non toxic, morbidly obese  Head: atraumatic, normocephalic  Eye: normal sclera and conjunctiva  ENT:    no oral lesions, neck supple  Cardio:     regular S1, S2,  no murmur  Respiratory:    on NC, mild crackles bilaterally, somewhat diminished bilaterally   abd:     soft,   BS +,   no tenderness  :   no CVAT,  no suprapubic tenderness,   no  blum  Musculoskeletal:   no joint swelling,   +edema / lymphedema no change  vascular: no central lines, +PIV   Skin:    large and deep ulcer on the posteromedial aspect of right leg with no discharge, chronic lymphedema, the surrounding skin is still warm but improving, chronic appearing skin changes   Neurologic:     no focal deficit  psych: normal affect    WBC Count: 19.41 K/uL (12-18 @ 07:34)  WBC Count: 22.55 K/uL (12-17 @ 08:22)  WBC Count: 23.82 K/uL (12-16 @ 07:52)  WBC Count: 25.93 K/uL (12-15 @ 10:15)  WBC Count: 25.40 K/uL (12-14 @ 06:36)  WBC Count: 25.14 K/uL (12-13 @ 08:07)  WBC Count: 27.77 K/uL (12-12 @ 08:10)                            7.9    19.41 )-----------( 406      ( 18 Dec 2020 07:34 )             24.3       12-18    136  |  100  |  20  ----------------------------<  115<H>  3.9   |  30  |  0.65    Ca    8.1<L>      18 Dec 2020 07:34    TPro  6.0  /  Alb  1.3<L>  /  TBili  0.7  /  DBili  x   /  AST  50<H>  /  ALT  95<H>  /  AlkPhos  148<H>  12-18          Creatinine Trend: 0.65<--, 0.59<--, 0.54<--, 0.64<--, 0.55<--, 0.61<--      MICROBIOLOGY:  Vancomycin Level, Trough: 18.7 ug/mL (12-17-20 @ 16:56)  v  .Blood Blood-Peripheral  12-15-20   No growth to date.  --  --      .Blood Blood-Peripheral  12-15-20   No growth to date.  --  --      .Blood Blood-Peripheral... one aerobic botle rec'd  12-15-20   No growth to date.  --  --      .Blood Blood  12-12-20   No Growth Final  --  --      .Blood Blood  12-11-20   No Growth Final  --  --      .Urine Catheterized  12-07-20   <10,000 CFU/mL Normal Urogenital Andreea  --  --      .Blood Blood-Peripheral  12-06-20   No Growth Final  --  --              COVID-19 PCR: NotDetec (12-16)  COVID-19 PCR: NotDetec (12-15)  COVID-19 PCR: NotDetec (12-09)    C-Reactive Protein, Serum: 21.81 (12-16)  C-Reactive Protein, Serum: 22.82 (12-15)  C-Reactive Protein, Serum: 19.29 (12-14)  C-Reactive Protein, Serum: 19.50 (12-13)  C-Reactive Protein, Serum: 17.02 (12-12)  C-Reactive Protein, Serum: 31.61 (12-09)    Ferritin, Serum: 938 (12-16)  Ferritin, Serum: 935 (12-15)      D-Dimer Assay, Quantitative: 1631 (12-13)    Procalcitonin, Serum: 1.82 (12-15-20 @ 14:36)  Procalcitonin, Serum: 2.01 (12-14-20 @ 17:06)      RADIOLOGY:  < from: CT Chest No Cont (12.16.20 @ 09:59) >    EXAM:  CT CHEST                            PROCEDURE DATE:  12/16/2020          INTERPRETATION:  CLINICAL INFORMATION: Pneumonia    COMPARISON: 12/13/2020    PROCEDURE:  CT of the Chest was performed without intravenous contrast.  Sagittal and coronal reformats were performed.    FINDINGS:    LUNGS AND AIRWAYS: Patent central airways.  Bilateral dependent and basilar atelectasis. Bilateral lower lobe consolidations.  PLEURA: Trace bilateral pleural effusions.  MEDIASTINUM AND SHERON: Several prominent lymph nodes.  VESSELS: Atherosclerotic calcifications of thoracic aorta and coronary arteries.  HEART: Enlarged. No pericardial effusion.  CHEST WALL AND LOWER NECK: Within normal limits.  VISUALIZED UPPER ABDOMEN: Nonspecific perinephric stranding. Colonic diverticulosis.  BONES: Degenerative changes.    IMPRESSION:  Bilateral lower lobe consolidations, which may represent atelectasis and/or pneumonia.    TAYA STILES MD; Attending Radiologist  This document has been electronically signed. Dec 16 2020 10:17AM    < end of copied text >      < from: US Abdomen Complete (US Abdomen Complete .) (12.16.20 @ 08:57) >    EXAM:  US ABDOMINAL COMPLETE                            PROCEDURE DATE:  12/16/2020          INTERPRETATION:  CLINICAL INFORMATION: Abdominal pain    COMPARISON: CT dated 12/7/2020    TECHNIQUE: Sonography of the abdomen.    FINDINGS:    Liver: Within normal limits.  Bile ducts: Normal caliber. Common bile duct measures 4 mm.  Gallbladder: Cholelithiasis and sludge. Mild wall thickening. No focal tenderness.  Pancreas: Visualized portions are within normal limits.  Spleen: 7.1 cm. Within normallimits.  Right kidney: 13.5 cm. No hydronephrosis.  Left kidney: 12.0 cm.  No hydronephrosis.  Ascites: None.  Aorta and IVC: Visualized portions are within normal limits.    IMPRESSION:  Cholelithiasis and sludge.  No biliary ductal dilatation.    TAYA STILES MD; Attending Radiologist  This document has been electronically signed. Dec 16 2020  9:05AM    < end of copied text >

## 2020-12-18 NOTE — PROGRESS NOTE ADULT - ASSESSMENT
76 y/o female w/pmhx of HTN, chronic lymphedema with RLE wounds w/cellulitis.    UTI ruled out.   UCx no growth  patient asymptomatic     12/13 --patient complaining of left sided CP and some SOB.   CE neg x 1   EKG: NSR   CXR : poor inspiratory effort, mildly congested , no e/o infiltrates or effusions (checked myself)  d-dimer elevated 1631  given duonebs   supplement oxygen via NC prn to maintain O2 sat >90 % , currently patient is saturating 96% on 2L NC   CT angio: neg for PE , Somewhat linear bilateral lower lobe opacities, suggesting atelectasis. imaging finding of esophagitis/gastritis (however patient is asymptomatic). Nonspecific bilateral peripancreatic stranding. however amylase and lipase unremarkable.   lasix 80mg IVP x1   d/c IVF     12/14 WBC is still elevated.   awaiting vanc trough @ 5pm  CXR unchanged.   lasix 80mg IVP x 1 given   added cefepime for broad spectrum coverage     12/15 patient spiked 101.8F overnight   thus far blood Cx have been negative   CXR b/l infiltrates   patient already on Vanco and cefepime   in view of elevated d-dimers/inflamm markers/high fever and transaminitis --will obtain COVID swab   obtain abd US     per  daughter, she stated that her brother was dx with COVID  2-3 weeks ago and he potentially exposed the patient as he was visiting the patient at home.     Covid negative    CT chest/us abd done, no acute findings          Assessment and Plan:     Cellulitis with open draining wound of right lower extremity , draining from wound is very minimal now   massive chronic lymphedema b/l  follows with Dr. Marquez outpatient   increasing  WBC with bandemia  remains afebrile   A1c 6.3%  - blood cultures: NGTD, repeat blood Cx also no growth   discussed with Dr. Marquez, no need for debridement at this time, continue with abx, daily wound care dressing changes  vanc trough was subtherapeutic 12/14    increased vanc  to 1250mg Q8H ,,follow  vanc trough @2100   completed   ceftriaxone   analgesics prn   discussed with ID, due to persistently elevated WBC with bandemia,  12/11 CT RLE done today w/o e/o abscess or tracking subcut emphysema   patient was unable to obtain MRI of LE due to body habitus   thus far blood Cx have been NGTD     Wheezing, atelectasis?  OHS?   - nebs treatment, wheezing resolved   s/p short steroid course    symbicort 2/2     Morbid obesity due to excess calories.    - nutrition eval appreciated   -weight loss program (consider [facility] obesity clinic referral once ready for discharge)       Essential hypertension, controlled off meds   - monitor.     Hypophosphatemia --repleted     Preventive measure.    - sq lovenox bid -dvt ppx  PT :PRASHANT  fall precautions   HOBE

## 2020-12-18 NOTE — PROGRESS NOTE ADULT - ASSESSMENT
76 y/o female w/pmhx of HTN, chronic lymphedema with RLE wounds w/cellulitis.  Fever, tachycardia  Normal WBC initially but today jumped to 16 with bandemia (perhaps in the setting of solumedrol)  Blood culture negative  UA positive but she denies any urinary symptoms   CT (I personally reviewed) lower extremity with marked edema and inflammatory changes  She has been on clinda and ceftriaxone   The wound appears to need more debridement and would benefit from a scraping and then culture of the base of the wound  Would continue antibiotics but change clindamycin to vanco for better and more optimal MRSA coverage     12/10: WBC rising but perhaps in the setting of steroids though to this extent is questionable, afebrile, evaluated again by surgery who wants to watch on antibiotics, though she had a positive UA she denies any dysuria or increased frequency. Discussed with hospitalist to perhaps stop steroids and if WBC remains elevated to repeat blood cultures and obtain CT right LE with IV contrast.  12/11: WBC increased slightly, steroids stopped, since WBC still increasing will obtain blood cultures and CT right LE with IV contrast, afebrile, right thigh wound is clean with no drainage.   Attending Addendum--  Case reviewed with NP Dolores Macias. Her note reviewed and modified as appropriate.   Patient personally assessed and examined.  CT report reviewed no deep drainable focus. Leukocytosis could be still related to steroids, though still has some early forms on diff which would not be readily explicable with steroids effect.  However their presence at this point in time seems to be in contrast to clinical picture which is one of stability. Depending on CBC/AM and clinical course will revisit transfer to rehab in am.   12/12: WBC about the same, still with some early forms. This remains in ulna contrast to clinical picture which is one of stability and improvement in LE exam. If cx negative at 24h would no object to transfer to rehab to rehab where she could continue to be observed, complete a course of oral antibiotics, and have her WBC monitored.  12/14: remains on supplemental O2 via nasal cannula, complains of pain in her right thigh - wound site. The pt is afebrile, leukocytosis is worsening, no new complains, reports productive occasional cough with clear thick sputum. The pt was scheduled for MRI of right lower extremity, unable to perform. CTA was performed over the weekend, negative for PE, + Bilateral lower lobe opacities, suggesting atelectasis.   12/15: The pt spiked a fever of 101.8 yesterday evening, WBC increased 25.40, Chest Xray appears worse bilaterally (I personally reviewed) . The pt is in bed, no distress, complains of pain at right lower extremity wound site. The pt still has a cough with thick sputum, will obtain sputum culture. Vancomycin trough was 5.4 yesterday in the evening, Vancomycin dose was changed to 1250 mg IV q 8 hours, Maxipime 2 g IV q 8 hours added, vancomycin trough is scheduled for today prior evening dose. Blood cultures were collected and pending result, the pt will be tested for COVID 19. Reasonable to start steroids in the setting of worsening respiratory status, covid exposure, rising inflammatory markers, fever. Would await covid testing prior to starting remdesivir   12/16: CT reviewed (I personally reviewed) does not appear like classic covid imaging, COVID is negative, fevers getting better on antibiotics, would not give remdesivir, there are 2 more PCRs pending (not sure why so many). WBC still elevated    12/18: afebrile, WBC trending down, BC with no growth, COVID 19 testing is negative, one more repeat is pending, overall the pt is feeling better.       Suggestions--  #Fever/SOB/O2 requirement/rule out COVID/Cellulitis with ulcer  - continue Vancomycin dose 1250 mg q 8 hours for now with trough target trough 10-15 (Vancomycin trough  18.7 on 12/17/2020, was high on 12/16/2020 26.2 and dose was held, 10.9 on 12/12/2020, VT 5.4 on 12/14, VT 17 on 12/15)  - VT therapeutic    - continue Cefepime 2g q8hrs   - covid 19 testing - pending  - Diuresis per hospitalist   - F/U Cx data  - awaiting for new blood cultures collected yesterday   - offloading to ensure no further wound development   - trend CBC with diff, LFTs, ddimer, CRP, procalcitonin    - if no improvement, consider MRI of LE (will need to go to [facility] or [facility] for MRI given body habitus)     #Obesity   - weight loss program (consider [facility] obesity clinic referral once ready for discharge: [phone])   - HbA1c - 6.3  - good but not too tight glycemic control especially in the setting of recent  steroids use and infection   76 y/o female w/pmhx of HTN, chronic lymphedema with RLE wounds w/cellulitis.  Fever, tachycardia  Normal WBC initially but today jumped to 16 with bandemia (perhaps in the setting of solumedrol)  Blood culture negative  UA positive but she denies any urinary symptoms   CT (I personally reviewed) lower extremity with marked edema and inflammatory changes  She has been on clinda and ceftriaxone   The wound appears to need more debridement and would benefit from a scraping and then culture of the base of the wound  Would continue antibiotics but change clindamycin to vanco for better and more optimal MRSA coverage     12/10: WBC rising but perhaps in the setting of steroids though to this extent is questionable, afebrile, evaluated again by surgery who wants to watch on antibiotics, though she had a positive UA she denies any dysuria or increased frequency. Discussed with hospitalist to perhaps stop steroids and if WBC remains elevated to repeat blood cultures and obtain CT right LE with IV contrast.  12/11: WBC increased slightly, steroids stopped, since WBC still increasing will obtain blood cultures and CT right LE with IV contrast, afebrile, right thigh wound is clean with no drainage.   Attending Addendum--  Case reviewed with NP Dolores Macias. Her note reviewed and modified as appropriate.   Patient personally assessed and examined.  CT report reviewed no deep drainable focus. Leukocytosis could be still related to steroids, though still has some early forms on diff which would not be readily explicable with steroids effect.  However their presence at this point in time seems to be in contrast to clinical picture which is one of stability. Depending on CBC/AM and clinical course will revisit transfer to rehab in am.   12/12: WBC about the same, still with some early forms. This remains in luna contrast to clinical picture which is one of stability and improvement in LE exam. If cx negative at 24h would no object to transfer to rehab to rehab where she could continue to be observed, complete a course of oral antibiotics, and have her WBC monitored.  12/14: remains on supplemental O2 via nasal cannula, complains of pain in her right thigh - wound site. The pt is afebrile, leukocytosis is worsening, no new complains, reports productive occasional cough with clear thick sputum. The pt was scheduled for MRI of right lower extremity, unable to perform. CTA was performed over the weekend, negative for PE, + Bilateral lower lobe opacities, suggesting atelectasis.   12/15: The pt spiked a fever of 101.8 yesterday evening, WBC increased 25.40, Chest Xray appears worse bilaterally (I personally reviewed) . The pt is in bed, no distress, complains of pain at right lower extremity wound site. The pt still has a cough with thick sputum, will obtain sputum culture. Vancomycin trough was 5.4 yesterday in the evening, Vancomycin dose was changed to 1250 mg IV q 8 hours, Maxipime 2 g IV q 8 hours added, vancomycin trough is scheduled for today prior evening dose. Blood cultures were collected and pending result, the pt will be tested for COVID 19. Reasonable to start steroids in the setting of worsening respiratory status, covid exposure, rising inflammatory markers, fever. Would await covid testing prior to starting remdesivir   12/16: CT reviewed (I personally reviewed) does not appear like classic covid imaging, COVID is negative, fevers getting better on antibiotics, would not give remdesivir, there are 2 more PCRs pending (not sure why so many). WBC still elevated    12/18: afebrile, WBC trending down, BC with no growth, COVID 19 testing is negative, one more repeat is pending, overall the pt is feeling better.       Suggestions--  #Fever/SOB/O2 requirement/rule out COVID/Cellulitis with ulcer  - stopped Vancomycin dose 1250 mg q 8 hours for now with trough target trough 10-15 (Vancomycin trough  18.7 on 12/17/2020, was high on 12/16/2020 26.2, 10.9 on 12/12/2020, VT 5.4 on 12/14, VT 17 on 12/15)  - start Doxycycline 100 mg po bid   - continue Cefepime 2g q8hrs   - covid 19 testing - pending  - Diuresis per hospitalist   - F/U Cx data  - awaiting for new blood cultures collected yesterday   - offloading to ensure no further wound development   - trend CBC with diff, LFTs, ddimer, CRP, procalcitonin    - if no improvement, consider MRI of LE (will need to go to [facility] or [facility] for MRI given body habitus)     #Obesity   - weight loss program (consider [facility] obesity clinic referral once ready for discharge: [phone])   - HbA1c - 6.3  - good but not too tight glycemic control especially in the setting of recent  steroids use and infection   74 y/o female w/pmhx of HTN, chronic lymphedema with RLE wounds w/cellulitis.  Fever, tachycardia  Normal WBC initially but today jumped to 16 with bandemia (perhaps in the setting of solumedrol)  Blood culture negative  UA positive but she denies any urinary symptoms   CT (I personally reviewed) lower extremity with marked edema and inflammatory changes  She has been on clinda and ceftriaxone   The wound appears to need more debridement and would benefit from a scraping and then culture of the base of the wound  Would continue antibiotics but change clindamycin to vanco for better and more optimal MRSA coverage     12/10: WBC rising but perhaps in the setting of steroids though to this extent is questionable, afebrile, evaluated again by surgery who wants to watch on antibiotics, though she had a positive UA she denies any dysuria or increased frequency. Discussed with hospitalist to perhaps stop steroids and if WBC remains elevated to repeat blood cultures and obtain CT right LE with IV contrast.  12/11: WBC increased slightly, steroids stopped, since WBC still increasing will obtain blood cultures and CT right LE with IV contrast, afebrile, right thigh wound is clean with no drainage.   Attending Addendum--  Case reviewed with NP Dolores Macias. Her note reviewed and modified as appropriate.   Patient personally assessed and examined.  CT report reviewed no deep drainable focus. Leukocytosis could be still related to steroids, though still has some early forms on diff which would not be readily explicable with steroids effect.  However their presence at this point in time seems to be in contrast to clinical picture which is one of stability. Depending on CBC/AM and clinical course will revisit transfer to rehab in am.   12/12: WBC about the same, still with some early forms. This remains in luna contrast to clinical picture which is one of stability and improvement in LE exam. If cx negative at 24h would no object to transfer to rehab to rehab where she could continue to be observed, complete a course of oral antibiotics, and have her WBC monitored.  12/14: remains on supplemental O2 via nasal cannula, complains of pain in her right thigh - wound site. The pt is afebrile, leukocytosis is worsening, no new complains, reports productive occasional cough with clear thick sputum. The pt was scheduled for MRI of right lower extremity, unable to perform. CTA was performed over the weekend, negative for PE, + Bilateral lower lobe opacities, suggesting atelectasis.   12/15: The pt spiked a fever of 101.8 yesterday evening, WBC increased 25.40, Chest Xray appears worse bilaterally (I personally reviewed) . The pt is in bed, no distress, complains of pain at right lower extremity wound site. The pt still has a cough with thick sputum, will obtain sputum culture. Vancomycin trough was 5.4 yesterday in the evening, Vancomycin dose was changed to 1250 mg IV q 8 hours, Maxipime 2 g IV q 8 hours added, vancomycin trough is scheduled for today prior evening dose. Blood cultures were collected and pending result, the pt will be tested for COVID 19. Reasonable to start steroids in the setting of worsening respiratory status, covid exposure, rising inflammatory markers, fever. Would await covid testing prior to starting remdesivir   12/16: CT reviewed (I personally reviewed) does not appear like classic covid imaging, COVID is negative, fevers getting better on antibiotics, would not give remdesivir, there are 2 more PCRs pending (not sure why so many). WBC still elevated    12/18: afebrile, WBC trending down, BC with no growth, COVID 19 testing is negative, one more repeat is pending, overall the pt is feeling better.       Suggestions--  #Fever/SOB/O2 requirement/rule out COVID/Cellulitis with ulcer  - stopped Vancomycin   - start Doxycycline 100 mg po bid x 2 more days  - continue Cefepime 2g q8hrs until 12/20 then stop   - covid 19 testing - pending  - Diuresis per hospitalist   - discussed with nurse to wean patient off oxygen if possible   - offloading to ensure no further wound development   - trend CBC with diff  - if no improvement, consider MRI of LE (will need to go to [facility] or [facility] for MRI given body habitus)     #Obesity   - weight loss program (consider [facility] obesity clinic referral once ready for discharge: [phone])   - HbA1c - 6.3  - good but not too tight glycemic control especially in the setting of recent  steroids use and infection

## 2020-12-19 LAB
ANION GAP SERPL CALC-SCNC: 7 MMOL/L — SIGNIFICANT CHANGE UP (ref 5–17)
BUN SERPL-MCNC: 19 MG/DL — SIGNIFICANT CHANGE UP (ref 7–23)
CALCIUM SERPL-MCNC: 8 MG/DL — LOW (ref 8.5–10.1)
CHLORIDE SERPL-SCNC: 101 MMOL/L — SIGNIFICANT CHANGE UP (ref 96–108)
CO2 SERPL-SCNC: 28 MMOL/L — SIGNIFICANT CHANGE UP (ref 22–31)
CREAT SERPL-MCNC: 0.73 MG/DL — SIGNIFICANT CHANGE UP (ref 0.5–1.3)
GLUCOSE SERPL-MCNC: 179 MG/DL — HIGH (ref 70–99)
HCT VFR BLD CALC: 27.5 % — LOW (ref 34.5–45)
HGB BLD-MCNC: 8.6 G/DL — LOW (ref 11.5–15.5)
MCHC RBC-ENTMCNC: 27.7 PG — SIGNIFICANT CHANGE UP (ref 27–34)
MCHC RBC-ENTMCNC: 31.3 GM/DL — LOW (ref 32–36)
MCV RBC AUTO: 88.4 FL — SIGNIFICANT CHANGE UP (ref 80–100)
NRBC # BLD: 0 /100 WBCS — SIGNIFICANT CHANGE UP (ref 0–0)
PLATELET # BLD AUTO: 465 K/UL — HIGH (ref 150–400)
POTASSIUM SERPL-MCNC: 3.7 MMOL/L — SIGNIFICANT CHANGE UP (ref 3.5–5.3)
POTASSIUM SERPL-SCNC: 3.7 MMOL/L — SIGNIFICANT CHANGE UP (ref 3.5–5.3)
RAPID RVP RESULT: SIGNIFICANT CHANGE UP
RBC # BLD: 3.11 M/UL — LOW (ref 3.8–5.2)
RBC # FLD: 15.4 % — HIGH (ref 10.3–14.5)
SARS-COV-2 RNA SPEC QL NAA+PROBE: SIGNIFICANT CHANGE UP
SODIUM SERPL-SCNC: 136 MMOL/L — SIGNIFICANT CHANGE UP (ref 135–145)
WBC # BLD: 18.03 K/UL — HIGH (ref 3.8–10.5)
WBC # FLD AUTO: 18.03 K/UL — HIGH (ref 3.8–10.5)

## 2020-12-19 PROCEDURE — 99233 SBSQ HOSP IP/OBS HIGH 50: CPT

## 2020-12-19 RX ADMIN — Medication 3 MILLILITER(S): at 16:04

## 2020-12-19 RX ADMIN — Medication 100 MILLIGRAM(S): at 06:16

## 2020-12-19 RX ADMIN — Medication 975 MILLIGRAM(S): at 22:31

## 2020-12-19 RX ADMIN — PANTOPRAZOLE SODIUM 40 MILLIGRAM(S): 20 TABLET, DELAYED RELEASE ORAL at 11:04

## 2020-12-19 RX ADMIN — Medication 3 MILLILITER(S): at 00:01

## 2020-12-19 RX ADMIN — Medication 100 MILLIGRAM(S): at 17:59

## 2020-12-19 RX ADMIN — BUDESONIDE AND FORMOTEROL FUMARATE DIHYDRATE 2 PUFF(S): 160; 4.5 AEROSOL RESPIRATORY (INHALATION) at 06:16

## 2020-12-19 RX ADMIN — CEFEPIME 100 MILLIGRAM(S): 1 INJECTION, POWDER, FOR SOLUTION INTRAMUSCULAR; INTRAVENOUS at 14:01

## 2020-12-19 RX ADMIN — Medication 975 MILLIGRAM(S): at 21:31

## 2020-12-19 RX ADMIN — Medication 3 MILLILITER(S): at 05:27

## 2020-12-19 RX ADMIN — Medication 3 MILLILITER(S): at 21:26

## 2020-12-19 RX ADMIN — ENOXAPARIN SODIUM 40 MILLIGRAM(S): 100 INJECTION SUBCUTANEOUS at 17:59

## 2020-12-19 RX ADMIN — BUDESONIDE AND FORMOTEROL FUMARATE DIHYDRATE 2 PUFF(S): 160; 4.5 AEROSOL RESPIRATORY (INHALATION) at 18:00

## 2020-12-19 RX ADMIN — CEFEPIME 100 MILLIGRAM(S): 1 INJECTION, POWDER, FOR SOLUTION INTRAMUSCULAR; INTRAVENOUS at 21:19

## 2020-12-19 RX ADMIN — CEFEPIME 100 MILLIGRAM(S): 1 INJECTION, POWDER, FOR SOLUTION INTRAMUSCULAR; INTRAVENOUS at 06:16

## 2020-12-19 RX ADMIN — SENNA PLUS 2 TABLET(S): 8.6 TABLET ORAL at 21:19

## 2020-12-19 RX ADMIN — ENOXAPARIN SODIUM 40 MILLIGRAM(S): 100 INJECTION SUBCUTANEOUS at 06:16

## 2020-12-19 NOTE — PROGRESS NOTE ADULT - SUBJECTIVE AND OBJECTIVE BOX
Patient is a 75y old  Female who presents with a chief complaint of wound check (18 Dec 2020 10:42)      INTERVAL HPI/OVERNIGHT EVENTS: no events     MEDICATIONS  (STANDING):  ALBUTerol    90 MICROgram(s) HFA Inhaler 1 Puff(s) Inhalation every 4 hours  albuterol/ipratropium for Nebulization 3 milliLiter(s) Nebulizer every 8 hours  budesonide 160 MICROgram(s)/formoterol 4.5 MICROgram(s) Inhaler 2 Puff(s) Inhalation two times a day  cefepime   IVPB      cefepime   IVPB 2000 milliGRAM(s) IV Intermittent every 8 hours  doxycycline hyclate Capsule 100 milliGRAM(s) Oral every 12 hours  enoxaparin Injectable 40 milliGRAM(s) SubCutaneous every 12 hours  pantoprazole  Injectable 40 milliGRAM(s) IV Push daily  polyethylene glycol 3350 17 Gram(s) Oral daily  senna 2 Tablet(s) Oral at bedtime  tiotropium 18 MICROgram(s) Capsule 1 Capsule(s) Inhalation daily    MEDICATIONS  (PRN):  acetaminophen   Tablet .. 975 milliGRAM(s) Oral every 8 hours PRN Temp greater or equal to 38.5C (101.3F), Mild Pain (1 - 3)  aluminum hydroxide/magnesium hydroxide/simethicone Suspension 30 milliLiter(s) Oral every 4 hours PRN Dyspepsia      Allergies    No Known Allergies    Intolerances         Vital Signs Last 24 Hrs  T(C): 36.4 (19 Dec 2020 05:05), Max: 37.3 (19 Dec 2020 00:31)  T(F): 97.6 (19 Dec 2020 05:05), Max: 99.1 (19 Dec 2020 00:31)  HR: 72 (19 Dec 2020 05:42) (70 - 85)  BP: 108/71 (19 Dec 2020 05:05) (102/48 - 120/53)  BP(mean): --  RR: 18 (19 Dec 2020 05:05) (18 - 18)  SpO2: 97% (19 Dec 2020 05:42) (95% - 98%)    PHYSICAL EXAM:  GENERAL: NAD, well-groomed, well-developed  HEAD:  Atraumatic, Normocephalic  EYES: EOMI, PERRLA, conjunctiva and sclera clear  ENMT: No tonsillar erythema, exudates, or enlargement; Moist mucous membranes, Good dentition, No lesions  NECK: Supple, No JVD, Normal thyroid  NERVOUS SYSTEM:  Alert & Oriented X3, Good concentration; Motor Strength 5/5 B/L upper and lower extremities; DTRs 2+ intact and symmetric  CHEST/LUNG: Clear to percussion bilaterally; No rales, rhonchi, wheezing, or rubs  HEART: Regular rate and rhythm; No murmurs, rubs, or gallops  ABDOMEN: Soft, Nontender, Nondistended; Bowel sounds present  EXTREMITIES:  2+ Peripheral Pulses b/l, right LE  wound dressing c/d/i     b/l  lymphedema   LYMPH: No lymphadenopathy noted  SKIN: No rashes or lesions  LABS:                        7.9    19.41 )-----------( 406      ( 18 Dec 2020 07:34 )             24.3     12-18    136  |  100  |  20  ----------------------------<  115<H>  3.9   |  30  |  0.65    Ca    8.1<L>      18 Dec 2020 07:34    TPro  6.0  /  Alb  1.3<L>  /  TBili  0.7  /  DBili  x   /  AST  50<H>  /  ALT  95<H>  /  AlkPhos  148<H>  12-18        CAPILLARY BLOOD GLUCOSE          RADIOLOGY & ADDITIONAL TESTS:    Imaging Personally Reviewed:  [ X] YES  [ ] NO    Consultant(s) Notes Reviewed:  [ X] YES  [ ] NO    Care Discussed with Consultants/Other Providers [X ] YES  [ ] NO

## 2020-12-19 NOTE — PROGRESS NOTE ADULT - ASSESSMENT
76 y/o female w/pmhx of HTN, chronic lymphedema with RLE wounds w/cellulitis.    UTI ruled out.   UCx no growth  patient asymptomatic     12/13 --patient complaining of left sided CP and some SOB.   CE neg x 1   EKG: NSR   CXR : poor inspiratory effort, mildly congested , no e/o infiltrates or effusions (checked myself)  d-dimer elevated 1631  given duonebs   supplement oxygen via NC prn to maintain O2 sat >90 % , currently patient is saturating 96% on 2L NC   CT angio: neg for PE , Somewhat linear bilateral lower lobe opacities, suggesting atelectasis. imaging finding of esophagitis/gastritis (however patient is asymptomatic). Nonspecific bilateral peripancreatic stranding. however amylase and lipase unremarkable.   lasix 80mg IVP x1   d/c IVF     12/14 WBC is still elevated.   awaiting vanc trough @ 5pm  CXR unchanged.   lasix 80mg IVP x 1 given   added cefepime for broad spectrum coverage     12/15 patient spiked 101.8F overnight   thus far blood Cx have been negative   CXR b/l infiltrates   patient already on Vanco and cefepime   in view of elevated d-dimers/inflamm markers/high fever and transaminitis --will obtain COVID swab   obtain abd US     per  daughter, she stated that her brother was dx with COVID  2-3 weeks ago and he potentially exposed the patient as he was visiting the patient at home.     Covid negative    CT chest/us abd done, no acute findings          Assessment and Plan:     Cellulitis with open draining wound of right lower extremity , draining from wound is very minimal now   massive chronic lymphedema b/l  follows with Dr. Marquez outpatient   increasing  WBC with bandemia  remains afebrile   A1c 6.3%  - blood cultures: NGTD, repeat blood Cx also no growth   discussed with Dr. Marquez, no need for debridement at this time, continue with abx, daily wound care dressing changes  vanc trough was subtherapeutic 12/14    increased vanc  to 1250mg Q8H ,,follow  vanc trough @2100   completed   ceftriaxone   analgesics prn   discussed with ID, due to persistently elevated WBC with bandemia,  12/11 CT RLE done today w/o e/o abscess or tracking subcut emphysema   patient was unable to obtain MRI of LE due to body habitus   thus far blood Cx have been NGTD     Wheezing, atelectasis?  OHS?   - nebs treatment, wheezing resolved   s/p short steroid course    symbicort 2/2     Morbid obesity due to excess calories.    - nutrition eval appreciated   -weight loss program (consider [facility] obesity clinic referral once ready for discharge)       Essential hypertension, controlled off meds   - monitor.     Hypophosphatemia --repleted     Preventive measure.    - sq lovenox bid -dvt ppx  PT :PRASHANT  fall precautions   HOBE

## 2020-12-20 LAB
ANION GAP SERPL CALC-SCNC: 3 MMOL/L — LOW (ref 5–17)
BUN SERPL-MCNC: 20 MG/DL — SIGNIFICANT CHANGE UP (ref 7–23)
CALCIUM SERPL-MCNC: 8.2 MG/DL — LOW (ref 8.5–10.1)
CHLORIDE SERPL-SCNC: 100 MMOL/L — SIGNIFICANT CHANGE UP (ref 96–108)
CO2 SERPL-SCNC: 29 MMOL/L — SIGNIFICANT CHANGE UP (ref 22–31)
CREAT SERPL-MCNC: 0.71 MG/DL — SIGNIFICANT CHANGE UP (ref 0.5–1.3)
CULTURE RESULTS: SIGNIFICANT CHANGE UP
GLUCOSE SERPL-MCNC: 116 MG/DL — HIGH (ref 70–99)
HCT VFR BLD CALC: 27.8 % — LOW (ref 34.5–45)
HGB BLD-MCNC: 8.7 G/DL — LOW (ref 11.5–15.5)
MCHC RBC-ENTMCNC: 27.6 PG — SIGNIFICANT CHANGE UP (ref 27–34)
MCHC RBC-ENTMCNC: 31.3 GM/DL — LOW (ref 32–36)
MCV RBC AUTO: 88.3 FL — SIGNIFICANT CHANGE UP (ref 80–100)
NRBC # BLD: 0 /100 WBCS — SIGNIFICANT CHANGE UP (ref 0–0)
PLATELET # BLD AUTO: 460 K/UL — HIGH (ref 150–400)
POTASSIUM SERPL-MCNC: 4.3 MMOL/L — SIGNIFICANT CHANGE UP (ref 3.5–5.3)
POTASSIUM SERPL-SCNC: 4.3 MMOL/L — SIGNIFICANT CHANGE UP (ref 3.5–5.3)
RBC # BLD: 3.15 M/UL — LOW (ref 3.8–5.2)
RBC # FLD: 15.4 % — HIGH (ref 10.3–14.5)
SODIUM SERPL-SCNC: 132 MMOL/L — LOW (ref 135–145)
SPECIMEN SOURCE: SIGNIFICANT CHANGE UP
WBC # BLD: 17.69 K/UL — HIGH (ref 3.8–10.5)
WBC # FLD AUTO: 17.69 K/UL — HIGH (ref 3.8–10.5)

## 2020-12-20 PROCEDURE — 99233 SBSQ HOSP IP/OBS HIGH 50: CPT

## 2020-12-20 RX ADMIN — Medication 100 MILLIGRAM(S): at 17:33

## 2020-12-20 RX ADMIN — ENOXAPARIN SODIUM 40 MILLIGRAM(S): 100 INJECTION SUBCUTANEOUS at 17:35

## 2020-12-20 RX ADMIN — SENNA PLUS 2 TABLET(S): 8.6 TABLET ORAL at 21:45

## 2020-12-20 RX ADMIN — Medication 3 MILLILITER(S): at 05:47

## 2020-12-20 RX ADMIN — Medication 975 MILLIGRAM(S): at 23:34

## 2020-12-20 RX ADMIN — BUDESONIDE AND FORMOTEROL FUMARATE DIHYDRATE 2 PUFF(S): 160; 4.5 AEROSOL RESPIRATORY (INHALATION) at 17:32

## 2020-12-20 RX ADMIN — Medication 100 MILLIGRAM(S): at 06:00

## 2020-12-20 RX ADMIN — CEFEPIME 100 MILLIGRAM(S): 1 INJECTION, POWDER, FOR SOLUTION INTRAMUSCULAR; INTRAVENOUS at 06:00

## 2020-12-20 RX ADMIN — CEFEPIME 100 MILLIGRAM(S): 1 INJECTION, POWDER, FOR SOLUTION INTRAMUSCULAR; INTRAVENOUS at 13:52

## 2020-12-20 RX ADMIN — ENOXAPARIN SODIUM 40 MILLIGRAM(S): 100 INJECTION SUBCUTANEOUS at 06:00

## 2020-12-20 RX ADMIN — Medication 3 MILLILITER(S): at 14:10

## 2020-12-20 RX ADMIN — CEFEPIME 100 MILLIGRAM(S): 1 INJECTION, POWDER, FOR SOLUTION INTRAMUSCULAR; INTRAVENOUS at 21:45

## 2020-12-20 RX ADMIN — PANTOPRAZOLE SODIUM 40 MILLIGRAM(S): 20 TABLET, DELAYED RELEASE ORAL at 11:18

## 2020-12-20 RX ADMIN — POLYETHYLENE GLYCOL 3350 17 GRAM(S): 17 POWDER, FOR SOLUTION ORAL at 11:18

## 2020-12-20 RX ADMIN — BUDESONIDE AND FORMOTEROL FUMARATE DIHYDRATE 2 PUFF(S): 160; 4.5 AEROSOL RESPIRATORY (INHALATION) at 06:02

## 2020-12-20 RX ADMIN — Medication 3 MILLILITER(S): at 22:30

## 2020-12-20 NOTE — PROGRESS NOTE ADULT - ASSESSMENT
76 y/o female w/pmhx of HTN, chronic lymphedema with RLE wounds w/cellulitis.    UTI ruled out.   UCx no growth  patient asymptomatic     12/13 --patient complaining of left sided CP and some SOB.   CE neg x 1   EKG: NSR   CXR : poor inspiratory effort, mildly congested , no e/o infiltrates or effusions (checked myself)  d-dimer elevated 1631  given duonebs   supplement oxygen via NC prn to maintain O2 sat >90 % , currently patient is saturating 96% on 2L NC   CT angio: neg for PE , Somewhat linear bilateral lower lobe opacities, suggesting atelectasis. imaging finding of esophagitis/gastritis (however patient is asymptomatic). Nonspecific bilateral peripancreatic stranding. however amylase and lipase unremarkable.   lasix 80mg IVP x1   d/c IVF     12/14 WBC is still elevated.   awaiting vanc trough @ 5pm  CXR unchanged.   lasix 80mg IVP x 1 given   added cefepime for broad spectrum coverage     12/15 patient spiked 101.8F overnight   thus far blood Cx have been negative   CXR b/l infiltrates   patient already on Vanco and cefepime   in view of elevated d-dimers/inflamm markers/high fever and transaminitis --will obtain COVID swab   obtain abd US     per  daughter, she stated that her brother was dx with COVID  2-3 weeks ago and he potentially exposed the patient as he was visiting the patient at home.     Covid negative    CT chest/us abd done, no acute findings          Assessment and Plan:     Cellulitis with open draining wound of right lower extremity , draining from wound is very minimal now   massive chronic lymphedema b/l  follows with Dr. Marquez outpatient   increasing  WBC with bandemia  remains afebrile   A1c 6.3%  - blood cultures: NGTD, repeat blood Cx also no growth   discussed with Dr. Marquez, no need for debridement at this time, continue with abx, daily wound care dressing changes  vanc trough was subtherapeutic 12/14    increased vanc  to 1250mg Q8H ,,follow  vanc trough @2100   completed   ceftriaxone   analgesics prn   discussed with ID, due to persistently elevated WBC with bandemia,  12/11 CT RLE done today w/o e/o abscess or tracking subcut emphysema   patient was unable to obtain MRI of LE due to body habitus   thus far blood Cx have been NGTD     Wheezing, atelectasis?  OHS?   - nebs treatment, wheezing resolved   s/p short steroid course    symbicort 2/2     Morbid obesity due to excess calories.    - nutrition eval appreciated   -weight loss program (consider [facility] obesity clinic referral once ready for discharge)       Essential hypertension, controlled off meds   - monitor.     Hypophosphatemia --repleted     Preventive measure.    - sq lovenox bid -dvt ppx  PT :PRASAHNT  fall precautions   HOBE

## 2020-12-20 NOTE — PROGRESS NOTE ADULT - SUBJECTIVE AND OBJECTIVE BOX
Patient is a 75y old  Female who presents with a chief complaint of wound check (19 Dec 2020 10:13)      INTERVAL HPI/OVERNIGHT EVENTS: no events     MEDICATIONS  (STANDING):  ALBUTerol    90 MICROgram(s) HFA Inhaler 1 Puff(s) Inhalation every 4 hours  albuterol/ipratropium for Nebulization 3 milliLiter(s) Nebulizer every 8 hours  budesonide 160 MICROgram(s)/formoterol 4.5 MICROgram(s) Inhaler 2 Puff(s) Inhalation two times a day  cefepime   IVPB      cefepime   IVPB 2000 milliGRAM(s) IV Intermittent every 8 hours  doxycycline hyclate Capsule 100 milliGRAM(s) Oral every 12 hours  enoxaparin Injectable 40 milliGRAM(s) SubCutaneous every 12 hours  pantoprazole  Injectable 40 milliGRAM(s) IV Push daily  polyethylene glycol 3350 17 Gram(s) Oral daily  senna 2 Tablet(s) Oral at bedtime  tiotropium 18 MICROgram(s) Capsule 1 Capsule(s) Inhalation daily    MEDICATIONS  (PRN):  acetaminophen   Tablet .. 975 milliGRAM(s) Oral every 8 hours PRN Temp greater or equal to 38.5C (101.3F), Mild Pain (1 - 3)  aluminum hydroxide/magnesium hydroxide/simethicone Suspension 30 milliLiter(s) Oral every 4 hours PRN Dyspepsia      Allergies    No Known Allergies    Intolerances         Vital Signs Last 24 Hrs  T(C): 37.6 (20 Dec 2020 05:30), Max: 37.6 (20 Dec 2020 05:30)  T(F): 99.6 (20 Dec 2020 05:30), Max: 99.6 (20 Dec 2020 05:30)  HR: 70 (20 Dec 2020 05:52) (70 - 84)  BP: 116/72 (20 Dec 2020 05:30) (116/72 - 140/61)  BP(mean): --  RR: 18 (20 Dec 2020 05:30) (18 - 19)  SpO2: 97% (20 Dec 2020 05:52) (97% - 100%)    PHYSICAL EXAM:  GENERAL: NAD, well-groomed, well-developed  HEAD:  Atraumatic, Normocephalic  EYES: EOMI, PERRLA, conjunctiva and sclera clear  ENMT: No tonsillar erythema, exudates, or enlargement; Moist mucous membranes, Good dentition, No lesions  NECK: Supple, No JVD, Normal thyroid  NERVOUS SYSTEM:  Alert & Oriented X3, Good concentration; Motor Strength 5/5 B/L upper and lower extremities; DTRs 2+ intact and symmetric  CHEST/LUNG: Clear to percussion bilaterally; No rales, rhonchi, wheezing, or rubs  HEART: Regular rate and rhythm; No murmurs, rubs, or gallops  ABDOMEN: Soft, Nontender, Nondistended; Bowel sounds present  EXTREMITIES:  2+ Peripheral Pulses, No clubbing, cyanosis, or edema  LYMPH: No lymphadenopathy noted  SKIN: No rashes or lesions    LABS:                        8.7    17.69 )-----------( 460      ( 20 Dec 2020 07:01 )             27.8     12-20    132<L>  |  100  |  20  ----------------------------<  116<H>  4.3   |  29  |  0.71    Ca    8.2<L>      20 Dec 2020 07:01          CAPILLARY BLOOD GLUCOSE          RADIOLOGY & ADDITIONAL TESTS:    Imaging Personally Reviewed:  [ X] YES  [ ] NO    Consultant(s) Notes Reviewed:  [ X] YES  [ ] NO    Care Discussed with Consultants/Other Providers [X ] YES  [ ] NO

## 2020-12-21 ENCOUNTER — TRANSCRIPTION ENCOUNTER (OUTPATIENT)
Age: 75
End: 2020-12-21

## 2020-12-21 VITALS
TEMPERATURE: 99 F | HEART RATE: 90 BPM | SYSTOLIC BLOOD PRESSURE: 115 MMHG | OXYGEN SATURATION: 98 % | RESPIRATION RATE: 17 BRPM | DIASTOLIC BLOOD PRESSURE: 31 MMHG

## 2020-12-21 LAB
HCT VFR BLD CALC: 25.4 % — LOW (ref 34.5–45)
HGB BLD-MCNC: 8.1 G/DL — LOW (ref 11.5–15.5)
MCHC RBC-ENTMCNC: 27.8 PG — SIGNIFICANT CHANGE UP (ref 27–34)
MCHC RBC-ENTMCNC: 31.9 GM/DL — LOW (ref 32–36)
MCV RBC AUTO: 87.3 FL — SIGNIFICANT CHANGE UP (ref 80–100)
NRBC # BLD: 0 /100 WBCS — SIGNIFICANT CHANGE UP (ref 0–0)
PLATELET # BLD AUTO: 474 K/UL — HIGH (ref 150–400)
RBC # BLD: 2.91 M/UL — LOW (ref 3.8–5.2)
RBC # FLD: 15.3 % — HIGH (ref 10.3–14.5)
WBC # BLD: 16.56 K/UL — HIGH (ref 3.8–10.5)
WBC # FLD AUTO: 16.56 K/UL — HIGH (ref 3.8–10.5)

## 2020-12-21 PROCEDURE — 99232 SBSQ HOSP IP/OBS MODERATE 35: CPT

## 2020-12-21 PROCEDURE — 99239 HOSP IP/OBS DSCHRG MGMT >30: CPT

## 2020-12-21 RX ORDER — ALBUTEROL 90 UG/1
1 AEROSOL, METERED ORAL
Qty: 0 | Refills: 0 | DISCHARGE
Start: 2020-12-21

## 2020-12-21 RX ORDER — POLYETHYLENE GLYCOL 3350 17 G/17G
17 POWDER, FOR SOLUTION ORAL
Qty: 0 | Refills: 0 | DISCHARGE
Start: 2020-12-21

## 2020-12-21 RX ORDER — IPRATROPIUM/ALBUTEROL SULFATE 18-103MCG
3 AEROSOL WITH ADAPTER (GRAM) INHALATION
Qty: 0 | Refills: 0 | DISCHARGE
Start: 2020-12-21

## 2020-12-21 RX ORDER — BUDESONIDE AND FORMOTEROL FUMARATE DIHYDRATE 160; 4.5 UG/1; UG/1
1 AEROSOL RESPIRATORY (INHALATION)
Qty: 0 | Refills: 0 | DISCHARGE
Start: 2020-12-21

## 2020-12-21 RX ORDER — SENNA PLUS 8.6 MG/1
2 TABLET ORAL
Qty: 0 | Refills: 0 | DISCHARGE
Start: 2020-12-21

## 2020-12-21 RX ORDER — TIOTROPIUM BROMIDE 18 UG/1
1 CAPSULE ORAL; RESPIRATORY (INHALATION)
Qty: 0 | Refills: 0 | DISCHARGE
Start: 2020-12-21

## 2020-12-21 RX ORDER — BUDESONIDE AND FORMOTEROL FUMARATE DIHYDRATE 160; 4.5 UG/1; UG/1
0 AEROSOL RESPIRATORY (INHALATION)
Qty: 0 | Refills: 0 | DISCHARGE
Start: 2020-12-21

## 2020-12-21 RX ADMIN — Medication 3 MILLILITER(S): at 05:43

## 2020-12-21 RX ADMIN — Medication 975 MILLIGRAM(S): at 00:34

## 2020-12-21 RX ADMIN — BUDESONIDE AND FORMOTEROL FUMARATE DIHYDRATE 2 PUFF(S): 160; 4.5 AEROSOL RESPIRATORY (INHALATION) at 05:24

## 2020-12-21 RX ADMIN — PANTOPRAZOLE SODIUM 40 MILLIGRAM(S): 20 TABLET, DELAYED RELEASE ORAL at 12:24

## 2020-12-21 RX ADMIN — ENOXAPARIN SODIUM 40 MILLIGRAM(S): 100 INJECTION SUBCUTANEOUS at 05:23

## 2020-12-21 RX ADMIN — Medication 100 MILLIGRAM(S): at 05:24

## 2020-12-21 RX ADMIN — POLYETHYLENE GLYCOL 3350 17 GRAM(S): 17 POWDER, FOR SOLUTION ORAL at 12:24

## 2020-12-21 RX ADMIN — CEFEPIME 100 MILLIGRAM(S): 1 INJECTION, POWDER, FOR SOLUTION INTRAMUSCULAR; INTRAVENOUS at 05:23

## 2020-12-21 NOTE — DISCHARGE NOTE PROVIDER - NSDCCPCAREPLAN_GEN_ALL_CORE_FT
PRINCIPAL DISCHARGE DIAGNOSIS  Diagnosis: Cellulitis  Assessment and Plan of Treatment: completed antibiotics, follow up w Dr Marquez at wound care

## 2020-12-21 NOTE — PROGRESS NOTE ADULT - ASSESSMENT
74 y/o female w/pmhx of HTN, chronic lymphedema with RLE wounds w/cellulitis.  Fever, tachycardia  Normal WBC initially but today jumped to 16 with bandemia (perhaps in the setting of solumedrol)  Blood culture negative  UA positive but she denies any urinary symptoms   CT (I personally reviewed) lower extremity with marked edema and inflammatory changes  She has been on clinda and ceftriaxone   The wound appears to need more debridement and would benefit from a scraping and then culture of the base of the wound  Would continue antibiotics but change clindamycin to vanco for better and more optimal MRSA coverage     12/10: WBC rising but perhaps in the setting of steroids though to this extent is questionable, afebrile, evaluated again by surgery who wants to watch on antibiotics, though she had a positive UA she denies any dysuria or increased frequency. Discussed with hospitalist to perhaps stop steroids and if WBC remains elevated to repeat blood cultures and obtain CT right LE with IV contrast.  12/11: WBC increased slightly, steroids stopped, since WBC still increasing will obtain blood cultures and CT right LE with IV contrast, afebrile, right thigh wound is clean with no drainage.   Attending Addendum--  Case reviewed with NP Dolores Macias. Her note reviewed and modified as appropriate.   Patient personally assessed and examined.  CT report reviewed no deep drainable focus. Leukocytosis could be still related to steroids, though still has some early forms on diff which would not be readily explicable with steroids effect.  However their presence at this point in time seems to be in contrast to clinical picture which is one of stability. Depending on CBC/AM and clinical course will revisit transfer to rehab in am.   12/12: WBC about the same, still with some early forms. This remains in luna contrast to clinical picture which is one of stability and improvement in LE exam. If cx negative at 24h would no object to transfer to rehab to rehab where she could continue to be observed, complete a course of oral antibiotics, and have her WBC monitored.  12/14: remains on supplemental O2 via nasal cannula, complains of pain in her right thigh - wound site. The pt is afebrile, leukocytosis is worsening, no new complains, reports productive occasional cough with clear thick sputum. The pt was scheduled for MRI of right lower extremity, unable to perform. CTA was performed over the weekend, negative for PE, + Bilateral lower lobe opacities, suggesting atelectasis.   12/15: The pt spiked a fever of 101.8 yesterday evening, WBC increased 25.40, Chest Xray appears worse bilaterally (I personally reviewed) . The pt is in bed, no distress, complains of pain at right lower extremity wound site. The pt still has a cough with thick sputum, will obtain sputum culture. Vancomycin trough was 5.4 yesterday in the evening, Vancomycin dose was changed to 1250 mg IV q 8 hours, Maxipime 2 g IV q 8 hours added, vancomycin trough is scheduled for today prior evening dose. Blood cultures were collected and pending result, the pt will be tested for COVID 19. Reasonable to start steroids in the setting of worsening respiratory status, covid exposure, rising inflammatory markers, fever. Would await covid testing prior to starting remdesivir   12/16: CT reviewed (I personally reviewed) does not appear like classic covid imaging, COVID is negative, fevers getting better on antibiotics, would not give remdesivir, there are 2 more PCRs pending (not sure why so many). WBC still elevated    12/18: afebrile, WBC trending down, BC with no growth, COVID 19 testing is negative, one more repeat is pending, overall the pt is feeling better.   12/21: afebrile, WBC continues trending down, blood cultures with no growth, COVID 19 is negative, cough is improving,  at right thigh wound site       Suggestions--  #Fever/SOB/O2 requirement/rule out COVID/Cellulitis with ulcer    - continue Doxycycline 100 mg po bid - complete  - continue Cefepime 2g q8hrs - complete   - covid 19 testing - negative   - Diuresis per hospitalist   - Off supplemental O2 - doing well   - offloading to ensure no further wound development   - trend CBC with diff  - if no improvement, consider MRI of LE (will need to go to [facility] or [facility] for MRI given body habitus)     #Obesity   - weight loss program (consider [facility] obesity clinic referral once ready for discharge: [phone])   - HbA1c - 6.3  - good but not too tight glycemic control especially in the setting of recent  steroids use and infection   76 y/o female w/pmhx of HTN, chronic lymphedema with RLE wounds w/cellulitis.  Fever, tachycardia  Normal WBC initially but today jumped to 16 with bandemia (perhaps in the setting of solumedrol)  Blood culture negative  UA positive but she denies any urinary symptoms   CT (I personally reviewed) lower extremity with marked edema and inflammatory changes  She has been on clinda and ceftriaxone   The wound appears to need more debridement and would benefit from a scraping and then culture of the base of the wound  Would continue antibiotics but change clindamycin to vanco for better and more optimal MRSA coverage     12/10: WBC rising but perhaps in the setting of steroids though to this extent is questionable, afebrile, evaluated again by surgery who wants to watch on antibiotics, though she had a positive UA she denies any dysuria or increased frequency. Discussed with hospitalist to perhaps stop steroids and if WBC remains elevated to repeat blood cultures and obtain CT right LE with IV contrast.  12/11: WBC increased slightly, steroids stopped, since WBC still increasing will obtain blood cultures and CT right LE with IV contrast, afebrile, right thigh wound is clean with no drainage.   CT report reviewed no deep drainable focus. Leukocytosis could be still related to steroids, though still has some early forms on diff which would not be readily explicable with steroids effect.  However their presence at this point in time seems to be in contrast to clinical picture which is one of stability. Depending on CBC/AM and clinical course will revisit transfer to rehab in am.   12/12: WBC about the same, still with some early forms. This remains in luna contrast to clinical picture which is one of stability and improvement in LE exam. If cx negative at 24h would no object to transfer to rehab to rehab where she could continue to be observed, complete a course of oral antibiotics, and have her WBC monitored.  12/14: remains on supplemental O2 via nasal cannula, complains of pain in her right thigh - wound site. The pt is afebrile, leukocytosis is worsening, no new complains, reports productive occasional cough with clear thick sputum. The pt was scheduled for MRI of right lower extremity, unable to perform. CTA was performed over the weekend, negative for PE, + Bilateral lower lobe opacities, suggesting atelectasis.   12/15: The pt spiked a fever of 101.8 yesterday evening, WBC increased 25.40, Chest Xray appears worse bilaterally (I personally reviewed) . The pt is in bed, no distress, complains of pain at right lower extremity wound site. The pt still has a cough with thick sputum, will obtain sputum culture. Vancomycin trough was 5.4 yesterday in the evening, Vancomycin dose was changed to 1250 mg IV q 8 hours, Maxipime 2 g IV q 8 hours added, vancomycin trough is scheduled for today prior evening dose. Blood cultures were collected and pending result, the pt will be tested for COVID 19. Reasonable to start steroids in the setting of worsening respiratory status, covid exposure, rising inflammatory markers, fever. Would await covid testing prior to starting remdesivir   12/16: CT reviewed (I personally reviewed) does not appear like classic covid imaging, COVID is negative, fevers getting better on antibiotics, would not give remdesivir, there are 2 more PCRs pending (not sure why so many). WBC still elevated    12/18: afebrile, WBC trending down, BC with no growth, COVID 19 testing is negative, one more repeat is pending, overall the pt is feeling better.   12/21: afebrile, WBC continues trending down, blood cultures with no growth, COVID 19 is negative, cough is improving,  at right thigh wound site     Attending Addendum--  Case reviewed with NP Dolores Macias. Her note reviewed and modified as appropriate.   Patient personally assessed and examined.   No concern of uncontrolled infection on exam, though exam limited by body habitus. Suspect leukocytosis is reactive in nature given concomitant thrombocytosis. I see no role for additional antibiotics at this time. I have no ID objection to discharge to rehab, where patient can be adequately monitored for any superimposed infection.     Suggestions--  #Fever/SOB/O2 requirement/rule out COVID/Cellulitis with ulcer    - continue Doxycycline 100 mg po bid - complete  - continue Cefepime 2g q8hrs - complete   - covid 19 testing - negative   - Diuresis per hospitalist   - Off supplemental O2 - doing well   - offloading to ensure no further wound development   - trend CBC with diff  - if no improvement, consider MRI of LE (will need to go to [facility] or [facility] for MRI given body habitus)     #Obesity   - weight loss program (consider [facility] obesity clinic referral once ready for discharge: [phone])   - HbA1c - 6.3  - good but not too tight glycemic control especially in the setting of recent  steroids use and infection    Hugo Gaines MD  Attending Physician  [facility]  Division of Infectious Diseases  [phone]

## 2020-12-21 NOTE — DISCHARGE NOTE PROVIDER - NSDCMRMEDTOKEN_GEN_ALL_CORE_FT
albuterol 90 mcg/inh inhalation aerosol: 1 puff(s) inhaled every 4 hours  aluminum hydroxide-magnesium hydroxide 200 mg-200 mg/5 mL oral suspension: 30 milliliter(s) orally every 4 hours, As needed, Dyspepsia  budesonide-formoterol 160 mcg-4.5 mcg/inh inhalation aerosol:  inhaled   ipratropium-albuterol 0.5 mg-2.5 mg/3 mLinhalation solution: 3 milliliter(s) inhaled every 8 hours  polyethylene glycol 3350 oral powder for reconstitution: 17 gram(s) orally once a day  senna oral tablet: 2 tab(s) orally once a day (at bedtime)  tiotropium 18 mcg inhalation capsule: 1 cap(s) inhaled once a day

## 2020-12-21 NOTE — DISCHARGE NOTE PROVIDER - DETAILS OF MALNUTRITION DIAGNOSIS/DIAGNOSES
This patient has been assessed with a concern for Malnutrition and was treated during this hospitalization for the following Nutrition diagnosis/diagnoses:     -  12/08/2020: Morbid obesity/BMI > 40

## 2020-12-21 NOTE — CHART NOTE - NSCHARTNOTESELECT_GEN_ALL_CORE
Malnutrition Notification
Event Note
ICU attendance RRT/Event Note
Midline Removal/Event Note
Nutrition Services
Rapid Response
midline/Event Note

## 2020-12-21 NOTE — PROGRESS NOTE ADULT - SUBJECTIVE AND OBJECTIVE BOX
ALICIA GONZALEZ  MRN-[id number]    Follow Up:  right lower extremity wound, cellulitis     Interval History: The pt is doing better, off supplemental O2 with O2 SAT 95%, afebrile, WBC is trending down 16.56, COVID 19 testing is negative. Pt has no new complains, states that her cough is improving - occasional, no sputum, right lower extremity wound with drainage, tender, less of erythema or warmth around the wound.     ROS:    [ ] Unobtainable because:  [ ] All other systems negative    Constitutional: no fever, no chills  Head: no trauma  Eyes: no vision changes, no eye pain  ENT:  no sore throat, no rhinorrhea  Cardiovascular:  no chest pain, no palpitation  Respiratory:  no SOB, occasional cough  GI:  no abd pain, no vomiting, no diarrhea, + constipation   urinary: no dysuria, no hematuria, no flank pain  musculoskeletal:  no joint pain, no joint swelling  skin:  no rash, right leg wound / pain   neurology:  no headache, no seizure, no change in mental status  psych: no anxiety, no depression         Allergies  No Known Allergies        ANTIMICROBIALS:  cefepime   IVPB    cefepime   IVPB 2000 every 8 hours  doxycycline hyclate Capsule 100 every 12 hours      OTHER MEDS:  acetaminophen   Tablet .. 975 milliGRAM(s) Oral every 8 hours PRN  ALBUTerol    90 MICROgram(s) HFA Inhaler 1 Puff(s) Inhalation every 4 hours  albuterol/ipratropium for Nebulization 3 milliLiter(s) Nebulizer every 8 hours  aluminum hydroxide/magnesium hydroxide/simethicone Suspension 30 milliLiter(s) Oral every 4 hours PRN  budesonide 160 MICROgram(s)/formoterol 4.5 MICROgram(s) Inhaler 2 Puff(s) Inhalation two times a day  enoxaparin Injectable 40 milliGRAM(s) SubCutaneous every 12 hours  pantoprazole  Injectable 40 milliGRAM(s) IV Push daily  polyethylene glycol 3350 17 Gram(s) Oral daily  senna 2 Tablet(s) Oral at bedtime  tiotropium 18 MICROgram(s) Capsule 1 Capsule(s) Inhalation daily      Vital Signs Last 24 Hrs  T(C): 37.1 (21 Dec 2020 11:34), Max: 37.3 (20 Dec 2020 23:39)  T(F): 98.8 (21 Dec 2020 11:34), Max: 99.2 (20 Dec 2020 23:39)  HR: 90 (21 Dec 2020 11:34) (80 - 90)  BP: 115/31 (21 Dec 2020 11:34) (105/69 - 120/64)  BP(mean): --  RR: 17 (21 Dec 2020 11:34) (17 - 18)  SpO2: 98% (21 Dec 2020 11:34) (97% - 100%)    Physical Exam:  General:    NAD,  non toxic, morbidly obese  Head: atraumatic, normocephalic  Eye: normal sclera and conjunctiva  ENT:    no oral lesions, neck supple  Cardio:     regular S1, S2,  no murmur  Respiratory:   off supplemental O2, somewhat diminished bilaterally   abd:     soft,   BS +,   no tenderness  :   no CVAT,  no suprapubic tenderness,   no  blum  Musculoskeletal:   no joint swelling,   +edema / lymphedema no change  vascular: no central lines, +PIV   Skin:    large and deep ulcer on the posteromedial aspect of right leg with mild drainage, chronic lymphedema, the surrounding skin is still warm but improving, chronic appearing skin changes   Neurologic:     no focal deficit  psych: normal affect    WBC Count: 16.56 K/uL (12-21 @ 07:47)  WBC Count: 17.69 K/uL (12-20 @ 07:01)  WBC Count: 18.03 K/uL (12-19 @ 10:41)  WBC Count: 19.41 K/uL (12-18 @ 07:34)  WBC Count: 22.55 K/uL (12-17 @ 08:22)  WBC Count: 23.82 K/uL (12-16 @ 07:52)  WBC Count: 25.93 K/uL (12-15 @ 10:15)                            8.1    16.56 )-----------( 474      ( 21 Dec 2020 07:47 )             25.4       12-20    132<L>  |  100  |  20  ----------------------------<  116<H>  4.3   |  29  |  0.71    Ca    8.2<L>      20 Dec 2020 07:01            Creatinine Trend: 0.71<--, 0.73<--, 0.65<--, 0.59<--, 0.54<--, 0.64<--      MICROBIOLOGY:  v  .Blood Blood-Peripheral  12-15-20   No Growth Final  --  --      .Blood Blood-Peripheral  12-15-20   No Growth Final  --  --      .Blood Blood-Peripheral... one aerobic botle rec'd  12-15-20   No Growth Final  --  --      .Blood Blood  12-12-20   No Growth Final  --  --      .Blood Blood  12-11-20   No Growth Final  --  --      .Urine Catheterized  12-07-20   <10,000 CFU/mL Normal Urogenital Andreea  --  --      .Blood Blood-Peripheral  12-06-20   No Growth Final  --  --          Rapid RVP Result: NotDetec (12-19 @ 09:04)      COVID-19 PCR: NotDetec (12-16)  COVID-19 PCR: NotDetec (12-15)  COVID-19 PCR: NotDetec (12-09)    C-Reactive Protein, Serum: 21.81 (12-16)  C-Reactive Protein, Serum: 22.82 (12-15)  C-Reactive Protein, Serum: 19.29 (12-14)  C-Reactive Protein, Serum: 19.50 (12-13)  C-Reactive Protein, Serum: 17.02 (12-12)  C-Reactive Protein, Serum: 31.61 (12-09)    Ferritin, Serum: 938 (12-16)  Ferritin, Serum: 935 (12-15)      D-Dimer Assay, Quantitative: 1631 (12-13)    Procalcitonin, Serum: 1.82 (12-15-20 @ 14:36)  Procalcitonin, Serum: 2.01 (12-14-20 @ 17:06)      RADIOLOGY:

## 2020-12-21 NOTE — PROGRESS NOTE ADULT - NUTRITIONAL ASSESSMENT
This patient has been assessed with a concern for Malnutrition and has been determined to have a diagnosis/diagnoses of Morbid obesity/BMI > 40.    This patient is being managed with:   Diet DASH/TLC-  Sodium & Cholesterol Restricted  Soft  Entered: Dec  8 2020 11:55AM    
This patient has been assessed with a concern for Malnutrition and has been determined to have a diagnosis/diagnoses of Morbid obesity/BMI > 40.    This patient is being managed with:   Diet NPO after Midnight-     NPO Start Date: 15-Dec-2020   NPO Start Time: 23:59  Entered: Dec 15 2020  6:21PM    Diet DASH/TLC-  Sodium & Cholesterol Restricted  Soft  Entered: Dec  8 2020 11:55AM    
This patient has been assessed with a concern for Malnutrition and has been determined to have a diagnosis/diagnoses of Morbid obesity/BMI > 40.    This patient is being managed with:   Diet DASH/TLC-  Sodium & Cholesterol Restricted  Soft  Entered: Dec  8 2020 11:55AM    
This patient has been assessed with a concern for Malnutrition and has been determined to have a diagnosis/diagnoses of Morbid obesity/BMI > 40.    This patient is being managed with:   Diet DASH/TLC-  Sodium & Cholesterol Restricted  Soft  Entered: Dec  8 2020 11:55AM

## 2020-12-21 NOTE — CHART NOTE - NSCHARTNOTEFT_GEN_A_CORE
Midline catheter removed from right upper extremity. Patient was explained about procedure and tolerated procedure well. Midline catheter removed with no purulence or drainage noted on catheter tip. No blood or pus noted at site. Pressure held at site for >5 minutes, no bleeding noted. Occlusive dressing placed at site.

## 2020-12-21 NOTE — DISCHARGE NOTE PROVIDER - HOSPITAL COURSE
74 y/o female w/pmhx of HTN, chronic lymphedema with RLE wounds w/cellulitis.    UTI ruled out.   UCx no growth  patient asymptomatic       d-dimer elevated 1631  given duonebs   supplement oxygen via NC prn to maintain O2 sat >90 % , currently patient is saturating 96% on 2L NC   CT angio: neg for PE , Somewhat linear bilateral lower lobe opacities, suggesting atelectasis. imaging finding of esophagitis/gastritis (however patient is asymptomatic). Nonspecific bilateral peripancreatic stranding. however amylase and lipase unremarkable.     Covid negative    CT chest/us abd done, no acute findings          Assessment and Plan:   Sepsis POA  Cellulitis with open draining wound of right lower extremity , draining from wound is very minimal now   massive chronic lymphedema b/l  follows with Dr. Marquez outpatient   increasing  WBC with bandemia  completed course of abx  d/w id pt stable for dc off of abx to rehab    patient also treated to suspected gram neg pna     Wheezing, atelectasis?  OHS?   - nebs treatment, wheezing resolved    symbicort 2/2     Morbid obesity due to excess calories.    - nutrition eval appreciated   -weight loss program (consider [facility] obesity clinic referral once ready for discharge)       Essential hypertension, controlled off meds   - monitor.     Hypophosphatemia --repleted     Preventive measure.    - sq lovenox bid -dvt ppx  PT :PRASHANT  fall precautions   HOBE         45min spent on dc plan

## 2020-12-21 NOTE — DISCHARGE NOTE NURSING/CASE MANAGEMENT/SOCIAL WORK - PATIENT PORTAL LINK FT
You can access the FollowMyHealth Patient Portal offered by [facility] by registering at the following website: [email or web address]. By joining [facility]’s FollowMyHealth portal, you will also be able to view your health information using other applications (apps) compatible with our system.

## 2020-12-21 NOTE — PROGRESS NOTE ADULT - REASON FOR ADMISSION
wound check

## 2020-12-21 NOTE — PROGRESS NOTE ADULT - PROVIDER SPECIALTY LIST ADULT
Hospitalist
Infectious Disease
Surgery
Surgery
Hospitalist
Infectious Disease
Hospitalist

## 2020-12-21 NOTE — DISCHARGE NOTE PROVIDER - NSFOLLOWUPCLINICS_GEN_ALL_ED_FT
[facility]  Wound Care  [address] [place] [address]  Phone: [phone]  Fax: [phone]  Follow Up Time:

## 2020-12-30 DIAGNOSIS — J98.11 ATELECTASIS: ICD-10-CM

## 2020-12-30 DIAGNOSIS — L03.115 CELLULITIS OF RIGHT LOWER LIMB: ICD-10-CM

## 2020-12-30 DIAGNOSIS — Z96.653 PRESENCE OF ARTIFICIAL KNEE JOINT, BILATERAL: ICD-10-CM

## 2020-12-30 DIAGNOSIS — E66.2 MORBID (SEVERE) OBESITY WITH ALVEOLAR HYPOVENTILATION: ICD-10-CM

## 2020-12-30 DIAGNOSIS — I89.0 LYMPHEDEMA, NOT ELSEWHERE CLASSIFIED: ICD-10-CM

## 2020-12-30 DIAGNOSIS — R06.2 WHEEZING: ICD-10-CM

## 2020-12-30 DIAGNOSIS — L03.90 CELLULITIS, UNSPECIFIED: ICD-10-CM

## 2020-12-30 DIAGNOSIS — I10 ESSENTIAL (PRIMARY) HYPERTENSION: ICD-10-CM

## 2020-12-30 DIAGNOSIS — E83.39 OTHER DISORDERS OF PHOSPHORUS METABOLISM: ICD-10-CM

## 2021-01-01 NOTE — H&P ADULT - NSHPOUTPATIENTPROVIDERS_GEN_ALL_CORE
[facility] PEDIATRICS PROGRESS NOTE     Name: Samuel Toro            Age: 1 days MRN: [id number]             Sex: female BW: 3609 g (7 lb 15.3 oz)              MEL: Gestational Age: 39w6d Pediatrician: Martell Sharif MD    Age: 33 hours     Nursing concerns: Having trouble with latch and hold-will be seen by Lactation again today-spoe with Lactation Nurse     Feeding Method: breastfeeding      I/O (last 24 hours): No intake or output data in the 24 hours ending 21     Birth weight: 3609 g (7 lb 15.3 oz)   Current weight: 3453 g (7 lb 9.8 oz)   Weight change since birth: -4%     Current Vitals:   BP      Temp      Pulse     Resp      SpO2         Physical Exam:    General Appearance  alert   Skin  jaundice   Head  AF open and flat   Eyes  pupils equal and reactive, red reflex normal bilaterally   ENT  oropharynx normal   Lungs  no wheezes, rales, or rhonchi, no tachypnea, retractions, or cyanosis   Heart  regular rate and rhythm, normal S1 and S2, no murmur   Abdomen (including umbilicus) Normal bowel sounds, soft, nondistended, no mass, no organomegaly. and soft   Genitalia  normal female exam   Anus  normal and patent   Trunk/Spine  spine normal, symmetric   Extremities Ortolani's and Rodney's signs absent bilaterally and thigh & gluteal folds symmetrical   Reflexes symmetric Delmy, normal root and suck      TCI: TcB Point of Care testing:  (7.3 @ 29hr)       Labs:   Lab Results (most recent)     Procedure Component Value Units Date/Time    Bilirubin,  Panel [[id number]] Collected: 21 0536    Specimen: Blood Updated: 21 0640     Bilirubin, Direct 0.3 mg/dL      Comment: Specimen hemolyzed. Results may be affected.        Bilirubin, Indirect 7.0 mg/dL      Total Bilirubin 7.3 mg/dL     Bilirubin,  Panel [[id number]] Collected: 21 0130    Specimen: Blood from Foot, Left Updated: 21 0242     Bilirubin, Direct 0.3 mg/dL      Comment: Specimen hemolyzed.  Results may be affected.        Bilirubin, Indirect 6.4 mg/dL      Total Bilirubin 6.7 mg/dL            Imaging:   Imaging Results (Last 72 Hours)     ** No results found for the last 72 hours. **             Assessment:   Active Problems:      Overview:    Term  delivered vaginally, current hospitalization  Overview:  Resolved Problems:    * No resolved hospital problems. *       Plan:   Continue routine care.   LACTATION.   Will work with Lactation on hold and latch              JAUNDICE               No HDN               7.3 at 30 hrs               No PTX required               TEMPERATURE                Some temperature variation yesterday--low                Treated with wrap and stocking cap                Temp stable this AM                 Want to observe another 24 hrs to insure temp stable        Martell Sharif MD   2021   09:38 EDT     Dr. Marquez- wound MD [facility]

## 2021-05-20 ENCOUNTER — OUTPATIENT (OUTPATIENT)
Dept: OUTPATIENT SERVICES | Facility: HOSPITAL | Age: 76
LOS: 1 days | Discharge: HOME | End: 2021-05-20

## 2021-05-20 DIAGNOSIS — L89.90 PRESSURE ULCER OF UNSPECIFIED SITE, UNSPECIFIED STAGE: ICD-10-CM

## 2021-05-20 DIAGNOSIS — Z96.653 PRESENCE OF ARTIFICIAL KNEE JOINT, BILATERAL: Chronic | ICD-10-CM

## 2021-05-21 DIAGNOSIS — Z79.899 OTHER LONG TERM (CURRENT) DRUG THERAPY: ICD-10-CM

## 2021-05-21 DIAGNOSIS — I89.0 LYMPHEDEMA, NOT ELSEWHERE CLASSIFIED: ICD-10-CM

## 2021-05-21 DIAGNOSIS — M79.604 PAIN IN RIGHT LEG: ICD-10-CM

## 2021-05-21 DIAGNOSIS — Z82.49 FAMILY HISTORY OF ISCHEMIC HEART DISEASE AND OTHER DISEASES OF THE CIRCULATORY SYSTEM: ICD-10-CM

## 2021-05-21 DIAGNOSIS — E66.01 MORBID (SEVERE) OBESITY DUE TO EXCESS CALORIES: ICD-10-CM

## 2021-05-21 DIAGNOSIS — I10 ESSENTIAL (PRIMARY) HYPERTENSION: ICD-10-CM

## 2021-05-21 DIAGNOSIS — Z84.1 FAMILY HISTORY OF DISORDERS OF KIDNEY AND URETER: ICD-10-CM

## 2021-05-21 DIAGNOSIS — L92.9 GRANULOMATOUS DISORDER OF THE SKIN AND SUBCUTANEOUS TISSUE, UNSPECIFIED: ICD-10-CM

## 2021-05-21 DIAGNOSIS — R60.9 EDEMA, UNSPECIFIED: ICD-10-CM

## 2021-05-21 DIAGNOSIS — L97.112 NON-PRESSURE CHRONIC ULCER OF RIGHT THIGH WITH FAT LAYER EXPOSED: ICD-10-CM

## 2021-05-21 DIAGNOSIS — L03.115 CELLULITIS OF RIGHT LOWER LIMB: ICD-10-CM

## 2021-05-27 ENCOUNTER — OUTPATIENT (OUTPATIENT)
Dept: OUTPATIENT SERVICES | Facility: HOSPITAL | Age: 76
LOS: 1 days | Discharge: HOME | End: 2021-05-27

## 2021-05-27 DIAGNOSIS — Z96.653 PRESENCE OF ARTIFICIAL KNEE JOINT, BILATERAL: Chronic | ICD-10-CM

## 2021-05-27 DIAGNOSIS — L89.90 PRESSURE ULCER OF UNSPECIFIED SITE, UNSPECIFIED STAGE: ICD-10-CM

## 2021-05-28 DIAGNOSIS — L97.112 NON-PRESSURE CHRONIC ULCER OF RIGHT THIGH WITH FAT LAYER EXPOSED: ICD-10-CM

## 2021-05-28 DIAGNOSIS — M79.604 PAIN IN RIGHT LEG: ICD-10-CM

## 2021-05-28 DIAGNOSIS — E66.01 MORBID (SEVERE) OBESITY DUE TO EXCESS CALORIES: ICD-10-CM

## 2021-05-28 DIAGNOSIS — L92.9 GRANULOMATOUS DISORDER OF THE SKIN AND SUBCUTANEOUS TISSUE, UNSPECIFIED: ICD-10-CM

## 2021-05-28 DIAGNOSIS — L03.115 CELLULITIS OF RIGHT LOWER LIMB: ICD-10-CM

## 2021-05-28 DIAGNOSIS — Z79.899 OTHER LONG TERM (CURRENT) DRUG THERAPY: ICD-10-CM

## 2021-05-28 DIAGNOSIS — I89.0 LYMPHEDEMA, NOT ELSEWHERE CLASSIFIED: ICD-10-CM

## 2021-05-28 DIAGNOSIS — R60.9 EDEMA, UNSPECIFIED: ICD-10-CM

## 2021-05-28 DIAGNOSIS — I10 ESSENTIAL (PRIMARY) HYPERTENSION: ICD-10-CM

## 2021-06-02 ENCOUNTER — OUTPATIENT (OUTPATIENT)
Dept: OUTPATIENT SERVICES | Facility: HOSPITAL | Age: 76
LOS: 1 days | Discharge: HOME | End: 2021-06-02

## 2021-06-02 DIAGNOSIS — Z96.653 PRESENCE OF ARTIFICIAL KNEE JOINT, BILATERAL: Chronic | ICD-10-CM

## 2021-06-02 DIAGNOSIS — L89.90 PRESSURE ULCER OF UNSPECIFIED SITE, UNSPECIFIED STAGE: ICD-10-CM

## 2021-06-08 ENCOUNTER — OUTPATIENT (OUTPATIENT)
Dept: OUTPATIENT SERVICES | Facility: HOSPITAL | Age: 76
LOS: 1 days | Discharge: HOME | End: 2021-06-08

## 2021-06-08 DIAGNOSIS — Z79.899 OTHER LONG TERM (CURRENT) DRUG THERAPY: ICD-10-CM

## 2021-06-08 DIAGNOSIS — Z96.653 PRESENCE OF ARTIFICIAL KNEE JOINT, BILATERAL: Chronic | ICD-10-CM

## 2021-06-08 DIAGNOSIS — E66.01 MORBID (SEVERE) OBESITY DUE TO EXCESS CALORIES: ICD-10-CM

## 2021-06-08 DIAGNOSIS — L97.112 NON-PRESSURE CHRONIC ULCER OF RIGHT THIGH WITH FAT LAYER EXPOSED: ICD-10-CM

## 2021-06-08 DIAGNOSIS — I89.0 LYMPHEDEMA, NOT ELSEWHERE CLASSIFIED: ICD-10-CM

## 2021-06-08 DIAGNOSIS — R60.9 EDEMA, UNSPECIFIED: ICD-10-CM

## 2021-06-08 DIAGNOSIS — L89.90 PRESSURE ULCER OF UNSPECIFIED SITE, UNSPECIFIED STAGE: ICD-10-CM

## 2021-06-08 DIAGNOSIS — I10 ESSENTIAL (PRIMARY) HYPERTENSION: ICD-10-CM

## 2021-06-08 DIAGNOSIS — M79.604 PAIN IN RIGHT LEG: ICD-10-CM

## 2021-06-10 DIAGNOSIS — E66.01 MORBID (SEVERE) OBESITY DUE TO EXCESS CALORIES: ICD-10-CM

## 2021-06-10 DIAGNOSIS — L97.112 NON-PRESSURE CHRONIC ULCER OF RIGHT THIGH WITH FAT LAYER EXPOSED: ICD-10-CM

## 2021-06-10 DIAGNOSIS — R60.9 EDEMA, UNSPECIFIED: ICD-10-CM

## 2021-06-10 DIAGNOSIS — Z79.899 OTHER LONG TERM (CURRENT) DRUG THERAPY: ICD-10-CM

## 2021-06-10 DIAGNOSIS — L92.9 GRANULOMATOUS DISORDER OF THE SKIN AND SUBCUTANEOUS TISSUE, UNSPECIFIED: ICD-10-CM

## 2021-06-10 DIAGNOSIS — M79.604 PAIN IN RIGHT LEG: ICD-10-CM

## 2021-06-10 DIAGNOSIS — I10 ESSENTIAL (PRIMARY) HYPERTENSION: ICD-10-CM

## 2021-06-10 DIAGNOSIS — I89.0 LYMPHEDEMA, NOT ELSEWHERE CLASSIFIED: ICD-10-CM

## 2021-06-10 DIAGNOSIS — L03.115 CELLULITIS OF RIGHT LOWER LIMB: ICD-10-CM

## 2021-06-23 ENCOUNTER — OUTPATIENT (OUTPATIENT)
Dept: OUTPATIENT SERVICES | Facility: HOSPITAL | Age: 76
LOS: 1 days | Discharge: HOME | End: 2021-06-23

## 2021-06-23 DIAGNOSIS — Z96.653 PRESENCE OF ARTIFICIAL KNEE JOINT, BILATERAL: Chronic | ICD-10-CM

## 2021-06-23 DIAGNOSIS — L89.90 PRESSURE ULCER OF UNSPECIFIED SITE, UNSPECIFIED STAGE: ICD-10-CM

## 2021-06-23 DIAGNOSIS — E66.01 MORBID (SEVERE) OBESITY DUE TO EXCESS CALORIES: ICD-10-CM

## 2021-06-23 DIAGNOSIS — R60.9 EDEMA, UNSPECIFIED: ICD-10-CM

## 2021-06-23 DIAGNOSIS — L03.115 CELLULITIS OF RIGHT LOWER LIMB: ICD-10-CM

## 2021-06-23 DIAGNOSIS — L97.112 NON-PRESSURE CHRONIC ULCER OF RIGHT THIGH WITH FAT LAYER EXPOSED: ICD-10-CM

## 2021-06-23 DIAGNOSIS — I89.0 LYMPHEDEMA, NOT ELSEWHERE CLASSIFIED: ICD-10-CM

## 2021-06-23 DIAGNOSIS — Z79.899 OTHER LONG TERM (CURRENT) DRUG THERAPY: ICD-10-CM

## 2021-06-23 DIAGNOSIS — M79.604 PAIN IN RIGHT LEG: ICD-10-CM

## 2021-06-23 DIAGNOSIS — I10 ESSENTIAL (PRIMARY) HYPERTENSION: ICD-10-CM

## 2021-06-30 ENCOUNTER — OUTPATIENT (OUTPATIENT)
Dept: OUTPATIENT SERVICES | Facility: HOSPITAL | Age: 76
LOS: 1 days | Discharge: HOME | End: 2021-06-30

## 2021-06-30 DIAGNOSIS — Z96.653 PRESENCE OF ARTIFICIAL KNEE JOINT, BILATERAL: Chronic | ICD-10-CM

## 2021-06-30 DIAGNOSIS — L89.90 PRESSURE ULCER OF UNSPECIFIED SITE, UNSPECIFIED STAGE: ICD-10-CM

## 2021-07-01 DIAGNOSIS — E66.01 MORBID (SEVERE) OBESITY DUE TO EXCESS CALORIES: ICD-10-CM

## 2021-07-01 DIAGNOSIS — R60.9 EDEMA, UNSPECIFIED: ICD-10-CM

## 2021-07-01 DIAGNOSIS — L97.112 NON-PRESSURE CHRONIC ULCER OF RIGHT THIGH WITH FAT LAYER EXPOSED: ICD-10-CM

## 2021-07-01 DIAGNOSIS — I10 ESSENTIAL (PRIMARY) HYPERTENSION: ICD-10-CM

## 2021-07-01 DIAGNOSIS — M79.604 PAIN IN RIGHT LEG: ICD-10-CM

## 2021-07-01 DIAGNOSIS — Z79.899 OTHER LONG TERM (CURRENT) DRUG THERAPY: ICD-10-CM

## 2021-07-01 DIAGNOSIS — I89.0 LYMPHEDEMA, NOT ELSEWHERE CLASSIFIED: ICD-10-CM

## 2021-07-13 ENCOUNTER — OUTPATIENT (OUTPATIENT)
Dept: OUTPATIENT SERVICES | Facility: HOSPITAL | Age: 76
LOS: 1 days | Discharge: HOME | End: 2021-07-13

## 2021-07-13 DIAGNOSIS — L89.90 PRESSURE ULCER OF UNSPECIFIED SITE, UNSPECIFIED STAGE: ICD-10-CM

## 2021-07-13 DIAGNOSIS — Z96.653 PRESENCE OF ARTIFICIAL KNEE JOINT, BILATERAL: Chronic | ICD-10-CM

## 2021-07-22 DIAGNOSIS — M79.604 PAIN IN RIGHT LEG: ICD-10-CM

## 2021-07-22 DIAGNOSIS — I89.0 LYMPHEDEMA, NOT ELSEWHERE CLASSIFIED: ICD-10-CM

## 2021-07-22 DIAGNOSIS — L92.9 GRANULOMATOUS DISORDER OF THE SKIN AND SUBCUTANEOUS TISSUE, UNSPECIFIED: ICD-10-CM

## 2021-07-22 DIAGNOSIS — L97.112 NON-PRESSURE CHRONIC ULCER OF RIGHT THIGH WITH FAT LAYER EXPOSED: ICD-10-CM

## 2021-07-22 DIAGNOSIS — E66.01 MORBID (SEVERE) OBESITY DUE TO EXCESS CALORIES: ICD-10-CM

## 2021-07-22 DIAGNOSIS — Z79.899 OTHER LONG TERM (CURRENT) DRUG THERAPY: ICD-10-CM

## 2021-07-22 DIAGNOSIS — I10 ESSENTIAL (PRIMARY) HYPERTENSION: ICD-10-CM

## 2021-07-22 DIAGNOSIS — R60.9 EDEMA, UNSPECIFIED: ICD-10-CM

## 2021-08-11 ENCOUNTER — OUTPATIENT (OUTPATIENT)
Dept: OUTPATIENT SERVICES | Facility: HOSPITAL | Age: 76
LOS: 1 days | Discharge: HOME | End: 2021-08-11

## 2021-08-11 DIAGNOSIS — L89.90 PRESSURE ULCER OF UNSPECIFIED SITE, UNSPECIFIED STAGE: ICD-10-CM

## 2021-08-11 DIAGNOSIS — Z96.653 PRESENCE OF ARTIFICIAL KNEE JOINT, BILATERAL: Chronic | ICD-10-CM

## 2021-08-12 ENCOUNTER — INPATIENT (INPATIENT)
Facility: HOSPITAL | Age: 76
LOS: 4 days | Discharge: HOME | End: 2021-08-17
Attending: INTERNAL MEDICINE | Admitting: INTERNAL MEDICINE
Payer: MEDICARE

## 2021-08-12 VITALS
WEIGHT: 293 LBS | TEMPERATURE: 103 F | SYSTOLIC BLOOD PRESSURE: 149 MMHG | HEART RATE: 106 BPM | RESPIRATION RATE: 19 BRPM | OXYGEN SATURATION: 95 % | DIASTOLIC BLOOD PRESSURE: 70 MMHG | HEIGHT: 63 IN

## 2021-08-12 DIAGNOSIS — Z79.899 OTHER LONG TERM (CURRENT) DRUG THERAPY: ICD-10-CM

## 2021-08-12 DIAGNOSIS — M79.604 PAIN IN RIGHT LEG: ICD-10-CM

## 2021-08-12 DIAGNOSIS — R60.9 EDEMA, UNSPECIFIED: ICD-10-CM

## 2021-08-12 DIAGNOSIS — L97.112 NON-PRESSURE CHRONIC ULCER OF RIGHT THIGH WITH FAT LAYER EXPOSED: ICD-10-CM

## 2021-08-12 DIAGNOSIS — E66.01 MORBID (SEVERE) OBESITY DUE TO EXCESS CALORIES: ICD-10-CM

## 2021-08-12 DIAGNOSIS — Z96.653 PRESENCE OF ARTIFICIAL KNEE JOINT, BILATERAL: Chronic | ICD-10-CM

## 2021-08-12 DIAGNOSIS — I89.0 LYMPHEDEMA, NOT ELSEWHERE CLASSIFIED: ICD-10-CM

## 2021-08-12 DIAGNOSIS — I10 ESSENTIAL (PRIMARY) HYPERTENSION: ICD-10-CM

## 2021-08-12 DIAGNOSIS — L92.9 GRANULOMATOUS DISORDER OF THE SKIN AND SUBCUTANEOUS TISSUE, UNSPECIFIED: ICD-10-CM

## 2021-08-12 LAB
ALBUMIN SERPL ELPH-MCNC: 3.1 G/DL — LOW (ref 3.3–5)
ALP SERPL-CCNC: 90 U/L — SIGNIFICANT CHANGE UP (ref 40–120)
ALT FLD-CCNC: 14 U/L — SIGNIFICANT CHANGE UP (ref 12–78)
ANION GAP SERPL CALC-SCNC: 6 MMOL/L — SIGNIFICANT CHANGE UP (ref 5–17)
AST SERPL-CCNC: 21 U/L — SIGNIFICANT CHANGE UP (ref 15–37)
BILIRUB SERPL-MCNC: 0.5 MG/DL — SIGNIFICANT CHANGE UP (ref 0.2–1.2)
BUN SERPL-MCNC: 19 MG/DL — SIGNIFICANT CHANGE UP (ref 7–23)
CALCIUM SERPL-MCNC: 8.1 MG/DL — LOW (ref 8.5–10.1)
CHLORIDE SERPL-SCNC: 104 MMOL/L — SIGNIFICANT CHANGE UP (ref 96–108)
CO2 SERPL-SCNC: 26 MMOL/L — SIGNIFICANT CHANGE UP (ref 22–31)
CREAT SERPL-MCNC: 0.78 MG/DL — SIGNIFICANT CHANGE UP (ref 0.5–1.3)
GLUCOSE SERPL-MCNC: 118 MG/DL — HIGH (ref 70–99)
HCT VFR BLD CALC: 31.1 % — LOW (ref 34.5–45)
HGB BLD-MCNC: 10.1 G/DL — LOW (ref 11.5–15.5)
LACTATE SERPL-SCNC: 1.1 MMOL/L — SIGNIFICANT CHANGE UP (ref 0.7–2)
MCHC RBC-ENTMCNC: 27.2 PG — SIGNIFICANT CHANGE UP (ref 27–34)
MCHC RBC-ENTMCNC: 32.5 GM/DL — SIGNIFICANT CHANGE UP (ref 32–36)
MCV RBC AUTO: 83.6 FL — SIGNIFICANT CHANGE UP (ref 80–100)
PLATELET # BLD AUTO: 215 K/UL — SIGNIFICANT CHANGE UP (ref 150–400)
POTASSIUM SERPL-MCNC: 3.8 MMOL/L — SIGNIFICANT CHANGE UP (ref 3.5–5.3)
POTASSIUM SERPL-SCNC: 3.8 MMOL/L — SIGNIFICANT CHANGE UP (ref 3.5–5.3)
PROT SERPL-MCNC: 8 GM/DL — SIGNIFICANT CHANGE UP (ref 6–8.3)
RBC # BLD: 3.72 M/UL — LOW (ref 3.8–5.2)
RBC # FLD: 17.2 % — HIGH (ref 10.3–14.5)
SODIUM SERPL-SCNC: 136 MMOL/L — SIGNIFICANT CHANGE UP (ref 135–145)
WBC # BLD: 15.3 K/UL — HIGH (ref 3.8–10.5)
WBC # FLD AUTO: 15.3 K/UL — HIGH (ref 3.8–10.5)

## 2021-08-12 PROCEDURE — 93010 ELECTROCARDIOGRAM REPORT: CPT

## 2021-08-12 PROCEDURE — 99285 EMERGENCY DEPT VISIT HI MDM: CPT

## 2021-08-12 PROCEDURE — 71045 X-RAY EXAM CHEST 1 VIEW: CPT | Mod: 26

## 2021-08-12 RX ORDER — SODIUM CHLORIDE 9 MG/ML
500 INJECTION INTRAMUSCULAR; INTRAVENOUS; SUBCUTANEOUS ONCE
Refills: 0 | Status: COMPLETED | OUTPATIENT
Start: 2021-08-12 | End: 2021-08-12

## 2021-08-12 RX ORDER — ACETAMINOPHEN 500 MG
650 TABLET ORAL ONCE
Refills: 0 | Status: COMPLETED | OUTPATIENT
Start: 2021-08-12 | End: 2021-08-12

## 2021-08-12 RX ORDER — CEFEPIME 1 G/1
2000 INJECTION, POWDER, FOR SOLUTION INTRAMUSCULAR; INTRAVENOUS ONCE
Refills: 0 | Status: COMPLETED | OUTPATIENT
Start: 2021-08-12 | End: 2021-08-12

## 2021-08-12 RX ADMIN — Medication 650 MILLIGRAM(S): at 22:13

## 2021-08-12 NOTE — ED PROVIDER NOTE - OBJECTIVE STATEMENT
75yo female with BMI > 35, with pmh htn, lymphedema with chronic round to rt medial leg presents with fever and not feeling well today. pt had normal wound care of leg yesterday, but states it has been healing okay.     + fever/chills,  No ear pain/sore throat. No chest pain/palpitations, no SOB/cough, No abdominal pain, No N/V/D, no dysuria/frequency, No neck/back pain, no rash, no changes in neurological status/function.

## 2021-08-12 NOTE — ED PROVIDER NOTE - PHYSICAL EXAMINATION
Gen: Alert, Well appearing. NAD    Head: NC, AT, PERRL, normal lids/conjunctiva   ENT: patent oropharynx without erythema/exudate, uvula midline  Neck: supple, no tenderness/meningismus  Pulm: Bilateral clear BS, normal resp effort  CV: RRR, no M/R/G, +dist pulses   Abd: soft, NT/ND, +BS, no guarding/rebound tenderness  Mskel: extremities x4 with normal ROM. no ctl spine ttp. no edema/erythema/cyanosis   Skin: + rt medial leg wound tunneling, no significant surrounding erythema  Neuro: AAOx3, no sensory/motor deficits, CN 2-12 intact Gen: Alert, Well appearing. NAD    Head: NC, AT, PERRL, normal lids/conjunctiva   ENT: patent oropharynx without erythema/exudate, uvula midline  Neck: supple, no tenderness/meningismus  Pulm: Bilateral clear BS, normal resp effort  CV: RRR, no M/R/G, +dist pulses   Abd: soft, NT/ND, +BS, no guarding/rebound tenderness  Mskel: extremities x4 with normal ROM. no ctl spine ttp. no edema/erythema/cyanosis   Skin: + rt medial leg wound tunneling with surrounding thickened skin, no significant surrounding erythema, warmth  Neuro: AAOx3, no sensory/motor deficits, CN 2-12 intact

## 2021-08-12 NOTE — ED PROVIDER NOTE - CLINICAL SUMMARY MEDICAL DECISION MAKING FREE TEXT BOX
Pt with high fever and wcc 15, likely stemming from cellulitis aadn wound of rt leg. surgery aware ofr consult dw dr castillo

## 2021-08-13 DIAGNOSIS — B99.9 UNSPECIFIED INFECTIOUS DISEASE: ICD-10-CM

## 2021-08-13 DIAGNOSIS — D72.9 DISORDER OF WHITE BLOOD CELLS, UNSPECIFIED: ICD-10-CM

## 2021-08-13 DIAGNOSIS — I10 ESSENTIAL (PRIMARY) HYPERTENSION: ICD-10-CM

## 2021-08-13 DIAGNOSIS — L03.90 CELLULITIS, UNSPECIFIED: ICD-10-CM

## 2021-08-13 DIAGNOSIS — I89.0 LYMPHEDEMA, NOT ELSEWHERE CLASSIFIED: ICD-10-CM

## 2021-08-13 DIAGNOSIS — Z29.9 ENCOUNTER FOR PROPHYLACTIC MEASURES, UNSPECIFIED: ICD-10-CM

## 2021-08-13 LAB
ANISOCYTOSIS BLD QL: SLIGHT — SIGNIFICANT CHANGE UP
APPEARANCE UR: CLEAR — SIGNIFICANT CHANGE UP
BACTERIA # UR AUTO: ABNORMAL
BASOPHILS # BLD AUTO: 0 K/UL — SIGNIFICANT CHANGE UP (ref 0–0.2)
BASOPHILS NFR BLD AUTO: 0 % — SIGNIFICANT CHANGE UP (ref 0–2)
BILIRUB UR-MCNC: NEGATIVE — SIGNIFICANT CHANGE UP
COLOR SPEC: YELLOW — SIGNIFICANT CHANGE UP
DACRYOCYTES BLD QL SMEAR: SLIGHT — SIGNIFICANT CHANGE UP
DIFF PNL FLD: NEGATIVE — SIGNIFICANT CHANGE UP
EOSINOPHIL # BLD AUTO: 0 K/UL — SIGNIFICANT CHANGE UP (ref 0–0.5)
EOSINOPHIL NFR BLD AUTO: 0 % — SIGNIFICANT CHANGE UP (ref 0–6)
EPI CELLS # UR: ABNORMAL
FLUAV AG NPH QL: SIGNIFICANT CHANGE UP
FLUBV AG NPH QL: SIGNIFICANT CHANGE UP
GLUCOSE UR QL: NEGATIVE MG/DL — SIGNIFICANT CHANGE UP
GRAM STN FLD: SIGNIFICANT CHANGE UP
HYPOCHROMIA BLD QL: SLIGHT — SIGNIFICANT CHANGE UP
KETONES UR-MCNC: NEGATIVE — SIGNIFICANT CHANGE UP
LEUKOCYTE ESTERASE UR-ACNC: NEGATIVE — SIGNIFICANT CHANGE UP
LYMPHOCYTES # BLD AUTO: 0.61 K/UL — LOW (ref 1–3.3)
LYMPHOCYTES # BLD AUTO: 4 % — LOW (ref 13–44)
MANUAL SMEAR VERIFICATION: SIGNIFICANT CHANGE UP
METHOD TYPE: SIGNIFICANT CHANGE UP
MICROCYTES BLD QL: SLIGHT — SIGNIFICANT CHANGE UP
MONOCYTES # BLD AUTO: 0.31 K/UL — SIGNIFICANT CHANGE UP (ref 0–0.9)
MONOCYTES NFR BLD AUTO: 2 % — SIGNIFICANT CHANGE UP (ref 2–14)
NEUTROPHILS # BLD AUTO: 14.38 K/UL — HIGH (ref 1.8–7.4)
NEUTROPHILS NFR BLD AUTO: 90 % — HIGH (ref 43–77)
NEUTS BAND # BLD: 4 % — SIGNIFICANT CHANGE UP (ref 0–8)
NITRITE UR-MCNC: POSITIVE
NRBC # BLD: 0 /100 — SIGNIFICANT CHANGE UP (ref 0–0)
NRBC # BLD: SIGNIFICANT CHANGE UP /100 WBCS (ref 0–0)
OVALOCYTES BLD QL SMEAR: SLIGHT — SIGNIFICANT CHANGE UP
P AERUGINOSA DNA BLD POS NAA+NON-PROBE: SIGNIFICANT CHANGE UP
PH UR: 8 — SIGNIFICANT CHANGE UP (ref 5–8)
PLAT MORPH BLD: NORMAL — SIGNIFICANT CHANGE UP
POIKILOCYTOSIS BLD QL AUTO: SLIGHT — SIGNIFICANT CHANGE UP
POLYCHROMASIA BLD QL SMEAR: SLIGHT — SIGNIFICANT CHANGE UP
PROT UR-MCNC: NEGATIVE MG/DL — SIGNIFICANT CHANGE UP
RBC BLD AUTO: ABNORMAL
RBC CASTS # UR COMP ASSIST: SIGNIFICANT CHANGE UP /HPF (ref 0–4)
SARS-COV-2 RNA SPEC QL NAA+PROBE: SIGNIFICANT CHANGE UP
SP GR SPEC: 1.01 — SIGNIFICANT CHANGE UP (ref 1.01–1.02)
SPECIMEN SOURCE: SIGNIFICANT CHANGE UP
UROBILINOGEN FLD QL: NEGATIVE MG/DL — SIGNIFICANT CHANGE UP
WBC UR QL: ABNORMAL

## 2021-08-13 PROCEDURE — 73700 CT LOWER EXTREMITY W/O DYE: CPT | Mod: 26,RT,MH

## 2021-08-13 PROCEDURE — 99223 1ST HOSP IP/OBS HIGH 75: CPT

## 2021-08-13 PROCEDURE — 99222 1ST HOSP IP/OBS MODERATE 55: CPT

## 2021-08-13 RX ORDER — METRONIDAZOLE 500 MG
500 TABLET ORAL EVERY 8 HOURS
Refills: 0 | Status: DISCONTINUED | OUTPATIENT
Start: 2021-08-13 | End: 2021-08-14

## 2021-08-13 RX ORDER — VANCOMYCIN HCL 1 G
1250 VIAL (EA) INTRAVENOUS ONCE
Refills: 0 | Status: COMPLETED | OUTPATIENT
Start: 2021-08-13 | End: 2021-08-13

## 2021-08-13 RX ORDER — CEFEPIME 1 G/1
1000 INJECTION, POWDER, FOR SOLUTION INTRAMUSCULAR; INTRAVENOUS EVERY 8 HOURS
Refills: 0 | Status: DISCONTINUED | OUTPATIENT
Start: 2021-08-13 | End: 2021-08-13

## 2021-08-13 RX ORDER — LOSARTAN POTASSIUM 100 MG/1
50 TABLET, FILM COATED ORAL DAILY
Refills: 0 | Status: DISCONTINUED | OUTPATIENT
Start: 2021-08-13 | End: 2021-08-17

## 2021-08-13 RX ORDER — CEFEPIME 1 G/1
INJECTION, POWDER, FOR SOLUTION INTRAMUSCULAR; INTRAVENOUS
Refills: 0 | Status: DISCONTINUED | OUTPATIENT
Start: 2021-08-13 | End: 2021-08-13

## 2021-08-13 RX ORDER — CEFEPIME 1 G/1
2000 INJECTION, POWDER, FOR SOLUTION INTRAMUSCULAR; INTRAVENOUS EVERY 8 HOURS
Refills: 0 | Status: DISCONTINUED | OUTPATIENT
Start: 2021-08-13 | End: 2021-08-17

## 2021-08-13 RX ORDER — CEFEPIME 1 G/1
INJECTION, POWDER, FOR SOLUTION INTRAMUSCULAR; INTRAVENOUS
Refills: 0 | Status: DISCONTINUED | OUTPATIENT
Start: 2021-08-13 | End: 2021-08-17

## 2021-08-13 RX ORDER — VANCOMYCIN HCL 1 G
1250 VIAL (EA) INTRAVENOUS EVERY 12 HOURS
Refills: 0 | Status: DISCONTINUED | OUTPATIENT
Start: 2021-08-13 | End: 2021-08-16

## 2021-08-13 RX ORDER — IBUPROFEN 200 MG
400 TABLET ORAL ONCE
Refills: 0 | Status: COMPLETED | OUTPATIENT
Start: 2021-08-13 | End: 2021-08-13

## 2021-08-13 RX ORDER — SENNA PLUS 8.6 MG/1
2 TABLET ORAL AT BEDTIME
Refills: 0 | Status: DISCONTINUED | OUTPATIENT
Start: 2021-08-13 | End: 2021-08-17

## 2021-08-13 RX ORDER — VANCOMYCIN HCL 1 G
VIAL (EA) INTRAVENOUS
Refills: 0 | Status: DISCONTINUED | OUTPATIENT
Start: 2021-08-13 | End: 2021-08-16

## 2021-08-13 RX ORDER — ACETAMINOPHEN 500 MG
650 TABLET ORAL EVERY 6 HOURS
Refills: 0 | Status: DISCONTINUED | OUTPATIENT
Start: 2021-08-13 | End: 2021-08-17

## 2021-08-13 RX ORDER — CEFEPIME 1 G/1
2000 INJECTION, POWDER, FOR SOLUTION INTRAMUSCULAR; INTRAVENOUS ONCE
Refills: 0 | Status: COMPLETED | OUTPATIENT
Start: 2021-08-13 | End: 2021-08-13

## 2021-08-13 RX ORDER — METRONIDAZOLE 500 MG
TABLET ORAL
Refills: 0 | Status: DISCONTINUED | OUTPATIENT
Start: 2021-08-13 | End: 2021-08-14

## 2021-08-13 RX ORDER — METRONIDAZOLE 500 MG
500 TABLET ORAL ONCE
Refills: 0 | Status: COMPLETED | OUTPATIENT
Start: 2021-08-13 | End: 2021-08-13

## 2021-08-13 RX ORDER — ACETAMINOPHEN 500 MG
650 TABLET ORAL ONCE
Refills: 0 | Status: COMPLETED | OUTPATIENT
Start: 2021-08-13 | End: 2021-08-13

## 2021-08-13 RX ORDER — CEFEPIME 1 G/1
1000 INJECTION, POWDER, FOR SOLUTION INTRAMUSCULAR; INTRAVENOUS ONCE
Refills: 0 | Status: DISCONTINUED | OUTPATIENT
Start: 2021-08-13 | End: 2021-08-13

## 2021-08-13 RX ORDER — ENOXAPARIN SODIUM 100 MG/ML
40 INJECTION SUBCUTANEOUS
Refills: 0 | Status: DISCONTINUED | OUTPATIENT
Start: 2021-08-13 | End: 2021-08-17

## 2021-08-13 RX ADMIN — Medication 166.67 MILLIGRAM(S): at 14:52

## 2021-08-13 RX ADMIN — SENNA PLUS 2 TABLET(S): 8.6 TABLET ORAL at 21:11

## 2021-08-13 RX ADMIN — Medication 650 MILLIGRAM(S): at 03:00

## 2021-08-13 RX ADMIN — Medication 650 MILLIGRAM(S): at 19:15

## 2021-08-13 RX ADMIN — ENOXAPARIN SODIUM 40 MILLIGRAM(S): 100 INJECTION SUBCUTANEOUS at 06:54

## 2021-08-13 RX ADMIN — SODIUM CHLORIDE 500 MILLILITER(S): 9 INJECTION INTRAMUSCULAR; INTRAVENOUS; SUBCUTANEOUS at 00:43

## 2021-08-13 RX ADMIN — CEFEPIME 100 MILLIGRAM(S): 1 INJECTION, POWDER, FOR SOLUTION INTRAMUSCULAR; INTRAVENOUS at 21:11

## 2021-08-13 RX ADMIN — Medication 100 MILLIGRAM(S): at 14:51

## 2021-08-13 RX ADMIN — CEFEPIME 100 MILLIGRAM(S): 1 INJECTION, POWDER, FOR SOLUTION INTRAMUSCULAR; INTRAVENOUS at 00:43

## 2021-08-13 RX ADMIN — CEFEPIME 100 MILLIGRAM(S): 1 INJECTION, POWDER, FOR SOLUTION INTRAMUSCULAR; INTRAVENOUS at 14:58

## 2021-08-13 RX ADMIN — Medication 650 MILLIGRAM(S): at 18:45

## 2021-08-13 RX ADMIN — ENOXAPARIN SODIUM 40 MILLIGRAM(S): 100 INJECTION SUBCUTANEOUS at 21:11

## 2021-08-13 NOTE — CONSULT NOTE ADULT - PROBLEM SELECTOR RECOMMENDATION 2
- wound care  - supportive care    will discussed with Dr. Montoya - wound care  - supportive care  As discussed with Dr. Marquez, recommends Dr. Burnett to sal pt.

## 2021-08-13 NOTE — CONSULT NOTE ADULT - SUBJECTIVE AND OBJECTIVE BOX
Full ID note to follow  ALICIA GONZALEZ  MRN-[id number]        Patient is a 76y old  Female who presents with a chief complaint of cellulitis. hx ofd Emil TKR (13 Aug 2021 11:53)      HPI:  Patient is a 76F with a PMH of HTN and chronic lymphedema who presents to the ED for fever.  Patient follows with Dr Montoya for wound care and chronic lymphedema.  Last visit was yesterday.  Patient states that she is developed fever and malaise today, concerned about infection to a wound to the RLE that is nonhealing.  Patient has no other complaints.  Febrile to 103 in triage.  Labs show leukocytosis.  Will admit to med surg.   (13 Aug 2021 04:14)      ID consulted for workup and antibiotic management     PAST MEDICAL & SURGICAL HISTORY:  Essential hypertension    Morbid obesity due to excess calories    Other iron deficiency anemia    H/O total knee replacement, bilateral        Allergies  No Known Allergies        ANTIMICROBIALS:  cefepime   IVPB 2000 every 8 hours  cefepime   IVPB    metroNIDAZOLE  IVPB 500 every 8 hours  metroNIDAZOLE  IVPB    vancomycin  IVPB    vancomycin  IVPB 1250 every 12 hours      MEDICATIONS  (STANDING):    cefepime   IVPB   100 mL/Hr IV Intermittent (21 @ 00:43)    cefepime   IVPB   100 mL/Hr IV Intermittent (21 @ 14:58)    metroNIDAZOLE  IVPB   100 mL/Hr IV Intermittent (21 @ 14:51)    vancomycin  IVPB   166.67 mL/Hr IV Intermittent (21 @ 14:52)        OTHER MEDS: MEDICATIONS  (STANDING):  acetaminophen   Tablet .. 650 every 6 hours PRN  enoxaparin Injectable 40 two times a day  losartan 50 daily  senna 2 at bedtime      SOCIAL HISTORY:     Denies smoking etoh or drugs     FAMILY HISTORY:  FH: HTN (hypertension)        REVIEW OF SYSTEMS  [  ] ROS unobtainable because:    [  X] All other systems negative except as noted below:	    Constitutional:  [ ] fever [ ] chills  [ ] weight loss  [ ] weakness  Skin:  [ ] rash [ ] phlebitis [x] wound	  Eyes: [ ] icterus [ ] pain  [ ] discharge	  ENMT: [ ] sore throat  [ ] thrush [ ] ulcers [ ] exudates  Respiratory: [ ] dyspnea [ ] hemoptysis [ ] cough [ ] sputum	  Cardiovascular:  [ ] chest pain [ ] palpitations [ ] edema	  Gastrointestinal:  [ ] nausea [ ] vomiting [ ] diarrhea [ ] constipation [ ] pain	  Genitourinary:  [ ] dysuria [ ] frequency [ ] hematuria [ ] discharge [ ] flank pain  [ ] incontinence  Musculoskeletal:  [ ] myalgias [ ] arthralgias [ ] arthritis  [ ] back pain  Neurological:  [ ] headache [ ] seizures  [ ] confusion/altered mental status  Psychiatric:  [ ] anxiety [ ] depression	  Hematology/Lymphatics:  [ ] lymphadenopathy  Endocrine:  [ ] adrenal [ ] thyroid  Allergic/Immunologic:	 [ ] transplant [ ] seasonal    Vital Signs Last 24 Hrs  T(F): 98.4 (21 @ 08:40), Max: 103.1 (21 @ 20:50)    Vital Signs Last 24 Hrs  HR: 92 (21 @ 08:40) (89 - 106)  BP: 168/78 (21 @ 08:40) (149/70 - 168/78)  RR: 20 (21 @ 08:40)  SpO2: 95% (21 @ 08:40) (95% - 100%)  Wt(kg): --    PHYSICAL EXAM:  Constitutional: non-toxic, no distress, obese female  HEAD/EYES: anicteric, no conjunctival injection  ENT:  supple, no thrush  Cardiovascular:   normal S1, S2, no murmur, no edema  Respiratory:  clear BS bilaterally, no wheezes, no rales  GI:  soft, non-tender, normal bowel sounds  :  no blum, no CVA tenderness  Musculoskeletal:  no synovitis  Neurologic: awake and alert, normal strength, no focal findings  Skin:  large right thigh with a deep wound, surrounding erythema and warmth, nontender, packing is present in the medial aspect of the inner upper right thigh   Heme/Onc: no lymphadenopathy   Psychiatric:  awake, alert, appropriate mood          WBC Count: 15.30 K/uL ( @ 22:52)                            10.1   15.30 )-----------( 215      ( 12 Aug 2021 22:52 )             31.1           136  |  104  |  19  ----------------------------<  118<H>  3.8   |  26  |  0.78    Ca    8.1<L>      12 Aug 2021 22:52    TPro  8.0  /  Alb  3.1<L>  /  TBili  0.5  /  DBili  x   /  AST  21  /  ALT  14  /  AlkPhos  90  -      Creatinine Trend: 0.78<--    Urinalysis Basic - ( 13 Aug 2021 01:51 )    Color: Yellow / Appearance: Clear / S.010 / pH: x  Gluc: x / Ketone: Negative  / Bili: Negative / Urobili: Negative mg/dL   Blood: x / Protein: Negative mg/dL / Nitrite: Positive   Leuk Esterase: Negative / RBC: 0-2 /HPF / WBC 6-10   Sq Epi: x / Non Sq Epi: Moderate / Bacteria: Many        MICROBIOLOGY:    RADIOLOGY:  < from: CT Lower Extremity No Cont, Right (21 @ 01:27) >  IMPRESSION:  Marked soft tissue swelling and subcutaneous inflammatory change inthe medial right lower extremity extending from the mid femur to the mid tibia with associated skin thickening likely due to a cellulitis. Underlying phlegmonous formation is not excluded. No associated focal fluid collection to suggest an abscess. Medial skin defect at the level of the right knee with an associated gas-filled tract extending deep to the skin surface likely correlating with the known wound. No additional areas of subcutaneous gas.    < end of copied text >

## 2021-08-13 NOTE — ED ADULT NURSE NOTE - OBJECTIVE STATEMENT
Patient brought in by stretcher from car for fever and not feeling well. Patient A& o x3 with BMI> 35. Patient has hx of thn, lymphedema with a wound to her inner right thigh. Pt denies chest pain, pt put  on cardiac monitor at bedside.  no n/v/d.

## 2021-08-13 NOTE — H&P ADULT - NSHPLABSRESULTS_GEN_ALL_CORE
Recent Vitals  T(C): 39.2 (21 @ 03:25), Max: 39.5 (21 @ 20:50)  HR: 101 (21 @ 03:25) (101 - 106)  BP: 159/62 (21 @ 00:55) (149/70 - 159/62)  RR: 28 (21 @ 00:55) (19 - 28)  SpO2: 100% (21 @ 00:55) (95% - 100%)                        10.1   15.30 )-----------( 215      ( 12 Aug 2021 22:52 )             31.1         136  |  104  |  19  ----------------------------<  118<H>  3.8   |  26  |  0.78    Ca    8.1<L>      12 Aug 2021 22:52    TPro  8.0  /  Alb  3.1<L>  /  TBili  0.5  /  DBili  x   /  AST  21  /  ALT  14  /  AlkPhos  90        LIVER FUNCTIONS - ( 12 Aug 2021 22:52 )  Alb: 3.1 g/dL / Pro: 8.0 gm/dL / ALK PHOS: 90 U/L / ALT: 14 U/L / AST: 21 U/L / GGT: x           Urinalysis Basic - ( 13 Aug 2021 01:51 )    Color: Yellow / Appearance: Clear / S.010 / pH: x  Gluc: x / Ketone: Negative  / Bili: Negative / Urobili: Negative mg/dL   Blood: x / Protein: Negative mg/dL / Nitrite: Positive   Leuk Esterase: Negative / RBC: 0-2 /HPF / WBC 6-10   Sq Epi: x / Non Sq Epi: Moderate / Bacteria: Many        Home Medications:  albuterol 90 mcg/inh inhalation aerosol: 1 puff(s) inhaled every 4 hours (21 Dec 2020 10:21)  aluminum hydroxide-magnesium hydroxide 200 mg-200 mg/5 mL oral suspension: 30 milliliter(s) orally every 4 hours, As needed, Dyspepsia (21 Dec 2020 10:21)  budesonide-formoterol 160 mcg-4.5 mcg/inh inhalation aerosol:  inhaled  (21 Dec 2020 10:21)  ipratropium-albuterol 0.5 mg-2.5 mg/3 mLinhalation solution: 3 milliliter(s) inhaled every 8 hours (21 Dec 2020 10:21)  polyethylene glycol 3350 oral powder for reconstitution: 17 gram(s) orally once a day (21 Dec 2020 10:21)  senna oral tablet: 2 tab(s) orally once a day (at bedtime) (21 Dec 2020 10:21)  tiotropium 18 mcg inhalation capsule: 1 cap(s) inhaled once a day (21 Dec 2020 10:21)

## 2021-08-13 NOTE — ED ADULT NURSE NOTE - NSIMPLEMENTINTERV_GEN_ALL_ED
Implemented All Fall Risk Interventions:  [place] to call system. Call bell, personal items and telephone within reach. Instruct patient to call for assistance. Room bathroom lighting operational. Non-slip footwear when patient is off stretcher. Physically safe environment: no spills, clutter or unnecessary equipment. Stretcher in lowest position, wheels locked, appropriate side rails in place. Provide visual cue, wrist band, yellow gown, etc. Monitor gait and stability. Monitor for mental status changes and reorient to person, place, and time. Review medications for side effects contributing to fall risk. Reinforce activity limits and safety measures with patient and family.

## 2021-08-13 NOTE — H&P ADULT - NSHPREVIEWOFSYSTEMS_GEN_ALL_CORE
Constitutional: fever positive.  No chills, night sweats  Ears: no hearing changes or ear pain,   Nose: no nasal congestion, sinus pain, or rhinorrhea  Cardio: no chest pain, orthopnea, edema, or palpitations  Resp: no dyspnea, cough, wheezing  GI: no nausea, vomiting, diarrhea, constipation, hematochezia, or melena  : no dysuria, urinary frequency, hematuria  MSK: no back pain, neck pain  Skin: no rash, pruritis   Neuro: no weakness, dizziness, lightheadedness, syncope   Heme/Lymph: no bruising or bleeding

## 2021-08-13 NOTE — CONSULT NOTE ADULT - ASSESSMENT
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
76F with a PMH of HTN and chronic lymphedema who presents to the ED for fever.  Patient follows with Dr Montoya for wound care and chronic lymphedema  febrile and tachycardic on  admission with leukocytosis with left shift and bands of 4%  Ct showing cellulitis  exam consistent with cellulitis  If she worsens clinically, would repeat CT of the lower extremity with IV contrast   need to follow up blood cultures   has no urinary symptoms, low suspicion for UTI  No respiratory complaints             
76F with a PMH of HTN and chronic lymphedema who presents to the ED for fever and malaise that started yesterday. Found to have Cellulitis and lymphedema

## 2021-08-13 NOTE — H&P ADULT - ASSESSMENT
Patient is a 76F with a PMH of HTN and chronic lymphedema who presents to the ED for fever.  Patient follows with Dr Montoya for wound care and chronic lymphedema.  Last visit was yesterday.  Patient states that she is developed fever and malaise today, concerned about infection to a wound to the RLE that is nonhealing.  Patient has no other complaints.  Febrile to 103 in triage.  Labs show leukocytosis.  Will admit to med surg.      IMPROVE VTE Individual Risk Assessment          RISK                                                          Points  [  ] Previous VTE                                                3  [  ] Thrombophilia                                             2  [  ] Lower limb paralysis                                    2        (unable to hold up >15 seconds)    [  ] Current Cancer                                             2         (within 6 months)  [  ] Immobilization > 24 hrs                              1  [  ] ICU/CCU stay > 24 hours                            1  [  ] Age > 60                                                    1    IMPROVE VTE Score - 1

## 2021-08-13 NOTE — PROCEDURE NOTE - ADDITIONAL PROCEDURE DETAILS
Patient seen at bedside for midline catheter placement.  Consent obtained from patient after risks/benefits/alternatives explained.   Upper extremity prepped and draped in usual sterile fashion. Time out performed with RN. 4Fr 20 cm single lumen midline catheter placed using ultrasound guided seldinger technique, R basilic vein. Flushes well, with good venous return. Patient tolerated procedure well without complication and was left in no acute distress. Upper arm circumference noted to be 42cm

## 2021-08-13 NOTE — CONSULT NOTE ADULT - SUBJECTIVE AND OBJECTIVE BOX
76F with a PMH of HTN and chronic lymphedema who presents to the ED for fever. Patient follows with Dr Montoya for wound care and chronic lymphedema. Last visit was on Wednesday 76F with a PMH of HTN and chronic lymphedema who presents to the ED for fever and malaise that started yesterday. Pt reports she follows with Dr Montoya for wound care of chronic lymphedema and her Last visit to Dr. Montoya was on Wednesday. She is concerned that her right LE wound may have been infected. She Denies N/V/D, SOB, CP, HA, LOC                                                                                                 PAST MEDICAL HISTORY:  No pertinent past medical history    Essential hypertension    Morbid obesity due to excess calories    Other iron deficiency anemia        PAST SURGICAL HISTORY:  No significant past surgical history    H/O total knee replacement, bilateral        MEDICATIONS:  cefepime   IVPB 1000 milliGRAM(s) IV Intermittent once  cefepime   IVPB 1000 milliGRAM(s) IV Intermittent every 8 hours  cefepime   IVPB      enoxaparin Injectable 40 milliGRAM(s) SubCutaneous daily  losartan 50 milliGRAM(s) Oral daily  senna 2 Tablet(s) Oral at bedtime      ALLERGIES:  No Known Allergies      VITALS & I/Os:  Vital Signs Last 24 Hrs  T(C): 37.9 (13 Aug 2021 04:18), Max: 39.5 (12 Aug 2021 20:50)  T(F): 100.3 (13 Aug 2021 04:18), Max: 103.1 (12 Aug 2021 20:50)  HR: 101 (13 Aug 2021 03:25) (101 - 106)  BP: 159/62 (13 Aug 2021 00:55) (149/70 - 159/62)  BP(mean): --  RR: 28 (13 Aug 2021 00:55) (19 - 28)  SpO2: 100% (13 Aug 2021 00:55) (95% - 100%)    I&O's Summary      PHYSICAL EXAM:  General: No acute distress  Respiratory: Nonlabored  Cardiovascular: RRR  Abdominal: Soft, nondistended, nontender. No rebound or guarding. No organomegaly, no palpable mass.  Extremities: Warm    LABS:                        10.1   15.30 )-----------( 215      ( 12 Aug 2021 22:52 )             31.1     08-12    136  |  104  |  19  ----------------------------<  118<H>  3.8   |  26  |  0.78    Ca    8.1<L>      12 Aug 2021 22:52    TPro  8.0  /  Alb  3.1<L>  /  TBili  0.5  /  DBili  x   /  AST  21  /  ALT  14  /  AlkPhos  90      Lactate: Lactate, Blood: 1.1 mmol/L ( @ 22:52)       Urinalysis Basic - ( 13 Aug 2021 01:51 )    Color: Yellow / Appearance: Clear / S.010 / pH: x  Gluc: x / Ketone: Negative  / Bili: Negative / Urobili: Negative mg/dL   Blood: x / Protein: Negative mg/dL / Nitrite: Positive   Leuk Esterase: Negative / RBC: 0-2 /HPF / WBC 6-10   Sq Epi: x / Non Sq Epi: Moderate / Bacteria: Many        IMAGING:  < from: CT Lower Extremity No Cont, Right (21 @ 01:27) >  FINDINGS:    Bone: No fracture or dislocation is demonstrated. A total right knee arthroplasty without a periprosthetic fracture, dislocation or loosening. Partially visualized left knee arthroplasty. Degenerative changes of the visualized lower lumbar spine, sacroiliac joints and pubic symphysis.    Soft tissues: Marked soft tissue swelling and subcutaneous inflammatory change in the medial right lower extremity extending from the mid femur to the mid tibia with associated skin thickening likely due to a cellulitis. No associated focal fluid collection to suggest an abscess. Medial skin defect at the level of the right knee with a gas-filled tract approximately 4.9 cm deep to the skin surface likely correlating with the known wound. No additional areas of subcutaneous gas. Moderate subcutaneous inflammatory change in the remainder of the right lower extremity from the mid thigh to the foot which could be due to subcutaneous edema.    IMPRESSION:  Marked soft tissue swelling and subcutaneous inflammatory change inthe medial right lower extremity extending from the mid femur to the mid tibia with associated skin thickening likely due to a cellulitis. Underlying phlegmonous formation is not excluded. No associated focal fluid collection to suggest an abscess. Medial skin defect at the level of the right knee with an associated gas-filled tract extending deep to the skin surface likely correlating with the known wound. No additional areas of subcutaneous gas.     76F with a PMH of HTN and chronic lymphedema who presents to the ED for fever and malaise that started yesterday. Pt reports she follows with Dr Montoya for wound care of chronic lymphedema and her Last visit to Dr. Montoya was on Wednesday. She is concerned that her right LE wound may have been infected. She Denies N/V/D, SOB, CP, HA, LOC                                                                                                 PAST MEDICAL HISTORY:  No pertinent past medical history    Essential hypertension    Morbid obesity due to excess calories    Other iron deficiency anemia        PAST SURGICAL HISTORY:  No significant past surgical history    H/O total knee replacement, bilateral        MEDICATIONS:  cefepime   IVPB 1000 milliGRAM(s) IV Intermittent once  cefepime   IVPB 1000 milliGRAM(s) IV Intermittent every 8 hours  cefepime   IVPB      enoxaparin Injectable 40 milliGRAM(s) SubCutaneous daily  losartan 50 milliGRAM(s) Oral daily  senna 2 Tablet(s) Oral at bedtime      ALLERGIES:  No Known Allergies      VITALS & I/Os:  Vital Signs Last 24 Hrs  T(C): 37.9 (13 Aug 2021 04:18), Max: 39.5 (12 Aug 2021 20:50)  T(F): 100.3 (13 Aug 2021 04:18), Max: 103.1 (12 Aug 2021 20:50)  HR: 101 (13 Aug 2021 03:25) (101 - 106)  BP: 159/62 (13 Aug 2021 00:55) (149/70 - 159/62)  BP(mean): --  RR: 28 (13 Aug 2021 00:55) (19 - 28)  SpO2: 100% (13 Aug 2021 00:55) (95% - 100%)    I&O's Summary        PHYSICAL EXAM:  GENERAL: NAD, well-groomed, well-developed  HEAD:  Atraumatic, Normocephalic  EYES: EOMI, PERRLA, conjunctiva and sclera clear  ENMT: No tonsillar erythema, exudates, or enlargement; Moist mucous membranes,   NECK: Supple, No JVD, Normal thyroid  HEART: Regular rate and rhythm; No murmurs, rubs, or gallops  RESPIRATORY: CTA B/L, No W/R/R  ABDOMEN: Soft, Nontender, Nondistended; Bowel sounds present  NEUROLOGY: A&Ox3, nonfocal, CN II-XII grossly intact, sensation intact, no gait abnormalities bilaterally;  Extremities: medial RLE with collection of hardened skin with central opening, currently packed. Packing removed, new packing applied covered with dry gauze.    LABS:                        10.1   15.30 )-----------( 215      ( 12 Aug 2021 22:52 )             31.1         136  |  104  |  19  ----------------------------<  118<H>  3.8   |  26  |  0.78    Ca    8.1<L>      12 Aug 2021 22:52    TPro  8.0  /  Alb  3.1<L>  /  TBili  0.5  /  DBili  x   /  AST  21  /  ALT  14  /  AlkPhos  90      Lactate: Lactate, Blood: 1.1 mmol/L ( @ 22:52)       Urinalysis Basic - ( 13 Aug 2021 01:51 )    Color: Yellow / Appearance: Clear / S.010 / pH: x  Gluc: x / Ketone: Negative  / Bili: Negative / Urobili: Negative mg/dL   Blood: x / Protein: Negative mg/dL / Nitrite: Positive   Leuk Esterase: Negative / RBC: 0-2 /HPF / WBC 6-10   Sq Epi: x / Non Sq Epi: Moderate / Bacteria: Many        IMAGING:  < from: CT Lower Extremity No Cont, Right (21 @ 01:27) >  FINDINGS:    Bone: No fracture or dislocation is demonstrated. A total right knee arthroplasty without a periprosthetic fracture, dislocation or loosening. Partially visualized left knee arthroplasty. Degenerative changes of the visualized lower lumbar spine, sacroiliac joints and pubic symphysis.    Soft tissues: Marked soft tissue swelling and subcutaneous inflammatory change in the medial right lower extremity extending from the mid femur to the mid tibia with associated skin thickening likely due to a cellulitis. No associated focal fluid collection to suggest an abscess. Medial skin defect at the level of the right knee with a gas-filled tract approximately 4.9 cm deep to the skin surface likely correlating with the known wound. No additional areas of subcutaneous gas. Moderate subcutaneous inflammatory change in the remainder of the right lower extremity from the mid thigh to the foot which could be due to subcutaneous edema.    IMPRESSION:  Marked soft tissue swelling and subcutaneous inflammatory change inthe medial right lower extremity extending from the mid femur to the mid tibia with associated skin thickening likely due to a cellulitis. Underlying phlegmonous formation is not excluded. No associated focal fluid collection to suggest an abscess. Medial skin defect at the level of the right knee with an associated gas-filled tract extending deep to the skin surface likely correlating with the known wound. No additional areas of subcutaneous gas.     76F with a PMH of HTN and chronic lymphedema who presents to the ED for fever and malaise that started yesterday. Pt reports she follows with Dr Montoya for wound care of chronic lymphedema and her Last visit to Dr. Montoya was on Wednesday. She is concerned that her right LE wound may have been infected. She Denies N/V/D, SOB, CP, HA, LOC                                                                                                 PAST MEDICAL HISTORY:  No pertinent past medical history    Essential hypertension    Morbid obesity due to excess calories    Other iron deficiency anemia        PAST SURGICAL HISTORY:  No significant past surgical history    H/O total knee replacement, bilateral        MEDICATIONS:  cefepime   IVPB 1000 milliGRAM(s) IV Intermittent once  cefepime   IVPB 1000 milliGRAM(s) IV Intermittent every 8 hours  cefepime   IVPB      enoxaparin Injectable 40 milliGRAM(s) SubCutaneous daily  losartan 50 milliGRAM(s) Oral daily  senna 2 Tablet(s) Oral at bedtime      ALLERGIES:  No Known Allergies      VITALS & I/Os:  Vital Signs Last 24 Hrs  T(C): 37.9 (13 Aug 2021 04:18), Max: 39.5 (12 Aug 2021 20:50)  T(F): 100.3 (13 Aug 2021 04:18), Max: 103.1 (12 Aug 2021 20:50)  HR: 101 (13 Aug 2021 03:25) (101 - 106)  BP: 159/62 (13 Aug 2021 00:55) (149/70 - 159/62)  BP(mean): --  RR: 28 (13 Aug 2021 00:55) (19 - 28)  SpO2: 100% (13 Aug 2021 00:55) (95% - 100%)    I&O's Summary        PHYSICAL EXAM:  GENERAL: Obese, NAD, well-groomed, well-developed  HEAD:  Atraumatic, Normocephalic  EYES: EOMI, PERRLA, conjunctiva and sclera clear  ENMT: No tonsillar erythema, exudates, or enlargement; Moist mucous membranes,   NECK: Supple, No JVD, Normal thyroid  HEART: Regular rate and rhythm; No murmurs, rubs, or gallops  RESPIRATORY: CTA B/L, No W/R/R  ABDOMEN: Soft, Nontender, Nondistended; Bowel sounds present  NEUROLOGY: A&Ox3, nonfocal, CN II-XII grossly intact, sensation intact, no gait abnormalities bilaterally;  Extremities: medial RLE with collection of hardened skin with central opening, currently packed. Packing removed, new packing applied covered with dry gauze.    LABS:                        10.1   15.30 )-----------( 215      ( 12 Aug 2021 22:52 )             31.1         136  |  104  |  19  ----------------------------<  118<H>  3.8   |  26  |  0.78    Ca    8.1<L>      12 Aug 2021 22:52    TPro  8.0  /  Alb  3.1<L>  /  TBili  0.5  /  DBili  x   /  AST  21  /  ALT  14  /  AlkPhos  90      Lactate: Lactate, Blood: 1.1 mmol/L ( @ 22:52)       Urinalysis Basic - ( 13 Aug 2021 01:51 )    Color: Yellow / Appearance: Clear / S.010 / pH: x  Gluc: x / Ketone: Negative  / Bili: Negative / Urobili: Negative mg/dL   Blood: x / Protein: Negative mg/dL / Nitrite: Positive   Leuk Esterase: Negative / RBC: 0-2 /HPF / WBC 6-10   Sq Epi: x / Non Sq Epi: Moderate / Bacteria: Many        IMAGING:  < from: CT Lower Extremity No Cont, Right (21 @ 01:27) >  FINDINGS:    Bone: No fracture or dislocation is demonstrated. A total right knee arthroplasty without a periprosthetic fracture, dislocation or loosening. Partially visualized left knee arthroplasty. Degenerative changes of the visualized lower lumbar spine, sacroiliac joints and pubic symphysis.    Soft tissues: Marked soft tissue swelling and subcutaneous inflammatory change in the medial right lower extremity extending from the mid femur to the mid tibia with associated skin thickening likely due to a cellulitis. No associated focal fluid collection to suggest an abscess. Medial skin defect at the level of the right knee with a gas-filled tract approximately 4.9 cm deep to the skin surface likely correlating with the known wound. No additional areas of subcutaneous gas. Moderate subcutaneous inflammatory change in the remainder of the right lower extremity from the mid thigh to the foot which could be due to subcutaneous edema.    IMPRESSION:  Marked soft tissue swelling and subcutaneous inflammatory change inthe medial right lower extremity extending from the mid femur to the mid tibia with associated skin thickening likely due to a cellulitis. Underlying phlegmonous formation is not excluded. No associated focal fluid collection to suggest an abscess. Medial skin defect at the level of the right knee with an associated gas-filled tract extending deep to the skin surface likely correlating with the known wound. No additional areas of subcutaneous gas.

## 2021-08-13 NOTE — CONSULT NOTE ADULT - SUBJECTIVE AND OBJECTIVE BOX
HPI:  Patient is a 76F with a PMH of HTN and chronic lymphedema who presents to the ED for fever.  Patient follows with Dr Montoya for wound care and chronic lymphedema.  Last visit was yesterday.  Patient states that she is developed fever and malaise today, concerned about infection to a wound to the RLE that is nonhealing.  Patient has no other complaints.  Febrile to 103 in triage.  Labs show leukocytosis.  Will admit to med surg.   (13 Aug 2021 04:14)   patient was admitted last night and started on cefipime 2gm. Iv.       PAST MEDICAL & SURGICAL HISTORY:  Essential hypertension    Morbid obesity due to excess calories    Other iron deficiency anemia    H/O total knee replacement, bilateral        REVIEW OF SYSTEMS:    CONSTITUTIONAL: No fever, weight loss, or fatigue  EYES: No eye pain, visual disturbances, or discharge  ENMT:  No difficulty hearing, tinnitus, vertigo; No sinus or throat pain  NECK: No pain or stiffness  BREASTS: No pain, masses, or nipple discharge  RESPIRATORY: No cough, wheezing, chills or hemoptysis; No shortness of breath  CARDIOVASCULAR: No chest pain, palpitations, dizziness, or leg swelling  GASTROINTESTINAL: No abdominal or epigastric pain. No nausea, vomiting, or hematemesis; No diarrhea or constipation. No melena or hematochezia.  GENITOURINARY: No dysuria, frequency, hematuria, or incontinence  NEUROLOGICAL: No headaches, memory loss, loss of strength, numbness, or tremors  SKIN: No itching, burning, rashes, or lesions   LYMPH NODES: No enlarged glands  ENDOCRINE: No heat or cold intolerance; No hair loss  MUSCULOSKELETAL: No joint pain or swelling; No muscle, back, or extremity pain  PSYCHIATRIC: No depression, anxiety, mood swings, or difficulty sleeping  HEME/LYMPH: No easy bruising, or bleeding gums  ALLERY AND IMMUNOLOGIC: No hives or eczema      MEDICATIONS  (STANDING):  cefepime   IVPB      enoxaparin Injectable 40 milliGRAM(s) SubCutaneous two times a day  losartan 50 milliGRAM(s) Oral daily  metroNIDAZOLE  IVPB      senna 2 Tablet(s) Oral at bedtime  vancomycin  IVPB        MEDICATIONS  (PRN):      Allergies    No Known Allergies    Intolerances        SOCIAL HISTORY:    FAMILY HISTORY:  FH: HTN (hypertension)        Vital Signs Last 24 Hrs  T(C): 36.9 (13 Aug 2021 08:40), Max: 39.5 (12 Aug 2021 20:50)  T(F): 98.4 (13 Aug 2021 08:40), Max: 103.1 (12 Aug 2021 20:50)  HR: 92 (13 Aug 2021 08:40) (89 - 106)  BP: 168/78 (13 Aug 2021 08:40) (149/70 - 168/78)  BP(mean): --  RR: 20 (13 Aug 2021 08:40) (19 - 28)  SpO2: 95% (13 Aug 2021 08:40) (95% - 100%)    PHYSICAL EXAM:    GENERAL: NAD, well-groomed, well-developed  HEAD:  Atraumatic, Normocephalic  EYES: EOMI, PERRLA, conjunctiva and sclera clear  ENMT: No tonsillar erythema, exudates, or enlargement; Moist mucous membranes, Good dentition, No lesions  NECK: Supple, No JVD, Normal thyroid  NERVOUS SYSTEM:  Alert & Oriented X3, Good concentration; Motor Strength 5/5 B/L upper and lower extremities; DTRs 2+ intact and symmetric  CHEST/LUNG: Clear to percussion bilaterally; No rales, rhonchi, wheezing, or rubs  HEART: Regular rate and rhythm; No murmurs, rubs, or gallops  ABDOMEN: Soft, Nontender, Nondistended; Bowel sounds present  EXTREMITIES:   bilateral lymphedema,  open wound  in right leg with cellulitis, bilateral leg lympedema   SKIN: No rashes or lesions      LABS:                        10.1   15.30 )-----------( 215      ( 12 Aug 2021 22:52 )             31.1     08-12    136  |  104  |  19  ----------------------------<  118<H>  3.8   |  26  |  0.78    Ca    8.1<L>      12 Aug 2021 22:52    TPro  8.0  /  Alb  3.1<L>  /  TBili  0.5  /  DBili  x   /  AST  21  /  ALT  14  /  AlkPhos  90  08-12  EKG sinus rhythm . NAc   chest Xray- no infiltrates    Urinalysis Basic - ( 13 Aug 2021 01:51 )    Color: Yellow / Appearance: Clear / S.010 / pH: x  Gluc: x / Ketone: Negative  / Bili: Negative / Urobili: Negative mg/dL     Blood: x / Protein: Negative mg/dL / Nitrite: Positive   Leuk Esterase: Negative / RBC: 0-2 /HPF / WBC 6-10   Sq Epi: x / Non Sq Epi: Moderate / Bacteria: Many    RADIOLOGY & ADDITIONAL STUDIES:  rd< from: CT Lower Extremity No Cont, Right (21 @ 01:27) >  EXAM:  CT LWR EXT RT                            PROCEDURE DATE:  2021          INTERPRETATION:  CLINICAL INFORMATION: Right medial leg wound.    TECHNIQUE: CT of the right lower extremity from the hip to the feet were performed in bone and soft tissue windows with coronal and sagittal reformats. No intravenous contrast was administered. 3-D reformats were performed on a separate workstation.    COMPARISON: No similar prior studies are available for comparison.    FINDINGS:    Bone: No fracture or dislocation is demonstrated. A total right knee arthroplasty without a periprosthetic fracture, dislocation or loosening. Partially visualized left knee arthroplasty. Degenerative changes of the visualized lower lumbar spine, sacroiliac joints and pubic symphysis.    Soft tissues: Marked soft tissue swelling and subcutaneous inflammatory change in the medial right lower extremity extending from the mid femur to the mid tibia with associated skin thickening likely due to a cellulitis. No associated focal fluid collection to suggest an abscess. Medial skin defect at the level of the right knee with a gas-filled tract approximately 4.9 cm deep to the skin surface likely correlating with the known wound. No additional areas of subcutaneous gas. Moderate subcutaneous inflammatory change in the remainder of the right lower extremity from the mid thigh to the foot which could be due to subcutaneous edema.    IMPRESSION:  Marked soft tissue swelling and subcutaneous inflammatory change inthe medial right lower extremity extending from the mid femur to the mid tibia with associated skin thickening likely due to a cellulitis. Underlying phlegmonous formation is not excluded. No associated focal fluid collection to suggest an abscess. Medial skin defect at the level of the right knee with an associated gas-filled tract extending deep to the skin surface likely correlating with the known wound. No additional areas of subcutaneous gas.    --- End of Report ---        < end of copied text >

## 2021-08-13 NOTE — H&P ADULT - HISTORY OF PRESENT ILLNESS
Patient is a 76F with a PMH of HTN and chronic lymphedema who presents to the ED for fever.  Patient follows with Dr Montoya for wound care and chronic lymphedema.  Last visit was yesterday.  Patient states that she is developed fever and malaise today, concerned about infection to a wound to the RLE that is nonhealing.  Patient has no other complaints.  Febrile to 103 in triage.  Labs show leukocytosis.  Will admit to med surg.

## 2021-08-13 NOTE — CONSULT NOTE ADULT - NSCONSULTADDITIONALINFOA_GEN_ALL_CORE
Discussed with Dr. Lex Barnes, DO  Infectious Disease Attending  Pager [phone]  After 5pm/weekends please call [phone] for all inquiries and new consults

## 2021-08-13 NOTE — H&P ADULT - NSHPPHYSICALEXAM_GEN_ALL_CORE
Physical exam:  General: patient in no acute distress, resting comfortably  Head:  Atraumatic, Normocephalic  Eyes: EOMI, PERRLA, clear sclera  Neck: Supple, thyroid nontender, non enlarged  Cardio: S1/S2 +ve, regular rate and rhythm, no M/G/R  Resp: clear to ausculation bilaterally, no rales or wheezes  GI: abdomen soft, nontender, non distended, no guarding, BS +ve x 4  Ext: Significant bilateral lower extremity lymphedema  Neuro: CN 2-12 intact, no significant motor or sensory deficits.  Skin: medial RLE with collection of hardened skin with central opening, currently packed.

## 2021-08-13 NOTE — CONSULT NOTE ADULT - PROBLEM SELECTOR RECOMMENDATION 9
- s/p Cefepime at the ED  - continue abx per medicine  - f/u ID eval
ID consult, Iv abx   PT wound care consult
start vancomycin 1250mg q12hrs  send vancomycin trough before the  4th dose (therapeutic drug monitoring required with IV vancomycin)   start cefepime 2g q8hrs   start flagyl 500mg q8hrs   follow up blood cultures   repeat CT with IV contrast if worsening

## 2021-08-14 LAB
COVID-19 SPIKE DOMAIN AB INTERP: POSITIVE
COVID-19 SPIKE DOMAIN ANTIBODY RESULT: 6.81 U/ML — HIGH
CRP SERPL-MCNC: 163 MG/L — HIGH
ERYTHROCYTE [SEDIMENTATION RATE] IN BLOOD: 72 MM/HR — HIGH (ref 0–20)
SARS-COV-2 IGG+IGM SERPL QL IA: 6.81 U/ML — HIGH
SARS-COV-2 IGG+IGM SERPL QL IA: POSITIVE

## 2021-08-14 RX ADMIN — LOSARTAN POTASSIUM 50 MILLIGRAM(S): 100 TABLET, FILM COATED ORAL at 05:12

## 2021-08-14 RX ADMIN — CEFEPIME 100 MILLIGRAM(S): 1 INJECTION, POWDER, FOR SOLUTION INTRAMUSCULAR; INTRAVENOUS at 05:11

## 2021-08-14 RX ADMIN — Medication 166.67 MILLIGRAM(S): at 17:15

## 2021-08-14 RX ADMIN — Medication 166.67 MILLIGRAM(S): at 06:36

## 2021-08-14 RX ADMIN — CEFEPIME 100 MILLIGRAM(S): 1 INJECTION, POWDER, FOR SOLUTION INTRAMUSCULAR; INTRAVENOUS at 13:09

## 2021-08-14 RX ADMIN — Medication 650 MILLIGRAM(S): at 22:05

## 2021-08-14 RX ADMIN — Medication 100 MILLIGRAM(S): at 04:01

## 2021-08-14 RX ADMIN — Medication 100 MILLIGRAM(S): at 11:49

## 2021-08-14 RX ADMIN — Medication 650 MILLIGRAM(S): at 14:30

## 2021-08-14 RX ADMIN — CEFEPIME 100 MILLIGRAM(S): 1 INJECTION, POWDER, FOR SOLUTION INTRAMUSCULAR; INTRAVENOUS at 23:07

## 2021-08-14 RX ADMIN — Medication 100 MILLIGRAM(S): at 22:04

## 2021-08-14 RX ADMIN — ENOXAPARIN SODIUM 40 MILLIGRAM(S): 100 INJECTION SUBCUTANEOUS at 17:16

## 2021-08-14 RX ADMIN — Medication 650 MILLIGRAM(S): at 13:44

## 2021-08-14 RX ADMIN — ENOXAPARIN SODIUM 40 MILLIGRAM(S): 100 INJECTION SUBCUTANEOUS at 08:24

## 2021-08-14 NOTE — PHYSICAL THERAPY INITIAL EVALUATION ADULT - PERTINENT HX OF CURRENT PROBLEM, REHAB EVAL
Patient is a 76F with a PMH of HTN and chronic lymphedema who presents to the ED for fever. Pt was admitted c Dx of cellulitis and UTI.

## 2021-08-14 NOTE — PHYSICAL THERAPY INITIAL EVALUATION ADULT - ADDITIONAL COMMENTS
Pt ambulated c a straight cane or a Rolling Walker or rollator prior to admission. There are 4 steps, c far marshall rails, at the entry of the house and one flight of steps, c R rail up, to negotiate at home. Patient has a nurse from [facility] who visits daily for wound care and her granddaughter is her home health aide (4 1/2 hrs x 7 days). Information obtained from pt.

## 2021-08-14 NOTE — PHYSICAL THERAPY INITIAL EVALUATION ADULT - TRANSFER TRAINING, PT EVAL
Pt will independently perform sit to stand to sit transfers without LOB using Bariatric Rolling walker,  by 2 weeks.

## 2021-08-14 NOTE — PHYSICAL THERAPY INITIAL EVALUATION ADULT - GAIT DEVIATIONS NOTED, PT EVAL
decreased vesta/increased time in double stance/decreased velocity of limb motion/decreased step length

## 2021-08-14 NOTE — PHYSICAL THERAPY INITIAL EVALUATION ADULT - GAIT TRAINING, PT EVAL
Pt will independently ambulate 100 feet with Bariatric rolling walker without loss of balance, by 2 weeks.

## 2021-08-14 NOTE — PROGRESS NOTE ADULT - SUBJECTIVE AND OBJECTIVE BOX
Patient is a 76y old  Female who presents with a chief complaint of cellulitis. hx ofd Emil TKR (13 Aug 2021 11:53)  patient feeling better. no fever or chills.   Vitals stable  seen by Dr courtney yesterday for wound care.  .  GRD in aerobic blood culture.    urine  culture pending       INTERVAL HPI/OVERNIGHT EVENTS:  PAST MEDICAL & SURGICAL HISTORY:  Essential hypertension    Morbid obesity due to excess calories    Other iron deficiency anemia    H/O total knee replacement, bilateral        MEDICATIONS  (STANDING):  cefepime   IVPB 2000 milliGRAM(s) IV Intermittent every 8 hours  cefepime   IVPB      enoxaparin Injectable 40 milliGRAM(s) SubCutaneous two times a day  losartan 50 milliGRAM(s) Oral daily  metroNIDAZOLE  IVPB 500 milliGRAM(s) IV Intermittent every 8 hours  metroNIDAZOLE  IVPB      senna 2 Tablet(s) Oral at bedtime  vancomycin  IVPB      vancomycin  IVPB 1250 milliGRAM(s) IV Intermittent every 12 hours    MEDICATIONS  (PRN):  acetaminophen   Tablet .. 650 milliGRAM(s) Oral every 6 hours PRN Mild Pain (1 - 3)      Allergies    No Known Allergies    Intolerances        REVIEW OF SYSTEMS:  CONSTITUTIONAL: No fever, weight loss, or fatigue  EYES: No eye pain, visual disturbances, or discharge  ENMT:  No difficulty hearing, tinnitus, vertigo; No sinus or throat pain  NECK: No pain or stiffness  BREASTS: No pain, masses, or nipple discharge  RESPIRATORY: No cough, wheezing, chills or hemoptysis; No shortness of breath  CARDIOVASCULAR: No chest pain, palpitations, dizziness, or leg swelling  GASTROINTESTINAL: No abdominal or epigastric pain. No nausea, vomiting, or hematemesis; No diarrhea or constipation. No melena or hematochezia.  GENITOURINARY: No dysuria, frequency, hematuria, or incontinence  NEUROLOGICAL: No headaches, memory loss, loss of strength, numbness, or tremors  SKIN: No itching, burning, rashes, or lesions   LYMPH NODES: No enlarged glands  ENDOCRINE: No heat or cold intolerance; No hair loss  MUSCULOSKELETAL: No joint pain or swelling; No muscle, back, or extremity pain  PSYCHIATRIC: No depression, anxiety, mood swings, or difficulty sleeping  HEME/LYMPH: No easy bruising, or bleeding gums  ALLERY AND IMMUNOLOGIC: No hives or eczema    Vital Signs Last 24 Hrs  T(C): 36.3 (14 Aug 2021 05:00), Max: 37.6 (13 Aug 2021 16:50)  T(F): 97.4 (14 Aug 2021 05:00), Max: 99.6 (13 Aug 2021 16:50)  HR: 83 (14 Aug 2021 09:53) (74 - 95)  BP: 106/71 (14 Aug 2021 09:53) (101/65 - 137/74)  BP(mean): --  RR: 18 (14 Aug 2021 09:53) (18 - 18)  SpO2: 100% (14 Aug 2021 09:53) (95% - 100%)    PHYSICAL EXAM:  GENERAL: NAD, well-groomed, well-developed  HEAD:  Atraumatic, Normocephalic  EYES: EOMI, PERRLA, conjunctiva and sclera clear  ENMT: No tonsillar erythema, exudates, or enlargement; Moist mucous membranes, Good dentition, No lesions  NECK: Supple, No JVD, Normal thyroid  NERVOUS SYSTEM:  Alert & Oriented X3, Good concentration; Motor Strength 5/5 B/L upper and lower extremities; DTRs 2+ intact and symmetric  CHEST/LUNG: Clear to percussion bilaterally; No rales, rhonchi, wheezing, or rubs  HEART: Regular rate and rhythm; No murmurs, rubs, or gallops  ABDOMEN: Soft, Nontender, Nondistended; Bowel sounds present  EXTREMITIES:  2+ Peripheral Pulses, No clubbing, cyanosis, or edema  LYMPH: No lymphadenopathy noted  SKIN: No rashes or lesions    LABS:                        10.1   15.30 )-----------( 215      ( 12 Aug 2021 22:52 )             31.1     08-12    136  |  104  |  19  ----------------------------<  118<H>  3.8   |  26  |  0.78    Ca    8.1<L>      12 Aug 2021 22:52    TPro  8.0  /  Alb  3.1<L>  /  TBili  0.5  /  DBili  x   /  AST  21  /  ALT  14  /  AlkPhos  90  08-12        Urinalysis Basic - ( 13 Aug 2021 01:51 )    Color: Yellow / Appearance: Clear / S.010 / pH: x  Gluc: x / Ketone: Negative  / Bili: Negative / Urobili: Negative mg/dL   Blood: x / Protein: Negative mg/dL / Nitrite: Positive   Leuk Esterase: Negative / RBC: 0-2 /HPF / WBC 6-10   Sq Epi: x / Non Sq Epi: Moderate / Bacteria: Many      CAPILLARY BLOOD GLUCOSE                    RADIOLOGY & ADDITIONAL TESTS:    Imaging Personally Reviewed:  [ ] YES  [ ] NO    Consultant(s) Notes Reviewed:  [ ] YES  [ ] NO    Care Discussed with Consultants/Other Providers [ ] YES  [ ] NO    Care discussed with family,         [  ]   yes  [  ]  No    imp:    stable[ ]    unstable[  ]     improving [ x  ]       unchanged  [  ]                Plans:  Continue present plans  [  x] continue abx as per ID               New consult [  ]   specialty  .......               order test[  ]    test name.  labs in am                Discharge Planning  [  ]

## 2021-08-14 NOTE — PROGRESS NOTE ADULT - ASSESSMENT
cellulitis with open wound drt leg   bilateral  leg lymphedema  chronic anemia of infection  Bacteremia -GNR  s/p MIRLANDE TKR   morbid obesity   HTn

## 2021-08-14 NOTE — PHYSICAL THERAPY INITIAL EVALUATION ADULT - GENERAL OBSERVATIONS, REHAB EVAL
Pt was seen in supine c Primafit donned and R LE dressing C/D/I, lymphedema in marshall LE, alert and Ox4. Pt was comfortable and motivated.

## 2021-08-15 LAB
-  AMIKACIN: SIGNIFICANT CHANGE UP
-  AZTREONAM: SIGNIFICANT CHANGE UP
-  CEFEPIME: SIGNIFICANT CHANGE UP
-  CEFTAZIDIME: SIGNIFICANT CHANGE UP
-  CIPROFLOXACIN: SIGNIFICANT CHANGE UP
-  GENTAMICIN: SIGNIFICANT CHANGE UP
-  IMIPENEM: SIGNIFICANT CHANGE UP
-  LEVOFLOXACIN: SIGNIFICANT CHANGE UP
-  MEROPENEM: SIGNIFICANT CHANGE UP
-  PIPERACILLIN/TAZOBACTAM: SIGNIFICANT CHANGE UP
-  TOBRAMYCIN: SIGNIFICANT CHANGE UP
ANION GAP SERPL CALC-SCNC: 6 MMOL/L — SIGNIFICANT CHANGE UP (ref 5–17)
BUN SERPL-MCNC: 18 MG/DL — SIGNIFICANT CHANGE UP (ref 7–23)
CALCIUM SERPL-MCNC: 8.3 MG/DL — LOW (ref 8.5–10.1)
CHLORIDE SERPL-SCNC: 109 MMOL/L — HIGH (ref 96–108)
CO2 SERPL-SCNC: 25 MMOL/L — SIGNIFICANT CHANGE UP (ref 22–31)
CREAT SERPL-MCNC: 0.81 MG/DL — SIGNIFICANT CHANGE UP (ref 0.5–1.3)
CULTURE RESULTS: SIGNIFICANT CHANGE UP
CULTURE RESULTS: SIGNIFICANT CHANGE UP
GLUCOSE SERPL-MCNC: 103 MG/DL — HIGH (ref 70–99)
GRAM STN FLD: SIGNIFICANT CHANGE UP
HCT VFR BLD CALC: 33 % — LOW (ref 34.5–45)
HGB BLD-MCNC: 10.2 G/DL — LOW (ref 11.5–15.5)
MCHC RBC-ENTMCNC: 26.5 PG — LOW (ref 27–34)
MCHC RBC-ENTMCNC: 30.9 GM/DL — LOW (ref 32–36)
MCV RBC AUTO: 85.7 FL — SIGNIFICANT CHANGE UP (ref 80–100)
METHOD TYPE: SIGNIFICANT CHANGE UP
NRBC # BLD: 0 /100 WBCS — SIGNIFICANT CHANGE UP (ref 0–0)
ORGANISM # SPEC MICROSCOPIC CNT: SIGNIFICANT CHANGE UP
PLATELET # BLD AUTO: 191 K/UL — SIGNIFICANT CHANGE UP (ref 150–400)
POTASSIUM SERPL-MCNC: 3.8 MMOL/L — SIGNIFICANT CHANGE UP (ref 3.5–5.3)
POTASSIUM SERPL-SCNC: 3.8 MMOL/L — SIGNIFICANT CHANGE UP (ref 3.5–5.3)
RBC # BLD: 3.85 M/UL — SIGNIFICANT CHANGE UP (ref 3.8–5.2)
RBC # FLD: 17.7 % — HIGH (ref 10.3–14.5)
SODIUM SERPL-SCNC: 140 MMOL/L — SIGNIFICANT CHANGE UP (ref 135–145)
SPECIMEN SOURCE: SIGNIFICANT CHANGE UP
SPECIMEN SOURCE: SIGNIFICANT CHANGE UP
VANCOMYCIN TROUGH SERPL-MCNC: 13.1 UG/ML — SIGNIFICANT CHANGE UP (ref 10–20)
WBC # BLD: 5.94 K/UL — SIGNIFICANT CHANGE UP (ref 3.8–10.5)
WBC # FLD AUTO: 5.94 K/UL — SIGNIFICANT CHANGE UP (ref 3.8–10.5)

## 2021-08-15 RX ADMIN — Medication 166.67 MILLIGRAM(S): at 17:19

## 2021-08-15 RX ADMIN — Medication 650 MILLIGRAM(S): at 23:20

## 2021-08-15 RX ADMIN — SENNA PLUS 2 TABLET(S): 8.6 TABLET ORAL at 22:17

## 2021-08-15 RX ADMIN — CEFEPIME 100 MILLIGRAM(S): 1 INJECTION, POWDER, FOR SOLUTION INTRAMUSCULAR; INTRAVENOUS at 06:36

## 2021-08-15 RX ADMIN — Medication 650 MILLIGRAM(S): at 22:20

## 2021-08-15 RX ADMIN — ENOXAPARIN SODIUM 40 MILLIGRAM(S): 100 INJECTION SUBCUTANEOUS at 17:19

## 2021-08-15 RX ADMIN — CEFEPIME 100 MILLIGRAM(S): 1 INJECTION, POWDER, FOR SOLUTION INTRAMUSCULAR; INTRAVENOUS at 22:17

## 2021-08-15 RX ADMIN — ENOXAPARIN SODIUM 40 MILLIGRAM(S): 100 INJECTION SUBCUTANEOUS at 06:38

## 2021-08-15 RX ADMIN — LOSARTAN POTASSIUM 50 MILLIGRAM(S): 100 TABLET, FILM COATED ORAL at 06:46

## 2021-08-15 RX ADMIN — CEFEPIME 100 MILLIGRAM(S): 1 INJECTION, POWDER, FOR SOLUTION INTRAMUSCULAR; INTRAVENOUS at 15:23

## 2021-08-15 NOTE — PROGRESS NOTE ADULT - ASSESSMENT
76F with a PMH of HTN and chronic lymphedema who presents to the ED for fever and malaise that started yesterday. Found to have Cellulitis and lymphedema    - Continue ABX per medical/ID team  - Continue daily packing by nursing team  - Will continue to monitor  - Discussed with Dr. Marquez

## 2021-08-15 NOTE — PROGRESS NOTE ADULT - SUBJECTIVE AND OBJECTIVE BOX
Patient seen and examined at bedside, patient without complaints.   Denies nausea and vomiting. Tolerating diet.  Denies chest pain, dyspnea, cough.      MEDICATIONS  (STANDING):  cefepime   IVPB 2000 milliGRAM(s) IV Intermittent every 8 hours  cefepime   IVPB      enoxaparin Injectable 40 milliGRAM(s) SubCutaneous two times a day  losartan 50 milliGRAM(s) Oral daily  senna 2 Tablet(s) Oral at bedtime  vancomycin  IVPB      vancomycin  IVPB 1250 milliGRAM(s) IV Intermittent every 12 hours    MEDICATIONS  (PRN):  acetaminophen   Tablet .. 650 milliGRAM(s) Oral every 6 hours PRN Mild Pain (1 - 3)      Vital Signs Last 24 Hrs  T(C): 36.7 (15 Aug 2021 17:42), Max: 36.9 (15 Aug 2021 05:17)  T(F): 98 (15 Aug 2021 17:42), Max: 98.5 (15 Aug 2021 05:17)  HR: 83 (15 Aug 2021 17:42) (73 - 83)  BP: 140/75 (15 Aug 2021 17:42) (100/61 - 140/75)  RR: 18 (15 Aug 2021 17:42) (18 - 18)  SpO2: 96% (15 Aug 2021 17:42) (95% - 100%)    GENERAL: Obese, NAD, well-groomed, well-developed  EYES: EOMI, PERRL, conjunctiva and sclera clear  ENMT: No tonsillar erythema, exudates, or enlargement; Moist mucous membranes,   NECK: Supple, No JVD, Normal thyroid  HEART: Regular rate and rhythm  RESPIRATORY: CTAB  ABDOMEN: Soft, Nontender, Nondistended; Bowel sounds present  NEUROLOGY: A&Ox3, nonfocal  Extremities: medial RLE with collection of hardened skin with central opening, currently packed. Dressing daily by RN    LABS:                        10.2   5.94  )-----------( 191      ( 15 Aug 2021 08:55 )             33.0     08-15    140  |  109<H>  |  18  ----------------------------<  103<H>  3.8   |  25  |  0.81    Ca    8.3<L>      15 Aug 2021 08:55

## 2021-08-15 NOTE — PROGRESS NOTE ADULT - SUBJECTIVE AND OBJECTIVE BOX
Patient is a 76y old  Female who presents with a chief complaint of cellulitis (14 Aug 2021 10:19)  improving.   no fever   Vitals stable       INTERVAL HPI/OVERNIGHT EVENTS:  PAST MEDICAL & SURGICAL HISTORY:  Essential hypertension    Morbid obesity due to excess calories    Other iron deficiency anemia    H/O total knee replacement, bilateral        MEDICATIONS  (STANDING):  cefepime   IVPB 2000 milliGRAM(s) IV Intermittent every 8 hours  cefepime   IVPB      enoxaparin Injectable 40 milliGRAM(s) SubCutaneous two times a day  losartan 50 milliGRAM(s) Oral daily  senna 2 Tablet(s) Oral at bedtime  vancomycin  IVPB      vancomycin  IVPB 1250 milliGRAM(s) IV Intermittent every 12 hours    MEDICATIONS  (PRN):  acetaminophen   Tablet .. 650 milliGRAM(s) Oral every 6 hours PRN Mild Pain (1 - 3)      Allergies    No Known Allergies    Intolerances        REVIEW OF SYSTEMS:  CONSTITUTIONAL: No fever, weight loss, or fatigue  EYES: No eye pain, visual disturbances, or discharge  ENMT:  No difficulty hearing, tinnitus, vertigo; No sinus or throat pain  NECK: No pain or stiffness  BREASTS: No pain, masses, or nipple discharge  RESPIRATORY: No cough, wheezing, chills or hemoptysis; No shortness of breath  CARDIOVASCULAR: No chest pain, palpitations, dizziness, or leg swelling  GASTROINTESTINAL: No abdominal or epigastric pain. No nausea, vomiting, or hematemesis; No diarrhea or constipation. No melena or hematochezia.  GENITOURINARY: No dysuria, frequency, hematuria, or incontinence  NEUROLOGICAL: No headaches, memory loss, loss of strength, numbness, or tremors  SKIN: No itching, burning, rashes, or lesions   LYMPH NODES: No enlarged glands  ENDOCRINE: No heat or cold intolerance; No hair loss  MUSCULOSKELETAL: No joint pain or swelling; No muscle, back, or extremity pain  PSYCHIATRIC: No depression, anxiety, mood swings, or difficulty sleeping  HEME/LYMPH: No easy bruising, or bleeding gums  ALLERY AND IMMUNOLOGIC: No hives or eczema    Vital Signs Last 24 Hrs  T(C): 36.9 (15 Aug 2021 05:17), Max: 36.9 (15 Aug 2021 05:17)  T(F): 98.5 (15 Aug 2021 05:17), Max: 98.5 (15 Aug 2021 05:17)  HR: 73 (15 Aug 2021 05:17) (73 - 88)  BP: 113/77 (15 Aug 2021 05:17) (100/61 - 141/82)  BP(mean): --  RR: 18 (15 Aug 2021 05:17) (18 - 18)  SpO2: 95% (15 Aug 2021 05:17) (95% - 100%)    PHYSICAL EXAM:  GENERAL: NAD, well-groomed, well-developed  HEAD:  Atraumatic, Normocephalic  EYES: EOMI, PERRLA, conjunctiva and sclera clear  ENMT: No tonsillar erythema, exudates, or enlargement; Moist mucous membranes, Good dentition, No lesions  NECK: Supple, No JVD, Normal thyroid  NERVOUS SYSTEM:  Alert & Oriented X3, Good concentration; Motor Strength 5/5 B/L upper and lower extremities; DTRs 2+ intact and symmetric  CHEST/LUNG: Clear to percussion bilaterally; No rales, rhonchi, wheezing, or rubs  HEART: Regular rate and rhythm; No murmurs, rubs, or gallops  ABDOMEN: Soft, Nontender, Nondistended; Bowel sounds present  EXTREMITIES:  2+ Peripheral Pulses, No clubbing, cyanosis, or edema. dressings on rle. dry  LYMPH: No lymphadenopathy noted  SKIN: No rashes or lesions    LABS:                CAPILLARY BLOOD GLUCOSE                    RADIOLOGY & ADDITIONAL TESTS:    Imaging Personally Reviewed:  [ ] YES  [ ] NO    Consultant(s) Notes Reviewed:  [ ] YES  [ ] NO    Care Discussed with Consultants/Other Providers [ ] YES  [ ] NO    Care discussed with family,         [  ]   yes  [  ]  No    imp:    stable[ ]    unstable[  ]     improving [   x]       unchanged  [  ]                Plans:  Continue present plans  [  x] as per ID and wound care               New consult [  ]   specialty  .......               order test[  ]    test name.                  Discharge Planning  [  ]            Patient is a 76y old  Female who presents with a chief complaint of cellulitis (14 Aug 2021 10:19)  improving.   no fever   Vitals stable   P. Aeroginosa in aerobic blood cultures.       INTERVAL HPI/OVERNIGHT EVENTS:  PAST MEDICAL & SURGICAL HISTORY:  Essential hypertension    Morbid obesity due to excess calories    Other iron deficiency anemia    H/O total knee replacement, bilateral        MEDICATIONS  (STANDING):  cefepime   IVPB 2000 milliGRAM(s) IV Intermittent every 8 hours  cefepime   IVPB      enoxaparin Injectable 40 milliGRAM(s) SubCutaneous two times a day  losartan 50 milliGRAM(s) Oral daily  senna 2 Tablet(s) Oral at bedtime  vancomycin  IVPB      vancomycin  IVPB 1250 milliGRAM(s) IV Intermittent every 12 hours    MEDICATIONS  (PRN):  acetaminophen   Tablet .. 650 milliGRAM(s) Oral every 6 hours PRN Mild Pain (1 - 3)      Allergies    No Known Allergies    Intolerances        REVIEW OF SYSTEMS:  CONSTITUTIONAL: No fever, weight loss, or fatigue  EYES: No eye pain, visual disturbances, or discharge  ENMT:  No difficulty hearing, tinnitus, vertigo; No sinus or throat pain  NECK: No pain or stiffness  BREASTS: No pain, masses, or nipple discharge  RESPIRATORY: No cough, wheezing, chills or hemoptysis; No shortness of breath  CARDIOVASCULAR: No chest pain, palpitations, dizziness, or leg swelling  GASTROINTESTINAL: No abdominal or epigastric pain. No nausea, vomiting, or hematemesis; No diarrhea or constipation. No melena or hematochezia.  GENITOURINARY: No dysuria, frequency, hematuria, or incontinence  NEUROLOGICAL: No headaches, memory loss, loss of strength, numbness, or tremors  SKIN: No itching, burning, rashes, or lesions   LYMPH NODES: No enlarged glands  ENDOCRINE: No heat or cold intolerance; No hair loss  MUSCULOSKELETAL: No joint pain or swelling; No muscle, back, or extremity pain  PSYCHIATRIC: No depression, anxiety, mood swings, or difficulty sleeping  HEME/LYMPH: No easy bruising, or bleeding gums  ALLERY AND IMMUNOLOGIC: No hives or eczema    Vital Signs Last 24 Hrs  T(C): 36.9 (15 Aug 2021 05:17), Max: 36.9 (15 Aug 2021 05:17)  T(F): 98.5 (15 Aug 2021 05:17), Max: 98.5 (15 Aug 2021 05:17)  HR: 73 (15 Aug 2021 05:17) (73 - 88)  BP: 113/77 (15 Aug 2021 05:17) (100/61 - 141/82)  BP(mean): --  RR: 18 (15 Aug 2021 05:17) (18 - 18)  SpO2: 95% (15 Aug 2021 05:17) (95% - 100%)    PHYSICAL EXAM:  GENERAL: NAD, well-groomed, well-developed  HEAD:  Atraumatic, Normocephalic  EYES: EOMI, PERRLA, conjunctiva and sclera clear  ENMT: No tonsillar erythema, exudates, or enlargement; Moist mucous membranes, Good dentition, No lesions  NECK: Supple, No JVD, Normal thyroid  NERVOUS SYSTEM:  Alert & Oriented X3, Good concentration; Motor Strength 5/5 B/L upper and lower extremities; DTRs 2+ intact and symmetric  CHEST/LUNG: Clear to percussion bilaterally; No rales, rhonchi, wheezing, or rubs  HEART: Regular rate and rhythm; No murmurs, rubs, or gallops  ABDOMEN: Soft, Nontender, Nondistended; Bowel sounds present  EXTREMITIES:  2+ Peripheral Pulses, No clubbing, cyanosis, or edema. dressings on rle. dry  LYMPH: No lymphadenopathy noted  SKIN: No rashes or lesions    LABS:                CAPILLARY BLOOD GLUCOSE                    RADIOLOGY & ADDITIONAL TESTS:    Imaging Personally Reviewed:  [ ] YES  [ ] NO    Consultant(s) Notes Reviewed:  [ ] YES  [ ] NO    Care Discussed with Consultants/Other Providers [ ] YES  [ ] NO    Care discussed with family,         [  ]   yes  [  ]  No    imp:    stable[ ]    unstable[  ]     improving [   x]       unchanged  [  ]                Plans:  Continue present plans  [  x] as per ID and wound care               New consult [  ]   specialty  .......               order test[  ]    test name.                  Discharge Planning  [  ]

## 2021-08-16 PROCEDURE — 99232 SBSQ HOSP IP/OBS MODERATE 35: CPT

## 2021-08-16 RX ADMIN — CEFEPIME 100 MILLIGRAM(S): 1 INJECTION, POWDER, FOR SOLUTION INTRAMUSCULAR; INTRAVENOUS at 06:59

## 2021-08-16 RX ADMIN — ENOXAPARIN SODIUM 40 MILLIGRAM(S): 100 INJECTION SUBCUTANEOUS at 06:59

## 2021-08-16 RX ADMIN — Medication 166.67 MILLIGRAM(S): at 06:56

## 2021-08-16 RX ADMIN — Medication 650 MILLIGRAM(S): at 21:32

## 2021-08-16 RX ADMIN — CEFEPIME 100 MILLIGRAM(S): 1 INJECTION, POWDER, FOR SOLUTION INTRAMUSCULAR; INTRAVENOUS at 21:26

## 2021-08-16 RX ADMIN — LOSARTAN POTASSIUM 50 MILLIGRAM(S): 100 TABLET, FILM COATED ORAL at 06:59

## 2021-08-16 RX ADMIN — Medication 650 MILLIGRAM(S): at 22:48

## 2021-08-16 RX ADMIN — CEFEPIME 100 MILLIGRAM(S): 1 INJECTION, POWDER, FOR SOLUTION INTRAMUSCULAR; INTRAVENOUS at 13:15

## 2021-08-16 RX ADMIN — ENOXAPARIN SODIUM 40 MILLIGRAM(S): 100 INJECTION SUBCUTANEOUS at 18:08

## 2021-08-16 RX ADMIN — SENNA PLUS 2 TABLET(S): 8.6 TABLET ORAL at 21:26

## 2021-08-16 NOTE — PROGRESS NOTE ADULT - ASSESSMENT
76F with a PMH of HTN and chronic lymphedema who presents to the ED for fever.  Patient follows with Dr Montoya for wound care and chronic lymphedema  febrile and tachycardic on  admission with leukocytosis with left shift and bands of 4%  Ct showing cellulitis  exam consistent with cellulitis  If she worsens clinically, would repeat CT of the lower extremity with IV contrast   need to follow up blood cultures   has no urinary symptoms, low suspicion for UTI  No respiratory complaints     8/16: no fevers, WBC normalized, UC grew 3+ organisms - most likely contamination, pt has no urinary symptoms, 1/4 BC grew pseudomonas and pt's wound is most likely the source. Vancomycin and Flagyl stopped, continue cefepime while in the hospital and if for discharge will complete 10 days course of antibiotics, will change to oral quinolone.     Suggestions  - stop Vancomycin  - stop Flagyl  - continue cefepime IV while in the hospital  - may change to oral Cipro to complete 10 days course when ready for discharge  - blood cultures repeated, will follow up on the result, low suspicion for cultures to come back positive  - wound care  - will sign off, re-consult as needed     discussed with Dr. Barnes             76F with a PMH of HTN and chronic lymphedema who presents to the ED for fever.  Patient follows with Dr Montoya for wound care and chronic lymphedema  febrile and tachycardic on  admission with leukocytosis with left shift and bands of 4%  Ct showing cellulitis  exam consistent with cellulitis  If she worsens clinically, would repeat CT of the lower extremity with IV contrast   need to follow up blood cultures   has no urinary symptoms, low suspicion for UTI  No respiratory complaints     8/16: no fevers, WBC normalized, UC grew 3+ organisms - most likely contamination, pt has no urinary symptoms, 1/4 BC grew pseudomonas and pt's wound is most likely the source. Vancomycin and Flagyl stopped, continue cefepime while in the hospital and if for discharge will complete 10 days course of antibiotics, will change to oral quinolone.     Suggestions  - stop Vancomycin  - stop Flagyl  - continue cefepime IV while in the hospital  - may change to oral Levaquin 750mg  daily to complete 10 days course when ready for discharge  - blood cultures repeated, low suspicion for cultures to come back positive  - wound care  - will sign off, re-consult as needed     discussed with Dr. Barnes

## 2021-08-16 NOTE — CONSULT NOTE ADULT - CONSULT REASON
lymphedema
Cellulitis   infected wound
Lymphedema
medical management of patient with lymphedema with Bilateral TKR and cellulitis and open wound right leg

## 2021-08-16 NOTE — CONSULT NOTE ADULT - SUBJECTIVE AND OBJECTIVE BOX
Vascular Attending:      HPI:  Patient is a 76F with a PMH of HTN and chronic lymphedema who presents to the ED for fever.  Patient follows with Dr Montoya for wound care and chronic lymphedema.  Last visit was yesterday.  Patient states that she is developed fever and malaise today, concerned about infection to a wound to the RLE that is nonhealing.  Patient has no other complaints.  Febrile to 103 in triage.  Labs show leukocytosis.  Will admit to med surg.   (13 Aug 2021 04:14)    Pt seen bedside with Dr Montoya, admits to feeling much better today. Denies fever, chills, n/v.    PAST MEDICAL & SURGICAL HISTORY:  Essential hypertension    Morbid obesity due to excess calories    Other iron deficiency anemia    H/O total knee replacement, bilateral      Allergies  No Known Allergies      Vital Signs Last 24 Hrs  T(C): 36.6 (16 Aug 2021 13:26), Max: 36.8 (16 Aug 2021 06:17)  T(F): 97.8 (16 Aug 2021 13:26), Max: 98.3 (16 Aug 2021 06:17)  HR: 70 (16 Aug 2021 14:21) (70 - 83)  BP: 115/71 (16 Aug 2021 14:21) (108/73 - 140/75)  BP(mean): --  RR: 18 (16 Aug 2021 14:21) (18 - 18)  SpO2: 97% (16 Aug 2021 14:21) (96% - 98%)    Gen: NAD, WDWN, pleasant  HEENT: NCAT  CV: +S1S2  Lung: nonlabored respirations  Abd: soft   Extremities: right lower extremity with significant edema and induration of medial thigh. Dressing CDI  Pulses: normal and intact b/l                                                                           LABS:                        10.2   5.94  )-----------( 191      ( 15 Aug 2021 08:55 )             33.0     08-15    140  |  109<H>  |  18  ----------------------------<  103<H>  3.8   |  25  |  0.81    Ca    8.3<L>      15 Aug 2021 08:55          Culture Results:   >=3 organisms. Probable collection contamination. (08-13-21 @ 09:15)  Culture Results:   No growth to date. (08-13-21 @ 08:54)  Culture Results:   Growth in aerobic bottle: Pseudomonas aeruginosa  ***Blood Panel PCR results on this specimen are available  approximately 3 hours after the Gram stain result.***  Gram stain, PCR, and/or culture results may not always  correspond due to differencein methodologies.  ************************************************************  This PCR assay was performed by multiplex PCR. This  Assay tests for 66 bacterial and resistance gene targets.  Please refer to the [facility] test directory  at [email or web address] for details. (08-13-21 @ 08:54)      Impression and Plan: 76F PMH HTN and h/o chronic lymphedema admitted with cellulitis of right lower extremity   -per discussion with Dr Montoya and Dr Barnes ID, patient is stable for d/c on PO abx, OP Follow up in wound care  -no surgical intervention planned at present  -Continue daily packing per RN

## 2021-08-16 NOTE — PROGRESS NOTE ADULT - SUBJECTIVE AND OBJECTIVE BOX
ALICIA GONZALEZ  MRN-[id number]    Follow Up:  RLE wound    Interval History: The pt was seen and examined earlier, no distress, out of bed to chair, comfortable. The pt is afebrile, WBC normalized, Cr ok.     PAST MEDICAL & SURGICAL HISTORY:  Essential hypertension    Morbid obesity due to excess calories    Other iron deficiency anemia    H/O total knee replacement, bilateral        ROS:    [ ] Unobtainable because:  [ x] All other systems negative    Constitutional: no fever, no chills  Head: no trauma  Eyes: no vision changes, no eye pain  ENT:  no sore throat, no rhinorrhea  Cardiovascular:  no chest pain, no palpitation  Respiratory:  no SOB, no cough  GI:  no abd pain, no vomiting, no diarrhea  urinary: no dysuria, no hematuria, no flank pain  musculoskeletal:  no joint pain, no joint swelling  skin:  no rash, RLE non-healing wound   neurology:  no headache, no seizure, no change in mental status  psych: no anxiety, no depression         Allergies  No Known Allergies        ANTIMICROBIALS:  cefepime   IVPB 2000 every 8 hours  cefepime   IVPB        OTHER MEDS:  acetaminophen   Tablet .. 650 milliGRAM(s) Oral every 6 hours PRN  enoxaparin Injectable 40 milliGRAM(s) SubCutaneous two times a day  losartan 50 milliGRAM(s) Oral daily  senna 2 Tablet(s) Oral at bedtime      Vital Signs Last 24 Hrs  T(C): 36.6 (16 Aug 2021 13:26), Max: 36.8 (16 Aug 2021 06:17)  T(F): 97.8 (16 Aug 2021 13:26), Max: 98.3 (16 Aug 2021 06:17)  HR: 78 (16 Aug 2021 13:26) (73 - 83)  BP: 121/67 (16 Aug 2021 13:26) (108/73 - 140/75)  BP(mean): --  RR: 18 (16 Aug 2021 13:26) (18 - 18)  SpO2: 97% (16 Aug 2021 13:26) (96% - 98%)    Physical Exam:  Constitutional: non-toxic, no distress, obese female  HEAD/EYES: anicteric, no conjunctival injection  ENT:  supple, no thrush  Cardiovascular:   normal S1, S2, no murmur  Respiratory:  clear BS bilaterally, no wheezes, no rales  GI:  soft, non-tender, normal bowel sounds  :  no blum, no CVA tenderness  Musculoskeletal:  no synovitis  Neurologic: awake and alert, normal strength, no focal findings  Skin:  large right thigh with a deep wound, surrounding mild erythema and warmth, nontender, packing is present in the medial aspect of the inner upper right thigh   Heme/Onc: lymphedema   Psychiatric:  awake, alert, appropriate mood    WBC Count: 5.94 K/uL (08-15 @ 08:55)  WBC Count: 15.30 K/uL (08-12 @ 22:52)                            10.2   5.94  )-----------( 191      ( 15 Aug 2021 08:55 )             33.0       08-15    140  |  109<H>  |  18  ----------------------------<  103<H>  3.8   |  25  |  0.81    Ca    8.3<L>      15 Aug 2021 08:55            Creatinine Trend: 0.81<--, 0.78<--      MICROBIOLOGY:  v  Clean Catch Clean Catch (Midstream)  08-13-21   >=3 organisms. Probable collection contamination.  --  --      .Blood Blood-Peripheral  08-13-21   No growth to date.  --  Blood Culture PCR  Pseudomonas aeruginosa    C-Reactive Protein, Serum: 163 (08-14)    RADIOLOGY:

## 2021-08-16 NOTE — PROGRESS NOTE ADULT - ASSESSMENT
stable   improving   cellulitis and wound rt leg. pseudomonas on blood culture  chronic anemia of infection  Bacteremia -GNR  s/p MIRLANDE TKR   morbid obesity   HTn

## 2021-08-16 NOTE — PROGRESS NOTE ADULT - SUBJECTIVE AND OBJECTIVE BOX
Patient is a 76y old  Female who presents with a chief complaint of cellulitis (15 Aug 2021 18:04)  improving. no fevver   wbc trending down   psueudomonas  from bllod culture  - discussed with ID dr Gómez- will discontinue vanco., continue cefipine  Vitals stable. patient feeling better.  INTERVAL HPI/OVERNIGHT EVENTS:  PAST MEDICAL & SURGICAL HISTORY:  Essential hypertension    Morbid obesity due to excess calories    Other iron deficiency anemia    H/O total knee replacement, bilateral        MEDICATIONS  (STANDING):  cefepime   IVPB 2000 milliGRAM(s) IV Intermittent every 8 hours  cefepime   IVPB      enoxaparin Injectable 40 milliGRAM(s) SubCutaneous two times a day  losartan 50 milliGRAM(s) Oral daily  senna 2 Tablet(s) Oral at bedtime  vancomycin  IVPB      vancomycin  IVPB 1250 milliGRAM(s) IV Intermittent every 12 hours    MEDICATIONS  (PRN):  acetaminophen   Tablet .. 650 milliGRAM(s) Oral every 6 hours PRN Mild Pain (1 - 3)      Allergies    No Known Allergies    Intolerances        REVIEW OF SYSTEMS:  CONSTITUTIONAL: No fever, weight loss, or fatigue  EYES: No eye pain, visual disturbances, or discharge  ENMT:  No difficulty hearing, tinnitus, vertigo; No sinus or throat pain  NECK: No pain or stiffness  BREASTS: No pain, masses, or nipple discharge  RESPIRATORY: No cough, wheezing, chills or hemoptysis; No shortness of breath  CARDIOVASCULAR: No chest pain, palpitations, dizziness, or leg swelling  GASTROINTESTINAL: No abdominal or epigastric pain. No nausea, vomiting, or hematemesis; No diarrhea or constipation. No melena or hematochezia.  GENITOURINARY: No dysuria, frequency, hematuria, or incontinence  NEUROLOGICAL: No headaches, memory loss, loss of strength, numbness, or tremors  SKIN: No itching, burning, rashes, or lesions   LYMPH NODES: No enlarged glands  ENDOCRINE: No heat or cold intolerance; No hair loss  MUSCULOSKELETAL: No joint pain or swelling; No muscle, back, or extremity pain  PSYCHIATRIC: No depression, anxiety, mood swings, or difficulty sleeping  HEME/LYMPH: No easy bruising, or bleeding gums  ALLERY AND IMMUNOLOGIC: No hives or eczema    Vital Signs Last 24 Hrs  T(C): 36.8 (16 Aug 2021 06:17), Max: 36.8 (16 Aug 2021 06:17)  T(F): 98.3 (16 Aug 2021 06:17), Max: 98.3 (16 Aug 2021 06:17)  HR: 73 (16 Aug 2021 06:17) (73 - 83)  BP: 127/74 (16 Aug 2021 06:17) (108/73 - 140/75)  BP(mean): --  RR: 18 (16 Aug 2021 06:17) (18 - 18)  SpO2: 98% (16 Aug 2021 06:17) (96% - 98%)    PHYSICAL EXAM:  GENERAL: NAD, well-groomed, well-developed  HEAD:  Atraumatic, Normocephalic  EYES: EOMI, PERRLA, conjunctiva and sclera clear  ENMT: No tonsillar erythema, exudates, or enlargement; Moist mucous membranes, Good dentition, No lesions  NECK: Supple, No JVD, Normal thyroid  NERVOUS SYSTEM:  Alert & Oriented X3, Good concentration; Motor Strength 5/5 B/L upper and lower extremities; DTRs 2+ intact and symmetric  CHEST/LUNG: Clear to percussion bilaterally; No rales, rhonchi, wheezing, or rubs  HEART: Regular rate and rhythm; No murmurs, rubs, or gallops  ABDOMEN: Soft, Nontender, Nondistended; Bowel sounds present  EXTREMITIES:  2+ Peripheral Pulses, No clubbing, cyanosis, or edema  LYMPH: No lymphadenopathy noted  SKIN: No rashes or lesions    LABS:                        10.2   5.94  )-----------( 191      ( 15 Aug 2021 08:55 )             33.0     08-15    140  |  109<H>  |  18  ----------------------------<  103<H>  3.8   |  25  |  0.81    Ca    8.3<L>      15 Aug 2021 08:55            CAPILLARY BLOOD GLUCOSE                    RADIOLOGY & ADDITIONAL TESTS:    Imaging Personally Reviewed:  [ ] YES  [ ] NO    Consultant(s) Notes Reviewed:  [ ] YES  [ ] NO    Care Discussed with Consultants/Other Providers [x ] YES  [ ] NO    Care discussed with family,         [ x]   yes  [  ]  No    imp:    stable[x ]    unstable[  ]     improving [   ]       unchanged  [  ]                Plans:  Continue present plans  [ x ] vanco disontinued               New consult [  ]   specialty  .......               order test[  ]    test name.                  Discharge Planning  [  ]

## 2021-08-17 ENCOUNTER — TRANSCRIPTION ENCOUNTER (OUTPATIENT)
Age: 76
End: 2021-08-17

## 2021-08-17 VITALS
SYSTOLIC BLOOD PRESSURE: 115 MMHG | TEMPERATURE: 98 F | OXYGEN SATURATION: 96 % | HEART RATE: 99 BPM | RESPIRATION RATE: 20 BRPM | DIASTOLIC BLOOD PRESSURE: 79 MMHG

## 2021-08-17 LAB
ANION GAP SERPL CALC-SCNC: 3 MMOL/L — LOW (ref 5–17)
BUN SERPL-MCNC: 22 MG/DL — SIGNIFICANT CHANGE UP (ref 7–23)
CALCIUM SERPL-MCNC: 8.7 MG/DL — SIGNIFICANT CHANGE UP (ref 8.5–10.1)
CHLORIDE SERPL-SCNC: 107 MMOL/L — SIGNIFICANT CHANGE UP (ref 96–108)
CO2 SERPL-SCNC: 29 MMOL/L — SIGNIFICANT CHANGE UP (ref 22–31)
CREAT SERPL-MCNC: 0.62 MG/DL — SIGNIFICANT CHANGE UP (ref 0.5–1.3)
GLUCOSE SERPL-MCNC: 92 MG/DL — SIGNIFICANT CHANGE UP (ref 70–99)
HCT VFR BLD CALC: 34.2 % — LOW (ref 34.5–45)
HGB BLD-MCNC: 10.5 G/DL — LOW (ref 11.5–15.5)
MCHC RBC-ENTMCNC: 26.6 PG — LOW (ref 27–34)
MCHC RBC-ENTMCNC: 30.7 GM/DL — LOW (ref 32–36)
MCV RBC AUTO: 86.6 FL — SIGNIFICANT CHANGE UP (ref 80–100)
NRBC # BLD: 0 /100 WBCS — SIGNIFICANT CHANGE UP (ref 0–0)
PLATELET # BLD AUTO: 255 K/UL — SIGNIFICANT CHANGE UP (ref 150–400)
POTASSIUM SERPL-MCNC: 4.1 MMOL/L — SIGNIFICANT CHANGE UP (ref 3.5–5.3)
POTASSIUM SERPL-SCNC: 4.1 MMOL/L — SIGNIFICANT CHANGE UP (ref 3.5–5.3)
RBC # BLD: 3.95 M/UL — SIGNIFICANT CHANGE UP (ref 3.8–5.2)
RBC # FLD: 17.6 % — HIGH (ref 10.3–14.5)
SODIUM SERPL-SCNC: 139 MMOL/L — SIGNIFICANT CHANGE UP (ref 135–145)
WBC # BLD: 5.49 K/UL — SIGNIFICANT CHANGE UP (ref 3.8–10.5)
WBC # FLD AUTO: 5.49 K/UL — SIGNIFICANT CHANGE UP (ref 3.8–10.5)

## 2021-08-17 RX ORDER — LOSARTAN POTASSIUM 100 MG/1
1 TABLET, FILM COATED ORAL
Qty: 30 | Refills: 0
Start: 2021-08-17 | End: 2021-09-15

## 2021-08-17 RX ORDER — CIPROFLOXACIN LACTATE 400MG/40ML
1 VIAL (ML) INTRAVENOUS
Qty: 14 | Refills: 0
Start: 2021-08-17 | End: 2021-08-30

## 2021-08-17 RX ADMIN — CEFEPIME 100 MILLIGRAM(S): 1 INJECTION, POWDER, FOR SOLUTION INTRAMUSCULAR; INTRAVENOUS at 05:41

## 2021-08-17 RX ADMIN — LOSARTAN POTASSIUM 50 MILLIGRAM(S): 100 TABLET, FILM COATED ORAL at 05:38

## 2021-08-17 RX ADMIN — ENOXAPARIN SODIUM 40 MILLIGRAM(S): 100 INJECTION SUBCUTANEOUS at 05:40

## 2021-08-17 NOTE — DIETITIAN INITIAL EVALUATION ADULT. - ORAL INTAKE PTA/DIET HISTORY
Pt reports good appetite and PO intake PTA. States she lives with her  and granddaughter and she cooks for her family PTA. Reports she follows a diet low in salt PTA.

## 2021-08-17 NOTE — DISCHARGE NOTE PROVIDER - CARE PROVIDER_API CALL
Toñito Montoya)  Surgery  [address], [address]  [place] [address]  Phone: [phone]  Fax: [phone]  Follow Up Time:

## 2021-08-17 NOTE — DIETITIAN INITIAL EVALUATION ADULT. - OTHER CALCULATIONS
Ht (cm): 160cm   Wt (kg): 147.4kg (dosing weight 08/12)   BMI: 57.6     IBW: 52.1kg +/- 10% %IBW: 283% UBW: 136.9kg %UBW: 107%

## 2021-08-17 NOTE — DIETITIAN INITIAL EVALUATION ADULT. - OTHER INFO
Pt reports good appetite and PO intake during LOS. Denies difficulty chewing/swallowing. Pt denies nausea, vomiting, diarrhea, or constipation. States recent weight gain due to physical inactivity and fluid retention; states  pounds.   Reinforced low sodium nutrition education and discussed importance of protein intake. Pt with no further questions or preferences at this time. Made aware RD remains available.

## 2021-08-17 NOTE — DISCHARGE NOTE PROVIDER - DETAILS OF MALNUTRITION DIAGNOSIS/DIAGNOSES
This patient has been assessed with a concern for Malnutrition and was treated during this hospitalization for the following Nutrition diagnosis/diagnoses:     -  08/17/2021: Morbid obesity (BMI > 40)

## 2021-08-17 NOTE — DISCHARGE NOTE NURSING/CASE MANAGEMENT/SOCIAL WORK - PATIENT PORTAL LINK FT
You can access the FollowMyHealth Patient Portal offered by [facility] by registering at the following website: [email or web address]. By joining [facility]’s FollowMyHealth portal, you will also be able to view your health information using other applications (apps) compatible with our system.

## 2021-08-17 NOTE — DIETITIAN INITIAL EVALUATION ADULT. - EDUCATION DIETARY MODIFICATIONS
Reinforced low sodium nutrition education and discussed importance of protein intake./(R) reinforced previously met goal

## 2021-08-17 NOTE — DIETITIAN INITIAL EVALUATION ADULT. - NUTRITIONGOAL OUTCOME1
Pt to consume portion controlled meals during LOS; gradually lose weight (1-2#s/week) post-discharge

## 2021-08-17 NOTE — DISCHARGE NOTE PROVIDER - HOSPITAL COURSE
76F with a PMH of HTN and chronic lymphedema who presents to the ED for fever.  Patient follows with Dr Montoya for wound care and chronic lymphedema febrile and tachycardic on admission with leukocytosis with left shift and bands of 4%    1) RLE Cellulitis/ +Pseudomonas   - finish Levaquin x 14 days  - please f/u wound care clinic 76F with a PMH of HTN and chronic lymphedema who presents to the ED for fever.  Patient follows with Dr Montoya for wound care and chronic lymphedema febrile and tachycardic on admission with leukocytosis with left shift and bands of 4%    1) RLE Cellulitis/ +Pseudomonas   - finish Levaquin x 14 days  - please f/u wound care clinic    Patient had Pseudomonas Bacteremia without sepsis  secondary to Right Lower leg cellulitis (POA)

## 2021-08-17 NOTE — DISCHARGE NOTE PROVIDER - NSDCCPCAREPLAN_GEN_ALL_CORE_FT
PRINCIPAL DISCHARGE DIAGNOSIS  Diagnosis: Cellulitis  Assessment and Plan of Treatment:       SECONDARY DISCHARGE DIAGNOSES  Diagnosis: Lymphedema  Assessment and Plan of Treatment:     Diagnosis: UTI (urinary tract infection)  Assessment and Plan of Treatment:

## 2021-08-17 NOTE — DIETITIAN INITIAL EVALUATION ADULT. - PERTINENT MEDS FT
MEDICATIONS  (STANDING):  cefepime   IVPB 2000 milliGRAM(s) IV Intermittent every 8 hours  cefepime   IVPB      enoxaparin Injectable 40 milliGRAM(s) SubCutaneous two times a day  losartan 50 milliGRAM(s) Oral daily  senna 2 Tablet(s) Oral at bedtime    MEDICATIONS  (PRN):  acetaminophen   Tablet .. 650 milliGRAM(s) Oral every 6 hours PRN Mild Pain (1 - 3)

## 2021-08-17 NOTE — DISCHARGE NOTE PROVIDER - NSDCMRMEDTOKEN_GEN_ALL_CORE_FT
albuterol 90 mcg/inh inhalation aerosol: 1 puff(s) inhaled every 4 hours  aluminum hydroxide-magnesium hydroxide 200 mg-200 mg/5 mL oral suspension: 30 milliliter(s) orally every 4 hours, As needed, Dyspepsia  budesonide-formoterol 160 mcg-4.5 mcg/inh inhalation aerosol:  inhaled   ipratropium-albuterol 0.5 mg-2.5 mg/3 mLinhalation solution: 3 milliliter(s) inhaled every 8 hours  levoFLOXacin 750 mg oral tablet: 1 tab(s) orally every 24 hours x 14 days   losartan 50 mg oral tablet: 1 tab(s) orally once a day  polyethylene glycol 3350 oral powder for reconstitution: 17 gram(s) orally once a day  senna oral tablet: 2 tab(s) orally once a day (at bedtime)  tiotropium 18 mcg inhalation capsule: 1 cap(s) inhaled once a day

## 2021-08-18 LAB
CULTURE RESULTS: SIGNIFICANT CHANGE UP
SPECIMEN SOURCE: SIGNIFICANT CHANGE UP

## 2021-08-18 NOTE — PHARMACOTHERAPY INTERVENTION NOTE - COMMENTS
Recommended 2 gm IV q8h given sufficient renal function and weight of 147.4 kg (BMI > 50)
Recommended 1250 mg IV q12h for anticipated AUC/SENAIT of 487 and trough of 13.8 for skin/skin structure infection
Recommended 500 mg IV q8h to cover for potential anaerobes with necrotizing fasciitis on the differential
Recommended d/c vancomycin since patient is growing Pseudomonas in blood culture, possibly 2/2 wound/skin infection.
Recommended switching to PO levofloxacin while inpatient in anticipation of discharge since patient is improving and tolerating PO.
Recommended vanco level pre-4th dose since on a q12h regimen

## 2021-08-18 NOTE — PHARMACOTHERAPY INTERVENTION NOTE - NSPHARMCOMMASP
ASP - De-escalation
ASP - Renal dose adjustment
ASP - Lab/ test recommended
ASP - Dose optimization/Non-Renal dose adjustment
ASP - Dose optimization/Non-Renal dose adjustment
ASP - IV to PO

## 2021-08-21 LAB
CULTURE RESULTS: SIGNIFICANT CHANGE UP
CULTURE RESULTS: SIGNIFICANT CHANGE UP
SPECIMEN SOURCE: SIGNIFICANT CHANGE UP
SPECIMEN SOURCE: SIGNIFICANT CHANGE UP

## 2021-08-23 DIAGNOSIS — L03.115 CELLULITIS OF RIGHT LOWER LIMB: ICD-10-CM

## 2021-08-23 DIAGNOSIS — Z96.653 PRESENCE OF ARTIFICIAL KNEE JOINT, BILATERAL: ICD-10-CM

## 2021-08-23 DIAGNOSIS — N39.0 URINARY TRACT INFECTION, SITE NOT SPECIFIED: ICD-10-CM

## 2021-08-23 DIAGNOSIS — D50.9 IRON DEFICIENCY ANEMIA, UNSPECIFIED: ICD-10-CM

## 2021-08-23 DIAGNOSIS — E66.01 MORBID (SEVERE) OBESITY DUE TO EXCESS CALORIES: ICD-10-CM

## 2021-08-23 DIAGNOSIS — Z79.51 LONG TERM (CURRENT) USE OF INHALED STEROIDS: ICD-10-CM

## 2021-08-23 DIAGNOSIS — I10 ESSENTIAL (PRIMARY) HYPERTENSION: ICD-10-CM

## 2021-08-23 DIAGNOSIS — R50.9 FEVER, UNSPECIFIED: ICD-10-CM

## 2021-08-23 DIAGNOSIS — B96.5 PSEUDOMONAS (AERUGINOSA) (MALLEI) (PSEUDOMALLEI) AS THE CAUSE OF DISEASES CLASSIFIED ELSEWHERE: ICD-10-CM

## 2021-08-23 DIAGNOSIS — I89.0 LYMPHEDEMA, NOT ELSEWHERE CLASSIFIED: ICD-10-CM

## 2021-08-23 DIAGNOSIS — R78.81 BACTEREMIA: ICD-10-CM

## 2021-08-25 ENCOUNTER — OUTPATIENT (OUTPATIENT)
Dept: OUTPATIENT SERVICES | Facility: HOSPITAL | Age: 76
LOS: 1 days | Discharge: HOME | End: 2021-08-25

## 2021-08-25 DIAGNOSIS — Z96.653 PRESENCE OF ARTIFICIAL KNEE JOINT, BILATERAL: Chronic | ICD-10-CM

## 2021-08-25 DIAGNOSIS — L89.90 PRESSURE ULCER OF UNSPECIFIED SITE, UNSPECIFIED STAGE: ICD-10-CM

## 2021-08-26 DIAGNOSIS — L92.9 GRANULOMATOUS DISORDER OF THE SKIN AND SUBCUTANEOUS TISSUE, UNSPECIFIED: ICD-10-CM

## 2021-08-26 DIAGNOSIS — L97.812 NON-PRESSURE CHRONIC ULCER OF OTHER PART OF RIGHT LOWER LEG WITH FAT LAYER EXPOSED: ICD-10-CM

## 2021-08-26 DIAGNOSIS — R60.9 EDEMA, UNSPECIFIED: ICD-10-CM

## 2021-08-26 DIAGNOSIS — E66.01 MORBID (SEVERE) OBESITY DUE TO EXCESS CALORIES: ICD-10-CM

## 2021-08-26 DIAGNOSIS — M79.604 PAIN IN RIGHT LEG: ICD-10-CM

## 2021-08-26 DIAGNOSIS — I10 ESSENTIAL (PRIMARY) HYPERTENSION: ICD-10-CM

## 2021-08-26 DIAGNOSIS — I89.0 LYMPHEDEMA, NOT ELSEWHERE CLASSIFIED: ICD-10-CM

## 2021-08-26 DIAGNOSIS — Z79.899 OTHER LONG TERM (CURRENT) DRUG THERAPY: ICD-10-CM

## 2021-08-31 ENCOUNTER — OUTPATIENT (OUTPATIENT)
Dept: OUTPATIENT SERVICES | Facility: HOSPITAL | Age: 76
LOS: 1 days | Discharge: HOME | End: 2021-08-31

## 2021-08-31 DIAGNOSIS — L89.90 PRESSURE ULCER OF UNSPECIFIED SITE, UNSPECIFIED STAGE: ICD-10-CM

## 2021-08-31 DIAGNOSIS — Z96.653 PRESENCE OF ARTIFICIAL KNEE JOINT, BILATERAL: Chronic | ICD-10-CM

## 2021-09-02 DIAGNOSIS — L97.812 NON-PRESSURE CHRONIC ULCER OF OTHER PART OF RIGHT LOWER LEG WITH FAT LAYER EXPOSED: ICD-10-CM

## 2021-09-02 DIAGNOSIS — E66.01 MORBID (SEVERE) OBESITY DUE TO EXCESS CALORIES: ICD-10-CM

## 2021-09-02 DIAGNOSIS — M79.604 PAIN IN RIGHT LEG: ICD-10-CM

## 2021-09-02 DIAGNOSIS — I89.0 LYMPHEDEMA, NOT ELSEWHERE CLASSIFIED: ICD-10-CM

## 2021-09-02 DIAGNOSIS — Z79.899 OTHER LONG TERM (CURRENT) DRUG THERAPY: ICD-10-CM

## 2021-09-02 DIAGNOSIS — I10 ESSENTIAL (PRIMARY) HYPERTENSION: ICD-10-CM

## 2021-09-02 DIAGNOSIS — R60.9 EDEMA, UNSPECIFIED: ICD-10-CM

## 2021-09-08 ENCOUNTER — OUTPATIENT (OUTPATIENT)
Dept: OUTPATIENT SERVICES | Facility: HOSPITAL | Age: 76
LOS: 1 days | Discharge: HOME | End: 2021-09-08

## 2021-09-08 DIAGNOSIS — Z96.653 PRESENCE OF ARTIFICIAL KNEE JOINT, BILATERAL: Chronic | ICD-10-CM

## 2021-09-08 DIAGNOSIS — L89.90 PRESSURE ULCER OF UNSPECIFIED SITE, UNSPECIFIED STAGE: ICD-10-CM

## 2021-09-10 DIAGNOSIS — L97.112 NON-PRESSURE CHRONIC ULCER OF RIGHT THIGH WITH FAT LAYER EXPOSED: ICD-10-CM

## 2021-09-10 DIAGNOSIS — E66.01 MORBID (SEVERE) OBESITY DUE TO EXCESS CALORIES: ICD-10-CM

## 2021-09-10 DIAGNOSIS — R60.9 EDEMA, UNSPECIFIED: ICD-10-CM

## 2021-09-10 DIAGNOSIS — I89.0 LYMPHEDEMA, NOT ELSEWHERE CLASSIFIED: ICD-10-CM

## 2021-09-10 DIAGNOSIS — I10 ESSENTIAL (PRIMARY) HYPERTENSION: ICD-10-CM

## 2021-09-10 DIAGNOSIS — M79.604 PAIN IN RIGHT LEG: ICD-10-CM

## 2021-09-10 DIAGNOSIS — Z79.899 OTHER LONG TERM (CURRENT) DRUG THERAPY: ICD-10-CM

## 2021-09-16 ENCOUNTER — OUTPATIENT (OUTPATIENT)
Dept: OUTPATIENT SERVICES | Facility: HOSPITAL | Age: 76
LOS: 1 days | Discharge: HOME | End: 2021-09-16
Payer: MEDICARE

## 2021-09-16 DIAGNOSIS — Z96.653 PRESENCE OF ARTIFICIAL KNEE JOINT, BILATERAL: Chronic | ICD-10-CM

## 2021-09-16 DIAGNOSIS — L89.90 PRESSURE ULCER OF UNSPECIFIED SITE, UNSPECIFIED STAGE: ICD-10-CM

## 2021-09-16 PROCEDURE — 99212 OFFICE O/P EST SF 10 MIN: CPT

## 2021-09-17 DIAGNOSIS — I10 ESSENTIAL (PRIMARY) HYPERTENSION: ICD-10-CM

## 2021-09-17 DIAGNOSIS — L97.112 NON-PRESSURE CHRONIC ULCER OF RIGHT THIGH WITH FAT LAYER EXPOSED: ICD-10-CM

## 2021-09-17 DIAGNOSIS — M79.604 PAIN IN RIGHT LEG: ICD-10-CM

## 2021-09-17 DIAGNOSIS — I89.0 LYMPHEDEMA, NOT ELSEWHERE CLASSIFIED: ICD-10-CM

## 2021-09-17 DIAGNOSIS — E66.01 MORBID (SEVERE) OBESITY DUE TO EXCESS CALORIES: ICD-10-CM

## 2021-09-17 DIAGNOSIS — Z79.899 OTHER LONG TERM (CURRENT) DRUG THERAPY: ICD-10-CM

## 2021-09-17 DIAGNOSIS — R60.9 EDEMA, UNSPECIFIED: ICD-10-CM

## 2021-09-22 ENCOUNTER — OUTPATIENT (OUTPATIENT)
Dept: OUTPATIENT SERVICES | Facility: HOSPITAL | Age: 76
LOS: 1 days | Discharge: HOME | End: 2021-09-22
Payer: MEDICARE

## 2021-09-22 DIAGNOSIS — L97.112 NON-PRESSURE CHRONIC ULCER OF RIGHT THIGH WITH FAT LAYER EXPOSED: ICD-10-CM

## 2021-09-22 DIAGNOSIS — E66.01 MORBID (SEVERE) OBESITY DUE TO EXCESS CALORIES: ICD-10-CM

## 2021-09-22 DIAGNOSIS — M79.604 PAIN IN RIGHT LEG: ICD-10-CM

## 2021-09-22 DIAGNOSIS — Z96.653 PRESENCE OF ARTIFICIAL KNEE JOINT, BILATERAL: Chronic | ICD-10-CM

## 2021-09-22 DIAGNOSIS — L89.90 PRESSURE ULCER OF UNSPECIFIED SITE, UNSPECIFIED STAGE: ICD-10-CM

## 2021-09-22 DIAGNOSIS — I10 ESSENTIAL (PRIMARY) HYPERTENSION: ICD-10-CM

## 2021-09-22 DIAGNOSIS — I89.0 LYMPHEDEMA, NOT ELSEWHERE CLASSIFIED: ICD-10-CM

## 2021-09-22 DIAGNOSIS — R60.9 EDEMA, UNSPECIFIED: ICD-10-CM

## 2021-09-22 DIAGNOSIS — Z79.899 OTHER LONG TERM (CURRENT) DRUG THERAPY: ICD-10-CM

## 2021-09-22 PROCEDURE — 99212 OFFICE O/P EST SF 10 MIN: CPT

## 2021-09-29 ENCOUNTER — OUTPATIENT (OUTPATIENT)
Dept: OUTPATIENT SERVICES | Facility: HOSPITAL | Age: 76
LOS: 1 days | Discharge: HOME | End: 2021-09-29
Payer: MEDICARE

## 2021-09-29 DIAGNOSIS — Z96.653 PRESENCE OF ARTIFICIAL KNEE JOINT, BILATERAL: Chronic | ICD-10-CM

## 2021-09-29 DIAGNOSIS — L89.90 PRESSURE ULCER OF UNSPECIFIED SITE, UNSPECIFIED STAGE: ICD-10-CM

## 2021-09-29 PROCEDURE — 99212 OFFICE O/P EST SF 10 MIN: CPT

## 2021-09-30 DIAGNOSIS — L97.112 NON-PRESSURE CHRONIC ULCER OF RIGHT THIGH WITH FAT LAYER EXPOSED: ICD-10-CM

## 2021-09-30 DIAGNOSIS — R60.9 EDEMA, UNSPECIFIED: ICD-10-CM

## 2021-09-30 DIAGNOSIS — M79.604 PAIN IN RIGHT LEG: ICD-10-CM

## 2021-09-30 DIAGNOSIS — Z79.899 OTHER LONG TERM (CURRENT) DRUG THERAPY: ICD-10-CM

## 2021-09-30 DIAGNOSIS — I10 ESSENTIAL (PRIMARY) HYPERTENSION: ICD-10-CM

## 2021-09-30 DIAGNOSIS — E66.01 MORBID (SEVERE) OBESITY DUE TO EXCESS CALORIES: ICD-10-CM

## 2021-09-30 DIAGNOSIS — I89.0 LYMPHEDEMA, NOT ELSEWHERE CLASSIFIED: ICD-10-CM

## 2021-10-06 ENCOUNTER — OUTPATIENT (OUTPATIENT)
Dept: OUTPATIENT SERVICES | Facility: HOSPITAL | Age: 76
LOS: 1 days | Discharge: HOME | End: 2021-10-06
Payer: MEDICARE

## 2021-10-06 DIAGNOSIS — Z96.653 PRESENCE OF ARTIFICIAL KNEE JOINT, BILATERAL: Chronic | ICD-10-CM

## 2021-10-06 DIAGNOSIS — L89.899 PRESSURE ULCER OF OTHER SITE, UNSPECIFIED STAGE: ICD-10-CM

## 2021-10-06 PROCEDURE — 99213 OFFICE O/P EST LOW 20 MIN: CPT

## 2021-10-07 DIAGNOSIS — I89.0 LYMPHEDEMA, NOT ELSEWHERE CLASSIFIED: ICD-10-CM

## 2021-10-07 DIAGNOSIS — M79.604 PAIN IN RIGHT LEG: ICD-10-CM

## 2021-10-07 DIAGNOSIS — L97.112 NON-PRESSURE CHRONIC ULCER OF RIGHT THIGH WITH FAT LAYER EXPOSED: ICD-10-CM

## 2021-10-07 DIAGNOSIS — E66.01 MORBID (SEVERE) OBESITY DUE TO EXCESS CALORIES: ICD-10-CM

## 2021-10-07 DIAGNOSIS — R60.9 EDEMA, UNSPECIFIED: ICD-10-CM

## 2021-10-07 DIAGNOSIS — I10 ESSENTIAL (PRIMARY) HYPERTENSION: ICD-10-CM

## 2021-10-07 DIAGNOSIS — Z79.899 OTHER LONG TERM (CURRENT) DRUG THERAPY: ICD-10-CM

## 2021-10-07 DIAGNOSIS — L92.9 GRANULOMATOUS DISORDER OF THE SKIN AND SUBCUTANEOUS TISSUE, UNSPECIFIED: ICD-10-CM

## 2021-10-13 ENCOUNTER — OUTPATIENT (OUTPATIENT)
Dept: OUTPATIENT SERVICES | Facility: HOSPITAL | Age: 76
LOS: 1 days | Discharge: HOME | End: 2021-10-13
Payer: MEDICARE

## 2021-10-13 DIAGNOSIS — L89.899 PRESSURE ULCER OF OTHER SITE, UNSPECIFIED STAGE: ICD-10-CM

## 2021-10-13 DIAGNOSIS — Z96.653 PRESENCE OF ARTIFICIAL KNEE JOINT, BILATERAL: Chronic | ICD-10-CM

## 2021-10-13 PROCEDURE — 99213 OFFICE O/P EST LOW 20 MIN: CPT

## 2021-10-14 DIAGNOSIS — I89.0 LYMPHEDEMA, NOT ELSEWHERE CLASSIFIED: ICD-10-CM

## 2021-10-14 DIAGNOSIS — I10 ESSENTIAL (PRIMARY) HYPERTENSION: ICD-10-CM

## 2021-10-14 DIAGNOSIS — R60.9 EDEMA, UNSPECIFIED: ICD-10-CM

## 2021-10-14 DIAGNOSIS — L97.122 NON-PRESSURE CHRONIC ULCER OF LEFT THIGH WITH FAT LAYER EXPOSED: ICD-10-CM

## 2021-10-14 DIAGNOSIS — E66.01 MORBID (SEVERE) OBESITY DUE TO EXCESS CALORIES: ICD-10-CM

## 2021-10-14 DIAGNOSIS — M79.604 PAIN IN RIGHT LEG: ICD-10-CM

## 2021-10-14 DIAGNOSIS — L97.112 NON-PRESSURE CHRONIC ULCER OF RIGHT THIGH WITH FAT LAYER EXPOSED: ICD-10-CM

## 2021-10-14 DIAGNOSIS — Z79.899 OTHER LONG TERM (CURRENT) DRUG THERAPY: ICD-10-CM

## 2021-10-27 ENCOUNTER — OUTPATIENT (OUTPATIENT)
Dept: OUTPATIENT SERVICES | Facility: HOSPITAL | Age: 76
LOS: 1 days | Discharge: HOME | End: 2021-10-27
Payer: MEDICARE

## 2021-10-27 DIAGNOSIS — Z96.653 PRESENCE OF ARTIFICIAL KNEE JOINT, BILATERAL: Chronic | ICD-10-CM

## 2021-10-27 DIAGNOSIS — L89.90 PRESSURE ULCER OF UNSPECIFIED SITE, UNSPECIFIED STAGE: ICD-10-CM

## 2021-10-27 PROCEDURE — 99213 OFFICE O/P EST LOW 20 MIN: CPT

## 2021-10-28 DIAGNOSIS — L97.112 NON-PRESSURE CHRONIC ULCER OF RIGHT THIGH WITH FAT LAYER EXPOSED: ICD-10-CM

## 2021-10-28 DIAGNOSIS — I10 ESSENTIAL (PRIMARY) HYPERTENSION: ICD-10-CM

## 2021-10-28 DIAGNOSIS — Z79.899 OTHER LONG TERM (CURRENT) DRUG THERAPY: ICD-10-CM

## 2021-10-28 DIAGNOSIS — E66.01 MORBID (SEVERE) OBESITY DUE TO EXCESS CALORIES: ICD-10-CM

## 2021-10-28 DIAGNOSIS — R60.9 EDEMA, UNSPECIFIED: ICD-10-CM

## 2021-10-28 DIAGNOSIS — M79.604 PAIN IN RIGHT LEG: ICD-10-CM

## 2021-10-28 DIAGNOSIS — I89.0 LYMPHEDEMA, NOT ELSEWHERE CLASSIFIED: ICD-10-CM

## 2021-11-03 ENCOUNTER — OUTPATIENT (OUTPATIENT)
Dept: OUTPATIENT SERVICES | Facility: HOSPITAL | Age: 76
LOS: 1 days | Discharge: HOME | End: 2021-11-03
Payer: MEDICARE

## 2021-11-03 DIAGNOSIS — L89.899 PRESSURE ULCER OF OTHER SITE, UNSPECIFIED STAGE: ICD-10-CM

## 2021-11-03 DIAGNOSIS — Z96.653 PRESENCE OF ARTIFICIAL KNEE JOINT, BILATERAL: Chronic | ICD-10-CM

## 2021-11-03 PROCEDURE — 99213 OFFICE O/P EST LOW 20 MIN: CPT

## 2021-11-08 DIAGNOSIS — L92.9 GRANULOMATOUS DISORDER OF THE SKIN AND SUBCUTANEOUS TISSUE, UNSPECIFIED: ICD-10-CM

## 2021-11-08 DIAGNOSIS — L97.112 NON-PRESSURE CHRONIC ULCER OF RIGHT THIGH WITH FAT LAYER EXPOSED: ICD-10-CM

## 2021-11-08 DIAGNOSIS — E66.01 MORBID (SEVERE) OBESITY DUE TO EXCESS CALORIES: ICD-10-CM

## 2021-11-08 DIAGNOSIS — M79.604 PAIN IN RIGHT LEG: ICD-10-CM

## 2021-11-08 DIAGNOSIS — Z79.899 OTHER LONG TERM (CURRENT) DRUG THERAPY: ICD-10-CM

## 2021-11-08 DIAGNOSIS — R60.9 EDEMA, UNSPECIFIED: ICD-10-CM

## 2021-11-08 DIAGNOSIS — I89.0 LYMPHEDEMA, NOT ELSEWHERE CLASSIFIED: ICD-10-CM

## 2021-11-10 ENCOUNTER — OUTPATIENT (OUTPATIENT)
Dept: OUTPATIENT SERVICES | Facility: HOSPITAL | Age: 76
LOS: 1 days | Discharge: HOME | End: 2021-11-10
Payer: MEDICARE

## 2021-11-10 DIAGNOSIS — L89.899 PRESSURE ULCER OF OTHER SITE, UNSPECIFIED STAGE: ICD-10-CM

## 2021-11-10 DIAGNOSIS — Z96.653 PRESENCE OF ARTIFICIAL KNEE JOINT, BILATERAL: Chronic | ICD-10-CM

## 2021-11-10 PROCEDURE — 17250 CHEM CAUT OF GRANLTJ TISSUE: CPT | Mod: 59

## 2021-11-10 PROCEDURE — 99213 OFFICE O/P EST LOW 20 MIN: CPT

## 2021-11-11 DIAGNOSIS — I89.0 LYMPHEDEMA, NOT ELSEWHERE CLASSIFIED: ICD-10-CM

## 2021-11-11 DIAGNOSIS — M79.604 PAIN IN RIGHT LEG: ICD-10-CM

## 2021-11-11 DIAGNOSIS — L97.112 NON-PRESSURE CHRONIC ULCER OF RIGHT THIGH WITH FAT LAYER EXPOSED: ICD-10-CM

## 2021-11-11 DIAGNOSIS — R60.9 EDEMA, UNSPECIFIED: ICD-10-CM

## 2021-11-11 DIAGNOSIS — E66.01 MORBID (SEVERE) OBESITY DUE TO EXCESS CALORIES: ICD-10-CM

## 2021-11-11 DIAGNOSIS — Z79.899 OTHER LONG TERM (CURRENT) DRUG THERAPY: ICD-10-CM

## 2021-11-11 DIAGNOSIS — L02.415 CUTANEOUS ABSCESS OF RIGHT LOWER LIMB: ICD-10-CM

## 2021-11-12 LAB
-  AMIKACIN: SIGNIFICANT CHANGE UP
-  AMOXICILLIN/CLAVULANIC ACID: SIGNIFICANT CHANGE UP
-  AMPICILLIN/SULBACTAM: SIGNIFICANT CHANGE UP
-  AMPICILLIN/SULBACTAM: SIGNIFICANT CHANGE UP
-  AMPICILLIN: SIGNIFICANT CHANGE UP
-  AZTREONAM: SIGNIFICANT CHANGE UP
-  CEFAZOLIN: SIGNIFICANT CHANGE UP
-  CEFAZOLIN: SIGNIFICANT CHANGE UP
-  CEFEPIME: SIGNIFICANT CHANGE UP
-  CEFOXITIN: SIGNIFICANT CHANGE UP
-  CEFTRIAXONE: SIGNIFICANT CHANGE UP
-  CIPROFLOXACIN: SIGNIFICANT CHANGE UP
-  CLINDAMYCIN: SIGNIFICANT CHANGE UP
-  DAPTOMYCIN: SIGNIFICANT CHANGE UP
-  ERTAPENEM: SIGNIFICANT CHANGE UP
-  ERYTHROMYCIN: SIGNIFICANT CHANGE UP
-  GENTAMICIN: SIGNIFICANT CHANGE UP
-  GENTAMICIN: SIGNIFICANT CHANGE UP
-  IMIPENEM: SIGNIFICANT CHANGE UP
-  LEVOFLOXACIN: SIGNIFICANT CHANGE UP
-  LINEZOLID: SIGNIFICANT CHANGE UP
-  MEROPENEM: SIGNIFICANT CHANGE UP
-  OXACILLIN: SIGNIFICANT CHANGE UP
-  PENICILLIN: SIGNIFICANT CHANGE UP
-  PIPERACILLIN/TAZOBACTAM: SIGNIFICANT CHANGE UP
-  RIFAMPIN: SIGNIFICANT CHANGE UP
-  TETRACYCLINE: SIGNIFICANT CHANGE UP
-  TOBRAMYCIN: SIGNIFICANT CHANGE UP
-  TRIMETHOPRIM/SULFAMETHOXAZOLE: SIGNIFICANT CHANGE UP
-  TRIMETHOPRIM/SULFAMETHOXAZOLE: SIGNIFICANT CHANGE UP
-  VANCOMYCIN: SIGNIFICANT CHANGE UP
METHOD TYPE: SIGNIFICANT CHANGE UP
METHOD TYPE: SIGNIFICANT CHANGE UP

## 2021-11-13 LAB
-  AMPICILLIN: SIGNIFICANT CHANGE UP
-  TETRACYCLINE: SIGNIFICANT CHANGE UP
-  VANCOMYCIN: SIGNIFICANT CHANGE UP
CULTURE RESULTS: SIGNIFICANT CHANGE UP
METHOD TYPE: SIGNIFICANT CHANGE UP
ORGANISM # SPEC MICROSCOPIC CNT: SIGNIFICANT CHANGE UP
SPECIMEN SOURCE: SIGNIFICANT CHANGE UP

## 2021-11-17 ENCOUNTER — OUTPATIENT (OUTPATIENT)
Dept: OUTPATIENT SERVICES | Facility: HOSPITAL | Age: 76
LOS: 1 days | Discharge: HOME | End: 2021-11-17
Payer: MEDICARE

## 2021-11-17 DIAGNOSIS — R60.9 EDEMA, UNSPECIFIED: ICD-10-CM

## 2021-11-17 DIAGNOSIS — I10 ESSENTIAL (PRIMARY) HYPERTENSION: ICD-10-CM

## 2021-11-17 DIAGNOSIS — L89.90 PRESSURE ULCER OF UNSPECIFIED SITE, UNSPECIFIED STAGE: ICD-10-CM

## 2021-11-17 DIAGNOSIS — Z79.899 OTHER LONG TERM (CURRENT) DRUG THERAPY: ICD-10-CM

## 2021-11-17 DIAGNOSIS — L92.9 GRANULOMATOUS DISORDER OF THE SKIN AND SUBCUTANEOUS TISSUE, UNSPECIFIED: ICD-10-CM

## 2021-11-17 DIAGNOSIS — E66.01 MORBID (SEVERE) OBESITY DUE TO EXCESS CALORIES: ICD-10-CM

## 2021-11-17 DIAGNOSIS — M79.604 PAIN IN RIGHT LEG: ICD-10-CM

## 2021-11-17 DIAGNOSIS — L02.415 CUTANEOUS ABSCESS OF RIGHT LOWER LIMB: ICD-10-CM

## 2021-11-17 DIAGNOSIS — L97.112 NON-PRESSURE CHRONIC ULCER OF RIGHT THIGH WITH FAT LAYER EXPOSED: ICD-10-CM

## 2021-11-17 DIAGNOSIS — Z96.653 PRESENCE OF ARTIFICIAL KNEE JOINT, BILATERAL: Chronic | ICD-10-CM

## 2021-11-17 DIAGNOSIS — I89.0 LYMPHEDEMA, NOT ELSEWHERE CLASSIFIED: ICD-10-CM

## 2021-11-17 PROCEDURE — 99212 OFFICE O/P EST SF 10 MIN: CPT | Mod: 25

## 2021-11-17 PROCEDURE — 11042 DBRDMT SUBQ TIS 1ST 20SQCM/<: CPT | Mod: 59,RT

## 2021-11-24 ENCOUNTER — OUTPATIENT (OUTPATIENT)
Dept: OUTPATIENT SERVICES | Facility: HOSPITAL | Age: 76
LOS: 1 days | Discharge: HOME | End: 2021-11-24
Payer: MEDICARE

## 2021-11-24 DIAGNOSIS — Z96.653 PRESENCE OF ARTIFICIAL KNEE JOINT, BILATERAL: Chronic | ICD-10-CM

## 2021-11-24 DIAGNOSIS — L89.899 PRESSURE ULCER OF OTHER SITE, UNSPECIFIED STAGE: ICD-10-CM

## 2021-11-24 PROCEDURE — 99214 OFFICE O/P EST MOD 30 MIN: CPT

## 2021-11-30 DIAGNOSIS — I89.0 LYMPHEDEMA, NOT ELSEWHERE CLASSIFIED: ICD-10-CM

## 2021-11-30 DIAGNOSIS — R60.9 EDEMA, UNSPECIFIED: ICD-10-CM

## 2021-11-30 DIAGNOSIS — I10 ESSENTIAL (PRIMARY) HYPERTENSION: ICD-10-CM

## 2021-11-30 DIAGNOSIS — E66.01 MORBID (SEVERE) OBESITY DUE TO EXCESS CALORIES: ICD-10-CM

## 2021-11-30 DIAGNOSIS — L02.415 CUTANEOUS ABSCESS OF RIGHT LOWER LIMB: ICD-10-CM

## 2021-11-30 DIAGNOSIS — M79.604 PAIN IN RIGHT LEG: ICD-10-CM

## 2021-11-30 DIAGNOSIS — Z79.899 OTHER LONG TERM (CURRENT) DRUG THERAPY: ICD-10-CM

## 2021-11-30 DIAGNOSIS — B95.62 METHICILLIN RESISTANT STAPHYLOCOCCUS AUREUS INFECTION AS THE CAUSE OF DISEASES CLASSIFIED ELSEWHERE: ICD-10-CM

## 2021-11-30 DIAGNOSIS — L97.112 NON-PRESSURE CHRONIC ULCER OF RIGHT THIGH WITH FAT LAYER EXPOSED: ICD-10-CM

## 2021-12-01 ENCOUNTER — OUTPATIENT (OUTPATIENT)
Dept: OUTPATIENT SERVICES | Facility: HOSPITAL | Age: 76
LOS: 1 days | Discharge: HOME | End: 2021-12-01
Payer: MEDICARE

## 2021-12-01 DIAGNOSIS — L89.90 PRESSURE ULCER OF UNSPECIFIED SITE, UNSPECIFIED STAGE: ICD-10-CM

## 2021-12-01 DIAGNOSIS — Z96.653 PRESENCE OF ARTIFICIAL KNEE JOINT, BILATERAL: Chronic | ICD-10-CM

## 2021-12-01 PROCEDURE — 17250 CHEM CAUT OF GRANLTJ TISSUE: CPT | Mod: 59,RT

## 2021-12-01 PROCEDURE — 99212 OFFICE O/P EST SF 10 MIN: CPT | Mod: 25

## 2021-12-02 DIAGNOSIS — B95.62 METHICILLIN RESISTANT STAPHYLOCOCCUS AUREUS INFECTION AS THE CAUSE OF DISEASES CLASSIFIED ELSEWHERE: ICD-10-CM

## 2021-12-02 DIAGNOSIS — I89.0 LYMPHEDEMA, NOT ELSEWHERE CLASSIFIED: ICD-10-CM

## 2021-12-02 DIAGNOSIS — Z79.899 OTHER LONG TERM (CURRENT) DRUG THERAPY: ICD-10-CM

## 2021-12-02 DIAGNOSIS — E66.01 MORBID (SEVERE) OBESITY DUE TO EXCESS CALORIES: ICD-10-CM

## 2021-12-02 DIAGNOSIS — R60.9 EDEMA, UNSPECIFIED: ICD-10-CM

## 2021-12-02 DIAGNOSIS — L97.112 NON-PRESSURE CHRONIC ULCER OF RIGHT THIGH WITH FAT LAYER EXPOSED: ICD-10-CM

## 2021-12-02 DIAGNOSIS — M79.604 PAIN IN RIGHT LEG: ICD-10-CM

## 2021-12-02 DIAGNOSIS — L02.415 CUTANEOUS ABSCESS OF RIGHT LOWER LIMB: ICD-10-CM

## 2021-12-02 DIAGNOSIS — L92.9 GRANULOMATOUS DISORDER OF THE SKIN AND SUBCUTANEOUS TISSUE, UNSPECIFIED: ICD-10-CM

## 2021-12-02 DIAGNOSIS — I10 ESSENTIAL (PRIMARY) HYPERTENSION: ICD-10-CM

## 2021-12-08 ENCOUNTER — OUTPATIENT (OUTPATIENT)
Dept: OUTPATIENT SERVICES | Facility: HOSPITAL | Age: 76
LOS: 1 days | Discharge: HOME | End: 2021-12-08
Payer: MEDICARE

## 2021-12-08 DIAGNOSIS — Z96.653 PRESENCE OF ARTIFICIAL KNEE JOINT, BILATERAL: Chronic | ICD-10-CM

## 2021-12-08 DIAGNOSIS — L89.899 PRESSURE ULCER OF OTHER SITE, UNSPECIFIED STAGE: ICD-10-CM

## 2021-12-08 PROCEDURE — 99212 OFFICE O/P EST SF 10 MIN: CPT

## 2021-12-09 DIAGNOSIS — L97.112 NON-PRESSURE CHRONIC ULCER OF RIGHT THIGH WITH FAT LAYER EXPOSED: ICD-10-CM

## 2021-12-09 DIAGNOSIS — M79.604 PAIN IN RIGHT LEG: ICD-10-CM

## 2021-12-09 DIAGNOSIS — I89.0 LYMPHEDEMA, NOT ELSEWHERE CLASSIFIED: ICD-10-CM

## 2021-12-09 DIAGNOSIS — Z79.899 OTHER LONG TERM (CURRENT) DRUG THERAPY: ICD-10-CM

## 2021-12-09 DIAGNOSIS — I10 ESSENTIAL (PRIMARY) HYPERTENSION: ICD-10-CM

## 2021-12-09 DIAGNOSIS — R60.9 EDEMA, UNSPECIFIED: ICD-10-CM

## 2021-12-09 DIAGNOSIS — E66.01 MORBID (SEVERE) OBESITY DUE TO EXCESS CALORIES: ICD-10-CM

## 2021-12-15 ENCOUNTER — OUTPATIENT (OUTPATIENT)
Dept: OUTPATIENT SERVICES | Facility: HOSPITAL | Age: 76
LOS: 1 days | Discharge: HOME | End: 2021-12-15
Payer: MEDICARE

## 2021-12-15 DIAGNOSIS — Z96.653 PRESENCE OF ARTIFICIAL KNEE JOINT, BILATERAL: Chronic | ICD-10-CM

## 2021-12-15 DIAGNOSIS — L89.90 PRESSURE ULCER OF UNSPECIFIED SITE, UNSPECIFIED STAGE: ICD-10-CM

## 2021-12-15 PROCEDURE — 99213 OFFICE O/P EST LOW 20 MIN: CPT

## 2021-12-20 DIAGNOSIS — E66.01 MORBID (SEVERE) OBESITY DUE TO EXCESS CALORIES: ICD-10-CM

## 2021-12-20 DIAGNOSIS — I10 ESSENTIAL (PRIMARY) HYPERTENSION: ICD-10-CM

## 2021-12-20 DIAGNOSIS — R60.9 EDEMA, UNSPECIFIED: ICD-10-CM

## 2021-12-20 DIAGNOSIS — M79.604 PAIN IN RIGHT LEG: ICD-10-CM

## 2021-12-20 DIAGNOSIS — I89.0 LYMPHEDEMA, NOT ELSEWHERE CLASSIFIED: ICD-10-CM

## 2021-12-20 DIAGNOSIS — L97.112 NON-PRESSURE CHRONIC ULCER OF RIGHT THIGH WITH FAT LAYER EXPOSED: ICD-10-CM

## 2021-12-20 DIAGNOSIS — Z79.899 OTHER LONG TERM (CURRENT) DRUG THERAPY: ICD-10-CM

## 2021-12-22 ENCOUNTER — OUTPATIENT (OUTPATIENT)
Dept: OUTPATIENT SERVICES | Facility: HOSPITAL | Age: 76
LOS: 1 days | Discharge: HOME | End: 2021-12-22
Payer: MEDICARE

## 2021-12-22 DIAGNOSIS — Z96.653 PRESENCE OF ARTIFICIAL KNEE JOINT, BILATERAL: Chronic | ICD-10-CM

## 2021-12-22 DIAGNOSIS — L89.90 PRESSURE ULCER OF UNSPECIFIED SITE, UNSPECIFIED STAGE: ICD-10-CM

## 2021-12-22 PROCEDURE — 99212 OFFICE O/P EST SF 10 MIN: CPT

## 2021-12-23 DIAGNOSIS — M79.604 PAIN IN RIGHT LEG: ICD-10-CM

## 2021-12-23 DIAGNOSIS — Z79.899 OTHER LONG TERM (CURRENT) DRUG THERAPY: ICD-10-CM

## 2021-12-23 DIAGNOSIS — R60.9 EDEMA, UNSPECIFIED: ICD-10-CM

## 2021-12-23 DIAGNOSIS — I89.0 LYMPHEDEMA, NOT ELSEWHERE CLASSIFIED: ICD-10-CM

## 2021-12-23 DIAGNOSIS — L97.112 NON-PRESSURE CHRONIC ULCER OF RIGHT THIGH WITH FAT LAYER EXPOSED: ICD-10-CM

## 2021-12-23 DIAGNOSIS — E66.01 MORBID (SEVERE) OBESITY DUE TO EXCESS CALORIES: ICD-10-CM

## 2021-12-23 DIAGNOSIS — I10 ESSENTIAL (PRIMARY) HYPERTENSION: ICD-10-CM

## 2021-12-23 NOTE — PROGRESS NOTE ADULT - SUBJECTIVE AND OBJECTIVE BOX
CHIEF COMPLAINT: Increased swelling and pain of right medial knee    SUBJECTIVE:   Per overnight team, patient reportedly had a temperature of 104 but recheck showed a temperature of 100.4 F. At encounter, patient denies fever/chills, pain, and a decrease in swelling of her right leg.     REVIEW OF SYSTEMS:  Constitutional: [ ] negative [ ] fevers [ ] chills [ ] weight loss [ ] weight gain  HEENT: [ ] negative [ ] dry eyes [ ] eye irritation [ ] postnasal drip [ ] nasal congestion  CV: [ ] negative  [ ] chest pain [ ] orthopnea [ ] palpitations [ ] murmur  Resp: [ ] negative [ ] cough [ ] shortness of breath [ ] dyspnea [ ] wheezing [ ] sputum [ ] hemoptysis  GI: [ ] negative [ ] nausea [ ] vomiting [ ] diarrhea [ ] constipation [ ] abd pain [ ] dysphagia   : [ ] negative [ ] dysuria [ ] nocturia [ ] hematuria [ ] increased urinary frequency  Musculoskeletal: [ ] negative [ ] back pain [ ] myalgias [ ] arthralgias [ ] fracture  Skin: [ ] negative [ ] rash [ ] itch  Neurological: [ ] negative [ ] headache [ ] dizziness [ ] syncope [ ] weakness [ ] numbness  Psychiatric: [ ] negative [ ] anxiety [ ] depression  Endocrine: [ ] negative [ ] diabetes [ ] thyroid problem  Hematologic/Lymphatic: [ ] negative [ ] anemia [ ] bleeding problem  Allergic/Immunologic: [ ] negative [ ] itchy eyes [ ] nasal discharge [ ] hives [ ] angioedema  [ ] All other systems negative  [ ] Unable to assess ROS because ________    OBJECTIVE:  Vital Signs Last 24 Hrs  T(C): 37.9 (25 Jun 2018 05:58), Max: 38 (24 Jun 2018 22:55)  T(F): 100.3 (25 Jun 2018 05:58), Max: 100.4 (24 Jun 2018 22:55)  HR: 89 (25 Jun 2018 05:58) (80 - 89)  BP: 117/58 (25 Jun 2018 05:58) (117/58 - 126/63)  BP(mean): --  ABP: --  ABP(mean): --  RR: 18 (25 Jun 2018 05:58) (18 - 18)  SpO2: 98% (25 Jun 2018 05:58) (97% - 98%)        06-24 @ 07:01  -  06-25 @ 07:00  --------------------------------------------------------  IN: 1050 mL / OUT: 300 mL / NET: 750 mL      CAPILLARY BLOOD GLUCOSE          PHYSICAL EXAM:  General:   HEENT:   Lymph Nodes:  Neck:   Respiratory:   Cardiovascular:   Abdomen:   Extremities:   Skin:   Neurological:  Psychiatry:    LINES:    HOSPITAL MEDICATIONS:  enoxaparin Injectable 40 milliGRAM(s) SubCutaneous every 24 hours    piperacillin/tazobactam IVPB. 3.375 Gram(s) IV Intermittent every 8 hours  vancomycin  IVPB 1250 milliGRAM(s) IV Intermittent every 12 hours    furosemide    Tablet 20 milliGRAM(s) Oral daily  losartan 25 milliGRAM(s) Oral daily        acetaminophen   Tablet 650 milliGRAM(s) Oral every 6 hours PRN  acetaminophen   Tablet. 650 milliGRAM(s) Oral every 6 hours PRN  oxyCODONE    5 mG/acetaminophen 325 mG 1 Tablet(s) Oral every 6 hours PRN          sodium chloride 0.9%. 1000 milliLiter(s) IV Continuous <Continuous>            LABS:                        9.0    15.10 )-----------( 395      ( 25 Jun 2018 06:04 )             28.0     Hgb Trend: 9.0<--, 8.8<--, 9.3<--  06-25    134<L>  |  98  |  10  ----------------------------<  127<H>  4.1   |  24  |  0.78    Ca    8.3<L>      25 Jun 2018 06:04  Phos  3.8     06-25  Mg     2.1     06-25    TPro  7.8  /  Alb  2.8<L>  /  TBili  0.6  /  DBili  x   /  AST  33<H>  /  ALT  38<H>  /  AlkPhos  185<H>  06-25    Creatinine Trend: 0.78<--, 0.75<--, 0.70<--  PT/INR - ( 24 Jun 2018 05:45 )   PT: 12.9 SEC;   INR: 1.12                    MICROBIOLOGY:     RADIOLOGY:  [ ] Reviewed and interpreted by me    EKG: CHIEF COMPLAINT: Increased swelling and pain of right medial knee    SUBJECTIVE:   Per overnight team, patient reportedly had a temperature of 104 but recheck showed a temperature of 100.4 F. At encounter, patient denies fever/chills, pain, and a decrease in swelling of her right leg.     REVIEW OF SYSTEMS:  Constitutional: [x] negative [ ] fevers [ ] chills [ ] weight loss [ ] weight gain  HEENT: [ ] negative [ ] dry eyes [ ] eye irritation [ ] postnasal drip [ ] nasal congestion  CV: [x] negative  [ ] chest pain [ ] orthopnea [ ] palpitations [ ] murmur  Resp: [x] negative [ ] cough [ ] shortness of breath [ ] dyspnea [ ] wheezing [ ] sputum [ ] hemoptysis  GI: [x] negative [ ] nausea [ ] vomiting [ ] diarrhea [ ] constipation [ ] abd pain [ ] dysphagia   : [ ] negative [ ] dysuria [ ] nocturia [ ] hematuria [ ] increased urinary frequency  Musculoskeletal: [ ] negative [ ] back pain [ ] myalgias [ ] arthralgias [ ] fracture  Skin: [ ] negative [ ] rash [ ] itch  Neurological: [ ] negative [ ] headache [ ] dizziness [ ] syncope [ ] weakness [ ] numbness  Psychiatric: [ ] negative [ ] anxiety [ ] depression  Endocrine: [ ] negative [ ] diabetes [ ] thyroid problem  Hematologic/Lymphatic: [ ] negative [ ] anemia [ ] bleeding problem  Allergic/Immunologic: [ ] negative [ ] itchy eyes [ ] nasal discharge [ ] hives [ ] angioedema  [x] All other systems negative  [ ] Unable to assess ROS because ________    OBJECTIVE:  Vital Signs Last 24 Hrs  Tmax: 100.4 F  HR: 88 bpm  BP: 116/65  RR: 16-18  SpO2: % on room air       PHYSICAL EXAM:  General: Alert, appears well. Not in acute distress  HEENT: Head is symmetric. Pupils round and equal. No goiter.   Lymph Nodes: No cervical or supraclavicular lymphadenopathy   Respiratory: No accessory muscles used. Lungs clear to auscultation bilaterally.   Cardiovascular: S1 and S2 heard. Regular rate and rhythm. No gallops, rubs, or murmurs auscultated.   Abdomen: No distention, pulsations, or abnormal masses. Normal bowel sounds auscultated. No tenderness to palpation in all four quadrants.   Extremities: Medial aspect of right knee is bandaged and appears swollen compared to the left leg. No erythema, increased warmth, or purulent discharge. Patient reports mild tenderness upon palpation of medial aspect of right knee.   Skin: Medial aspect of right knee is bandaged. No erythema. Upon palpation, skin is firm and mildly tender.   Psychiatry: Appropriate affect and mood.       HOSPITAL MEDICATIONS:  enoxaparin Injectable 40 milliGRAM(s) SubCutaneous every 24 hours    piperacillin/tazobactam IVPB. 3.375 Gram(s) IV Intermittent every 8 hours  vancomycin  IVPB 1250 milliGRAM(s) IV Intermittent every 12 hours    furosemide    Tablet 20 milliGRAM(s) Oral daily  losartan 25 milliGRAM(s) Oral daily        acetaminophen   Tablet 650 milliGRAM(s) Oral every 6 hours PRN  acetaminophen   Tablet. 650 milliGRAM(s) Oral every 6 hours PRN  oxyCODONE    5 mG/acetaminophen 325 mG 1 Tablet(s) Oral every 6 hours PRN          sodium chloride 0.9%. 1000 milliLiter(s) IV Continuous <Continuous>            LABS:                        9.0    15 )-----------( 395      ( 25 Jun 2018 06:04 )             28.0     Hgb Trend: 9.0<--, 8.8<--, 9.3<--  06-25    134<L>  |  98  |  10  ----------------------------<  127<H>  4.1   |  24  |  0.78    Ca    8.3<L>      25 Jun 2018 06:04  Phos  3.8     06-25  Mg     2.1     06-25    TPro  7.8  /  Alb  2.8<L>  /  TBili  0.6  /  DBili  x   /  AST  33<H>  /  ALT  38<H>  /  AlkPhos  185<H>  06-25    Creatinine Trend: 0.78<--, 0.75<--, 0.70<--  PT/INR - ( 24 Jun 2018 05:45 )   PT: 12.9 SEC;   INR: 1.12                  MICROBIOLOGY:     RADIOLOGY:  [ ] Reviewed and interpreted by me CHIEF COMPLAINT: Increased swelling and pain of right medial knee    SUBJECTIVE:   Per overnight team, patient reportedly had a temperature of 104 but recheck showed a temperature of 100.4 F. At encounter, patient denies fever/chills, pain, and a decrease in swelling of her right leg.     REVIEW OF SYSTEMS:  Constitutional: [x] negative [ ] fevers [ ] chills [ ] weight loss [ ] weight gain  HEENT: [ ] negative [ ] dry eyes [ ] eye irritation [ ] postnasal drip [ ] nasal congestion  CV: [x] negative  [ ] chest pain [ ] orthopnea [ ] palpitations [ ] murmur  Resp: [x] negative [ ] cough [ ] shortness of breath [ ] dyspnea [ ] wheezing [ ] sputum [ ] hemoptysis  GI: [x] negative [ ] nausea [ ] vomiting [ ] diarrhea [ ] constipation [ ] abd pain [ ] dysphagia   : [ ] negative [ ] dysuria [ ] nocturia [ ] hematuria [ ] increased urinary frequency  Musculoskeletal: [ ] negative [ ] back pain [ ] myalgias [ ] arthralgias [ ] fracture  Skin: [ ] negative [ ] rash [ ] itch  Neurological: [ ] negative [ ] headache [ ] dizziness [ ] syncope [ ] weakness [ ] numbness  Psychiatric: [ ] negative [ ] anxiety [ ] depression  Endocrine: [ ] negative [ ] diabetes [ ] thyroid problem  Hematologic/Lymphatic: [ ] negative [ ] anemia [ ] bleeding problem  Allergic/Immunologic: [ ] negative [ ] itchy eyes [ ] nasal discharge [ ] hives [ ] angioedema  [x] All other systems negative  [ ] Unable to assess ROS because ________    OBJECTIVE:  Vital Signs Last 24 Hrs  Tmax: 100.4 F  HR: 88 bpm  BP: 116/65  RR: 16-18  SpO2: % on room air       PHYSICAL EXAM:  General: Alert, appears well. Not in acute distress  HEENT: Head is symmetric. Pupils round and equal. No goiter.   Lymph Nodes: No cervical or supraclavicular lymphadenopathy   Respiratory: No accessory muscles used. Lungs clear to auscultation bilaterally.   Cardiovascular: S1 and S2 heard. Regular rate and rhythm. No gallops, rubs, or murmurs auscultated.   Abdomen: No distention, pulsations, or abnormal masses. Normal bowel sounds auscultated. No tenderness to palpation in all four quadrants.   Extremities: Medial aspect of right knee is bandaged and appears swollen compared to the left leg. No erythema, increased warmth, or purulent discharge. Patient reports mild tenderness upon palpation of medial aspect of right knee.   Skin: Medial aspect of right knee is bandaged. No erythema. Upon palpation, skin is firm and mildly tender.   Psychiatry: Appropriate affect and mood.       HOSPITAL MEDICATIONS:  enoxaparin Injectable 40 milliGRAM(s) SubCutaneous every 24 hours    piperacillin/tazobactam IVPB. 3.375 Gram(s) IV Intermittent every 8 hours  vancomycin  IVPB 1250 milliGRAM(s) IV Intermittent every 12 hours    furosemide    Tablet 20 milliGRAM(s) Oral daily  losartan 25 milliGRAM(s) Oral daily        acetaminophen   Tablet 650 milliGRAM(s) Oral every 6 hours PRN  acetaminophen   Tablet. 650 milliGRAM(s) Oral every 6 hours PRN  oxyCODONE    5 mG/acetaminophen 325 mG 1 Tablet(s) Oral every 6 hours PRN          sodium chloride 0.9%. 1000 milliLiter(s) IV Continuous <Continuous>            LABS:                        9.0    15 )-----------( 395      ( 25 Jun 2018 06:04 )             28.0     Hgb Trend: 9.0<--, 8.8<--, 9.3<--  06-25    134<L>  |  98  |  10  ----------------------------<  127<H>  4.1   |  24  |  0.78    Ca    8.3<L>      25 Jun 2018 06:04  Phos  3.8     06-25  Mg     2.1     06-25    TPro  7.8  /  Alb  2.8<L>  /  TBili  0.6  /  DBili  x   /  AST  33<H>  /  ALT  38<H>  /  AlkPhos  185<H>  06-25    Creatinine Trend: 0.78<--, 0.75<--, 0.70<--  PT/INR - ( 24 Jun 2018 05:45 )   PT: 12.9 SEC;   INR: 1.12          Wound culture from right lower extremity:  2+ WBC  1+ gram positive cocci pairs CHIEF COMPLAINT: Increased swelling and pain of right medial knee    SUBJECTIVE:   Per overnight team, patient reportedly had a temperature of 104 but recheck showed a temperature of 100.4 F. At encounter, patient denies fever/chills, pain, and a decrease in swelling of her right leg. No N/V/D      OBJECTIVE:  Vital Signs Last 24 Hrs  Tmax: 100.4 F  HR: 88 bpm  BP: 116/65  RR: 16-18  SpO2: % on room air       PHYSICAL EXAM:  General: Alert, appears well. Not in acute distress  HEENT: Head is symmetric. Pupils round and equal. No goiter.   Lymph Nodes: No cervical or supraclavicular lymphadenopathy   Respiratory: No accessory muscles used. Lungs clear to auscultation bilaterally.   Cardiovascular: S1 and S2 heard. Regular rate and rhythm. No gallops, rubs, or murmurs auscultated.   Abdomen: No distention, pulsations, or abnormal masses. Normal bowel sounds auscultated. No tenderness to palpation in all four quadrants.   Extremities: Medial aspect of right knee is bandaged and appears swollen compared to the left leg. No erythema, increased warmth, or purulent discharge. Patient reports mild tenderness upon palpation of medial aspect of right knee.   Skin: Medial aspect of right knee is bandaged. No erythema. Upon palpation, skin is firm and mildly tender.   Psychiatry: Appropriate affect and mood.       HOSPITAL MEDICATIONS:  enoxaparin Injectable 40 milliGRAM(s) SubCutaneous every 24 hours    piperacillin/tazobactam IVPB. 3.375 Gram(s) IV Intermittent every 8 hours  vancomycin  IVPB 1250 milliGRAM(s) IV Intermittent every 12 hours    furosemide    Tablet 20 milliGRAM(s) Oral daily  losartan 25 milliGRAM(s) Oral daily        acetaminophen   Tablet 650 milliGRAM(s) Oral every 6 hours PRN  acetaminophen   Tablet. 650 milliGRAM(s) Oral every 6 hours PRN  oxyCODONE    5 mG/acetaminophen 325 mG 1 Tablet(s) Oral every 6 hours PRN          sodium chloride 0.9%. 1000 milliLiter(s) IV Continuous <Continuous>            LABS:                        9.0    15 )-----------( 395      ( 25 Jun 2018 06:04 )             28.0     Hgb Trend: 9.0<--, 8.8<--, 9.3<--  06-25    134<L>  |  98  |  10  ----------------------------<  127<H>  4.1   |  24  |  0.78    Ca    8.3<L>      25 Jun 2018 06:04  Phos  3.8     06-25  Mg     2.1     06-25    TPro  7.8  /  Alb  2.8<L>  /  TBili  0.6  /  DBili  x   /  AST  33<H>  /  ALT  38<H>  /  AlkPhos  185<H>  06-25    Creatinine Trend: 0.78<--, 0.75<--, 0.70<--  PT/INR - ( 24 Jun 2018 05:45 )   PT: 12.9 SEC;   INR: 1.12          Wound culture from right lower extremity:  2+ WBC  1+ gram positive cocci pairs Partial Purse String (Simple) Text: Given the location of the defect and the characteristics of the surrounding skin a simple purse string closure was deemed most appropriate.  Undermining was performed circumfirentially around the surgical defect.  A purse string suture was then placed and tightened. Wound tension only allowed a partial closure of the circular defect.

## 2022-01-06 ENCOUNTER — OUTPATIENT (OUTPATIENT)
Dept: OUTPATIENT SERVICES | Facility: HOSPITAL | Age: 77
LOS: 1 days | Discharge: HOME | End: 2022-01-06
Payer: MEDICARE

## 2022-01-06 DIAGNOSIS — L89.899 PRESSURE ULCER OF OTHER SITE, UNSPECIFIED STAGE: ICD-10-CM

## 2022-01-06 DIAGNOSIS — Z96.653 PRESENCE OF ARTIFICIAL KNEE JOINT, BILATERAL: Chronic | ICD-10-CM

## 2022-01-06 PROCEDURE — 99213 OFFICE O/P EST LOW 20 MIN: CPT | Mod: 25

## 2022-01-06 PROCEDURE — 17250 CHEM CAUT OF GRANLTJ TISSUE: CPT | Mod: 59,RT
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
## 2022-01-07 DIAGNOSIS — I89.0 LYMPHEDEMA, NOT ELSEWHERE CLASSIFIED: ICD-10-CM
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
## 2022-01-07 DIAGNOSIS — E66.01 MORBID (SEVERE) OBESITY DUE TO EXCESS CALORIES: ICD-10-CM

## 2022-01-07 DIAGNOSIS — S70.311A ABRASION, RIGHT THIGH, INITIAL ENCOUNTER: ICD-10-CM

## 2022-01-07 DIAGNOSIS — M79.604 PAIN IN RIGHT LEG: ICD-10-CM

## 2022-01-07 DIAGNOSIS — L97.112 NON-PRESSURE CHRONIC ULCER OF RIGHT THIGH WITH FAT LAYER EXPOSED: ICD-10-CM
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
## 2022-01-07 DIAGNOSIS — Z79.899 OTHER LONG TERM (CURRENT) DRUG THERAPY: ICD-10-CM

## 2022-01-07 DIAGNOSIS — L92.9 GRANULOMATOUS DISORDER OF THE SKIN AND SUBCUTANEOUS TISSUE, UNSPECIFIED: ICD-10-CM

## 2022-01-07 DIAGNOSIS — R60.9 EDEMA, UNSPECIFIED: ICD-10-CM
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
## 2022-01-07 DIAGNOSIS — I10 ESSENTIAL (PRIMARY) HYPERTENSION: ICD-10-CM

## 2022-01-12 ENCOUNTER — OUTPATIENT (OUTPATIENT)
Dept: OUTPATIENT SERVICES | Facility: HOSPITAL | Age: 77
LOS: 1 days | Discharge: HOME | End: 2022-01-12
Payer: MEDICARE

## 2022-01-12 DIAGNOSIS — L89.90 PRESSURE ULCER OF UNSPECIFIED SITE, UNSPECIFIED STAGE: ICD-10-CM

## 2022-01-12 DIAGNOSIS — Z96.653 PRESENCE OF ARTIFICIAL KNEE JOINT, BILATERAL: Chronic | ICD-10-CM

## 2022-01-12 PROCEDURE — 99213 OFFICE O/P EST LOW 20 MIN: CPT

## 2022-01-13 DIAGNOSIS — I89.0 LYMPHEDEMA, NOT ELSEWHERE CLASSIFIED: ICD-10-CM

## 2022-01-13 DIAGNOSIS — S70.311A ABRASION, RIGHT THIGH, INITIAL ENCOUNTER: ICD-10-CM

## 2022-01-13 DIAGNOSIS — Z79.899 OTHER LONG TERM (CURRENT) DRUG THERAPY: ICD-10-CM
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
## 2022-01-13 DIAGNOSIS — E66.01 MORBID (SEVERE) OBESITY DUE TO EXCESS CALORIES: ICD-10-CM

## 2022-01-13 DIAGNOSIS — L97.112 NON-PRESSURE CHRONIC ULCER OF RIGHT THIGH WITH FAT LAYER EXPOSED: ICD-10-CM
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
## 2022-01-13 DIAGNOSIS — M79.604 PAIN IN RIGHT LEG: ICD-10-CM

## 2022-01-13 DIAGNOSIS — R60.9 EDEMA, UNSPECIFIED: ICD-10-CM

## 2022-01-13 DIAGNOSIS — I10 ESSENTIAL (PRIMARY) HYPERTENSION: ICD-10-CM
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
## 2022-01-13 NOTE — PROVIDER CONTACT NOTE (CRITICAL VALUE NOTIFICATION) - ACTION/TREATMENT ORDERED:
Department of Anesthesiology  Postprocedure Note    Patient: Brenden Arndt  MRN: [id number]  YOB: 1933  Date of evaluation: 1/13/2022  Time:  10:48 AM     Procedure Summary     Date: 01/13/22 Room / Location: [address]    Anesthesia Start: 1005 Anesthesia Stop: 3602    Procedure: CYCLOPHOTOCOAGULATION G-PROBE ([address]) - LEFT EYE - RETROBULBAR LONG BLOCK (Left Eye) Diagnosis:       Neovascular glaucoma, left eye, indeterminate stage      (Neovascular glaucoma, left eye, indeterminate stage)    Surgeons: Enrico Donaldson MD Responsible Provider: Berneta Soulier, MD    Anesthesia Type: MAC ASA Status: 3          Anesthesia Type: MAC    Yuliya Phase I: Yuliya Score: 10    Yuliya Phase II: Yuliya Score: 10    Last vitals: Reviewed and per EMR flowsheets.        Anesthesia Post Evaluation    Patient location during evaluation: bedside  Patient participation: complete - patient participated  Level of consciousness: awake and alert  Pain score: 0  Nausea & Vomiting: no nausea  Complications: no  Cardiovascular status: hemodynamically stable  Respiratory status: acceptable  Hydration status: stable Patient is already on IV Antibiotics cefepime ,Vancomycin &Flagyl

## 2022-01-14 ENCOUNTER — INPATIENT (INPATIENT)
Facility: HOSPITAL | Age: 77
LOS: 2 days | Discharge: HOME | End: 2022-01-17
Attending: INTERNAL MEDICINE | Admitting: INTERNAL MEDICINE
Payer: MEDICARE

## 2022-01-14 VITALS
HEIGHT: 63 IN | WEIGHT: 293 LBS | RESPIRATION RATE: 22 BRPM | TEMPERATURE: 103 F | DIASTOLIC BLOOD PRESSURE: 82 MMHG | HEART RATE: 108 BPM | SYSTOLIC BLOOD PRESSURE: 184 MMHG | OXYGEN SATURATION: 95 %

## 2022-01-14 DIAGNOSIS — Z96.653 PRESENCE OF ARTIFICIAL KNEE JOINT, BILATERAL: Chronic | ICD-10-CM

## 2022-01-14 LAB
ALBUMIN SERPL ELPH-MCNC: 3 G/DL — LOW (ref 3.3–5)
ALP SERPL-CCNC: 92 U/L — SIGNIFICANT CHANGE UP (ref 40–120)
ALT FLD-CCNC: 26 U/L — SIGNIFICANT CHANGE UP (ref 12–78)
ANION GAP SERPL CALC-SCNC: 10 MMOL/L — SIGNIFICANT CHANGE UP (ref 5–17)
APPEARANCE UR: CLEAR — SIGNIFICANT CHANGE UP
APTT BLD: 29.1 SEC — SIGNIFICANT CHANGE UP (ref 27.5–35.5)
AST SERPL-CCNC: 34 U/L — SIGNIFICANT CHANGE UP (ref 15–37)
BACTERIA # UR AUTO: ABNORMAL
BASOPHILS # BLD AUTO: 0.03 K/UL — SIGNIFICANT CHANGE UP (ref 0–0.2)
BASOPHILS NFR BLD AUTO: 0.2 % — SIGNIFICANT CHANGE UP (ref 0–2)
BILIRUB SERPL-MCNC: 0.7 MG/DL — SIGNIFICANT CHANGE UP (ref 0.2–1.2)
BILIRUB UR-MCNC: NEGATIVE — SIGNIFICANT CHANGE UP
BUN SERPL-MCNC: 14 MG/DL — SIGNIFICANT CHANGE UP (ref 7–23)
CALCIUM SERPL-MCNC: 8.3 MG/DL — LOW (ref 8.5–10.1)
CHLORIDE SERPL-SCNC: 101 MMOL/L — SIGNIFICANT CHANGE UP (ref 96–108)
CO2 SERPL-SCNC: 25 MMOL/L — SIGNIFICANT CHANGE UP (ref 22–31)
COLOR SPEC: YELLOW — SIGNIFICANT CHANGE UP
CREAT SERPL-MCNC: 0.76 MG/DL — SIGNIFICANT CHANGE UP (ref 0.5–1.3)
DIFF PNL FLD: ABNORMAL
EOSINOPHIL # BLD AUTO: 0 K/UL — SIGNIFICANT CHANGE UP (ref 0–0.5)
EOSINOPHIL NFR BLD AUTO: 0 % — SIGNIFICANT CHANGE UP (ref 0–6)
EPI CELLS # UR: SIGNIFICANT CHANGE UP
FLUAV AG NPH QL: SIGNIFICANT CHANGE UP
FLUBV AG NPH QL: SIGNIFICANT CHANGE UP
GLUCOSE BLDC GLUCOMTR-MCNC: 115 MG/DL — HIGH (ref 70–99)
GLUCOSE SERPL-MCNC: 100 MG/DL — HIGH (ref 70–99)
GLUCOSE UR QL: NEGATIVE MG/DL — SIGNIFICANT CHANGE UP
HCT VFR BLD CALC: 32.3 % — LOW (ref 34.5–45)
HGB BLD-MCNC: 10.4 G/DL — LOW (ref 11.5–15.5)
IMM GRANULOCYTES NFR BLD AUTO: 1.2 % — SIGNIFICANT CHANGE UP (ref 0–1.5)
INR BLD: 1.19 RATIO — HIGH (ref 0.88–1.16)
KETONES UR-MCNC: NEGATIVE — SIGNIFICANT CHANGE UP
LACTATE SERPL-SCNC: 1 MMOL/L — SIGNIFICANT CHANGE UP (ref 0.7–2)
LEUKOCYTE ESTERASE UR-ACNC: NEGATIVE — SIGNIFICANT CHANGE UP
LYMPHOCYTES # BLD AUTO: 0.43 K/UL — LOW (ref 1–3.3)
LYMPHOCYTES # BLD AUTO: 2.8 % — LOW (ref 13–44)
MCHC RBC-ENTMCNC: 27.5 PG — SIGNIFICANT CHANGE UP (ref 27–34)
MCHC RBC-ENTMCNC: 32.2 GM/DL — SIGNIFICANT CHANGE UP (ref 32–36)
MCV RBC AUTO: 85.4 FL — SIGNIFICANT CHANGE UP (ref 80–100)
MONOCYTES # BLD AUTO: 0.83 K/UL — SIGNIFICANT CHANGE UP (ref 0–0.9)
MONOCYTES NFR BLD AUTO: 5.5 % — SIGNIFICANT CHANGE UP (ref 2–14)
NEUTROPHILS # BLD AUTO: 13.72 K/UL — HIGH (ref 1.8–7.4)
NEUTROPHILS NFR BLD AUTO: 90.3 % — HIGH (ref 43–77)
NITRITE UR-MCNC: NEGATIVE — SIGNIFICANT CHANGE UP
NRBC # BLD: 0 /100 WBCS — SIGNIFICANT CHANGE UP (ref 0–0)
PH UR: 6 — SIGNIFICANT CHANGE UP (ref 5–8)
PLATELET # BLD AUTO: 126 K/UL — LOW (ref 150–400)
POTASSIUM SERPL-MCNC: 4.5 MMOL/L — SIGNIFICANT CHANGE UP (ref 3.5–5.3)
POTASSIUM SERPL-SCNC: 4.5 MMOL/L — SIGNIFICANT CHANGE UP (ref 3.5–5.3)
PROT SERPL-MCNC: 8.1 GM/DL — SIGNIFICANT CHANGE UP (ref 6–8.3)
PROT UR-MCNC: 30 MG/DL
PROTHROM AB SERPL-ACNC: 13.7 SEC — HIGH (ref 10.6–13.6)
RBC # BLD: 3.78 M/UL — LOW (ref 3.8–5.2)
RBC # FLD: 16.1 % — HIGH (ref 10.3–14.5)
RBC CASTS # UR COMP ASSIST: ABNORMAL /HPF (ref 0–4)
SARS-COV-2 RNA SPEC QL NAA+PROBE: SIGNIFICANT CHANGE UP
SODIUM SERPL-SCNC: 136 MMOL/L — SIGNIFICANT CHANGE UP (ref 135–145)
SP GR SPEC: 1.01 — SIGNIFICANT CHANGE UP (ref 1.01–1.02)
UROBILINOGEN FLD QL: NEGATIVE MG/DL — SIGNIFICANT CHANGE UP
WBC # BLD: 15.19 K/UL — HIGH (ref 3.8–10.5)
WBC # FLD AUTO: 15.19 K/UL — HIGH (ref 3.8–10.5)
WBC UR QL: NEGATIVE — SIGNIFICANT CHANGE UP

## 2022-01-14 PROCEDURE — 71045 X-RAY EXAM CHEST 1 VIEW: CPT | Mod: 26

## 2022-01-14 PROCEDURE — 99285 EMERGENCY DEPT VISIT HI MDM: CPT | Mod: CS

## 2022-01-14 PROCEDURE — 73701 CT LOWER EXTREMITY W/DYE: CPT | Mod: 26,RT,MA

## 2022-01-14 PROCEDURE — 73590 X-RAY EXAM OF LOWER LEG: CPT | Mod: 26,RT

## 2022-01-14 RX ORDER — ACETAMINOPHEN 500 MG
650 TABLET ORAL ONCE
Refills: 0 | Status: COMPLETED | OUTPATIENT
Start: 2022-01-14 | End: 2022-01-14

## 2022-01-14 RX ORDER — VANCOMYCIN HCL 1 G
1000 VIAL (EA) INTRAVENOUS ONCE
Refills: 0 | Status: COMPLETED | OUTPATIENT
Start: 2022-01-14 | End: 2022-01-14

## 2022-01-14 RX ORDER — SODIUM CHLORIDE 9 MG/ML
1600 INJECTION INTRAMUSCULAR; INTRAVENOUS; SUBCUTANEOUS ONCE
Refills: 0 | Status: COMPLETED | OUTPATIENT
Start: 2022-01-14 | End: 2022-01-14

## 2022-01-14 RX ORDER — PIPERACILLIN AND TAZOBACTAM 4; .5 G/20ML; G/20ML
3.38 INJECTION, POWDER, LYOPHILIZED, FOR SOLUTION INTRAVENOUS ONCE
Refills: 0 | Status: COMPLETED | OUTPATIENT
Start: 2022-01-14 | End: 2022-01-14

## 2022-01-14 RX ADMIN — PIPERACILLIN AND TAZOBACTAM 3.38 GRAM(S): 4; .5 INJECTION, POWDER, LYOPHILIZED, FOR SOLUTION INTRAVENOUS at 22:30

## 2022-01-14 RX ADMIN — SODIUM CHLORIDE 1600 MILLILITER(S): 9 INJECTION INTRAMUSCULAR; INTRAVENOUS; SUBCUTANEOUS at 15:42

## 2022-01-14 RX ADMIN — SODIUM CHLORIDE 1600 MILLILITER(S): 9 INJECTION INTRAMUSCULAR; INTRAVENOUS; SUBCUTANEOUS at 18:35

## 2022-01-14 RX ADMIN — Medication 650 MILLIGRAM(S): at 14:00

## 2022-01-14 RX ADMIN — PIPERACILLIN AND TAZOBACTAM 200 GRAM(S): 4; .5 INJECTION, POWDER, LYOPHILIZED, FOR SOLUTION INTRAVENOUS at 21:17

## 2022-01-14 RX ADMIN — Medication 250 MILLIGRAM(S): at 23:10

## 2022-01-14 RX ADMIN — Medication 650 MILLIGRAM(S): at 15:41

## 2022-01-14 NOTE — ED PROVIDER NOTE - OBJECTIVE STATEMENT
This patient is a 76 year old woman hx of HTN, lymphedema and morbid obesity who presents to the ER for evaluation.  Patient is followed by wound care every Wednesday for her right leg wound and has a visiting nurse come to her home a couple times a week.  Today when the visiting nurse visited the home she found the patient to be febrile.   Patient also reported increase lethargy and fatigue.  She has had an intermittent cough for 2-3 months.  Patient denies chest pain, SOB, dysuria, hematuria, abdominal pain and vomiting.

## 2022-01-14 NOTE — ED ADULT TRIAGE NOTE - CHIEF COMPLAINT QUOTE
AS per visiting nurse Fever, HR racing, Lethargic  HX HTN, weekly wound care Right leg with lymphedema

## 2022-01-14 NOTE — ED PROVIDER NOTE - CPE EDP [facility] NORM
Jung Wheatley is a 57 year old male presenting with:    Symptoms:  Weakness, runny nose, throat pain, fever, cough productive of dark green phlegm, headache, ear pain, \"little diarrhea\"    Symptoms present for:   1 week    Denies:  n/a    Contacted PCP: No    Travel history:   Recent travel to  [place], [place], [place], [place], [place] within 14 days of symptom onset? denies     Exposure:    Close contact with laboratory confirmed COVID-19 patient within 14 days of symptom onset? Denies known contact    Patient screened at: 1632 and escorted back to waiting room to wait for provider.    normal...

## 2022-01-14 NOTE — ED PROVIDER NOTE - CLINICAL SUMMARY MEDICAL DECISION MAKING FREE TEXT BOX
Patient with fatigue and fever no other complaints.  Chronic right leg wound followed by wound care no discharge from wound.  Patient febrile and tachycardic.  Sepsis orders placed.  Will check EKG, Labs, CXR, UA and Re-eval.

## 2022-01-15 LAB
ANION GAP SERPL CALC-SCNC: 5 MMOL/L — SIGNIFICANT CHANGE UP (ref 5–17)
BASOPHILS # BLD AUTO: 0.02 K/UL — SIGNIFICANT CHANGE UP (ref 0–0.2)
BASOPHILS NFR BLD AUTO: 0.2 % — SIGNIFICANT CHANGE UP (ref 0–2)
BUN SERPL-MCNC: 12 MG/DL — SIGNIFICANT CHANGE UP (ref 7–23)
CALCIUM SERPL-MCNC: 8.5 MG/DL — SIGNIFICANT CHANGE UP (ref 8.5–10.1)
CHLORIDE SERPL-SCNC: 106 MMOL/L — SIGNIFICANT CHANGE UP (ref 96–108)
CO2 SERPL-SCNC: 28 MMOL/L — SIGNIFICANT CHANGE UP (ref 22–31)
CREAT SERPL-MCNC: 0.66 MG/DL — SIGNIFICANT CHANGE UP (ref 0.5–1.3)
CRP SERPL-MCNC: 149 MG/L — HIGH
EOSINOPHIL # BLD AUTO: 0 K/UL — SIGNIFICANT CHANGE UP (ref 0–0.5)
EOSINOPHIL NFR BLD AUTO: 0 % — SIGNIFICANT CHANGE UP (ref 0–6)
ERYTHROCYTE [SEDIMENTATION RATE] IN BLOOD: 55 MM/HR — HIGH (ref 0–20)
GLUCOSE SERPL-MCNC: 91 MG/DL — SIGNIFICANT CHANGE UP (ref 70–99)
HCT VFR BLD CALC: 31.7 % — LOW (ref 34.5–45)
HGB BLD-MCNC: 10.3 G/DL — LOW (ref 11.5–15.5)
IMM GRANULOCYTES NFR BLD AUTO: 0.7 % — SIGNIFICANT CHANGE UP (ref 0–1.5)
LYMPHOCYTES # BLD AUTO: 0.66 K/UL — LOW (ref 1–3.3)
LYMPHOCYTES # BLD AUTO: 5.5 % — LOW (ref 13–44)
MAGNESIUM SERPL-MCNC: 1.8 MG/DL — SIGNIFICANT CHANGE UP (ref 1.6–2.6)
MCHC RBC-ENTMCNC: 28.1 PG — SIGNIFICANT CHANGE UP (ref 27–34)
MCHC RBC-ENTMCNC: 32.5 GM/DL — SIGNIFICANT CHANGE UP (ref 32–36)
MCV RBC AUTO: 86.4 FL — SIGNIFICANT CHANGE UP (ref 80–100)
MONOCYTES # BLD AUTO: 0.64 K/UL — SIGNIFICANT CHANGE UP (ref 0–0.9)
MONOCYTES NFR BLD AUTO: 5.4 % — SIGNIFICANT CHANGE UP (ref 2–14)
NEUTROPHILS # BLD AUTO: 10.52 K/UL — HIGH (ref 1.8–7.4)
NEUTROPHILS NFR BLD AUTO: 88.2 % — HIGH (ref 43–77)
NRBC # BLD: 0 /100 WBCS — SIGNIFICANT CHANGE UP (ref 0–0)
PHOSPHATE SERPL-MCNC: 3.3 MG/DL — SIGNIFICANT CHANGE UP (ref 2.5–4.5)
PLATELET # BLD AUTO: 194 K/UL — SIGNIFICANT CHANGE UP (ref 150–400)
POTASSIUM SERPL-MCNC: 3.5 MMOL/L — SIGNIFICANT CHANGE UP (ref 3.5–5.3)
POTASSIUM SERPL-SCNC: 3.5 MMOL/L — SIGNIFICANT CHANGE UP (ref 3.5–5.3)
RBC # BLD: 3.67 M/UL — LOW (ref 3.8–5.2)
RBC # FLD: 16.4 % — HIGH (ref 10.3–14.5)
SODIUM SERPL-SCNC: 139 MMOL/L — SIGNIFICANT CHANGE UP (ref 135–145)
WBC # BLD: 11.92 K/UL — HIGH (ref 3.8–10.5)
WBC # FLD AUTO: 11.92 K/UL — HIGH (ref 3.8–10.5)

## 2022-01-15 PROCEDURE — 99222 1ST HOSP IP/OBS MODERATE 55: CPT

## 2022-01-15 RX ORDER — CEFEPIME 1 G/1
2000 INJECTION, POWDER, FOR SOLUTION INTRAMUSCULAR; INTRAVENOUS EVERY 12 HOURS
Refills: 0 | Status: DISCONTINUED | OUTPATIENT
Start: 2022-01-16 | End: 2022-01-16

## 2022-01-15 RX ORDER — CEFEPIME 1 G/1
2000 INJECTION, POWDER, FOR SOLUTION INTRAMUSCULAR; INTRAVENOUS ONCE
Refills: 0 | Status: COMPLETED | OUTPATIENT
Start: 2022-01-15 | End: 2022-01-15

## 2022-01-15 RX ORDER — BUDESONIDE AND FORMOTEROL FUMARATE DIHYDRATE 160; 4.5 UG/1; UG/1
2 AEROSOL RESPIRATORY (INHALATION)
Refills: 0 | Status: DISCONTINUED | OUTPATIENT
Start: 2022-01-15 | End: 2022-01-17

## 2022-01-15 RX ORDER — PIPERACILLIN AND TAZOBACTAM 4; .5 G/20ML; G/20ML
3.38 INJECTION, POWDER, LYOPHILIZED, FOR SOLUTION INTRAVENOUS EVERY 8 HOURS
Refills: 0 | Status: DISCONTINUED | OUTPATIENT
Start: 2022-01-15 | End: 2022-01-15

## 2022-01-15 RX ORDER — TIOTROPIUM BROMIDE 18 UG/1
1 CAPSULE ORAL; RESPIRATORY (INHALATION) DAILY
Refills: 0 | Status: DISCONTINUED | OUTPATIENT
Start: 2022-01-15 | End: 2022-01-17

## 2022-01-15 RX ORDER — CEFEPIME 1 G/1
INJECTION, POWDER, FOR SOLUTION INTRAMUSCULAR; INTRAVENOUS
Refills: 0 | Status: DISCONTINUED | OUTPATIENT
Start: 2022-01-15 | End: 2022-01-16

## 2022-01-15 RX ORDER — VANCOMYCIN HCL 1 G
1250 VIAL (EA) INTRAVENOUS EVERY 12 HOURS
Refills: 0 | Status: DISCONTINUED | OUTPATIENT
Start: 2022-01-15 | End: 2022-01-16

## 2022-01-15 RX ORDER — ONDANSETRON 8 MG/1
4 TABLET, FILM COATED ORAL EVERY 8 HOURS
Refills: 0 | Status: DISCONTINUED | OUTPATIENT
Start: 2022-01-15 | End: 2022-01-17

## 2022-01-15 RX ORDER — LOSARTAN POTASSIUM 100 MG/1
50 TABLET, FILM COATED ORAL DAILY
Refills: 0 | Status: DISCONTINUED | OUTPATIENT
Start: 2022-01-15 | End: 2022-01-17

## 2022-01-15 RX ORDER — METRONIDAZOLE 500 MG
500 TABLET ORAL EVERY 8 HOURS
Refills: 0 | Status: DISCONTINUED | OUTPATIENT
Start: 2022-01-15 | End: 2022-01-16

## 2022-01-15 RX ORDER — ENOXAPARIN SODIUM 100 MG/ML
40 INJECTION SUBCUTANEOUS EVERY 12 HOURS
Refills: 0 | Status: DISCONTINUED | OUTPATIENT
Start: 2022-01-15 | End: 2022-01-17

## 2022-01-15 RX ORDER — ACETAMINOPHEN 500 MG
650 TABLET ORAL EVERY 6 HOURS
Refills: 0 | Status: DISCONTINUED | OUTPATIENT
Start: 2022-01-15 | End: 2022-01-17

## 2022-01-15 RX ORDER — ALBUTEROL 90 UG/1
2 AEROSOL, METERED ORAL EVERY 4 HOURS
Refills: 0 | Status: DISCONTINUED | OUTPATIENT
Start: 2022-01-15 | End: 2022-01-17

## 2022-01-15 RX ADMIN — ALBUTEROL 2 PUFF(S): 90 AEROSOL, METERED ORAL at 06:20

## 2022-01-15 RX ADMIN — PIPERACILLIN AND TAZOBACTAM 25 GRAM(S): 4; .5 INJECTION, POWDER, LYOPHILIZED, FOR SOLUTION INTRAVENOUS at 06:20

## 2022-01-15 RX ADMIN — LOSARTAN POTASSIUM 50 MILLIGRAM(S): 100 TABLET, FILM COATED ORAL at 06:20

## 2022-01-15 RX ADMIN — Medication 650 MILLIGRAM(S): at 06:20

## 2022-01-15 RX ADMIN — Medication 1000 MILLIGRAM(S): at 00:00

## 2022-01-15 RX ADMIN — BUDESONIDE AND FORMOTEROL FUMARATE DIHYDRATE 2 PUFF(S): 160; 4.5 AEROSOL RESPIRATORY (INHALATION) at 06:20

## 2022-01-15 RX ADMIN — TIOTROPIUM BROMIDE 1 CAPSULE(S): 18 CAPSULE ORAL; RESPIRATORY (INHALATION) at 14:47

## 2022-01-15 RX ADMIN — Medication 166.67 MILLIGRAM(S): at 08:35

## 2022-01-15 RX ADMIN — Medication 500 MILLIGRAM(S): at 21:33

## 2022-01-15 RX ADMIN — CEFEPIME 100 MILLIGRAM(S): 1 INJECTION, POWDER, FOR SOLUTION INTRAMUSCULAR; INTRAVENOUS at 13:15

## 2022-01-15 RX ADMIN — Medication 650 MILLIGRAM(S): at 21:33

## 2022-01-15 RX ADMIN — BUDESONIDE AND FORMOTEROL FUMARATE DIHYDRATE 2 PUFF(S): 160; 4.5 AEROSOL RESPIRATORY (INHALATION) at 17:20

## 2022-01-15 RX ADMIN — Medication 650 MILLIGRAM(S): at 21:52

## 2022-01-15 RX ADMIN — Medication 166.67 MILLIGRAM(S): at 17:19

## 2022-01-15 RX ADMIN — ENOXAPARIN SODIUM 40 MILLIGRAM(S): 100 INJECTION SUBCUTANEOUS at 17:19

## 2022-01-15 RX ADMIN — ENOXAPARIN SODIUM 40 MILLIGRAM(S): 100 INJECTION SUBCUTANEOUS at 07:07

## 2022-01-15 NOTE — H&P ADULT - NSHPPHYSICALEXAM_GEN_ALL_CORE
PHYSICAL EXAM:    Vital Signs Last 24 Hrs  T(C): 38.2 (15 Kaden 2022 06:03), Max: 39.5 (14 Jan 2022 12:38)  T(F): 100.7 (15 Kaden 2022 06:03), Max: 103.1 (14 Jan 2022 12:38)  HR: 89 (15 Kaden 2022 06:03) (84 - 108)  BP: 128/52 (15 Kaden 2022 06:03) (128/52 - 184/82)  BP(mean): --  RR: 25 (15 Kaden 2022 06:03) (18 - 25)  SpO2: 96% (15 Kaden 2022 06:03) (95% - 97%)    GENERAL: Pt lying in bed comfortably in NAD  HEENT:  Atraumatic, EOMI, PERRL, conjunctiva and sclera clear, MMM  NECK: Supple, No JVD  CHEST/LUNG: Clear to auscultation bilaterally; No rales, rhonchi, wheezing or rubs. Unlabored respirations  HEART: Regular rate and rhythm; No murmurs, rubs, or gallops  ABDOMEN: Bowel sounds present; Soft, Nontender, Nondistended. No guarding or rigidity    EXTREMITIES:  2+ Peripheral Pulses, brisk capillary refill. No clubbing, cyanosis, or edema  NEUROLOGICAL:  Alert & Oriented X3, speech clear. Answers questions appropriately. Full and equal strength B/L upper and lower extremities. No deficits   MSK: FROM x 4 extremities   SKIN: No rashes or lesions

## 2022-01-15 NOTE — PROGRESS NOTE ADULT - ASSESSMENT
76 year old morbidly obese F w/ PMHx of HTN, lymphedema, chronic RLE wound presents to the ER c/o fever, pt being admitted for RLE wound infection/cellulitis    1) RLE Wound Infection/Cellulitis  - CT scan as above  - c/w Zosyn and Vanco  - Dr Montoya consulted  - Wound care nurse consulted    2) HTN  - c/w Losartan    3) DVT ppx - Lovenox    76 year old morbidly obese F w/ PMHx of HTN, lymphedema, chronic RLE wound presents to the ER c/o fever, pt being admitted for RLE wound infection/cellulitis    1) RLE Wound Infection/Cellulitis  - CT scan -Extensive subcutaneous edema throughout the thigh, calf, foot, and ankle,   most prominent along the posterior and medial aspect of the thigh. There   is a gas containing draining sinus tract within the medial aspect of the   thigh, draining to the skin surface, which is decreased in size from the   prior study. Overall appearance is otherwise similar to the prior study.   No definite drainable fluid collection is seen.  No new cortical erosion or periostitis to suggest osteomyelitis. However,   MRI would be a more sensitive test to evaluate for osteomyelitis.  Stable right inguinal reactive lymphadenopathy.      - c/w Zosyn and Theresao  -will await recs from Vascular and ID regarding proceeding with MRI  - Wound care nurse consulted    2) HTN  - c/w Losartan    3) DVT ppx - Lovenox    76 year old morbidly obese F w/ PMHx of HTN, lymphedema, chronic RLE wound presents to the ER c/o fever, pt being admitted for RLE wound infection/cellulitis    1) RLE Wound Infection/Cellulitis  - CT scan -Extensive subcutaneous edema throughout the thigh, calf, foot, and ankle,   most prominent along the posterior and medial aspect of the thigh. There   is a gas containing draining sinus tract within the medial aspect of the   thigh, draining to the skin surface, which is decreased in size from the   prior study. Overall appearance is otherwise similar to the prior study.   No definite drainable fluid collection is seen.  No new cortical erosion or periostitis to suggest osteomyelitis. However,   MRI would be a more sensitive test to evaluate for osteomyelitis.  Stable right inguinal reactive lymphadenopathy.      - c/w Zosyn and Vanco--spoke with ID- will switch to Cefepime and Vanco  -Blood cultures pending  -will await recs from Vascular and ID regarding proceeding with MRI  - Wound care nurse consulted    2) HTN  - c/w Losartan    3) DVT ppx - Lovenox

## 2022-01-15 NOTE — CONSULT NOTE ADULT - ATTENDING COMMENTS
1-16-22 The right  thigh is evaluated, the wound is small pinpoint opening, no cellulitis can be seen. The drainage is minimal,   pt has severe chronic localised right thigh Lymphedema and has chronic Non healing right thigh wound which is Rx at [facility].Th ept is supposed to be making appt with Plastics for surgery and excn of the right thigh Lymphedema.  Adv-ABX as per ID,?OK to DC home on PO ABX., yuan locally.

## 2022-01-15 NOTE — H&P ADULT - HISTORY OF PRESENT ILLNESS
76 year old morbidly obese F w/ PMHx of HTN, lymphedema presents to the ER c/o fever. Pt reports this morning she noted she suddenly felt very "hot", developed a HA and felt dizzy. Pt reports her VN who was there checked her vitals and noted her Temp was 103 and her BP was high thus called EMS. Of note pt w/ chronic RLE wound followed by wound care every Wednesday and QOD dressing changes by  come to her home a couple times a week.  Patient denies chest pain, blurry vision, SOB, dysuria, abdominal pain, nausea or vomiting.

## 2022-01-15 NOTE — CONSULT NOTE ADULT - ASSESSMENT
76F with a PMH of HTN and chronic lymphedema who presents to the ED for fever.  Patient follows with Dr Montoya for wound care and chronic lymphedema  febrile to 103 and tachycardic to 108 on  admission with leukocytosis 15k  CT (I personally reviewed) consistent with cellulitis with a chronic wound and no drainable fluid collection  COVID negative  UA quite bland   presentation most consistent with cellulitis   unfortunately she has had several bouts like this before and she is becoming antibiotics experienced which can lead to development of resistance   will treat with cefepime, vancomycin and flagyl for now       Cellulitis   Fever  leukocytosis  chronic Lymphedema     Plan:  continue vancomycin   continue cefepime 2g q12hrs   start PO flagyl 500mg 3 times a day   follow blood cultures   leg elevation as tolerated   early physical therapy and OOB to chair   wound care appreciated  needs to continue going to wound care and lymphedema clinic regularly     D/W Dr. Khadijah Barnes,   Infectious Disease Attending  Pager [phone]  After 5pm/weekends please call [phone] for all inquiries and new consults    76F with a PMH of HTN and chronic lymphedema who presents to the ED for fever.  Patient follows with Dr Montoya for wound care and chronic lymphedema  febrile to 103 and tachycardic to 108 on  admission with leukocytosis 15k  CT (I personally reviewed) consistent with cellulitis with a chronic wound and no drainable fluid collection  COVID negative  UA quite bland   presentation most consistent with cellulitis   unfortunately she has had several bouts like this before and she is becoming antibiotics experienced which can lead to development of resistance   will treat with cefepime, vancomycin and flagyl for now       Cellulitis   Fever  leukocytosis  chronic Lymphedema     Plan:  continue vancomycin   continue cefepime 2g q12hrs   start PO flagyl 500mg 3 times a day   follow blood cultures   leg elevation as tolerated   early physical therapy and OOB to chair   wound care appreciated  needs to continue going to wound care and lymphedema clinic regularly   physical therapy and consider rehab  weight loss counselling     D/W Dr. Khadijah Barnes,   Infectious Disease Attending  Pager [phone]  After 5pm/weekends please call [phone] for all inquiries and new consults

## 2022-01-15 NOTE — CONSULT NOTE ADULT - ASSESSMENT
A/P: 76F PMH HTN and h/o chronic lymphedema admitted with cellulitis of right lower extremity   Patient is known to Dr. Montoya, last seen in wound care a few days ago.     -per discussion with Dr Montoya, continue local wound care with MAISHA Saba per RN  -Continue ABX and medical management per primary team  -no surgical intervention planned at present

## 2022-01-15 NOTE — H&P ADULT - NSHPLABSRESULTS_GEN_ALL_CORE
T(C): 38.2 (01-15-22 @ 06:03), Max: 39.5 (22 @ 12:38)  HR: 89 (01-15-22 @ 06:03) (84 - 108)  BP: 128/52 (01-15-22 @ 06:03) (128/52 - 184/82)  RR: 25 (01-15-22 @ 06:03) (18 - 25)  SpO2: 96% (01-15-22 @ 06:03) (95% - 97%)                        10.3   11.92 )-----------( 194      ( 15 Kaden 2022 07:24 )             31.7     01-15    139  |  106  |  12  ----------------------------<  91  3.5   |  28  |  0.66    Ca    8.5      15 Kaden 2022 07:24  Phos  3.3     01-15  Mg     1.8     01-15    TPro  8.1  /  Alb  3.0<L>  /  TBili  0.7  /  DBili  x   /  AST  34  /  ALT  26  /  AlkPhos  92  -14    LIVER FUNCTIONS - ( 2022 18:23 )  Alb: 3.0 g/dL / Pro: 8.1 gm/dL / ALK PHOS: 92 U/L / ALT: 26 U/L / AST: 34 U/L / GGT: x           PT/INR - ( 2022 18:29 )   PT: 13.7 sec;   INR: 1.19 ratio         PTT - ( 2022 18:29 )  PTT:29.1 sec  Urinalysis Basic - ( 2022 15:34 )    Color: Yellow / Appearance: Clear / S.010 / pH: x  Gluc: x / Ketone: Negative  / Bili: Negative / Urobili: Negative mg/dL   Blood: x / Protein: 30 mg/dL / Nitrite: Negative   Leuk Esterase: Negative / RBC: 3-5 /HPF / WBC Negative   Sq Epi: x / Non Sq Epi: Few / Bacteria: Occasional    < from: CT Lower Extremity w/ IV Cont, Right (22 @ 22:03) >    IMPRESSION:  Persistent severe soft tissue swelling in the right leg especially the   thigh  with a persistent wound in the posteromedial right thigh with opening more  narrow compared to the prior exam but a sinus tract of gas is still   present. No  obvious large drainable abscess. Reactive adenopathy again seen. No CT   findings  of osteomyelitis.    < end of copied text >      acetaminophen     Tablet .. 650 milliGRAM(s) Oral every 6 hours PRN  ALBUTerol    90 MICROgram(s) HFA Inhaler 2 Puff(s) Inhalation every 4 hours PRN  aluminum hydroxide/magnesium hydroxide/simethicone Suspension 30 milliLiter(s) Oral every 4 hours PRN  budesonide 160 MICROgram(s)/formoterol 4.5 MICROgram(s) Inhaler 2 Puff(s) Inhalation two times a day  enoxaparin Injectable 40 milliGRAM(s) SubCutaneous every 12 hours  losartan 50 milliGRAM(s) Oral daily  ondansetron Injectable 4 milliGRAM(s) IV Push every 8 hours PRN  piperacillin/tazobactam IVPB.. 3.375 Gram(s) IV Intermittent every 8 hours  tiotropium 18 MICROgram(s) Capsule 1 Capsule(s) Inhalation daily  vancomycin  IVPB 1250 milliGRAM(s) IV Intermittent every 12 hours

## 2022-01-15 NOTE — CONSULT NOTE ADULT - SUBJECTIVE AND OBJECTIVE BOX
CARLOSAMINAALICIA  MRN-[id number]        Patient is a 76y old  Female who presents with a chief complaint of RLE Cellulitis (15 Kaden 2022 16:14)      HPI:        76 year old morbidly obese F w/ PMHx of HTN, lymphedema presents to the ER c/o fever. Pt reports this morning she noted she suddenly felt very "hot", developed a HA and felt dizzy. Pt reports her VN who was there checked her vitals and noted her Temp was 103 and her BP was high thus called EMS. Of note pt w/ chronic RLE wound followed by wound care every Wednesday and QOD dressing changes by  come to her home a couple times a week.  Patient denies chest pain, blurry vision, SOB, dysuria, abdominal pain, nausea or vomiting.   (15 Kaden 2022 02:12)    known to our service. Develops superimposed cellulitis due to a chronic wound and poor lymphatic drainage due to chronic lymphedema. febrile at home with HA and dizziness. admitted for cellulitis   ID consulted for workup and antibiotic management     PAST MEDICAL & SURGICAL HISTORY:  Essential hypertension    Morbid obesity due to excess calories    Other iron deficiency anemia    H/O total knee replacement, bilateral        Allergies  No Known Allergies        ANTIMICROBIALS:  cefepime   IVPB    metroNIDAZOLE    Tablet 500 every 8 hours  vancomycin  IVPB 1250 every 12 hours      MEDICATIONS  (STANDING):    cefepime   IVPB   100 mL/Hr IV Intermittent (01-15-22 @ 13:15)    piperacillin/tazobactam IVPB..   25 mL/Hr IV Intermittent (01-15-22 @ 06:20)    piperacillin/tazobactam IVPB...   200 mL/Hr IV Intermittent (22 @ 21:17)    vancomycin  IVPB   166.67 mL/Hr IV Intermittent (01-15-22 @ 17:19)   166.67 mL/Hr IV Intermittent (01-15-22 @ 08:35)    vancomycin  IVPB.   250 mL/Hr IV Intermittent (22 @ 23:10)        OTHER MEDS: MEDICATIONS  (STANDING):  acetaminophen     Tablet .. 650 every 6 hours PRN  ALBUTerol    90 MICROgram(s) HFA Inhaler 2 every 4 hours PRN  aluminum hydroxide/magnesium hydroxide/simethicone Suspension 30 every 4 hours PRN  budesonide 160 MICROgram(s)/formoterol 4.5 MICROgram(s) Inhaler 2 two times a day  enoxaparin Injectable 40 every 12 hours  losartan 50 daily  ondansetron Injectable 4 every 8 hours PRN  tiotropium 18 MICROgram(s) Capsule 1 daily      SOCIAL HISTORY:       Denies smoking etoh or drugs FAMILY HISTORY:  FH: HTN (hypertension)        REVIEW OF SYSTEMS  [  ] ROS unobtainable because:    [ X ] All other systems negative except as noted below:	    Constitutional:  [ X] fever [ ] chills  [ ] weight loss  [ ] weakness  Skin:  [ ] rash [ ] phlebitis	  Eyes: [ ] icterus [ ] pain  [ ] discharge	  ENMT: [ ] sore throat  [ ] thrush [ ] ulcers [ ] exudates  Respiratory: [ ] dyspnea [ ] hemoptysis [ ] cough [ ] sputum	  Cardiovascular:  [ ] chest pain [ ] palpitations [ ] edema	  Gastrointestinal:  [ ] nausea [ ] vomiting [ ] diarrhea [ ] constipation [ ] pain	  Genitourinary:  [ ] dysuria [ ] frequency [ ] hematuria [ ] discharge [ ] flank pain  [ ] incontinence  Musculoskeletal:  [ ] myalgias [ ] arthralgias [ ] arthritis  [ ] back pain  Neurological:  [ ] headache [ ] seizures  [ ] confusion/altered mental status  Psychiatric:  [ ] anxiety [ ] depression	  Hematology/Lymphatics:  [ ] lymphadenopathy  Endocrine:  [ ] adrenal [ ] thyroid  Allergic/Immunologic:	 [ ] transplant [ ] seasonal    Vital Signs Last 24 Hrs  T(F): 98.3 (01-15-22 @ 17:20), Max: 103.1 (22 @ 12:38)    Vital Signs Last 24 Hrs  HR: 89 (01-15-22 @ 17:20) (81 - 89)  BP: 138/93 (01-15-22 @ 17:20) (127/58 - 155/57)  RR: 20 (01-15-22 @ 17:20)  SpO2: 98% (01-15-22 @ 17:20) (96% - 98%)  Wt(kg): --    PHYSICAL EXAM:  Constitutional: non-toxic, no distress  HEAD/EYES: anicteric, no conjunctival injection  ENT:  supple, no thrush  Cardiovascular:   normal S1, S2, no murmur, no edema  Respiratory:  clear BS bilaterally, no wheezes, no rales  GI:  soft, non-tender, normal bowel sounds  :  no blum, no CVA tenderness  Musculoskeletal:  no synovitis, normal ROM  Neurologic: awake and alert, normal strength, no focal findings  Skin:  no rash, no erythema, no phlebitis  Heme/Onc: no lymphadenopathy   Psychiatric:  awake, alert, appropriate mood          WBC Count: 11.92 K/uL (01-15 @ 07:24)  WBC Count: 15.19 K/uL ( @ 18:23)      Auto Neutrophil %: 88.2 % *H* (01-15-22 @ 07:24)  Auto Neutrophil #: 10.52 K/uL *H* (01-15-22 @ 07:24)  Auto Neutrophil %: 90.3 % *H* (22 @ 18:23)  Auto Neutrophil #: 13.72 K/uL *H* (22 @ 18:23)                            10.3   11.92 )-----------( 194      ( 15 Kaden 2022 07:24 )             31.7       01-15    139  |  106  |  12  ----------------------------<  91  3.5   |  28  |  0.66    Ca    8.5      15 Kaden 2022 07:24  Phos  3.3     01-15  Mg     1.8     01-15    TPro  8.1  /  Alb  3.0<L>  /  TBili  0.7  /  DBili  x   /  AST  34  /  ALT  26  /  AlkPhos  92        Creatinine Trend: 0.66<--, 0.76<--    Urinalysis Basic - ( 2022 15:34 )    Color: Yellow / Appearance: Clear / S.010 / pH: x  Gluc: x / Ketone: Negative  / Bili: Negative / Urobili: Negative mg/dL   Blood: x / Protein: 30 mg/dL / Nitrite: Negative   Leuk Esterase: Negative / RBC: 3-5 /HPF / WBC Negative   Sq Epi: x / Non Sq Epi: Few / Bacteria: Occasional        MICROBIOLOGY:  C-Reactive Protein, Serum: 149 (01-15)    SARS-CoV-2 Result: NotDetec (22 @ 15:34)      RADIOLOGY:  < from: CT Lower Extremity w/ IV Cont, Right (22 @ 22:03) >  IMPRESSION:    Extensive subcutaneous edema throughout the thigh, calf, foot, and ankle,   most prominent along the posterior and medial aspect of the thigh. There   is a gas containing draining sinus tract within the medial aspect of the   thigh, draining to the skin surface, which is decreased in size from the   prior study. Overall appearance is otherwise similar to the prior study.   No definite drainable fluid collection is seen.    No new cortical erosion or periostitis to suggest osteomyelitis. However,   MRI would be a more sensitive test to evaluate for osteomyelitis.    Stable right inguinal reactive lymphadenopathy.    < end of copied text >               CARLOSAMINAALICIA  MRN-[id number]        Patient is a 76y old  Female who presents with a chief complaint of RLE Cellulitis (15 Kaden 2022 16:14)      HPI:        76 year old morbidly obese F w/ PMHx of HTN, lymphedema presents to the ER c/o fever. Pt reports this morning she noted she suddenly felt very "hot", developed a HA and felt dizzy. Pt reports her VN who was there checked her vitals and noted her Temp was 103 and her BP was high thus called EMS. Of note pt w/ chronic RLE wound followed by wound care every Wednesday and QOD dressing changes by  come to her home a couple times a week.  Patient denies chest pain, blurry vision, SOB, dysuria, abdominal pain, nausea or vomiting.   (15 Kaden 2022 02:12)    known to our service. Develops superimposed cellulitis due to a chronic wound and poor lymphatic drainage due to chronic lymphedema. febrile at home with HA and dizziness. admitted for cellulitis   ID consulted for workup and antibiotic management     PAST MEDICAL & SURGICAL HISTORY:  Essential hypertension    Morbid obesity due to excess calories    Other iron deficiency anemia    H/O total knee replacement, bilateral        Allergies  No Known Allergies        ANTIMICROBIALS:  cefepime   IVPB    metroNIDAZOLE    Tablet 500 every 8 hours  vancomycin  IVPB 1250 every 12 hours      MEDICATIONS  (STANDING):    cefepime   IVPB   100 mL/Hr IV Intermittent (01-15-22 @ 13:15)    piperacillin/tazobactam IVPB..   25 mL/Hr IV Intermittent (01-15-22 @ 06:20)    piperacillin/tazobactam IVPB...   200 mL/Hr IV Intermittent (22 @ 21:17)    vancomycin  IVPB   166.67 mL/Hr IV Intermittent (01-15-22 @ 17:19)   166.67 mL/Hr IV Intermittent (01-15-22 @ 08:35)    vancomycin  IVPB.   250 mL/Hr IV Intermittent (22 @ 23:10)        OTHER MEDS: MEDICATIONS  (STANDING):  acetaminophen     Tablet .. 650 every 6 hours PRN  ALBUTerol    90 MICROgram(s) HFA Inhaler 2 every 4 hours PRN  aluminum hydroxide/magnesium hydroxide/simethicone Suspension 30 every 4 hours PRN  budesonide 160 MICROgram(s)/formoterol 4.5 MICROgram(s) Inhaler 2 two times a day  enoxaparin Injectable 40 every 12 hours  losartan 50 daily  ondansetron Injectable 4 every 8 hours PRN  tiotropium 18 MICROgram(s) Capsule 1 daily      SOCIAL HISTORY:       Denies smoking etoh or drugs     FAMILY HISTORY:  FH: HTN (hypertension)        REVIEW OF SYSTEMS  [  ] ROS unobtainable because:    [ X ] All other systems negative except as noted below:	    Constitutional:  [ X] fever [ ] chills  [ ] weight loss  [ ] weakness  Skin:  [x ] rash [ ] phlebitis	  Eyes: [ ] icterus [ ] pain  [ ] discharge	  ENMT: [ ] sore throat  [ ] thrush [ ] ulcers [ ] exudates  Respiratory: [ ] dyspnea [ ] hemoptysis [ ] cough [ ] sputum	  Cardiovascular:  [ ] chest pain [ ] palpitations [ ] edema	  Gastrointestinal:  [ ] nausea [ ] vomiting [ ] diarrhea [ ] constipation [ ] pain	  Genitourinary:  [ ] dysuria [ ] frequency [ ] hematuria [ ] discharge [ ] flank pain  [ ] incontinence  Musculoskeletal:  [ ] myalgias [ ] arthralgias [ ] arthritis  [ ] back pain  Neurological:  [ ] headache [ ] seizures  [ ] confusion/altered mental status  Psychiatric:  [ ] anxiety [ ] depression	  Hematology/Lymphatics:  [ ] lymphadenopathy  Endocrine:  [ ] adrenal [ ] thyroid  Allergic/Immunologic:	 [ ] transplant [ ] seasonal    Vital Signs Last 24 Hrs  T(F): 98.3 (01-15-22 @ 17:20), Max: 103.1 (22 @ 12:38)    Vital Signs Last 24 Hrs  HR: 89 (01-15-22 @ 17:20) (81 - 89)  BP: 138/93 (01-15-22 @ 17:20) (127/58 - 155/57)  RR: 20 (01-15-22 @ 17:20)  SpO2: 98% (01-15-22 @ 17:20) (96% - 98%)  Wt(kg): --    PHYSICAL EXAM:  Constitutional: non-toxic, no distress obese  HEAD/EYES: anicteric, no conjunctival injection  ENT:  supple, no thrush  Cardiovascular:   normal S1, S2, no murmur, no edema  Respiratory:  clear BS bilaterally, no wheezes, no rales  GI:  soft, non-tender, normal bowel sounds  :  no blum, no CVA tenderness  Musculoskeletal:  no synovitis, normal ROM  Neurologic: awake and alert, normal strength, no focal findings  Skin:  chronic lymphedema especially of right leg with skin tenting, cellulitis and small posterior wound covered with guaze with scant dicsharge  Heme/Onc: no lymphadenopathy   Psychiatric:  awake, alert, appropriate mood          WBC Count: 11.92 K/uL (01-15 @ 07:24)  WBC Count: 15.19 K/uL ( @ 18:23)      Auto Neutrophil %: 88.2 % *H* (01-15-22 @ 07:24)  Auto Neutrophil #: 10.52 K/uL *H* (01-15-22 @ 07:24)  Auto Neutrophil %: 90.3 % *H* (22 @ 18:23)  Auto Neutrophil #: 13.72 K/uL *H* (22 @ 18:23)                            10.3   11.92 )-----------( 194      ( 15 Kaden 2022 07:24 )             31.7       01-15    139  |  106  |  12  ----------------------------<  91  3.5   |  28  |  0.66    Ca    8.5      15 Kaden 2022 07:24  Phos  3.3     01-15  Mg     1.8     01-15    TPro  8.1  /  Alb  3.0<L>  /  TBili  0.7  /  DBili  x   /  AST  34  /  ALT  26  /  AlkPhos  92        Creatinine Trend: 0.66<--, 0.76<--    Urinalysis Basic - ( 2022 15:34 )    Color: Yellow / Appearance: Clear / S.010 / pH: x  Gluc: x / Ketone: Negative  / Bili: Negative / Urobili: Negative mg/dL   Blood: x / Protein: 30 mg/dL / Nitrite: Negative   Leuk Esterase: Negative / RBC: 3-5 /HPF / WBC Negative   Sq Epi: x / Non Sq Epi: Few / Bacteria: Occasional        MICROBIOLOGY:  C-Reactive Protein, Serum: 149 (01-15)    SARS-CoV-2 Result: NotDetec (22 @ 15:34)      RADIOLOGY:  < from: CT Lower Extremity w/ IV Cont, Right (22 @ 22:03) >  IMPRESSION:    Extensive subcutaneous edema throughout the thigh, calf, foot, and ankle,   most prominent along the posterior and medial aspect of the thigh. There   is a gas containing draining sinus tract within the medial aspect of the   thigh, draining to the skin surface, which is decreased in size from the   prior study. Overall appearance is otherwise similar to the prior study.   No definite drainable fluid collection is seen.    No new cortical erosion or periostitis to suggest osteomyelitis. However,   MRI would be a more sensitive test to evaluate for osteomyelitis.    Stable right inguinal reactive lymphadenopathy.    < end of copied text >

## 2022-01-15 NOTE — PATIENT PROFILE ADULT - NSPROGENOTHERPROVIDER_GEN_A_NUR
Nurse visits 3x a wk. Visits outpatient wound care at [facility]/home care/outpatient care

## 2022-01-15 NOTE — H&P ADULT - ASSESSMENT
76 year old morbidly obese F w/ PMHx of HTN, lymphedema, chronic RLE wound presents to the ER c/o fever, pt being admitted for RLE wound infection/cellulitis    1) RLE Wound Infection/Cellulitis  - CT scan as above  - c/w Zosyn and Vanco  - Dr Montoya consulted  - Wound care nurse consulted    2) HTN  - c/w Losartan    3) DVT ppx - Lovenox

## 2022-01-15 NOTE — CONSULT NOTE ADULT - SUBJECTIVE AND OBJECTIVE BOX
WOUND CARE CONSULT NOTE    Patient is a 76y old  Female who presents with a chief complaint of RLE Cellulitis (15 Kaden 2022 10:32)    HPI:        76 year old morbidly obese F w/ PMHx of HTN, lymphedema presents to the ER c/o fever. Pt reports this morning she noted she suddenly felt very "hot", developed a HA and felt dizzy. Pt reports her VN who was there checked her vitals and noted her Temp was 103 and her BP was high thus called EMS. Of note pt w/ chronic RLE wound followed by wound care every Wednesday and QOD dressing changes by  come to her home a couple times a week.  Patient denies chest pain, blurry vision, SOB, dysuria, abdominal pain, nausea or vomiting.   (15 Kaden 2022 02:12)    Pt seen bedside, admits to feeling much better today. Low grade fever this morning, afebrile since.   Denies chills, chest pain, sob, dizziness, HA, abdominal pain, N/V/C/D.    PAST MEDICAL & SURGICAL HISTORY:  Essential hypertension  Morbid obesity due to excess calories  Other iron deficiency anemia  H/O total knee replacement, bilateral    Review of Systems:  Contained within HPI    MEDICATIONS  (STANDING):  budesonide 160 MICROgram(s)/formoterol 4.5 MICROgram(s) Inhaler 2 Puff(s) Inhalation two times a day  cefepime   IVPB      enoxaparin Injectable 40 milliGRAM(s) SubCutaneous every 12 hours  losartan 50 milliGRAM(s) Oral daily  tiotropium 18 MICROgram(s) Capsule 1 Capsule(s) Inhalation daily  vancomycin  IVPB 1250 milliGRAM(s) IV Intermittent every 12 hours    MEDICATIONS  (PRN):  acetaminophen     Tablet .. 650 milliGRAM(s) Oral every 6 hours PRN Temp greater or equal to 38C (100.4F), Mild Pain (1 - 3)  ALBUTerol    90 MICROgram(s) HFA Inhaler 2 Puff(s) Inhalation every 4 hours PRN Shortness of Breath and/or Wheezing  aluminum hydroxide/magnesium hydroxide/simethicone Suspension 30 milliLiter(s) Oral every 4 hours PRN Dyspepsia  ondansetron Injectable 4 milliGRAM(s) IV Push every 8 hours PRN Nausea and/or Vomiting      Allergies  No Known Allergies    SOCIAL HISTORY       FAMILY HISTORY:  FH: HTN (hypertension)      Vital Signs Last 24 Hrs  T(C): 36.8 (15 Kaden 2022 12:33), Max: 38.2 (15 Kaden 2022 06:03)  T(F): 98.3 (15 Kaden 2022 12:33), Max: 100.7 (15 Kaden 2022 06:03)  HR: 84 (15 Kaden 2022 12:33) (81 - 89)  BP: 131/70 (15 Kaden 2022 12:33) (127/58 - 155/57)  RR: 20 (15 Kaden 2022 12:33) (18 - 25)  SpO2: 97% (15 Kaden 2022 12:33) (96% - 97%)    Physical Exam:  Gen: NAD, WDWN, pleasant  HEENT: NCAT  CV: +S1S2  Lung: nonlabored respirations  Abd: soft   Extremities: right lower extremity with significant edema and induration of medial thigh. Small wound clean and dry appearing, no purulent drainage. Irrigated with NS, Covered with Aquacel and Gauze dressing.   Pulses: normal and intact b/l      LABS:                        10.3   11.92 )-----------( 194      ( 15 Kaden 2022 07:24 )             31.7     01-15    139  |  106  |  12  ----------------------------<  91  3.5   |  28  |  0.66    Ca    8.5      15 Kaden 2022 07:24  Phos  3.3     01-15  Mg     1.8     01-15    TPro  8.1  /  Alb  3.0<L>  /  TBili  0.7  /  DBili  x   /  AST  34  /  ALT  26  /  AlkPhos  92  01-14    PT/INR - ( 2022 18:29 )   PT: 13.7 sec;   INR: 1.19 ratio         PTT - ( 2022 18:29 )  PTT:29.1 sec  Urinalysis Basic - ( 2022 15:34 )    Color: Yellow / Appearance: Clear / S.010 / pH: x  Gluc: x / Ketone: Negative  / Bili: Negative / Urobili: Negative mg/dL   Blood: x / Protein: 30 mg/dL / Nitrite: Negative   Leuk Esterase: Negative / RBC: 3-5 /HPF / WBC Negative   Sq Epi: x / Non Sq Epi: Few / Bacteria: Occasional    RADIOLOGY & ADDITIONAL STUDIES:  < from: CT Lower Extremity w/ IV Cont, Right (22 @ 22:03) >  IMPRESSION:    Extensive subcutaneous edema throughout the thigh, calf, foot, and ankle,   most prominent along the posterior and medial aspect of the thigh. There   is a gas containing draining sinus tract within the medial aspect of the   thigh, draining to the skin surface, which is decreased in size from the   prior study. Overall appearance is otherwise similar to the prior study.   No definite drainable fluid collection is seen.    No new cortical erosion or periostitis to suggest osteomyelitis. However,   MRI would be a more sensitive test to evaluate for osteomyelitis.    Stable right inguinal reactive lymphadenopathy.    --- End of Report ---    < end of copied text >

## 2022-01-15 NOTE — PROGRESS NOTE ADULT - SUBJECTIVE AND OBJECTIVE BOX
Patient is a 76y old  Female who presents with a chief complaint of RLE Cellulitis (15 Kaden 2022 02:12)      INTERVAL HPI/OVERNIGHT EVENTS:    MEDICATIONS  (STANDING):  budesonide 160 MICROgram(s)/formoterol 4.5 MICROgram(s) Inhaler 2 Puff(s) Inhalation two times a day  enoxaparin Injectable 40 milliGRAM(s) SubCutaneous every 12 hours  losartan 50 milliGRAM(s) Oral daily  piperacillin/tazobactam IVPB.. 3.375 Gram(s) IV Intermittent every 8 hours  tiotropium 18 MICROgram(s) Capsule 1 Capsule(s) Inhalation daily  vancomycin  IVPB 1250 milliGRAM(s) IV Intermittent every 12 hours    MEDICATIONS  (PRN):  acetaminophen     Tablet .. 650 milliGRAM(s) Oral every 6 hours PRN Temp greater or equal to 38C (100.4F), Mild Pain (1 - 3)  ALBUTerol    90 MICROgram(s) HFA Inhaler 2 Puff(s) Inhalation every 4 hours PRN Shortness of Breath and/or Wheezing  aluminum hydroxide/magnesium hydroxide/simethicone Suspension 30 milliLiter(s) Oral every 4 hours PRN Dyspepsia  ondansetron Injectable 4 milliGRAM(s) IV Push every 8 hours PRN Nausea and/or Vomiting      Allergies    No Known Allergies    Intolerances        REVIEW OF SYSTEMS:  CONSTITUTIONAL: No fever, weight loss, or fatigue  EYES: No eye pain, visual disturbances, or discharge  ENMT:  No difficulty hearing, tinnitus, vertigo; No sinus or throat pain  NECK: No pain or stiffness  BREASTS: No pain, masses, or nipple discharge  RESPIRATORY: No cough, wheezing, chills or hemoptysis; No shortness of breath  CARDIOVASCULAR: No chest pain, palpitations, dizziness, or leg swelling  GASTROINTESTINAL: No abdominal or epigastric pain. No nausea, vomiting, or hematemesis; No diarrhea or constipation. No melena or hematochezia.  GENITOURINARY: No dysuria, frequency, hematuria, or incontinence  NEUROLOGICAL: No headaches, memory loss, loss of strength, numbness, or tremors  SKIN: No itching, burning, rashes, or lesions   LYMPH NODES: No enlarged glands  ENDOCRINE: No heat or cold intolerance; No hair loss  MUSCULOSKELETAL: No joint pain or swelling; No muscle, back, or extremity pain  PSYCHIATRIC: No depression, anxiety, mood swings, or difficulty sleeping  HEME/LYMPH: No easy bruising, or bleeding gums  ALLERGY AND IMMUNOLOGIC: No hives or eczema    Vital Signs Last 24 Hrs  T(C): 38.2 (15 Kaden 2022 06:03), Max: 39.5 (2022 12:38)  T(F): 100.7 (15 Kaden 2022 06:03), Max: 103.1 (2022 12:38)  HR: 89 (15 Kaden 2022 06:03) (84 - 108)  BP: 128/52 (15 Kaden 2022 06:03) (128/52 - 184/82)  BP(mean): --  RR: 25 (15 Kaden 2022 06:03) (18 - 25)  SpO2: 96% (15 Kaden 2022 06:03) (95% - 97%)    PHYSICAL EXAM:  GENERAL: NAD, well-groomed, well-developed  HEAD:  Atraumatic, Normocephalic  EYES: EOMI, PERRLA, conjunctiva and sclera clear  ENMT: No tonsillar erythema, exudates, or enlargement; Moist mucous membranes, Good dentition, No lesions  NECK: Supple, No JVD, Normal thyroid  NERVOUS SYSTEM:  Alert & Oriented X3, Good concentration; Motor Strength 5/5 B/L upper and lower extremities; DTRs 2+ intact and symmetric  CHEST/LUNG: Clear to percussion bilaterally; No rales, rhonchi, wheezing, or rubs  HEART: Regular rate and rhythm; No murmurs, rubs, or gallops  ABDOMEN: Soft, Nontender, Nondistended; Bowel sounds present  EXTREMITIES:  2+ Peripheral Pulses, No clubbing, cyanosis, or edema  LYMPH: No lymphadenopathy noted  SKIN: No rashes or lesions    LABS:                        10.3   11.92 )-----------( 194      ( 15 Kaden 2022 07:24 )             31.7     01-15    139  |  106  |  12  ----------------------------<  91  3.5   |  28  |  0.66    Ca    8.5      15 Kaden 2022 07:24  Phos  3.3     01-15  Mg     1.8     01-15    TPro  8.1  /  Alb  3.0<L>  /  TBili  0.7  /  DBili  x   /  AST  34  /  ALT  26  /  AlkPhos  92  -14    PT/INR - ( 2022 18:29 )   PT: 13.7 sec;   INR: 1.19 ratio         PTT - ( 2022 18:29 )  PTT:29.1 sec  Urinalysis Basic - ( 2022 15:34 )    Color: Yellow / Appearance: Clear / S.010 / pH: x  Gluc: x / Ketone: Negative  / Bili: Negative / Urobili: Negative mg/dL   Blood: x / Protein: 30 mg/dL / Nitrite: Negative   Leuk Esterase: Negative / RBC: 3-5 /HPF / WBC Negative   Sq Epi: x / Non Sq Epi: Few / Bacteria: Occasional      CAPILLARY BLOOD GLUCOSE      POCT Blood Glucose.: 115 mg/dL (2022 15:08)      RADIOLOGY & ADDITIONAL TESTS:    Imaging Personally Reviewed:  [ ] YES  [ ] NO    Consultant(s) Notes Reviewed:  [ ] YES  [ ] NO    Care Discussed with Consultants/Other Providers [ ] YES  [ ] NO Patient is a 76y old  Female who presents with a chief complaint of RLE Cellulitis (15 Kaden 2022 02:12)      INTERVAL HPI/OVERNIGHT EVENTS: Pt doing well, has no complaints at bedside currently. States this is a chronic problem with her RLE  Still spiking low grade fevers  Labs reviewed-elevated WBC- but decreased since starting antibiotics yesterday    MEDICATIONS  (STANDING):  budesonide 160 MICROgram(s)/formoterol 4.5 MICROgram(s) Inhaler 2 Puff(s) Inhalation two times a day  enoxaparin Injectable 40 milliGRAM(s) SubCutaneous every 12 hours  losartan 50 milliGRAM(s) Oral daily  piperacillin/tazobactam IVPB.. 3.375 Gram(s) IV Intermittent every 8 hours  tiotropium 18 MICROgram(s) Capsule 1 Capsule(s) Inhalation daily  vancomycin  IVPB 1250 milliGRAM(s) IV Intermittent every 12 hours    MEDICATIONS  (PRN):  acetaminophen     Tablet .. 650 milliGRAM(s) Oral every 6 hours PRN Temp greater or equal to 38C (100.4F), Mild Pain (1 - 3)  ALBUTerol    90 MICROgram(s) HFA Inhaler 2 Puff(s) Inhalation every 4 hours PRN Shortness of Breath and/or Wheezing  aluminum hydroxide/magnesium hydroxide/simethicone Suspension 30 milliLiter(s) Oral every 4 hours PRN Dyspepsia  ondansetron Injectable 4 milliGRAM(s) IV Push every 8 hours PRN Nausea and/or Vomiting      Allergies    No Known Allergies    Intolerances        REVIEW OF SYSTEMS:  CONSTITUTIONAL: No fever, weight loss, or fatigue  EYES: No eye pain, visual disturbances, or discharge  ENMT:  No difficulty hearing, tinnitus, vertigo; No sinus or throat pain  NECK: No pain or stiffness  BREASTS: No pain, masses, or nipple discharge  RESPIRATORY: No cough, wheezing, chills or hemoptysis; No shortness of breath  CARDIOVASCULAR: No chest pain, palpitations, dizziness, or leg swelling  GASTROINTESTINAL: No abdominal or epigastric pain. No nausea, vomiting, or hematemesis; No diarrhea or constipation. No melena or hematochezia.  GENITOURINARY: No dysuria, frequency, hematuria, or incontinence  NEUROLOGICAL: No headaches, memory loss, loss of strength, numbness, or tremors  SKIN: No itching, burning, rashes, or lesions   LYMPH NODES: No enlarged glands  ENDOCRINE: No heat or cold intolerance; No hair loss  MUSCULOSKELETAL: No joint pain or swelling; No muscle, back, or extremity pain  PSYCHIATRIC: No depression, anxiety, mood swings, or difficulty sleeping  HEME/LYMPH: No easy bruising, or bleeding gums  ALLERGY AND IMMUNOLOGIC: No hives or eczema    ICU Vital Signs Last 24 Hrs  T(C): 38.2 (15 Kaden 2022 06:03), Max: 39.5 (2022 12:38)  T(F): 100.7 (15 Kaden 2022 06:03), Max: 103.1 (2022 12:38)  HR: 89 (15 Kaden 2022 06:03) (84 - 108)  BP: 128/52 (15 Kaden 2022 06:03) (128/52 - 184/82)  BP(mean): --  ABP: --  ABP(mean): --  RR: 25 (15 Kaden 2022 06:03) (18 - 25)  SpO2: 96% (15 Kaden 2022 06:03) (95% - 97%)    PHYSICAL EXAM:  GENERAL: NAD, well-groomed, well-developed  HEAD:  Atraumatic, Normocephalic  EYES: EOMI, PERRLA, conjunctiva and sclera clear  ENMT: No tonsillar erythema, exudates, or enlargement; Moist mucous membranes, Good dentition, No lesions  NECK: Supple, No JVD, Normal thyroid  NERVOUS SYSTEM:  Alert & Oriented X3, Good concentration; Motor Strength 5/5 B/L upper and lower extremities; DTRs 2+ intact and symmetric  CHEST/LUNG: Clear to percussion bilaterally; No rales, rhonchi, wheezing, or rubs  HEART: Regular rate and rhythm; No murmurs, rubs, or gallops  ABDOMEN: Soft, Nontender, Nondistended; Bowel sounds present  EXTREMITIES:  2+ Peripheral Pulses, both lower extremities- with severe lymphedema, right lower ext posterior thigh- with small ulcer   LYMPH: No lymphadenopathy noted  SKIN: No rashes or lesions    LABS:                        10.3   11.92 )-----------( 194      ( 15 Kaden 2022 07:24 )             31.7     01-15    139  |  106  |  12  ----------------------------<  91  3.5   |  28  |  0.66    Ca    8.5      15 Kaden 2022 07:24  Phos  3.3     01-15  Mg     1.8     -15    TPro  8.1  /  Alb  3.0<L>  /  TBili  0.7  /  DBili  x   /  AST  34  /  ALT  26  /  AlkPhos  92  01-14    PT/INR - ( 2022 18:29 )   PT: 13.7 sec;   INR: 1.19 ratio         PTT - ( 2022 18:29 )  PTT:29.1 sec  Urinalysis Basic - ( 2022 15:34 )    Color: Yellow / Appearance: Clear / S.010 / pH: x  Gluc: x / Ketone: Negative  / Bili: Negative / Urobili: Negative mg/dL   Blood: x / Protein: 30 mg/dL / Nitrite: Negative   Leuk Esterase: Negative / RBC: 3-5 /HPF / WBC Negative   Sq Epi: x / Non Sq Epi: Few / Bacteria: Occasional      CAPILLARY BLOOD GLUCOSE      POCT Blood Glucose.: 115 mg/dL (2022 15:08)      RADIOLOGY & ADDITIONAL TESTS:    Imaging Personally Reviewed:  [ ] YES  [ ] NO    Consultant(s) Notes Reviewed:  [ ] YES  [ ] NO    Care Discussed with Consultants/Other Providers [ ] YES  [ ] NO

## 2022-01-15 NOTE — PATIENT PROFILE ADULT - FALL HARM RISK - HARM RISK INTERVENTIONS

## 2022-01-15 NOTE — ED ADULT NURSE REASSESSMENT NOTE - NS ED NURSE REASSESS COMMENT FT1
Patient received from [facility] in bed 9. Patient is awake, alert and oriented x4 in bed. Received with IV access left AC 20g. Safety and environmental checks maintained. Patient informed of their plan of care. Patient has no further requests at this time. No acute distress noted. Pending bad placement for admission.

## 2022-01-16 LAB
ANION GAP SERPL CALC-SCNC: 7 MMOL/L — SIGNIFICANT CHANGE UP (ref 5–17)
BUN SERPL-MCNC: 13 MG/DL — SIGNIFICANT CHANGE UP (ref 7–23)
CALCIUM SERPL-MCNC: 8.2 MG/DL — LOW (ref 8.5–10.1)
CHLORIDE SERPL-SCNC: 104 MMOL/L — SIGNIFICANT CHANGE UP (ref 96–108)
CO2 SERPL-SCNC: 25 MMOL/L — SIGNIFICANT CHANGE UP (ref 22–31)
CREAT SERPL-MCNC: 0.77 MG/DL — SIGNIFICANT CHANGE UP (ref 0.5–1.3)
GLUCOSE SERPL-MCNC: 114 MG/DL — HIGH (ref 70–99)
HCT VFR BLD CALC: 32.9 % — LOW (ref 34.5–45)
HGB BLD-MCNC: 10.2 G/DL — LOW (ref 11.5–15.5)
MCHC RBC-ENTMCNC: 27.3 PG — SIGNIFICANT CHANGE UP (ref 27–34)
MCHC RBC-ENTMCNC: 31 GM/DL — LOW (ref 32–36)
MCV RBC AUTO: 88 FL — SIGNIFICANT CHANGE UP (ref 80–100)
NRBC # BLD: 0 /100 WBCS — SIGNIFICANT CHANGE UP (ref 0–0)
PLATELET # BLD AUTO: 137 K/UL — LOW (ref 150–400)
POTASSIUM SERPL-MCNC: 3.9 MMOL/L — SIGNIFICANT CHANGE UP (ref 3.5–5.3)
POTASSIUM SERPL-SCNC: 3.9 MMOL/L — SIGNIFICANT CHANGE UP (ref 3.5–5.3)
RBC # BLD: 3.74 M/UL — LOW (ref 3.8–5.2)
RBC # FLD: 16.7 % — HIGH (ref 10.3–14.5)
SODIUM SERPL-SCNC: 136 MMOL/L — SIGNIFICANT CHANGE UP (ref 135–145)
VANCOMYCIN TROUGH SERPL-MCNC: 13.9 UG/ML — SIGNIFICANT CHANGE UP (ref 10–20)
WBC # BLD: 8.32 K/UL — SIGNIFICANT CHANGE UP (ref 3.8–10.5)
WBC # FLD AUTO: 8.32 K/UL — SIGNIFICANT CHANGE UP (ref 3.8–10.5)

## 2022-01-16 PROCEDURE — 99232 SBSQ HOSP IP/OBS MODERATE 35: CPT

## 2022-01-16 PROCEDURE — 99222 1ST HOSP IP/OBS MODERATE 55: CPT

## 2022-01-16 RX ORDER — CIPROFLOXACIN LACTATE 400MG/40ML
500 VIAL (ML) INTRAVENOUS EVERY 12 HOURS
Refills: 0 | Status: DISCONTINUED | OUTPATIENT
Start: 2022-01-16 | End: 2022-01-17

## 2022-01-16 RX ADMIN — Medication 500 MILLIGRAM(S): at 17:10

## 2022-01-16 RX ADMIN — ENOXAPARIN SODIUM 40 MILLIGRAM(S): 100 INJECTION SUBCUTANEOUS at 05:21

## 2022-01-16 RX ADMIN — Medication 500 MILLIGRAM(S): at 05:21

## 2022-01-16 RX ADMIN — LOSARTAN POTASSIUM 50 MILLIGRAM(S): 100 TABLET, FILM COATED ORAL at 05:21

## 2022-01-16 RX ADMIN — Medication 166.67 MILLIGRAM(S): at 08:08

## 2022-01-16 RX ADMIN — TIOTROPIUM BROMIDE 1 CAPSULE(S): 18 CAPSULE ORAL; RESPIRATORY (INHALATION) at 11:40

## 2022-01-16 RX ADMIN — BUDESONIDE AND FORMOTEROL FUMARATE DIHYDRATE 2 PUFF(S): 160; 4.5 AEROSOL RESPIRATORY (INHALATION) at 05:31

## 2022-01-16 RX ADMIN — CEFEPIME 100 MILLIGRAM(S): 1 INJECTION, POWDER, FOR SOLUTION INTRAMUSCULAR; INTRAVENOUS at 05:15

## 2022-01-16 RX ADMIN — ENOXAPARIN SODIUM 40 MILLIGRAM(S): 100 INJECTION SUBCUTANEOUS at 17:10

## 2022-01-16 RX ADMIN — BUDESONIDE AND FORMOTEROL FUMARATE DIHYDRATE 2 PUFF(S): 160; 4.5 AEROSOL RESPIRATORY (INHALATION) at 17:11

## 2022-01-16 RX ADMIN — Medication 500 MILLIGRAM(S): at 13:15

## 2022-01-16 RX ADMIN — Medication 100 MILLIGRAM(S): at 17:10

## 2022-01-16 NOTE — PROGRESS NOTE ADULT - SUBJECTIVE AND OBJECTIVE BOX
Patient is a 76y old  Female who presents with a chief complaint of RLE Cellulitis (15 Kaden 2022 02:12)      INTERVAL HPI/OVERNIGHT EVENTS: Pt doing well, has no complaints   vitals stable, labs stable       MEDICATIONS  (STANDING):  budesonide 160 MICROgram(s)/formoterol 4.5 MICROgram(s) Inhaler 2 Puff(s) Inhalation two times a day  enoxaparin Injectable 40 milliGRAM(s) SubCutaneous every 12 hours  losartan 50 milliGRAM(s) Oral daily  piperacillin/tazobactam IVPB.. 3.375 Gram(s) IV Intermittent every 8 hours  tiotropium 18 MICROgram(s) Capsule 1 Capsule(s) Inhalation daily  vancomycin  IVPB 1250 milliGRAM(s) IV Intermittent every 12 hours    MEDICATIONS  (PRN):  acetaminophen     Tablet .. 650 milliGRAM(s) Oral every 6 hours PRN Temp greater or equal to 38C (100.4F), Mild Pain (1 - 3)  ALBUTerol    90 MICROgram(s) HFA Inhaler 2 Puff(s) Inhalation every 4 hours PRN Shortness of Breath and/or Wheezing  aluminum hydroxide/magnesium hydroxide/simethicone Suspension 30 milliLiter(s) Oral every 4 hours PRN Dyspepsia  ondansetron Injectable 4 milliGRAM(s) IV Push every 8 hours PRN Nausea and/or Vomiting      Allergies    No Known Allergies    Intolerances        REVIEW OF SYSTEMS:  CONSTITUTIONAL: No fever, weight loss, or fatigue  EYES: No eye pain, visual disturbances, or discharge  ENMT:  No difficulty hearing, tinnitus, vertigo; No sinus or throat pain  NECK: No pain or stiffness  BREASTS: No pain, masses, or nipple discharge  RESPIRATORY: No cough, wheezing, chills or hemoptysis; No shortness of breath  CARDIOVASCULAR: No chest pain, palpitations, dizziness, or leg swelling  GASTROINTESTINAL: No abdominal or epigastric pain. No nausea, vomiting, or hematemesis; No diarrhea or constipation. No melena or hematochezia.  GENITOURINARY: No dysuria, frequency, hematuria, or incontinence  NEUROLOGICAL: No headaches, memory loss, loss of strength, numbness, or tremors  SKIN: No itching, burning, rashes, or lesions   LYMPH NODES: No enlarged glands  ENDOCRINE: No heat or cold intolerance; No hair loss  MUSCULOSKELETAL: No joint pain or swelling; No muscle, back, or extremity pain  PSYCHIATRIC: No depression, anxiety, mood swings, or difficulty sleeping  HEME/LYMPH: No easy bruising, or bleeding gums  ALLERGY AND IMMUNOLOGIC: No hives or eczema    ICU Vital Signs Last 24 Hrs  T(C): 36.7 (2022 11:08), Max: 37.1 (15 Kaden 2022 23:50)  T(F): 98 (2022 11:08), Max: 98.7 (15 Kaden 2022 23:50)  HR: 79 (2022 11:08) (79 - 89)  BP: 129/95 (2022 11:08) (122/63 - 138/93)  BP(mean): --  ABP: --  ABP(mean): --  RR: 19 (2022 11:08) (19 - 20)  SpO2: 97% (2022 11:08) (96% - 98%)    PHYSICAL EXAM:  GENERAL: NAD, well-groomed, well-developed  HEAD:  Atraumatic, Normocephalic  EYES: EOMI, PERRLA, conjunctiva and sclera clear  ENMT: No tonsillar erythema, exudates, or enlargement; Moist mucous membranes, Good dentition, No lesions  NECK: Supple, No JVD, Normal thyroid  NERVOUS SYSTEM:  Alert & Oriented X3, Good concentration; Motor Strength 5/5 B/L upper and lower extremities; DTRs 2+ intact and symmetric  CHEST/LUNG: Clear to percussion bilaterally; No rales, rhonchi, wheezing, or rubs  HEART: Regular rate and rhythm; No murmurs, rubs, or gallops  ABDOMEN: Soft, Nontender, Nondistended; Bowel sounds present  EXTREMITIES:  2+ Peripheral Pulses, both lower extremities- with severe lymphedema, right lower ext posterior thigh- with small ulcer   LYMPH: No lymphadenopathy noted  SKIN: No rashes or lesions    LABS:                                         10.2   8.32  )-----------( 137      ( 2022 07:19 )             32.9     01-16    136  |  104  |  13  ----------------------------<  114<H>  3.9   |  25  |  0.77    Ca    8.2<L>      2022 07:19  Phos  3.3     01-15  Mg     1.8     01-15    TPro  8.1  /  Alb  3.0<L>  /  TBili  0.7  /  DBili  x   /  AST  34  /  ALT  26  /  AlkPhos  92      PT/INR - ( 2022 18:29 )   PT: 13.7 sec;   INR: 1.19 ratio         PTT - ( 2022 18:29 )  PTT:29.1 sec  Urinalysis Basic - ( 2022 15:34 )    Color: Yellow / Appearance: Clear / S.010 / pH: x  Gluc: x / Ketone: Negative  / Bili: Negative / Urobili: Negative mg/dL   Blood: x / Protein: 30 mg/dL / Nitrite: Negative   Leuk Esterase: Negative / RBC: 3-5 /HPF / WBC Negative   Sq Epi: x / Non Sq Epi: Few / Bacteria: Occasional

## 2022-01-16 NOTE — PROGRESS NOTE ADULT - SUBJECTIVE AND OBJECTIVE BOX
ALICIA GONZALEZ  MRN-[id number]      Follow Up:  fever and cellulitis     Interval History: patient seen and exmained. doing much better, afebrile, blood cultures negative and she lost IV access and is a hard stick, for now will change to PO antibiotics     ROS:    [ ] Unobtainable because:  [ x] All other systems negative except as noted            Allergies  No Known Allergies        ANTIMICROBIALS:  ciprofloxacin     Tablet 500 every 12 hours  doxycycline hyclate Capsule 100 every 12 hours      OTHER MEDS:  acetaminophen     Tablet .. 650 milliGRAM(s) Oral every 6 hours PRN  ALBUTerol    90 MICROgram(s) HFA Inhaler 2 Puff(s) Inhalation every 4 hours PRN  aluminum hydroxide/magnesium hydroxide/simethicone Suspension 30 milliLiter(s) Oral every 4 hours PRN  budesonide 160 MICROgram(s)/formoterol 4.5 MICROgram(s) Inhaler 2 Puff(s) Inhalation two times a day  enoxaparin Injectable 40 milliGRAM(s) SubCutaneous every 12 hours  losartan 50 milliGRAM(s) Oral daily  ondansetron Injectable 4 milliGRAM(s) IV Push every 8 hours PRN  tiotropium 18 MICROgram(s) Capsule 1 Capsule(s) Inhalation daily      Physical Exam:  Vital Signs Last 24 Hrs  T(C): 36.7 (2022 11:08), Max: 37.1 (15 Kaden 2022 23:50)  T(F): 98 (2022 11:08), Max: 98.7 (15 Kaden 2022 23:50)  HR: 79 (2022 11:08) (79 - 89)  BP: 129/95 (2022 11:08) (122/63 - 138/93)  BP(mean): --  RR: 19 (2022 11:08) (19 - 20)  SpO2: 97% (2022 11:08) (96% - 98%)    General:    NAD,  non toxic, obese   Head: atraumatic, normocephalic  Eye: normal sclera and conjunctiva  ENT:    no oral lesions, neck supple  Cardio:     regular S1, S2,  no murmur  Respiratory:    clear b/l,    no wheezing  abd:     soft,   BS +,   no tenderness  :   no CVAT,  no suprapubic tenderness,   no  blum  Musculoskeletal:   no joint swelling,   +edema  vascular: no central lines, +PIV   Skin:    RLE lymphedema, erythema and calor  Neurologic:     no focal deficit  psych: normal affect    WBC Count: 8.32 K/uL ( @ 07:19)  WBC Count: 11.92 K/uL (01-15 @ 07:24)  WBC Count: 15.19 K/uL ( @ 18:23)                            10.2   8.32  )-----------( 137      ( 2022 07:19 )             32.9           136  |  104  |  13  ----------------------------<  114<H>  3.9   |  25  |  0.77    Ca    8.2<L>      2022 07:19  Phos  3.3     01-15  Mg     1.8     01-15    TPro  8.1  /  Alb  3.0<L>  /  TBili  0.7  /  DBili  x   /  AST  34  /  ALT  26  /  AlkPhos  92        Urinalysis Basic - ( 2022 15:34 )    Color: Yellow / Appearance: Clear / S.010 / pH: x  Gluc: x / Ketone: Negative  / Bili: Negative / Urobili: Negative mg/dL   Blood: x / Protein: 30 mg/dL / Nitrite: Negative   Leuk Esterase: Negative / RBC: 3-5 /HPF / WBC Negative   Sq Epi: x / Non Sq Epi: Few / Bacteria: Occasional        Creatinine Trend: 0.77<--, 0.66<--, 0.76<--  Lactate, Blood: 1.0 mmol/L (22 @ 18:23)      MICROBIOLOGY:  Vancomycin Level, Trough: 13.9 ug/mL (22 @ 07:19)  v  .Blood Blood-Peripheral  01-15-22   No growth to date.  --  --      C-Reactive Protein, Serum: 149 (01-15)  SARS-CoV-2 Result: NotDetec (22 @ 15:34)    RADIOLOGY:

## 2022-01-16 NOTE — PROGRESS NOTE ADULT - ASSESSMENT
76F with a PMH of HTN and chronic lymphedema who presents to the ED for fever.  Patient follows with Dr Montoya for wound care and chronic lymphedema  febrile to 103 and tachycardic to 108 on  admission with leukocytosis 15k  CT (I personally reviewed) consistent with cellulitis with a chronic wound and no drainable fluid collection  COVID negative  UA quite bland   presentation most consistent with cellulitis   unfortunately she has had several bouts like this before and she is becoming antibiotics experienced which can lead to development of resistance   will treat with cefepime, vancomycin and flagyl for now     1/16: lost IV access, blood cultures negative, normalized WBC and afebrile. OK to change IV antibiotics over to Doxy, Cipro and Flagyl       Cellulitis   Fever  leukocytosis  chronic Lymphedema     Plan:  stop IV antibiotics   start doxy 100mg 2 times a day  start Cipro 500mg 2 times a day   continue PO flagyl 500mg 3 times a day   follow blood cultures until final   leg elevation as tolerated   early physical therapy and OOB to chair   wound care appreciated  needs to continue going to wound care and lymphedema clinic regularly   physical therapy and consider rehab  weight loss counselling   d/c tomorrow if cultures remains negative     D/W Dr. Khadijah Barnes, DO  Infectious Disease Attending  Pager [phone]  After 5pm/weekends please call [phone] for all inquiries and new consults     D/W Dr. Khadijah Barnes,   Infectious Disease Attending  Pager [phone]  After 5pm/weekends please call [phone] for all inquiries and new consults

## 2022-01-16 NOTE — PROGRESS NOTE ADULT - ASSESSMENT
76 year old morbidly obese F w/ PMHx of HTN, lymphedema, chronic RLE wound presents to the ER c/o fever, pt being admitted for RLE wound infection/cellulitis    1) RLE Wound Infection/Cellulitis  - CT scan -Extensive subcutaneous edema throughout the thigh, calf, foot, and ankle,   most prominent along the posterior and medial aspect of the thigh. There   is a gas containing draining sinus tract within the medial aspect of the   thigh, draining to the skin surface, which is decreased in size from the   prior study. Overall appearance is otherwise similar to the prior study.   No definite drainable fluid collection is seen.  No new cortical erosion or periostitis to suggest osteomyelitis. However,   MRI would be a more sensitive test to evaluate for osteomyelitis.  Stable right inguinal reactive lymphadenopathy.      - c/w Zosyn and Vanco--spoke with ID- will switch to Cefepime and Vanco/ Flagyl   -Blood cultures pending--Blood cultures neg to date, Urine culture - growing gram neg rods  -spoke with ID today---since pt's IV not working--will switch to PO medications Cipro 500mg q12 and Doxy 100mg BID   IF BLOOD CULTURES NEGATIVE TOMORROW can go home, if not --needs a PICC Line      - Wound care nurse consulted  -ID and Vascular following- pt follows with dr. Epperson in outpatient clinic as well  -NOt septic anymore-will d/c tele monitoring- sinus rythm     2) HTN  - c/w Losartan  -stable    3) DVT ppx - Lovenox

## 2022-01-17 ENCOUNTER — TRANSCRIPTION ENCOUNTER (OUTPATIENT)
Age: 77
End: 2022-01-17

## 2022-01-17 VITALS
TEMPERATURE: 98 F | HEART RATE: 85 BPM | OXYGEN SATURATION: 96 % | SYSTOLIC BLOOD PRESSURE: 114 MMHG | DIASTOLIC BLOOD PRESSURE: 70 MMHG | RESPIRATION RATE: 18 BRPM

## 2022-01-17 PROCEDURE — 99239 HOSP IP/OBS DSCHRG MGMT >30: CPT

## 2022-01-17 RX ORDER — CIPROFLOXACIN LACTATE 400MG/40ML
1 VIAL (ML) INTRAVENOUS
Qty: 14 | Refills: 0
Start: 2022-01-17

## 2022-01-17 RX ORDER — BNT162B2 0.23 MG/2.25ML
0.3 INJECTION, SUSPENSION INTRAMUSCULAR ONCE
Refills: 0 | Status: COMPLETED | OUTPATIENT
Start: 2022-01-17 | End: 2022-01-17

## 2022-01-17 RX ORDER — METRONIDAZOLE 500 MG
500 TABLET ORAL EVERY 8 HOURS
Refills: 0 | Status: DISCONTINUED | OUTPATIENT
Start: 2022-01-17 | End: 2022-01-17

## 2022-01-17 RX ADMIN — Medication 500 MILLIGRAM(S): at 05:11

## 2022-01-17 RX ADMIN — BUDESONIDE AND FORMOTEROL FUMARATE DIHYDRATE 2 PUFF(S): 160; 4.5 AEROSOL RESPIRATORY (INHALATION) at 05:11

## 2022-01-17 RX ADMIN — BNT162B2 0.3 MILLILITER(S): 0.23 INJECTION, SUSPENSION INTRAMUSCULAR at 14:41

## 2022-01-17 RX ADMIN — Medication 100 MILLIGRAM(S): at 05:11

## 2022-01-17 RX ADMIN — ENOXAPARIN SODIUM 40 MILLIGRAM(S): 100 INJECTION SUBCUTANEOUS at 05:10

## 2022-01-17 RX ADMIN — TIOTROPIUM BROMIDE 1 CAPSULE(S): 18 CAPSULE ORAL; RESPIRATORY (INHALATION) at 12:13

## 2022-01-17 RX ADMIN — Medication 500 MILLIGRAM(S): at 14:40

## 2022-01-17 RX ADMIN — LOSARTAN POTASSIUM 50 MILLIGRAM(S): 100 TABLET, FILM COATED ORAL at 05:11

## 2022-01-17 NOTE — DISCHARGE NOTE PROVIDER - HOSPITAL COURSE
76 year old morbidly obese F w/ PMHx of HTN, lymphedema, chronic RLE wound presents to the ER c/o fever, pt being admitted for RLE wound infection/cellulitis    1) Sepsis with RLE Wound Infection/Cellulitis (POA)  - CT scan -Extensive subcutaneous edema throughout the thigh, calf, foot, and ankle,   most prominent along the posterior and medial aspect of the thigh. There   is a gas containing draining sinus tract within the medial aspect of the   thigh, draining to the skin surface, which is decreased in size from the   prior study. Overall appearance is otherwise similar to the prior study.   No definite drainable fluid collection is seen.  No new cortical erosion or periostitis to suggest osteomyelitis. However,   MRI would be a more sensitive test to evaluate for osteomyelitis.  Stable right inguinal reactive lymphadenopathy.    Blood cultures no growth so far.   on oral antibiotics per ID.   Outpatient ID and vascular follow up is strongly recommended  Patient has refused rehab placement and would prefer to go home.       2) HTN  - c/w Losartan  -stable    Seen and examined by me today. Vitals stable.   I have discussed all the inpatient radiographic findings with the patient and stressed that patient follows with the PCP for further outpatient care. I have educated patient to use [facility] patient portal (as instructed on the discharge paperwork) to access medical records outside the hospital.   All questions welcomed and answered appropriately. Patient verbalized understanding of post discharge physician's follows up and discharge instructions.   DC time spent by me excluding billable procedures 38 mins

## 2022-01-17 NOTE — DISCHARGE NOTE PROVIDER - PROVIDER TOKENS
PROVIDER:[TOKEN:[[id number]],FOLLOWUP:[2 weeks]],PROVIDER:[TOKEN:[[id number]],FOLLOWUP:[2 weeks]],FREE:[LAST:[your PCP as soon as possible],PHONE:[(   )    -],FAX:[(   )    -]]

## 2022-01-17 NOTE — DISCHARGE NOTE PROVIDER - NSDCFUADDINST_GEN_ALL_CORE_FT
1) It is important to see your primary physician as well as other necessary consultants within next 7-10 days to perform a comprehensive medical review.  Call their offices for an appointment as soon as you leave the hospital.  If you do not have a primary physician or unable to reach your PCP, contact the [facility] Physician Referral Service at [phone].  Your medical issues appear to be stable at this time, but if your symptoms recur or worsen, contact your physicians and/or return to the hospital if necessary.  If you encounter any issues or questions with your medication, call your physicians before stopping the medication.    2) Please access [facility] Patient portal (as instructed on the discharge paperwork) to access your medical records at any time after discharge.

## 2022-01-17 NOTE — DISCHARGE NOTE PROVIDER - NSDCMRMEDTOKEN_GEN_ALL_CORE_FT
albuterol 90 mcg/inh inhalation aerosol: 1 puff(s) inhaled every 4 hours  aluminum hydroxide-magnesium hydroxide 200 mg-200 mg/5 mL oral suspension: 30 milliliter(s) orally every 4 hours, As needed, Dyspepsia  budesonide-formoterol 160 mcg-4.5 mcg/inh inhalation aerosol: 1 inhaler(s) inhaled 2 times a day  ciprofloxacin 500 mg oral tablet: 1 tab(s) orally every 12 hours  doxycycline monohydrate 100 mg oral capsule: 1 cap(s) orally every 12 hours  ipratropium-albuterol 0.5 mg-2.5 mg/3 mLinhalation solution: 3 milliliter(s) inhaled every 8 hours  losartan 50 mg oral tablet: 1 tab(s) orally once a day  polyethylene glycol 3350 oral powder for reconstitution: 17 gram(s) orally once a day  senna oral tablet: 2 tab(s) orally once a day (at bedtime)  tiotropium 18 mcg inhalation capsule: 1 cap(s) inhaled once a day

## 2022-01-17 NOTE — DISCHARGE NOTE NURSING/CASE MANAGEMENT/SOCIAL WORK - NSDCVIVACCINE_GEN_ALL_CORE_FT
COVID-19, mRNA, LNP-S, PF, 30 mcg/0.3 mL dose (Pfizer); 17-Jan-2022 14:41; Jaja Mike (BISI); [employer]; [id number] (Exp. Date: 15-Mar-2022); IntraMuscular; Deltoid Right.; 0.3 milliLiter(s);

## 2022-01-17 NOTE — DISCHARGE NOTE PROVIDER - CARE PROVIDER_API CALL
Que Barnes (DO)  Infectious Disease; Internal Medicine  [address] [place] [id number]  Phone: [phone]  Fax: ()-  Follow Up Time: 2 weeks    Toñito Montoya)  Surgery  [address] [facility], 2nd Floor  [place], [place] [id number]  Phone: [phone]  Fax: [phone]  Follow Up Time: 2 weeks    your PCP as soon as possible,   Phone: (   )    -  Fax: (   )    -  Follow Up Time:

## 2022-01-17 NOTE — DISCHARGE NOTE NURSING/CASE MANAGEMENT/SOCIAL WORK - NSDCPEFALRISK_GEN_ALL_CORE
For information on Fall & Injury Prevention, visit: [email or web address] OR  [email or web address] OR  https://www.cdc.gov/steadi/patient.html

## 2022-01-17 NOTE — DISCHARGE NOTE NURSING/CASE MANAGEMENT/SOCIAL WORK - PATIENT PORTAL LINK FT
You can access the FollowMyHealth Patient Portal offered by [facility] by registering at the following website: [email or web address]. By joining [email or web address]’s FollowMyHealth portal, you will also be able to view your health information using other applications (apps) compatible with our system.

## 2022-01-21 DIAGNOSIS — Z82.49 FAMILY HISTORY OF ISCHEMIC HEART DISEASE AND OTHER DISEASES OF THE CIRCULATORY SYSTEM: ICD-10-CM

## 2022-01-21 DIAGNOSIS — I10 ESSENTIAL (PRIMARY) HYPERTENSION: ICD-10-CM

## 2022-01-21 DIAGNOSIS — Z96.653 PRESENCE OF ARTIFICIAL KNEE JOINT, BILATERAL: ICD-10-CM

## 2022-01-21 DIAGNOSIS — D50.9 IRON DEFICIENCY ANEMIA, UNSPECIFIED: ICD-10-CM

## 2022-01-21 DIAGNOSIS — A41.9 SEPSIS, UNSPECIFIED ORGANISM: ICD-10-CM

## 2022-01-21 DIAGNOSIS — E66.01 MORBID (SEVERE) OBESITY DUE TO EXCESS CALORIES: ICD-10-CM

## 2022-01-21 DIAGNOSIS — L03.115 CELLULITIS OF RIGHT LOWER LIMB: ICD-10-CM

## 2022-01-21 DIAGNOSIS — R50.9 FEVER, UNSPECIFIED: ICD-10-CM

## 2022-01-21 DIAGNOSIS — I89.0 LYMPHEDEMA, NOT ELSEWHERE CLASSIFIED: ICD-10-CM

## 2022-01-26 ENCOUNTER — OUTPATIENT (OUTPATIENT)
Dept: OUTPATIENT SERVICES | Facility: HOSPITAL | Age: 77
LOS: 1 days | Discharge: HOME | End: 2022-01-26
Payer: MEDICARE

## 2022-01-26 DIAGNOSIS — Z96.653 PRESENCE OF ARTIFICIAL KNEE JOINT, BILATERAL: Chronic | ICD-10-CM

## 2022-01-26 DIAGNOSIS — L89.90 PRESSURE ULCER OF UNSPECIFIED SITE, UNSPECIFIED STAGE: ICD-10-CM

## 2022-01-26 PROCEDURE — 99213 OFFICE O/P EST LOW 20 MIN: CPT

## 2022-01-27 DIAGNOSIS — M79.604 PAIN IN RIGHT LEG: ICD-10-CM

## 2022-01-27 DIAGNOSIS — Z79.899 OTHER LONG TERM (CURRENT) DRUG THERAPY: ICD-10-CM
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
## 2022-01-27 DIAGNOSIS — I89.0 LYMPHEDEMA, NOT ELSEWHERE CLASSIFIED: ICD-10-CM

## 2022-01-27 DIAGNOSIS — R60.9 EDEMA, UNSPECIFIED: ICD-10-CM

## 2022-01-27 DIAGNOSIS — L97.112 NON-PRESSURE CHRONIC ULCER OF RIGHT THIGH WITH FAT LAYER EXPOSED: ICD-10-CM
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
## 2022-01-27 DIAGNOSIS — S70.311A ABRASION, RIGHT THIGH, INITIAL ENCOUNTER: ICD-10-CM

## 2022-01-27 DIAGNOSIS — E66.01 MORBID (SEVERE) OBESITY DUE TO EXCESS CALORIES: ICD-10-CM

## 2022-01-27 DIAGNOSIS — I10 ESSENTIAL (PRIMARY) HYPERTENSION: ICD-10-CM

## 2022-02-02 ENCOUNTER — OUTPATIENT (OUTPATIENT)
Dept: OUTPATIENT SERVICES | Facility: HOSPITAL | Age: 77
LOS: 1 days | Discharge: HOME | End: 2022-02-02
Payer: MEDICARE

## 2022-02-02 DIAGNOSIS — Z96.653 PRESENCE OF ARTIFICIAL KNEE JOINT, BILATERAL: Chronic | ICD-10-CM

## 2022-02-02 DIAGNOSIS — L89.90 PRESSURE ULCER OF UNSPECIFIED SITE, UNSPECIFIED STAGE: ICD-10-CM

## 2022-02-02 PROCEDURE — 99213 OFFICE O/P EST LOW 20 MIN: CPT

## 2022-02-03 DIAGNOSIS — I89.0 LYMPHEDEMA, NOT ELSEWHERE CLASSIFIED: ICD-10-CM

## 2022-02-03 DIAGNOSIS — R60.9 EDEMA, UNSPECIFIED: ICD-10-CM
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
## 2022-02-03 DIAGNOSIS — E66.01 MORBID (SEVERE) OBESITY DUE TO EXCESS CALORIES: ICD-10-CM

## 2022-02-03 DIAGNOSIS — L97.112 NON-PRESSURE CHRONIC ULCER OF RIGHT THIGH WITH FAT LAYER EXPOSED: ICD-10-CM

## 2022-02-03 DIAGNOSIS — Z79.899 OTHER LONG TERM (CURRENT) DRUG THERAPY: ICD-10-CM
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
## 2022-02-03 DIAGNOSIS — I10 ESSENTIAL (PRIMARY) HYPERTENSION: ICD-10-CM

## 2022-02-03 DIAGNOSIS — S70.311A ABRASION, RIGHT THIGH, INITIAL ENCOUNTER: ICD-10-CM

## 2022-02-03 DIAGNOSIS — M79.604 PAIN IN RIGHT LEG: ICD-10-CM
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
## 2022-02-09 ENCOUNTER — OUTPATIENT (OUTPATIENT)
Dept: OUTPATIENT SERVICES | Facility: HOSPITAL | Age: 77
LOS: 1 days | Discharge: HOME | End: 2022-02-09
Payer: MEDICARE

## 2022-02-09 DIAGNOSIS — Z96.653 PRESENCE OF ARTIFICIAL KNEE JOINT, BILATERAL: Chronic | ICD-10-CM

## 2022-02-09 DIAGNOSIS — L89.90 PRESSURE ULCER OF UNSPECIFIED SITE, UNSPECIFIED STAGE: ICD-10-CM

## 2022-02-09 PROCEDURE — 99212 OFFICE O/P EST SF 10 MIN: CPT
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
## 2022-02-10 DIAGNOSIS — I10 ESSENTIAL (PRIMARY) HYPERTENSION: ICD-10-CM

## 2022-02-10 DIAGNOSIS — L97.112 NON-PRESSURE CHRONIC ULCER OF RIGHT THIGH WITH FAT LAYER EXPOSED: ICD-10-CM

## 2022-02-10 DIAGNOSIS — I89.0 LYMPHEDEMA, NOT ELSEWHERE CLASSIFIED: ICD-10-CM

## 2022-02-10 DIAGNOSIS — Z79.899 OTHER LONG TERM (CURRENT) DRUG THERAPY: ICD-10-CM
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
## 2022-02-10 DIAGNOSIS — R60.9 EDEMA, UNSPECIFIED: ICD-10-CM

## 2022-02-10 DIAGNOSIS — S70.311A ABRASION, RIGHT THIGH, INITIAL ENCOUNTER: ICD-10-CM
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
## 2022-02-10 DIAGNOSIS — M79.604 PAIN IN RIGHT LEG: ICD-10-CM

## 2022-02-10 DIAGNOSIS — E66.01 MORBID (SEVERE) OBESITY DUE TO EXCESS CALORIES: ICD-10-CM

## 2022-02-16 ENCOUNTER — OUTPATIENT (OUTPATIENT)
Dept: OUTPATIENT SERVICES | Facility: HOSPITAL | Age: 77
LOS: 1 days | Discharge: HOME | End: 2022-02-16
Payer: MEDICARE

## 2022-02-16 DIAGNOSIS — L89.90 PRESSURE ULCER OF UNSPECIFIED SITE, UNSPECIFIED STAGE: ICD-10-CM

## 2022-02-16 DIAGNOSIS — Z96.653 PRESENCE OF ARTIFICIAL KNEE JOINT, BILATERAL: Chronic | ICD-10-CM

## 2022-02-16 PROCEDURE — 17250 CHEM CAUT OF GRANLTJ TISSUE: CPT | Mod: 59,RT

## 2022-02-16 PROCEDURE — 99212 OFFICE O/P EST SF 10 MIN: CPT | Mod: 25

## 2022-02-17 DIAGNOSIS — L97.112 NON-PRESSURE CHRONIC ULCER OF RIGHT THIGH WITH FAT LAYER EXPOSED: ICD-10-CM

## 2022-02-17 DIAGNOSIS — L92.9 GRANULOMATOUS DISORDER OF THE SKIN AND SUBCUTANEOUS TISSUE, UNSPECIFIED: ICD-10-CM

## 2022-02-17 DIAGNOSIS — M79.604 PAIN IN RIGHT LEG: ICD-10-CM
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
## 2022-02-17 DIAGNOSIS — R60.9 EDEMA, UNSPECIFIED: ICD-10-CM

## 2022-02-17 DIAGNOSIS — I10 ESSENTIAL (PRIMARY) HYPERTENSION: ICD-10-CM

## 2022-02-17 DIAGNOSIS — I89.0 LYMPHEDEMA, NOT ELSEWHERE CLASSIFIED: ICD-10-CM
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
## 2022-02-17 DIAGNOSIS — S70.311A ABRASION, RIGHT THIGH, INITIAL ENCOUNTER: ICD-10-CM
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
## 2022-02-17 DIAGNOSIS — E66.01 MORBID (SEVERE) OBESITY DUE TO EXCESS CALORIES: ICD-10-CM

## 2022-02-17 DIAGNOSIS — Z79.899 OTHER LONG TERM (CURRENT) DRUG THERAPY: ICD-10-CM

## 2022-02-28 ENCOUNTER — INPATIENT (INPATIENT)
Facility: HOSPITAL | Age: 77
LOS: 23 days | Discharge: NURSING, REHAB OR LONG-TERM CARE | DRG: 573 | End: 2022-03-24
Attending: INTERNAL MEDICINE | Admitting: INTERNAL MEDICINE
Payer: MEDICARE

## 2022-02-28 VITALS
HEART RATE: 95 BPM | DIASTOLIC BLOOD PRESSURE: 91 MMHG | WEIGHT: 293 LBS | HEIGHT: 63 IN | OXYGEN SATURATION: 99 % | RESPIRATION RATE: 20 BRPM | TEMPERATURE: 98 F | SYSTOLIC BLOOD PRESSURE: 163 MMHG

## 2022-02-28 DIAGNOSIS — Z29.9 ENCOUNTER FOR PROPHYLACTIC MEASURES, UNSPECIFIED: ICD-10-CM

## 2022-02-28 DIAGNOSIS — R77.8 OTHER SPECIFIED ABNORMALITIES OF PLASMA PROTEINS: ICD-10-CM

## 2022-02-28 DIAGNOSIS — I89.0 LYMPHEDEMA, NOT ELSEWHERE CLASSIFIED: ICD-10-CM

## 2022-02-28 DIAGNOSIS — I89.0 LYMPHEDEMA, NOT ELSEWHERE CLASSIFIED: Chronic | ICD-10-CM

## 2022-02-28 DIAGNOSIS — I10 ESSENTIAL (PRIMARY) HYPERTENSION: ICD-10-CM

## 2022-02-28 DIAGNOSIS — L03.90 CELLULITIS, UNSPECIFIED: ICD-10-CM

## 2022-02-28 DIAGNOSIS — Z96.653 PRESENCE OF ARTIFICIAL KNEE JOINT, BILATERAL: Chronic | ICD-10-CM

## 2022-02-28 LAB
ALBUMIN SERPL ELPH-MCNC: 4.1 G/DL — SIGNIFICANT CHANGE UP (ref 3.3–5)
ALP SERPL-CCNC: 112 U/L — SIGNIFICANT CHANGE UP (ref 40–120)
ALT FLD-CCNC: 16 U/L — SIGNIFICANT CHANGE UP (ref 10–45)
ANION GAP SERPL CALC-SCNC: 12 MMOL/L — SIGNIFICANT CHANGE UP (ref 5–17)
APTT BLD: 34.2 SEC — SIGNIFICANT CHANGE UP (ref 27.5–35.5)
AST SERPL-CCNC: 18 U/L — SIGNIFICANT CHANGE UP (ref 10–40)
BASOPHILS # BLD AUTO: 0.04 K/UL — SIGNIFICANT CHANGE UP (ref 0–0.2)
BASOPHILS NFR BLD AUTO: 0.8 % — SIGNIFICANT CHANGE UP (ref 0–2)
BILIRUB SERPL-MCNC: 0.2 MG/DL — SIGNIFICANT CHANGE UP (ref 0.2–1.2)
BLD GP AB SCN SERPL QL: NEGATIVE — SIGNIFICANT CHANGE UP
BUN SERPL-MCNC: 18 MG/DL — SIGNIFICANT CHANGE UP (ref 7–23)
CALCIUM SERPL-MCNC: 9.3 MG/DL — SIGNIFICANT CHANGE UP (ref 8.4–10.5)
CHLORIDE SERPL-SCNC: 103 MMOL/L — SIGNIFICANT CHANGE UP (ref 96–108)
CO2 SERPL-SCNC: 24 MMOL/L — SIGNIFICANT CHANGE UP (ref 22–31)
CREAT SERPL-MCNC: 0.8 MG/DL — SIGNIFICANT CHANGE UP (ref 0.5–1.3)
EGFR: 77 ML/MIN/1.73M2 — SIGNIFICANT CHANGE UP
EOSINOPHIL # BLD AUTO: 0.13 K/UL — SIGNIFICANT CHANGE UP (ref 0–0.5)
EOSINOPHIL NFR BLD AUTO: 2.6 % — SIGNIFICANT CHANGE UP (ref 0–6)
GLUCOSE SERPL-MCNC: 94 MG/DL — SIGNIFICANT CHANGE UP (ref 70–99)
HCT VFR BLD CALC: 38.8 % — SIGNIFICANT CHANGE UP (ref 34.5–45)
HGB BLD-MCNC: 11.7 G/DL — SIGNIFICANT CHANGE UP (ref 11.5–15.5)
IMM GRANULOCYTES NFR BLD AUTO: 0.4 % — SIGNIFICANT CHANGE UP (ref 0–1.5)
INR BLD: 1.01 RATIO — SIGNIFICANT CHANGE UP (ref 0.88–1.16)
LYMPHOCYTES # BLD AUTO: 1.44 K/UL — SIGNIFICANT CHANGE UP (ref 1–3.3)
LYMPHOCYTES # BLD AUTO: 28.4 % — SIGNIFICANT CHANGE UP (ref 13–44)
MCHC RBC-ENTMCNC: 27.3 PG — SIGNIFICANT CHANGE UP (ref 27–34)
MCHC RBC-ENTMCNC: 30.2 GM/DL — LOW (ref 32–36)
MCV RBC AUTO: 90.4 FL — SIGNIFICANT CHANGE UP (ref 80–100)
MONOCYTES # BLD AUTO: 0.79 K/UL — SIGNIFICANT CHANGE UP (ref 0–0.9)
MONOCYTES NFR BLD AUTO: 15.6 % — HIGH (ref 2–14)
NEUTROPHILS # BLD AUTO: 2.65 K/UL — SIGNIFICANT CHANGE UP (ref 1.8–7.4)
NEUTROPHILS NFR BLD AUTO: 52.2 % — SIGNIFICANT CHANGE UP (ref 43–77)
NRBC # BLD: 0 /100 WBCS — SIGNIFICANT CHANGE UP (ref 0–0)
PLATELET # BLD AUTO: 311 K/UL — SIGNIFICANT CHANGE UP (ref 150–400)
POTASSIUM SERPL-MCNC: 4.5 MMOL/L — SIGNIFICANT CHANGE UP (ref 3.5–5.3)
POTASSIUM SERPL-SCNC: 4.5 MMOL/L — SIGNIFICANT CHANGE UP (ref 3.5–5.3)
PROT SERPL-MCNC: 9 G/DL — HIGH (ref 6–8.3)
PROTHROM AB SERPL-ACNC: 12.1 SEC — SIGNIFICANT CHANGE UP (ref 10.5–13.4)
RBC # BLD: 4.29 M/UL — SIGNIFICANT CHANGE UP (ref 3.8–5.2)
RBC # FLD: 15.4 % — HIGH (ref 10.3–14.5)
RH IG SCN BLD-IMP: POSITIVE — SIGNIFICANT CHANGE UP
SARS-COV-2 RNA SPEC QL NAA+PROBE: SIGNIFICANT CHANGE UP
SODIUM SERPL-SCNC: 139 MMOL/L — SIGNIFICANT CHANGE UP (ref 135–145)
WBC # BLD: 5.07 K/UL — SIGNIFICANT CHANGE UP (ref 3.8–10.5)
WBC # FLD AUTO: 5.07 K/UL — SIGNIFICANT CHANGE UP (ref 3.8–10.5)

## 2022-02-28 PROCEDURE — 93010 ELECTROCARDIOGRAM REPORT: CPT

## 2022-02-28 PROCEDURE — 99223 1ST HOSP IP/OBS HIGH 75: CPT

## 2022-02-28 PROCEDURE — 99285 EMERGENCY DEPT VISIT HI MDM: CPT | Mod: 25

## 2022-02-28 RX ORDER — LOSARTAN POTASSIUM 100 MG/1
50 TABLET, FILM COATED ORAL DAILY
Refills: 0 | Status: DISCONTINUED | OUTPATIENT
Start: 2022-02-28 | End: 2022-03-07

## 2022-02-28 RX ORDER — ACETAMINOPHEN 500 MG
1000 TABLET ORAL EVERY 8 HOURS
Refills: 0 | Status: DISCONTINUED | OUTPATIENT
Start: 2022-02-28 | End: 2022-03-07

## 2022-02-28 RX ORDER — CEFAZOLIN SODIUM 1 G
2000 VIAL (EA) INJECTION ONCE
Refills: 0 | Status: COMPLETED | OUTPATIENT
Start: 2022-02-28 | End: 2022-02-28

## 2022-02-28 RX ADMIN — Medication 100 MILLIGRAM(S): at 18:25

## 2022-02-28 NOTE — H&P ADULT - NSHPPHYSICALEXAM_GEN_ALL_CORE
Vital Signs Last 24 Hrs  T(C): 36.6 (28 Feb 2022 21:21), Max: 36.8 (28 Feb 2022 20:30)  T(F): 97.9 (28 Feb 2022 21:21), Max: 98.2 (28 Feb 2022 20:30)  HR: 71 (28 Feb 2022 21:21) (71 - 95)  BP: 140/82 (28 Feb 2022 21:21) (140/82 - 163/91)  BP(mean): --  RR: 16 (28 Feb 2022 21:21) (16 - 20)  SpO2: 97% (28 Feb 2022 21:21) (97% - 100%)    CONSTITUTIONAL: Well-groomed, in no apparent distress  EYES: No conjunctival or scleral injection, non-icteric; PERRLA and symmetric  ENMT: No external nasal lesions; oral mucosa with moist membranes  NECK: Trachea midline   RESPIRATORY: Breathing comfortably; lungs CTA without wheeze/rhonchi/rales  CARDIOVASCULAR: +S1S2, RRR, no M/G/R; no carotid bruits; pedal pulses full and symmetric; no lower extremity edema  GASTROINTESTINAL: Obese. No palpable masses or tenderness, +BS throughout, no rebound/guarding; no hepatosplenomegaly; no hernia palpated  LYMPHATIC: Bilat R>L lymphedema  MUSCULOSKELETAL: Moving all extremities  SKIN: Drainage from RLE medially with open wound, chronic skin changes, no obviously erythematous, slightly increased warmth.  NEUROLOGIC: CN II-XII intact; strength and sensatio intact  PSYCHIATRIC: A+O x 3; mood and affect appropriate; appropriate insight and judgment

## 2022-02-28 NOTE — ED PROVIDER NOTE - PHYSICAL EXAMINATION
General: NAD, morbidly obese  HEENT: NCAT, PERRL  Cardiac: RRR, no murmurs, 2+ peripheral pulses  Chest: CTA  Abdomen: soft, non-distended, bowel sounds present, no ttp, no rebound or guarding  Extremities: +R thigh w/ firm calf lymphedema  Skin: no rashes  Neuro: AAOx3, motor and sensory grossly intact  Psych: mood and affect appropriate

## 2022-02-28 NOTE — H&P ADULT - PROBLEM SELECTOR PLAN 2
Pt with mild to moderate purulent cellulitis with necrotic tissue reported on outside imaging.   s/p cefazolin in the ED  -Will treat with bactrim Pt with moderate purulent cellulitis with necrotic tissue reported on outside imaging.   s/p cefazolin in the ED  -Will treat with bactrim Pt with moderate to severe purulent cellulitis with necrotic tissue reported on outside imaging.   s/p cefazolin in the ED  -Will treat with vancomycin   -f/u trough

## 2022-02-28 NOTE — H&P ADULT - NSHPLABSRESULTS_GEN_ALL_CORE
.  LABS:                         11.7   5.07  )-----------( 311      ( 28 Feb 2022 16:11 )             38.8     02-28    139  |  103  |  18  ----------------------------<  94  4.5   |  24  |  0.80    Ca    9.3      28 Feb 2022 16:11    TPro  9.0<H>  /  Alb  4.1  /  TBili  0.2  /  DBili  x   /  AST  18  /  ALT  16  /  AlkPhos  112  02-28    PT/INR - ( 28 Feb 2022 16:11 )   PT: 12.1 sec;   INR: 1.01 ratio         PTT - ( 28 Feb 2022 16:11 )  PTT:34.2 sec          PROCEDURE DATE:  02/28/2022          INTERPRETATION:  EXAMINATION: XR CHEST    CLINICAL INDICATION: Screening for pulmonary disease. Preop.    TECHNIQUE: Single frontal portable view of the chest. Lordotic.    COMPARISON: No relevant imaging available for comparison.    FINDINGS:  Heart size and the mediastinum cannot be accurately evaluated on this   projection.  The lungs are clear. Mildly elevated right hemidiaphragm.  There is no pneumothorax or pleural effusion.  No acute bony abnormality.    IMPRESSION:  Mildly elevated right hemidiaphragm.    Clear lungs.    EKG: Sinus rhtyhm with normal axis and rate of 84, no TRISHA, STD, or TWI

## 2022-02-28 NOTE — H&P ADULT - ASSESSMENT
75 YO F with MHx of HTN and Chronic Lymphedema who presents with increased drainage from her RLE concerning for possible cellulitis

## 2022-02-28 NOTE — H&P ADULT - HISTORY OF PRESENT ILLNESS
77 YO F with MHx of HTN and Chronic Lymphedema who presents with increased drainage from her RLE. Pt with Hx of Chronic Lymphedema for >5 years, surgery attempted twice per patient and now with RLE increasing more over the past year, particularly over the past several months. She completed two weeks of abx one month ago (does not recall which abx), afterwhich her RLE infection got better, but now with increased drainage from the RLE, was sent in for abx and for surgical eval. Of note, she reports imaging that showed ?inflammation and necrosis? She denies any RLE warmth or erythema, fever, chills, nausea, or vomiting. She denies any chest pain , palpitations or shortness of breath, orthopnea,or PND. She can walk about two blocks and stops due to fatigue but not short of breath. She states she had a nuclear stress test two years ago which was normal and to her knowledge has not had any changes to her EKG.

## 2022-02-28 NOTE — ED ADULT NURSE NOTE - NSIMPLEMENTINTERV_GEN_ALL_ED
Implemented All Fall Risk Interventions:  [place] to call system. Call bell, personal items and telephone within reach. Instruct patient to call for assistance. Room bathroom lighting operational. Non-slip footwear when patient is off stretcher. Physically safe environment: no spills, clutter or unnecessary equipment. Stretcher in lowest position, wheels locked, appropriate side rails in place. Provide visual cue, wrist band, yellow gown, etc. Monitor gait and stability. Monitor for mental status changes and reorient to person, place, and time. Review medications for side effects contributing to fall risk. Reinforce activity limits and safety measures with patient and family.

## 2022-02-28 NOTE — H&P ADULT - PROBLEM SELECTOR PLAN 1
-pt with chronic lymphedema  -Va duplex to r/o clot (has not had one in many years)  -Plan for OR on Wednesday with Plastic surgery  -Medical Clearance: Pt with RCRI=0, pt with exercise tolerance limited by fatigue due to weight, but not shortness of breath or chest pain. Reports negative nuclear stress test 2 years ago. EKG sinus rhythm with no Ischaemic changes, and no murmurs on exam. Therefore, no further cardiac  testing is required, and pt is optimized for OR with plastic surgery on Wednesday.  -NPOm after midnight on tuesday night

## 2022-02-28 NOTE — ED PROVIDER NOTE - OBJECTIVE STATEMENT
As per above, pt has had bad lymphedema since 2018, is having surgery tomorrow to have it removed. Complaining of some drainage but no fevers. Was told by Dr. Pendleton to come in for admission and abx. Denies cp, sob, abdominal pain, change in urine or bowel.

## 2022-02-28 NOTE — CONSULT NOTE ADULT - ASSESSMENT
IMP: Pt is a 77 y/o female with RLE lymphedema, persistent open wound and necrotic tissue     Plan:   - OR weds for  Excision of necrotic tissue   - will need medical clearance  - NPO after midnight tues    - local wound care in meantime

## 2022-02-28 NOTE — ED PROVIDER NOTE - RAPID ASSESSMENT
76 F referred by Dr. Giraldo for surgery on Wednesday.     **Pt seen in the waiting room via teletriage by Tino Morel (MD), documentation completed by Juve Rubi. Pt to be sent to main ED for further evaluation - all orders placed to be followed by MD in the main ED**  Scribe Statement: Shukri SHAW Cole (scribe), attest that this documentation has been prepared under the direction and in the presence of Tino Morel (MD) 76 F referred by Dr. Colon for admission to Dr. Giraldo for presurgical clearance for planned surgery on Wednesday.  Denying acute complaint.    **Pt seen in the waiting room via teletriage by Tino Morel (MD), documentation completed by Rafy -Juve Watt. Pt to be sent to main ED for further evaluation - all orders placed to be followed by MD in the main ED**  Scribe Statement: IShukri Cole (scribe), attest that this documentation has been prepared under the direction and in the presence of Tino Morel (MD)    This was a preliminary evaluation by telemedicine in the waiting room.  Please see full Emergency Department evaluation for complete evaluation, diagnosis and disposition.  Rapid assessment entered by [email or web address], reviewed and edited/corrected as needed by myself.  Tino Morel M.D.

## 2022-02-28 NOTE — ED ADULT NURSE NOTE - OBJECTIVE STATEMENT
Pt is a 75 yo female sent here by MD for worsening lymphedema since 2018 Pt states she is surgery tomorrow to have it removed. Pt reports it has been getting progressively worse and increasing in size from Dec 2021-now. Pt reports seeing cleat fluid leaking from the leg today. She denies any CP or SOB no N/V/D no cough fever or chills. pt uses a walker to get around but has reported increased difficultly due to the leg. Pt was told by Dr. Pendleton to come in for admission and abx.

## 2022-03-01 ENCOUNTER — TRANSCRIPTION ENCOUNTER (OUTPATIENT)
Age: 77
End: 2022-03-01

## 2022-03-01 LAB
ALBUMIN SERPL ELPH-MCNC: 3.7 G/DL — SIGNIFICANT CHANGE UP (ref 3.3–5)
ALP SERPL-CCNC: 96 U/L — SIGNIFICANT CHANGE UP (ref 40–120)
ALT FLD-CCNC: 13 U/L — SIGNIFICANT CHANGE UP (ref 10–45)
ANION GAP SERPL CALC-SCNC: 13 MMOL/L — SIGNIFICANT CHANGE UP (ref 5–17)
AST SERPL-CCNC: 16 U/L — SIGNIFICANT CHANGE UP (ref 10–40)
BILIRUB SERPL-MCNC: 0.5 MG/DL — SIGNIFICANT CHANGE UP (ref 0.2–1.2)
BUN SERPL-MCNC: 16 MG/DL — SIGNIFICANT CHANGE UP (ref 7–23)
CALCIUM SERPL-MCNC: 8.8 MG/DL — SIGNIFICANT CHANGE UP (ref 8.4–10.5)
CHLORIDE SERPL-SCNC: 103 MMOL/L — SIGNIFICANT CHANGE UP (ref 96–108)
CO2 SERPL-SCNC: 21 MMOL/L — LOW (ref 22–31)
CREAT SERPL-MCNC: 0.64 MG/DL — SIGNIFICANT CHANGE UP (ref 0.5–1.3)
EGFR: 92 ML/MIN/1.73M2 — SIGNIFICANT CHANGE UP
GLUCOSE SERPL-MCNC: 110 MG/DL — HIGH (ref 70–99)
HCT VFR BLD CALC: 39 % — SIGNIFICANT CHANGE UP (ref 34.5–45)
HCV AB S/CO SERPL IA: 0.1 S/CO — SIGNIFICANT CHANGE UP (ref 0–0.99)
HCV AB SERPL-IMP: SIGNIFICANT CHANGE UP
HGB BLD-MCNC: 12 G/DL — SIGNIFICANT CHANGE UP (ref 11.5–15.5)
KAPPA LC SER QL IFE: 5.89 MG/DL — HIGH (ref 0.33–1.94)
KAPPA/LAMBDA FREE LIGHT CHAIN RATIO, SERUM: 2.47 RATIO — HIGH (ref 0.26–1.65)
LAMBDA LC SER QL IFE: 2.38 MG/DL — SIGNIFICANT CHANGE UP (ref 0.57–2.63)
MCHC RBC-ENTMCNC: 27.3 PG — SIGNIFICANT CHANGE UP (ref 27–34)
MCHC RBC-ENTMCNC: 30.8 GM/DL — LOW (ref 32–36)
MCV RBC AUTO: 88.8 FL — SIGNIFICANT CHANGE UP (ref 80–100)
NRBC # BLD: 0 /100 WBCS — SIGNIFICANT CHANGE UP (ref 0–0)
PLATELET # BLD AUTO: 293 K/UL — SIGNIFICANT CHANGE UP (ref 150–400)
POTASSIUM SERPL-MCNC: 4.8 MMOL/L — SIGNIFICANT CHANGE UP (ref 3.5–5.3)
POTASSIUM SERPL-SCNC: 4.8 MMOL/L — SIGNIFICANT CHANGE UP (ref 3.5–5.3)
PROT SERPL-MCNC: 8.1 G/DL — SIGNIFICANT CHANGE UP (ref 6–8.3)
RBC # BLD: 4.39 M/UL — SIGNIFICANT CHANGE UP (ref 3.8–5.2)
RBC # FLD: 15.4 % — HIGH (ref 10.3–14.5)
SODIUM SERPL-SCNC: 137 MMOL/L — SIGNIFICANT CHANGE UP (ref 135–145)
WBC # BLD: 5.38 K/UL — SIGNIFICANT CHANGE UP (ref 3.8–10.5)
WBC # FLD AUTO: 5.38 K/UL — SIGNIFICANT CHANGE UP (ref 3.8–10.5)

## 2022-03-01 PROCEDURE — 93970 EXTREMITY STUDY: CPT | Mod: 26

## 2022-03-01 RX ORDER — VANCOMYCIN HCL 1 G
1250 VIAL (EA) INTRAVENOUS EVERY 8 HOURS
Refills: 0 | Status: DISCONTINUED | OUTPATIENT
Start: 2022-03-01 | End: 2022-03-01

## 2022-03-01 RX ORDER — VANCOMYCIN HCL 1 G
VIAL (EA) INTRAVENOUS
Refills: 0 | Status: DISCONTINUED | OUTPATIENT
Start: 2022-03-01 | End: 2022-03-03

## 2022-03-01 RX ORDER — VANCOMYCIN HCL 1 G
1250 VIAL (EA) INTRAVENOUS EVERY 12 HOURS
Refills: 0 | Status: DISCONTINUED | OUTPATIENT
Start: 2022-03-01 | End: 2022-03-03

## 2022-03-01 RX ORDER — ENOXAPARIN SODIUM 100 MG/ML
40 INJECTION SUBCUTANEOUS EVERY 12 HOURS
Refills: 0 | Status: DISCONTINUED | OUTPATIENT
Start: 2022-03-01 | End: 2022-03-01

## 2022-03-01 RX ORDER — VANCOMYCIN HCL 1 G
VIAL (EA) INTRAVENOUS
Refills: 0 | Status: DISCONTINUED | OUTPATIENT
Start: 2022-03-01 | End: 2022-03-01

## 2022-03-01 RX ORDER — VANCOMYCIN HCL 1 G
750 VIAL (EA) INTRAVENOUS ONCE
Refills: 0 | Status: COMPLETED | OUTPATIENT
Start: 2022-03-01 | End: 2022-03-01

## 2022-03-01 RX ORDER — VANCOMYCIN HCL 1 G
750 VIAL (EA) INTRAVENOUS EVERY 8 HOURS
Refills: 0 | Status: DISCONTINUED | OUTPATIENT
Start: 2022-03-01 | End: 2022-03-01

## 2022-03-01 RX ORDER — VANCOMYCIN HCL 1 G
1250 VIAL (EA) INTRAVENOUS ONCE
Refills: 0 | Status: COMPLETED | OUTPATIENT
Start: 2022-03-01 | End: 2022-03-01

## 2022-03-01 RX ORDER — ENOXAPARIN SODIUM 100 MG/ML
40 INJECTION SUBCUTANEOUS ONCE
Refills: 0 | Status: COMPLETED | OUTPATIENT
Start: 2022-03-01 | End: 2022-03-01

## 2022-03-01 RX ADMIN — Medication 166.67 MILLIGRAM(S): at 22:04

## 2022-03-01 RX ADMIN — ENOXAPARIN SODIUM 40 MILLIGRAM(S): 100 INJECTION SUBCUTANEOUS at 11:43

## 2022-03-01 RX ADMIN — Medication 1000 MILLIGRAM(S): at 01:37

## 2022-03-01 RX ADMIN — Medication 250 MILLIGRAM(S): at 00:56

## 2022-03-01 RX ADMIN — LOSARTAN POTASSIUM 50 MILLIGRAM(S): 100 TABLET, FILM COATED ORAL at 05:20

## 2022-03-01 RX ADMIN — Medication 1000 MILLIGRAM(S): at 01:07

## 2022-03-01 RX ADMIN — Medication 166.67 MILLIGRAM(S): at 12:15

## 2022-03-01 RX ADMIN — ENOXAPARIN SODIUM 40 MILLIGRAM(S): 100 INJECTION SUBCUTANEOUS at 22:03

## 2022-03-01 NOTE — PROGRESS NOTE ADULT - ASSESSMENT
75 YO F with MHx of HTN and Chronic Lymphedema who presents with increased drainage from her RLE concerning for possible cellulitis     Lymphedema.    -pt with chronic lymphedema  -Va duplex to r/o clot (has not had one in many years)  -Plan for OR on Wednesday with Plastic surgery  -Medical Clearance: Pt with RCRI=0, pt with exercise tolerance limited by fatigue due to weight, but not shortness of breath or chest pain. Reports negative nuclear stress test 2 years ago. EKG sinus rhythm with no Ischaemic changes, and no murmurs on exam. Therefore, no further cardiac  testing is required, and pt is optimized for OR with plastic surgery on Wednesday.  -NPO  after midnight on tuesday night.    Cellulitis.   - Pt with moderate to severe purulent cellulitis with necrotic tissue reported on outside imaging.   s/p cefazolin in the ED  -Will treat with vancomycin   -f/u trough.     Elevated total protein.   -  -T. protein =9, alb =4.1  -will send serum light chains and immunofixation.     Hypertension.   - c/w losartan.     Prophylactic measure.   -  DVT Ppx  -Lovenox, Hold before OR.

## 2022-03-02 ENCOUNTER — RESULT REVIEW (OUTPATIENT)
Age: 77
End: 2022-03-02

## 2022-03-02 DIAGNOSIS — M79.89 OTHER SPECIFIED SOFT TISSUE DISORDERS: ICD-10-CM

## 2022-03-02 LAB
ANION GAP SERPL CALC-SCNC: 10 MMOL/L — SIGNIFICANT CHANGE UP (ref 5–17)
BUN SERPL-MCNC: 19 MG/DL — SIGNIFICANT CHANGE UP (ref 7–23)
CALCIUM SERPL-MCNC: 8.6 MG/DL — SIGNIFICANT CHANGE UP (ref 8.4–10.5)
CHLORIDE SERPL-SCNC: 104 MMOL/L — SIGNIFICANT CHANGE UP (ref 96–108)
CO2 SERPL-SCNC: 25 MMOL/L — SIGNIFICANT CHANGE UP (ref 22–31)
CREAT SERPL-MCNC: 0.79 MG/DL — SIGNIFICANT CHANGE UP (ref 0.5–1.3)
EGFR: 78 ML/MIN/1.73M2 — SIGNIFICANT CHANGE UP
GLUCOSE SERPL-MCNC: 103 MG/DL — HIGH (ref 70–99)
HCT VFR BLD CALC: 34.9 % — SIGNIFICANT CHANGE UP (ref 34.5–45)
HGB BLD-MCNC: 10.6 G/DL — LOW (ref 11.5–15.5)
MCHC RBC-ENTMCNC: 27.1 PG — SIGNIFICANT CHANGE UP (ref 27–34)
MCHC RBC-ENTMCNC: 30.4 GM/DL — LOW (ref 32–36)
MCV RBC AUTO: 89.3 FL — SIGNIFICANT CHANGE UP (ref 80–100)
NRBC # BLD: 0 /100 WBCS — SIGNIFICANT CHANGE UP (ref 0–0)
PLATELET # BLD AUTO: 265 K/UL — SIGNIFICANT CHANGE UP (ref 150–400)
POTASSIUM SERPL-MCNC: 4.3 MMOL/L — SIGNIFICANT CHANGE UP (ref 3.5–5.3)
POTASSIUM SERPL-SCNC: 4.3 MMOL/L — SIGNIFICANT CHANGE UP (ref 3.5–5.3)
RBC # BLD: 3.91 M/UL — SIGNIFICANT CHANGE UP (ref 3.8–5.2)
RBC # FLD: 15.5 % — HIGH (ref 10.3–14.5)
SODIUM SERPL-SCNC: 139 MMOL/L — SIGNIFICANT CHANGE UP (ref 135–145)
WBC # BLD: 3.75 K/UL — LOW (ref 3.8–10.5)
WBC # FLD AUTO: 3.75 K/UL — LOW (ref 3.8–10.5)

## 2022-03-02 PROCEDURE — 88304 TISSUE EXAM BY PATHOLOGIST: CPT | Mod: 26

## 2022-03-02 RX ORDER — HYDROMORPHONE HYDROCHLORIDE 2 MG/ML
0.25 INJECTION INTRAMUSCULAR; INTRAVENOUS; SUBCUTANEOUS
Refills: 0 | Status: DISCONTINUED | OUTPATIENT
Start: 2022-03-02 | End: 2022-03-02

## 2022-03-02 RX ORDER — CEFAZOLIN SODIUM 1 G
1000 VIAL (EA) INJECTION EVERY 8 HOURS
Refills: 0 | Status: DISCONTINUED | OUTPATIENT
Start: 2022-03-02 | End: 2022-03-03

## 2022-03-02 RX ORDER — ENOXAPARIN SODIUM 100 MG/ML
40 INJECTION SUBCUTANEOUS EVERY 12 HOURS
Refills: 0 | Status: DISCONTINUED | OUTPATIENT
Start: 2022-03-03 | End: 2022-03-06

## 2022-03-02 RX ORDER — HYDROMORPHONE HYDROCHLORIDE 2 MG/ML
0.25 INJECTION INTRAMUSCULAR; INTRAVENOUS; SUBCUTANEOUS ONCE
Refills: 0 | Status: DISCONTINUED | OUTPATIENT
Start: 2022-03-02 | End: 2022-03-02

## 2022-03-02 RX ORDER — SODIUM CHLORIDE 9 MG/ML
1000 INJECTION, SOLUTION INTRAVENOUS
Refills: 0 | Status: DISCONTINUED | OUTPATIENT
Start: 2022-03-02 | End: 2022-03-02

## 2022-03-02 RX ADMIN — HYDROMORPHONE HYDROCHLORIDE 0.25 MILLIGRAM(S): 2 INJECTION INTRAMUSCULAR; INTRAVENOUS; SUBCUTANEOUS at 16:50

## 2022-03-02 RX ADMIN — HYDROMORPHONE HYDROCHLORIDE 0.25 MILLIGRAM(S): 2 INJECTION INTRAMUSCULAR; INTRAVENOUS; SUBCUTANEOUS at 17:29

## 2022-03-02 RX ADMIN — HYDROMORPHONE HYDROCHLORIDE 0.25 MILLIGRAM(S): 2 INJECTION INTRAMUSCULAR; INTRAVENOUS; SUBCUTANEOUS at 12:17

## 2022-03-02 RX ADMIN — Medication 100 MILLIGRAM(S): at 22:05

## 2022-03-02 RX ADMIN — HYDROMORPHONE HYDROCHLORIDE 0.25 MILLIGRAM(S): 2 INJECTION INTRAMUSCULAR; INTRAVENOUS; SUBCUTANEOUS at 13:00

## 2022-03-02 RX ADMIN — HYDROMORPHONE HYDROCHLORIDE 0.25 MILLIGRAM(S): 2 INJECTION INTRAMUSCULAR; INTRAVENOUS; SUBCUTANEOUS at 13:44

## 2022-03-02 RX ADMIN — HYDROMORPHONE HYDROCHLORIDE 0.25 MILLIGRAM(S): 2 INJECTION INTRAMUSCULAR; INTRAVENOUS; SUBCUTANEOUS at 11:55

## 2022-03-02 RX ADMIN — LOSARTAN POTASSIUM 50 MILLIGRAM(S): 100 TABLET, FILM COATED ORAL at 05:28

## 2022-03-02 RX ADMIN — Medication 100 MILLIGRAM(S): at 16:32

## 2022-03-02 RX ADMIN — Medication 166.67 MILLIGRAM(S): at 23:12

## 2022-03-02 RX ADMIN — Medication 1000 MILLIGRAM(S): at 23:14

## 2022-03-02 RX ADMIN — HYDROMORPHONE HYDROCHLORIDE 0.25 MILLIGRAM(S): 2 INJECTION INTRAMUSCULAR; INTRAVENOUS; SUBCUTANEOUS at 12:25

## 2022-03-02 NOTE — CHART NOTE - NSCHARTNOTEFT_GEN_A_CORE
Wound Care Team Note:    Request for wound care consult for Right posterior leg wound received from nursing. Ms. Aquino's wound is being managed by Dr. Sherman, from plastics. Please refer any questions, concerns to Dr. Sherman. Will not follow.    Virginia Velez, NP-c, [facility] [id number]

## 2022-03-02 NOTE — PROGRESS NOTE ADULT - ASSESSMENT
77 YO F with MHx of HTN and Chronic Lymphedema who presents with increased drainage from her RLE concerning for possible cellulitis     Lymphedema.    -pt with chronic lymphedema  -Va duplex to r/o clot (has not had one in many years)  -Pnecrotic tissue with lymphedema excised RLE, closure with interrupted nylon sutures and application of wound vac  - back to OR on Monday    Cellulitis.   - Pt with moderate to severe purulent cellulitis with necrotic tissue reported on outside imaging.   s/p cefazolin in the ED  -Will treat with vancomycin   -f/u trough.     Elevated total protein.   -  -T. protein =9, alb =4.1  -will send serum light chains and immunofixation.     Hypertension.   - c/w losartan.     Prophylactic measure.   -  DVT Ppx  -Lovenox, Hold before OR.

## 2022-03-03 LAB
ANION GAP SERPL CALC-SCNC: 9 MMOL/L — SIGNIFICANT CHANGE UP (ref 5–17)
BLD GP AB SCN SERPL QL: NEGATIVE — SIGNIFICANT CHANGE UP
BUN SERPL-MCNC: 25 MG/DL — HIGH (ref 7–23)
CALCIUM SERPL-MCNC: 7.9 MG/DL — LOW (ref 8.4–10.5)
CHLORIDE SERPL-SCNC: 103 MMOL/L — SIGNIFICANT CHANGE UP (ref 96–108)
CO2 SERPL-SCNC: 23 MMOL/L — SIGNIFICANT CHANGE UP (ref 22–31)
CREAT SERPL-MCNC: 0.85 MG/DL — SIGNIFICANT CHANGE UP (ref 0.5–1.3)
EGFR: 71 ML/MIN/1.73M2 — SIGNIFICANT CHANGE UP
GLUCOSE SERPL-MCNC: 146 MG/DL — HIGH (ref 70–99)
HCT VFR BLD CALC: 23.3 % — LOW (ref 34.5–45)
HCT VFR BLD CALC: 25 % — LOW (ref 34.5–45)
HGB BLD-MCNC: 7.5 G/DL — LOW (ref 11.5–15.5)
HGB BLD-MCNC: 7.6 G/DL — LOW (ref 11.5–15.5)
INTERPRETATION SERPL IFE-IMP: SIGNIFICANT CHANGE UP
MCHC RBC-ENTMCNC: 27.7 PG — SIGNIFICANT CHANGE UP (ref 27–34)
MCHC RBC-ENTMCNC: 28.4 PG — SIGNIFICANT CHANGE UP (ref 27–34)
MCHC RBC-ENTMCNC: 30.4 GM/DL — LOW (ref 32–36)
MCHC RBC-ENTMCNC: 32.2 GM/DL — SIGNIFICANT CHANGE UP (ref 32–36)
MCV RBC AUTO: 88.3 FL — SIGNIFICANT CHANGE UP (ref 80–100)
MCV RBC AUTO: 91.2 FL — SIGNIFICANT CHANGE UP (ref 80–100)
NRBC # BLD: 0 /100 WBCS — SIGNIFICANT CHANGE UP (ref 0–0)
NRBC # BLD: 0 /100 WBCS — SIGNIFICANT CHANGE UP (ref 0–0)
PLATELET # BLD AUTO: 205 K/UL — SIGNIFICANT CHANGE UP (ref 150–400)
PLATELET # BLD AUTO: 251 K/UL — SIGNIFICANT CHANGE UP (ref 150–400)
POTASSIUM SERPL-MCNC: 4.9 MMOL/L — SIGNIFICANT CHANGE UP (ref 3.5–5.3)
POTASSIUM SERPL-SCNC: 4.9 MMOL/L — SIGNIFICANT CHANGE UP (ref 3.5–5.3)
RBC # BLD: 2.64 M/UL — LOW (ref 3.8–5.2)
RBC # BLD: 2.74 M/UL — LOW (ref 3.8–5.2)
RBC # FLD: 15.6 % — HIGH (ref 10.3–14.5)
RBC # FLD: 15.7 % — HIGH (ref 10.3–14.5)
RH IG SCN BLD-IMP: POSITIVE — SIGNIFICANT CHANGE UP
SODIUM SERPL-SCNC: 135 MMOL/L — SIGNIFICANT CHANGE UP (ref 135–145)
VANCOMYCIN TROUGH SERPL-MCNC: 13.8 UG/ML — SIGNIFICANT CHANGE UP (ref 10–20)
WBC # BLD: 9.27 K/UL — SIGNIFICANT CHANGE UP (ref 3.8–10.5)
WBC # BLD: 9.81 K/UL — SIGNIFICANT CHANGE UP (ref 3.8–10.5)
WBC # FLD AUTO: 9.27 K/UL — SIGNIFICANT CHANGE UP (ref 3.8–10.5)
WBC # FLD AUTO: 9.81 K/UL — SIGNIFICANT CHANGE UP (ref 3.8–10.5)

## 2022-03-03 PROCEDURE — 99223 1ST HOSP IP/OBS HIGH 75: CPT

## 2022-03-03 RX ORDER — CEFTRIAXONE 500 MG/1
INJECTION, POWDER, FOR SOLUTION INTRAMUSCULAR; INTRAVENOUS
Refills: 0 | Status: DISCONTINUED | OUTPATIENT
Start: 2022-03-03 | End: 2022-03-07

## 2022-03-03 RX ORDER — HYDROMORPHONE HYDROCHLORIDE 2 MG/ML
0.25 INJECTION INTRAMUSCULAR; INTRAVENOUS; SUBCUTANEOUS ONCE
Refills: 0 | Status: DISCONTINUED | OUTPATIENT
Start: 2022-03-03 | End: 2022-03-03

## 2022-03-03 RX ORDER — METRONIDAZOLE 500 MG
500 TABLET ORAL EVERY 8 HOURS
Refills: 0 | Status: DISCONTINUED | OUTPATIENT
Start: 2022-03-03 | End: 2022-03-07

## 2022-03-03 RX ORDER — VANCOMYCIN HCL 1 G
1500 VIAL (EA) INTRAVENOUS EVERY 24 HOURS
Refills: 0 | Status: DISCONTINUED | OUTPATIENT
Start: 2022-03-03 | End: 2022-03-07

## 2022-03-03 RX ORDER — CEFTRIAXONE 500 MG/1
2000 INJECTION, POWDER, FOR SOLUTION INTRAMUSCULAR; INTRAVENOUS EVERY 24 HOURS
Refills: 0 | Status: DISCONTINUED | OUTPATIENT
Start: 2022-03-04 | End: 2022-03-07

## 2022-03-03 RX ORDER — CEFTRIAXONE 500 MG/1
2000 INJECTION, POWDER, FOR SOLUTION INTRAMUSCULAR; INTRAVENOUS ONCE
Refills: 0 | Status: COMPLETED | OUTPATIENT
Start: 2022-03-03 | End: 2022-03-03

## 2022-03-03 RX ADMIN — Medication 1000 MILLIGRAM(S): at 13:32

## 2022-03-03 RX ADMIN — CEFTRIAXONE 2000 MILLIGRAM(S): 500 INJECTION, POWDER, FOR SOLUTION INTRAMUSCULAR; INTRAVENOUS at 18:57

## 2022-03-03 RX ADMIN — Medication 500 MILLIGRAM(S): at 21:55

## 2022-03-03 RX ADMIN — LOSARTAN POTASSIUM 50 MILLIGRAM(S): 100 TABLET, FILM COATED ORAL at 05:39

## 2022-03-03 RX ADMIN — HYDROMORPHONE HYDROCHLORIDE 0.25 MILLIGRAM(S): 2 INJECTION INTRAMUSCULAR; INTRAVENOUS; SUBCUTANEOUS at 02:00

## 2022-03-03 RX ADMIN — Medication 1000 MILLIGRAM(S): at 20:16

## 2022-03-03 RX ADMIN — Medication 100 MILLIGRAM(S): at 13:03

## 2022-03-03 RX ADMIN — Medication 1000 MILLIGRAM(S): at 00:00

## 2022-03-03 RX ADMIN — Medication 166.67 MILLIGRAM(S): at 13:02

## 2022-03-03 RX ADMIN — ENOXAPARIN SODIUM 40 MILLIGRAM(S): 100 INJECTION SUBCUTANEOUS at 18:56

## 2022-03-03 RX ADMIN — Medication 1000 MILLIGRAM(S): at 13:02

## 2022-03-03 RX ADMIN — Medication 100 MILLIGRAM(S): at 05:40

## 2022-03-03 RX ADMIN — HYDROMORPHONE HYDROCHLORIDE 0.25 MILLIGRAM(S): 2 INJECTION INTRAMUSCULAR; INTRAVENOUS; SUBCUTANEOUS at 01:29

## 2022-03-03 RX ADMIN — ENOXAPARIN SODIUM 40 MILLIGRAM(S): 100 INJECTION SUBCUTANEOUS at 05:43

## 2022-03-03 NOTE — PHYSICAL THERAPY INITIAL EVALUATION ADULT - MODALITIES TREATMENT COMMENTS
Pt with hx of lymphedema, s/p RLE excision of soft tissue from RLE on 3/2. Pt in prone position for dressing removed. Wound on R posterior thigh measuring 60cm x 10cm x 10cm. Wound with large hematoma covering wound bed, plastics removing suture to remove hematoma from wound bed. Hemostasis maintained throughout. Wound with +adipose tissue, no malodor, no induration, no erythema. Cleansed with NS, cavilon to periwound, black foam to wound bed, adaptic on top of sutures, followed by black foam with good seal at 175mmHG.

## 2022-03-03 NOTE — PROGRESS NOTE ADULT - ASSESSMENT
Assessment: 76 year old woman with chronic lymphadema and a draining wound of RLE s/p excision of soft tissue from RLE on 3/2, recovering well on floor, wound vac in place.    Recommendations:  - C/w Ancef  - Diet as tolerated  - OOB with physical therapy ambulate as tolerated  - C/w Wound VAC to 175mmHg  - Monitor outputs  - IVF  - AM labs reviewed  - Leg elevated while in bed  - Plan to return to operating room on Friday 3/4 for closure    PRS  [id number] Assessment: 76 year old woman with chronic lymphadema and a draining wound of RLE s/p excision of soft tissue from RLE on 3/2, recovering well on floor, wound vac in place.    Recommendations:  - C/w Ancef  - Diet as tolerated  - OOB with physical therapy ambulate as tolerated  - C/w Wound VAC to 175mmHg  - Monitor outputs  - IVF  - AM labs reviewed  - Leg elevated while in bed  - Plan to return to operating room on Monday for closure    PRS  [id number]

## 2022-03-03 NOTE — CONSULT NOTE ADULT - ASSESSMENT
Assessment:  The patient is a 76 year old female with PMHx of Hypertension, morbid obesity, and chronic Lymphedema who presents with increased drainage from her RLE. She reports history of chronic Lymphedema for >5 years, surgery attempted twice per patient and now with RLE increasing more over the past year, particularly over the past several months. She completed two weeks of antibiotics one month ago (does not recall which antibiotics), after which her RLE infection got better, but now with increased drainage from the RLE. She was subsequently sent in for antibiotics and for surgical evaluation. She s/p excision of soft tissue from RLE on 3/2/22 by plastic surgery w/ wound vac placement. Patient to return to OR on Monday for closure. Patient remains afebrile. Patient had a CT of the RLE w/ IV contrast on 1/14/22 showed extensive subcutaneous edema throughout the thigh, calf, foot, and ankle, most prominent along the posterior and medial aspect of the thigh. There is a gas containing draining sinus tract within the medial aspect of the thigh, draining to the skin surface, which is decreased in size from the prior study. ID was consulted for antibiotic recommendations.      Plan:  # Right lower extremity lymphedema w/ underlying infected wound  - c/w IV vancomycin and IV ceftriaxone for now  - f/u surgical pathology  - check OR cultures   - monitor fever curve  - follow white count and renal function  - ID will continue to follow    Case not yet discussed w/ attending    Srinivasan Conner MD PGY-5  Fellow, Infectious Diseases   Pager: [phone]  If no response, after 5pm and on weekends: Call [phone]   Assessment:  The patient is a 76 year old female with PMHx of Hypertension, morbid obesity, and chronic Lymphedema who presents with increased drainage from her RLE. She reports history of chronic Lymphedema for >5 years, surgery attempted twice per patient and now with RLE increasing more over the past year, particularly over the past several months. She completed two weeks of antibiotics one month ago (does not recall which antibiotics), after which her RLE infection got better, but now with increased drainage from the RLE. She was subsequently sent in for antibiotics and for surgical evaluation. She s/p excision of soft tissue from RLE on 3/2/22 by plastic surgery w/ wound vac placement. Patient to return to OR on Monday for closure. Patient remains afebrile. Patient had a CT of the RLE w/ IV contrast on 1/14/22 showed extensive subcutaneous edema throughout the thigh, calf, foot, and ankle, most prominent along the posterior and medial aspect of the thigh. There is a gas containing draining sinus tract within the medial aspect of the thigh, draining to the skin surface, which is decreased in size from the prior study. ID was consulted for antibiotic recommendations.      Plan:  # Right lower extremity lymphedema w/ purulent cellulitis   - c/w IV vancomycin for now  - discontinue IV ceftriaxone  - f/u surgical pathology  - check OR cultures and MRSA PCR nasal swab  - monitor fever curve  - follow white count and renal function  - ID will continue to follow    Case not yet discussed w/ attending    Srinivasan Conner MD PGY-5  Fellow, Infectious Diseases   Pager: [phone]  If no response, after 5pm and on weekends: Call [phone]   Assessment:  The patient is a 76 year old female with PMHx of Hypertension, morbid obesity, and chronic Lymphedema who presents with increased drainage from her RLE. She reports history of chronic Lymphedema for >5 years, surgery attempted twice per patient and now with RLE increasing more over the past year, particularly over the past several months. She completed two weeks of antibiotics one month ago (does not recall which antibiotics), after which her RLE infection got better, but now with increased drainage from the RLE. She was subsequently sent in for antibiotics and for surgical evaluation. She s/p excision of soft tissue from RLE on 3/2/22 by plastic surgery w/ wound vac placement. Patient to return to OR on Monday for closure. Patient remains afebrile. Patient had a CT of the RLE w/ IV contrast on 1/14/22 showed extensive subcutaneous edema throughout the thigh, calf, foot, and ankle, most prominent along the posterior and medial aspect of the thigh. There is a gas containing draining sinus tract within the medial aspect of the thigh, draining to the skin surface, which is decreased in size from the prior study. ID was consulted for antibiotic recommendations.    Plan:  # Right lower extremity superinfected wound w/ cellulitis   - c/w IV vancomycin 1.5 g q24 hours  - switch IV ceftriaxone 2 g q24 hours  - start IV flagyl 500 mg q8 hours   - f/u surgical pathology  - check OR cultures   - monitor fever curve  - follow white count and renal function  - ID will continue to follow    Patient seen w/ attending MD Srinivasan Tyler MD PGY-5  Fellow, Infectious Diseases   Pager: [phone]  If no response, after 5pm and on weekends: Call [phone]   Assessment:  The patient is a 76 year old female with PMHx of Hypertension, morbid obesity, and chronic Lymphedema who presents with increased drainage from her RLE. She reports history of chronic Lymphedema for >5 years, surgery attempted twice per patient and now with RLE increasing more over the past year, particularly over the past several months. She completed two weeks of antibiotics one month ago (does not recall which antibiotics), after which her RLE infection got better, but now with increased drainage from the RLE. She was subsequently sent in for antibiotics and for surgical evaluation. She s/p excision of soft tissue from RLE on 3/2/22 by plastic surgery w/ wound vac placement. Patient to return to OR on Monday for closure. Patient remains afebrile. Patient had a CT of the RLE w/ IV contrast on 1/14/22 showed extensive subcutaneous edema throughout the thigh, calf, foot, and ankle, most prominent along the posterior and medial aspect of the thigh. There is a gas containing draining sinus tract within the medial aspect of the thigh, draining to the skin surface, which is decreased in size from the prior study. ID was consulted for antibiotic recommendations.    Plan:  # Right lower extremity superinfected wound w/ cellulitis   - c/w IV vancomycin 1.5 g q24 hours  - switch IV ancef to IV ceftriaxone 2 g q24 hours  - start IV flagyl 500 mg q8 hours   - f/u surgical pathology  - check OR wound cultures on Monday  - monitor fever curve  - follow white count and renal function  - ID will continue to follow  - plan of care discussed w/ primary team     Patient seen w/ attending MD Srinivasan Tyler MD PGY-5  Fellow, Infectious Diseases   Pager: [phone]  If no response, after 5pm and on weekends: Call [phone]

## 2022-03-03 NOTE — CONSULT NOTE ADULT - ATTENDING COMMENTS
76 year old female with PMHx of Hypertension, morbid obesity, and chronic Lymphedema who presents with increased drainage from her RLE  Longstanding wound, prior procedures, has had drainage from site  On admission, necrotic tissue at wound and drainage  3/2 OR with plastic, debridement  Wound vac in place presently  Planned for closure next week  Would cover for potential pathogens/infected necrotic wound in the interim  Overall,  1) Necrotic, chronic wound  - Likely with some degree of superinfection  - Vanco 1500mg q 24 (monitor levels)  - Ceftriaxone 2g q 24  - PO Flagyl 500mg q 12  - F/U plastic for closure--if purulence seen, would send cultures  - Wound care  2) Anemia  - Due to IVF/hospitalization  - Trend for stability  - Monitor for signs bleeding    Francis Henderson MD  Contact on TEAMS messaging from 9am - 5pm  From 5pm-9am, and on weekends call [phone]

## 2022-03-03 NOTE — PHYSICAL THERAPY INITIAL EVALUATION ADULT - PLANNED THERAPY INTERVENTIONS, PT EVAL
balance training/bed mobility training/gait training/strengthening/transfer training GOAL: NEGATIVE PRESSURE WOUND THERAPY R THIGH/CALF/balance training/bed mobility training/gait training/strengthening/transfer training

## 2022-03-03 NOTE — PHYSICAL THERAPY INITIAL EVALUATION ADULT - PERTINENT HX OF CURRENT PROBLEM, REHAB EVAL
76 year old woman with chronic lymphadema and a draining wound of RLE s/p excision of soft tissue from RLE on 3/2, recovering well on floor, wound vac in place.

## 2022-03-03 NOTE — PHYSICAL THERAPY INITIAL EVALUATION ADULT - DIAGNOSIS, PT EVAL
Dec functional mobility secondary to dec strength, balance and endurance Dec functional mobility secondary to dec strength, balance and endurance /integumentary impairment

## 2022-03-03 NOTE — PROGRESS NOTE ADULT - ASSESSMENT
75 YO F with MHx of HTN and Chronic Lymphedema who presents with increased drainage from her RLE concerning for possible cellulitis     Lymphedema.    -pt with chronic lymphedema  -Va duplex to r/o clot - done  -Pnecrotic tissue with lymphedema excised RLE, closure with interrupted nylon sutures and application of wound vac  - back to OR on Monday    Cellulitis.   - Pt with moderate to severe purulent cellulitis with necrotic tissue reported on outside imaging.   cont  cefazolin   -Will treat with vancomycin   -f/u trough.  - ID consult     Elevated total protein.   -  -T. protein =9, alb =4.1  -will send serum light chains and immunofixation.     Hypertension.   - c/w losartan.     Prophylactic measure.   -  DVT Ppx  -Lovenox, Hold before OR.

## 2022-03-04 LAB
ANION GAP SERPL CALC-SCNC: 11 MMOL/L — SIGNIFICANT CHANGE UP (ref 5–17)
BUN SERPL-MCNC: 24 MG/DL — HIGH (ref 7–23)
CALCIUM SERPL-MCNC: 8.3 MG/DL — LOW (ref 8.4–10.5)
CHLORIDE SERPL-SCNC: 105 MMOL/L — SIGNIFICANT CHANGE UP (ref 96–108)
CO2 SERPL-SCNC: 23 MMOL/L — SIGNIFICANT CHANGE UP (ref 22–31)
CREAT SERPL-MCNC: 0.79 MG/DL — SIGNIFICANT CHANGE UP (ref 0.5–1.3)
EGFR: 78 ML/MIN/1.73M2 — SIGNIFICANT CHANGE UP
GLUCOSE SERPL-MCNC: 111 MG/DL — HIGH (ref 70–99)
HCT VFR BLD CALC: 24.1 % — LOW (ref 34.5–45)
HGB BLD-MCNC: 7.4 G/DL — LOW (ref 11.5–15.5)
MCHC RBC-ENTMCNC: 27.8 PG — SIGNIFICANT CHANGE UP (ref 27–34)
MCHC RBC-ENTMCNC: 30.7 GM/DL — LOW (ref 32–36)
MCV RBC AUTO: 90.6 FL — SIGNIFICANT CHANGE UP (ref 80–100)
NRBC # BLD: 0 /100 WBCS — SIGNIFICANT CHANGE UP (ref 0–0)
PLATELET # BLD AUTO: 207 K/UL — SIGNIFICANT CHANGE UP (ref 150–400)
POTASSIUM SERPL-MCNC: 4.4 MMOL/L — SIGNIFICANT CHANGE UP (ref 3.5–5.3)
POTASSIUM SERPL-SCNC: 4.4 MMOL/L — SIGNIFICANT CHANGE UP (ref 3.5–5.3)
RBC # BLD: 2.66 M/UL — LOW (ref 3.8–5.2)
RBC # FLD: 15.8 % — HIGH (ref 10.3–14.5)
SODIUM SERPL-SCNC: 139 MMOL/L — SIGNIFICANT CHANGE UP (ref 135–145)
WBC # BLD: 11.37 K/UL — HIGH (ref 3.8–10.5)
WBC # FLD AUTO: 11.37 K/UL — HIGH (ref 3.8–10.5)

## 2022-03-04 PROCEDURE — 99232 SBSQ HOSP IP/OBS MODERATE 35: CPT

## 2022-03-04 RX ORDER — HYDROMORPHONE HYDROCHLORIDE 2 MG/ML
0.5 INJECTION INTRAMUSCULAR; INTRAVENOUS; SUBCUTANEOUS ONCE
Refills: 0 | Status: DISCONTINUED | OUTPATIENT
Start: 2022-03-04 | End: 2022-03-04

## 2022-03-04 RX ORDER — OXYCODONE HYDROCHLORIDE 5 MG/1
5 TABLET ORAL EVERY 4 HOURS
Refills: 0 | Status: DISCONTINUED | OUTPATIENT
Start: 2022-03-04 | End: 2022-03-07

## 2022-03-04 RX ORDER — ACETAMINOPHEN 500 MG
1000 TABLET ORAL ONCE
Refills: 0 | Status: COMPLETED | OUTPATIENT
Start: 2022-03-04 | End: 2022-03-04

## 2022-03-04 RX ORDER — ACETAMINOPHEN 500 MG
325 TABLET ORAL ONCE
Refills: 0 | Status: COMPLETED | OUTPATIENT
Start: 2022-03-04 | End: 2022-03-04

## 2022-03-04 RX ORDER — POLYETHYLENE GLYCOL 3350 17 G/17G
17 POWDER, FOR SOLUTION ORAL DAILY
Refills: 0 | Status: DISCONTINUED | OUTPATIENT
Start: 2022-03-04 | End: 2022-03-07

## 2022-03-04 RX ORDER — SENNA PLUS 8.6 MG/1
2 TABLET ORAL AT BEDTIME
Refills: 0 | Status: DISCONTINUED | OUTPATIENT
Start: 2022-03-04 | End: 2022-03-07

## 2022-03-04 RX ADMIN — Medication 500 MILLIGRAM(S): at 06:17

## 2022-03-04 RX ADMIN — HYDROMORPHONE HYDROCHLORIDE 0.5 MILLIGRAM(S): 2 INJECTION INTRAMUSCULAR; INTRAVENOUS; SUBCUTANEOUS at 08:42

## 2022-03-04 RX ADMIN — Medication 500 MILLIGRAM(S): at 22:10

## 2022-03-04 RX ADMIN — OXYCODONE HYDROCHLORIDE 5 MILLIGRAM(S): 5 TABLET ORAL at 19:20

## 2022-03-04 RX ADMIN — LOSARTAN POTASSIUM 50 MILLIGRAM(S): 100 TABLET, FILM COATED ORAL at 06:17

## 2022-03-04 RX ADMIN — Medication 500 MILLIGRAM(S): at 13:07

## 2022-03-04 RX ADMIN — CEFTRIAXONE 100 MILLIGRAM(S): 500 INJECTION, POWDER, FOR SOLUTION INTRAMUSCULAR; INTRAVENOUS at 20:44

## 2022-03-04 RX ADMIN — HYDROMORPHONE HYDROCHLORIDE 0.5 MILLIGRAM(S): 2 INJECTION INTRAMUSCULAR; INTRAVENOUS; SUBCUTANEOUS at 09:02

## 2022-03-04 RX ADMIN — Medication 1000 MILLIGRAM(S): at 22:11

## 2022-03-04 RX ADMIN — POLYETHYLENE GLYCOL 3350 17 GRAM(S): 17 POWDER, FOR SOLUTION ORAL at 18:55

## 2022-03-04 RX ADMIN — Medication 400 MILLIGRAM(S): at 04:05

## 2022-03-04 RX ADMIN — Medication 300 MILLIGRAM(S): at 13:07

## 2022-03-04 RX ADMIN — ENOXAPARIN SODIUM 40 MILLIGRAM(S): 100 INJECTION SUBCUTANEOUS at 06:18

## 2022-03-04 RX ADMIN — Medication 1000 MILLIGRAM(S): at 13:36

## 2022-03-04 RX ADMIN — OXYCODONE HYDROCHLORIDE 5 MILLIGRAM(S): 5 TABLET ORAL at 18:54

## 2022-03-04 RX ADMIN — Medication 1000 MILLIGRAM(S): at 13:06

## 2022-03-04 RX ADMIN — SENNA PLUS 2 TABLET(S): 8.6 TABLET ORAL at 22:13

## 2022-03-04 RX ADMIN — Medication 325 MILLIGRAM(S): at 23:45

## 2022-03-04 RX ADMIN — ENOXAPARIN SODIUM 40 MILLIGRAM(S): 100 INJECTION SUBCUTANEOUS at 18:56

## 2022-03-04 RX ADMIN — OXYCODONE HYDROCHLORIDE 5 MILLIGRAM(S): 5 TABLET ORAL at 10:51

## 2022-03-04 RX ADMIN — OXYCODONE HYDROCHLORIDE 5 MILLIGRAM(S): 5 TABLET ORAL at 11:20

## 2022-03-04 NOTE — PROGRESS NOTE ADULT - ASSESSMENT
76 year old female with PMHx of Hypertension, morbid obesity, and chronic Lymphedema who presents with increased drainage from her RLE  Longstanding wound, prior procedures, has had drainage from site  On admission, necrotic tissue at wound and drainage  3/2 OR with plastic, debridement  Wound vac in place presently  Planned for closure next week  Would cover for potential pathogens/infected necrotic wound in the interim  Overall,  1) Necrotic, chronic wound  - Likely with some degree of superinfection  - Vanco 1500mg q 24 (monitor levels)  - Ceftriaxone 2g q 24  - PO Flagyl 500mg q 12  - F/U plastic for closure--if purulence seen, would send cultures  - Wound care  2) Anemia  - Due to IVF/hospitalization  - Trend for stability  - Monitor for signs bleeding    Francis Henderson MD  Contact on TEAMS messaging from 9am - 5pm  From 5pm-9am, and on weekends call [phone]

## 2022-03-04 NOTE — PROGRESS NOTE ADULT - ASSESSMENT
75 YO F with MHx of HTN and Chronic Lymphedema who presents with increased drainage from her RLE concerning for possible cellulitis     Lymphedema.    -pt with chronic lymphedema  -Va duplex to r/o clot - done  -Pnecrotic tissue with lymphedema excised RLE, closure with interrupted nylon sutures and application of wound vac  - back to OR on Monday    Cellulitis.   - Pt with moderate to severe purulent cellulitis with necrotic tissue reported on outside imaging.   cont  ceftriaxone  and vancomycin   -f/u trough.  - ID consult following    anemia  - likely 2/2 acute blood loss  - transfuse 1 unit  - follow up H&H in am     Elevated total protein.   -  -T. protein =9, alb =4.1  -will send serum light chains and immunofixation.     Hypertension.   - c/w losartan.    pain  - oxycodone prn    bowl regimen  - senna and miralalx     Prophylactic measure.   -  DVT Ppx  -Lovenox, Hold before OR.

## 2022-03-04 NOTE — PROGRESS NOTE ADULT - ASSESSMENT
Assessment: 76 year old woman with chronic lymphadema and a draining wound of RLE s/p excision of soft tissue from RLE on 3/2, recovering well on floor, wound vac in place.    Recommendations:  - C/w Abx  - Diet as tolerated  - OOB with physical therapy ambulate as tolerated  - C/w Wound VAC to 175mmHg until dressing change by plastic surgery team   - Monitor outputs  - IVF  - AM labs reviewed  - Leg elevated while in bed  - Plan to return to operating room on Monday for closure    PRS  [id number]

## 2022-03-05 LAB
APPEARANCE UR: CLEAR — SIGNIFICANT CHANGE UP
BACTERIA # UR AUTO: NEGATIVE — SIGNIFICANT CHANGE UP
BASOPHILS # BLD AUTO: 0.05 K/UL — SIGNIFICANT CHANGE UP (ref 0–0.2)
BASOPHILS NFR BLD AUTO: 0.3 % — SIGNIFICANT CHANGE UP (ref 0–2)
BILIRUB UR-MCNC: NEGATIVE — SIGNIFICANT CHANGE UP
COLOR SPEC: YELLOW — SIGNIFICANT CHANGE UP
DIFF PNL FLD: NEGATIVE — SIGNIFICANT CHANGE UP
EOSINOPHIL # BLD AUTO: 0.16 K/UL — SIGNIFICANT CHANGE UP (ref 0–0.5)
EOSINOPHIL NFR BLD AUTO: 1 % — SIGNIFICANT CHANGE UP (ref 0–6)
EPI CELLS # UR: 3 /HPF — SIGNIFICANT CHANGE UP
GLUCOSE UR QL: NEGATIVE — SIGNIFICANT CHANGE UP
HCT VFR BLD CALC: 25.2 % — LOW (ref 34.5–45)
HGB BLD-MCNC: 8.2 G/DL — LOW (ref 11.5–15.5)
HYALINE CASTS # UR AUTO: 1 /LPF — SIGNIFICANT CHANGE UP (ref 0–2)
IMM GRANULOCYTES NFR BLD AUTO: 0.7 % — SIGNIFICANT CHANGE UP (ref 0–1.5)
KETONES UR-MCNC: NEGATIVE — SIGNIFICANT CHANGE UP
LEUKOCYTE ESTERASE UR-ACNC: ABNORMAL
LYMPHOCYTES # BLD AUTO: 1.28 K/UL — SIGNIFICANT CHANGE UP (ref 1–3.3)
LYMPHOCYTES # BLD AUTO: 8 % — LOW (ref 13–44)
MCHC RBC-ENTMCNC: 28.7 PG — SIGNIFICANT CHANGE UP (ref 27–34)
MCHC RBC-ENTMCNC: 32.5 GM/DL — SIGNIFICANT CHANGE UP (ref 32–36)
MCV RBC AUTO: 88.1 FL — SIGNIFICANT CHANGE UP (ref 80–100)
MONOCYTES # BLD AUTO: 1.45 K/UL — HIGH (ref 0–0.9)
MONOCYTES NFR BLD AUTO: 9.1 % — SIGNIFICANT CHANGE UP (ref 2–14)
NEUTROPHILS # BLD AUTO: 12.91 K/UL — HIGH (ref 1.8–7.4)
NEUTROPHILS NFR BLD AUTO: 80.9 % — HIGH (ref 43–77)
NITRITE UR-MCNC: NEGATIVE — SIGNIFICANT CHANGE UP
NRBC # BLD: 0 /100 WBCS — SIGNIFICANT CHANGE UP (ref 0–0)
PH UR: 6 — SIGNIFICANT CHANGE UP (ref 5–8)
PLATELET # BLD AUTO: 216 K/UL — SIGNIFICANT CHANGE UP (ref 150–400)
PROT UR-MCNC: ABNORMAL
RBC # BLD: 2.86 M/UL — LOW (ref 3.8–5.2)
RBC # FLD: 15.2 % — HIGH (ref 10.3–14.5)
RBC CASTS # UR COMP ASSIST: 1 /HPF — SIGNIFICANT CHANGE UP (ref 0–4)
SP GR SPEC: 1.02 — SIGNIFICANT CHANGE UP (ref 1.01–1.02)
UROBILINOGEN FLD QL: NEGATIVE — SIGNIFICANT CHANGE UP
WBC # BLD: 15.96 K/UL — HIGH (ref 3.8–10.5)
WBC # FLD AUTO: 15.96 K/UL — HIGH (ref 3.8–10.5)
WBC UR QL: 6 /HPF — HIGH (ref 0–5)

## 2022-03-05 RX ADMIN — ENOXAPARIN SODIUM 40 MILLIGRAM(S): 100 INJECTION SUBCUTANEOUS at 05:11

## 2022-03-05 RX ADMIN — Medication 500 MILLIGRAM(S): at 05:11

## 2022-03-05 RX ADMIN — OXYCODONE HYDROCHLORIDE 5 MILLIGRAM(S): 5 TABLET ORAL at 23:42

## 2022-03-05 RX ADMIN — Medication 1000 MILLIGRAM(S): at 11:47

## 2022-03-05 RX ADMIN — Medication 300 MILLIGRAM(S): at 12:18

## 2022-03-05 RX ADMIN — Medication 500 MILLIGRAM(S): at 12:58

## 2022-03-05 RX ADMIN — OXYCODONE HYDROCHLORIDE 5 MILLIGRAM(S): 5 TABLET ORAL at 16:43

## 2022-03-05 RX ADMIN — Medication 500 MILLIGRAM(S): at 21:36

## 2022-03-05 RX ADMIN — SENNA PLUS 2 TABLET(S): 8.6 TABLET ORAL at 21:36

## 2022-03-05 RX ADMIN — LOSARTAN POTASSIUM 50 MILLIGRAM(S): 100 TABLET, FILM COATED ORAL at 05:11

## 2022-03-05 RX ADMIN — CEFTRIAXONE 100 MILLIGRAM(S): 500 INJECTION, POWDER, FOR SOLUTION INTRAMUSCULAR; INTRAVENOUS at 15:58

## 2022-03-05 RX ADMIN — OXYCODONE HYDROCHLORIDE 5 MILLIGRAM(S): 5 TABLET ORAL at 18:10

## 2022-03-05 RX ADMIN — ENOXAPARIN SODIUM 40 MILLIGRAM(S): 100 INJECTION SUBCUTANEOUS at 17:41

## 2022-03-05 RX ADMIN — Medication 1000 MILLIGRAM(S): at 12:16

## 2022-03-05 NOTE — PATIENT PROFILE ADULT - FALL HARM RISK - HARM RISK INTERVENTIONS

## 2022-03-06 ENCOUNTER — TRANSCRIPTION ENCOUNTER (OUTPATIENT)
Age: 77
End: 2022-03-06

## 2022-03-06 LAB — SARS-COV-2 RNA SPEC QL NAA+PROBE: SIGNIFICANT CHANGE UP

## 2022-03-06 RX ORDER — MORPHINE SULFATE 50 MG/1
4 CAPSULE, EXTENDED RELEASE ORAL ONCE
Refills: 0 | Status: DISCONTINUED | OUTPATIENT
Start: 2022-03-06 | End: 2022-03-06

## 2022-03-06 RX ORDER — MORPHINE SULFATE 50 MG/1
2 CAPSULE, EXTENDED RELEASE ORAL ONCE
Refills: 0 | Status: DISCONTINUED | OUTPATIENT
Start: 2022-03-06 | End: 2022-03-06

## 2022-03-06 RX ADMIN — OXYCODONE HYDROCHLORIDE 5 MILLIGRAM(S): 5 TABLET ORAL at 10:27

## 2022-03-06 RX ADMIN — CEFTRIAXONE 100 MILLIGRAM(S): 500 INJECTION, POWDER, FOR SOLUTION INTRAMUSCULAR; INTRAVENOUS at 20:37

## 2022-03-06 RX ADMIN — OXYCODONE HYDROCHLORIDE 5 MILLIGRAM(S): 5 TABLET ORAL at 10:57

## 2022-03-06 RX ADMIN — MORPHINE SULFATE 2 MILLIGRAM(S): 50 CAPSULE, EXTENDED RELEASE ORAL at 15:50

## 2022-03-06 RX ADMIN — Medication 500 MILLIGRAM(S): at 06:11

## 2022-03-06 RX ADMIN — Medication 500 MILLIGRAM(S): at 21:54

## 2022-03-06 RX ADMIN — OXYCODONE HYDROCHLORIDE 5 MILLIGRAM(S): 5 TABLET ORAL at 00:12

## 2022-03-06 RX ADMIN — Medication 300 MILLIGRAM(S): at 13:35

## 2022-03-06 RX ADMIN — MORPHINE SULFATE 2 MILLIGRAM(S): 50 CAPSULE, EXTENDED RELEASE ORAL at 15:20

## 2022-03-06 RX ADMIN — MORPHINE SULFATE 4 MILLIGRAM(S): 50 CAPSULE, EXTENDED RELEASE ORAL at 13:14

## 2022-03-06 RX ADMIN — OXYCODONE HYDROCHLORIDE 5 MILLIGRAM(S): 5 TABLET ORAL at 18:51

## 2022-03-06 RX ADMIN — LOSARTAN POTASSIUM 50 MILLIGRAM(S): 100 TABLET, FILM COATED ORAL at 06:11

## 2022-03-06 RX ADMIN — OXYCODONE HYDROCHLORIDE 5 MILLIGRAM(S): 5 TABLET ORAL at 19:21

## 2022-03-06 RX ADMIN — MORPHINE SULFATE 4 MILLIGRAM(S): 50 CAPSULE, EXTENDED RELEASE ORAL at 12:44

## 2022-03-06 RX ADMIN — Medication 500 MILLIGRAM(S): at 13:34

## 2022-03-06 RX ADMIN — ENOXAPARIN SODIUM 40 MILLIGRAM(S): 100 INJECTION SUBCUTANEOUS at 06:11

## 2022-03-06 NOTE — PROGRESS NOTE ADULT - ASSESSMENT
Assessment: 76 year old woman with chronic lymphadema and a draining wound of RLE s/p excision of soft tissue from RLE on 3/2, recovering well on floor, wound vac in place.    Recommendations:  - C/w Abx  - Diet as tolerated, NPO at midnight   - OOB with physical therapy ambulate as tolerated  - C/w Wound VAC to 175, wall suction if not holding   - Monitor outputs  - IVF  - Leg elevated while in bed  - Plan to return to operating room on Monday for closure  - Preop CBC, BMP, type and screen, NPO at midnight     PRS  [phone]

## 2022-03-06 NOTE — PROGRESS NOTE ADULT - ASSESSMENT
77 YO F with MHx of HTN and Chronic Lymphedema who presents with increased drainage from her RLE concerning for possible cellulitis     Lymphedema.    -pt with chronic lymphedema  -Va duplex to r/o clot - done  -Pnecrotic tissue with lymphedema excised RLE, closure with interrupted nylon sutures and application of wound vac  - back to OR on Monday    Cellulitis.   - Pt with moderate to severe purulent cellulitis with necrotic tissue reported on outside imaging.   cont  ceftriaxone  and vancomycin   -f/u trough.  - ID consult following    anemia  - likely 2/2 acute blood loss  - transfuse 2 more unit  - follow up H&H in am      Hypertension.   - c/w losartan.    pain  - oxycodone prn    bowl regimen  - senna and miralalx     Prophylactic measure.   -  DVT Ppx  -Lovenox, Hold before OR.

## 2022-03-06 NOTE — CHART NOTE - NSCHARTNOTEFT_GEN_A_CORE
1230p: Notified by RN that patient's wound vac not functioning, with blood and clot leaking onto bed and floor.    Seen and evaluated at bedside. Given 4mg IV morphine, flipped into prone position with Transport team assistance. Removed wound vac. ~150cc old clotted blood in wound. Small amount of purulent exudate from inferior vertical wound. Wound explored to base, evacuated hematoma. No active bleeding. Packed with Fibrillar hemostatic agent, moist latrice, ABD pads, and tape.     2U pRBC ordered. Lovenox 40mg subQ BID discontinued. LLE SCD. Bedrest. No dressing change until OR tomorrow.    Seen and evaluated at bedside with Dr. Colon, attending surgeon

## 2022-03-07 LAB
CULTURE RESULTS: SIGNIFICANT CHANGE UP
SPECIMEN SOURCE: SIGNIFICANT CHANGE UP

## 2022-03-07 RX ORDER — HYDROMORPHONE HYDROCHLORIDE 2 MG/ML
0.5 INJECTION INTRAMUSCULAR; INTRAVENOUS; SUBCUTANEOUS
Refills: 0 | Status: DISCONTINUED | OUTPATIENT
Start: 2022-03-07 | End: 2022-03-07

## 2022-03-07 RX ORDER — ENOXAPARIN SODIUM 100 MG/ML
40 INJECTION SUBCUTANEOUS EVERY 24 HOURS
Refills: 0 | Status: DISCONTINUED | OUTPATIENT
Start: 2022-03-07 | End: 2022-03-07

## 2022-03-07 RX ORDER — OXYCODONE HYDROCHLORIDE 5 MG/1
5 TABLET ORAL EVERY 4 HOURS
Refills: 0 | Status: DISCONTINUED | OUTPATIENT
Start: 2022-03-07 | End: 2022-03-14

## 2022-03-07 RX ORDER — VANCOMYCIN HCL 1 G
1500 VIAL (EA) INTRAVENOUS EVERY 24 HOURS
Refills: 0 | Status: DISCONTINUED | OUTPATIENT
Start: 2022-03-07 | End: 2022-03-13

## 2022-03-07 RX ORDER — LOSARTAN POTASSIUM 100 MG/1
50 TABLET, FILM COATED ORAL DAILY
Refills: 0 | Status: DISCONTINUED | OUTPATIENT
Start: 2022-03-07 | End: 2022-03-24

## 2022-03-07 RX ORDER — ACETAMINOPHEN 500 MG
1000 TABLET ORAL EVERY 8 HOURS
Refills: 0 | Status: DISCONTINUED | OUTPATIENT
Start: 2022-03-07 | End: 2022-03-07

## 2022-03-07 RX ORDER — CEFTRIAXONE 500 MG/1
2000 INJECTION, POWDER, FOR SOLUTION INTRAMUSCULAR; INTRAVENOUS EVERY 24 HOURS
Refills: 0 | Status: DISCONTINUED | OUTPATIENT
Start: 2022-03-07 | End: 2022-03-13

## 2022-03-07 RX ORDER — SENNA PLUS 8.6 MG/1
2 TABLET ORAL AT BEDTIME
Refills: 0 | Status: DISCONTINUED | OUTPATIENT
Start: 2022-03-07 | End: 2022-03-24

## 2022-03-07 RX ORDER — METRONIDAZOLE 500 MG
500 TABLET ORAL EVERY 8 HOURS
Refills: 0 | Status: DISCONTINUED | OUTPATIENT
Start: 2022-03-07 | End: 2022-03-13

## 2022-03-07 RX ORDER — SODIUM CHLORIDE 9 MG/ML
1000 INJECTION, SOLUTION INTRAVENOUS
Refills: 0 | Status: DISCONTINUED | OUTPATIENT
Start: 2022-03-07 | End: 2022-03-09

## 2022-03-07 RX ORDER — POLYETHYLENE GLYCOL 3350 17 G/17G
17 POWDER, FOR SOLUTION ORAL DAILY
Refills: 0 | Status: DISCONTINUED | OUTPATIENT
Start: 2022-03-07 | End: 2022-03-24

## 2022-03-07 RX ORDER — ONDANSETRON 8 MG/1
4 TABLET, FILM COATED ORAL ONCE
Refills: 0 | Status: DISCONTINUED | OUTPATIENT
Start: 2022-03-07 | End: 2022-03-07

## 2022-03-07 RX ORDER — SODIUM CHLORIDE 9 MG/ML
500 INJECTION, SOLUTION INTRAVENOUS
Refills: 0 | Status: DISCONTINUED | OUTPATIENT
Start: 2022-03-07 | End: 2022-03-24

## 2022-03-07 RX ORDER — ACETAMINOPHEN 500 MG
1000 TABLET ORAL ONCE
Refills: 0 | Status: COMPLETED | OUTPATIENT
Start: 2022-03-07 | End: 2022-03-07

## 2022-03-07 RX ORDER — ENOXAPARIN SODIUM 100 MG/ML
40 INJECTION SUBCUTANEOUS EVERY 24 HOURS
Refills: 0 | Status: DISCONTINUED | OUTPATIENT
Start: 2022-03-08 | End: 2022-03-24

## 2022-03-07 RX ADMIN — OXYCODONE HYDROCHLORIDE 5 MILLIGRAM(S): 5 TABLET ORAL at 19:01

## 2022-03-07 RX ADMIN — HYDROMORPHONE HYDROCHLORIDE 0.5 MILLIGRAM(S): 2 INJECTION INTRAMUSCULAR; INTRAVENOUS; SUBCUTANEOUS at 11:07

## 2022-03-07 RX ADMIN — OXYCODONE HYDROCHLORIDE 5 MILLIGRAM(S): 5 TABLET ORAL at 14:49

## 2022-03-07 RX ADMIN — Medication 500 MILLIGRAM(S): at 13:41

## 2022-03-07 RX ADMIN — OXYCODONE HYDROCHLORIDE 5 MILLIGRAM(S): 5 TABLET ORAL at 19:31

## 2022-03-07 RX ADMIN — Medication 300 MILLIGRAM(S): at 12:33

## 2022-03-07 RX ADMIN — OXYCODONE HYDROCHLORIDE 5 MILLIGRAM(S): 5 TABLET ORAL at 05:24

## 2022-03-07 RX ADMIN — SODIUM CHLORIDE 2000 MILLILITER(S): 9 INJECTION, SOLUTION INTRAVENOUS at 11:43

## 2022-03-07 RX ADMIN — Medication 500 MILLIGRAM(S): at 22:18

## 2022-03-07 RX ADMIN — SODIUM CHLORIDE 75 MILLILITER(S): 9 INJECTION, SOLUTION INTRAVENOUS at 11:45

## 2022-03-07 RX ADMIN — HYDROMORPHONE HYDROCHLORIDE 0.5 MILLIGRAM(S): 2 INJECTION INTRAMUSCULAR; INTRAVENOUS; SUBCUTANEOUS at 10:42

## 2022-03-07 RX ADMIN — HYDROMORPHONE HYDROCHLORIDE 0.5 MILLIGRAM(S): 2 INJECTION INTRAMUSCULAR; INTRAVENOUS; SUBCUTANEOUS at 11:30

## 2022-03-07 RX ADMIN — Medication 500 MILLIGRAM(S): at 05:24

## 2022-03-07 RX ADMIN — SODIUM CHLORIDE 75 MILLILITER(S): 9 INJECTION, SOLUTION INTRAVENOUS at 20:17

## 2022-03-07 RX ADMIN — CEFTRIAXONE 100 MILLIGRAM(S): 500 INJECTION, POWDER, FOR SOLUTION INTRAMUSCULAR; INTRAVENOUS at 18:12

## 2022-03-07 RX ADMIN — Medication 1000 MILLIGRAM(S): at 20:44

## 2022-03-07 RX ADMIN — HYDROMORPHONE HYDROCHLORIDE 0.5 MILLIGRAM(S): 2 INJECTION INTRAMUSCULAR; INTRAVENOUS; SUBCUTANEOUS at 11:00

## 2022-03-07 RX ADMIN — OXYCODONE HYDROCHLORIDE 5 MILLIGRAM(S): 5 TABLET ORAL at 15:19

## 2022-03-07 RX ADMIN — Medication 400 MILLIGRAM(S): at 20:17

## 2022-03-07 NOTE — PROVIDER CONTACT NOTE (OTHER) - ASSESSMENT
Pt refused senna. AO4, VSS, responsive.
pt a&ox4, VSS except for temp  no c/o chills or warmth
Pt c/o pain 8/10 of RLE r/t post op lymphedema excision on 3/2. VSS
Pt has temp of 102.9, hr 108, chills, cold, has increased pain level of 10/10.

## 2022-03-07 NOTE — PROVIDER CONTACT NOTE (OTHER) - SITUATION
pt febrile to 101.3 orally   pt due for 2nd U of PRBCs  pt received 1G of tylenol PO for pain at 2211
Pt c/o pain 8/10 of RLE r/t post op lymphedema excision on 3/2. PO acetaminophen administered but did not alleviate pain.
Pt refused senna
Pt wound vac was leaking.
Pt has elevated temp of 102.9, , increased pain level 10/10.

## 2022-03-07 NOTE — BRIEF OPERATIVE NOTE - OPERATION/FINDINGS
necrotic tissue with lymphedema excised RLE, closure with interrupted nylon sutures and application of wound vac
Debridement and excision of soft tissue, closure with deep 0 Vicryl and interrupted 0 prolene, application of wound vac 125mmHg

## 2022-03-07 NOTE — PROVIDER CONTACT NOTE (OTHER) - ACTION/TREATMENT ORDERED:
ELADIA Castro made aware. IV hydromorphone administered. Pain reassessed. Will continue to monitor.
IV tylenol, blood cultures
provider scot made aware  additional 325 of tylenol to be given  recheck temp   hold off on 2nd U of blood until temperature decreases  ice packs placed on pt- tylenol given  continue to monitor
provider notified. No actions at this time.

## 2022-03-07 NOTE — PROGRESS NOTE ADULT - ASSESSMENT
Assessment: 76 year old woman with chronic lymphadema and a draining wound of RLE s/p excision of soft tissue from RLE on 3/2, recovering well on floor    Recommendations:  - OR today  - C/w Abx  - OOB with physical therapy  - Keep dressing  - Leg elevated while in bed    PRS  [phone]

## 2022-03-07 NOTE — PROVIDER CONTACT NOTE (OTHER) - BACKGROUND
Pt was admitted for lymphadema, wound vac was replaced on Right thigh on 3/7.
pt came in with admitting dx of lymphedema
Planned surgical excision of necrosis on RLE r/t lymphedema.
Pt admitted with lymphedema

## 2022-03-07 NOTE — BRIEF OPERATIVE NOTE - NSICDXBRIEFPROCEDURE_GEN_ALL_CORE_FT
PROCEDURES:  Debridement of necrotic or infected skin and tissue 02-Mar-2022 11:34:21 Kelle Bliss  
PROCEDURES:  Debridement of necrotic or infected skin and tissue 02-Mar-2022 11:34:21 Kelle Bliss

## 2022-03-07 NOTE — PROGRESS NOTE ADULT - ASSESSMENT
77 YO F with MHx of HTN and Chronic Lymphedema who presents with increased drainage from her RLE concerning for possible cellulitis     Lymphedema.    -pt with chronic lymphedema  -Va duplex to r/o clot - done  -Pnecrotic tissue with lymphedema excised RLE, closure with interrupted nylon sutures and application of wound vac  - back to OR today    Cellulitis.   - Pt with moderate to severe purulent cellulitis with necrotic tissue reported on outside imaging.   cont  ceftriaxone  and vancomycin   -f/u trough.  - ID consult following    anemia  - likely 2/2 acute blood loss  - transfuse 2 more unit  - follow up H&H in am      Hypertension.   - c/w losartan.    pain  - oxycodone prn    bowl regimen  - senna and miralalx     Prophylactic measure.   -  DVT Ppx  -Lovenox, Hold before OR.

## 2022-03-07 NOTE — BRIEF OPERATIVE NOTE - NSICDXBRIEFPREOP_GEN_ALL_CORE_FT
PRE-OP DIAGNOSIS:  Lymphedema, limb 02-Mar-2022 11:35:57 Kelle Bliss  Open wound of right lower leg 07-Mar-2022 10:35:24  Kelle Patterson  
PRE-OP DIAGNOSIS:  Necrosis 02-Mar-2022 11:35:33 Kelle Bliss  Lymphedema, limb 02-Mar-2022 11:35:57 Kelle Bliss

## 2022-03-07 NOTE — PROVIDER CONTACT NOTE (OTHER) - RECOMMENDATIONS
notify provider
Provider was notified. Spoke to plastic surgery and they stated to remove the wound vac. Provider removing wound vac and applying wet to dry dressing.
ELADIA Castro made aware.
Contact provider.
provider made aware

## 2022-03-08 LAB
ANION GAP SERPL CALC-SCNC: 9 MMOL/L — SIGNIFICANT CHANGE UP (ref 5–17)
BASOPHILS # BLD AUTO: 0.06 K/UL — SIGNIFICANT CHANGE UP (ref 0–0.2)
BASOPHILS NFR BLD AUTO: 0.3 % — SIGNIFICANT CHANGE UP (ref 0–2)
BUN SERPL-MCNC: 31 MG/DL — HIGH (ref 7–23)
CALCIUM SERPL-MCNC: 7.2 MG/DL — LOW (ref 8.4–10.5)
CHLORIDE SERPL-SCNC: 104 MMOL/L — SIGNIFICANT CHANGE UP (ref 96–108)
CO2 SERPL-SCNC: 20 MMOL/L — LOW (ref 22–31)
CREAT SERPL-MCNC: 1.44 MG/DL — HIGH (ref 0.5–1.3)
EGFR: 38 ML/MIN/1.73M2 — LOW
EOSINOPHIL # BLD AUTO: 0.15 K/UL — SIGNIFICANT CHANGE UP (ref 0–0.5)
EOSINOPHIL NFR BLD AUTO: 0.9 % — SIGNIFICANT CHANGE UP (ref 0–6)
GLUCOSE SERPL-MCNC: 176 MG/DL — HIGH (ref 70–99)
HCT VFR BLD CALC: 20.5 % — CRITICAL LOW (ref 34.5–45)
HGB BLD-MCNC: 6.5 G/DL — CRITICAL LOW (ref 11.5–15.5)
IMM GRANULOCYTES NFR BLD AUTO: 1.3 % — SIGNIFICANT CHANGE UP (ref 0–1.5)
LACTATE SERPL-SCNC: 1.4 MMOL/L — SIGNIFICANT CHANGE UP (ref 0.7–2)
LYMPHOCYTES # BLD AUTO: 0.98 K/UL — LOW (ref 1–3.3)
LYMPHOCYTES # BLD AUTO: 5.6 % — LOW (ref 13–44)
MCHC RBC-ENTMCNC: 28.6 PG — SIGNIFICANT CHANGE UP (ref 27–34)
MCHC RBC-ENTMCNC: 31.7 GM/DL — LOW (ref 32–36)
MCV RBC AUTO: 90.3 FL — SIGNIFICANT CHANGE UP (ref 80–100)
MONOCYTES # BLD AUTO: 1.98 K/UL — HIGH (ref 0–0.9)
MONOCYTES NFR BLD AUTO: 11.3 % — SIGNIFICANT CHANGE UP (ref 2–14)
NEUTROPHILS # BLD AUTO: 14.09 K/UL — HIGH (ref 1.8–7.4)
NEUTROPHILS NFR BLD AUTO: 80.6 % — HIGH (ref 43–77)
NRBC # BLD: 0 /100 WBCS — SIGNIFICANT CHANGE UP (ref 0–0)
PLATELET # BLD AUTO: 230 K/UL — SIGNIFICANT CHANGE UP (ref 150–400)
POTASSIUM SERPL-MCNC: 4.6 MMOL/L — SIGNIFICANT CHANGE UP (ref 3.5–5.3)
POTASSIUM SERPL-SCNC: 4.6 MMOL/L — SIGNIFICANT CHANGE UP (ref 3.5–5.3)
RBC # BLD: 2.27 M/UL — LOW (ref 3.8–5.2)
RBC # FLD: 15.2 % — HIGH (ref 10.3–14.5)
SODIUM SERPL-SCNC: 133 MMOL/L — LOW (ref 135–145)
VANCOMYCIN TROUGH SERPL-MCNC: 14.2 UG/ML — SIGNIFICANT CHANGE UP (ref 10–20)
WBC # BLD: 17.48 K/UL — HIGH (ref 3.8–10.5)
WBC # FLD AUTO: 17.48 K/UL — HIGH (ref 3.8–10.5)

## 2022-03-08 PROCEDURE — 99232 SBSQ HOSP IP/OBS MODERATE 35: CPT

## 2022-03-08 RX ORDER — OXYCODONE HYDROCHLORIDE 5 MG/1
5 TABLET ORAL ONCE
Refills: 0 | Status: DISCONTINUED | OUTPATIENT
Start: 2022-03-08 | End: 2022-03-08

## 2022-03-08 RX ORDER — ACETAMINOPHEN 500 MG
650 TABLET ORAL EVERY 6 HOURS
Refills: 0 | Status: DISCONTINUED | OUTPATIENT
Start: 2022-03-08 | End: 2022-03-24

## 2022-03-08 RX ADMIN — Medication 500 MILLIGRAM(S): at 22:13

## 2022-03-08 RX ADMIN — OXYCODONE HYDROCHLORIDE 5 MILLIGRAM(S): 5 TABLET ORAL at 04:19

## 2022-03-08 RX ADMIN — LOSARTAN POTASSIUM 50 MILLIGRAM(S): 100 TABLET, FILM COATED ORAL at 05:42

## 2022-03-08 RX ADMIN — Medication 500 MILLIGRAM(S): at 05:43

## 2022-03-08 RX ADMIN — OXYCODONE HYDROCHLORIDE 5 MILLIGRAM(S): 5 TABLET ORAL at 23:15

## 2022-03-08 RX ADMIN — Medication 300 MILLIGRAM(S): at 12:51

## 2022-03-08 RX ADMIN — Medication 650 MILLIGRAM(S): at 18:50

## 2022-03-08 RX ADMIN — Medication 500 MILLIGRAM(S): at 13:38

## 2022-03-08 RX ADMIN — OXYCODONE HYDROCHLORIDE 5 MILLIGRAM(S): 5 TABLET ORAL at 12:05

## 2022-03-08 RX ADMIN — OXYCODONE HYDROCHLORIDE 5 MILLIGRAM(S): 5 TABLET ORAL at 11:35

## 2022-03-08 RX ADMIN — ENOXAPARIN SODIUM 40 MILLIGRAM(S): 100 INJECTION SUBCUTANEOUS at 11:36

## 2022-03-08 RX ADMIN — OXYCODONE HYDROCHLORIDE 5 MILLIGRAM(S): 5 TABLET ORAL at 05:19

## 2022-03-08 NOTE — DIETITIAN INITIAL EVALUATION ADULT. - OTHER INFO
Pt reports good appetite and PO intake in-house. Denies nausea/vomiting/diarrhea/constipation.     Pt reports UBW "300-something," consistent with dosing weight (319.8 pounds on 3/2). Chronic lymphedema likely masking lower dry weight. Will continue to monitor and trend weights as able.     Encouraged continued good PO intake as tolerated. Emphasized importance of adequate protein intake to aid in healing and educated patient on high-protein foods. Encouraged continued adherence to low sodium diet as able and limiting intake of processed foods. Discussed relationship between sodium, blood pressure, and edema. Patient amenable to recommendations. Patient with no nutrition-related questions at this time. Made aware RD remains available.

## 2022-03-08 NOTE — DIETITIAN INITIAL EVALUATION ADULT. - ADD RECOMMEND
Consider multivitamin and vitamin C supplements if no medical contraindications to aid in wound healing. Monitor PO intake, weight, labs, skin, GI status, diet.

## 2022-03-08 NOTE — CHART NOTE - NSCHARTNOTEFT_GEN_A_CORE
MEDICINE NP-EPISODIC NOTE    Notified by RN patient with temperature 102.9 overnight. Seen and examined patient at bedside. Patient is alert, NAD. Denies HA, CP, SOB, cough, N/V, or abd pain.     VITAL SIGNS:  Vital Signs Last 24 Hrs  T(C): 37.7 (08 Mar 2022 04:44), Max: 39.4 (07 Mar 2022 19:59)  T(F): 99.9 (08 Mar 2022 04:44), Max: 102.9 (07 Mar 2022 19:59)  HR: 89 (08 Mar 2022 04:44) (78 - 108)  BP: 108/66 (08 Mar 2022 04:44) (79/49 - 134/80)  BP(mean): 69 (07 Mar 2022 13:30) (59 - 73)  RR: 18 (08 Mar 2022 04:44) (17 - 20)  SpO2: 98% (08 Mar 2022 04:44) (97% - 100%)      LABORATORY:    MICROBIOLOGY:   Culture - Blood (03.06.22 @ 02:56)   Specimen Source: .Blood Blood   Culture Results:   No growth to date.     Culture - Blood (03.06.22 @ 02:56)   Specimen Source: .Blood Blood   Culture Results:   No growth to date.       RADIOLOGY:  2/28 CXR: IMPRESSION:  Mildly elevated right hemidiaphragm.    Clear lungs.      PHYSICAL EXAM:    Constitutional: AOx3. NAD.    Respiratory: clear lungs bilaterally. No wheezing, rhonchi, or crackles.    Cardiovascular: S1 S2. No murmurs.    Gastrointestinal: BS X4 active. soft. nontender.    Extremities/Vascular: +2 pulses bilaterally. No BLE edema.      ASSESSMENT/PLAN:   75 YO F with MHx of HTN and Chronic Lymphedema who presents with increased drainage from her RLE. Pt with Hx of Chronic Lymphedema for >5 years, surgery attempted twice per patient and now with RLE increasing more over the past year, particularly over the past several months. She completed two weeks of abx one month ago (does not recall which abx), afterwhich her RLE infection got better, but now with increased drainage from the RLE, was sent in for abx and for surgical eval. Of note, she reports imaging that showed ?inflammation and necrosis? She denies any RLE warmth or erythema, fever, chills, nausea, or vomiting. She denies any chest pain , palpitations or shortness of breath, orthopnea,or PND. She can walk about two blocks and stops due to fatigue but not short of breath. She states she had a nuclear stress test two years ago which was normal and to her knowledge has not had any changes to her EKG.  (28 Feb 2022 23:16)    Patient s/p debridement of necrotic tissue and wound vac placement on 3/8.     Now with fever 2/2 post vs current infection.    Fever  --Tylenol and cooling measures prn for pyrexia  --rpt BC x2 ordered,, UA if dysuria  --CXR if resp symptoms  --c/w CTX, vanco, flagyl  --F/U primary team in AM    Leticia Lainez, NP-BC  [id number] MEDICINE NP-EPISODIC NOTE    Notified by RN patient with temperature 102.9 overnight. Seen and examined patient at bedside. Patient is alert, NAD, c/o generalized pain. Denies HA, CP, SOB, cough, N/V, dysuria or abd pain.     VITAL SIGNS:  Vital Signs Last 24 Hrs  T(C): 37.7 (08 Mar 2022 04:44), Max: 39.4 (07 Mar 2022 19:59)  T(F): 99.9 (08 Mar 2022 04:44), Max: 102.9 (07 Mar 2022 19:59)  HR: 89 (08 Mar 2022 04:44) (78 - 108)  BP: 108/66 (08 Mar 2022 04:44) (79/49 - 134/80)  BP(mean): 69 (07 Mar 2022 13:30) (59 - 73)  RR: 18 (08 Mar 2022 04:44) (17 - 20)  SpO2: 98% (08 Mar 2022 04:44) (97% - 100%)      LABORATORY:    MICROBIOLOGY:   Culture - Blood (03.06.22 @ 02:56)   Specimen Source: .Blood Blood   Culture Results:   No growth to date.     Culture - Blood (03.06.22 @ 02:56)   Specimen Source: .Blood Blood   Culture Results:   No growth to date.       RADIOLOGY:  2/28 CXR: IMPRESSION:  Mildly elevated right hemidiaphragm.    Clear lungs.      PHYSICAL EXAM:    Constitutional: AOx3. NAD.    Respiratory: clear lungs bilaterally. No wheezing, rhonchi, or crackles.    Cardiovascular: S1 S2. No murmurs.    Gastrointestinal: BS X4 active. soft. nontender.    Extremities/Vascular: +2 pulses bilaterally. No BLE edema.      ASSESSMENT/PLAN:   75 YO F with MHx of HTN and Chronic Lymphedema who presents with increased drainage from her RLE. Pt with Hx of Chronic Lymphedema for >5 years, surgery attempted twice per patient and now with RLE increasing more over the past year, particularly over the past several months. She completed two weeks of abx one month ago (does not recall which abx), afterwhich her RLE infection got better, but now with increased drainage from the RLE, was sent in for abx and for surgical eval. Of note, she reports imaging that showed ?inflammation and necrosis? She denies any RLE warmth or erythema, fever, chills, nausea, or vomiting. She denies any chest pain , palpitations or shortness of breath, orthopnea,or PND. She can walk about two blocks and stops due to fatigue but not short of breath. She states she had a nuclear stress test two years ago which was normal and to her knowledge has not had any changes to her EKG.  (28 Feb 2022 23:16)    Patient s/p debridement of necrotic tissue and wound vac placement on 3/8.     Now with fever 2/2 post vs current infection.    Fever  --Tylenol and cooling measures prn for pyrexia  --rpt BC x2 ordered,, UA if dysuria  --CXR if resp symptoms  --c/w CTX, vanco, flagyl  --F/U primary team in     Leticia Lainez, NP-BC  [id number]

## 2022-03-08 NOTE — PROGRESS NOTE ADULT - ASSESSMENT
76 year old female with PMHx of Hypertension, morbid obesity, and chronic Lymphedema who presents with increased drainage from her RLE  Longstanding wound, prior procedures, has had drainage from site  On admission, necrotic tissue at wound and drainage  3/2 OR with plastic, debridement  Wound vac in place presently  S/p OR 3/7 debridement and re-placement wound vac  Fevers following--post op fevers? However note drop in hemoglobin, rise in WBC, AMS  Transient process causing inflammatory reaction?  Overall,  1) Necrotic, chronic wound  - Likely with some degree of superinfection  - Vanco 1500mg q 24 (monitor levels)  - Ceftriaxone 2g q 24  - PO Flagyl 500mg q 12  - F/U plastic  - Wound care  2) Anemia  - Due to IVF/hospitalization  - Trend for stability  - Monitor for signs bleeding  3) Fever/Leukocytosis  - Reactive to OR or anemia process?  - Check BCXs x 2  - Further workup depending on persistence/clinical signs  - Cont abx as above    Francis Henderson MD  Contact on TEAMS messaging from 9am - 5pm  From 5pm-9am, and on weekends call [phone]

## 2022-03-08 NOTE — DIETITIAN INITIAL EVALUATION ADULT. - SIGNS/SYMPTOMS
as evidenced by BMI >40 kg/m2 as evidenced by pt s/p excision of soft tissue from RLE with staged excision and VAC

## 2022-03-08 NOTE — DIETITIAN INITIAL EVALUATION ADULT. - ORAL INTAKE PTA/DIET HISTORY
Pt reports good appetite and PO intake PTA. Endorses limiting sodium intake at home. Confirmed NKFA, denies difficulties chewing or swallowing. Notes taking vitamin B12 supplement PTA.

## 2022-03-08 NOTE — CHART NOTE - NSCHARTNOTEFT_GEN_A_CORE
ALICIA GONZALEZ    Notified by RN patient with critical lab result HGB 6.5. Seen and examined. Sero-sanguineous drainage from wound vac.      Interventions taken   -PRBC x1  -Repeat CBC post transfusion  - Attending aware and agrees                     Stan HALL-BC

## 2022-03-08 NOTE — DIETITIAN INITIAL EVALUATION ADULT. - PROBLEM SELECTOR PLAN 2
Pt with moderate to severe purulent cellulitis with necrotic tissue reported on outside imaging.   s/p cefazolin in the ED  -Will treat with vancomycin   -f/u trough

## 2022-03-08 NOTE — DIETITIAN INITIAL EVALUATION ADULT. - ORAL NUTRITION SUPPLEMENTS
Recommend Liquid Protein Supplement (LPS) 2 daily (15 g protein, 100 kcal in each) to optimize protein intake

## 2022-03-08 NOTE — PROGRESS NOTE ADULT - ASSESSMENT
Assessment: 76 year old woman with chronic lymphadema and a draining wound of RLE s/p excision of soft tissue from RLE on 3/2 with staged excision and VAC on 3/7    Recommendations:  - C/w VAC MWF change  - C/w Abx  - OOB with physical therapy  - F.u blood cx  - Leg elevated while in bed    PRS  [id number]

## 2022-03-08 NOTE — DIETITIAN NUTRITION RISK NOTIFICATION - TREATMENT: THE FOLLOWING DIET HAS BEEN RECOMMENDED
Diet, Regular:   Low Sodium  Liquid Protein Supplement     Qty per Day:  2 (03-08-22 @ 10:24) [Pending Verification By Attending]

## 2022-03-08 NOTE — DIETITIAN INITIAL EVALUATION ADULT. - REASON INDICATOR FOR ASSESSMENT
BMI >40, length of stay assessment  Source: EMR, patient    Pt is a 75 yo female with PMH of HTN and chronic lymphedema who presented with increased drainage from RLE. Admitted 2/28.

## 2022-03-09 LAB
ANION GAP SERPL CALC-SCNC: 9 MMOL/L — SIGNIFICANT CHANGE UP (ref 5–17)
APPEARANCE UR: ABNORMAL
BACTERIA # UR AUTO: 0 — SIGNIFICANT CHANGE UP
BILIRUB UR-MCNC: NEGATIVE — SIGNIFICANT CHANGE UP
BUN SERPL-MCNC: 28 MG/DL — HIGH (ref 7–23)
CALCIUM SERPL-MCNC: 7.3 MG/DL — LOW (ref 8.4–10.5)
CHLORIDE SERPL-SCNC: 102 MMOL/L — SIGNIFICANT CHANGE UP (ref 96–108)
CO2 SERPL-SCNC: 22 MMOL/L — SIGNIFICANT CHANGE UP (ref 22–31)
COLOR SPEC: YELLOW — SIGNIFICANT CHANGE UP
COMMENT - URINE: SIGNIFICANT CHANGE UP
CREAT SERPL-MCNC: 1.18 MG/DL — SIGNIFICANT CHANGE UP (ref 0.5–1.3)
DIFF PNL FLD: NEGATIVE — SIGNIFICANT CHANGE UP
EGFR: 48 ML/MIN/1.73M2 — LOW
EPI CELLS # UR: 1 /HPF — SIGNIFICANT CHANGE UP
GLUCOSE SERPL-MCNC: 161 MG/DL — HIGH (ref 70–99)
GLUCOSE UR QL: NEGATIVE — SIGNIFICANT CHANGE UP
HCT VFR BLD CALC: 22.1 % — LOW (ref 34.5–45)
HGB BLD-MCNC: 7.1 G/DL — LOW (ref 11.5–15.5)
KETONES UR-MCNC: NEGATIVE — SIGNIFICANT CHANGE UP
LEUKOCYTE ESTERASE UR-ACNC: NEGATIVE — SIGNIFICANT CHANGE UP
MCHC RBC-ENTMCNC: 29.1 PG — SIGNIFICANT CHANGE UP (ref 27–34)
MCHC RBC-ENTMCNC: 32.1 GM/DL — SIGNIFICANT CHANGE UP (ref 32–36)
MCV RBC AUTO: 90.6 FL — SIGNIFICANT CHANGE UP (ref 80–100)
NITRITE UR-MCNC: NEGATIVE — SIGNIFICANT CHANGE UP
NRBC # BLD: 0 /100 WBCS — SIGNIFICANT CHANGE UP (ref 0–0)
PH UR: 6 — SIGNIFICANT CHANGE UP (ref 5–8)
PLATELET # BLD AUTO: 242 K/UL — SIGNIFICANT CHANGE UP (ref 150–400)
POTASSIUM SERPL-MCNC: 4.3 MMOL/L — SIGNIFICANT CHANGE UP (ref 3.5–5.3)
POTASSIUM SERPL-SCNC: 4.3 MMOL/L — SIGNIFICANT CHANGE UP (ref 3.5–5.3)
PROT UR-MCNC: SIGNIFICANT CHANGE UP
RBC # BLD: 2.44 M/UL — LOW (ref 3.8–5.2)
RBC # FLD: 15 % — HIGH (ref 10.3–14.5)
RBC CASTS # UR COMP ASSIST: 3 /HPF — SIGNIFICANT CHANGE UP (ref 0–4)
SODIUM SERPL-SCNC: 133 MMOL/L — LOW (ref 135–145)
SP GR SPEC: 1.01 — SIGNIFICANT CHANGE UP (ref 1.01–1.02)
UROBILINOGEN FLD QL: NEGATIVE — SIGNIFICANT CHANGE UP
WBC # BLD: 16.49 K/UL — HIGH (ref 3.8–10.5)
WBC # FLD AUTO: 16.49 K/UL — HIGH (ref 3.8–10.5)
WBC UR QL: 1 /HPF — SIGNIFICANT CHANGE UP (ref 0–5)

## 2022-03-09 PROCEDURE — 99232 SBSQ HOSP IP/OBS MODERATE 35: CPT

## 2022-03-09 RX ADMIN — Medication 500 MILLIGRAM(S): at 21:27

## 2022-03-09 RX ADMIN — POLYETHYLENE GLYCOL 3350 17 GRAM(S): 17 POWDER, FOR SOLUTION ORAL at 11:56

## 2022-03-09 RX ADMIN — Medication 500 MILLIGRAM(S): at 05:43

## 2022-03-09 RX ADMIN — Medication 300 MILLIGRAM(S): at 11:57

## 2022-03-09 RX ADMIN — OXYCODONE HYDROCHLORIDE 5 MILLIGRAM(S): 5 TABLET ORAL at 00:05

## 2022-03-09 RX ADMIN — CEFTRIAXONE 100 MILLIGRAM(S): 500 INJECTION, POWDER, FOR SOLUTION INTRAMUSCULAR; INTRAVENOUS at 17:29

## 2022-03-09 RX ADMIN — CEFTRIAXONE 100 MILLIGRAM(S): 500 INJECTION, POWDER, FOR SOLUTION INTRAMUSCULAR; INTRAVENOUS at 00:15

## 2022-03-09 RX ADMIN — ENOXAPARIN SODIUM 40 MILLIGRAM(S): 100 INJECTION SUBCUTANEOUS at 11:56

## 2022-03-09 RX ADMIN — LOSARTAN POTASSIUM 50 MILLIGRAM(S): 100 TABLET, FILM COATED ORAL at 05:43

## 2022-03-09 RX ADMIN — OXYCODONE HYDROCHLORIDE 5 MILLIGRAM(S): 5 TABLET ORAL at 23:30

## 2022-03-09 RX ADMIN — Medication 650 MILLIGRAM(S): at 02:12

## 2022-03-09 RX ADMIN — OXYCODONE HYDROCHLORIDE 5 MILLIGRAM(S): 5 TABLET ORAL at 08:55

## 2022-03-09 RX ADMIN — SENNA PLUS 2 TABLET(S): 8.6 TABLET ORAL at 21:27

## 2022-03-09 RX ADMIN — OXYCODONE HYDROCHLORIDE 5 MILLIGRAM(S): 5 TABLET ORAL at 13:30

## 2022-03-09 RX ADMIN — OXYCODONE HYDROCHLORIDE 5 MILLIGRAM(S): 5 TABLET ORAL at 12:57

## 2022-03-09 RX ADMIN — OXYCODONE HYDROCHLORIDE 5 MILLIGRAM(S): 5 TABLET ORAL at 08:22

## 2022-03-09 RX ADMIN — Medication 650 MILLIGRAM(S): at 08:22

## 2022-03-09 RX ADMIN — Medication 500 MILLIGRAM(S): at 13:05

## 2022-03-09 RX ADMIN — OXYCODONE HYDROCHLORIDE 5 MILLIGRAM(S): 5 TABLET ORAL at 19:40

## 2022-03-09 RX ADMIN — Medication 650 MILLIGRAM(S): at 08:52

## 2022-03-09 RX ADMIN — Medication 650 MILLIGRAM(S): at 01:33

## 2022-03-09 NOTE — PROGRESS NOTE ADULT - ASSESSMENT
77 YO F with MHx of HTN and Chronic Lymphedema who presents with increased drainage from her RLE concerning for possible cellulitis     Lymphedema.    -pt with chronic lymphedema  -Va duplex to r/o clot - done  -Pnecrotic tissue with lymphedema excised RLE, closure with interrupted nylon sutures and application of wound vac  - s/p Debridement and excision of soft tissue, closure and  application of wound vac 125mmHg    Cellulitis. fever  -Necrotic, chronic wound  - Likely with some degree of superinfection  - Vanco 1500mg q 24 (monitor levels)  - Ceftriaxone 2g q 24  - PO Flagyl 500mg q 12  - F/U plastics team  - Wound care  - ID consult following    anemia  - likely 2/2 acute blood loss  - transfuse prn  - follow up H&H in am  - keep hgb above 8      Hypertension.   - c/w losartan.    pain  - oxycodone prn    bowl regimen  - senna and Miralax     Prophylactic measure.   -  DVT Ppx  -Lovenox, Hold before OR.

## 2022-03-09 NOTE — PROGRESS NOTE ADULT - ASSESSMENT
76 year old female with PMHx of Hypertension, morbid obesity, and chronic Lymphedema who presents with increased drainage from her RLE  Longstanding wound, prior procedures, has had drainage from site  On admission, necrotic tissue at wound and drainage  3/2 OR with plastic, debridement  Wound vac in place presently  S/p OR 3/7 debridement and re-placement wound vac  Fevers following--post op fevers? However note drop in hemoglobin, rise in WBC, AMS  Transient process causing inflammatory reaction?  Overall,  1) Necrotic, chronic wound  - Likely with some degree of superinfection  - Vanco 1500mg q 24 (monitor levels)  - Ceftriaxone 2g q 24  - PO Flagyl 500mg q 12  - F/U plastics team  - Wound care  2) Anemia  - Due to IVF/hospitalization  - Trend for stability  - Monitor for signs bleeding  3) Fever/Leukocytosis  - Reactive to OR or anemia process?  - F/U BCX  - Further workup depending on persistence/clinical signs  - Cont abx as above    Francis Henderson MD  Contact on TEAMS messaging from 9am - 5pm  From 5pm-9am, and on weekends call [phone] 76 year old female with PMHx of Hypertension, morbid obesity, and chronic Lymphedema who presents with increased drainage from her RLE  Longstanding wound, prior procedures, has had drainage from site  On admission, necrotic tissue at wound and drainage  3/2 OR with plastic, debridement  Wound vac in place presently  S/p OR 3/7 debridement and re-placement wound vac  Fevers following--post op fevers? However note drop in hemoglobin, rise in WBC, AMS  Transient process causing inflammatory reaction?  Overall,  1) Necrotic, chronic wound  - Likely with some degree of superinfection  - Vanco 1500mg q 24 (monitor levels)  - Ceftriaxone 2g q 24  - PO Flagyl 500mg q 12  - F/U plastics team  - Wound care  2) Anemia  - Due to IVF/hospitalization  - Trend for stability  - Any bleeding concerns at wound site per surgical team  3) Fever/Leukocytosis  - Reactive to OR or anemia process?  - F/U BCX  - Further workup depending on persistence/clinical signs  - Cont abx as above    Francis Henderson MD  Contact on TEAMS messaging from 9am - 5pm  From 5pm-9am, and on weekends call [phone]

## 2022-03-10 LAB — VANCOMYCIN TROUGH SERPL-MCNC: 11.5 UG/ML — SIGNIFICANT CHANGE UP (ref 10–20)

## 2022-03-10 PROCEDURE — 99232 SBSQ HOSP IP/OBS MODERATE 35: CPT

## 2022-03-10 RX ADMIN — Medication 500 MILLIGRAM(S): at 13:53

## 2022-03-10 RX ADMIN — Medication 500 MILLIGRAM(S): at 05:29

## 2022-03-10 RX ADMIN — OXYCODONE HYDROCHLORIDE 5 MILLIGRAM(S): 5 TABLET ORAL at 07:30

## 2022-03-10 RX ADMIN — LOSARTAN POTASSIUM 50 MILLIGRAM(S): 100 TABLET, FILM COATED ORAL at 05:29

## 2022-03-10 RX ADMIN — OXYCODONE HYDROCHLORIDE 5 MILLIGRAM(S): 5 TABLET ORAL at 16:47

## 2022-03-10 RX ADMIN — ENOXAPARIN SODIUM 40 MILLIGRAM(S): 100 INJECTION SUBCUTANEOUS at 13:54

## 2022-03-10 RX ADMIN — Medication 300 MILLIGRAM(S): at 15:20

## 2022-03-10 RX ADMIN — CEFTRIAXONE 100 MILLIGRAM(S): 500 INJECTION, POWDER, FOR SOLUTION INTRAMUSCULAR; INTRAVENOUS at 17:40

## 2022-03-10 RX ADMIN — Medication 500 MILLIGRAM(S): at 21:47

## 2022-03-10 RX ADMIN — OXYCODONE HYDROCHLORIDE 5 MILLIGRAM(S): 5 TABLET ORAL at 21:47

## 2022-03-10 RX ADMIN — OXYCODONE HYDROCHLORIDE 5 MILLIGRAM(S): 5 TABLET ORAL at 07:00

## 2022-03-10 RX ADMIN — SENNA PLUS 2 TABLET(S): 8.6 TABLET ORAL at 21:47

## 2022-03-10 RX ADMIN — OXYCODONE HYDROCHLORIDE 5 MILLIGRAM(S): 5 TABLET ORAL at 17:17

## 2022-03-10 NOTE — PROGRESS NOTE ADULT - ASSESSMENT
76 year old female with PMHx of Hypertension, morbid obesity, and chronic Lymphedema who presents with increased drainage from her RLE  Longstanding wound, prior procedures, has had drainage from site  On admission, necrotic tissue at wound and drainage  3/2 OR with plastic, debridement  Wound vac in place presently  S/p OR 3/7 debridement and re-placement wound vac  Fevers following--post op fevers? However note drop in hemoglobin, rise in WBC, AMS  Leukocytosis/temp elevation due to hemoglobin drop? Monitor, continue abx  Overall,  1) Necrotic, chronic wound  - Likely with some degree of superinfection  - Vanco 1500mg q 24 (monitor levels)  - Ceftriaxone 2g q 24  - PO Flagyl 500mg q 12  - F/U plastics team  - Wound care  2) Anemia  - Due to IVF/hospitalization  - Trend for stability  - Any bleeding concerns at wound site per surgical team  3) Fever/Leukocytosis  - Reactive to OR or anemia process?  - F/U BCX  - Further workup depending on persistence/clinical signs  - Cont abx as above    Francis Hendesron MD  Contact on TEAMS messaging from 9am - 5pm  From 5pm-9am, and on weekends call [phone]

## 2022-03-10 NOTE — PROGRESS NOTE ADULT - ASSESSMENT
75 YO F with MHx of HTN and Chronic Lymphedema who presents with increased drainage from her RLE concerning for possible cellulitis     Lymphedema.    -pt with chronic lymphedema  -Va duplex to r/o clot - done  -Pnecrotic tissue with lymphedema excised RLE, closure with interrupted nylon sutures and application of wound vac  - s/p Debridement and excision of soft tissue, closure and  application of wound vac 125mmHg    Cellulitis. fever  -Necrotic, chronic wound  - Likely with some degree of superinfection  - Vanco 1500mg q 24 (monitor levels)  - Ceftriaxone 2g q 24  - PO Flagyl 500mg q 12  - F/U plastics team  - Wound care  - ID consult following    anemia  - likely 2/2 acute blood loss  - transfuse prn  - follow up H&H in am  - keep hgb above 8      Hypertension.   - c/w losartan.    pain  - oxycodone prn    bowl regimen  - senna and Miralax     Prophylactic measure.   -  DVT Ppx  -Lovenox, Hold before OR.

## 2022-03-11 LAB
ANION GAP SERPL CALC-SCNC: 12 MMOL/L — SIGNIFICANT CHANGE UP (ref 5–17)
BUN SERPL-MCNC: 31 MG/DL — HIGH (ref 7–23)
CALCIUM SERPL-MCNC: 7.6 MG/DL — LOW (ref 8.4–10.5)
CHLORIDE SERPL-SCNC: 100 MMOL/L — SIGNIFICANT CHANGE UP (ref 96–108)
CO2 SERPL-SCNC: 19 MMOL/L — LOW (ref 22–31)
CREAT SERPL-MCNC: 1.03 MG/DL — SIGNIFICANT CHANGE UP (ref 0.5–1.3)
CULTURE RESULTS: SIGNIFICANT CHANGE UP
CULTURE RESULTS: SIGNIFICANT CHANGE UP
EGFR: 57 ML/MIN/1.73M2 — LOW
GLUCOSE SERPL-MCNC: 166 MG/DL — HIGH (ref 70–99)
HCT VFR BLD CALC: 22.9 % — LOW (ref 34.5–45)
HGB BLD-MCNC: 7.2 G/DL — LOW (ref 11.5–15.5)
MCHC RBC-ENTMCNC: 29 PG — SIGNIFICANT CHANGE UP (ref 27–34)
MCHC RBC-ENTMCNC: 31.4 GM/DL — LOW (ref 32–36)
MCV RBC AUTO: 92.3 FL — SIGNIFICANT CHANGE UP (ref 80–100)
NRBC # BLD: 0 /100 WBCS — SIGNIFICANT CHANGE UP (ref 0–0)
PLATELET # BLD AUTO: 244 K/UL — SIGNIFICANT CHANGE UP (ref 150–400)
POTASSIUM SERPL-MCNC: 4.7 MMOL/L — SIGNIFICANT CHANGE UP (ref 3.5–5.3)
POTASSIUM SERPL-SCNC: 4.7 MMOL/L — SIGNIFICANT CHANGE UP (ref 3.5–5.3)
RBC # BLD: 2.48 M/UL — LOW (ref 3.8–5.2)
RBC # FLD: 15.1 % — HIGH (ref 10.3–14.5)
SODIUM SERPL-SCNC: 131 MMOL/L — LOW (ref 135–145)
SPECIMEN SOURCE: SIGNIFICANT CHANGE UP
SPECIMEN SOURCE: SIGNIFICANT CHANGE UP
SURGICAL PATHOLOGY STUDY: SIGNIFICANT CHANGE UP
WBC # BLD: 13.99 K/UL — HIGH (ref 3.8–10.5)
WBC # FLD AUTO: 13.99 K/UL — HIGH (ref 3.8–10.5)

## 2022-03-11 PROCEDURE — 99232 SBSQ HOSP IP/OBS MODERATE 35: CPT

## 2022-03-11 RX ADMIN — Medication 500 MILLIGRAM(S): at 13:13

## 2022-03-11 RX ADMIN — Medication 500 MILLIGRAM(S): at 06:18

## 2022-03-11 RX ADMIN — Medication 300 MILLIGRAM(S): at 13:14

## 2022-03-11 RX ADMIN — OXYCODONE HYDROCHLORIDE 5 MILLIGRAM(S): 5 TABLET ORAL at 06:18

## 2022-03-11 RX ADMIN — OXYCODONE HYDROCHLORIDE 5 MILLIGRAM(S): 5 TABLET ORAL at 11:40

## 2022-03-11 RX ADMIN — LOSARTAN POTASSIUM 50 MILLIGRAM(S): 100 TABLET, FILM COATED ORAL at 06:19

## 2022-03-11 RX ADMIN — CEFTRIAXONE 100 MILLIGRAM(S): 500 INJECTION, POWDER, FOR SOLUTION INTRAMUSCULAR; INTRAVENOUS at 17:55

## 2022-03-11 RX ADMIN — OXYCODONE HYDROCHLORIDE 5 MILLIGRAM(S): 5 TABLET ORAL at 06:47

## 2022-03-11 RX ADMIN — SENNA PLUS 2 TABLET(S): 8.6 TABLET ORAL at 21:47

## 2022-03-11 RX ADMIN — OXYCODONE HYDROCHLORIDE 5 MILLIGRAM(S): 5 TABLET ORAL at 22:17

## 2022-03-11 RX ADMIN — Medication 650 MILLIGRAM(S): at 13:13

## 2022-03-11 RX ADMIN — ENOXAPARIN SODIUM 40 MILLIGRAM(S): 100 INJECTION SUBCUTANEOUS at 13:14

## 2022-03-11 RX ADMIN — OXYCODONE HYDROCHLORIDE 5 MILLIGRAM(S): 5 TABLET ORAL at 21:47

## 2022-03-11 RX ADMIN — Medication 500 MILLIGRAM(S): at 21:46

## 2022-03-11 RX ADMIN — OXYCODONE HYDROCHLORIDE 5 MILLIGRAM(S): 5 TABLET ORAL at 17:54

## 2022-03-11 NOTE — PROGRESS NOTE ADULT - ASSESSMENT
75 YO F with MHx of HTN and Chronic Lymphedema who presents with increased drainage from her RLE concerning for possible cellulitis     Lymphedema.    -pt with chronic lymphedema  -Va duplex to r/o clot - done  -Pnecrotic tissue with lymphedema excised RLE, closure with interrupted nylon sutures and application of wound vac  - s/p Debridement and excision of soft tissue, closure and  application of wound vac 125mmHg    Cellulitis. fever  - Necrotic, chronic wound  - Likely with some degree of superinfection  - Vanco 1500mg q 24 (monitor levels)  - Ceftriaxone 2g q 24  - PO Flagyl 500mg q 12  - F/U plastics team  - Wound care  - ID consult following    anemia  - likely 2/2 acute blood loss  - transfuse prn  - follow up H&H in am  - keep hgb above 8      Hypertension.   - c/w losartan.    pain  - oxycodone prn    bowl regimen  - senna and Miralax     Prophylactic measure.   -  DVT Ppx  -Lovenox, Hold before OR.

## 2022-03-11 NOTE — PROGRESS NOTE ADULT - ASSESSMENT
75 yo F with PMHx of Hypertension, morbid obesity, and chronic Lymphedema who presents with increased drainage from her RLE  Longstanding wound, prior procedures, has had drainage from site  On admission, necrotic tissue at wound and drainage  3/2 OR with plastic, debridement  Wound vac in place presently  S/p OR 3/7 debridement and re-placement wound vac  Fever resolved? WBC somewhat trending down  Overall,  1) Necrotic, chronic wound  - Likely with some degree of superinfection  - Vanco 1500mg q 24 (monitor levels)  - Ceftriaxone 2g q 24  - PO Flagyl 500mg q 12  - Plan to continue abx through 3/13/22 then discontinue (completes 7 days from last OR)  - F/U plastics team  - Wound care  2) Anemia  - Due to IVF/hospitalization  - Trend for stability  - Any bleeding concerns at wound site per surgical team  3) Fever/Leukocytosis  - Reactive to OR or anemia process?  - F/U BCX  - Further workup depending on persistence/clinical signs  - Cont abx as above    Francis Henderson MD  Contact on TEAMS messaging from 9am - 5pm  From 5pm-9am, and on weekends call [phone]

## 2022-03-12 LAB
ANION GAP SERPL CALC-SCNC: 8 MMOL/L — SIGNIFICANT CHANGE UP (ref 5–17)
BLD GP AB SCN SERPL QL: NEGATIVE — SIGNIFICANT CHANGE UP
BUN SERPL-MCNC: 26 MG/DL — HIGH (ref 7–23)
CALCIUM SERPL-MCNC: 7.5 MG/DL — LOW (ref 8.4–10.5)
CHLORIDE SERPL-SCNC: 104 MMOL/L — SIGNIFICANT CHANGE UP (ref 96–108)
CO2 SERPL-SCNC: 23 MMOL/L — SIGNIFICANT CHANGE UP (ref 22–31)
CREAT SERPL-MCNC: 0.93 MG/DL — SIGNIFICANT CHANGE UP (ref 0.5–1.3)
EGFR: 64 ML/MIN/1.73M2 — SIGNIFICANT CHANGE UP
GLUCOSE SERPL-MCNC: 144 MG/DL — HIGH (ref 70–99)
HCT VFR BLD CALC: 21.9 % — LOW (ref 34.5–45)
HGB BLD-MCNC: 6.8 G/DL — CRITICAL LOW (ref 11.5–15.5)
MCHC RBC-ENTMCNC: 28.6 PG — SIGNIFICANT CHANGE UP (ref 27–34)
MCHC RBC-ENTMCNC: 31.1 GM/DL — LOW (ref 32–36)
MCV RBC AUTO: 92 FL — SIGNIFICANT CHANGE UP (ref 80–100)
NRBC # BLD: 0 /100 WBCS — SIGNIFICANT CHANGE UP (ref 0–0)
PLATELET # BLD AUTO: 411 K/UL — HIGH (ref 150–400)
POTASSIUM SERPL-MCNC: 4.6 MMOL/L — SIGNIFICANT CHANGE UP (ref 3.5–5.3)
POTASSIUM SERPL-SCNC: 4.6 MMOL/L — SIGNIFICANT CHANGE UP (ref 3.5–5.3)
RBC # BLD: 2.38 M/UL — LOW (ref 3.8–5.2)
RBC # FLD: 15.1 % — HIGH (ref 10.3–14.5)
RH IG SCN BLD-IMP: POSITIVE — SIGNIFICANT CHANGE UP
SODIUM SERPL-SCNC: 135 MMOL/L — SIGNIFICANT CHANGE UP (ref 135–145)
WBC # BLD: 14.05 K/UL — HIGH (ref 3.8–10.5)
WBC # FLD AUTO: 14.05 K/UL — HIGH (ref 3.8–10.5)

## 2022-03-12 RX ADMIN — OXYCODONE HYDROCHLORIDE 5 MILLIGRAM(S): 5 TABLET ORAL at 23:12

## 2022-03-12 RX ADMIN — CEFTRIAXONE 100 MILLIGRAM(S): 500 INJECTION, POWDER, FOR SOLUTION INTRAMUSCULAR; INTRAVENOUS at 18:54

## 2022-03-12 RX ADMIN — OXYCODONE HYDROCHLORIDE 5 MILLIGRAM(S): 5 TABLET ORAL at 18:16

## 2022-03-12 RX ADMIN — OXYCODONE HYDROCHLORIDE 5 MILLIGRAM(S): 5 TABLET ORAL at 11:29

## 2022-03-12 RX ADMIN — OXYCODONE HYDROCHLORIDE 5 MILLIGRAM(S): 5 TABLET ORAL at 18:46

## 2022-03-12 RX ADMIN — Medication 500 MILLIGRAM(S): at 05:53

## 2022-03-12 RX ADMIN — Medication 500 MILLIGRAM(S): at 21:14

## 2022-03-12 RX ADMIN — Medication 500 MILLIGRAM(S): at 14:57

## 2022-03-12 RX ADMIN — ENOXAPARIN SODIUM 40 MILLIGRAM(S): 100 INJECTION SUBCUTANEOUS at 11:00

## 2022-03-12 RX ADMIN — POLYETHYLENE GLYCOL 3350 17 GRAM(S): 17 POWDER, FOR SOLUTION ORAL at 11:00

## 2022-03-12 RX ADMIN — OXYCODONE HYDROCHLORIDE 5 MILLIGRAM(S): 5 TABLET ORAL at 10:59

## 2022-03-12 RX ADMIN — LOSARTAN POTASSIUM 50 MILLIGRAM(S): 100 TABLET, FILM COATED ORAL at 05:53

## 2022-03-12 RX ADMIN — Medication 300 MILLIGRAM(S): at 11:15

## 2022-03-13 LAB
ANION GAP SERPL CALC-SCNC: 10 MMOL/L — SIGNIFICANT CHANGE UP (ref 5–17)
BUN SERPL-MCNC: 20 MG/DL — SIGNIFICANT CHANGE UP (ref 7–23)
CALCIUM SERPL-MCNC: 7.6 MG/DL — LOW (ref 8.4–10.5)
CHLORIDE SERPL-SCNC: 103 MMOL/L — SIGNIFICANT CHANGE UP (ref 96–108)
CO2 SERPL-SCNC: 22 MMOL/L — SIGNIFICANT CHANGE UP (ref 22–31)
CREAT SERPL-MCNC: 0.88 MG/DL — SIGNIFICANT CHANGE UP (ref 0.5–1.3)
EGFR: 68 ML/MIN/1.73M2 — SIGNIFICANT CHANGE UP
FERRITIN SERPL-MCNC: 253 NG/ML — HIGH (ref 15–150)
FOLATE SERPL-MCNC: 4.5 NG/ML — LOW
GLUCOSE SERPL-MCNC: 148 MG/DL — HIGH (ref 70–99)
HCT VFR BLD CALC: 24.6 % — LOW (ref 34.5–45)
HCT VFR BLD CALC: 26.6 % — LOW (ref 34.5–45)
HGB BLD-MCNC: 7.8 G/DL — LOW (ref 11.5–15.5)
HGB BLD-MCNC: 8.2 G/DL — LOW (ref 11.5–15.5)
IRON SATN MFR SERPL: 14 % — SIGNIFICANT CHANGE UP (ref 14–50)
IRON SATN MFR SERPL: 22 UG/DL — LOW (ref 30–160)
MCHC RBC-ENTMCNC: 28.4 PG — SIGNIFICANT CHANGE UP (ref 27–34)
MCHC RBC-ENTMCNC: 28.5 PG — SIGNIFICANT CHANGE UP (ref 27–34)
MCHC RBC-ENTMCNC: 30.8 GM/DL — LOW (ref 32–36)
MCHC RBC-ENTMCNC: 31.7 GM/DL — LOW (ref 32–36)
MCV RBC AUTO: 89.8 FL — SIGNIFICANT CHANGE UP (ref 80–100)
MCV RBC AUTO: 92 FL — SIGNIFICANT CHANGE UP (ref 80–100)
NRBC # BLD: 0 /100 WBCS — SIGNIFICANT CHANGE UP (ref 0–0)
NRBC # BLD: 0 /100 WBCS — SIGNIFICANT CHANGE UP (ref 0–0)
PLATELET # BLD AUTO: 468 K/UL — HIGH (ref 150–400)
PLATELET # BLD AUTO: 509 K/UL — HIGH (ref 150–400)
POTASSIUM SERPL-MCNC: 4.5 MMOL/L — SIGNIFICANT CHANGE UP (ref 3.5–5.3)
POTASSIUM SERPL-SCNC: 4.5 MMOL/L — SIGNIFICANT CHANGE UP (ref 3.5–5.3)
RBC # BLD: 2.74 M/UL — LOW (ref 3.8–5.2)
RBC # BLD: 2.89 M/UL — LOW (ref 3.8–5.2)
RBC # FLD: 16.2 % — HIGH (ref 10.3–14.5)
RBC # FLD: 16.4 % — HIGH (ref 10.3–14.5)
SARS-COV-2 RNA SPEC QL NAA+PROBE: SIGNIFICANT CHANGE UP
SODIUM SERPL-SCNC: 135 MMOL/L — SIGNIFICANT CHANGE UP (ref 135–145)
TIBC SERPL-MCNC: 157 UG/DL — LOW (ref 220–430)
TRANSFERRIN SERPL-MCNC: 115 MG/DL — LOW (ref 200–360)
UIBC SERPL-MCNC: 134 UG/DL — SIGNIFICANT CHANGE UP (ref 110–370)
VANCOMYCIN TROUGH SERPL-MCNC: 9.4 UG/ML — LOW (ref 10–20)
VIT B12 SERPL-MCNC: >2000 PG/ML — HIGH (ref 232–1245)
WBC # BLD: 14.58 K/UL — HIGH (ref 3.8–10.5)
WBC # BLD: 15.89 K/UL — HIGH (ref 3.8–10.5)
WBC # FLD AUTO: 14.58 K/UL — HIGH (ref 3.8–10.5)
WBC # FLD AUTO: 15.89 K/UL — HIGH (ref 3.8–10.5)

## 2022-03-13 RX ORDER — METRONIDAZOLE 500 MG
500 TABLET ORAL EVERY 8 HOURS
Refills: 0 | Status: COMPLETED | OUTPATIENT
Start: 2022-03-13 | End: 2022-03-13

## 2022-03-13 RX ORDER — CEFTRIAXONE 500 MG/1
2000 INJECTION, POWDER, FOR SOLUTION INTRAMUSCULAR; INTRAVENOUS ONCE
Refills: 0 | Status: COMPLETED | OUTPATIENT
Start: 2022-03-13 | End: 2022-03-13

## 2022-03-13 RX ORDER — VANCOMYCIN HCL 1 G
1500 VIAL (EA) INTRAVENOUS ONCE
Refills: 0 | Status: COMPLETED | OUTPATIENT
Start: 2022-03-13 | End: 2022-03-13

## 2022-03-13 RX ADMIN — Medication 650 MILLIGRAM(S): at 03:13

## 2022-03-13 RX ADMIN — Medication 500 MILLIGRAM(S): at 05:44

## 2022-03-13 RX ADMIN — POLYETHYLENE GLYCOL 3350 17 GRAM(S): 17 POWDER, FOR SOLUTION ORAL at 11:55

## 2022-03-13 RX ADMIN — Medication 500 MILLIGRAM(S): at 13:10

## 2022-03-13 RX ADMIN — Medication 300 MILLIGRAM(S): at 11:57

## 2022-03-13 RX ADMIN — Medication 650 MILLIGRAM(S): at 04:08

## 2022-03-13 RX ADMIN — ENOXAPARIN SODIUM 40 MILLIGRAM(S): 100 INJECTION SUBCUTANEOUS at 12:01

## 2022-03-13 RX ADMIN — LOSARTAN POTASSIUM 50 MILLIGRAM(S): 100 TABLET, FILM COATED ORAL at 21:20

## 2022-03-13 RX ADMIN — OXYCODONE HYDROCHLORIDE 5 MILLIGRAM(S): 5 TABLET ORAL at 21:48

## 2022-03-13 RX ADMIN — OXYCODONE HYDROCHLORIDE 5 MILLIGRAM(S): 5 TABLET ORAL at 00:06

## 2022-03-13 RX ADMIN — OXYCODONE HYDROCHLORIDE 5 MILLIGRAM(S): 5 TABLET ORAL at 14:00

## 2022-03-13 RX ADMIN — Medication 500 MILLIGRAM(S): at 21:20

## 2022-03-13 RX ADMIN — OXYCODONE HYDROCHLORIDE 5 MILLIGRAM(S): 5 TABLET ORAL at 22:50

## 2022-03-13 RX ADMIN — OXYCODONE HYDROCHLORIDE 5 MILLIGRAM(S): 5 TABLET ORAL at 13:09

## 2022-03-13 RX ADMIN — CEFTRIAXONE 100 MILLIGRAM(S): 500 INJECTION, POWDER, FOR SOLUTION INTRAMUSCULAR; INTRAVENOUS at 18:11

## 2022-03-13 NOTE — PROGRESS NOTE ADULT - ASSESSMENT
75 YO F with MHx of HTN and Chronic Lymphedema who presents with increased drainage from her RLE concerning for possible cellulitis     Lymphedema.    -pt with chronic lymphedema  -Va duplex to r/o clot - done  -Pnecrotic tissue with lymphedema excised RLE, closure with interrupted nylon sutures and application of wound vac  - s/p Debridement and excision of soft tissue, closure and  application of wound vac 125mmHg    Cellulitis. fever  - Necrotic, chronic wound  - Likely with some degree of superinfection  - Vanco 1500mg q 24 (monitor levels)  - Ceftriaxone 2g q 24  - PO Flagyl 500mg q 12  - F/U plastics team  - Wound care  - ID consult following    anemia  - likely 2/2 acute blood loss  - transfuse prn  - follow up H&H   - keep hgb above 8      Hypertension.   - c/w losartan.    pain  - oxycodone prn    bowl regimen  - senna and Miralax     Prophylactic measure.   -  DVT Ppx  -Lovenox, Hold before OR.

## 2022-03-14 LAB
ALBUMIN SERPL ELPH-MCNC: 2.2 G/DL — LOW (ref 3.3–5)
ALP SERPL-CCNC: 96 U/L — SIGNIFICANT CHANGE UP (ref 40–120)
ALT FLD-CCNC: 7 U/L — LOW (ref 10–45)
ANION GAP SERPL CALC-SCNC: 8 MMOL/L — SIGNIFICANT CHANGE UP (ref 5–17)
APTT BLD: 29.6 SEC — SIGNIFICANT CHANGE UP (ref 27.5–35.5)
AST SERPL-CCNC: 9 U/L — LOW (ref 10–40)
BILIRUB SERPL-MCNC: 0.2 MG/DL — SIGNIFICANT CHANGE UP (ref 0.2–1.2)
BUN SERPL-MCNC: 15 MG/DL — SIGNIFICANT CHANGE UP (ref 7–23)
CALCIUM SERPL-MCNC: 7.8 MG/DL — LOW (ref 8.4–10.5)
CHLORIDE SERPL-SCNC: 103 MMOL/L — SIGNIFICANT CHANGE UP (ref 96–108)
CO2 SERPL-SCNC: 23 MMOL/L — SIGNIFICANT CHANGE UP (ref 22–31)
CREAT SERPL-MCNC: 0.71 MG/DL — SIGNIFICANT CHANGE UP (ref 0.5–1.3)
CULTURE RESULTS: SIGNIFICANT CHANGE UP
EGFR: 89 ML/MIN/1.73M2 — SIGNIFICANT CHANGE UP
GLUCOSE SERPL-MCNC: 124 MG/DL — HIGH (ref 70–99)
HCT VFR BLD CALC: 24.2 % — LOW (ref 34.5–45)
HCT VFR BLD CALC: 24.4 % — LOW (ref 34.5–45)
HGB BLD-MCNC: 7.5 G/DL — LOW (ref 11.5–15.5)
HGB BLD-MCNC: 7.5 G/DL — LOW (ref 11.5–15.5)
INR BLD: 1.27 RATIO — HIGH (ref 0.88–1.16)
MAGNESIUM SERPL-MCNC: 2 MG/DL — SIGNIFICANT CHANGE UP (ref 1.6–2.6)
MCHC RBC-ENTMCNC: 28.1 PG — SIGNIFICANT CHANGE UP (ref 27–34)
MCHC RBC-ENTMCNC: 28.4 PG — SIGNIFICANT CHANGE UP (ref 27–34)
MCHC RBC-ENTMCNC: 30.7 GM/DL — LOW (ref 32–36)
MCHC RBC-ENTMCNC: 31 GM/DL — LOW (ref 32–36)
MCV RBC AUTO: 91.4 FL — SIGNIFICANT CHANGE UP (ref 80–100)
MCV RBC AUTO: 91.7 FL — SIGNIFICANT CHANGE UP (ref 80–100)
NRBC # BLD: 0 /100 WBCS — SIGNIFICANT CHANGE UP (ref 0–0)
NRBC # BLD: 0 /100 WBCS — SIGNIFICANT CHANGE UP (ref 0–0)
PLATELET # BLD AUTO: 516 K/UL — HIGH (ref 150–400)
PLATELET # BLD AUTO: 543 K/UL — HIGH (ref 150–400)
POTASSIUM SERPL-MCNC: 4.5 MMOL/L — SIGNIFICANT CHANGE UP (ref 3.5–5.3)
POTASSIUM SERPL-SCNC: 4.5 MMOL/L — SIGNIFICANT CHANGE UP (ref 3.5–5.3)
PROT SERPL-MCNC: 5.8 G/DL — LOW (ref 6–8.3)
PROTHROM AB SERPL-ACNC: 14.6 SEC — HIGH (ref 10.5–13.4)
RBC # BLD: 2.64 M/UL — LOW (ref 3.8–5.2)
RBC # BLD: 2.67 M/UL — LOW (ref 3.8–5.2)
RBC # FLD: 16.1 % — HIGH (ref 10.3–14.5)
RBC # FLD: 16.2 % — HIGH (ref 10.3–14.5)
SODIUM SERPL-SCNC: 134 MMOL/L — LOW (ref 135–145)
SPECIMEN SOURCE: SIGNIFICANT CHANGE UP
WBC # BLD: 14.35 K/UL — HIGH (ref 3.8–10.5)
WBC # BLD: 14.46 K/UL — HIGH (ref 3.8–10.5)
WBC # FLD AUTO: 14.35 K/UL — HIGH (ref 3.8–10.5)
WBC # FLD AUTO: 14.46 K/UL — HIGH (ref 3.8–10.5)

## 2022-03-14 PROCEDURE — 99232 SBSQ HOSP IP/OBS MODERATE 35: CPT

## 2022-03-14 RX ORDER — NYSTATIN CREAM 100000 [USP'U]/G
1 CREAM TOPICAL
Refills: 0 | Status: DISCONTINUED | OUTPATIENT
Start: 2022-03-14 | End: 2022-03-24

## 2022-03-14 RX ORDER — MEROPENEM 1 G/30ML
1000 INJECTION INTRAVENOUS EVERY 8 HOURS
Refills: 0 | Status: DISCONTINUED | OUTPATIENT
Start: 2022-03-14 | End: 2022-03-17

## 2022-03-14 RX ORDER — CEFTRIAXONE 500 MG/1
2000 INJECTION, POWDER, FOR SOLUTION INTRAMUSCULAR; INTRAVENOUS EVERY 24 HOURS
Refills: 0 | Status: DISCONTINUED | OUTPATIENT
Start: 2022-03-14 | End: 2022-03-14

## 2022-03-14 RX ORDER — ACETAMINOPHEN 500 MG
1000 TABLET ORAL ONCE
Refills: 0 | Status: COMPLETED | OUTPATIENT
Start: 2022-03-14 | End: 2022-03-14

## 2022-03-14 RX ORDER — METRONIDAZOLE 500 MG
500 TABLET ORAL EVERY 8 HOURS
Refills: 0 | Status: DISCONTINUED | OUTPATIENT
Start: 2022-03-14 | End: 2022-03-14

## 2022-03-14 RX ORDER — VANCOMYCIN HCL 1 G
1500 VIAL (EA) INTRAVENOUS EVERY 24 HOURS
Refills: 0 | Status: DISCONTINUED | OUTPATIENT
Start: 2022-03-14 | End: 2022-03-18

## 2022-03-14 RX ADMIN — OXYCODONE HYDROCHLORIDE 5 MILLIGRAM(S): 5 TABLET ORAL at 11:35

## 2022-03-14 RX ADMIN — Medication 400 MILLIGRAM(S): at 14:13

## 2022-03-14 RX ADMIN — OXYCODONE HYDROCHLORIDE 5 MILLIGRAM(S): 5 TABLET ORAL at 23:33

## 2022-03-14 RX ADMIN — Medication 650 MILLIGRAM(S): at 20:16

## 2022-03-14 RX ADMIN — LOSARTAN POTASSIUM 50 MILLIGRAM(S): 100 TABLET, FILM COATED ORAL at 05:15

## 2022-03-14 RX ADMIN — NYSTATIN CREAM 1 APPLICATION(S): 100000 CREAM TOPICAL at 17:41

## 2022-03-14 RX ADMIN — MEROPENEM 100 MILLIGRAM(S): 1 INJECTION INTRAVENOUS at 15:09

## 2022-03-14 RX ADMIN — SENNA PLUS 2 TABLET(S): 8.6 TABLET ORAL at 21:37

## 2022-03-14 RX ADMIN — OXYCODONE HYDROCHLORIDE 5 MILLIGRAM(S): 5 TABLET ORAL at 20:16

## 2022-03-14 RX ADMIN — OXYCODONE HYDROCHLORIDE 5 MILLIGRAM(S): 5 TABLET ORAL at 05:15

## 2022-03-14 RX ADMIN — Medication 650 MILLIGRAM(S): at 19:46

## 2022-03-14 RX ADMIN — ENOXAPARIN SODIUM 40 MILLIGRAM(S): 100 INJECTION SUBCUTANEOUS at 15:09

## 2022-03-14 RX ADMIN — Medication 300 MILLIGRAM(S): at 15:44

## 2022-03-14 RX ADMIN — OXYCODONE HYDROCHLORIDE 5 MILLIGRAM(S): 5 TABLET ORAL at 19:46

## 2022-03-14 RX ADMIN — MEROPENEM 100 MILLIGRAM(S): 1 INJECTION INTRAVENOUS at 21:37

## 2022-03-14 RX ADMIN — OXYCODONE HYDROCHLORIDE 5 MILLIGRAM(S): 5 TABLET ORAL at 12:51

## 2022-03-14 RX ADMIN — OXYCODONE HYDROCHLORIDE 5 MILLIGRAM(S): 5 TABLET ORAL at 06:48

## 2022-03-14 RX ADMIN — Medication 1000 MILLIGRAM(S): at 14:45

## 2022-03-14 NOTE — CHART NOTE - NSCHARTNOTEFT_GEN_A_CORE
Wound Care Team Note:    Request for wound care consult for dressing change for patient being managed by Plastic Surgery received. Spoke with Plastic's PA Kelle Patterson and RIAN Last. Clinical nurses will do dressing changes as Plastics continues to manage this wound.    Virginia Velez, NP-C, CWOCN [id number]

## 2022-03-14 NOTE — PROGRESS NOTE ADULT - ASSESSMENT
75 YO F with MHx of HTN and Chronic Lymphedema who presents with increased drainage from her RLE concerning for possible cellulitis     Lymphedema.    -pt with chronic lymphedema  -Va duplex to r/o clot - done  -Pnecrotic tissue with lymphedema excised RLE, closure with interrupted nylon sutures and application of wound vac  - s/p Debridement and excision of soft tissue, closure and  application of wound vac 125mmHg    Cellulitis. fever  - Necrotic, chronic wound with purulent drainage  - Likely with some degree of superinfection  - Vanco 1500mg q 24 (monitor levels)  - Ceftriaxone 2g q 24  - PO Flagyl 500mg q 12  - F/U plastics team  - Wound care  - ID consult following    anemia  - likely 2/2 acute blood loss  - transfuse prn  - follow up H&H   - keep hgb above 8      Hypertension.   - c/w losartan.    pain  - oxycodone prn    bowl regimen  - senna and Miralax     Prophylactic measure.   -  DVT Ppx  -Lovenox, Hold before OR.

## 2022-03-14 NOTE — CHART NOTE - NSCHARTNOTEFT_GEN_A_CORE
Advised by plastic team of concern for worsening redness and purulence at wound site.  Concern is that patient has been on abx up to this point with minimal break in antibiotics--abx failure?    Use Vanco/Chris for now  Requested if purulence observed in wound, to send of wound culture    Francis Henderson MD  Contact on TEAMS messaging from 9am - 5pm  From 5pm-9am, and on weekends call [phone]

## 2022-03-14 NOTE — PROGRESS NOTE ADULT - ASSESSMENT
75 yo F with PMHx of Hypertension, morbid obesity, and chronic Lymphedema who presents with increased drainage from her RLE  Longstanding wound, prior procedures, has had drainage from site  On admission, necrotic tissue at wound and drainage  3/2 OR with plastic, debridement  Wound vac in place presently  S/p OR 3/7 debridement and re-placement wound vac  Fever resolved, WBC elevated but stable  Overall,  1) Necrotic, chronic wound  - Likely with some degree of superinfection  - S/p course abx for wound infection (vanco, CTX, flagyl)  - F/U plastics team  - Wound care  2) Anemia  - Due to IVF/hospitalization  - Trend for stability  - Any bleeding concerns at wound site per surgical team  3) Fever/Leukocytosis  - Reactive to OR or anemia process?  - Further workup depending on persistence/clinical signs    Francis Henderson MD  Contact on TEAMS messaging from 9am - 5pm  From 5pm-9am, and on weekends call [phone]

## 2022-03-15 LAB
HCT VFR BLD CALC: 29.1 % — LOW (ref 34.5–45)
HCT VFR BLD CALC: 29.7 % — LOW (ref 34.5–45)
HGB BLD-MCNC: 9 G/DL — LOW (ref 11.5–15.5)
HGB BLD-MCNC: 9.3 G/DL — LOW (ref 11.5–15.5)
MCHC RBC-ENTMCNC: 29 PG — SIGNIFICANT CHANGE UP (ref 27–34)
MCHC RBC-ENTMCNC: 29.2 PG — SIGNIFICANT CHANGE UP (ref 27–34)
MCHC RBC-ENTMCNC: 30.9 GM/DL — LOW (ref 32–36)
MCHC RBC-ENTMCNC: 31.3 GM/DL — LOW (ref 32–36)
MCV RBC AUTO: 93.1 FL — SIGNIFICANT CHANGE UP (ref 80–100)
MCV RBC AUTO: 93.9 FL — SIGNIFICANT CHANGE UP (ref 80–100)
NRBC # BLD: 0 /100 WBCS — SIGNIFICANT CHANGE UP (ref 0–0)
NRBC # BLD: 0 /100 WBCS — SIGNIFICANT CHANGE UP (ref 0–0)
PLATELET # BLD AUTO: 554 K/UL — HIGH (ref 150–400)
PLATELET # BLD AUTO: 570 K/UL — HIGH (ref 150–400)
RBC # BLD: 3.1 M/UL — LOW (ref 3.8–5.2)
RBC # BLD: 3.19 M/UL — LOW (ref 3.8–5.2)
RBC # FLD: 15.7 % — HIGH (ref 10.3–14.5)
RBC # FLD: 15.8 % — HIGH (ref 10.3–14.5)
WBC # BLD: 12.89 K/UL — HIGH (ref 3.8–10.5)
WBC # BLD: 13.75 K/UL — HIGH (ref 3.8–10.5)
WBC # FLD AUTO: 12.89 K/UL — HIGH (ref 3.8–10.5)
WBC # FLD AUTO: 13.75 K/UL — HIGH (ref 3.8–10.5)

## 2022-03-15 PROCEDURE — 99232 SBSQ HOSP IP/OBS MODERATE 35: CPT

## 2022-03-15 RX ORDER — OXYCODONE HYDROCHLORIDE 5 MG/1
5 TABLET ORAL EVERY 4 HOURS
Refills: 0 | Status: DISCONTINUED | OUTPATIENT
Start: 2022-03-15 | End: 2022-03-22

## 2022-03-15 RX ADMIN — ENOXAPARIN SODIUM 40 MILLIGRAM(S): 100 INJECTION SUBCUTANEOUS at 14:02

## 2022-03-15 RX ADMIN — Medication 650 MILLIGRAM(S): at 21:51

## 2022-03-15 RX ADMIN — OXYCODONE HYDROCHLORIDE 5 MILLIGRAM(S): 5 TABLET ORAL at 17:42

## 2022-03-15 RX ADMIN — OXYCODONE HYDROCHLORIDE 5 MILLIGRAM(S): 5 TABLET ORAL at 21:51

## 2022-03-15 RX ADMIN — OXYCODONE HYDROCHLORIDE 5 MILLIGRAM(S): 5 TABLET ORAL at 22:21

## 2022-03-15 RX ADMIN — OXYCODONE HYDROCHLORIDE 5 MILLIGRAM(S): 5 TABLET ORAL at 11:31

## 2022-03-15 RX ADMIN — MEROPENEM 100 MILLIGRAM(S): 1 INJECTION INTRAVENOUS at 21:51

## 2022-03-15 RX ADMIN — Medication 650 MILLIGRAM(S): at 05:20

## 2022-03-15 RX ADMIN — MEROPENEM 100 MILLIGRAM(S): 1 INJECTION INTRAVENOUS at 05:21

## 2022-03-15 RX ADMIN — NYSTATIN CREAM 1 APPLICATION(S): 100000 CREAM TOPICAL at 06:00

## 2022-03-15 RX ADMIN — NYSTATIN CREAM 1 APPLICATION(S): 100000 CREAM TOPICAL at 17:40

## 2022-03-15 RX ADMIN — OXYCODONE HYDROCHLORIDE 5 MILLIGRAM(S): 5 TABLET ORAL at 11:01

## 2022-03-15 RX ADMIN — OXYCODONE HYDROCHLORIDE 5 MILLIGRAM(S): 5 TABLET ORAL at 18:12

## 2022-03-15 RX ADMIN — MEROPENEM 100 MILLIGRAM(S): 1 INJECTION INTRAVENOUS at 14:01

## 2022-03-15 RX ADMIN — LOSARTAN POTASSIUM 50 MILLIGRAM(S): 100 TABLET, FILM COATED ORAL at 05:20

## 2022-03-15 RX ADMIN — Medication 650 MILLIGRAM(S): at 05:50

## 2022-03-15 RX ADMIN — OXYCODONE HYDROCHLORIDE 5 MILLIGRAM(S): 5 TABLET ORAL at 05:50

## 2022-03-15 RX ADMIN — OXYCODONE HYDROCHLORIDE 5 MILLIGRAM(S): 5 TABLET ORAL at 00:03

## 2022-03-15 RX ADMIN — OXYCODONE HYDROCHLORIDE 5 MILLIGRAM(S): 5 TABLET ORAL at 05:20

## 2022-03-15 RX ADMIN — Medication 300 MILLIGRAM(S): at 14:03

## 2022-03-15 RX ADMIN — Medication 650 MILLIGRAM(S): at 22:21

## 2022-03-15 NOTE — PROGRESS NOTE ADULT - ASSESSMENT
Patient is a 76y old  Female who presents with a chief complaint of Lymphedema (13 Mar 2022 10:15) s/p excision of necrotic tissue RLE now with open wound.    Plan:  - Continue BID packing and dressing changes by nursing - pack wound with wet-to-dry cling, ABD pad and tape for dressing  - OOB with PT, ambulate as tolerated  - F/u CBC, trend WBC  - Leg elevation in bed  - Continue abx per ID      Plastic Surgery  [phone]

## 2022-03-15 NOTE — PROGRESS NOTE ADULT - ASSESSMENT
77 yo F with PMHx of Hypertension, morbid obesity, and chronic Lymphedema who presents with increased drainage from her RLE  Longstanding wound, prior procedures, has had drainage from site  On admission, necrotic tissue at wound and drainage  3/2 OR with plastic, debridement  Wound vac in place presently  S/p OR 3/7 debridement and re-placement wound vac  Fever resolved, WBC elevated but stable  Overall,  1) Necrotic, chronic wound  - Concern for ongoing SSTI  - Abx restarted--Vanco/Chris, team has concerned for ongoing SSTI at wound site (monitor vanco levels)  - F/U plastics team  - Wound care  2) Anemia  - Due to IVF/hospitalization  - Trend for stability  - Any bleeding concerns at wound site per surgical team  3) Fever/Leukocytosis  - Reactive to OR or anemia process?  - Further workup depending on persistence/clinical signs  - Trend CBCs    My colleagues will be covering this patient starting on 3/16/22. I will return 3/17. Please call [phone] or on call fellow with any questions or change in status.     Francis Henderson MD  Contact on TEAMS messaging from 9am - 5pm  From 5pm-9am, and on weekends call [phone]

## 2022-03-15 NOTE — PROGRESS NOTE ADULT - ASSESSMENT
77 YO F with MHx of HTN and Chronic Lymphedema who presents with increased drainage from her RLE concerning for possible cellulitis     Lymphedema.    -pt with chronic lymphedema  -Va duplex to r/o clot - done  -Pnecrotic tissue with lymphedema excised RLE, closure with interrupted nylon sutures and application of wound vac  - s/p Debridement and excision of soft tissue, closure and  application of wound vac 125mmHg    Cellulitis. fever  - Necrotic, chronic wound with purulent drainage  - Likely with some degree of superinfection  - Vanco 1500mg q 24 (monitor levels)  - Ceftriaxone 2g q 24  - PO Flagyl 500mg q 12  - F/U plastics team  - Continue BID packing and dressing changes by nursing - pack wound with wet-to-dry cling, ABD pad and tape for dressing  - OOB with PT, ambulate as tolerated  - F/u CBC, trend WBC  - Leg elevation in bed  - Continue abx per ID  - ID consult following    anemia  - likely 2/2 acute blood loss  - transfuse prn  - follow up H&H daily  - keep hgb above 8      Hypertension.   - c/w losartan.    pain  - oxycodone prn    bowl regimen  - senna and Miralax     Prophylactic measure.   -  DVT Ppx  -Lovenox, Hold before OR.

## 2022-03-16 ENCOUNTER — TRANSCRIPTION ENCOUNTER (OUTPATIENT)
Age: 77
End: 2022-03-16

## 2022-03-16 LAB
ANION GAP SERPL CALC-SCNC: 8 MMOL/L — SIGNIFICANT CHANGE UP (ref 5–17)
BUN SERPL-MCNC: 18 MG/DL — SIGNIFICANT CHANGE UP (ref 7–23)
CALCIUM SERPL-MCNC: 8.1 MG/DL — LOW (ref 8.4–10.5)
CHLORIDE SERPL-SCNC: 104 MMOL/L — SIGNIFICANT CHANGE UP (ref 96–108)
CO2 SERPL-SCNC: 25 MMOL/L — SIGNIFICANT CHANGE UP (ref 22–31)
CREAT SERPL-MCNC: 0.79 MG/DL — SIGNIFICANT CHANGE UP (ref 0.5–1.3)
EGFR: 78 ML/MIN/1.73M2 — SIGNIFICANT CHANGE UP
GLUCOSE SERPL-MCNC: 134 MG/DL — HIGH (ref 70–99)
HCT VFR BLD CALC: 32.8 % — LOW (ref 34.5–45)
HGB BLD-MCNC: 9.9 G/DL — LOW (ref 11.5–15.5)
MCHC RBC-ENTMCNC: 28.6 PG — SIGNIFICANT CHANGE UP (ref 27–34)
MCHC RBC-ENTMCNC: 30.2 GM/DL — LOW (ref 32–36)
MCV RBC AUTO: 94.8 FL — SIGNIFICANT CHANGE UP (ref 80–100)
NRBC # BLD: 0 /100 WBCS — SIGNIFICANT CHANGE UP (ref 0–0)
PLATELET # BLD AUTO: 543 K/UL — HIGH (ref 150–400)
POTASSIUM SERPL-MCNC: 4.9 MMOL/L — SIGNIFICANT CHANGE UP (ref 3.5–5.3)
POTASSIUM SERPL-SCNC: 4.9 MMOL/L — SIGNIFICANT CHANGE UP (ref 3.5–5.3)
RBC # BLD: 3.46 M/UL — LOW (ref 3.8–5.2)
RBC # FLD: 16 % — HIGH (ref 10.3–14.5)
SODIUM SERPL-SCNC: 137 MMOL/L — SIGNIFICANT CHANGE UP (ref 135–145)
VANCOMYCIN TROUGH SERPL-MCNC: 12.1 UG/ML — SIGNIFICANT CHANGE UP (ref 10–20)
WBC # BLD: 10.97 K/UL — HIGH (ref 3.8–10.5)
WBC # FLD AUTO: 10.97 K/UL — HIGH (ref 3.8–10.5)

## 2022-03-16 RX ADMIN — LOSARTAN POTASSIUM 50 MILLIGRAM(S): 100 TABLET, FILM COATED ORAL at 05:44

## 2022-03-16 RX ADMIN — Medication 650 MILLIGRAM(S): at 06:21

## 2022-03-16 RX ADMIN — OXYCODONE HYDROCHLORIDE 5 MILLIGRAM(S): 5 TABLET ORAL at 12:09

## 2022-03-16 RX ADMIN — Medication 650 MILLIGRAM(S): at 23:55

## 2022-03-16 RX ADMIN — MEROPENEM 100 MILLIGRAM(S): 1 INJECTION INTRAVENOUS at 21:28

## 2022-03-16 RX ADMIN — Medication 300 MILLIGRAM(S): at 15:54

## 2022-03-16 RX ADMIN — OXYCODONE HYDROCHLORIDE 5 MILLIGRAM(S): 5 TABLET ORAL at 21:55

## 2022-03-16 RX ADMIN — OXYCODONE HYDROCHLORIDE 5 MILLIGRAM(S): 5 TABLET ORAL at 06:21

## 2022-03-16 RX ADMIN — NYSTATIN CREAM 1 APPLICATION(S): 100000 CREAM TOPICAL at 05:47

## 2022-03-16 RX ADMIN — NYSTATIN CREAM 1 APPLICATION(S): 100000 CREAM TOPICAL at 18:02

## 2022-03-16 RX ADMIN — OXYCODONE HYDROCHLORIDE 5 MILLIGRAM(S): 5 TABLET ORAL at 05:51

## 2022-03-16 RX ADMIN — MEROPENEM 100 MILLIGRAM(S): 1 INJECTION INTRAVENOUS at 14:31

## 2022-03-16 RX ADMIN — OXYCODONE HYDROCHLORIDE 5 MILLIGRAM(S): 5 TABLET ORAL at 12:53

## 2022-03-16 RX ADMIN — Medication 650 MILLIGRAM(S): at 14:29

## 2022-03-16 RX ADMIN — OXYCODONE HYDROCHLORIDE 5 MILLIGRAM(S): 5 TABLET ORAL at 21:25

## 2022-03-16 RX ADMIN — ENOXAPARIN SODIUM 40 MILLIGRAM(S): 100 INJECTION SUBCUTANEOUS at 14:26

## 2022-03-16 RX ADMIN — Medication 650 MILLIGRAM(S): at 21:25

## 2022-03-16 RX ADMIN — MEROPENEM 100 MILLIGRAM(S): 1 INJECTION INTRAVENOUS at 05:44

## 2022-03-16 RX ADMIN — Medication 650 MILLIGRAM(S): at 05:51

## 2022-03-16 RX ADMIN — Medication 650 MILLIGRAM(S): at 16:00

## 2022-03-16 NOTE — DISCHARGE NOTE PROVIDER - PROVIDER TOKENS
PROVIDER:[TOKEN:[[id number]],FOLLOWUP:[1 week],ESTABLISHEDPATIENT:[T]] PROVIDER:[TOKEN:[[id number]],FOLLOWUP:[1 week],ESTABLISHEDPATIENT:[T]],FREE:[LAST:[DR YEH],PHONE:[(   )    -],FAX:[(   )    -],ADDRESS:[Plastic sx in [facility]]]

## 2022-03-16 NOTE — DISCHARGE NOTE PROVIDER - NSDCFUADDAPPT_GEN_ALL_CORE_FT
APPTS ARE READY TO BE MADE: [x ] YES    Best Family or Patient Contact (if needed):    Additional Information about above appointments (if needed):    1: please follow up with Plastic sx  for Wound Vac   2: please place the wound Vac  when Pt arrives to Rehab , will be discharged with wet to dry dressing for transport to Rehab   3:     Other comments or requests:    APPTS ARE READY TO BE MADE: [x ] YES    Best Family or Patient Contact (if needed):    Additional Information about above appointments (if needed):    1: please follow up with Plastic sx  for Wound Vac   2: please place the wound Vac  when Pt arrives to Rehab , will be discharged with wet to dry dressing for transport to Rehab     3- Follow up outpatient in office [phone]   - Upon discharge rehab to follow wound care as follows:   Clean wound with NS and chlorhexidine  Use one wound vac with 2 suction pads on either end of wound (anterior and posterior) w/ "Y" connector give h/o lymphedema and high output/ drainage.  Anterior wound - use white foam in between sutures covered with adaptic, black foam and tape.   Medial and Posterior open wound to be covered with Medihoney, adaptic, black foam and tape.   Posterior wound undermine with white foam.        Other comments or requests:    APPTS ARE READY TO BE MADE: [x ] YES    Best Family or Patient Contact (if needed):    Additional Information about above appointments (if needed):    1: please follow up with Plastic sx  for Wound Vac   2: please place the wound Vac  when Pt arrives to Rehab , will be discharged with wet to dry dressing for transport to Rehab     3- Follow up outpatient in office [phone]   - Upon discharge rehab to follow wound care as follows:   Clean wound with NS and chlorhexidine  Use one wound vac with 2 suction pads on either end of wound (anterior and posterior) w/ "Y" connector give h/o lymphedema and high output/ drainage.  Anterior wound - use white foam in between sutures covered with adaptic, black foam and tape.   Medial and Posterior open wound to be covered with Medihoney, adaptic, black foam and tape.   Posterior wound undermine with white foam.        Patient is being discharged to rehab. Caregiver will arrange follow up.    Other comments or requests:

## 2022-03-16 NOTE — DISCHARGE NOTE PROVIDER - NSDCCPCAREPLAN_GEN_ALL_CORE_FT
PRINCIPAL DISCHARGE DIAGNOSIS  Diagnosis: Cellulitis  Assessment and Plan of Treatment: You were treated with antibiotics for cellulitis on your right leg second to Lymphedema. Necrotic tissue was removed. If you note any pain or increased swelling or other symptoms please notify your provider      SECONDARY DISCHARGE DIAGNOSES  Diagnosis: Hypertension  Assessment and Plan of Treatment: Low sodium and fat diet, continue anti-hypertensive medications, and follow up with primary care physician.    Diagnosis: Lymphedema  Assessment and Plan of Treatment: You have chronic lymphadema, an ultrasound was done to be sure you do not have a clot in your leg. Necrotic tissue was removed and you were treated with antibiotics. and application of wound vac    Diagnosis: Anemia due to acute blood loss  Assessment and Plan of Treatment: If you notice any bleeding, please notify your doctor immediately or go to the nearest emergency room. Notify your doctor if you feel dizzy, nauseas, or having any other unusual symptoms. You recieved multiple units of blood in the hospital. Be sure to have your blood count checked with your primary care provider

## 2022-03-16 NOTE — PROGRESS NOTE ADULT - ASSESSMENT
77 YO F with MHx of HTN and Chronic Lymphedema who presents with increased drainage from her RLE concerning for possible cellulitis     Lymphedema.    -pt with chronic lymphedema  -Va duplex to r/o clot - done  -Pnecrotic tissue with lymphedema excised RLE, closure with interrupted nylon sutures and application of wound vac  - s/p Debridement and excision of soft tissue, closure and  application of wound vac 125mmHg    Cellulitis. fever  - Necrotic, chronic wound  - Concern for ongoing SSTI  - Abx restarted--Vanco/Chris, plastics team has concerned for ongoing SSTI at wound site (monitor vanco levels)  - F/U plastics team  - Wound care    anemia  - likely 2/2 acute blood loss  - transfuse prn  - follow up H&H daily  - keep hgb above 8      Hypertension.   - c/w losartan.    pain  - oxycodone prn    bowl regimen  - senna and Miralax     Prophylactic measure.   -  DVT Ppx  -Lovenox

## 2022-03-16 NOTE — DISCHARGE NOTE PROVIDER - CARE PROVIDER_API CALL
Vee Baker)  Internal Medicine  [address] [facility], [address] [place] [address]  Phone: [phone]  Fax: [phone]  Established Patient  Follow Up Time: 1 week   Vee Baker)  Internal Medicine  [address] [facility], [address] [place] [address]  Phone: [phone]  Fax: [phone]  Established Patient  Follow Up Time: 1 week    DR YEH,   Plastic sx in [facility]  Phone: (   )    -  Fax: (   )    -  Follow Up Time:

## 2022-03-16 NOTE — DISCHARGE NOTE PROVIDER - NSDCMRMEDTOKEN_GEN_ALL_CORE_FT
albuterol 90 mcg/inh inhalation aerosol: 1 puff(s) inhaled every 4 hours  aluminum hydroxide-magnesium hydroxide 200 mg-200 mg/5 mL oral suspension: 30 milliliter(s) orally every 4 hours, As needed, Dyspepsia  budesonide-formoterol 160 mcg-4.5 mcg/inh inhalation aerosol: 1 inhaler(s) inhaled 2 times a day  ciprofloxacin 500 mg oral tablet: 1 tab(s) orally every 12 hours  doxycycline monohydrate 100 mg oral capsule: 1 cap(s) orally every 12 hours  ipratropium-albuterol 0.5 mg-2.5 mg/3 mLinhalation solution: 3 milliliter(s) inhaled every 8 hours  losartan 50 mg oral tablet: 1 tab(s) orally once a day  losartan 50 mg oral tablet: 1 tab(s) orally once a day  polyethylene glycol 3350 oral powder for reconstitution: 17 gram(s) orally once a day  senna oral tablet: 2 tab(s) orally once a day (at bedtime)  tiotropium 18 mcg inhalation capsule: 1 cap(s) inhaled once a day  Tylenol 500 mg oral tablet: 1 tab(s) orally , As Needed   enoxaparin: 40 milligram(s)  once a day  losartan 50 mg oral tablet: 1 tab(s) orally once a day  nystatin 100,000 units/g topical powder: 1 application topically 2 times a day  oxyCODONE 5 mg oral tablet: 1 tab(s) orally every 4 hours, As needed, Moderate Pain (4 - 6)  polyethylene glycol 3350 oral powder for reconstitution: 17 gram(s) orally once a day  senna oral tablet: 2 tab(s) orally once a day (at bedtime)

## 2022-03-16 NOTE — DISCHARGE NOTE PROVIDER - HOSPITAL COURSE
75 YO F with MHx of HTN and Chronic Lymphedema who presents with increased drainage from her RLE concerning for possible cellulitis     Lymphedema: chronic, VA duplex to r/o DVT was negative, necrotic tissue with lymphedema excised RLE, closure with interrupted nylon sutures and application of wound vac  - s/p Debridement and excision of soft tissue, closure and  application of wound vac 125mmHg    Cellulitis. fever  - Necrotic, chronic wound with purulent drainage, likely superinfection   - Vanco 1500mg q 24 (monitor levels), Ceftriaxone 2g q 24, PO Flagyl 500mg q 12  - F/U plastics team  - Continue BID packing and dressing changes by nursing - pack wound with wet-to-dry cling, ABD pad and tape for dressing, leg elevation   - Continue abx, ID on board     anemia  - likely 2/2 acute blood loss, hemoglobin currently >8, received multiple transfusions        Hypertension.   - c/w losartan.    pain: oxycodone prn    bowl regimen: senna and Miralax   7 yo F with PMHx of Hypertension, morbid obesity, and chronic Lymphedema who presents with increased drainage from her RLE  Longstanding wound, prior procedures, has had drainage from site  On admission, necrotic tissue at wound and drainage  3/2 OR with plastic, debridement  Wound vac in place presently  S/p OR 3/7 debridement and re-placement wound vac  Fever resolved, WBC improved  Wound culture with  Pseudomonas, Enterococcus, Candida--doubt significance enterococcus/candida, treat pseudomonas  Overall,   Necrotic, chronic wound,  Concern for ongoing SSTI  s/p Zerbaxa 3g q 8 through 3/23/22 .   Remained HD stable  and no fevers.  Pt will need wound Vac in Rehab   Pt will be followed  by plastic sx  from  [facility]

## 2022-03-16 NOTE — DISCHARGE NOTE PROVIDER - DETAILS OF MALNUTRITION DIAGNOSIS/DIAGNOSES
This patient has been assessed with a concern for Malnutrition and was treated during this hospitalization for the following Nutrition diagnosis/diagnoses:     -  03/08/2022: Morbid obesity (BMI > 40)

## 2022-03-17 LAB
-  AMIKACIN: SIGNIFICANT CHANGE UP
-  AZTREONAM: SIGNIFICANT CHANGE UP
-  CEFEPIME: SIGNIFICANT CHANGE UP
-  CEFTAZIDIME/AVIBACTAM: SIGNIFICANT CHANGE UP
-  CEFTAZIDIME: SIGNIFICANT CHANGE UP
-  CEFTOLOZANE/TAZOBACTAM: SIGNIFICANT CHANGE UP
-  CIPROFLOXACIN: SIGNIFICANT CHANGE UP
-  GENTAMICIN: SIGNIFICANT CHANGE UP
-  IMIPENEM: SIGNIFICANT CHANGE UP
-  LEVOFLOXACIN: SIGNIFICANT CHANGE UP
-  MEROPENEM: SIGNIFICANT CHANGE UP
-  PIPERACILLIN/TAZOBACTAM: SIGNIFICANT CHANGE UP
-  TOBRAMYCIN: SIGNIFICANT CHANGE UP
ANION GAP SERPL CALC-SCNC: 7 MMOL/L — SIGNIFICANT CHANGE UP (ref 5–17)
BUN SERPL-MCNC: 21 MG/DL — SIGNIFICANT CHANGE UP (ref 7–23)
CALCIUM SERPL-MCNC: 8.1 MG/DL — LOW (ref 8.4–10.5)
CHLORIDE SERPL-SCNC: 105 MMOL/L — SIGNIFICANT CHANGE UP (ref 96–108)
CO2 SERPL-SCNC: 25 MMOL/L — SIGNIFICANT CHANGE UP (ref 22–31)
CREAT SERPL-MCNC: 0.95 MG/DL — SIGNIFICANT CHANGE UP (ref 0.5–1.3)
EGFR: 62 ML/MIN/1.73M2 — SIGNIFICANT CHANGE UP
GLUCOSE SERPL-MCNC: 124 MG/DL — HIGH (ref 70–99)
HCT VFR BLD CALC: 30.9 % — LOW (ref 34.5–45)
HGB BLD-MCNC: 9.5 G/DL — LOW (ref 11.5–15.5)
MCHC RBC-ENTMCNC: 28.4 PG — SIGNIFICANT CHANGE UP (ref 27–34)
MCHC RBC-ENTMCNC: 30.7 GM/DL — LOW (ref 32–36)
MCV RBC AUTO: 92.5 FL — SIGNIFICANT CHANGE UP (ref 80–100)
METHOD TYPE: SIGNIFICANT CHANGE UP
NRBC # BLD: 0 /100 WBCS — SIGNIFICANT CHANGE UP (ref 0–0)
PLATELET # BLD AUTO: 606 K/UL — HIGH (ref 150–400)
POTASSIUM SERPL-MCNC: 4.6 MMOL/L — SIGNIFICANT CHANGE UP (ref 3.5–5.3)
POTASSIUM SERPL-SCNC: 4.6 MMOL/L — SIGNIFICANT CHANGE UP (ref 3.5–5.3)
RBC # BLD: 3.34 M/UL — LOW (ref 3.8–5.2)
RBC # FLD: 15.8 % — HIGH (ref 10.3–14.5)
SODIUM SERPL-SCNC: 137 MMOL/L — SIGNIFICANT CHANGE UP (ref 135–145)
VANCOMYCIN TROUGH SERPL-MCNC: 14.7 UG/ML — SIGNIFICANT CHANGE UP (ref 10–20)
WBC # BLD: 10.48 K/UL — SIGNIFICANT CHANGE UP (ref 3.8–10.5)
WBC # FLD AUTO: 10.48 K/UL — SIGNIFICANT CHANGE UP (ref 3.8–10.5)

## 2022-03-17 PROCEDURE — 99232 SBSQ HOSP IP/OBS MODERATE 35: CPT

## 2022-03-17 RX ORDER — CEFTOLOZANE AND TAZOBACTAM 1; .5 G/10ML; G/10ML
3000 INJECTION, POWDER, LYOPHILIZED, FOR SOLUTION INTRAVENOUS EVERY 8 HOURS
Refills: 0 | Status: DISCONTINUED | OUTPATIENT
Start: 2022-03-17 | End: 2022-03-24

## 2022-03-17 RX ADMIN — NYSTATIN CREAM 1 APPLICATION(S): 100000 CREAM TOPICAL at 18:21

## 2022-03-17 RX ADMIN — OXYCODONE HYDROCHLORIDE 5 MILLIGRAM(S): 5 TABLET ORAL at 10:45

## 2022-03-17 RX ADMIN — OXYCODONE HYDROCHLORIDE 5 MILLIGRAM(S): 5 TABLET ORAL at 18:24

## 2022-03-17 RX ADMIN — SENNA PLUS 2 TABLET(S): 8.6 TABLET ORAL at 22:17

## 2022-03-17 RX ADMIN — NYSTATIN CREAM 1 APPLICATION(S): 100000 CREAM TOPICAL at 06:52

## 2022-03-17 RX ADMIN — OXYCODONE HYDROCHLORIDE 5 MILLIGRAM(S): 5 TABLET ORAL at 19:04

## 2022-03-17 RX ADMIN — CEFTOLOZANE AND TAZOBACTAM 33.33 MILLIGRAM(S): 1; .5 INJECTION, POWDER, LYOPHILIZED, FOR SOLUTION INTRAVENOUS at 22:18

## 2022-03-17 RX ADMIN — OXYCODONE HYDROCHLORIDE 5 MILLIGRAM(S): 5 TABLET ORAL at 02:56

## 2022-03-17 RX ADMIN — ENOXAPARIN SODIUM 40 MILLIGRAM(S): 100 INJECTION SUBCUTANEOUS at 11:58

## 2022-03-17 RX ADMIN — Medication 650 MILLIGRAM(S): at 17:15

## 2022-03-17 RX ADMIN — LOSARTAN POTASSIUM 50 MILLIGRAM(S): 100 TABLET, FILM COATED ORAL at 05:31

## 2022-03-17 RX ADMIN — MEROPENEM 100 MILLIGRAM(S): 1 INJECTION INTRAVENOUS at 05:31

## 2022-03-17 RX ADMIN — CEFTOLOZANE AND TAZOBACTAM 33.33 MILLIGRAM(S): 1; .5 INJECTION, POWDER, LYOPHILIZED, FOR SOLUTION INTRAVENOUS at 11:58

## 2022-03-17 RX ADMIN — OXYCODONE HYDROCHLORIDE 5 MILLIGRAM(S): 5 TABLET ORAL at 03:26

## 2022-03-17 RX ADMIN — OXYCODONE HYDROCHLORIDE 5 MILLIGRAM(S): 5 TABLET ORAL at 10:10

## 2022-03-17 RX ADMIN — Medication 300 MILLIGRAM(S): at 15:03

## 2022-03-17 RX ADMIN — Medication 650 MILLIGRAM(S): at 16:27

## 2022-03-17 NOTE — PROGRESS NOTE ADULT - ASSESSMENT
75 YO F with MHx of HTN and Chronic Lymphedema who presents with increased drainage from her RLE concerning for possible cellulitis     Lymphedema.    -pt with chronic lymphedema  -Va duplex to r/o clot - done  -Pnecrotic tissue with lymphedema excised RLE, closure with interrupted nylon sutures and application of wound vac  - s/p Debridement and excision of soft tissue, closure and  application of wound vac 125mmHg    Cellulitis. fever  - Necrotic, chronic wound  - Concern for ongoing SSTI  - Abx restarted--Vanco/Chris, plastics team has concerned for ongoing SSTI at wound site (monitor vanco levels)  - F/U plastics team  - Wound care    anemia  - likely 2/2 acute blood loss  - transfuse prn  - follow up H&H daily  - keep hgb above 8      Hypertension.   - c/w losartan.    pain  - oxycodone prn    bowl regimen  - senna and Miralax     Prophylactic measure.   -  DVT Ppx  -Lovenox

## 2022-03-17 NOTE — CONSULT NOTE ADULT - ASSESSMENT
Impression:    gluteal cleft Moisture dermatitis  Suggestive of fungal rash    Recommend:  1.) topical therapy: sacral/buttock injury - cleanse with incontinence cleanser, pat dry, apply Angelica antifungal ointment BID and PRN for incontinent episodes  2.) Incontinence Management - incontinence cleanser, pads, pericare BID, continue external female urinary catheter  3.) Maintain on an alternating air with low air loss surface  4.) Turn and reposition Q 2 hours  5.) Nutrition optimization  6.) Offload heels/feet with complete cair air fluidized boots; ensure that the soles of the feet are not resting on the foot board of the bed.    Care as per medicine. Will not actively follow but will remain available. Please recall for new issues or deterioration.  Upon discharge f/u as outpatient at Wound Center [address] [place] [address] [phone]  Thank you for this consult  Virginia Velez, NP-C, CWOCN [id number]

## 2022-03-17 NOTE — CONSULT NOTE ADULT - CONSULT REASON
sacral/bilateral buttocks skin damage
Necrotic tissue and persistent drainage from wound RLE
Cellulitis

## 2022-03-17 NOTE — PROGRESS NOTE ADULT - ASSESSMENT
75 yo F with PMHx of Hypertension, morbid obesity, and chronic Lymphedema who presents with increased drainage from her RLE  Longstanding wound, prior procedures, has had drainage from site  On admission, necrotic tissue at wound and drainage  3/2 OR with plastic, debridement  Wound vac in place presently  S/p OR 3/7 debridement and re-placement wound vac  Fever resolved, WBC improving  Wound culture with  Pseudomonas  Overall,  1) Necrotic, chronic wound  - Concern for ongoing SSTI  - Vanco 1500mg q 24  - Zerbaxa 3g q 8  - F/U plastics team  - Wound care  - F/U pending cultures--doubt significance candida, note GP P/C; final antibiotic plan pending culture  2) Anemia  - Due to IVF/hospitalization  - Trend for stability  - Any bleeding concerns at wound site per surgical team  3) Fever/Leukocytosis  - Reactive to OR or anemia process?  - Further workup depending on persistence/clinical signs  - Trend CBCs    Francis Henderson MD  Contact on TEAMS messaging from 9am - 5pm  From 5pm-9am, and on weekends call [phone]

## 2022-03-17 NOTE — CONSULT NOTE ADULT - SUBJECTIVE AND OBJECTIVE BOX
Wound Surgery consult Note:    HPI:  75 YO F with MHx of HTN and Chronic Lymphedema who presents with increased drainage from her RLE. Pt with Hx of Chronic Lymphedema for >5 years, surgery attempted twice per patient and now with RLE increasing more over the past year, particularly over the past several months. She completed two weeks of abx one month ago (does not recall which abx), afterwhich her RLE infection got better, but now with increased drainage from the RLE, was sent in for abx and for surgical eval. Of note, she reports imaging that showed ?inflammation and necrosis? She denies any RLE warmth or erythema, fever, chills, nausea, or vomiting. She denies any chest pain , palpitations or shortness of breath, orthopnea,or PND. She can walk about two blocks and stops due to fatigue but not short of breath. She states she had a nuclear stress test two years ago which was normal and to her knowledge has not had any changes to her EKG.  (28 Feb 2022 23:16)    Request for wound care consult for gluteal cleft skin damage with discomfort received from nursing. Ms. Aquino reported that there has been a slight pruritus associated with the skin damage and that application of Vaseline has helped. She attributes the skin damage to being cleaned after an incontinent bowel movement.    PAST MEDICAL & SURGICAL HISTORY:  Essential hypertension  Morbid obesity due to excess calories  Other iron deficiency anemia  Hypertension  Lymphedema  H/O total knee replacement, bilateral  Lymphedema    MEDICATIONS  (STANDING):  ceftolozane/tazobactam IVPB 3000 milliGRAM(s) IV Intermittent every 8 hours  enoxaparin Injectable 40 milliGRAM(s) SubCutaneous every 24 hours  losartan 50 milliGRAM(s) Oral daily  multiple electrolytes Injection Type 1 500 milliLiter(s) (2000 mL/Hr) IV Continuous <Continuous>  nystatin Powder 1 Application(s) Topical two times a day  polyethylene glycol 3350 17 Gram(s) Oral daily  senna 2 Tablet(s) Oral at bedtime  vancomycin  IVPB 1500 milliGRAM(s) IV Intermittent every 24 hours    MEDICATIONS  (PRN):  acetaminophen     Tablet .. 650 milliGRAM(s) Oral every 6 hours PRN Temp greater or equal to 38C (100.4F), Mild Pain (1 - 3)  oxyCODONE    IR 5 milliGRAM(s) Oral every 4 hours PRN Moderate Pain (4 - 6)    Allergies    No Known Allergies    Intolerances    Vital Signs Last 24 Hrs  T(C): 36.4 (17 Mar 2022 08:17), Max: 36.8 (16 Mar 2022 13:00)  T(F): 97.5 (17 Mar 2022 08:17), Max: 98.3 (16 Mar 2022 13:00)  HR: 78 (17 Mar 2022 08:17) (71 - 89)  BP: 116/63 (17 Mar 2022 08:17) (116/63 - 133/78)  BP(mean): --  RR: 18 (17 Mar 2022 08:17) (17 - 18)  SpO2: 99% (17 Mar 2022 08:17) (93% - 100%)    Physical Exam:  General: Alert, obese  Respiratory: no SOB on room air  Gastrointestinal: soft NT/ND  Neurology: weakened strength & sensation grossly intact  Musculoskeletal: no contractures  Vascular: BLE edema equal  Skin:  Sacral/bilateral buttocks with central area of superficially denuded skin L 3cm X W 1cm xD 0.1cm with pink wound bed, no necrotic tissue, and scant serosanguinous drainage  No odor, erythema, increased warmth, tenderness, induration, fluctuance    LABS:  03-17    137  |  105  |  21  ----------------------------<  124<H>  4.6   |  25  |  0.95    Ca    8.1<L>      17 Mar 2022 09:30                            9.5    10.48 )-----------( 606      ( 17 Mar 2022 09:30 )             30.9           
Plastic Surgery Consult Note  (pg [facility]: [id number], [facility]: [phone])    HPI: Pt is a 75 y/o female with PMHx lympedema and recurring abscess RLE c/o persistent maloderous drainage from RLE open wound.    PAST MEDICAL & SURGICAL HISTORY:    Allergies    No Known Allergies    Intolerances      Home Medications:  losartan 50 mg oral tablet: 1 tab(s) orally once a day (28 Feb 2022 19:47)  Tylenol 500 mg oral tablet: 1 tab(s) orally , As Needed (28 Feb 2022 19:47)    MEDICATIONS  (STANDING):      SOCIAL HISTORY:  FAMILY HISTORY:      ___________________________________________  OBJECTIVE:  Vital Signs Last 24 Hrs  T(C): 36.8 (28 Feb 2022 20:30), Max: 36.8 (28 Feb 2022 20:30)  T(F): 98.2 (28 Feb 2022 20:30), Max: 98.2 (28 Feb 2022 20:30)  HR: 73 (28 Feb 2022 20:30) (73 - 95)  BP: 150/80 (28 Feb 2022 20:30) (150/80 - 163/91)  BP(mean): --  RR: 16 (28 Feb 2022 20:30) (16 - 20)  SpO2: 99% (28 Feb 2022 20:30) (99% - 100%)CAPILLARY BLOOD GLUCOSE        I&O's Detail    General: Well developed, obese,, NAD  Neuro: Alert and oriented, no focal deficits, moves all extremities spontaneously  HEENT: NCAT, EOMI, anicteric, mucosa moist  Respiratory: Airway patent, respirations unlabored  CVS: Regular rate and rhythm  Abdomen: Soft, nontender  Extremities: + edema RLE, sensation and movement grossly intact  Skin: RLE open wound with serosanguineous drainage, areas of necrotic tissue   ____________________________________________  LABS:  CBC Full  -  ( 28 Feb 2022 16:11 )  WBC Count : 5.07 K/uL  RBC Count : 4.29 M/uL  Hemoglobin : 11.7 g/dL  Hematocrit : 38.8 %  Platelet Count - Automated : 311 K/uL  Mean Cell Volume : 90.4 fl  Mean Cell Hemoglobin : 27.3 pg  Mean Cell Hemoglobin Concentration : 30.2 gm/dL  Auto Neutrophil # : 2.65 K/uL  Auto Lymphocyte # : 1.44 K/uL  Auto Monocyte # : 0.79 K/uL  Auto Eosinophil # : 0.13 K/uL  Auto Basophil # : 0.04 K/uL  Auto Neutrophil % : 52.2 %  Auto Lymphocyte % : 28.4 %  Auto Monocyte % : 15.6 %  Auto Eosinophil % : 2.6 %  Auto Basophil % : 0.8 %    02-28    139  |  103  |  18  ----------------------------<  94  4.5   |  24  |  0.80    Ca    9.3      28 Feb 2022 16:11    TPro  9.0<H>  /  Alb  4.1  /  TBili  0.2  /  DBili  x   /  AST  18  /  ALT  16  /  AlkPhos  112  02-28    LIVER FUNCTIONS - ( 28 Feb 2022 16:11 )  Alb: 4.1 g/dL / Pro: 9.0 g/dL / ALK PHOS: 112 U/L / ALT: 16 U/L / AST: 18 U/L / GGT: x           PT/INR - ( 28 Feb 2022 16:11 )   PT: 12.1 sec;   INR: 1.01 ratio         PTT - ( 28 Feb 2022 16:11 )  PTT:34.2 sec          ____________________________________________  MICRO:  RECENT CULTURES:    ____________________________________________  RADIOLOGY:  
Patient is a 76 year old female who presents with a chief complaint of RLE swelling. (03 Mar 2022 13:26)    HPI:  The patient is a 76 year old female with PMHx of Hypertension, morbid obesity, and chronic Lymphedema who presents with increased drainage from her RLE. She reports history of chronic Lymphedema for >5 years, surgery attempted twice per patient and now with RLE increasing more over the past year, particularly over the past several months. She completed two weeks of antibiotics one month ago (does not recall which antibiotics), after which her RLE infection got better, but now with increased drainage from the RLE. She was subsequently sent in for antibiotics and for surgical evaluation. Of note, she reports imaging that showed ?inflammation and necrosis? She denies any RLE warmth or erythema, fever, chills, nausea, vomiting, chest pain, palpitations or shortness of breath, orthopnea, or PND. She can walk about two blocks and stops due to fatigue but not short of breath. She was noted to have draining wound of RLE s/p excision of soft tissue from RLE on 3/2/22 by plastic surgery w/ wound vac placement. Patient to return to OR on Monday for closure. Patient remains afebrile. Patient had a CT of the RLE w/ IV contrast on 1/14/22 showed extensive subcutaneous edema throughout the thigh, calf, foot, and ankle, most prominent along the posterior and medial aspect of the thigh. There is a gas containing draining sinus tract within the medial aspect of the thigh, draining to the skin surface, which is decreased in size from the prior study. Overall appearance is otherwise similar to the prior study.     Labs showed WBC 3.75K, Hgb 10.6, BUN 25 w/ normal liver function. Vancomycin trough was 13.8. HCV Ab and COVID-19 PCR was negative. CXR negative. Bilateral DVT study was negative. She was started on IV vancomycin and IV ancef. Surgical pathology is pending. ID was consulted for antibiotic recommendations.        prior hospital charts reviewed [x]  primary team notes reviewed [x]  other consultant notes reviewed [x]    PAST MEDICAL & SURGICAL HISTORY:  Essential hypertension  Morbid obesity due to excess calories  Other iron deficiency anemia  Hypertension  Lymphedema  H/O total knee replacement, bilateral    Allergies  No Known Allergies    ANTIMICROBIALS (past 90 days)  MEDICATIONS  (STANDING):  ceFAZolin   IVPB   100 mL/Hr IV Intermittent (02-28-22 @ 18:25)    ceFAZolin   IVPB   100 mL/Hr IV Intermittent (03-03-22 @ 13:03)   100 mL/Hr IV Intermittent (03-03-22 @ 05:40)   100 mL/Hr IV Intermittent (03-02-22 @ 22:05)   100 mL/Hr IV Intermittent (03-02-22 @ 16:32)    vancomycin  IVPB   166.67 mL/Hr IV Intermittent (03-01-22 @ 12:15)    vancomycin  IVPB   166.67 mL/Hr IV Intermittent (03-03-22 @ 13:02)   166.67 mL/Hr IV Intermittent (03-02-22 @ 23:12)   166.67 mL/Hr IV Intermittent (03-01-22 @ 22:04)    vancomycin  IVPB   250 mL/Hr IV Intermittent (03-01-22 @ 00:56)        ceFAZolin   IVPB 1000 every 8 hours  vancomycin  IVPB    vancomycin  IVPB 1250 every 12 hours    OTHER MEDS: MEDICATIONS  (STANDING):  acetaminophen     Tablet .. 1000 every 8 hours PRN  enoxaparin Injectable 40 every 12 hours  losartan 50 daily    SOCIAL HISTORY:   does not smoke, drink alcohol, or use recreational drugs     FAMILY HISTORY:  FH: HTN (hypertension)  No pertinent family history in first degree relatives      REVIEW OF SYSTEMS  [  ] ROS unobtainable because:    [x] All other systems negative except as noted below:	    Constitutional:  [ ] fever [ ] chills  [ ] weight loss  [x] weakness  Skin:  [x] rash [ ] phlebitis	  Eyes: [ ] icterus [ ] pain  [ ] discharge	  ENMT: [ ] sore throat  [ ] thrush [ ] ulcers [ ] exudates  Respiratory: [ ] dyspnea [ ] hemoptysis [ ] cough [ ] sputum	  Cardiovascular:  [ ] chest pain [ ] palpitations [ ] edema	  Gastrointestinal:  [ ] nausea [ ] vomiting [ ] diarrhea [ ] constipation [ ] pain	  Genitourinary:  [ ] dysuria [ ] frequency [ ] hematuria [ ] discharge [ ] flank pain  [ ] incontinence  Musculoskeletal:  [ ] myalgias [ ] arthralgias [ ] arthritis  [ ] back pain  Neurological:  [ ] headache [ ] seizures  [ ] confusion/altered mental status  Psychiatric:  [ ] anxiety [ ] depression	  Hematology/Lymphatics:  [ ] lymphadenopathy  Endocrine:  [ ] adrenal [ ] thyroid  Allergic/Immunologic:	 [ ] transplant [ ] seasonal    Vital Signs Last 24 Hrs  T(F): 98.2 (03-03-22 @ 12:11), Max: 98.3 (03-02-22 @ 04:29)  Vital Signs Last 24 Hrs  HR: 80 (03-03-22 @ 12:11) (65 - 96)  BP: 111/72 (03-03-22 @ 12:11) (107/65 - 143/76)  RR: 18 (03-03-22 @ 12:11)  SpO2: 99% (03-03-22 @ 12:11) (92% - 99%)  Wt(kg): --    PHYSICAL EXAM:  Constitutional: non-toxic, no distress  HEAD/EYES: anicteric, no conjunctival injection  ENT:  supple, no thrush  Cardiovascular:   normal S1, S2, no murmur, + bilateral lower extremity edema   Respiratory:  clear BS bilaterally, no wheezes, no rales  GI:  soft, non-tender, normal bowel sounds  :  no blum, no CVA tenderness  Musculoskeletal:  decreased ROM due to lymphedema   Neurologic: awake and alert, decreased strength, no focal findings  Skin:  + right lower extremity wound vac w/ surrounding erythema   Heme/Onc: no lymphadenopathy   Psychiatric:  awake, alert, appropriate mood                            10.6   3.75  )-----------( 265      ( 02 Mar 2022 06:11 )             34.9   03-03    135  |  103  |  25<H>  ----------------------------<  146<H>  4.9   |  23  |  0.85    Ca    7.9<L>      03 Mar 2022 11:19      MICROBIOLOGY:      Vancomycin Level, Trough: 13.8 (03-03 @ 11:19)    Prior microbiology    1/14/22 UCx Morganella morganii  11/10/21 Surgical Swab Cx MRSA, Enterococcus faecalis, morganella morganii, bacteroides ovatus   8/14/21 + COVID-19 spike Ab         RADIOLOGY:    < from: VA Duplex Lower Ext Vein Scan, Bilat (03.01.22 @ 09:28) >  FINDINGS: There is edema within the superficial soft tissues of the right   lower extremity.    RIGHT:  Normal compressibility of the RIGHT common femoral, femoral and popliteal   veins.  Doppler examination shows normal spontaneous and phasic flow.  No RIGHT calf vein thrombosis is detected.    LEFT:  Normal compressibility of the LEFT common femoral, femoral and popliteal   veins.  Doppler examination shows normal spontaneous and phasic flow.  No LEFT calf vein thrombosis is detected.    IMPRESSION:  No evidence of deep venous thrombosis in either lower extremity.          --- End of Report ---      < end of copied text >  < from: Xray Chest 1 View AP/PA (02.28.22 @ 16:39) >  FINDINGS:  Heart size and the mediastinum cannot be accurately evaluated on this   projection.  The lungs are clear. Mildly elevated right hemidiaphragm.  There is no pneumothorax or pleural effusion.  No acute bony abnormality.    IMPRESSION:  Mildly elevated right hemidiaphragm.    Clear lungs.    --- End of Report ---    < end of copied text >

## 2022-03-18 LAB — SARS-COV-2 RNA SPEC QL NAA+PROBE: SIGNIFICANT CHANGE UP

## 2022-03-18 PROCEDURE — 99232 SBSQ HOSP IP/OBS MODERATE 35: CPT

## 2022-03-18 RX ADMIN — CEFTOLOZANE AND TAZOBACTAM 33.33 MILLIGRAM(S): 1; .5 INJECTION, POWDER, LYOPHILIZED, FOR SOLUTION INTRAVENOUS at 22:03

## 2022-03-18 RX ADMIN — OXYCODONE HYDROCHLORIDE 5 MILLIGRAM(S): 5 TABLET ORAL at 22:03

## 2022-03-18 RX ADMIN — OXYCODONE HYDROCHLORIDE 5 MILLIGRAM(S): 5 TABLET ORAL at 10:04

## 2022-03-18 RX ADMIN — ENOXAPARIN SODIUM 40 MILLIGRAM(S): 100 INJECTION SUBCUTANEOUS at 11:40

## 2022-03-18 RX ADMIN — OXYCODONE HYDROCHLORIDE 5 MILLIGRAM(S): 5 TABLET ORAL at 01:15

## 2022-03-18 RX ADMIN — OXYCODONE HYDROCHLORIDE 5 MILLIGRAM(S): 5 TABLET ORAL at 22:30

## 2022-03-18 RX ADMIN — CEFTOLOZANE AND TAZOBACTAM 33.33 MILLIGRAM(S): 1; .5 INJECTION, POWDER, LYOPHILIZED, FOR SOLUTION INTRAVENOUS at 13:26

## 2022-03-18 RX ADMIN — Medication 650 MILLIGRAM(S): at 12:44

## 2022-03-18 RX ADMIN — OXYCODONE HYDROCHLORIDE 5 MILLIGRAM(S): 5 TABLET ORAL at 06:12

## 2022-03-18 RX ADMIN — NYSTATIN CREAM 1 APPLICATION(S): 100000 CREAM TOPICAL at 18:00

## 2022-03-18 RX ADMIN — LOSARTAN POTASSIUM 50 MILLIGRAM(S): 100 TABLET, FILM COATED ORAL at 06:02

## 2022-03-18 RX ADMIN — OXYCODONE HYDROCHLORIDE 5 MILLIGRAM(S): 5 TABLET ORAL at 12:45

## 2022-03-18 RX ADMIN — OXYCODONE HYDROCHLORIDE 5 MILLIGRAM(S): 5 TABLET ORAL at 00:45

## 2022-03-18 RX ADMIN — Medication 650 MILLIGRAM(S): at 11:41

## 2022-03-18 RX ADMIN — NYSTATIN CREAM 1 APPLICATION(S): 100000 CREAM TOPICAL at 06:03

## 2022-03-18 RX ADMIN — CEFTOLOZANE AND TAZOBACTAM 33.33 MILLIGRAM(S): 1; .5 INJECTION, POWDER, LYOPHILIZED, FOR SOLUTION INTRAVENOUS at 06:01

## 2022-03-18 RX ADMIN — OXYCODONE HYDROCHLORIDE 5 MILLIGRAM(S): 5 TABLET ORAL at 15:35

## 2022-03-18 RX ADMIN — OXYCODONE HYDROCHLORIDE 5 MILLIGRAM(S): 5 TABLET ORAL at 06:45

## 2022-03-18 RX ADMIN — OXYCODONE HYDROCHLORIDE 5 MILLIGRAM(S): 5 TABLET ORAL at 14:17

## 2022-03-18 NOTE — PROGRESS NOTE ADULT - ASSESSMENT
75 YO F with MHx of HTN and Chronic Lymphedema who presents with increased drainage from her RLE concerning for possible cellulitis     Lymphedema.    -pt with chronic lymphedema  -Va duplex to r/o clot - done  -Pnecrotic tissue with lymphedema excised RLE, closure with interrupted nylon sutures and application of wound vac  - s/p Debridement and excision of soft tissue, closure     Cellulitis. fever  - Necrotic, chronic wound  - Concern for ongoing SSTI  - Abx as per ID-ceftolozane/tazobactam IVPB 3000 milliGRAM(s) IV Intermittent every 8 hours  - F/U plastics team  - Wound care    anemia  - likely 2/2 acute blood loss  - transfuse prn  - follow up H&H daily  - keep hgb above 8      Hypertension.   - c/w losartan.    pain  - oxycodone prn    bowl regimen  - senna and Miralax     Prophylactic measure.   -  DVT Ppx  -Lovenox

## 2022-03-18 NOTE — PROGRESS NOTE ADULT - ASSESSMENT
77 yo F with PMHx of Hypertension, morbid obesity, and chronic Lymphedema who presents with increased drainage from her RLE  Longstanding wound, prior procedures, has had drainage from site  On admission, necrotic tissue at wound and drainage  3/2 OR with plastic, debridement  Wound vac in place presently  S/p OR 3/7 debridement and re-placement wound vac  Fever resolved, WBC improving  Wound culture with  Pseudomonas, Enterococcus, Candida--doubt significance enterococcus/candida, treat pseudomonas  Overall,  1) Necrotic, chronic wound  - Concern for ongoing SSTI  - Zerbaxa 3g q 8, 5-7 day course  - DC Vanco, has had prolonged course Vanco  - Would not plan to treat enterococcus unless worsening clinical status (typically a bystander rather than true pathogen)  - F/U plastics team  - Wound care  - F/U pending cultures--doubt significance candida, enterococcus  2) Anemia  - Due to IVF/hospitalization  - Trend for stability  - Any bleeding concerns at wound site per surgical team  3) Fever/Leukocytosis  - Reactive to OR or anemia process?  - Further workup depending on persistence/clinical signs  - Trend CBCs    Francis Henderson MD  Contact on TEAMS messaging from 9am - 5pm  From 5pm-9am, and on weekends call [phone]

## 2022-03-19 LAB
-  AMPICILLIN: SIGNIFICANT CHANGE UP
-  CIPROFLOXACIN: SIGNIFICANT CHANGE UP
-  DAPTOMYCIN: SIGNIFICANT CHANGE UP
-  ERYTHROMYCIN: SIGNIFICANT CHANGE UP
-  LEVOFLOXACIN: SIGNIFICANT CHANGE UP
-  LINEZOLID: SIGNIFICANT CHANGE UP
-  PENICILLIN: SIGNIFICANT CHANGE UP
-  RIFAMPIN: SIGNIFICANT CHANGE UP
-  TETRACYCLINE: SIGNIFICANT CHANGE UP
-  VANCOMYCIN: SIGNIFICANT CHANGE UP
METHOD TYPE: SIGNIFICANT CHANGE UP

## 2022-03-19 RX ADMIN — OXYCODONE HYDROCHLORIDE 5 MILLIGRAM(S): 5 TABLET ORAL at 18:37

## 2022-03-19 RX ADMIN — OXYCODONE HYDROCHLORIDE 5 MILLIGRAM(S): 5 TABLET ORAL at 22:12

## 2022-03-19 RX ADMIN — LOSARTAN POTASSIUM 50 MILLIGRAM(S): 100 TABLET, FILM COATED ORAL at 06:12

## 2022-03-19 RX ADMIN — OXYCODONE HYDROCHLORIDE 5 MILLIGRAM(S): 5 TABLET ORAL at 06:11

## 2022-03-19 RX ADMIN — OXYCODONE HYDROCHLORIDE 5 MILLIGRAM(S): 5 TABLET ORAL at 06:40

## 2022-03-19 RX ADMIN — OXYCODONE HYDROCHLORIDE 5 MILLIGRAM(S): 5 TABLET ORAL at 22:45

## 2022-03-19 RX ADMIN — CEFTOLOZANE AND TAZOBACTAM 33.33 MILLIGRAM(S): 1; .5 INJECTION, POWDER, LYOPHILIZED, FOR SOLUTION INTRAVENOUS at 22:12

## 2022-03-19 RX ADMIN — ENOXAPARIN SODIUM 40 MILLIGRAM(S): 100 INJECTION SUBCUTANEOUS at 13:57

## 2022-03-19 RX ADMIN — CEFTOLOZANE AND TAZOBACTAM 33.33 MILLIGRAM(S): 1; .5 INJECTION, POWDER, LYOPHILIZED, FOR SOLUTION INTRAVENOUS at 06:17

## 2022-03-19 RX ADMIN — CEFTOLOZANE AND TAZOBACTAM 33.33 MILLIGRAM(S): 1; .5 INJECTION, POWDER, LYOPHILIZED, FOR SOLUTION INTRAVENOUS at 13:51

## 2022-03-19 RX ADMIN — NYSTATIN CREAM 1 APPLICATION(S): 100000 CREAM TOPICAL at 17:54

## 2022-03-19 RX ADMIN — OXYCODONE HYDROCHLORIDE 5 MILLIGRAM(S): 5 TABLET ORAL at 14:30

## 2022-03-19 RX ADMIN — OXYCODONE HYDROCHLORIDE 5 MILLIGRAM(S): 5 TABLET ORAL at 13:57

## 2022-03-19 RX ADMIN — NYSTATIN CREAM 1 APPLICATION(S): 100000 CREAM TOPICAL at 06:12

## 2022-03-19 RX ADMIN — SENNA PLUS 2 TABLET(S): 8.6 TABLET ORAL at 22:12

## 2022-03-19 RX ADMIN — OXYCODONE HYDROCHLORIDE 5 MILLIGRAM(S): 5 TABLET ORAL at 17:54

## 2022-03-19 NOTE — PROGRESS NOTE ADULT - ASSESSMENT
77 YO F with MHx of HTN and Chronic Lymphedema who presents with increased drainage from her RLE concerning for possible cellulitis     Lymphedema.    -pt with chronic lymphedema  -Va duplex to r/o clot - done  -Pnecrotic tissue with lymphedema excised RLE, closure with interrupted nylon sutures and application of wound vac  - s/p Debridement and excision of soft tissue, closure     Cellulitis. fever  - Necrotic, chronic wound  - Concern for ongoing SSTI  - Abx as per ID-ceftolozane/tazobactam IVPB 3000 milliGRAM(s) IV Intermittent every 8 hours  - F/U plastics team  - Wound care    anemia  - likely 2/2 acute blood loss  - transfuse prn  - follow up H&H daily  - keep hgb above 8      Hypertension.   - c/w losartan.    pain  - oxycodone prn    bowl regimen  - senna and Miralax     Prophylactic measure.   -  DVT Ppx  -Lovenox

## 2022-03-20 LAB
ANION GAP SERPL CALC-SCNC: 7 MMOL/L — SIGNIFICANT CHANGE UP (ref 5–17)
BASOPHILS # BLD AUTO: 0.04 K/UL — SIGNIFICANT CHANGE UP (ref 0–0.2)
BASOPHILS NFR BLD AUTO: 0.5 % — SIGNIFICANT CHANGE UP (ref 0–2)
BUN SERPL-MCNC: 19 MG/DL — SIGNIFICANT CHANGE UP (ref 7–23)
CALCIUM SERPL-MCNC: 8.2 MG/DL — LOW (ref 8.4–10.5)
CHLORIDE SERPL-SCNC: 108 MMOL/L — SIGNIFICANT CHANGE UP (ref 96–108)
CO2 SERPL-SCNC: 24 MMOL/L — SIGNIFICANT CHANGE UP (ref 22–31)
CREAT SERPL-MCNC: 0.76 MG/DL — SIGNIFICANT CHANGE UP (ref 0.5–1.3)
CULTURE RESULTS: SIGNIFICANT CHANGE UP
EGFR: 82 ML/MIN/1.73M2 — SIGNIFICANT CHANGE UP
EOSINOPHIL # BLD AUTO: 0.26 K/UL — SIGNIFICANT CHANGE UP (ref 0–0.5)
EOSINOPHIL NFR BLD AUTO: 3 % — SIGNIFICANT CHANGE UP (ref 0–6)
GLUCOSE SERPL-MCNC: 118 MG/DL — HIGH (ref 70–99)
HCT VFR BLD CALC: 29.5 % — LOW (ref 34.5–45)
HGB BLD-MCNC: 8.9 G/DL — LOW (ref 11.5–15.5)
IMM GRANULOCYTES NFR BLD AUTO: 1.3 % — SIGNIFICANT CHANGE UP (ref 0–1.5)
LYMPHOCYTES # BLD AUTO: 0.7 K/UL — LOW (ref 1–3.3)
LYMPHOCYTES # BLD AUTO: 8 % — LOW (ref 13–44)
MCHC RBC-ENTMCNC: 28.4 PG — SIGNIFICANT CHANGE UP (ref 27–34)
MCHC RBC-ENTMCNC: 30.2 GM/DL — LOW (ref 32–36)
MCV RBC AUTO: 94.2 FL — SIGNIFICANT CHANGE UP (ref 80–100)
MONOCYTES # BLD AUTO: 0.74 K/UL — SIGNIFICANT CHANGE UP (ref 0–0.9)
MONOCYTES NFR BLD AUTO: 8.5 % — SIGNIFICANT CHANGE UP (ref 2–14)
NEUTROPHILS # BLD AUTO: 6.9 K/UL — SIGNIFICANT CHANGE UP (ref 1.8–7.4)
NEUTROPHILS NFR BLD AUTO: 78.7 % — HIGH (ref 43–77)
NRBC # BLD: 0 /100 WBCS — SIGNIFICANT CHANGE UP (ref 0–0)
ORGANISM # SPEC MICROSCOPIC CNT: SIGNIFICANT CHANGE UP
PLATELET # BLD AUTO: 496 K/UL — HIGH (ref 150–400)
POTASSIUM SERPL-MCNC: 4.7 MMOL/L — SIGNIFICANT CHANGE UP (ref 3.5–5.3)
POTASSIUM SERPL-SCNC: 4.7 MMOL/L — SIGNIFICANT CHANGE UP (ref 3.5–5.3)
RBC # BLD: 3.13 M/UL — LOW (ref 3.8–5.2)
RBC # FLD: 15.9 % — HIGH (ref 10.3–14.5)
SODIUM SERPL-SCNC: 139 MMOL/L — SIGNIFICANT CHANGE UP (ref 135–145)
SPECIMEN SOURCE: SIGNIFICANT CHANGE UP
WBC # BLD: 8.75 K/UL — SIGNIFICANT CHANGE UP (ref 3.8–10.5)
WBC # FLD AUTO: 8.75 K/UL — SIGNIFICANT CHANGE UP (ref 3.8–10.5)

## 2022-03-20 RX ADMIN — OXYCODONE HYDROCHLORIDE 5 MILLIGRAM(S): 5 TABLET ORAL at 21:57

## 2022-03-20 RX ADMIN — OXYCODONE HYDROCHLORIDE 5 MILLIGRAM(S): 5 TABLET ORAL at 06:45

## 2022-03-20 RX ADMIN — NYSTATIN CREAM 1 APPLICATION(S): 100000 CREAM TOPICAL at 18:15

## 2022-03-20 RX ADMIN — CEFTOLOZANE AND TAZOBACTAM 33.33 MILLIGRAM(S): 1; .5 INJECTION, POWDER, LYOPHILIZED, FOR SOLUTION INTRAVENOUS at 06:16

## 2022-03-20 RX ADMIN — OXYCODONE HYDROCHLORIDE 5 MILLIGRAM(S): 5 TABLET ORAL at 21:27

## 2022-03-20 RX ADMIN — NYSTATIN CREAM 1 APPLICATION(S): 100000 CREAM TOPICAL at 06:16

## 2022-03-20 RX ADMIN — CEFTOLOZANE AND TAZOBACTAM 33.33 MILLIGRAM(S): 1; .5 INJECTION, POWDER, LYOPHILIZED, FOR SOLUTION INTRAVENOUS at 21:27

## 2022-03-20 RX ADMIN — OXYCODONE HYDROCHLORIDE 5 MILLIGRAM(S): 5 TABLET ORAL at 12:13

## 2022-03-20 RX ADMIN — CEFTOLOZANE AND TAZOBACTAM 33.33 MILLIGRAM(S): 1; .5 INJECTION, POWDER, LYOPHILIZED, FOR SOLUTION INTRAVENOUS at 14:48

## 2022-03-20 RX ADMIN — LOSARTAN POTASSIUM 50 MILLIGRAM(S): 100 TABLET, FILM COATED ORAL at 06:15

## 2022-03-20 RX ADMIN — OXYCODONE HYDROCHLORIDE 5 MILLIGRAM(S): 5 TABLET ORAL at 06:15

## 2022-03-20 RX ADMIN — OXYCODONE HYDROCHLORIDE 5 MILLIGRAM(S): 5 TABLET ORAL at 12:43

## 2022-03-20 RX ADMIN — ENOXAPARIN SODIUM 40 MILLIGRAM(S): 100 INJECTION SUBCUTANEOUS at 12:15

## 2022-03-20 NOTE — PROGRESS NOTE ADULT - ASSESSMENT
77 YO F with MHx of HTN and Chronic Lymphedema who presents with increased drainage from her RLE concerning for possible cellulitis     Lymphedema.    -pt with chronic lymphedema  -Va duplex to r/o clot - done  -Pnecrotic tissue with lymphedema excised RLE, closure with interrupted nylon sutures and application of wound vac  - s/p Debridement and excision of soft tissue, closure     Cellulitis. fever  - Necrotic, chronic wound  - Concern for ongoing SSTI  - Abx as per ID-ceftolozane/tazobactam IVPB 3000 milliGRAM(s) IV Intermittent every 8 hours  - F/U plastics team  - Wound care  - wound vac per plastics    anemia  - likely 2/2 acute blood loss  - transfuse prn  - follow up H&H daily  - keep hgb above 8      Hypertension.   - c/w losartan.    pain  - oxycodone prn    bowl regimen  - senna and Miralax     Prophylactic measure.   -  DVT Ppx  -Lovenox

## 2022-03-20 NOTE — PROVIDER CONTACT NOTE (CRITICAL VALUE NOTIFICATION) - TEST AND RESULT REPORTED:
surgical culture 3/15 final report  few pseudomonas aeruginosa (carbapenem resistant)  few enterococcus faecium (vancomycin resistant)  rare candida albicans 'susceptibility not preformed
Hgb - 6.8

## 2022-03-20 NOTE — PROVIDER CONTACT NOTE (CRITICAL VALUE NOTIFICATION) - ASSESSMENT
No signs of bleeding
pt A&Ox4, VSS, pt denies SOB, CP, pain. R leg wounds, wound vac replaced 3/9, on Zerbaxa 3g q8hr

## 2022-03-20 NOTE — PROVIDER CONTACT NOTE (CRITICAL VALUE NOTIFICATION) - ACTION/TREATMENT ORDERED:
results already known, ID following patient. CTM
Pending T&S, 1 unit of PRBCs. Will continue to monitor.

## 2022-03-20 NOTE — PROVIDER CONTACT NOTE (CRITICAL VALUE NOTIFICATION) - SITUATION
surgical culture 3/15 final report  few pseudomonas aeruginosa (carbapenem resistant)  few enterococcus faecium (vancomycin resistant)  rare candida albicans 'susceptibility not preformed

## 2022-03-21 LAB
ANION GAP SERPL CALC-SCNC: 10 MMOL/L — SIGNIFICANT CHANGE UP (ref 5–17)
BUN SERPL-MCNC: 15 MG/DL — SIGNIFICANT CHANGE UP (ref 7–23)
CALCIUM SERPL-MCNC: 8.4 MG/DL — SIGNIFICANT CHANGE UP (ref 8.4–10.5)
CHLORIDE SERPL-SCNC: 105 MMOL/L — SIGNIFICANT CHANGE UP (ref 96–108)
CO2 SERPL-SCNC: 23 MMOL/L — SIGNIFICANT CHANGE UP (ref 22–31)
CREAT SERPL-MCNC: 0.78 MG/DL — SIGNIFICANT CHANGE UP (ref 0.5–1.3)
EGFR: 79 ML/MIN/1.73M2 — SIGNIFICANT CHANGE UP
GLUCOSE SERPL-MCNC: 114 MG/DL — HIGH (ref 70–99)
HCT VFR BLD CALC: 28.9 % — LOW (ref 34.5–45)
HGB BLD-MCNC: 8.8 G/DL — LOW (ref 11.5–15.5)
MCHC RBC-ENTMCNC: 28.8 PG — SIGNIFICANT CHANGE UP (ref 27–34)
MCHC RBC-ENTMCNC: 30.4 GM/DL — LOW (ref 32–36)
MCV RBC AUTO: 94.4 FL — SIGNIFICANT CHANGE UP (ref 80–100)
NRBC # BLD: 0 /100 WBCS — SIGNIFICANT CHANGE UP (ref 0–0)
PLATELET # BLD AUTO: 485 K/UL — HIGH (ref 150–400)
POTASSIUM SERPL-MCNC: 4.2 MMOL/L — SIGNIFICANT CHANGE UP (ref 3.5–5.3)
POTASSIUM SERPL-SCNC: 4.2 MMOL/L — SIGNIFICANT CHANGE UP (ref 3.5–5.3)
RBC # BLD: 3.06 M/UL — LOW (ref 3.8–5.2)
RBC # FLD: 15.6 % — HIGH (ref 10.3–14.5)
SARS-COV-2 RNA SPEC QL NAA+PROBE: SIGNIFICANT CHANGE UP
SODIUM SERPL-SCNC: 138 MMOL/L — SIGNIFICANT CHANGE UP (ref 135–145)
WBC # BLD: 11.16 K/UL — HIGH (ref 3.8–10.5)
WBC # FLD AUTO: 11.16 K/UL — HIGH (ref 3.8–10.5)

## 2022-03-21 PROCEDURE — 99232 SBSQ HOSP IP/OBS MODERATE 35: CPT

## 2022-03-21 RX ADMIN — OXYCODONE HYDROCHLORIDE 5 MILLIGRAM(S): 5 TABLET ORAL at 11:30

## 2022-03-21 RX ADMIN — Medication 650 MILLIGRAM(S): at 23:59

## 2022-03-21 RX ADMIN — OXYCODONE HYDROCHLORIDE 5 MILLIGRAM(S): 5 TABLET ORAL at 21:09

## 2022-03-21 RX ADMIN — SENNA PLUS 2 TABLET(S): 8.6 TABLET ORAL at 21:10

## 2022-03-21 RX ADMIN — CEFTOLOZANE AND TAZOBACTAM 33.33 MILLIGRAM(S): 1; .5 INJECTION, POWDER, LYOPHILIZED, FOR SOLUTION INTRAVENOUS at 21:09

## 2022-03-21 RX ADMIN — NYSTATIN CREAM 1 APPLICATION(S): 100000 CREAM TOPICAL at 06:09

## 2022-03-21 RX ADMIN — CEFTOLOZANE AND TAZOBACTAM 33.33 MILLIGRAM(S): 1; .5 INJECTION, POWDER, LYOPHILIZED, FOR SOLUTION INTRAVENOUS at 13:50

## 2022-03-21 RX ADMIN — CEFTOLOZANE AND TAZOBACTAM 33.33 MILLIGRAM(S): 1; .5 INJECTION, POWDER, LYOPHILIZED, FOR SOLUTION INTRAVENOUS at 06:09

## 2022-03-21 RX ADMIN — NYSTATIN CREAM 1 APPLICATION(S): 100000 CREAM TOPICAL at 17:30

## 2022-03-21 RX ADMIN — OXYCODONE HYDROCHLORIDE 5 MILLIGRAM(S): 5 TABLET ORAL at 06:39

## 2022-03-21 RX ADMIN — ENOXAPARIN SODIUM 40 MILLIGRAM(S): 100 INJECTION SUBCUTANEOUS at 11:00

## 2022-03-21 RX ADMIN — OXYCODONE HYDROCHLORIDE 5 MILLIGRAM(S): 5 TABLET ORAL at 21:39

## 2022-03-21 RX ADMIN — OXYCODONE HYDROCHLORIDE 5 MILLIGRAM(S): 5 TABLET ORAL at 06:09

## 2022-03-21 RX ADMIN — LOSARTAN POTASSIUM 50 MILLIGRAM(S): 100 TABLET, FILM COATED ORAL at 06:09

## 2022-03-21 RX ADMIN — OXYCODONE HYDROCHLORIDE 5 MILLIGRAM(S): 5 TABLET ORAL at 15:34

## 2022-03-21 RX ADMIN — OXYCODONE HYDROCHLORIDE 5 MILLIGRAM(S): 5 TABLET ORAL at 11:00

## 2022-03-21 RX ADMIN — OXYCODONE HYDROCHLORIDE 5 MILLIGRAM(S): 5 TABLET ORAL at 15:04

## 2022-03-21 NOTE — PROGRESS NOTE ADULT - ASSESSMENT
75 YO F with MHx of HTN and Chronic Lymphedema who presents with increased drainage from her RLE concerning for possible cellulitis     Lymphedema.    -pt with chronic lymphedema  -Va duplex to r/o clot - done  -Pnecrotic tissue with lymphedema excised RLE, closure with interrupted nylon sutures and application of wound vac  - s/p Debridement and excision of soft tissue, closure     Cellulitis. fever  - Necrotic, chronic wound  - Concern for ongoing SSTI  - Abx as per ID-ceftolozane/tazobactam IVPB 3000 milliGRAM(s) IV Intermittent every 8 hours  - Zerbaxa 3g q 8 through 3/23/22 then discontinue  - F/U plastics team  - Wound care  - wound vac per plastics    anemia  - likely 2/2 acute blood loss  - transfuse prn  - follow up H&H daily  - keep hgb above 8      Hypertension.   - c/w losartan.    pain  - oxycodone prn    bowl regimen  - senna and Miralax     Prophylactic measure.   -  DVT Ppx  -Lovenox    can be discharged once completes the abx

## 2022-03-21 NOTE — PROGRESS NOTE ADULT - ASSESSMENT
77 yo F with PMHx of Hypertension, morbid obesity, and chronic Lymphedema who presents with increased drainage from her RLE  Longstanding wound, prior procedures, has had drainage from site  On admission, necrotic tissue at wound and drainage  3/2 OR with plastic, debridement  Wound vac in place presently  S/p OR 3/7 debridement and re-placement wound vac  Fever resolved, WBC improving  Wound culture with  Pseudomonas, Enterococcus, Candida--doubt significance enterococcus/candida, treat pseudomonas  Overall,  1) Necrotic, chronic wound  - Concern for ongoing SSTI  - Zerbaxa 3g q 8 through 3/23/22 then discontinue  - Would not plan to treat enterococcus unless worsening clinical status (typically a bystander rather than true pathogen)  - F/U plastics team  - Wound care  - F/U pending cultures--doubt significance candida, enterococcus  2) Anemia  - Due to IVF/hospitalization  - Trend for stability  - Any bleeding concerns at wound site per surgical team  3) Fever/Leukocytosis  - Reactive to OR or anemia process?  - Further workup depending on persistence/clinical signs  - Trend CBCs    Francis Henderson MD  Contact on TEAMS messaging from 9am - 5pm  From 5pm-9am, and on weekends call [phone]

## 2022-03-22 LAB
HCT VFR BLD CALC: 26.6 % — LOW (ref 34.5–45)
HGB BLD-MCNC: 8.1 G/DL — LOW (ref 11.5–15.5)
MCHC RBC-ENTMCNC: 28.4 PG — SIGNIFICANT CHANGE UP (ref 27–34)
MCHC RBC-ENTMCNC: 30.5 GM/DL — LOW (ref 32–36)
MCV RBC AUTO: 93.3 FL — SIGNIFICANT CHANGE UP (ref 80–100)
NRBC # BLD: 0 /100 WBCS — SIGNIFICANT CHANGE UP (ref 0–0)
PLATELET # BLD AUTO: 420 K/UL — HIGH (ref 150–400)
RBC # BLD: 2.85 M/UL — LOW (ref 3.8–5.2)
RBC # FLD: 15.3 % — HIGH (ref 10.3–14.5)
WBC # BLD: 6.87 K/UL — SIGNIFICANT CHANGE UP (ref 3.8–10.5)
WBC # FLD AUTO: 6.87 K/UL — SIGNIFICANT CHANGE UP (ref 3.8–10.5)

## 2022-03-22 PROCEDURE — 99232 SBSQ HOSP IP/OBS MODERATE 35: CPT

## 2022-03-22 RX ORDER — OXYCODONE HYDROCHLORIDE 5 MG/1
5 TABLET ORAL EVERY 4 HOURS
Refills: 0 | Status: DISCONTINUED | OUTPATIENT
Start: 2022-03-22 | End: 2022-03-24

## 2022-03-22 RX ADMIN — LOSARTAN POTASSIUM 50 MILLIGRAM(S): 100 TABLET, FILM COATED ORAL at 06:45

## 2022-03-22 RX ADMIN — ENOXAPARIN SODIUM 40 MILLIGRAM(S): 100 INJECTION SUBCUTANEOUS at 12:36

## 2022-03-22 RX ADMIN — SENNA PLUS 2 TABLET(S): 8.6 TABLET ORAL at 23:27

## 2022-03-22 RX ADMIN — CEFTOLOZANE AND TAZOBACTAM 33.33 MILLIGRAM(S): 1; .5 INJECTION, POWDER, LYOPHILIZED, FOR SOLUTION INTRAVENOUS at 13:26

## 2022-03-22 RX ADMIN — NYSTATIN CREAM 1 APPLICATION(S): 100000 CREAM TOPICAL at 06:45

## 2022-03-22 RX ADMIN — CEFTOLOZANE AND TAZOBACTAM 33.33 MILLIGRAM(S): 1; .5 INJECTION, POWDER, LYOPHILIZED, FOR SOLUTION INTRAVENOUS at 06:45

## 2022-03-22 RX ADMIN — OXYCODONE HYDROCHLORIDE 5 MILLIGRAM(S): 5 TABLET ORAL at 12:40

## 2022-03-22 RX ADMIN — OXYCODONE HYDROCHLORIDE 5 MILLIGRAM(S): 5 TABLET ORAL at 22:32

## 2022-03-22 RX ADMIN — CEFTOLOZANE AND TAZOBACTAM 33.33 MILLIGRAM(S): 1; .5 INJECTION, POWDER, LYOPHILIZED, FOR SOLUTION INTRAVENOUS at 22:02

## 2022-03-22 RX ADMIN — OXYCODONE HYDROCHLORIDE 5 MILLIGRAM(S): 5 TABLET ORAL at 22:02

## 2022-03-22 RX ADMIN — OXYCODONE HYDROCHLORIDE 5 MILLIGRAM(S): 5 TABLET ORAL at 06:45

## 2022-03-22 RX ADMIN — OXYCODONE HYDROCHLORIDE 5 MILLIGRAM(S): 5 TABLET ORAL at 13:10

## 2022-03-22 RX ADMIN — NYSTATIN CREAM 1 APPLICATION(S): 100000 CREAM TOPICAL at 17:03

## 2022-03-22 NOTE — CHART NOTE - NSCHARTNOTESELECT_GEN_ALL_CORE
Event Note
Wound Care Team Note:/Event Note
fever/Event Note
Anemia/Event Note
Event Note
ID
Wound Care Team Note:/Event Note

## 2022-03-22 NOTE — PROGRESS NOTE ADULT - ASSESSMENT
77 YO F with MHx of HTN and Chronic Lymphedema who presents with increased drainage from her RLE concerning for possible cellulitis     Lymphedema.    -pt with chronic lymphedema  -Va duplex to r/o clot - done  -Pnecrotic tissue with lymphedema excised RLE, closure with interrupted nylon sutures and application of wound vac  - s/p Debridement and excision of soft tissue, closure     Cellulitis. fever  - Necrotic, chronic wound  - Concern for ongoing SSTI  - Abx as per ID-ceftolozane/tazobactam IVPB 3000 milliGRAM(s) IV Intermittent every 8 hours  - Zerbaxa 3g q 8 through 3/23/22 then discontinue  - F/U plastics team  - Wound care  - wound vac per plastics    anemia  - likely 2/2 acute blood loss  - transfuse prn  - follow up H&H daily  - keep hgb above 8      Hypertension.   - c/w losartan.    pain  - oxycodone prn    bowl regimen  - senna and Miralax     Prophylactic measure.   -  DVT Ppx  -Lovenox    can be discharged once completes the abx

## 2022-03-22 NOTE — PROGRESS NOTE ADULT - ASSESSMENT
75 yo F with PMHx of Hypertension, morbid obesity, and chronic Lymphedema who presents with increased drainage from her RLE  Longstanding wound, prior procedures, has had drainage from site  On admission, necrotic tissue at wound and drainage  3/2 OR with plastic, debridement  Wound vac in place presently  S/p OR 3/7 debridement and re-placement wound vac  Fever resolved, WBC improving  Wound culture with  Pseudomonas, Enterococcus, Candida--doubt significance enterococcus/candida, treat pseudomonas  Overall,  1) Necrotic, chronic wound  - Concern for ongoing SSTI  - Zerbaxa 3g q 8 through 3/23/22 then discontinue  - Would not plan to treat enterococcus unless worsening clinical status (typically a bystander rather than true pathogen)  - F/U plastics team  - Wound care  - F/U pending cultures--doubt significance candida, enterococcus  2) Anemia  - Due to IVF/hospitalization  - Trend for stability  - Any bleeding concerns at wound site per surgical team  3) Fever/Leukocytosis  - Reactive to OR or anemia process?  - Further workup depending on persistence/clinical signs  - Trend CBCs    Francis Henderson MD  Contact on TEAMS messaging from 9am - 5pm  From 5pm-9am, and on weekends call [phone]

## 2022-03-22 NOTE — CHART NOTE - NSCHARTNOTEFT_GEN_A_CORE
Notified by RN; pt's midline leaking.    IV team notified and changed dressing. Still with persistent leaking.    IV team to be notified in am for change Notified by RN; pt's midline leaking.    IV team notified and changed dressing. Still with persistent leaking.    IV team to be notified in am for Midline change.    F/u repeat imaging.    Will endorse to am team

## 2022-03-23 ENCOUNTER — TRANSCRIPTION ENCOUNTER (OUTPATIENT)
Age: 77
End: 2022-03-23

## 2022-03-23 LAB
ANION GAP SERPL CALC-SCNC: 9 MMOL/L — SIGNIFICANT CHANGE UP (ref 5–17)
BUN SERPL-MCNC: 13 MG/DL — SIGNIFICANT CHANGE UP (ref 7–23)
CALCIUM SERPL-MCNC: 8 MG/DL — LOW (ref 8.4–10.5)
CHLORIDE SERPL-SCNC: 104 MMOL/L — SIGNIFICANT CHANGE UP (ref 96–108)
CO2 SERPL-SCNC: 24 MMOL/L — SIGNIFICANT CHANGE UP (ref 22–31)
CREAT SERPL-MCNC: 0.58 MG/DL — SIGNIFICANT CHANGE UP (ref 0.5–1.3)
EGFR: 94 ML/MIN/1.73M2 — SIGNIFICANT CHANGE UP
GLUCOSE SERPL-MCNC: 168 MG/DL — HIGH (ref 70–99)
HCT VFR BLD CALC: 25.6 % — LOW (ref 34.5–45)
HGB BLD-MCNC: 8.1 G/DL — LOW (ref 11.5–15.5)
MCHC RBC-ENTMCNC: 29.1 PG — SIGNIFICANT CHANGE UP (ref 27–34)
MCHC RBC-ENTMCNC: 31.6 GM/DL — LOW (ref 32–36)
MCV RBC AUTO: 92.1 FL — SIGNIFICANT CHANGE UP (ref 80–100)
NRBC # BLD: 0 /100 WBCS — SIGNIFICANT CHANGE UP (ref 0–0)
PLATELET # BLD AUTO: 373 K/UL — SIGNIFICANT CHANGE UP (ref 150–400)
POTASSIUM SERPL-MCNC: 4.1 MMOL/L — SIGNIFICANT CHANGE UP (ref 3.5–5.3)
POTASSIUM SERPL-SCNC: 4.1 MMOL/L — SIGNIFICANT CHANGE UP (ref 3.5–5.3)
RBC # BLD: 2.78 M/UL — LOW (ref 3.8–5.2)
RBC # FLD: 14.9 % — HIGH (ref 10.3–14.5)
SARS-COV-2 RNA SPEC QL NAA+PROBE: SIGNIFICANT CHANGE UP
SODIUM SERPL-SCNC: 137 MMOL/L — SIGNIFICANT CHANGE UP (ref 135–145)
WBC # BLD: 5.9 K/UL — SIGNIFICANT CHANGE UP (ref 3.8–10.5)
WBC # FLD AUTO: 5.9 K/UL — SIGNIFICANT CHANGE UP (ref 3.8–10.5)

## 2022-03-23 PROCEDURE — 99232 SBSQ HOSP IP/OBS MODERATE 35: CPT

## 2022-03-23 RX ADMIN — NYSTATIN CREAM 1 APPLICATION(S): 100000 CREAM TOPICAL at 05:57

## 2022-03-23 RX ADMIN — CEFTOLOZANE AND TAZOBACTAM 33.33 MILLIGRAM(S): 1; .5 INJECTION, POWDER, LYOPHILIZED, FOR SOLUTION INTRAVENOUS at 05:57

## 2022-03-23 RX ADMIN — CEFTOLOZANE AND TAZOBACTAM 33.33 MILLIGRAM(S): 1; .5 INJECTION, POWDER, LYOPHILIZED, FOR SOLUTION INTRAVENOUS at 15:32

## 2022-03-23 RX ADMIN — OXYCODONE HYDROCHLORIDE 5 MILLIGRAM(S): 5 TABLET ORAL at 16:05

## 2022-03-23 RX ADMIN — LOSARTAN POTASSIUM 50 MILLIGRAM(S): 100 TABLET, FILM COATED ORAL at 05:57

## 2022-03-23 RX ADMIN — OXYCODONE HYDROCHLORIDE 5 MILLIGRAM(S): 5 TABLET ORAL at 11:15

## 2022-03-23 RX ADMIN — OXYCODONE HYDROCHLORIDE 5 MILLIGRAM(S): 5 TABLET ORAL at 21:28

## 2022-03-23 RX ADMIN — POLYETHYLENE GLYCOL 3350 17 GRAM(S): 17 POWDER, FOR SOLUTION ORAL at 10:42

## 2022-03-23 RX ADMIN — OXYCODONE HYDROCHLORIDE 5 MILLIGRAM(S): 5 TABLET ORAL at 22:09

## 2022-03-23 RX ADMIN — ENOXAPARIN SODIUM 40 MILLIGRAM(S): 100 INJECTION SUBCUTANEOUS at 10:42

## 2022-03-23 RX ADMIN — NYSTATIN CREAM 1 APPLICATION(S): 100000 CREAM TOPICAL at 18:35

## 2022-03-23 RX ADMIN — CEFTOLOZANE AND TAZOBACTAM 33.33 MILLIGRAM(S): 1; .5 INJECTION, POWDER, LYOPHILIZED, FOR SOLUTION INTRAVENOUS at 21:14

## 2022-03-23 RX ADMIN — SENNA PLUS 2 TABLET(S): 8.6 TABLET ORAL at 21:28

## 2022-03-23 RX ADMIN — OXYCODONE HYDROCHLORIDE 5 MILLIGRAM(S): 5 TABLET ORAL at 10:42

## 2022-03-23 RX ADMIN — OXYCODONE HYDROCHLORIDE 5 MILLIGRAM(S): 5 TABLET ORAL at 05:57

## 2022-03-23 RX ADMIN — OXYCODONE HYDROCHLORIDE 5 MILLIGRAM(S): 5 TABLET ORAL at 15:32

## 2022-03-23 NOTE — PROGRESS NOTE ADULT - ASSESSMENT
75 yo F with PMHx of Hypertension, morbid obesity, and chronic Lymphedema who presents with increased drainage from her RLE  Longstanding wound, prior procedures, has had drainage from site  On admission, necrotic tissue at wound and drainage  3/2 OR with plastic, debridement  Wound vac in place presently  S/p OR 3/7 debridement and re-placement wound vac  Fever resolved, WBC improved  Wound culture with  Pseudomonas, Enterococcus, Candida--doubt significance enterococcus/candida, treat pseudomonas  Overall,  1) Necrotic, chronic wound  - Concern for ongoing SSTI  - Zerbaxa 3g q 8 through 3/23/22 then discontinue  - Would not plan to treat enterococcus unless worsening clinical status (typically a bystander rather than true pathogen)  - F/U plastics team  - Wound care  - F/U pending cultures--doubt significance candida, enterococcus  2) Anemia  - Due to IVF/hospitalization  - Trend for stability  - Any bleeding concerns at wound site per surgical team  3) Fever/Leukocytosis  - Trend CBCs    Signing off. Please call with further questions or change in status.    Francis Henderson MD  Contact on TEAMS messaging from 9am - 5pm  From 5pm-9am, and on weekends call [phone]

## 2022-03-23 NOTE — DISCHARGE NOTE NURSING/CASE MANAGEMENT/SOCIAL WORK - PATIENT PORTAL LINK FT
You can access the FollowMyHealth Patient Portal offered by [facility] by registering at the following website: [email or web address]. By joining [facility]’s FollowMyHealth portal, you will also be able to view your health information using other applications (apps) compatible with our system.

## 2022-03-23 NOTE — DISCHARGE NOTE NURSING/CASE MANAGEMENT/SOCIAL WORK - NSDCFUADDAPPT_GEN_ALL_CORE_FT
APPTS ARE READY TO BE MADE: [x ] YES    Best Family or Patient Contact (if needed):    Additional Information about above appointments (if needed):    1: please follow up with Plastic sx  for Wound Vac   2: please place the wound Vac  when Pt arrives to Rehab , will be discharged with wet to dry dressing for transport to Rehab     3- Follow up outpatient in office [phone]   - Upon discharge rehab to follow wound care as follows:   Clean wound with NS and chlorhexidine  Use one wound vac with 2 suction pads on either end of wound (anterior and posterior) w/ "Y" connector give h/o lymphedema and high output/ drainage.  Anterior wound - use white foam in between sutures covered with adaptic, black foam and tape.   Medial and Posterior open wound to be covered with Medihoney, adaptic, black foam and tape.   Posterior wound undermine with white foam.        Other comments or requests:

## 2022-03-23 NOTE — DISCHARGE NOTE NURSING/CASE MANAGEMENT/SOCIAL WORK - NSDCPEFALRISK_GEN_ALL_CORE
For information on Fall & Injury Prevention, visit: [email or web address] OR  [email or web address] OR  https://www.cdc.gov/steadi/patient.html

## 2022-03-24 VITALS
SYSTOLIC BLOOD PRESSURE: 153 MMHG | TEMPERATURE: 98 F | RESPIRATION RATE: 20 BRPM | DIASTOLIC BLOOD PRESSURE: 80 MMHG | OXYGEN SATURATION: 98 % | HEART RATE: 77 BPM

## 2022-03-24 PROCEDURE — 93970 EXTREMITY STUDY: CPT

## 2022-03-24 PROCEDURE — 81001 URINALYSIS AUTO W/SCOPE: CPT

## 2022-03-24 PROCEDURE — 84466 ASSAY OF TRANSFERRIN: CPT

## 2022-03-24 PROCEDURE — 97606 NEG PRS WND THER DME>50 SQCM: CPT

## 2022-03-24 PROCEDURE — 80048 BASIC METABOLIC PNL TOTAL CA: CPT

## 2022-03-24 PROCEDURE — 87075 CULTR BACTERIA EXCEPT BLOOD: CPT

## 2022-03-24 PROCEDURE — 85730 THROMBOPLASTIN TIME PARTIAL: CPT

## 2022-03-24 PROCEDURE — 97164 PT RE-EVAL EST PLAN CARE: CPT

## 2022-03-24 PROCEDURE — 86334 IMMUNOFIX E-PHORESIS SERUM: CPT

## 2022-03-24 PROCEDURE — 83521 IG LIGHT CHAINS FREE EACH: CPT

## 2022-03-24 PROCEDURE — 86850 RBC ANTIBODY SCREEN: CPT

## 2022-03-24 PROCEDURE — 87040 BLOOD CULTURE FOR BACTERIA: CPT

## 2022-03-24 PROCEDURE — P9016: CPT

## 2022-03-24 PROCEDURE — 86901 BLOOD TYPING SEROLOGIC RH(D): CPT

## 2022-03-24 PROCEDURE — 87070 CULTURE OTHR SPECIMN AEROBIC: CPT

## 2022-03-24 PROCEDURE — P9045: CPT

## 2022-03-24 PROCEDURE — 87186 SC STD MICRODIL/AGAR DIL: CPT

## 2022-03-24 PROCEDURE — 82746 ASSAY OF FOLIC ACID SERUM: CPT

## 2022-03-24 PROCEDURE — 97602 WOUND(S) CARE NON-SELECTIVE: CPT

## 2022-03-24 PROCEDURE — 87077 CULTURE AEROBIC IDENTIFY: CPT

## 2022-03-24 PROCEDURE — 82607 VITAMIN B-12: CPT

## 2022-03-24 PROCEDURE — C1751: CPT

## 2022-03-24 PROCEDURE — 36415 COLL VENOUS BLD VENIPUNCTURE: CPT

## 2022-03-24 PROCEDURE — 83550 IRON BINDING TEST: CPT

## 2022-03-24 PROCEDURE — 97161 PT EVAL LOW COMPLEX 20 MIN: CPT

## 2022-03-24 PROCEDURE — 86803 HEPATITIS C AB TEST: CPT

## 2022-03-24 PROCEDURE — 83605 ASSAY OF LACTIC ACID: CPT

## 2022-03-24 PROCEDURE — 86900 BLOOD TYPING SEROLOGIC ABO: CPT

## 2022-03-24 PROCEDURE — U0005: CPT

## 2022-03-24 PROCEDURE — 80202 ASSAY OF VANCOMYCIN: CPT

## 2022-03-24 PROCEDURE — 80053 COMPREHEN METABOLIC PANEL: CPT

## 2022-03-24 PROCEDURE — 36430 TRANSFUSION BLD/BLD COMPNT: CPT

## 2022-03-24 PROCEDURE — 82728 ASSAY OF FERRITIN: CPT

## 2022-03-24 PROCEDURE — 36569 INSJ PICC 5 YR+ W/O IMAGING: CPT

## 2022-03-24 PROCEDURE — 85025 COMPLETE CBC W/AUTO DIFF WBC: CPT

## 2022-03-24 PROCEDURE — 85610 PROTHROMBIN TIME: CPT

## 2022-03-24 PROCEDURE — 97116 GAIT TRAINING THERAPY: CPT

## 2022-03-24 PROCEDURE — 85027 COMPLETE CBC AUTOMATED: CPT

## 2022-03-24 PROCEDURE — 83540 ASSAY OF IRON: CPT

## 2022-03-24 PROCEDURE — 71045 X-RAY EXAM CHEST 1 VIEW: CPT

## 2022-03-24 PROCEDURE — 83735 ASSAY OF MAGNESIUM: CPT

## 2022-03-24 PROCEDURE — 87086 URINE CULTURE/COLONY COUNT: CPT

## 2022-03-24 PROCEDURE — 93005 ELECTROCARDIOGRAM TRACING: CPT

## 2022-03-24 PROCEDURE — 97530 THERAPEUTIC ACTIVITIES: CPT

## 2022-03-24 PROCEDURE — 86923 COMPATIBILITY TEST ELECTRIC: CPT

## 2022-03-24 PROCEDURE — 99285 EMERGENCY DEPT VISIT HI MDM: CPT | Mod: 25

## 2022-03-24 PROCEDURE — U0003: CPT

## 2022-03-24 PROCEDURE — 88304 TISSUE EXAM BY PATHOLOGIST: CPT

## 2022-03-24 RX ORDER — NYSTATIN CREAM 100000 [USP'U]/G
1 CREAM TOPICAL
Qty: 0 | Refills: 0 | DISCHARGE
Start: 2022-03-24

## 2022-03-24 RX ORDER — OXYCODONE HYDROCHLORIDE 5 MG/1
1 TABLET ORAL
Qty: 0 | Refills: 0 | DISCHARGE
Start: 2022-03-24

## 2022-03-24 RX ORDER — POLYETHYLENE GLYCOL 3350 17 G/17G
17 POWDER, FOR SOLUTION ORAL
Qty: 0 | Refills: 0 | DISCHARGE
Start: 2022-03-24

## 2022-03-24 RX ORDER — LOSARTAN POTASSIUM 100 MG/1
1 TABLET, FILM COATED ORAL
Qty: 0 | Refills: 0 | DISCHARGE
Start: 2022-03-24

## 2022-03-24 RX ORDER — ENOXAPARIN SODIUM 100 MG/ML
40 INJECTION SUBCUTANEOUS
Qty: 0 | Refills: 0 | DISCHARGE
Start: 2022-03-24

## 2022-03-24 RX ORDER — ACETAMINOPHEN 500 MG
1 TABLET ORAL
Qty: 0 | Refills: 0 | DISCHARGE

## 2022-03-24 RX ORDER — LOSARTAN POTASSIUM 100 MG/1
1 TABLET, FILM COATED ORAL
Qty: 0 | Refills: 0 | DISCHARGE

## 2022-03-24 RX ORDER — SENNA PLUS 8.6 MG/1
2 TABLET ORAL
Qty: 0 | Refills: 0 | DISCHARGE
Start: 2022-03-24

## 2022-03-24 RX ADMIN — NYSTATIN CREAM 1 APPLICATION(S): 100000 CREAM TOPICAL at 05:55

## 2022-03-24 RX ADMIN — CEFTOLOZANE AND TAZOBACTAM 33.33 MILLIGRAM(S): 1; .5 INJECTION, POWDER, LYOPHILIZED, FOR SOLUTION INTRAVENOUS at 05:52

## 2022-03-24 RX ADMIN — OXYCODONE HYDROCHLORIDE 5 MILLIGRAM(S): 5 TABLET ORAL at 03:06

## 2022-03-24 RX ADMIN — OXYCODONE HYDROCHLORIDE 5 MILLIGRAM(S): 5 TABLET ORAL at 04:04

## 2022-03-24 RX ADMIN — OXYCODONE HYDROCHLORIDE 5 MILLIGRAM(S): 5 TABLET ORAL at 15:17

## 2022-03-24 RX ADMIN — LOSARTAN POTASSIUM 50 MILLIGRAM(S): 100 TABLET, FILM COATED ORAL at 05:54

## 2022-03-24 RX ADMIN — ENOXAPARIN SODIUM 40 MILLIGRAM(S): 100 INJECTION SUBCUTANEOUS at 11:14

## 2022-03-24 NOTE — PHARMACOTHERAPY INTERVENTION NOTE - COMMENTS
REHAN Aquino is a 77 yo F with morbid obesity and chronic Lymphedema who presents with increased drainage from her RLE chronic wound s/p debridement on 3/2 and 3/7 with wound culture growing  Pseudomonas, Enterococcus, Candida.  Per ID, treating with ceftolozane/tazobactam (Zerbaxa) for MDR Pseudomonas through 3/23.    Communicated with Walt MCKEE - as per ID recommendations, recommend d/c Zerbaxa.    Thank you,  Kristan Hartley, PharmD, BCIDP  Clinical Pharmacist, Infectious Diseases  Cell: [phone]/Pager: [phone]

## 2022-03-24 NOTE — PROGRESS NOTE ADULT - ASSESSMENT
75 YO F with MHx of HTN and Chronic Lymphedema who presents with increased drainage from her RLE concerning for possible cellulitis     Lymphedema.    -pt with chronic lymphedema  -Va duplex to r/o clot - done  -Pnecrotic tissue with lymphedema excised RLE, closure with interrupted nylon sutures and application of wound vac  - s/p Debridement and excision of soft tissue, closure     Cellulitis. fever  - Necrotic, chronic wound  - Concern for ongoing SSTI  - Abx as per ID-ceftolozane/tazobactam IVPB 3000 milliGRAM(s) IV Intermittent every 8 hours  - Zerbaxa 3g q 8 through 3/23/22 then discontinue  - F/U plastics team  - Wound care  - wound vac per plastics    anemia  - likely 2/2 acute blood loss  - transfuse prn  - follow up H&H daily  - keep hgb above 8      Hypertension.   - c/w losartan.    pain  - oxycodone prn    bowl regimen  - senna and Miralax     Prophylactic measure.   -  DVT Ppx  -Lovenox    can be discharged to [facility]

## 2022-03-24 NOTE — PROGRESS NOTE ADULT - PROVIDER SPECIALTY LIST ADULT
Infectious Disease
Internal Medicine
Plastic Surgery
Plastic Surgery
Infectious Disease
Internal Medicine
Plastic Surgery
Internal Medicine
Plastic Surgery
Internal Medicine
Plastic Surgery
Internal Medicine
Plastic Surgery

## 2022-03-24 NOTE — PROGRESS NOTE ADULT - SUBJECTIVE AND OBJECTIVE BOX
CC: F/U Fever    Saw/spoke to patient. Patient s/p OR and further debridement of wound. Patient had episode of fever in the interim and has AMS.    Allergies  No Known Allergies    ANTIMICROBIALS:  cefTRIAXone   IVPB 2000 every 24 hours  metroNIDAZOLE    Tablet 500 every 8 hours  vancomycin  IVPB 1500 every 24 hours    PE:    Vital Signs Last 24 Hrs  T(C): 36.7 (08 Mar 2022 12:29), Max: 39.4 (07 Mar 2022 19:59)  T(F): 98 (08 Mar 2022 12:29), Max: 102.9 (07 Mar 2022 19:59)  HR: 72 (08 Mar 2022 12:29) (72 - 108)  BP: 91/67 (08 Mar 2022 12:29) (91/67 - 134/80)  RR: 18 (08 Mar 2022 12:29) (18 - 18)  SpO2: 98% (08 Mar 2022 12:29) (97% - 98%)    Gen: AOx1-2, awake, but seems confused  Resp: Breathing comfortably, RA  Ext: wound vac to LE wounds    LABS:                        6.5    17.48 )-----------( 230      ( 08 Mar 2022 11:03 )             20.5     03-08    133<L>  |  104  |  31<H>  ----------------------------<  176<H>  4.6   |  20<L>  |  1.44<H>    Ca    7.2<L>      08 Mar 2022 11:03    MICROBIOLOGY:  Vancomycin Level, Trough: 14.2 ug/mL (03-08-22 @ 11:03)    .Blood Blood  03-06-22   No growth to date.     Clean Catch Clean Catch (Midstream)  03-05-22   <10,000 CFU/ml Normal Urogenital armida present     RADIOLOGY:    3/1 US:    FINDINGS: There is edema within the superficial soft tissues of the right   lower extremity.    RIGHT:  Normal compressibility of the RIGHT common femoral, femoral and popliteal   veins.  Doppler examination shows normal spontaneous and phasic flow.  No RIGHT calf vein thrombosis is detected.    LEFT:  Normal compressibility of the LEFT common femoral, femoral and popliteal   veins.  Doppler examination shows normal spontaneous and phasic flow.  No LEFT calf vein thrombosis is detected.    IMPRESSION:  No evidence of deep venous thrombosis in either lower extremity.  
DATE OF SERVICE: 03-02-22 @ 14:55    Patient is a 76y old  Female who presents with a chief complaint of Lymphedema (01 Mar 2022 17:09)      SUBJECTIVE / OVERNIGHT EVENTS:  No chest pain. No shortness of breath. No complaints. No events overnight.     MEDICATIONS  (STANDING):  ceFAZolin   IVPB 1000 milliGRAM(s) IV Intermittent every 8 hours  lactated ringers. 1000 milliLiter(s) (75 mL/Hr) IV Continuous <Continuous>  losartan 50 milliGRAM(s) Oral daily  vancomycin  IVPB      vancomycin  IVPB 1250 milliGRAM(s) IV Intermittent every 12 hours    MEDICATIONS  (PRN):  acetaminophen     Tablet .. 1000 milliGRAM(s) Oral every 8 hours PRN Mild Pain (1 - 3)  HYDROmorphone  Injectable 0.25 milliGRAM(s) IV Push every 10 minutes PRN Moderate Pain (4 - 6)      Vital Signs Last 24 Hrs  T(C): 36.5 (02 Mar 2022 13:45), Max: 36.8 (02 Mar 2022 04:29)  T(F): 97.7 (02 Mar 2022 13:45), Max: 98.3 (02 Mar 2022 04:29)  HR: 66 (02 Mar 2022 13:45) (65 - 79)  BP: 153/73 (02 Mar 2022 13:45) (121/76 - 160/85)  BP(mean): 104 (02 Mar 2022 13:45) (95 - 115)  RR: 16 (02 Mar 2022 13:45) (14 - 19)  SpO2: 96% (02 Mar 2022 13:45) (95% - 100%)  CAPILLARY BLOOD GLUCOSE        I&O's Summary    01 Mar 2022 07:01  -  02 Mar 2022 07:00  --------------------------------------------------------  IN: 240 mL / OUT: 0 mL / NET: 240 mL        PHYSICAL EXAM:  GENERAL: NAD, well-developed  HEAD:  Atraumatic, Normocephalic  EYES: EOMI, PERRLA, conjunctiva and sclera clear  NECK: Supple, No JVD  CHEST/LUNG: Clear to auscultation bilaterally; No wheeze  HEART: Regular rate and rhythm; No murmurs, rubs, or gallops  ABDOMEN: Soft, Nontender, Nondistended; Bowel sounds present  EXTREMITIES:  2+ Peripheral Pulses, No clubbing, cyanosis, or edema  PSYCH: AAOx3  NEUROLOGY: non-focal  SKIN: No rashes or lesions    LABS:                        10.6   3.75  )-----------( 265      ( 02 Mar 2022 06:11 )             34.9     03-02    139  |  104  |  19  ----------------------------<  103<H>  4.3   |  25  |  0.79    Ca    8.6      02 Mar 2022 06:11    TPro  8.1  /  Alb  3.7  /  TBili  0.5  /  DBili  x   /  AST  16  /  ALT  13  /  AlkPhos  96  03-01    PT/INR - ( 28 Feb 2022 16:11 )   PT: 12.1 sec;   INR: 1.01 ratio         PTT - ( 28 Feb 2022 16:11 )  PTT:34.2 sec          RADIOLOGY & ADDITIONAL TESTS:    Imaging Personally Reviewed:    Consultant(s) Notes Reviewed:      Care Discussed with Consultants/Other Providers:  
DATE OF SERVICE: 03-03-22 @ 13:30    Patient is a 76y old  Female who presents with a chief complaint of Lymphedema (03 Mar 2022 08:10)      SUBJECTIVE / OVERNIGHT EVENTS:  No chest pain. No shortness of breath. No complaints. No events overnight.     MEDICATIONS  (STANDING):  ceFAZolin   IVPB 1000 milliGRAM(s) IV Intermittent every 8 hours  enoxaparin Injectable 40 milliGRAM(s) SubCutaneous every 12 hours  losartan 50 milliGRAM(s) Oral daily  vancomycin  IVPB      vancomycin  IVPB 1250 milliGRAM(s) IV Intermittent every 12 hours    MEDICATIONS  (PRN):  acetaminophen     Tablet .. 1000 milliGRAM(s) Oral every 8 hours PRN Mild Pain (1 - 3)      Vital Signs Last 24 Hrs  T(C): 36.8 (03 Mar 2022 12:11), Max: 36.8 (03 Mar 2022 12:11)  T(F): 98.2 (03 Mar 2022 12:11), Max: 98.2 (03 Mar 2022 12:11)  HR: 80 (03 Mar 2022 12:11) (65 - 96)  BP: 111/72 (03 Mar 2022 12:11) (107/65 - 153/73)  BP(mean): 104 (02 Mar 2022 13:45) (104 - 104)  RR: 18 (03 Mar 2022 12:11) (16 - 19)  SpO2: 99% (03 Mar 2022 12:11) (92% - 99%)  CAPILLARY BLOOD GLUCOSE        I&O's Summary    02 Mar 2022 07:01  -  03 Mar 2022 07:00  --------------------------------------------------------  IN: 840 mL / OUT: 0 mL / NET: 840 mL        PHYSICAL EXAM:  GENERAL: NAD, well-developed  HEAD:  Atraumatic, Normocephalic  EYES: EOMI, PERRLA, conjunctiva and sclera clear  NECK: Supple, No JVD  CHEST/LUNG: Clear to auscultation bilaterally; No wheeze  HEART: Regular rate and rhythm; No murmurs, rubs, or gallops  ABDOMEN: Soft, Nontender, Nondistended; Bowel sounds present  EXTREMITIES:  2+ Peripheral Pulses, No clubbing, cyanosis, or edema  PSYCH: AAOx3  NEUROLOGY: non-focal  SKIN: No rashes or lesions    LABS:                        10.6   3.75  )-----------( 265      ( 02 Mar 2022 06:11 )             34.9     03-03    135  |  103  |  25<H>  ----------------------------<  146<H>  4.9   |  23  |  0.85    Ca    7.9<L>      03 Mar 2022 11:19      < from: [facility] Duplex Lower Ext Vein Scan, Bilat (03.01.22 @ 09:28) >  INTERPRETATION:  CLINICAL INFORMATION: Lymphedema, right lower extremity   pain    COMPARISON: None available.    TECHNIQUE: Duplex sonography of the BILATERAL LOWER extremity veins with   color and spectral Doppler, with and without compression.    FINDINGS: There is edema within the superficial soft tissues of the right   lower extremity.    RIGHT:  Normal compressibility of the RIGHT common femoral, femoral and popliteal   veins.  Doppler examination shows normal spontaneous and phasic flow.  No RIGHT calf vein thrombosis is detected.    LEFT:  Normal compressibility of the LEFT common femoral, femoral and popliteal   veins.  Doppler examination shows normal spontaneous and phasic flow.  No LEFT calf vein thrombosis is detected.    IMPRESSION:  No evidence of deep venous thrombosis in either lower extremity.        < end of copied text >            RADIOLOGY & ADDITIONAL TESTS:    Imaging Personally Reviewed:    Consultant(s) Notes Reviewed:      Care Discussed with Consultants/Other Providers:  
DATE OF SERVICE: 03-08-22 @ 13:29    Patient is a 76y old  Female who presents with a chief complaint of increased drainage from RLE (08 Mar 2022 10:03)      SUBJECTIVE / OVERNIGHT EVENTS:   No chest pain. No shortness of breath. No complaints. No events overnight.     MEDICATIONS  (STANDING):  cefTRIAXone   IVPB 2000 milliGRAM(s) IV Intermittent every 24 hours  enoxaparin Injectable 40 milliGRAM(s) SubCutaneous every 24 hours  lactated ringers. 1000 milliLiter(s) (75 mL/Hr) IV Continuous <Continuous>  losartan 50 milliGRAM(s) Oral daily  metroNIDAZOLE    Tablet 500 milliGRAM(s) Oral every 8 hours  multiple electrolytes Injection Type 1 500 milliLiter(s) (2000 mL/Hr) IV Continuous <Continuous>  polyethylene glycol 3350 17 Gram(s) Oral daily  senna 2 Tablet(s) Oral at bedtime  vancomycin  IVPB 1500 milliGRAM(s) IV Intermittent every 24 hours    MEDICATIONS  (PRN):  oxyCODONE    IR 5 milliGRAM(s) Oral every 4 hours PRN Moderate Pain (4 - 6)      Vital Signs Last 24 Hrs  T(C): 36.7 (08 Mar 2022 12:29), Max: 39.4 (07 Mar 2022 19:59)  T(F): 98 (08 Mar 2022 12:29), Max: 102.9 (07 Mar 2022 19:59)  HR: 72 (08 Mar 2022 12:29) (72 - 108)  BP: 91/67 (08 Mar 2022 12:29) (91/67 - 134/80)  BP(mean): 69 (07 Mar 2022 13:30) (69 - 69)  RR: 18 (08 Mar 2022 12:29) (18 - 20)  SpO2: 98% (08 Mar 2022 12:29) (97% - 100%)  CAPILLARY BLOOD GLUCOSE        I&O's Summary    07 Mar 2022 07:01  -  08 Mar 2022 07:00  --------------------------------------------------------  IN: 2490 mL / OUT: 1300 mL / NET: 1190 mL        PHYSICAL EXAM:  GENERAL: NAD, well-developed  HEAD:  Atraumatic, Normocephalic  EYES: EOMI, PERRLA, conjunctiva and sclera clear  NECK: Supple, No JVD  CHEST/LUNG: Clear to auscultation bilaterally; No wheeze  HEART: Regular rate and rhythm; No murmurs, rubs, or gallops  ABDOMEN: Soft, Nontender, Nondistended; Bowel sounds present  EXTREMITIES:  2+ Peripheral Pulses, No clubbing, cyanosis, or edema  PSYCH: AAOx3  NEUROLOGY: non-focal  SKIN: No rashes or lesions    LABS:                        6.5    17.48 )-----------( 230      ( 08 Mar 2022 11:03 )             20.5     03-08    133<L>  |  104  |  31<H>  ----------------------------<  176<H>  4.6   |  20<L>  |  1.44<H>    Ca    7.2<L>      08 Mar 2022 11:03                RADIOLOGY & ADDITIONAL TESTS:    Imaging Personally Reviewed:    Consultant(s) Notes Reviewed:      Care Discussed with Consultants/Other Providers:  
DATE OF SERVICE: 03-17-22 @ 18:05    Patient is a 76y old  Female who presents with a chief complaint of Lymphedema (17 Mar 2022 11:12)      SUBJECTIVE / OVERNIGHT EVENTS:  No chest pain. No shortness of breath. No complaints. No events overnight.      MEDICATIONS  (STANDING):  ceftolozane/tazobactam IVPB 3000 milliGRAM(s) IV Intermittent every 8 hours  enoxaparin Injectable 40 milliGRAM(s) SubCutaneous every 24 hours  losartan 50 milliGRAM(s) Oral daily  multiple electrolytes Injection Type 1 500 milliLiter(s) (2000 mL/Hr) IV Continuous <Continuous>  nystatin Powder 1 Application(s) Topical two times a day  polyethylene glycol 3350 17 Gram(s) Oral daily  senna 2 Tablet(s) Oral at bedtime  vancomycin  IVPB 1500 milliGRAM(s) IV Intermittent every 24 hours    MEDICATIONS  (PRN):  acetaminophen     Tablet .. 650 milliGRAM(s) Oral every 6 hours PRN Temp greater or equal to 38C (100.4F), Mild Pain (1 - 3)  oxyCODONE    IR 5 milliGRAM(s) Oral every 4 hours PRN Moderate Pain (4 - 6)      Vital Signs Last 24 Hrs  T(C): 36.3 (17 Mar 2022 16:23), Max: 36.9 (17 Mar 2022 11:37)  T(F): 97.3 (17 Mar 2022 16:23), Max: 98.4 (17 Mar 2022 11:37)  HR: 77 (17 Mar 2022 16:23) (71 - 89)  BP: 109/74 (17 Mar 2022 16:23) (109/74 - 133/78)  BP(mean): --  RR: 18 (17 Mar 2022 16:23) (17 - 18)  SpO2: 96% (17 Mar 2022 16:23) (96% - 100%)  CAPILLARY BLOOD GLUCOSE        I&O's Summary    16 Mar 2022 07:01  -  17 Mar 2022 07:00  --------------------------------------------------------  IN: 770 mL / OUT: 1050 mL / NET: -280 mL    17 Mar 2022 07:01  -  17 Mar 2022 18:05  --------------------------------------------------------  IN: 1080 mL / OUT: 0 mL / NET: 1080 mL        PHYSICAL EXAM:  GENERAL: NAD, well-developed  HEAD:  Atraumatic, Normocephalic  EYES: EOMI, PERRLA, conjunctiva and sclera clear  NECK: Supple, No JVD  CHEST/LUNG: Clear to auscultation bilaterally; No wheeze  HEART: Regular rate and rhythm; No murmurs, rubs, or gallops  ABDOMEN: Soft, Nontender, Nondistended; Bowel sounds present  EXTREMITIES:  2+ Peripheral Pulses, No clubbing, cyanosis, or edema  PSYCH: AAOx3  NEUROLOGY: non-focal  SKIN: No rashes or lesions    LABS:                        9.5    10.48 )-----------( 606      ( 17 Mar 2022 09:30 )             30.9     03-17    137  |  105  |  21  ----------------------------<  124<H>  4.6   |  25  |  0.95    Ca    8.1<L>      17 Mar 2022 09:30                RADIOLOGY & ADDITIONAL TESTS:    Imaging Personally Reviewed:    Consultant(s) Notes Reviewed:      Care Discussed with Consultants/Other Providers:  
DATE OF SERVICE: 03-22-22 @ 14:06    Patient is a 76y old  Female who presents with a chief complaint of Lymphedema (21 Mar 2022 14:50)      SUBJECTIVE / OVERNIGHT EVENTS:  No chest pain. No shortness of breath. No complaints. No events overnight.     MEDICATIONS  (STANDING):  ceftolozane/tazobactam IVPB 3000 milliGRAM(s) IV Intermittent every 8 hours  enoxaparin Injectable 40 milliGRAM(s) SubCutaneous every 24 hours  losartan 50 milliGRAM(s) Oral daily  multiple electrolytes Injection Type 1 500 milliLiter(s) (2000 mL/Hr) IV Continuous <Continuous>  nystatin Powder 1 Application(s) Topical two times a day  polyethylene glycol 3350 17 Gram(s) Oral daily  senna 2 Tablet(s) Oral at bedtime    MEDICATIONS  (PRN):  acetaminophen     Tablet .. 650 milliGRAM(s) Oral every 6 hours PRN Temp greater or equal to 38C (100.4F), Mild Pain (1 - 3)  oxyCODONE    IR 5 milliGRAM(s) Oral every 4 hours PRN Moderate Pain (4 - 6)      Vital Signs Last 24 Hrs  T(C): 36.7 (22 Mar 2022 11:59), Max: 37.3 (21 Mar 2022 20:45)  T(F): 98.1 (22 Mar 2022 11:59), Max: 99.1 (21 Mar 2022 20:45)  HR: 75 (22 Mar 2022 11:59) (75 - 85)  BP: 104/57 (22 Mar 2022 11:59) (104/57 - 134/74)  BP(mean): --  RR: 18 (22 Mar 2022 11:59) (18 - 18)  SpO2: 98% (22 Mar 2022 11:59) (95% - 100%)  CAPILLARY BLOOD GLUCOSE        I&O's Summary    21 Mar 2022 07:01  -  22 Mar 2022 07:00  --------------------------------------------------------  IN: 0 mL / OUT: 1400 mL / NET: -1400 mL        PHYSICAL EXAM:  GENERAL: NAD, well-developed  HEAD:  Atraumatic, Normocephalic  EYES: EOMI, PERRLA, conjunctiva and sclera clear  NECK: Supple, No JVD  CHEST/LUNG: Clear to auscultation bilaterally; No wheeze  HEART: Regular rate and rhythm; No murmurs, rubs, or gallops  ABDOMEN: Soft, Nontender, Nondistended; Bowel sounds present  EXTREMITIES:  2+ Peripheral Pulses, No clubbing, cyanosis, or edema  PSYCH: AAOx3  NEUROLOGY: non-focal  SKIN: No rashes or lesions    LABS:                        8.1    6.87  )-----------( 420      ( 22 Mar 2022 11:21 )             26.6     03-21    138  |  105  |  15  ----------------------------<  114<H>  4.2   |  23  |  0.78    Ca    8.4      21 Mar 2022 13:56                RADIOLOGY & ADDITIONAL TESTS:    Imaging Personally Reviewed:    Consultant(s) Notes Reviewed:      Care Discussed with Consultants/Other Providers:  
Doing well   feels better  VAC in place    Plan    OOB ambulate  VAC change monday  Anticipate DC this coming week
Lane Colon MD & Kelle MCKEE  Plastic & Reconstructive Surgery  [address] [address]    [phone]    Patient:ALICIA GONZALEZ  [id number]      Subjective:  Patient is a 76y old  Female who presents with a chief complaint of Lymphedema (22 Mar 2022 14:06)  s/p excision of necrotic tissue RLE seen at bedside. Pt advises ambulation is improving. No complaints at this time.          Objective:  T(C): 37 (03-23-22 @ 05:43), Max: 37.2 (03-22-22 @ 20:45)  HR: 78 (03-23-22 @ 05:43) (75 - 78)  BP: 132/80 (03-23-22 @ 05:43) (104/57 - 132/80)  RR: 18 (03-23-22 @ 05:43) (18 - 18)  SpO2: 99% (03-23-22 @ 05:43) (96% - 99%)  Wt(kg): --   03-23    137  |  104  |  13  ----------------------------<  168<H>  4.1   |  24  |  0.58    Ca    8.0<L>      23 Mar 2022 10:04                          8.1    5.90  )-----------( 373      ( 23 Mar 2022 10:04 )             25.6       03-22 @ 07:01  -  03-23 @ 07:00  --------------------------------------------------------  IN: 0 mL / OUT: 1400 mL / NET: -1400 mL      PHYSICAL EXAM:    General Appearance: A&O x 3, Appears well, NAD  Respiratory: No labored breathing  CV: Pulse regularly present  Extremities: B/L edematous lower extremities  Skin: RLE open wound, no bleeding or active drainage at wound sites. Periwound erythema and tenderness, improving.  Posterior wound with granulation tissue and a clean base. No gross sign of infection     Vac chnage >50cm2 set to 125mmHg, good seal             MEDICATIONS  (STANDING):  ceftolozane/tazobactam IVPB 3000 milliGRAM(s) IV Intermittent every 8 hours  enoxaparin Injectable 40 milliGRAM(s) SubCutaneous every 24 hours  losartan 50 milliGRAM(s) Oral daily  multiple electrolytes Injection Type 1 500 milliLiter(s) (2000 mL/Hr) IV Continuous <Continuous>  nystatin Powder 1 Application(s) Topical two times a day  polyethylene glycol 3350 17 Gram(s) Oral daily  senna 2 Tablet(s) Oral at bedtime    MEDICATIONS  (PRN):  acetaminophen     Tablet .. 650 milliGRAM(s) Oral every 6 hours PRN Temp greater or equal to 38C (100.4F), Mild Pain (1 - 3)  oxyCODONE    IR 5 milliGRAM(s) Oral every 4 hours PRN Moderate Pain (4 - 6)      Assessment/Plan:  Patient is a 76y old  Female who presents with a chief complaint of Lymphedema (22 Mar 2022 14:06) s/p excision of necrotic tissue RLE, now with open wound with clean granulating base, no sign of infection.    - vac change > 50cm2, set to 125mmHg.  - Vac changes M/W/F   - OOB with physical therapy, ambulate as tolerated  - leg elevated while in bed   - continue to trend for H/H   - c/w abx. per ID   - Discharge to rehab planned for tomorrow once completed abx.   - Follow up outpatient in office [phone]   - Upon discharge rehab to follow wound care as follows:   Clean wound with NS and chlorohexadine  Use one wound vac with 2 suction pads on either end of wound (anterior and posterior) w/ "Y" connector give h/o lymphedema and high output/ drainage.  Anterior wound - use white foam in between sutures covered with adaptic, black foam and tape.   Medial and Posterior open wound to be covered with Medihoney, adaptic, black foam and tape.   Posterior wound undermine with white foam.              
Lane Colon MD & Kelle MCKEE  Plastic & Reconstructive Surgery  [address] [place], [place] [id number] [phone]    Patient:ALICIA GONZALEZ  [id number]      Subjective:  Patient is a 76y old  Female who presents with a chief complaint of Lymphedema (04 Mar 2022 13:29), s/p excision of necrotic tissue RLE POD #2 seen at bedside for wound vac change. Pain in RLE overnight controlled with medications, no dizziness or lightheadedness. Ambulating with PT.        Objective:  T(C): 37.2 (03-04-22 @ 11:44), Max: 37.8 (03-03-22 @ 20:51)  HR: 92 (03-04-22 @ 11:44) (84 - 92)  BP: 134/76 (03-04-22 @ 11:44) (107/54 - 134/76)  RR: 18 (03-04-22 @ 11:44) (18 - 19)  SpO2: 99% (03-04-22 @ 11:44) (97% - 99%)  Wt(kg): --   03-04    139  |  105  |  24<H>  ----------------------------<  111<H>  4.4   |  23  |  0.79    Ca    8.3<L>      04 Mar 2022 11:54                          7.4    11.37 )-----------( 207      ( 04 Mar 2022 11:53 )             24.1       03-03 @ 07:01  -  03-04 @ 07:00  --------------------------------------------------------  IN: 480 mL / OUT: 1850 mL / NET: -1370 mL      Physical Exam:  General Appearance: A&O x 3, Appears well, NAD  Respiratory: No labored breathing  CV: Pulse regularly present  Extremities: B/l edematous lower extremities,  Skin: RLE open wound bleeding controlled with electrocautery.  RLE wound vac > 50 cm2 changed at bedside, suction set to 175mmHg continuous.   No gross sign of infection         MEDICATIONS  (STANDING):  cefTRIAXone   IVPB      cefTRIAXone   IVPB 2000 milliGRAM(s) IV Intermittent every 24 hours  enoxaparin Injectable 40 milliGRAM(s) SubCutaneous every 12 hours  losartan 50 milliGRAM(s) Oral daily  metroNIDAZOLE    Tablet 500 milliGRAM(s) Oral every 8 hours  polyethylene glycol 3350 17 Gram(s) Oral daily  senna 2 Tablet(s) Oral at bedtime  vancomycin  IVPB 1500 milliGRAM(s) IV Intermittent every 24 hours    MEDICATIONS  (PRN):  acetaminophen     Tablet .. 1000 milliGRAM(s) Oral every 8 hours PRN Mild Pain (1 - 3)  oxyCODONE    IR 5 milliGRAM(s) Oral every 4 hours PRN Moderate Pain (4 - 6)      Assessment/Plan:  Patient is a 76y old  Female who presents with a chief complaint of Lymphedema (04 Mar 2022 13:29) s/p excision of necrotic tissue RLE with open wound, bleeding controlled with electrocautery, wound vac in place.       Recommendations:  - Wound vac > 50 cm2 changed today, set to 175mmHg  - C/w Abx  - Diet as tolerated  - OOB with physical therapy ambulate as tolerated  - Monitor outputs  - AM labs reviewed   - 2 units PRBC   - IVF  - Leg elevated while in bed  - Plan to return to operating room on Monday for closure         
Lane Colon MD & Kelle MCKEE  Plastic & Reconstructive Surgery  [address] [place], [place] [address] [phone]    Patient:ALICIA GONZALEZ  [id number]      Subjective:  Patient is a 76y old  Female who presents with a chief complaint of Lymphedema (16 Mar 2022 13:27)  s/p excision of necrotic tissue RLE seen at bedside.          Objective:  T(C): 36.7 (03-16-22 @ 21:36), Max: 36.8 (03-16-22 @ 13:00)  HR: 71 (03-16-22 @ 21:36) (67 - 90)  BP: 133/78 (03-16-22 @ 21:36) (106/67 - 138/73)  RR: 18 (03-16-22 @ 21:36) (18 - 18)  SpO2: 99% (03-16-22 @ 21:36) (93% - 99%)  Wt(kg): --   03-16    137  |  104  |  18  ----------------------------<  134<H>  4.9   |  25  |  0.79    Ca    8.1<L>      16 Mar 2022 14:15                          9.9    10.97 )-----------( 543      ( 16 Mar 2022 14:15 )             32.8       03-15 @ 07:01  -  03-16 @ 07:00  --------------------------------------------------------  IN: 600 mL / OUT: 1200 mL / NET: -600 mL    03-16 @ 07:01  -  03-16 @ 22:28  --------------------------------------------------------  IN: 770 mL / OUT: 600 mL / NET: 170 mL      PHYSICAL EXAM:    General Appearance: A&O x 3, Appears well, NAD  Respiratory: No labored breathing  CV: Pulse regularly present  Extremities: B/L edematous lower extremities  Skin: RLE open wound, no bleeding or active drainage at wound sites. Periwound erythema and tenderness. Posterior wound breakdown    Packing and dressing replaced.          MEDICATIONS  (STANDING):  enoxaparin Injectable 40 milliGRAM(s) SubCutaneous every 24 hours  losartan 50 milliGRAM(s) Oral daily  meropenem  IVPB 1000 milliGRAM(s) IV Intermittent every 8 hours  multiple electrolytes Injection Type 1 500 milliLiter(s) (2000 mL/Hr) IV Continuous <Continuous>  nystatin Powder 1 Application(s) Topical two times a day  polyethylene glycol 3350 17 Gram(s) Oral daily  senna 2 Tablet(s) Oral at bedtime  vancomycin  IVPB 1500 milliGRAM(s) IV Intermittent every 24 hours    MEDICATIONS  (PRN):  acetaminophen     Tablet .. 650 milliGRAM(s) Oral every 6 hours PRN Temp greater or equal to 38C (100.4F), Mild Pain (1 - 3)  oxyCODONE    IR 5 milliGRAM(s) Oral every 4 hours PRN Moderate Pain (4 - 6)        Culture - Surgical Swab (collected 15 Mar 2022 14:45)  Source: .Surgical Swab ight lower extremity open surgical wound  Preliminary Report (16 Mar 2022 14:38):    Few Pseudomonas aeruginosa        Assessment/Plan:  Patient is a 76y old  Female who presents with a chief complaint of Lymphedema (16 Mar 2022 13:27) s/p excision of necrotic tissue RLE, now with open wound , anemia and leukocytosis improving     - twice daily changes by nursing - clean with NS/ chlorhexidine, pack wound, wet to dry dressing, ABD and tape   - OOB with physical therapy, ambulate as tolerated  - leg elevated while in bed   - continue to trend for leukocytosis and H/H   - wound w/ pseudomonas: c/w abx. per ID              
Lane Colon MD & Kelle MCKEE  Plastic & Reconstructive Surgery  [address] [place] [address]    [phone]    Patient:ALICIA GONZALEZ  [id number]      Subjective:  Patient is a 76y old  Female who presents with a chief complaint of Lymphedema (01 Mar 2022 14:32) has open wound right lower extremity cellulitis and drainage  Plan for OR tomorrow for soft tissue excision         REVIEW OF SYSTEMS:    CONSTITUTIONAL: No weakness, fevers or chills  EYES/ENT: No visual changes;  No vertigo or throat pain   NECK: No pain or stiffness  RESPIRATORY: No cough, wheezing, hemoptysis; No shortness of breath  CARDIOVASCULAR: No chest pain or palpitations  GASTROINTESTINAL: No abdominal or epigastric pain. No nausea, vomiting, or hematemesis; No diarrhea or constipation. No melena or hematochezia.  GENITOURINARY: No dysuria, frequency or hematuria  NEUROLOGICAL: No numbness or weakness  SKIN: open wounds right leg MSK: lymphedema      Objective:  T(C): 36.5 (03-01-22 @ 11:55), Max: 36.8 (02-28-22 @ 20:30)  HR: 80 (03-01-22 @ 11:55) (71 - 104)  BP: 115/70 (03-01-22 @ 11:55) (115/70 - 158/86)  RR: 18 (03-01-22 @ 11:55) (16 - 18)  SpO2: 96% (03-01-22 @ 11:55) (96% - 100%)  Wt(kg): --   03-01    137  |  103  |  16  ----------------------------<  110<H>  4.8   |  21<L>  |  0.64    Ca    8.8      01 Mar 2022 11:05    TPro  8.1  /  Alb  3.7  /  TBili  0.5  /  DBili  x   /  AST  16  /  ALT  13  /  AlkPhos  96  03-01                        12.0   5.38  )-----------( 293      ( 01 Mar 2022 15:01 )             39.0       PHYSICAL EXAM:    General:  NAD  Breathing unlabored  Open wound right leg with severe swelling            MEDICATIONS  (STANDING):  enoxaparin Injectable 40 milliGRAM(s) SubCutaneous once  losartan 50 milliGRAM(s) Oral daily  vancomycin  IVPB      vancomycin  IVPB 1250 milliGRAM(s) IV Intermittent every 12 hours    MEDICATIONS  (PRN):  acetaminophen     Tablet .. 1000 milliGRAM(s) Oral every 8 hours PRN Mild Pain (1 - 3)      Assessment/Plan:  Patient is a 76y old  Female who presents with a chief complaint of Lymphedema (01 Mar 2022 14:32) open wound cellulitis  - Plan for debulking surgery tomorrow and VAC  - High risks of wound and extremity related morbidity reviewed with patient as well as risk of bleeding, infection and delayed wound healing reviewed    NPO  Clearance appreciated        
Lane Colon MD & Kelle MCKEE  Plastic & Reconstructive Surgery  [address] [place] [address]    [phone]    Patient:ALICIA GONZALEZ  [id number]      Subjective:  Patient is a 76y old  Female who presents with a chief complaint of Lymphedema (10 Mar 2022 14:29) s/p excision of necrotic tissue RLE seen at bedside for vac change, not complaints.       Objective:  T(C): 37.1 (03-11-22 @ 09:02), Max: 37.4 (03-10-22 @ 20:42)  HR: 76 (03-11-22 @ 09:02) (76 - 89)  BP: 100/66 (03-11-22 @ 09:02) (100/66 - 116/65)  RR: 18 (03-11-22 @ 09:02) (18 - 18)  SpO2: 90% (03-11-22 @ 09:02) (90% - 100%)  Wt(kg): --           03-10 @ 07:01  -  03-11 @ 07:00  --------------------------------------------------------  IN: 0 mL / OUT: 1000 mL / NET: -1000 mL      PHYSICAL EXAM:    General Appearance: A&O x 3, Appears well, NAD  Respiratory: No labored breathing  CV: Pulse regularly present  Extremities: B/L edematous lower extremities, RLE soft   Skin: RLE open wound, no bleeding at wound sites, clean granulating base.   RLE wound vac > 50 cm2 changed at bedside, suction set to 125mmHg continuous.   No gross sign of infection           MEDICATIONS  (STANDING):  cefTRIAXone   IVPB 2000 milliGRAM(s) IV Intermittent every 24 hours  enoxaparin Injectable 40 milliGRAM(s) SubCutaneous every 24 hours  losartan 50 milliGRAM(s) Oral daily  metroNIDAZOLE    Tablet 500 milliGRAM(s) Oral every 8 hours  multiple electrolytes Injection Type 1 500 milliLiter(s) (2000 mL/Hr) IV Continuous <Continuous>  polyethylene glycol 3350 17 Gram(s) Oral daily  senna 2 Tablet(s) Oral at bedtime  vancomycin  IVPB 1500 milliGRAM(s) IV Intermittent every 24 hours    MEDICATIONS  (PRN):  acetaminophen     Tablet .. 650 milliGRAM(s) Oral every 6 hours PRN Temp greater or equal to 38C (100.4F), Mild Pain (1 - 3)  oxyCODONE    IR 5 milliGRAM(s) Oral every 4 hours PRN Moderate Pain (4 - 6)      Assessment/Plan:  Patient is a 76y old  Female who presents with a chief complaint of Lymphedema (10 Mar 2022 14:29) s/p excision of necrotic tissue RLE now with open wound and vac.     Recommendations:  - Wound vac > 50 cm2 changed today, set to 125mmHg  - wound vac changes M/W/F  - OOB with physical therapy ambulate as tolerated  - leg elevated while in bed   - CBC, continue to trend for leukocytosis and H/H   - no bleeding concerns at wound site  - c/w abx per ID                     
Lane Colon MD & Kelle MCKEE  Plastic & Reconstructive Surgery  [address] [place] [address]    [phone]    Patient:ALICIA GONZALEZ  [id number]      Subjective:  Patient is a 76y old  Female who presents with a chief complaint of Lymphedema (06 Mar 2022 11:10) s/p  soft tissue debridement and VAC  had bleeding from wound today VAC removed bedside large amount of clot noted and no active bleeding. VAC DCd for now         REVIEW OF SYSTEMS:    CONSTITUTIONAL: No weakness, fevers or chills  EYES/ENT: No visual changes;  No vertigo or throat pain   NECK: No pain or stiffness  RESPIRATORY: No cough, wheezing, hemoptysis; No shortness of breath  CARDIOVASCULAR: No chest pain or palpitations  GASTROINTESTINAL: No abdominal or epigastric pain. No nausea, vomiting, or hematemesis; No diarrhea or constipation. No melena or hematochezia.  GENITOURINARY: No dysuria, frequency or hematuria  NEUROLOGICAL: No numbness or weakness  SKIN: open wounds right leg      Objective:  T(C): 37.2 (03-06-22 @ 11:16), Max: 37.5 (03-05-22 @ 21:23)  HR: 84 (03-06-22 @ 11:16) (84 - 95)  BP: 102/68 (03-06-22 @ 11:16) (94/55 - 127/82)  RR: 18 (03-06-22 @ 11:16) (18 - 19)  SpO2: 97% (03-06-22 @ 11:16) (97% - 100%)  Wt(kg): --                             8.2    15.96 )-----------( 216      ( 05 Mar 2022 11:08 )             25.2       03-05 @ 07:01  -  03-06 @ 07:00  --------------------------------------------------------  IN: 0 mL / OUT: 1700 mL / NET: -1700 mL    03-06 @ 07:01  -  03-06 @ 14:03  --------------------------------------------------------  IN: 120 mL / OUT: 0 mL / NET: 120 mL      PHYSICAL EXAM:    General:  NAD  Breathing unlabored  Right leg wound otherwise clean  No active bleeding  Dressing placed          MEDICATIONS  (STANDING):  cefTRIAXone   IVPB 2000 milliGRAM(s) IV Intermittent every 24 hours  cefTRIAXone   IVPB      losartan 50 milliGRAM(s) Oral daily  metroNIDAZOLE    Tablet 500 milliGRAM(s) Oral every 8 hours  morphine  - Injectable 2 milliGRAM(s) IV Push once  polyethylene glycol 3350 17 Gram(s) Oral daily  senna 2 Tablet(s) Oral at bedtime  vancomycin  IVPB 1500 milliGRAM(s) IV Intermittent every 24 hours    MEDICATIONS  (PRN):  acetaminophen     Tablet .. 1000 milliGRAM(s) Oral every 8 hours PRN Mild Pain (1 - 3)  oxyCODONE    IR 5 milliGRAM(s) Oral every 4 hours PRN Moderate Pain (4 - 6)      Assessment/Plan:  Patient is a 76y old  Female who presents with a chief complaint of Lymphedema (06 Mar 2022 11:10) s/p soft tissue debridement  - 2 units PRBC  - NPO after MN  Hold lovenox due to bleeding SCD to left leg  - Bedrest for not  DC VAC  OK tomorrow         
Plastic Surgery Progress Note (pg [facility]: [id number], [facility]: [phone])    SUBJECTIVE  The patient was seen and examined. No acute events overnight.    OBJECTIVE  ___________________________________________________  VITAL SIGNS / I&O's   Vital Signs Last 24 Hrs  T(C): 36.4 (07 Mar 2022 04:48), Max: 37.6 (06 Mar 2022 23:35)  T(F): 97.6 (07 Mar 2022 04:48), Max: 99.7 (06 Mar 2022 23:35)  HR: 88 (07 Mar 2022 04:48) (84 - 95)  BP: 91/58 (07 Mar 2022 04:48) (91/58 - 102/68)  BP(mean): --  RR: 18 (07 Mar 2022 04:48) (18 - 18)  SpO2: 98% (07 Mar 2022 04:48) (97% - 100%)      05 Mar 2022 07:01  -  06 Mar 2022 07:00  --------------------------------------------------------  IN:  Total IN: 0 mL    OUT:    Voided (mL): 1700 mL  Total OUT: 1700 mL    Total NET: -1700 mL      06 Mar 2022 07:01  -  07 Mar 2022 06:43  --------------------------------------------------------  IN:    Oral Fluid: 120 mL  Total IN: 120 mL    OUT:  Total OUT: 0 mL    Total NET: 120 mL        ___________________________________________________  PHYSICAL EXAM    Physical Exam:  General Appearance: Appears well, NAD  Respiratory: No labored breathing  CV: Pulse regularly present  Extremities: B/l edematous lower extremities, R wound dressing intact      ___________________________________________________  LABS                        8.2    15.96 )-----------( 216      ( 05 Mar 2022 11:08 )             25.2             CAPILLARY BLOOD GLUCOSE            Urinalysis Basic - ( 05 Mar 2022 16:40 )    Color: Yellow / Appearance: Clear / S.019 / pH: x  Gluc: x / Ketone: Negative  / Bili: Negative / Urobili: Negative   Blood: x / Protein: 30 mg/dL / Nitrite: Negative   Leuk Esterase: Moderate / RBC: 1 /hpf / WBC 6 /HPF   Sq Epi: x / Non Sq Epi: 3 /hpf / Bacteria: Negative      ___________________________________________________  MICRO  Recent Cultures:  Specimen Source: .Blood Blood,  @ 02:56; Results   No growth to date.; Gram Stain: --; Organism: --    ___________________________________________________  MEDICATIONS  (STANDING):  cefTRIAXone   IVPB      cefTRIAXone   IVPB 2000 milliGRAM(s) IV Intermittent every 24 hours  losartan 50 milliGRAM(s) Oral daily  metroNIDAZOLE    Tablet 500 milliGRAM(s) Oral every 8 hours  polyethylene glycol 3350 17 Gram(s) Oral daily  senna 2 Tablet(s) Oral at bedtime  vancomycin  IVPB 1500 milliGRAM(s) IV Intermittent every 24 hours    MEDICATIONS  (PRN):  acetaminophen     Tablet .. 1000 milliGRAM(s) Oral every 8 hours PRN Mild Pain (1 - 3)  oxyCODONE    IR 5 milliGRAM(s) Oral every 4 hours PRN Moderate Pain (4 - 6)  
Plastic Surgery Progress Note (pg [facility]: [id number], [facility]: [phone])    SUBJECTIVE  The patient was seen and examined. No acute events overnight.    OBJECTIVE  ___________________________________________________  VITAL SIGNS / I&O's   Vital Signs Last 24 Hrs  T(C): 36.8 (15 Mar 2022 04:03), Max: 36.9 (14 Mar 2022 07:38)  T(F): 98.2 (15 Mar 2022 04:03), Max: 98.5 (14 Mar 2022 07:38)  HR: 75 (15 Mar 2022 04:03) (72 - 80)  BP: 126/81 (15 Mar 2022 04:03) (94/66 - 130/74)  BP(mean): --  RR: 19 (15 Mar 2022 04:03) (18 - 19)  SpO2: 99% (15 Mar 2022 04:03) (96% - 100%)      14 Mar 2022 07:01  -  15 Mar 2022 07:00  --------------------------------------------------------  IN:    Oral Fluid: 900 mL  Total IN: 900 mL    OUT:    Voided (mL): 2200 mL  Total OUT: 2200 mL    Total NET: -1300 mL        ___________________________________________________  PHYSICAL EXAM    General Appearance: A&O x 3, Appears well, NAD  Respiratory: No labored breathing  CV: Pulse regularly present  Extremities: B/L edematous lower extremities, RLE   Skin: RLE open wound, no bleeding or active drainage at wound sites. Periwound erythema and tenderness. Packing and dressing replaced.      ___________________________________________________  LABS                        9.3    13.75 )-----------( 570      ( 15 Mar 2022 00:18 )             29.7     14 Mar 2022 06:53    134    |  103    |  15     ----------------------------<  124    4.5     |  23     |  0.71     Ca    7.8        14 Mar 2022 06:53  Mg     2.0       14 Mar 2022 06:53    TPro  5.8    /  Alb  2.2    /  TBili  0.2    /  DBili  x      /  AST  9      /  ALT  7      /  AlkPhos  96     14 Mar 2022 06:53    PT/INR - ( 14 Mar 2022 06:55 )   PT: 14.6 sec;   INR: 1.27 ratio         PTT - ( 14 Mar 2022 06:55 )  PTT:29.6 sec  CAPILLARY BLOOD GLUCOSE              ___________________________________________________  MICRO  Recent Cultures:  Specimen Source: .Blood Blood-Peripheral, 03-09 @ 14:57; Results   No Growth Final; Gram Stain: --; Organism: --    ___________________________________________________  MEDICATIONS  (STANDING):  enoxaparin Injectable 40 milliGRAM(s) SubCutaneous every 24 hours  losartan 50 milliGRAM(s) Oral daily  meropenem  IVPB 1000 milliGRAM(s) IV Intermittent every 8 hours  multiple electrolytes Injection Type 1 500 milliLiter(s) (2000 mL/Hr) IV Continuous <Continuous>  nystatin Powder 1 Application(s) Topical two times a day  polyethylene glycol 3350 17 Gram(s) Oral daily  senna 2 Tablet(s) Oral at bedtime  vancomycin  IVPB 1500 milliGRAM(s) IV Intermittent every 24 hours    MEDICATIONS  (PRN):  acetaminophen     Tablet .. 650 milliGRAM(s) Oral every 6 hours PRN Temp greater or equal to 38C (100.4F), Mild Pain (1 - 3)  oxyCODONE    IR 5 milliGRAM(s) Oral every 4 hours PRN Moderate Pain (4 - 6)  
Surgery Progress Note    SUBJECTIVE: Pt seen and examined at bedside, pain in RLE overnight controlled with medications, no dizziness or lightheadedness. Ambulating with PT.     OBJECTIVE:  Vital Signs Last 24 Hrs  T(C): 37.2 (04 Mar 2022 04:13), Max: 37.8 (03 Mar 2022 20:51)  T(F): 99 (04 Mar 2022 04:13), Max: 100.1 (03 Mar 2022 20:51)  HR: 84 (04 Mar 2022 04:13) (80 - 86)  BP: 114/69 (04 Mar 2022 04:13) (107/54 - 114/69)  BP(mean): --  RR: 19 (04 Mar 2022 04:13) (18 - 19)  SpO2: 97% (04 Mar 2022 04:13) (97% - 99%)    I&O's Detail    02 Mar 2022 07:01  -  03 Mar 2022 07:00  --------------------------------------------------------  IN:    Lactated Ringers: 600 mL    Oral Fluid: 240 mL  Total IN: 840 mL    OUT:  Total OUT: 0 mL    Total NET: 840 mL      03 Mar 2022 07:01  -  04 Mar 2022 06:52  --------------------------------------------------------  IN:    Oral Fluid: 480 mL  Total IN: 480 mL    OUT:    VAC (Vacuum Assisted Closure) System (mL): 950 mL    Voided (mL): 900 mL  Total OUT: 1850 mL    Total NET: -1370 mL    Physical Exam:  General Appearance: Appears well, NAD  Respiratory: No labored breathing  CV: Pulse regularly present  Extremities: B/l edematous lower extremities, RLE wound vac in place holding suction, 950cc serous output    LABS:                                   7.6    9.27  )-----------( 205      ( 03 Mar 2022 21:20 )             25.0   03-03    135  |  103  |  25<H>  ----------------------------<  146<H>  4.9   |  23  |  0.85    Ca    7.9<L>      03 Mar 2022 11:19      
Surgery Progress Note    SUBJECTIVE: Pt seen and examined at bedside, pain in RLE overnight controlled with medications, no dizziness or lightheadedness. Consented for OR today.     OBJECTIVE:  Vital Signs Last 24 Hrs  T(C): 36.3 (06 Mar 2022 05:59), Max: 37.8 (05 Mar 2022 11:32)  T(F): 97.4 (06 Mar 2022 05:59), Max: 100.1 (05 Mar 2022 11:32)  HR: 89 (06 Mar 2022 05:59) (84 - 95)  BP: 104/64 (06 Mar 2022 05:59) (94/55 - 127/82)  BP(mean): --  RR: 18 (06 Mar 2022 05:59) (18 - 19)  SpO2: 97% (06 Mar 2022 05:59) (97% - 100%)    I&O's Detail    05 Mar 2022 07:01  -  06 Mar 2022 07:00  --------------------------------------------------------  IN:  Total IN: 0 mL    OUT:    Voided (mL): 1700 mL  Total OUT: 1700 mL    Total NET: -1700 mL      06 Mar 2022 07:01  -  06 Mar 2022 11:11  --------------------------------------------------------  IN:    Oral Fluid: 120 mL  Total IN: 120 mL    OUT:  Total OUT: 0 mL    Total NET: 120 mL    Physical Exam:  General Appearance: Appears well, NAD  Respiratory: No labored breathing  CV: Pulse regularly present  Extremities: B/l edematous lower extremities, RLE wound vac in place, 400cc serous output    LABS:                                   8.2    15.96 )-----------( 216      ( 05 Mar 2022 11:08 )             25.2   03-04    139  |  105  |  24<H>  ----------------------------<  111<H>  4.4   |  23  |  0.79    Ca    8.3<L>      04 Mar 2022 11:54          
CC: F/U for Wound infection    Saw/spoke to patient. Doing well. No new complaints.    Allergies  No Known Allergies    ANTIMICROBIALS:  ceftolozane/tazobactam IVPB 3000 every 8 hours  vancomycin  IVPB 1500 every 24 hours    PE:    Vital Signs Last 24 Hrs  T(C): 36.9 (17 Mar 2022 11:37), Max: 36.9 (17 Mar 2022 11:37)  T(F): 98.4 (17 Mar 2022 11:37), Max: 98.4 (17 Mar 2022 11:37)  HR: 78 (17 Mar 2022 11:37) (71 - 89)  BP: 118/73 (17 Mar 2022 11:37) (116/63 - 133/78)  RR: 18 (17 Mar 2022 11:37) (17 - 18)  SpO2: 99% (17 Mar 2022 11:37) (98% - 100%)    Gen: AOx3, NAD, non-toxic  Resp: Breathing comfortably, RA  Ext: RLE wound vac    LABS:                        9.5    10.48 )-----------( 606      ( 17 Mar 2022 09:30 )             30.9     03-17    137  |  105  |  21  ----------------------------<  124<H>  4.6   |  25  |  0.95    Ca    8.1<L>      17 Mar 2022 09:30    MICROBIOLOGY:  Vancomycin Level, Trough: 14.7 ug/mL (03-17-22 @ 09:30)  Vancomycin Level, Trough: 12.1 ug/mL (03-16-22 @ 14:17)    .Surgical Swab ight lower extremity open surgical wound  03-15-22   Few Pseudomonas aeruginosa (Carbapenem Resistant)  Few Gram Positive Cocci in Pairs and Chains  Rare Candida albicans "Susceptibilities not performed"  --  Pseudomonas aeruginosa (Carbapenem Resistant)    .Blood Blood-Peripheral  03-09-22   No Growth Final  --  --    .Blood Blood  03-06-22   No Growth Final  --  --    Clean Catch Clean Catch (Midstream)  03-05-22   <10,000 CFU/ml Normal Urogenital armida present  --  --    (otherwise reviewed)    RADIOLOGY:    3/1 USG:    FINDINGS: There is edema within the superficial soft tissues of the right   lower extremity.    RIGHT:  Normal compressibility of the RIGHT common femoral, femoral and popliteal   veins.  Doppler examination shows normal spontaneous and phasic flow.  No RIGHT calf vein thrombosis is detected.    LEFT:  Normal compressibility of the LEFT common femoral, femoral and popliteal   veins.  Doppler examination shows normal spontaneous and phasic flow.  No LEFT calf vein thrombosis is detected.    IMPRESSION:  No evidence of deep venous thrombosis in either lower extremity.
DATE OF SERVICE: 03-05-22 @ 11:04    Patient is a 76y old  Female who presents with a chief complaint of Lymphedema (05 Mar 2022 09:13)      SUBJECTIVE / OVERNIGHT EVENTS:  No chest pain. No shortness of breath. No complaints. No events overnight.     MEDICATIONS  (STANDING):  cefTRIAXone   IVPB      cefTRIAXone   IVPB 2000 milliGRAM(s) IV Intermittent every 24 hours  enoxaparin Injectable 40 milliGRAM(s) SubCutaneous every 12 hours  losartan 50 milliGRAM(s) Oral daily  metroNIDAZOLE    Tablet 500 milliGRAM(s) Oral every 8 hours  polyethylene glycol 3350 17 Gram(s) Oral daily  senna 2 Tablet(s) Oral at bedtime  vancomycin  IVPB 1500 milliGRAM(s) IV Intermittent every 24 hours    MEDICATIONS  (PRN):  acetaminophen     Tablet .. 1000 milliGRAM(s) Oral every 8 hours PRN Mild Pain (1 - 3)  oxyCODONE    IR 5 milliGRAM(s) Oral every 4 hours PRN Moderate Pain (4 - 6)      Vital Signs Last 24 Hrs  T(C): 36.9 (05 Mar 2022 04:00), Max: 38.5 (04 Mar 2022 23:41)  T(F): 98.5 (05 Mar 2022 04:00), Max: 101.3 (04 Mar 2022 23:41)  HR: 86 (05 Mar 2022 04:00) (86 - 99)  BP: 106/66 (05 Mar 2022 04:00) (95/63 - 135/83)  BP(mean): --  RR: 18 (05 Mar 2022 04:00) (18 - 19)  SpO2: 98% (05 Mar 2022 04:00) (94% - 99%)  CAPILLARY BLOOD GLUCOSE        I&O's Summary    04 Mar 2022 07:01  -  05 Mar 2022 07:00  --------------------------------------------------------  IN: 0 mL / OUT: 1300 mL / NET: -1300 mL        PHYSICAL EXAM:  GENERAL: NAD, well-developed  HEAD:  Atraumatic, Normocephalic  EYES: EOMI, PERRLA, conjunctiva and sclera clear  NECK: Supple, No JVD  CHEST/LUNG: Clear to auscultation bilaterally; No wheeze  HEART: Regular rate and rhythm; No murmurs, rubs, or gallops  ABDOMEN: Soft, Nontender, Nondistended; Bowel sounds present  EXTREMITIES:  2+ Peripheral Pulses, No clubbing, cyanosis, or edema  PSYCH: AAOx3  NEUROLOGY: non-focal  SKIN: No rashes or lesions    LABS:                        7.4    11.37 )-----------( 207      ( 04 Mar 2022 11:53 )             24.1     03-04    139  |  105  |  24<H>  ----------------------------<  111<H>  4.4   |  23  |  0.79    Ca    8.3<L>      04 Mar 2022 11:54                RADIOLOGY & ADDITIONAL TESTS:    Imaging Personally Reviewed:    Consultant(s) Notes Reviewed:      Care Discussed with Consultants/Other Providers:  
DATE OF SERVICE: 03-11-22 @ 13:56    Patient is a 76y old  Female who presents with a chief complaint of Lymphedema (11 Mar 2022 13:13)      SUBJECTIVE / OVERNIGHT EVENTS:  No chest pain. No shortness of breath. No complaints. No events overnight.     MEDICATIONS  (STANDING):  cefTRIAXone   IVPB 2000 milliGRAM(s) IV Intermittent every 24 hours  enoxaparin Injectable 40 milliGRAM(s) SubCutaneous every 24 hours  losartan 50 milliGRAM(s) Oral daily  metroNIDAZOLE    Tablet 500 milliGRAM(s) Oral every 8 hours  multiple electrolytes Injection Type 1 500 milliLiter(s) (2000 mL/Hr) IV Continuous <Continuous>  polyethylene glycol 3350 17 Gram(s) Oral daily  senna 2 Tablet(s) Oral at bedtime  vancomycin  IVPB 1500 milliGRAM(s) IV Intermittent every 24 hours    MEDICATIONS  (PRN):  acetaminophen     Tablet .. 650 milliGRAM(s) Oral every 6 hours PRN Temp greater or equal to 38C (100.4F), Mild Pain (1 - 3)  oxyCODONE    IR 5 milliGRAM(s) Oral every 4 hours PRN Moderate Pain (4 - 6)      Vital Signs Last 24 Hrs  T(C): 37.1 (11 Mar 2022 09:02), Max: 37.4 (10 Mar 2022 20:42)  T(F): 98.7 (11 Mar 2022 09:02), Max: 99.3 (10 Mar 2022 20:42)  HR: 76 (11 Mar 2022 09:02) (76 - 89)  BP: 100/66 (11 Mar 2022 09:02) (100/66 - 116/65)  BP(mean): --  RR: 18 (11 Mar 2022 09:02) (18 - 18)  SpO2: 90% (11 Mar 2022 09:02) (90% - 100%)  CAPILLARY BLOOD GLUCOSE        I&O's Summary    10 Mar 2022 07:01  -  11 Mar 2022 07:00  --------------------------------------------------------  IN: 0 mL / OUT: 1000 mL / NET: -1000 mL        PHYSICAL EXAM:  GENERAL: NAD, well-developed  HEAD:  Atraumatic, Normocephalic  EYES: EOMI, PERRLA, conjunctiva and sclera clear  NECK: Supple, No JVD  CHEST/LUNG: Clear to auscultation bilaterally; No wheeze  HEART: Regular rate and rhythm; No murmurs, rubs, or gallops  ABDOMEN: Soft, Nontender, Nondistended; Bowel sounds present  EXTREMITIES:  2+ Peripheral Pulses, No clubbing, cyanosis, or edema  PSYCH: AAOx3  NEUROLOGY: non-focal  SKIN: No rashes or lesions    LABS:                    RADIOLOGY & ADDITIONAL TESTS:    Imaging Personally Reviewed:    Consultant(s) Notes Reviewed:      Care Discussed with Consultants/Other Providers:  
DATE OF SERVICE: 03-13-22 @ 10:15    Patient is a 76y old  Female who presents with a chief complaint of Lymphedema (12 Mar 2022 14:02)      SUBJECTIVE / OVERNIGHT EVENTS:  No chest pain. No shortness of breath. No complaints. No events overnight.     MEDICATIONS  (STANDING):  cefTRIAXone   IVPB 2000 milliGRAM(s) IV Intermittent once  enoxaparin Injectable 40 milliGRAM(s) SubCutaneous every 24 hours  losartan 50 milliGRAM(s) Oral daily  metroNIDAZOLE    Tablet 500 milliGRAM(s) Oral every 8 hours  multiple electrolytes Injection Type 1 500 milliLiter(s) (2000 mL/Hr) IV Continuous <Continuous>  polyethylene glycol 3350 17 Gram(s) Oral daily  senna 2 Tablet(s) Oral at bedtime  vancomycin  IVPB 1500 milliGRAM(s) IV Intermittent once    MEDICATIONS  (PRN):  acetaminophen     Tablet .. 650 milliGRAM(s) Oral every 6 hours PRN Temp greater or equal to 38C (100.4F), Mild Pain (1 - 3)  oxyCODONE    IR 5 milliGRAM(s) Oral every 4 hours PRN Moderate Pain (4 - 6)      Vital Signs Last 24 Hrs  T(C): 36.8 (13 Mar 2022 04:55), Max: 36.9 (12 Mar 2022 12:01)  T(F): 98.2 (13 Mar 2022 04:55), Max: 98.5 (12 Mar 2022 12:01)  HR: 79 (13 Mar 2022 04:55) (68 - 82)  BP: 101/63 (13 Mar 2022 04:55) (101/63 - 139/74)  BP(mean): --  RR: 18 (13 Mar 2022 04:55) (18 - 18)  SpO2: 97% (13 Mar 2022 04:55) (95% - 97%)  CAPILLARY BLOOD GLUCOSE        I&O's Summary    12 Mar 2022 06:01  -  13 Mar 2022 07:00  --------------------------------------------------------  IN: 240 mL / OUT: 500 mL / NET: -260 mL        PHYSICAL EXAM:  GENERAL: NAD, well-developed  HEAD:  Atraumatic, Normocephalic  EYES: EOMI, PERRLA, conjunctiva and sclera clear  NECK: Supple, No JVD  CHEST/LUNG: Clear to auscultation bilaterally; No wheeze  HEART: Regular rate and rhythm; No murmurs, rubs, or gallops  ABDOMEN: Soft, Nontender, Nondistended; Bowel sounds present  EXTREMITIES:  2+ Peripheral Pulses, No clubbing, cyanosis, or edema  PSYCH: AAOx3  NEUROLOGY: non-focal  SKIN: No rashes or lesions    LABS:                        6.8    14.05 )-----------( 411      ( 12 Mar 2022 10:36 )             21.9     03-12    135  |  104  |  26<H>  ----------------------------<  144<H>  4.6   |  23  |  0.93    Ca    7.5<L>      12 Mar 2022 10:36                RADIOLOGY & ADDITIONAL TESTS:    Imaging Personally Reviewed:    Consultant(s) Notes Reviewed:      Care Discussed with Consultants/Other Providers:  
DATE OF SERVICE: 03-14-22 @ 14:04    Patient is a 76y old  Female who presents with a chief complaint of Lymphedema (14 Mar 2022 12:39)      SUBJECTIVE / OVERNIGHT EVENTS:  No chest pain. No shortness of breath. No complaints. No events overnight.     MEDICATIONS  (STANDING):  acetaminophen   IVPB .. 1000 milliGRAM(s) IV Intermittent once  enoxaparin Injectable 40 milliGRAM(s) SubCutaneous every 24 hours  losartan 50 milliGRAM(s) Oral daily  meropenem  IVPB 1000 milliGRAM(s) IV Intermittent every 8 hours  multiple electrolytes Injection Type 1 500 milliLiter(s) (2000 mL/Hr) IV Continuous <Continuous>  nystatin Powder 1 Application(s) Topical two times a day  polyethylene glycol 3350 17 Gram(s) Oral daily  senna 2 Tablet(s) Oral at bedtime  vancomycin  IVPB 1500 milliGRAM(s) IV Intermittent every 24 hours    MEDICATIONS  (PRN):  acetaminophen     Tablet .. 650 milliGRAM(s) Oral every 6 hours PRN Temp greater or equal to 38C (100.4F), Mild Pain (1 - 3)  oxyCODONE    IR 5 milliGRAM(s) Oral every 4 hours PRN Moderate Pain (4 - 6)      Vital Signs Last 24 Hrs  T(C): 36.9 (14 Mar 2022 11:20), Max: 37.1 (14 Mar 2022 04:51)  T(F): 98.5 (14 Mar 2022 11:20), Max: 98.7 (14 Mar 2022 04:51)  HR: 78 (14 Mar 2022 11:20) (76 - 83)  BP: 106/66 (14 Mar 2022 11:20) (103/68 - 124/73)  BP(mean): --  RR: 18 (14 Mar 2022 11:20) (18 - 18)  SpO2: 96% (14 Mar 2022 11:20) (96% - 100%)  CAPILLARY BLOOD GLUCOSE        I&O's Summary    13 Mar 2022 07:01  -  14 Mar 2022 07:00  --------------------------------------------------------  IN: 480 mL / OUT: 1000 mL / NET: -520 mL        PHYSICAL EXAM:  GENERAL: NAD, well-developed  HEAD:  Atraumatic, Normocephalic  EYES: EOMI, PERRLA, conjunctiva and sclera clear  NECK: Supple, No JVD  CHEST/LUNG: Clear to auscultation bilaterally; No wheeze  HEART: Regular rate and rhythm; No murmurs, rubs, or gallops  ABDOMEN: Soft, Nontender, Nondistended; Bowel sounds present  EXTREMITIES:  2+ Peripheral Pulses, No clubbing, cyanosis, or edema  PSYCH: AAOx3  NEUROLOGY: non-focal  SKIN: No rashes or lesions    LABS:                        7.5    14.35 )-----------( 516      ( 14 Mar 2022 07:01 )             24.2     03-14    134<L>  |  103  |  15  ----------------------------<  124<H>  4.5   |  23  |  0.71    Ca    7.8<L>      14 Mar 2022 06:53  Mg     2.0     03-14    TPro  5.8<L>  /  Alb  2.2<L>  /  TBili  0.2  /  DBili  x   /  AST  9<L>  /  ALT  7<L>  /  AlkPhos  96  03-14    PT/INR - ( 14 Mar 2022 06:55 )   PT: 14.6 sec;   INR: 1.27 ratio         PTT - ( 14 Mar 2022 06:55 )  PTT:29.6 sec          RADIOLOGY & ADDITIONAL TESTS:    Imaging Personally Reviewed:    Consultant(s) Notes Reviewed:      Care Discussed with Consultants/Other Providers:  
DATE OF SERVICE: 03-18-22 @ 17:54    Patient is a 76y old  Female who presents with a chief complaint of Lymphedema (18 Mar 2022 11:18)      SUBJECTIVE / OVERNIGHT EVENTS:  No chest pain. No shortness of breath. No complaints. No events overnight.     MEDICATIONS  (STANDING):  ceftolozane/tazobactam IVPB 3000 milliGRAM(s) IV Intermittent every 8 hours  enoxaparin Injectable 40 milliGRAM(s) SubCutaneous every 24 hours  losartan 50 milliGRAM(s) Oral daily  multiple electrolytes Injection Type 1 500 milliLiter(s) (2000 mL/Hr) IV Continuous <Continuous>  nystatin Powder 1 Application(s) Topical two times a day  polyethylene glycol 3350 17 Gram(s) Oral daily  senna 2 Tablet(s) Oral at bedtime    MEDICATIONS  (PRN):  acetaminophen     Tablet .. 650 milliGRAM(s) Oral every 6 hours PRN Temp greater or equal to 38C (100.4F), Mild Pain (1 - 3)  oxyCODONE    IR 5 milliGRAM(s) Oral every 4 hours PRN Moderate Pain (4 - 6)      Vital Signs Last 24 Hrs  T(C): 36.6 (18 Mar 2022 16:23), Max: 36.8 (18 Mar 2022 08:29)  T(F): 97.9 (18 Mar 2022 16:23), Max: 98.3 (18 Mar 2022 08:29)  HR: 74 (18 Mar 2022 16:23) (74 - 79)  BP: 107/68 (18 Mar 2022 16:23) (101/65 - 118/73)  BP(mean): --  RR: 20 (18 Mar 2022 16:23) (18 - 20)  SpO2: 99% (18 Mar 2022 16:23) (97% - 99%)  CAPILLARY BLOOD GLUCOSE        I&O's Summary    17 Mar 2022 07:01  -  18 Mar 2022 07:00  --------------------------------------------------------  IN: 1280 mL / OUT: 1600 mL / NET: -320 mL    18 Mar 2022 07:01  -  18 Mar 2022 17:54  --------------------------------------------------------  IN: 480 mL / OUT: 0 mL / NET: 480 mL        PHYSICAL EXAM:  GENERAL: NAD, well-developed  HEAD:  Atraumatic, Normocephalic  EYES: EOMI, PERRLA, conjunctiva and sclera clear  NECK: Supple, No JVD  CHEST/LUNG: Clear to auscultation bilaterally; No wheeze  HEART: Regular rate and rhythm; No murmurs, rubs, or gallops  ABDOMEN: Soft, Nontender, Nondistended; Bowel sounds present  EXTREMITIES:  2+ Peripheral Pulses, No clubbing, cyanosis, or edema  PSYCH: AAOx3  NEUROLOGY: non-focal  SKIN: No rashes or lesions    LABS:                        9.5    10.48 )-----------( 606      ( 17 Mar 2022 09:30 )             30.9     03-17    137  |  105  |  21  ----------------------------<  124<H>  4.6   |  25  |  0.95    Ca    8.1<L>      17 Mar 2022 09:30                RADIOLOGY & ADDITIONAL TESTS:    Imaging Personally Reviewed:    Consultant(s) Notes Reviewed:      Care Discussed with Consultants/Other Providers:  
DATE OF SERVICE: 22 @ 12:00    Patient is a 76y old  Female who presents with a chief complaint of Lymphedema (07 Mar 2022 06:42)      SUBJECTIVE / OVERNIGHT EVENTS:  No chest pain. No shortness of breath. No complaints. No events overnight.     MEDICATIONS  (STANDING):  cefTRIAXone   IVPB 2000 milliGRAM(s) IV Intermittent every 24 hours  lactated ringers. 1000 milliLiter(s) (75 mL/Hr) IV Continuous <Continuous>  losartan 50 milliGRAM(s) Oral daily  metroNIDAZOLE    Tablet 500 milliGRAM(s) Oral every 8 hours  multiple electrolytes Injection Type 1 500 milliLiter(s) (2000 mL/Hr) IV Continuous <Continuous>  polyethylene glycol 3350 17 Gram(s) Oral daily  senna 2 Tablet(s) Oral at bedtime  vancomycin  IVPB 1500 milliGRAM(s) IV Intermittent every 24 hours    MEDICATIONS  (PRN):  acetaminophen     Tablet .. 1000 milliGRAM(s) Oral every 8 hours PRN Mild Pain (1 - 3)  HYDROmorphone  Injectable 0.5 milliGRAM(s) IV Push every 10 minutes PRN Moderate Pain (4 - 6)  ondansetron Injectable 4 milliGRAM(s) IV Push once PRN Nausea and/or Vomiting  oxyCODONE    IR 5 milliGRAM(s) Oral every 4 hours PRN Moderate Pain (4 - 6)      Vital Signs Last 24 Hrs  T(C): 36.2 (07 Mar 2022 10:35), Max: 37.6 (06 Mar 2022 23:35)  T(F): 97.2 (07 Mar 2022 10:35), Max: 99.7 (06 Mar 2022 23:35)  HR: 84 (07 Mar 2022 11:45) (81 - 95)  BP: 92/45 (07 Mar 2022 11:45) (79/49 - 119/51)  BP(mean): 65 (07 Mar 2022 11:45) (59 - 73)  RR: 18 (07 Mar 2022 11:45) (17 - 18)  SpO2: 100% (07 Mar 2022 11:45) (97% - 100%)  CAPILLARY BLOOD GLUCOSE        I&O's Summary    06 Mar 2022 07:01  -  07 Mar 2022 07:00  --------------------------------------------------------  IN: 120 mL / OUT: 0 mL / NET: 120 mL        PHYSICAL EXAM:  GENERAL: NAD, well-developed  HEAD:  Atraumatic, Normocephalic  EYES: EOMI, PERRLA, conjunctiva and sclera clear  NECK: Supple, No JVD  CHEST/LUNG: Clear to auscultation bilaterally; No wheeze  HEART: Regular rate and rhythm; No murmurs, rubs, or gallops  ABDOMEN: Soft, Nontender, Nondistended; Bowel sounds present  EXTREMITIES:  2+ Peripheral Pulses, No clubbing, cyanosis, or edema  PSYCH: AAOx3  NEUROLOGY: non-focal  SKIN: No rashes or lesions    LABS:                Urinalysis Basic - ( 05 Mar 2022 16:40 )    Color: Yellow / Appearance: Clear / S.019 / pH: x  Gluc: x / Ketone: Negative  / Bili: Negative / Urobili: Negative   Blood: x / Protein: 30 mg/dL / Nitrite: Negative   Leuk Esterase: Moderate / RBC: 1 /hpf / WBC 6 /HPF   Sq Epi: x / Non Sq Epi: 3 /hpf / Bacteria: Negative        RADIOLOGY & ADDITIONAL TESTS:    Imaging Personally Reviewed:    Consultant(s) Notes Reviewed:      Care Discussed with Consultants/Other Providers:  
[facility]ane Colon MD [facility]  [facility]  [address] [place] [address]    [phone]    Patient:ALICIA GONZALEZ  [id number]      Subjective:  Patient is a 76y old  Female who presents with a chief complaint of Lymphedema (03 Mar 2022 07:10) s/p excisional debridement of right leg now with VAC. States she feels alot better with tissue resected          REVIEW OF SYSTEMS:    CONSTITUTIONAL: No weakness, fevers or chills  EYES/ENT: No visual changes;  No vertigo or throat pain   NECK: No pain or stiffness  RESPIRATORY: No cough, wheezing, hemoptysis; No shortness of breath  CARDIOVASCULAR: No chest pain or palpitations  GASTROINTESTINAL: No abdominal or epigastric pain. No nausea, vomiting, or hematemesis; No diarrhea or constipation. No melena or hematochezia.  GENITOURINARY: No dysuria, frequency or hematuria  NEUROLOGICAL: No numbness or weakness  SKIN: Surgical wounds with VAC      Objective:  T(C): 36.7 (03-03-22 @ 04:38), Max: 36.7 (03-03-22 @ 04:38)  HR: 96 (03-03-22 @ 05:36) (65 - 96)  BP: 116/69 (03-03-22 @ 05:36) (107/65 - 160/85)  RR: 19 (03-03-22 @ 04:38) (14 - 19)  SpO2: 99% (03-03-22 @ 04:38) (92% - 100%)  Wt(kg): --   03-02    139  |  104  |  19  ----------------------------<  103<H>  4.3   |  25  |  0.79    Ca    8.6      02 Mar 2022 06:11    TPro  8.1  /  Alb  3.7  /  TBili  0.5  /  DBili  x   /  AST  16  /  ALT  13  /  AlkPhos  96  03-01                        10.6   3.75  )-----------( 265      ( 02 Mar 2022 06:11 )             34.9       03-02 @ 07:01  -  03-03 @ 07:00  --------------------------------------------------------  IN: 240 mL / OUT: 0 mL / NET: 240 mL      PHYSICAL EXAM:    General:    NAD  Breathing unlabored  Abd Soft  EXT with VAC right leg and serous drainage        MEDICATIONS  (STANDING):  ceFAZolin   IVPB 1000 milliGRAM(s) IV Intermittent every 8 hours  enoxaparin Injectable 40 milliGRAM(s) SubCutaneous every 12 hours  losartan 50 milliGRAM(s) Oral daily  vancomycin  IVPB      vancomycin  IVPB 1250 milliGRAM(s) IV Intermittent every 12 hours    MEDICATIONS  (PRN):  acetaminophen     Tablet .. 1000 milliGRAM(s) Oral every 8 hours PRN Mild Pain (1 - 3)      Assessment/Plan:  Patient is a 76y old  Female who presents with a chief complaint of Lymphedema (03 Mar 2022 07:10)     - Cont VAC change tomorrow bedside with PT  - OOB ambulate   - DVT prophylaxis  - Will have home care needs for VAC on discharge with changes 3 x week  - OR Monday for definitive closure anticipate DC by mid next week      
CC: F/U for Wound infection    Saw/spoke to patient. Discussed case with plastics yesterday, they have concern for residual wound infection--erythema/pus under wound vac.    Allergies  No Known Allergies    ANTIMICROBIALS:  meropenem  IVPB 1000 every 8 hours  vancomycin  IVPB 1500 every 24 hours    PE:    Vital Signs Last 24 Hrs  T(C): 36.6 (15 Mar 2022 11:22), Max: 36.8 (15 Mar 2022 04:03)  T(F): 97.9 (15 Mar 2022 11:22), Max: 98.2 (15 Mar 2022 04:03)  HR: 70 (15 Mar 2022 11:22) (70 - 80)  BP: 99/62 (15 Mar 2022 11:22) (94/66 - 130/74)  RR: 19 (15 Mar 2022 11:22) (18 - 19)  SpO2: 97% (15 Mar 2022 11:22) (96% - 100%)    Gen: AOx3, NAD, non-toxic  CV: Nontachycardic  Resp: Breathing comfortably, RA  Ext: wound vac to RLE    LABS:                        9.0    12.89 )-----------( 554      ( 15 Mar 2022 06:46 )             29.1     03-14    134<L>  |  103  |  15  ----------------------------<  124<H>  4.5   |  23  |  0.71    Ca    7.8<L>      14 Mar 2022 06:53  Mg     2.0     03-14    TPro  5.8<L>  /  Alb  2.2<L>  /  TBili  0.2  /  DBili  x   /  AST  9<L>  /  ALT  7<L>  /  AlkPhos  96  03-14    MICROBIOLOGY:    .Blood Blood-Peripheral  03-09-22   No Growth Final    .Blood Blood  03-06-22   No Growth Final     Clean Catch Clean Catch (Midstream)  03-05-22   <10,000 CFU/ml Normal Urogenital armida present    RADIOLOGY:    3/1 USG:    IMPRESSION:  No evidence of deep venous thrombosis in either lower extremity.
CC: F/U for Wound infection    Saw/spoke to patient. Doing well. no new complaints.    Allergies  No Known Allergies    ANTIMICROBIALS:  Off    PE:    Vital Signs Last 24 Hrs  T(C): 36.9 (14 Mar 2022 11:20), Max: 37.1 (14 Mar 2022 04:51)  T(F): 98.5 (14 Mar 2022 11:20), Max: 98.7 (14 Mar 2022 04:51)  HR: 78 (14 Mar 2022 11:20) (76 - 83)  BP: 106/66 (14 Mar 2022 11:20) (103/68 - 124/73)  RR: 18 (14 Mar 2022 11:20) (18 - 18)  SpO2: 96% (14 Mar 2022 11:20) (96% - 100%)    Gen: AOx3, NAD, non-toxic  Resp: Breathing comfortably, RA  Ext: Wound vac to LE wound    LABS:                        7.5    14.35 )-----------( 516      ( 14 Mar 2022 07:01 )             24.2     03-14    134<L>  |  103  |  15  ----------------------------<  124<H>  4.5   |  23  |  0.71    Ca    7.8<L>      14 Mar 2022 06:53  Mg     2.0     03-14    TPro  5.8<L>  /  Alb  2.2<L>  /  TBili  0.2  /  DBili  x   /  AST  9<L>  /  ALT  7<L>  /  AlkPhos  96  03-14    MICROBIOLOGY:    .Blood Blood-Peripheral  03-09-22   No growth to date.     .Blood Blood  03-06-22   No Growth Final    Clean Catch Clean Catch (Midstream)  03-05-22   <10,000 CFU/ml Normal Urogenital armida present  --  --    (otherwise reviewed)    RADIOLOGY:    3/1 USG:    IMPRESSION:  No evidence of deep venous thrombosis in either lower extremity.
CC: F/U for Wound infection    Saw/spoke to patient. Doing well. no new complaints.    Allergies  No Known Allergies    ANTIMICROBIALS:  cefTRIAXone   IVPB 2000 every 24 hours  metroNIDAZOLE    Tablet 500 every 8 hours  vancomycin  IVPB 1500 every 24 hours    PE:    Vital Signs Last 24 Hrs  T(C): 37.1 (11 Mar 2022 09:02), Max: 37.4 (10 Mar 2022 20:42)  T(F): 98.7 (11 Mar 2022 09:02), Max: 99.3 (10 Mar 2022 20:42)  HR: 76 (11 Mar 2022 09:02) (76 - 89)  BP: 100/66 (11 Mar 2022 09:02) (100/66 - 116/65)  RR: 18 (11 Mar 2022 09:02) (18 - 18)  SpO2: 90% (11 Mar 2022 09:02) (90% - 100%)    Gen: AOx3, NAD, non-toxic  Resp: Breathing comfortably, RA  Abd: Soft, nontender  Ext: Wound vac to LE wound site    LABS:                        7.2    13.99 )-----------( 244      ( 11 Mar 2022 14:13 )             22.9     03-11    131<L>  |  100  |  31<H>  ----------------------------<  166<H>  4.7   |  19<L>  |  1.03    Ca    7.6<L>      11 Mar 2022 14:13    MICROBIOLOGY:    .Blood Blood-Peripheral  03-09-22   No growth to date.     .Blood Blood  03-06-22   No Growth Final     Clean Catch Clean Catch (Midstream)  03-05-22   <10,000 CFU/ml Normal Urogenital armida present  --  --    (otherwise reviewed)    RADIOLOGY:    3/1 USG:    FINDINGS: There is edema within the superficial soft tissues of the right   lower extremity.    RIGHT:  Normal compressibility of the RIGHT common femoral, femoral and popliteal   veins.  Doppler examination shows normal spontaneous and phasic flow.  No RIGHT calf vein thrombosis is detected.    LEFT:  Normal compressibility of the LEFT common femoral, femoral and popliteal   veins.  Doppler examination shows normal spontaneous and phasic flow.  No LEFT calf vein thrombosis is detected.    IMPRESSION:  No evidence of deep venous thrombosis in either lower extremity.
CC: F/U for Wound infection    Saw/spoke to patient. No fevers, no chills. No new complaints.    Allergies  No Known Allergies    ANTIMICROBIALS:  cefTRIAXone   IVPB    cefTRIAXone   IVPB 2000 every 24 hours  metroNIDAZOLE    Tablet 500 every 8 hours  vancomycin  IVPB 1500 every 24 hours    PE:    Vital Signs Last 24 Hrs  T(C): 37.2 (04 Mar 2022 11:44), Max: 37.8 (03 Mar 2022 20:51)  T(F): 98.9 (04 Mar 2022 11:44), Max: 100.1 (03 Mar 2022 20:51)  HR: 92 (04 Mar 2022 11:44) (84 - 92)  BP: 134/76 (04 Mar 2022 11:44) (107/54 - 134/76)  RR: 18 (04 Mar 2022 11:44) (18 - 19)  SpO2: 99% (04 Mar 2022 11:44) (97% - 99%)    Gen: AOx3, NAD, non-toxic  Ext: Wound vac to posterior R thigh    LABS:                        7.4    11.37 )-----------( 207      ( 04 Mar 2022 11:53 )             24.1     03-04    139  |  105  |  24<H>  ----------------------------<  111<H>  4.4   |  23  |  0.79    Ca    8.3<L>      04 Mar 2022 11:54    MICROBIOLOGY:    No new available    RADIOLOGY:    USG 3/1:    IMPRESSION:  No evidence of deep venous thrombosis in either lower extremity.
CC: F/U for Wound infection    Saw/spoke to patient. No fevers, no chills. No new complaints.    Allergies  No Known Allergies    ANTIMICROBIALS:  cefTRIAXone   IVPB 2000 every 24 hours  metroNIDAZOLE    Tablet 500 every 8 hours  vancomycin  IVPB 1500 every 24 hours    PE:    Vital Signs Last 24 Hrs  T(C): 36.7 (10 Mar 2022 12:26), Max: 37.9 (09 Mar 2022 20:40)  T(F): 98 (10 Mar 2022 12:26), Max: 100.2 (09 Mar 2022 20:40)  HR: 77 (10 Mar 2022 12:26) (77 - 97)  BP: 81/55 (10 Mar 2022 12:26) (81/55 - 135/78)  RR: 18 (10 Mar 2022 12:) (18 - 18)  SpO2: 99% (10 Mar 2022 12:26) (96% - 99%)    Gen: AOx3, NAD, non-toxic  Resp: Breathing comfortably, RA  Ext: LE wound vac in place    LABS:                        7.1    16.49 )-----------( 242      ( 09 Mar 2022 10:52 )             22.1     03-    133<L>  |  102  |  28<H>  ----------------------------<  161<H>  4.3   |  22  |  1.18    Ca    7.3<L>      09 Mar 2022 10:52    Urinalysis Basic - ( 09 Mar 2022 02:14 )    Color: Yellow / Appearance: Slightly Turbid / S.011 / pH: x  Gluc: x / Ketone: Negative  / Bili: Negative / Urobili: Negative   Blood: x / Protein: Trace / Nitrite: Negative   Leuk Esterase: Negative / RBC: 3 /hpf / WBC 1 /HPF   Sq Epi: x / Non Sq Epi: 1 /hpf / Bacteria: 0.0    MICROBIOLOGY:  Vancomycin Level, Trough: 11.5 ug/mL (03-10-22 @ 14:14)    .Blood Blood  22   No growth to date.      Clean Catch Clean Catch (Midstream)  22   <10,000 CFU/ml Normal Urogenital armida present  --  --    (otherwise reviewed)    RADIOLOGY:     XR:    IMPRESSION:  Mildly elevated right hemidiaphragm.    Clear lungs.  
CC: F/U for Wound infection    Saw/spoke to patient. No fevers, no chills. No new complaints.    Allergies  No Known Allergies    ANTIMICROBIALS:  ceftolozane/tazobactam IVPB 3000 every 8 hours  vancomycin  IVPB 1500 every 24 hours    PE:    Vital Signs Last 24 Hrs  T(C): 36.8 (18 Mar 2022 12:10), Max: 36.8 (18 Mar 2022 08:29)  T(F): 98.2 (18 Mar 2022 12:10), Max: 98.3 (18 Mar 2022 08:29)  HR: 75 (18 Mar 2022 12:10) (75 - 79)  BP: 118/73 (18 Mar 2022 12:10) (101/65 - 118/73)  RR: 18 (18 Mar 2022 12:10) (18 - 20)  SpO2: 99% (18 Mar 2022 12:10) (96% - 99%)    Gen: AOx3, NAD, non-toxic  Resp: Breathing comfortably, RA  Ext: RLE wound vac    LABS:                        9.5    10.48 )-----------( 606      ( 17 Mar 2022 09:30 )             30.9     03-17    137  |  105  |  21  ----------------------------<  124<H>  4.6   |  25  |  0.95    Ca    8.1<L>      17 Mar 2022 09:30    MICROBIOLOGY:    .Surgical Swab ight lower extremity open surgical wound  03-15-22   Few Pseudomonas aeruginosa (Carbapenem Resistant)  Few Enterococcus faecium Susceptibility to follow.  Rare Candida albicans "Susceptibilities not performed"  --  Pseudomonas aeruginosa (Carbapenem Resistant)    .Blood Blood-Peripheral  03-09-22   No Growth Final  --  --    .Blood Blood  03-06-22   No Growth Final  --  --    Clean Catch Clean Catch (Midstream)  03-05-22   <10,000 CFU/ml Normal Urogenital armida present  --  --    (otherwise reviewed)    RADIOLOGY:    3/1 USG:    IMPRESSION:  No evidence of deep venous thrombosis in either lower extremity.
CC: F/U for Wound infection    Saw/spoke to patient. No fevers, no chills. No new complaints. Mental status improved.    Allergies  No Known Allergies    ANTIMICROBIALS:  cefTRIAXone   IVPB 2000 every 24 hours  metroNIDAZOLE    Tablet 500 every 8 hours  vancomycin  IVPB 1500 every 24 hours    PE:    Vital Signs Last 24 Hrs  T(C): 36.8 (09 Mar 2022 11:22), Max: 37.8 (08 Mar 2022 18:20)  T(F): 98.2 (09 Mar 2022 11:22), Max: 100.1 (08 Mar 2022 18:20)  HR: 85 (09 Mar 2022 11:22) (82 - 91)  BP: 109/69 (09 Mar 2022 11:22) (96/61 - 114/78)  RR: 18 (09 Mar 2022 11:22) (18 - 20)  SpO2: 98% (09 Mar 2022 11:22) (98% - 100%)    Gen: AOx3, NAD, non-toxic  Resp: Breathing comfortably, RA  Abd: Soft, nontender  : No García  Ext: Wound vac to lower ext    LABS:                        7.1    16.49 )-----------( 242      ( 09 Mar 2022 10:52 )             22.1     03-    133<L>  |  102  |  28<H>  ----------------------------<  161<H>  4.3   |  22  |  1.18    Ca    7.3<L>      09 Mar 2022 10:52    Urinalysis Basic - ( 09 Mar 2022 02:14 )    Color: Yellow / Appearance: Slightly Turbid / S.011 / pH: x  Gluc: x / Ketone: Negative  / Bili: Negative / Urobili: Negative   Blood: x / Protein: Trace / Nitrite: Negative   Leuk Esterase: Negative / RBC: 3 /hpf / WBC 1 /HPF   Sq Epi: x / Non Sq Epi: 1 /hpf / Bacteria: 0.0    MICROBIOLOGY:    .Blood Blood  22   No growth to date.    Clean Catch Clean Catch (Midstream)  22   <10,000 CFU/ml Normal Urogenital armida present     RADIOLOGY:    3/1 USG:    RIGHT:  Normal compressibility of the RIGHT common femoral, femoral and popliteal   veins.  Doppler examination shows normal spontaneous and phasic flow.  No RIGHT calf vein thrombosis is detected.    LEFT:  Normal compressibility of the LEFT common femoral, femoral and popliteal   veins.  Doppler examination shows normal spontaneous and phasic flow.  No LEFT calf vein thrombosis is detected.    IMPRESSION:  No evidence of deep venous thrombosis in either lower extremity.
CC: F/U for Wound infection    Saw/spoke to patient. Unchanged. Doing well.    Allergies  No Known Allergies    ANTIMICROBIALS:  ceftolozane/tazobactam IVPB 3000 every 8 hours    PE:    Vital Signs Last 24 Hrs  T(C): 36.7 (22 Mar 2022 11:59), Max: 37.3 (21 Mar 2022 20:45)  T(F): 98.1 (22 Mar 2022 11:59), Max: 99.1 (21 Mar 2022 20:45)  HR: 75 (22 Mar 2022 11:59) (75 - 85)  BP: 104/57 (22 Mar 2022 11:59) (104/57 - 134/74)  RR: 18 (22 Mar 2022 11:59) (18 - 18)  SpO2: 98% (22 Mar 2022 11:59) (95% - 100%)    Gen: AOx3, NAD, non-toxic  Resp: Breathing comfortably, RA  Ext: RLE wound vac    LABS:                        8.1    6.87  )-----------( 420      ( 22 Mar 2022 11:21 )             26.6     03-21    138  |  105  |  15  ----------------------------<  114<H>  4.2   |  23  |  0.78    Ca    8.4      21 Mar 2022 13:56    MICROBIOLOGY:    .Surgical Swab ight lower extremity open surgical wound  03-15-22   Few Pseudomonas aeruginosa (Carbapenem Resistant)  Few Enterococcus faecium (vancomycin resistant)  Rare Candida albicans "Susceptibilities not performed"  --  Pseudomonas aeruginosa (Carbapenem Resistant)  Enterococcus faecium (vancomycin resistant)    .Blood Blood-Peripheral  03-09-22   No Growth Final    .Blood Blood  03-06-22   No Growth Final      Clean Catch Clean Catch (Midstream)  03-05-22   <10,000 CFU/ml Normal Urogenital armida present  --  --    (otherwise reviewed)    RADIOLOGY:    2/28 XR:    FINDINGS:  Heart size and the mediastinum cannot be accurately evaluated on this   projection.  The lungs are clear. Mildly elevated right hemidiaphragm.  There is no pneumothorax or pleural effusion.  No acute bony abnormality.    IMPRESSION:  Mildly elevated right hemidiaphragm.    Clear lungs.  
CC: F/U for wound infection    Saw/spoke to patient. No fevers, no chills. No new complaints.    Allergies  No Known Allergies    ANTIMICROBIALS:  ceftolozane/tazobactam IVPB 3000 every 8 hours    PE:    Vital Signs Last 24 Hrs  T(C): 36.9 (21 Mar 2022 12:43), Max: 37.2 (20 Mar 2022 20:30)  T(F): 98.4 (21 Mar 2022 12:43), Max: 98.9 (20 Mar 2022 20:30)  HR: 71 (21 Mar 2022 08:15) (71 - 86)  BP: 122/51 (21 Mar 2022 12:43) (108/69 - 133/77)  RR: 18 (21 Mar 2022 08:15) (18 - 18)  SpO2: 98% (21 Mar 2022 08:15) (97% - 99%)    Gen: AOx3, NAD, non-toxic  Resp: Breathing comfortably, RA  Ext: Wound vac to RLE    LABS:                        8.8    11.16 )-----------( 485      ( 21 Mar 2022 13:56 )             28.9     03-21    138  |  105  |  15  ----------------------------<  114<H>  4.2   |  23  |  0.78    Ca    8.4      21 Mar 2022 13:56    MICROBIOLOGY:    .Surgical Swab ight lower extremity open surgical wound  03-15-22   Few Pseudomonas aeruginosa (Carbapenem Resistant)  Few Enterococcus faecium (vancomycin resistant)  Rare Candida albicans "Susceptibilities not performed"  --  Pseudomonas aeruginosa (Carbapenem Resistant)  Enterococcus faecium (vancomycin resistant)    .Blood Blood-Peripheral  03-09-22   No Growth Final  --  --    .Blood Blood  03-06-22   No Growth Final  --  --    Clean Catch Clean Catch (Midstream)  03-05-22   <10,000 CFU/ml Normal Urogenital armida present  --  --    (otherwise reviewed)    RADIOLOGY:    2/28 XR:    IMPRESSION:  Mildly elevated right hemidiaphragm.    Clear lungs.
CC: F/U wound infection    Saw/spoke to patient. No fevers, no chills. No new complaints.    Allergies  No Known Allergies    ANTIMICROBIALS:  ceftolozane/tazobactam IVPB 3000 every 8 hours    PE:    Vital Signs Last 24 Hrs  T(C): 37.1 (23 Mar 2022 12:00), Max: 37.2 (22 Mar 2022 20:45)  T(F): 98.7 (23 Mar 2022 12:00), Max: 99 (22 Mar 2022 20:45)  HR: 71 (23 Mar 2022 12:00) (71 - 78)  BP: 131/76 (23 Mar 2022 12:00) (106/66 - 132/80)  RR: 18 (23 Mar 2022 12:00) (18 - 18)  SpO2: 98% (23 Mar 2022 12:00) (96% - 99%)    Gen: AOx3, NAD, non-toxic  Resp: Breathing comfortably, RA  Ext: Wound vac RLE    LABS:                        8.1    5.90  )-----------( 373      ( 23 Mar 2022 10:04 )             25.6     03-23    137  |  104  |  13  ----------------------------<  168<H>  4.1   |  24  |  0.58    Ca    8.0<L>      23 Mar 2022 10:04    MICROBIOLOGY:    .Surgical Swab ight lower extremity open surgical wound  03-15-22   Few Pseudomonas aeruginosa (Carbapenem Resistant)  Few Enterococcus faecium (vancomycin resistant)  Rare Candida albicans "Susceptibilities not performed"  --  Pseudomonas aeruginosa (Carbapenem Resistant)  Enterococcus faecium (vancomycin resistant)    .Blood Blood-Peripheral  03-09-22   No Growth Final     .Blood Blood  03-06-22   No Growth Nicole    Clean Catch Clean Catch (Midstream)  03-05-22   <10,000 CFU/ml Normal Urogenital armida present  --  --    (otherwise reviewed)    RADIOLOGY:    2/28 XR:    IMPRESSION:  Mildly elevated right hemidiaphragm.    Clear lungs.
DATE OF SERVICE: 03-16-22 @ 13:27    Patient is a 76y old  Female who presents with a chief complaint of Lymphedema (16 Mar 2022 09:01)      SUBJECTIVE / OVERNIGHT EVENTS:    MEDICATIONS  (STANDING):  enoxaparin Injectable 40 milliGRAM(s) SubCutaneous every 24 hours  losartan 50 milliGRAM(s) Oral daily  meropenem  IVPB 1000 milliGRAM(s) IV Intermittent every 8 hours  multiple electrolytes Injection Type 1 500 milliLiter(s) (2000 mL/Hr) IV Continuous <Continuous>  nystatin Powder 1 Application(s) Topical two times a day  polyethylene glycol 3350 17 Gram(s) Oral daily  senna 2 Tablet(s) Oral at bedtime  vancomycin  IVPB 1500 milliGRAM(s) IV Intermittent every 24 hours    MEDICATIONS  (PRN):  acetaminophen     Tablet .. 650 milliGRAM(s) Oral every 6 hours PRN Temp greater or equal to 38C (100.4F), Mild Pain (1 - 3)  oxyCODONE    IR 5 milliGRAM(s) Oral every 4 hours PRN Moderate Pain (4 - 6)      Vital Signs Last 24 Hrs  T(C): 36.4 (16 Mar 2022 08:18), Max: 36.9 (15 Mar 2022 21:44)  T(F): 97.5 (16 Mar 2022 08:18), Max: 98.5 (15 Mar 2022 21:44)  HR: 67 (16 Mar 2022 08:18) (67 - 90)  BP: 106/67 (16 Mar 2022 08:18) (99/66 - 138/73)  BP(mean): --  RR: 18 (16 Mar 2022 08:18) (17 - 18)  SpO2: 98% (16 Mar 2022 08:18) (97% - 100%)  CAPILLARY BLOOD GLUCOSE        I&O's Summary    15 Mar 2022 07:01  -  16 Mar 2022 07:00  --------------------------------------------------------  IN: 600 mL / OUT: 1200 mL / NET: -600 mL        PHYSICAL EXAM:  GENERAL: NAD, well-developed  HEAD:  Atraumatic, Normocephalic  EYES: EOMI, PERRLA, conjunctiva and sclera clear  NECK: Supple, No JVD  CHEST/LUNG: Clear to auscultation bilaterally; No wheeze  HEART: Regular rate and rhythm; No murmurs, rubs, or gallops  ABDOMEN: Soft, Nontender, Nondistended; Bowel sounds present  EXTREMITIES:  2+ Peripheral Pulses, No clubbing, cyanosis, or edema  PSYCH: AAOx3  NEUROLOGY: non-focal  SKIN: No rashes or lesions    LABS:                        9.0    12.89 )-----------( 554      ( 15 Mar 2022 06:46 )             29.1                     RADIOLOGY & ADDITIONAL TESTS:    Imaging Personally Reviewed:    Consultant(s) Notes Reviewed:      Care Discussed with Consultants/Other Providers:  
DATE OF SERVICE: 03-23-22 @ 18:11    Patient is a 76y old  Female who presents with a chief complaint of Lymphedema (23 Mar 2022 11:45)      SUBJECTIVE / OVERNIGHT EVENTS:  No chest pain. No shortness of breath. No complaints. No events overnight.     MEDICATIONS  (STANDING):  ceftolozane/tazobactam IVPB 3000 milliGRAM(s) IV Intermittent every 8 hours  enoxaparin Injectable 40 milliGRAM(s) SubCutaneous every 24 hours  losartan 50 milliGRAM(s) Oral daily  multiple electrolytes Injection Type 1 500 milliLiter(s) (2000 mL/Hr) IV Continuous <Continuous>  nystatin Powder 1 Application(s) Topical two times a day  polyethylene glycol 3350 17 Gram(s) Oral daily  senna 2 Tablet(s) Oral at bedtime    MEDICATIONS  (PRN):  acetaminophen     Tablet .. 650 milliGRAM(s) Oral every 6 hours PRN Temp greater or equal to 38C (100.4F), Mild Pain (1 - 3)  oxyCODONE    IR 5 milliGRAM(s) Oral every 4 hours PRN Moderate Pain (4 - 6)      Vital Signs Last 24 Hrs  T(C): 37.1 (23 Mar 2022 12:00), Max: 37.2 (22 Mar 2022 20:45)  T(F): 98.7 (23 Mar 2022 12:00), Max: 99 (22 Mar 2022 20:45)  HR: 71 (23 Mar 2022 12:00) (71 - 78)  BP: 131/76 (23 Mar 2022 12:00) (128/72 - 132/80)  BP(mean): --  RR: 18 (23 Mar 2022 12:00) (18 - 18)  SpO2: 98% (23 Mar 2022 12:00) (96% - 99%)  CAPILLARY BLOOD GLUCOSE        I&O's Summary    22 Mar 2022 07:01  -  23 Mar 2022 07:00  --------------------------------------------------------  IN: 0 mL / OUT: 1400 mL / NET: -1400 mL        PHYSICAL EXAM:  GENERAL: NAD, well-developed  HEAD:  Atraumatic, Normocephalic  EYES: EOMI, PERRLA, conjunctiva and sclera clear  NECK: Supple, No JVD  CHEST/LUNG: Clear to auscultation bilaterally; No wheeze  HEART: Regular rate and rhythm; No murmurs, rubs, or gallops  ABDOMEN: Soft, Nontender, Nondistended; Bowel sounds present  EXTREMITIES:  2+ Peripheral Pulses, No clubbing, cyanosis, or edema  PSYCH: AAOx3  NEUROLOGY: non-focal  SKIN: No rashes or lesions    LABS:                        8.1    5.90  )-----------( 373      ( 23 Mar 2022 10:04 )             25.6     03-23    137  |  104  |  13  ----------------------------<  168<H>  4.1   |  24  |  0.58    Ca    8.0<L>      23 Mar 2022 10:04                RADIOLOGY & ADDITIONAL TESTS:    Imaging Personally Reviewed:    Consultant(s) Notes Reviewed:      Care Discussed with Consultants/Other Providers:  
Lane Colon MD & Kelle MCKEE  Plastic & Reconstructive Surgery  [address] [address]    [phone]    Patient:ALICIA GONZALEZ  [id number]      Subjective:  Patient is a 76y old  Female who presents with a chief complaint of Lymphedema (20 Mar 2022 11:42)  s/p excision of necrotic tissue RLE seen at bedside.       Objective:  T(C): 36.9 (03-21-22 @ 12:43), Max: 37.2 (03-20-22 @ 20:30)  HR: 71 (03-21-22 @ 08:15) (71 - 86)  BP: 122/51 (03-21-22 @ 12:43) (108/69 - 133/77)  RR: 18 (03-21-22 @ 08:15) (18 - 18)  SpO2: 98% (03-21-22 @ 08:15) (97% - 99%)  Wt(kg): --   03-20    139  |  108  |  19  ----------------------------<  118<H>  4.7   |  24  |  0.76    Ca    8.2<L>      20 Mar 2022 11:27                          8.8    11.16 )-----------( 485      ( 21 Mar 2022 13:56 )             28.9       03-20 @ 07:01  -  03-21 @ 07:00  --------------------------------------------------------  IN: 240 mL / OUT: 1400 mL / NET: -1160 mL      PHYSICAL EXAM:    General Appearance: A&O x 3, Appears well, NAD  Respiratory: No labored breathing  CV: Pulse regularly present  Extremities: B/L edematous lower extremities  Skin: RLE open wound, no bleeding or active drainage at wound sites. Periwound erythema and tenderness, improving.  Posterior wound with granulation tissue and a clean base. No gross sign of infection     Vac chnage >50cm2 set to 125mmHg, good seal         MEDICATIONS  (STANDING):  ceftolozane/tazobactam IVPB 3000 milliGRAM(s) IV Intermittent every 8 hours  enoxaparin Injectable 40 milliGRAM(s) SubCutaneous every 24 hours  losartan 50 milliGRAM(s) Oral daily  multiple electrolytes Injection Type 1 500 milliLiter(s) (2000 mL/Hr) IV Continuous <Continuous>  nystatin Powder 1 Application(s) Topical two times a day  polyethylene glycol 3350 17 Gram(s) Oral daily  senna 2 Tablet(s) Oral at bedtime    MEDICATIONS  (PRN):  acetaminophen     Tablet .. 650 milliGRAM(s) Oral every 6 hours PRN Temp greater or equal to 38C (100.4F), Mild Pain (1 - 3)  oxyCODONE    IR 5 milliGRAM(s) Oral every 4 hours PRN Moderate Pain (4 - 6)      Assessment/Plan:  Patient is a 76y old  Female who presents with a chief complaint of Lymphedema (20 Mar 2022 11:42) s/p excision of necrotic tissue RLE with clean granulating base, no sign of infection, leukocytosis resolved.     - vac change > 50cm2, set to 125mmHg.  - Vac changes M/W/F   - OOB with physical therapy, ambulate as tolerated  - leg elevated while in bed   - continue to trend for H/H   - c/w abx. per ID   - Discharge once completed with abx.            
Lane Colon MD & Kelle MCKEE  Plastic & Reconstructive Surgery  [address] [place], [place] [address] [phone]    Patient:ALICIA GONZALEZ  [id number]      Subjective:  Patient is a 76y old  Female who presents with a chief complaint of Lymphedema (13 Mar 2022 10:15) s/p excision of necrotic tissue RLE seen at bedside for vac change.        Objective:  T(C): 36.9 (03-14-22 @ 11:20), Max: 37.1 (03-14-22 @ 04:51)  HR: 78 (03-14-22 @ 11:20) (76 - 83)  BP: 106/66 (03-14-22 @ 11:20) (103/68 - 124/73)  RR: 18 (03-14-22 @ 11:20) (18 - 18)  SpO2: 96% (03-14-22 @ 11:20) (96% - 100%)  Wt(kg): --   03-14    134<L>  |  103  |  15  ----------------------------<  124<H>  4.5   |  23  |  0.71    Ca    7.8<L>      14 Mar 2022 06:53  Mg     2.0     03-14    TPro  5.8<L>  /  Alb  2.2<L>  /  TBili  0.2  /  DBili  x   /  AST  9<L>  /  ALT  7<L>  /  AlkPhos  96  03-14                        7.5    14.35 )-----------( 516      ( 14 Mar 2022 07:01 )             24.2       03-13 @ 07:01  -  03-14 @ 07:00  --------------------------------------------------------  IN: 480 mL / OUT: 1000 mL / NET: -520 mL      PHYSICAL EXAM:    General Appearance: A&O x 3, Appears well, NAD  Respiratory: No labored breathing  CV: Pulse regularly present  Extremities: B/L edematous lower extremities, RLE   Skin: RLE open wound, no bleeding at wound sites. Periwound erythema and tenderness. Wound draining purulent fluid.   vac removed           MEDICATIONS  (STANDING):  enoxaparin Injectable 40 milliGRAM(s) SubCutaneous every 24 hours  losartan 50 milliGRAM(s) Oral daily  multiple electrolytes Injection Type 1 500 milliLiter(s) (2000 mL/Hr) IV Continuous <Continuous>  nystatin Powder 1 Application(s) Topical two times a day  polyethylene glycol 3350 17 Gram(s) Oral daily  senna 2 Tablet(s) Oral at bedtime    MEDICATIONS  (PRN):  acetaminophen     Tablet .. 650 milliGRAM(s) Oral every 6 hours PRN Temp greater or equal to 38C (100.4F), Mild Pain (1 - 3)  oxyCODONE    IR 5 milliGRAM(s) Oral every 4 hours PRN Moderate Pain (4 - 6)      Assessment/Plan:  Patient is a 76y old  Female who presents with a chief complaint of Lymphedema (13 Mar 2022 10:15) s/p excision of necrotic tissue RLE now with open wound and vac, wound likely infected     Recommendations:  - Discontinue wound vac   - twice daily changes by nursing - pack wound, wet to dry dressing, ABD and tape   - OOB with physical therapy ambulate as tolerated  - leg elevated while in bed   - CBC, continue to trend for leukocytosis and H/H   - no bleeding concerns at wound site  - c/w abx. ID consult appreciated            
Lane Colon MD & Kelle MCKEE  Plastic & Reconstructive Surgery  [address] [place] [address]    [phone]    Patient:ALICIA GONZALEZ  [id number]      Subjective:  Patient is a 76y old  Female who presents with a chief complaint of Lymphedema (09 Mar 2022 16:42) s/p excision of necrotic tissue RLE seen at bedside for vac change. Pt c/o pain.        Objective:  T(C): 36.8 (03-09-22 @ 11:22), Max: 37.8 (03-08-22 @ 18:20)  HR: 85 (03-09-22 @ 11:22) (82 - 91)  BP: 109/69 (03-09-22 @ 11:22) (96/61 - 114/78)  RR: 18 (03-09-22 @ 11:22) (18 - 20)  SpO2: 98% (03-09-22 @ 11:22) (98% - 100%)  Wt(kg): --   03-09    133<L>  |  102  |  28<H>  ----------------------------<  161<H>  4.3   |  22  |  1.18    Ca    7.3<L>      09 Mar 2022 10:52                          7.1    16.49 )-----------( 242      ( 09 Mar 2022 10:52 )             22.1       03-08 @ 07:01  -  03-09 @ 07:00  --------------------------------------------------------  IN: 730 mL / OUT: 975 mL / NET: -245 mL      PHYSICAL EXAM:    General Appearance: A&O x 3, Appears well, NAD  Respiratory: No labored breathing  CV: Pulse regularly present  Extremities: B/L edematous lower extremities, RLE soft   Skin: RLE open wound, no bleeding at wound sites.  RLE wound vac > 50 cm2 changed at bedside, suction set to 125mmHg continuous.   No gross sign of infection             MEDICATIONS  (STANDING):  cefTRIAXone   IVPB 2000 milliGRAM(s) IV Intermittent every 24 hours  enoxaparin Injectable 40 milliGRAM(s) SubCutaneous every 24 hours  losartan 50 milliGRAM(s) Oral daily  metroNIDAZOLE    Tablet 500 milliGRAM(s) Oral every 8 hours  multiple electrolytes Injection Type 1 500 milliLiter(s) (2000 mL/Hr) IV Continuous <Continuous>  polyethylene glycol 3350 17 Gram(s) Oral daily  senna 2 Tablet(s) Oral at bedtime  vancomycin  IVPB 1500 milliGRAM(s) IV Intermittent every 24 hours    MEDICATIONS  (PRN):  acetaminophen     Tablet .. 650 milliGRAM(s) Oral every 6 hours PRN Temp greater or equal to 38C (100.4F), Mild Pain (1 - 3)  oxyCODONE    IR 5 milliGRAM(s) Oral every 4 hours PRN Moderate Pain (4 - 6)      Assessment/Plan:  Patient is a 76y old  Female who presents with a chief complaint of Lymphedema (09 Mar 2022 16:42) s/p excision of necrotic tissue RLE now with open wound and vac, leukocytosis and anemia     Recommendations:  - Wound vac > 50 cm2 changed today, set to 125mmHg  - wound vac changes M/W/F  - OOB with physical therapy ambulate as tolerated  - leg elevated while in bed   - AM labs reviewed  - no bleeding concerns at wound site, continue to trend for stability   - c/w abx per ID                   
Lane Colon MD & Kelle MCKEE  Plastic & Reconstructive Surgery  [address] [place] [address]    [phone]    Patient:ALICIA GONZALEZ  [id number]      Subjective:  Patient is a 76y old  Female who presents with a chief complaint of Lymphedema (04 Mar 2022 14:22) s/p wide resection and VAC transfused PRBC yesterday for acute blood loss anemia has serous drainage from VAC Now temp yesterday         REVIEW OF SYSTEMS:    CONSTITUTIONAL: No weakness, fevers or chills  EYES/ENT: No visual changes;  No vertigo or throat pain   NECK: No pain or stiffness  RESPIRATORY: No cough, wheezing, hemoptysis; No shortness of breath  CARDIOVASCULAR: No chest pain or palpitations  GASTROINTESTINAL: No abdominal or epigastric pain. No nausea, vomiting, or hematemesis; No diarrhea or constipation. No melena or hematochezia.  GENITOURINARY: No dysuria, frequency or hematuria  NEUROLOGICAL: No numbness or weakness  SKIN: wound right leg      Objective:  T(C): 36.9 (03-05-22 @ 04:00), Max: 38.5 (03-04-22 @ 23:41)  HR: 86 (03-05-22 @ 04:00) (86 - 99)  BP: 106/66 (03-05-22 @ 04:00) (95/63 - 135/83)  RR: 18 (03-05-22 @ 04:00) (18 - 19)  SpO2: 98% (03-05-22 @ 04:00) (94% - 99%)  Wt(kg): --   03-04    139  |  105  |  24<H>  ----------------------------<  111<H>  4.4   |  23  |  0.79    Ca    8.3<L>      04 Mar 2022 11:54                          7.4    11.37 )-----------( 207      ( 04 Mar 2022 11:53 )             24.1       03-04 @ 07:01  -  03-05 @ 07:00  --------------------------------------------------------  IN: 0 mL / OUT: 1300 mL / NET: -1300 mL      PHYSICAL EXAM:    General:    NAD  Breathing unlabored  VAC in place RLE  Serous drainage  Improved edema        MEDICATIONS  (STANDING):  cefTRIAXone   IVPB      cefTRIAXone   IVPB 2000 milliGRAM(s) IV Intermittent every 24 hours  enoxaparin Injectable 40 milliGRAM(s) SubCutaneous every 12 hours  losartan 50 milliGRAM(s) Oral daily  metroNIDAZOLE    Tablet 500 milliGRAM(s) Oral every 8 hours  polyethylene glycol 3350 17 Gram(s) Oral daily  senna 2 Tablet(s) Oral at bedtime  vancomycin  IVPB 1500 milliGRAM(s) IV Intermittent every 24 hours    MEDICATIONS  (PRN):  acetaminophen     Tablet .. 1000 milliGRAM(s) Oral every 8 hours PRN Mild Pain (1 - 3)  oxyCODONE    IR 5 milliGRAM(s) Oral every 4 hours PRN Moderate Pain (4 - 6)      Assessment/Plan:  Patient is a 76y old  Female who presents with a chief complaint of Lymphedema (04 Mar 2022 14:22) s/p wide excision    OR Monday for definitive closure  Incentive Spirometer       
Lane Colon MD & Kelle MCKEE  Plastic & Reconstructive Surgery  [address] [place] [address]    [phone]    Patient:ALICIA GONZALEZ  [id number]      Subjective:  Patient is a 76y old  Female who presents with a chief complaint of Lymphedema (17 Mar 2022 18:03)  s/p excision of necrotic tissue RLE seen at bedside. No complaints. Pt advises ambulation improving.         Objective:  T(C): 36.8 (03-18-22 @ 08:29), Max: 36.9 (03-17-22 @ 11:37)  HR: 78 (03-18-22 @ 08:29) (77 - 79)  BP: 113/68 (03-18-22 @ 08:29) (101/65 - 118/73)  RR: 18 (03-18-22 @ 08:29) (18 - 20)  SpO2: 98% (03-18-22 @ 08:29) (96% - 99%)  Wt(kg): --   03-17    137  |  105  |  21  ----------------------------<  124<H>  4.6   |  25  |  0.95    Ca    8.1<L>      17 Mar 2022 09:30                          9.5    10.48 )-----------( 606      ( 17 Mar 2022 09:30 )             30.9       03-17 @ 07:01  -  03-18 @ 07:00  --------------------------------------------------------  IN: 1280 mL / OUT: 1600 mL / NET: -320 mL    03-18 @ 07:01  -  03-18 @ 11:18  --------------------------------------------------------  IN: 240 mL / OUT: 0 mL / NET: 240 mL      PHYSICAL EXAM:    General Appearance: A&O x 3, Appears well, NAD  Respiratory: No labored breathing  CV: Pulse regularly present  Extremities: B/L edematous lower extremities  Skin: RLE open wound, no bleeding or active drainage at wound sites. Periwound erythema and tenderness, improving.  Posterior wound with granulation tissue and a clean base. No gross sign of infection     Vac placed >50cm2 set to 125mmHg, good seal         MEDICATIONS  (STANDING):  ceftolozane/tazobactam IVPB 3000 milliGRAM(s) IV Intermittent every 8 hours  enoxaparin Injectable 40 milliGRAM(s) SubCutaneous every 24 hours  losartan 50 milliGRAM(s) Oral daily  multiple electrolytes Injection Type 1 500 milliLiter(s) (2000 mL/Hr) IV Continuous <Continuous>  nystatin Powder 1 Application(s) Topical two times a day  polyethylene glycol 3350 17 Gram(s) Oral daily  senna 2 Tablet(s) Oral at bedtime  vancomycin  IVPB 1500 milliGRAM(s) IV Intermittent every 24 hours    MEDICATIONS  (PRN):  acetaminophen     Tablet .. 650 milliGRAM(s) Oral every 6 hours PRN Temp greater or equal to 38C (100.4F), Mild Pain (1 - 3)  oxyCODONE    IR 5 milliGRAM(s) Oral every 4 hours PRN Moderate Pain (4 - 6)      Assessment/Plan:  Patient is a 76y old  Female who presents with a chief complaint of Lymphedema (17 Mar 2022 18:03 s/p excision of necrotic tissue RLE with clean granulating base, no sign of infection, leukocytosis resolved.     - vac placed > 50cm2, set to 125mmHg.  - Vac changes M/W/F   - OOB with physical therapy, ambulate as tolerated  - leg elevated while in bed   - continue to trend for H/H   - c/w abx., will discuss with ID   - Plan for discharge next week with vac            
Seen earlier today  CO pain at surgical site  Thigh soft  VAC serosanguineous     WBC 17 Wounds were clean at time of OR  Anemia from surgery and blood loss    - PRBC  - Follow WBC cont Abx  - VAC change tomorrow    
Surgery Progress Note    SUBJECTIVE: Pt seen and examined at bedside Overnight, VAC power supply became unplugged, when replaced still holding excellent suction. Patient comfortable and in no-apparent distress. No nausea, vomiting, diarrhea. Pain is controlled. Tolerating diet, did not get OOB with PT yet. Encouraged to get OOB with PT and elevate leg.     Vital Signs Last 24 Hrs  T(C): 36.7 (03 Mar 2022 04:38), Max: 36.7 (03 Mar 2022 04:38)  T(F): 98 (03 Mar 2022 04:38), Max: 98 (03 Mar 2022 04:38)  HR: 96 (03 Mar 2022 05:36) (65 - 96)  BP: 116/69 (03 Mar 2022 05:36) (107/65 - 160/85)  BP(mean): 104 (02 Mar 2022 13:45) (95 - 115)  RR: 19 (03 Mar 2022 04:38) (14 - 19)  SpO2: 99% (03 Mar 2022 04:38) (92% - 100%)    Physical Exam:  General Appearance: Appears well, NAD  Respiratory: No labored breathing  CV: Pulse regularly present  Extremities: B/l edematous lower extremities, RLE wound vac in place holding suction, some leakage while unplugged.     LABS:                        10.6   3.75  )-----------( 265      ( 02 Mar 2022 06:11 )             34.9     03-02    139  |  104  |  19  ----------------------------<  103<H>  4.3   |  25  |  0.79    Ca    8.6      02 Mar 2022 06:11    TPro  8.1  /  Alb  3.7  /  TBili  0.5  /  DBili  x   /  AST  16  /  ALT  13  /  AlkPhos  96  03-01          INs and OUTs:    03-02-22 @ 07:01  -  03-03-22 @ 07:00  --------------------------------------------------------  IN: 240 mL / OUT: 0 mL / NET: 240 mL    
77 YO F with MHx of HTN and Chronic Lymphedema who presents with increased drainage from her RLE. Pt with Hx of Chronic Lymphedema for >5 years, surgery attempted twice per patient and now with RLE increasing more over the past year, particularly over the past several months. She completed two weeks of abx one month ago (does not recall which abx), afterwhich her RLE infection got better, but now with increased drainage from the RLE, was sent in for abx and for surgical eval. Of note, she reports imaging that showed ?inflammation and necrosis? She denies any RLE warmth or erythema, fever, chills, nausea, or vomiting. She denies any chest pain , palpitations or shortness of breath, orthopnea,or PND. She can walk about two blocks and stops due to fatigue but not short of breath. She states she had a nuclear stress test two years ago which was normal and to her knowledge has not had any changes to her EKG.  (28 Feb 2022 23:16)    03-01-22 @ 14:32  PAST MEDICAL & SURGICAL HISTORY:  Hypertension    Lymphedema    Lymphedema        Review of Systems:   CONSTITUTIONAL: No fever, weight loss, or fatigue  EYES: No eye pain, visual disturbances, or discharge  ENMT:  No difficulty hearing, tinnitus, vertigo; No sinus or throat pain  NECK: No pain or stiffness  BREASTS: No pain, masses, or nipple discharge  RESPIRATORY: No cough, wheezing, chills or hemoptysis; No shortness of breath  CARDIOVASCULAR: No chest pain, palpitations, dizziness, or leg swelling  GASTROINTESTINAL: No abdominal or epigastric pain. No nausea, vomiting, or hematemesis; No diarrhea or constipation. No melena or hematochezia.  GENITOURINARY: No dysuria, frequency, hematuria, or incontinence  NEUROLOGICAL: No headaches, memory loss, loss of strength, numbness, or tremors  SKIN: No itching, burning, rashes, or lesions   LYMPH NODES: No enlarged glands  ENDOCRINE: No heat or cold intolerance; No hair loss  MUSCULOSKELETAL: No joint pain or swelling; No muscle, back, or extremity pain  PSYCHIATRIC: No depression, anxiety, mood swings, or difficulty sleeping  HEME/LYMPH: No easy bruising, or bleeding gums  ALLERY AND IMMUNOLOGIC: No hives or eczema    Allergies    No Known Allergies    Intolerances        Social History:     FAMILY HISTORY:  No pertinent family history in first degree relatives        MEDICATIONS  (STANDING):  enoxaparin Injectable 40 milliGRAM(s) SubCutaneous every 12 hours  losartan 50 milliGRAM(s) Oral daily  vancomycin  IVPB      vancomycin  IVPB 1250 milliGRAM(s) IV Intermittent every 12 hours    MEDICATIONS  (PRN):  acetaminophen     Tablet .. 1000 milliGRAM(s) Oral every 8 hours PRN Mild Pain (1 - 3)      Vital Signs Last 24 Hrs  T(C): 36.5 (01 Mar 2022 11:55), Max: 36.8 (28 Feb 2022 20:30)  T(F): 97.7 (01 Mar 2022 11:55), Max: 98.2 (28 Feb 2022 20:30)  HR: 80 (01 Mar 2022 11:55) (71 - 104)  BP: 115/70 (01 Mar 2022 11:55) (115/70 - 158/86)  BP(mean): --  RR: 18 (01 Mar 2022 11:55) (16 - 18)  SpO2: 96% (01 Mar 2022 11:55) (96% - 100%)  CAPILLARY BLOOD GLUCOSE        I&O's Summary      PHYSICAL EXAM:  GENERAL: NAD, well-developed  HEAD:  Atraumatic, Normocephalic  EYES: EOMI, PERRLA, conjunctiva and sclera clear  NECK: Supple, No JVD  CHEST/LUNG: Clear to auscultation bilaterally; No wheeze  HEART: Regular rate and rhythm; No murmurs, rubs, or gallops  ABDOMEN: Soft, Nontender, Nondistended; Bowel sounds present  EXTREMITIES:  2+ Peripheral Pulses, No clubbing, cyanosis, or edema  PSYCH: AAOx3  NEUROLOGY: non-focal  SKIN: lymphedema    LABS:                        11.7   5.07  )-----------( 311      ( 28 Feb 2022 16:11 )             38.8     03-01    137  |  103  |  16  ----------------------------<  110<H>  4.8   |  21<L>  |  0.64    Ca    8.8      01 Mar 2022 11:05    TPro  8.1  /  Alb  3.7  /  TBili  0.5  /  DBili  x   /  AST  16  /  ALT  13  /  AlkPhos  96  03-01    PT/INR - ( 28 Feb 2022 16:11 )   PT: 12.1 sec;   INR: 1.01 ratio         PTT - ( 28 Feb 2022 16:11 )  PTT:34.2 sec          RADIOLOGY & ADDITIONAL TESTS:    Imaging Personally Reviewed:    Consultant(s) Notes Reviewed:      Care Discussed with Consultants/Other Providers:  
DATE OF SERVICE: 03-04-22 @ 13:29    Patient is a 76y old  Female who presents with a chief complaint of Lymphedema (04 Mar 2022 06:51)      SUBJECTIVE / OVERNIGHT EVENTS:  pt c/o pain after wound vac change  pt has bleeding into the wound vac    MEDICATIONS  (STANDING):  cefTRIAXone   IVPB      cefTRIAXone   IVPB 2000 milliGRAM(s) IV Intermittent every 24 hours  enoxaparin Injectable 40 milliGRAM(s) SubCutaneous every 12 hours  losartan 50 milliGRAM(s) Oral daily  metroNIDAZOLE    Tablet 500 milliGRAM(s) Oral every 8 hours  polyethylene glycol 3350 17 Gram(s) Oral daily  senna 2 Tablet(s) Oral at bedtime  vancomycin  IVPB 1500 milliGRAM(s) IV Intermittent every 24 hours    MEDICATIONS  (PRN):  acetaminophen     Tablet .. 1000 milliGRAM(s) Oral every 8 hours PRN Mild Pain (1 - 3)  oxyCODONE    IR 5 milliGRAM(s) Oral every 4 hours PRN Moderate Pain (4 - 6)      Vital Signs Last 24 Hrs  T(C): 37.2 (04 Mar 2022 11:44), Max: 37.8 (03 Mar 2022 20:51)  T(F): 98.9 (04 Mar 2022 11:44), Max: 100.1 (03 Mar 2022 20:51)  HR: 92 (04 Mar 2022 11:44) (84 - 92)  BP: 134/76 (04 Mar 2022 11:44) (107/54 - 134/76)  BP(mean): --  RR: 18 (04 Mar 2022 11:44) (18 - 19)  SpO2: 99% (04 Mar 2022 11:44) (97% - 99%)  CAPILLARY BLOOD GLUCOSE        I&O's Summary    03 Mar 2022 07:01  -  04 Mar 2022 07:00  --------------------------------------------------------  IN: 480 mL / OUT: 1850 mL / NET: -1370 mL        PHYSICAL EXAM:  GENERAL: NAD, well-developed  HEAD:  Atraumatic, Normocephalic  EYES: EOMI, PERRLA, conjunctiva and sclera clear  NECK: Supple, No JVD  CHEST/LUNG: Clear to auscultation bilaterally; No wheeze  HEART: Regular rate and rhythm; No murmurs, rubs, or gallops  ABDOMEN: Soft, Nontender, Nondistended; Bowel sounds present  EXTREMITIES:  2+ Peripheral Pulses, No clubbing, cyanosis, or edema  PSYCH: AAOx3  NEUROLOGY: non-focal  SKIN: No rashes or lesions    LABS:                        7.4    11.37 )-----------( 207      ( 04 Mar 2022 11:53 )             24.1     03-04    139  |  105  |  24<H>  ----------------------------<  111<H>  4.4   |  23  |  0.79    Ca    8.3<L>      04 Mar 2022 11:54                RADIOLOGY & ADDITIONAL TESTS:    Imaging Personally Reviewed:    Consultant(s) Notes Reviewed:      Care Discussed with Consultants/Other Providers:  
DATE OF SERVICE: 03-10-22 @ 14:29    Patient is a 76y old  Female who presents with a chief complaint of Lymphedema (09 Mar 2022 18:04)      SUBJECTIVE / OVERNIGHT EVENTS:  No chest pain. No shortness of breath. No complaints. No events overnight.     MEDICATIONS  (STANDING):  cefTRIAXone   IVPB 2000 milliGRAM(s) IV Intermittent every 24 hours  enoxaparin Injectable 40 milliGRAM(s) SubCutaneous every 24 hours  losartan 50 milliGRAM(s) Oral daily  metroNIDAZOLE    Tablet 500 milliGRAM(s) Oral every 8 hours  multiple electrolytes Injection Type 1 500 milliLiter(s) (2000 mL/Hr) IV Continuous <Continuous>  polyethylene glycol 3350 17 Gram(s) Oral daily  senna 2 Tablet(s) Oral at bedtime  vancomycin  IVPB 1500 milliGRAM(s) IV Intermittent every 24 hours    MEDICATIONS  (PRN):  acetaminophen     Tablet .. 650 milliGRAM(s) Oral every 6 hours PRN Temp greater or equal to 38C (100.4F), Mild Pain (1 - 3)  oxyCODONE    IR 5 milliGRAM(s) Oral every 4 hours PRN Moderate Pain (4 - 6)      Vital Signs Last 24 Hrs  T(C): 36.7 (10 Mar 2022 12:26), Max: 37.9 (09 Mar 2022 20:40)  T(F): 98 (10 Mar 2022 12:26), Max: 100.2 (09 Mar 2022 20:40)  HR: 77 (10 Mar 2022 12:26) (77 - 97)  BP: 81/55 (10 Mar 2022 12:26) (81/55 - 135/78)  BP(mean): --  RR: 18 (10 Mar 2022 12:26) (18 - 18)  SpO2: 99% (10 Mar 2022 12:26) (96% - 99%)  CAPILLARY BLOOD GLUCOSE        I&O's Summary    09 Mar 2022 07:01  -  10 Mar 2022 07:00  --------------------------------------------------------  IN: 0 mL / OUT: 650 mL / NET: -650 mL        PHYSICAL EXAM:  GENERAL: NAD, well-developed  HEAD:  Atraumatic, Normocephalic  EYES: EOMI, PERRLA, conjunctiva and sclera clear  NECK: Supple, No JVD  CHEST/LUNG: Clear to auscultation bilaterally; No wheeze  HEART: Regular rate and rhythm; No murmurs, rubs, or gallops  ABDOMEN: Soft, Nontender, Nondistended; Bowel sounds present  EXTREMITIES:  2+ Peripheral Pulses, No clubbing, cyanosis, or edema  PSYCH: AAOx3  NEUROLOGY: non-focal  SKIN: No rashes or lesions    LABS:                        7.1    16.49 )-----------( 242      ( 09 Mar 2022 10:52 )             22.1     03-09    133<L>  |  102  |  28<H>  ----------------------------<  161<H>  4.3   |  22  |  1.18    Ca    7.3<L>      09 Mar 2022 10:52            Urinalysis Basic - ( 09 Mar 2022 02:14 )    Color: Yellow / Appearance: Slightly Turbid / S.011 / pH: x  Gluc: x / Ketone: Negative  / Bili: Negative / Urobili: Negative   Blood: x / Protein: Trace / Nitrite: Negative   Leuk Esterase: Negative / RBC: 3 /hpf / WBC 1 /HPF   Sq Epi: x / Non Sq Epi: 1 /hpf / Bacteria: 0.0        RADIOLOGY & ADDITIONAL TESTS:    Imaging Personally Reviewed:    Consultant(s) Notes Reviewed:      Care Discussed with Consultants/Other Providers:  
DATE OF SERVICE: 03-19-22 @ 11:25    Patient is a 76y old  Female who presents with a chief complaint of Lymphedema (18 Mar 2022 17:54)      SUBJECTIVE / OVERNIGHT EVENTS:  No chest pain. No shortness of breath. No complaints. No events overnight.     MEDICATIONS  (STANDING):  ceftolozane/tazobactam IVPB 3000 milliGRAM(s) IV Intermittent every 8 hours  enoxaparin Injectable 40 milliGRAM(s) SubCutaneous every 24 hours  losartan 50 milliGRAM(s) Oral daily  multiple electrolytes Injection Type 1 500 milliLiter(s) (2000 mL/Hr) IV Continuous <Continuous>  nystatin Powder 1 Application(s) Topical two times a day  polyethylene glycol 3350 17 Gram(s) Oral daily  senna 2 Tablet(s) Oral at bedtime    MEDICATIONS  (PRN):  acetaminophen     Tablet .. 650 milliGRAM(s) Oral every 6 hours PRN Temp greater or equal to 38C (100.4F), Mild Pain (1 - 3)  oxyCODONE    IR 5 milliGRAM(s) Oral every 4 hours PRN Moderate Pain (4 - 6)      Vital Signs Last 24 Hrs  T(C): 36.7 (19 Mar 2022 08:33), Max: 36.8 (18 Mar 2022 12:10)  T(F): 98 (19 Mar 2022 08:33), Max: 98.2 (18 Mar 2022 12:10)  HR: 74 (19 Mar 2022 08:33) (74 - 80)  BP: 125/74 (19 Mar 2022 08:33) (103/68 - 125/74)  BP(mean): --  RR: 20 (19 Mar 2022 08:33) (18 - 20)  SpO2: 98% (19 Mar 2022 08:33) (97% - 99%)  CAPILLARY BLOOD GLUCOSE        I&O's Summary    18 Mar 2022 07:01  -  19 Mar 2022 07:00  --------------------------------------------------------  IN: 480 mL / OUT: 400 mL / NET: 80 mL        PHYSICAL EXAM:  GENERAL: NAD, well-developed  HEAD:  Atraumatic, Normocephalic  EYES: EOMI, PERRLA, conjunctiva and sclera clear  NECK: Supple, No JVD  CHEST/LUNG: Clear to auscultation bilaterally; No wheeze  HEART: Regular rate and rhythm; No murmurs, rubs, or gallops  ABDOMEN: Soft, Nontender, Nondistended; Bowel sounds present  EXTREMITIES:  2+ Peripheral Pulses, No clubbing, cyanosis, or edema  PSYCH: AAOx3  NEUROLOGY: non-focal  SKIN: No rashes or lesions    LABS:                    RADIOLOGY & ADDITIONAL TESTS:    Imaging Personally Reviewed:    Consultant(s) Notes Reviewed:      Care Discussed with Consultants/Other Providers:  
DATE OF SERVICE: 03-20-22 @ 11:43    Patient is a 76y old  Female who presents with a chief complaint of Lymphedema (19 Mar 2022 11:25)      SUBJECTIVE / OVERNIGHT EVENTS:  No chest pain. No shortness of breath. No complaints. No events overnight.     MEDICATIONS  (STANDING):  ceftolozane/tazobactam IVPB 3000 milliGRAM(s) IV Intermittent every 8 hours  enoxaparin Injectable 40 milliGRAM(s) SubCutaneous every 24 hours  losartan 50 milliGRAM(s) Oral daily  multiple electrolytes Injection Type 1 500 milliLiter(s) (2000 mL/Hr) IV Continuous <Continuous>  nystatin Powder 1 Application(s) Topical two times a day  polyethylene glycol 3350 17 Gram(s) Oral daily  senna 2 Tablet(s) Oral at bedtime    MEDICATIONS  (PRN):  acetaminophen     Tablet .. 650 milliGRAM(s) Oral every 6 hours PRN Temp greater or equal to 38C (100.4F), Mild Pain (1 - 3)  oxyCODONE    IR 5 milliGRAM(s) Oral every 4 hours PRN Moderate Pain (4 - 6)      Vital Signs Last 24 Hrs  T(C): 36.8 (20 Mar 2022 05:16), Max: 36.8 (19 Mar 2022 16:40)  T(F): 98.3 (20 Mar 2022 05:16), Max: 98.3 (20 Mar 2022 05:16)  HR: 85 (20 Mar 2022 05:16) (78 - 85)  BP: 114/74 (20 Mar 2022 05:16) (110/60 - 115/73)  BP(mean): --  RR: 18 (20 Mar 2022 05:16) (18 - 20)  SpO2: 98% (20 Mar 2022 05:16) (93% - 98%)  CAPILLARY BLOOD GLUCOSE        I&O's Summary    19 Mar 2022 07:01  -  20 Mar 2022 07:00  --------------------------------------------------------  IN: 610 mL / OUT: 1100 mL / NET: -490 mL    20 Mar 2022 07:01  -  20 Mar 2022 11:43  --------------------------------------------------------  IN: 0 mL / OUT: 600 mL / NET: -600 mL        PHYSICAL EXAM:  GENERAL: NAD, well-developed  HEAD:  Atraumatic, Normocephalic  EYES: EOMI, PERRLA, conjunctiva and sclera clear  NECK: Supple, No JVD  CHEST/LUNG: Clear to auscultation bilaterally; No wheeze  HEART: Regular rate and rhythm; No murmurs, rubs, or gallops  ABDOMEN: Soft, Nontender, Nondistended; Bowel sounds present  EXTREMITIES:  2+ Peripheral Pulses, No clubbing, cyanosis, or edema  PSYCH: AAOx3  NEUROLOGY: non-focal  SKIN: No rashes or lesions    LABS:                        8.9    8.75  )-----------( 496      ( 20 Mar 2022 11:27 )             29.5                     RADIOLOGY & ADDITIONAL TESTS:    Imaging Personally Reviewed:    Consultant(s) Notes Reviewed:      Care Discussed with Consultants/Other Providers:  
DATE OF SERVICE: 03-21-22 @ 14:51    Patient is a 76y old  Female who presents with a chief complaint of Lymphedema (21 Mar 2022 14:26)      SUBJECTIVE / OVERNIGHT EVENTS:  No chest pain. No shortness of breath. No complaints. No events overnight.     MEDICATIONS  (STANDING):  ceftolozane/tazobactam IVPB 3000 milliGRAM(s) IV Intermittent every 8 hours  enoxaparin Injectable 40 milliGRAM(s) SubCutaneous every 24 hours  losartan 50 milliGRAM(s) Oral daily  multiple electrolytes Injection Type 1 500 milliLiter(s) (2000 mL/Hr) IV Continuous <Continuous>  nystatin Powder 1 Application(s) Topical two times a day  polyethylene glycol 3350 17 Gram(s) Oral daily  senna 2 Tablet(s) Oral at bedtime    MEDICATIONS  (PRN):  acetaminophen     Tablet .. 650 milliGRAM(s) Oral every 6 hours PRN Temp greater or equal to 38C (100.4F), Mild Pain (1 - 3)  oxyCODONE    IR 5 milliGRAM(s) Oral every 4 hours PRN Moderate Pain (4 - 6)      Vital Signs Last 24 Hrs  T(C): 36.9 (21 Mar 2022 12:43), Max: 37.2 (20 Mar 2022 20:30)  T(F): 98.4 (21 Mar 2022 12:43), Max: 98.9 (20 Mar 2022 20:30)  HR: 71 (21 Mar 2022 08:15) (71 - 86)  BP: 122/51 (21 Mar 2022 12:43) (108/69 - 133/77)  BP(mean): --  RR: 18 (21 Mar 2022 08:15) (18 - 18)  SpO2: 98% (21 Mar 2022 08:15) (97% - 99%)  CAPILLARY BLOOD GLUCOSE        I&O's Summary    20 Mar 2022 07:01  -  21 Mar 2022 07:00  --------------------------------------------------------  IN: 240 mL / OUT: 1400 mL / NET: -1160 mL        PHYSICAL EXAM:  GENERAL: NAD, well-developed  HEAD:  Atraumatic, Normocephalic  EYES: EOMI, PERRLA, conjunctiva and sclera clear  NECK: Supple, No JVD  CHEST/LUNG: Clear to auscultation bilaterally; No wheeze  HEART: Regular rate and rhythm; No murmurs, rubs, or gallops  ABDOMEN: Soft, Nontender, Nondistended; Bowel sounds present  EXTREMITIES:  2+ Peripheral Pulses, No clubbing, cyanosis, or edema  PSYCH: AAOx3  NEUROLOGY: non-focal  SKIN: No rashes or lesions    LABS:                        8.8    11.16 )-----------( 485      ( 21 Mar 2022 13:56 )             28.9     03-21    138  |  105  |  15  ----------------------------<  114<H>  4.2   |  23  |  0.78    Ca    8.4      21 Mar 2022 13:56                RADIOLOGY & ADDITIONAL TESTS:    Imaging Personally Reviewed:    Consultant(s) Notes Reviewed:      Care Discussed with Consultants/Other Providers:  
DATE OF SERVICE: 22 @ 20:27    Patient is a 76y old  Female who presents with a chief complaint of Lymphedema (06 Mar 2022 14:03)      SUBJECTIVE / OVERNIGHT EVENTS:    MEDICATIONS  (STANDING):  cefTRIAXone   IVPB      cefTRIAXone   IVPB 2000 milliGRAM(s) IV Intermittent every 24 hours  losartan 50 milliGRAM(s) Oral daily  metroNIDAZOLE    Tablet 500 milliGRAM(s) Oral every 8 hours  polyethylene glycol 3350 17 Gram(s) Oral daily  senna 2 Tablet(s) Oral at bedtime  vancomycin  IVPB 1500 milliGRAM(s) IV Intermittent every 24 hours    MEDICATIONS  (PRN):  acetaminophen     Tablet .. 1000 milliGRAM(s) Oral every 8 hours PRN Mild Pain (1 - 3)  oxyCODONE    IR 5 milliGRAM(s) Oral every 4 hours PRN Moderate Pain (4 - 6)      Vital Signs Last 24 Hrs  T(C): 37.4 (06 Mar 2022 16:52), Max: 37.5 (05 Mar 2022 21:23)  T(F): 99.4 (06 Mar 2022 16:52), Max: 99.5 (05 Mar 2022 21:23)  HR: 92 (06 Mar 2022 16:52) (84 - 95)  BP: 101/65 (06 Mar 2022 16:52) (101/65 - 107/58)  BP(mean): --  RR: 18 (06 Mar 2022 16:52) (18 - 18)  SpO2: 99% (06 Mar 2022 16:52) (97% - 99%)  CAPILLARY BLOOD GLUCOSE        I&O's Summary    05 Mar 2022 07:01  -  06 Mar 2022 07:00  --------------------------------------------------------  IN: 0 mL / OUT: 1700 mL / NET: -1700 mL    06 Mar 2022 07:01  -  06 Mar 2022 20:27  --------------------------------------------------------  IN: 120 mL / OUT: 0 mL / NET: 120 mL        PHYSICAL EXAM:  GENERAL: NAD, well-developed  HEAD:  Atraumatic, Normocephalic  EYES: EOMI, PERRLA, conjunctiva and sclera clear  NECK: Supple, No JVD  CHEST/LUNG: Clear to auscultation bilaterally; No wheeze  HEART: Regular rate and rhythm; No murmurs, rubs, or gallops  ABDOMEN: Soft, Nontender, Nondistended; Bowel sounds present  EXTREMITIES:  2+ Peripheral Pulses, No clubbing, cyanosis, or edema  PSYCH: AAOx3  NEUROLOGY: non-focal  SKIN: No rashes or lesions    LABS:                        8.2    15.96 )-----------( 216      ( 05 Mar 2022 11:08 )             25.2                 Urinalysis Basic - ( 05 Mar 2022 16:40 )    Color: Yellow / Appearance: Clear / S.019 / pH: x  Gluc: x / Ketone: Negative  / Bili: Negative / Urobili: Negative   Blood: x / Protein: 30 mg/dL / Nitrite: Negative   Leuk Esterase: Moderate / RBC: 1 /hpf / WBC 6 /HPF   Sq Epi: x / Non Sq Epi: 3 /hpf / Bacteria: Negative        RADIOLOGY & ADDITIONAL TESTS:    Imaging Personally Reviewed:    Consultant(s) Notes Reviewed:      Care Discussed with Consultants/Other Providers:  
Plastic Surgery Progress Note (pg [facility]: [id number], [facility]: [phone])    SUBJECTIVE  The patient was seen and examined. No acute events overnight.    OBJECTIVE  ___________________________________________________  VITAL SIGNS / I&O's   Vital Signs Last 24 Hrs  T(C): 37.7 (08 Mar 2022 04:44), Max: 39.4 (07 Mar 2022 19:59)  T(F): 99.9 (08 Mar 2022 04:44), Max: 102.9 (07 Mar 2022 19:59)  HR: 89 (08 Mar 2022 04:44) (78 - 108)  BP: 108/66 (08 Mar 2022 04:44) (79/49 - 134/80)  BP(mean): 69 (07 Mar 2022 13:30) (59 - 73)  RR: 18 (08 Mar 2022 04:44) (17 - 20)  SpO2: 98% (08 Mar 2022 04:44) (97% - 100%)      06 Mar 2022 07:01  -  07 Mar 2022 07:00  --------------------------------------------------------  IN:    Oral Fluid: 120 mL  Total IN: 120 mL    OUT:  Total OUT: 0 mL    Total NET: 120 mL      07 Mar 2022 07:01  -  08 Mar 2022 06:58  --------------------------------------------------------  IN:    IV PiggyBack: 100 mL    Lactated Ringers: 1050 mL    multiple electrolytes Injection Type 1.: 500 mL    Oral Fluid: 840 mL  Total IN: 2490 mL    OUT:    Voided (mL): 1300 mL  Total OUT: 1300 mL    Total NET: 1190 mL        ___________________________________________________  PHYSICAL EXAM    General Appearance: Appears well, NAD  Respiratory: No labored breathing  CV: Pulse regularly present  Extremities: B/l edematous lower extremities, R wound dressing intact, VAC seal intact    ___________________________________________________  LABS            CAPILLARY BLOOD GLUCOSE              ___________________________________________________  MICRO  Recent Cultures:  Specimen Source: .Blood Blood, 03-06 @ 02:56; Results   No growth to date.; Gram Stain: --; Organism: --  Specimen Source: Clean Catch Clean Catch (Midstream), 03-05 @ 21:02; Results   <10,000 CFU/ml Normal Urogenital armida present; Gram Stain: --; Organism: --    ___________________________________________________  MEDICATIONS  (STANDING):  cefTRIAXone   IVPB 2000 milliGRAM(s) IV Intermittent every 24 hours  enoxaparin Injectable 40 milliGRAM(s) SubCutaneous every 24 hours  lactated ringers. 1000 milliLiter(s) (75 mL/Hr) IV Continuous <Continuous>  losartan 50 milliGRAM(s) Oral daily  metroNIDAZOLE    Tablet 500 milliGRAM(s) Oral every 8 hours  multiple electrolytes Injection Type 1 500 milliLiter(s) (2000 mL/Hr) IV Continuous <Continuous>  oxyCODONE    IR 5 milliGRAM(s) Oral once  polyethylene glycol 3350 17 Gram(s) Oral daily  senna 2 Tablet(s) Oral at bedtime  vancomycin  IVPB 1500 milliGRAM(s) IV Intermittent every 24 hours    MEDICATIONS  (PRN):  oxyCODONE    IR 5 milliGRAM(s) Oral every 4 hours PRN Moderate Pain (4 - 6)  
DATE OF SERVICE: 03-15-22 @ 13:03    Patient is a 76y old  Female who presents with a chief complaint of Lymphedema (15 Mar 2022 07:04)      SUBJECTIVE / OVERNIGHT EVENTS:  No chest pain. No shortness of breath. No complaints. No events overnight.     MEDICATIONS  (STANDING):  enoxaparin Injectable 40 milliGRAM(s) SubCutaneous every 24 hours  losartan 50 milliGRAM(s) Oral daily  meropenem  IVPB 1000 milliGRAM(s) IV Intermittent every 8 hours  multiple electrolytes Injection Type 1 500 milliLiter(s) (2000 mL/Hr) IV Continuous <Continuous>  nystatin Powder 1 Application(s) Topical two times a day  polyethylene glycol 3350 17 Gram(s) Oral daily  senna 2 Tablet(s) Oral at bedtime  vancomycin  IVPB 1500 milliGRAM(s) IV Intermittent every 24 hours    MEDICATIONS  (PRN):  acetaminophen     Tablet .. 650 milliGRAM(s) Oral every 6 hours PRN Temp greater or equal to 38C (100.4F), Mild Pain (1 - 3)  oxyCODONE    IR 5 milliGRAM(s) Oral every 4 hours PRN Moderate Pain (4 - 6)      Vital Signs Last 24 Hrs  T(C): 36.6 (15 Mar 2022 11:22), Max: 36.8 (15 Mar 2022 04:03)  T(F): 97.9 (15 Mar 2022 11:22), Max: 98.2 (15 Mar 2022 04:03)  HR: 70 (15 Mar 2022 11:22) (70 - 80)  BP: 99/62 (15 Mar 2022 11:22) (94/66 - 130/74)  BP(mean): --  RR: 19 (15 Mar 2022 11:22) (18 - 19)  SpO2: 97% (15 Mar 2022 11:22) (96% - 100%)  CAPILLARY BLOOD GLUCOSE        I&O's Summary    14 Mar 2022 07:01  -  15 Mar 2022 07:00  --------------------------------------------------------  IN: 900 mL / OUT: 2200 mL / NET: -1300 mL        PHYSICAL EXAM:  GENERAL: NAD, well-developed  HEAD:  Atraumatic, Normocephalic  EYES: EOMI, PERRLA, conjunctiva and sclera clear  NECK: Supple, No JVD  CHEST/LUNG: Clear to auscultation bilaterally; No wheeze  HEART: Regular rate and rhythm; No murmurs, rubs, or gallops  ABDOMEN: Soft, Nontender, Nondistended; Bowel sounds present  EXTREMITIES:  2+ Peripheral Pulses, No clubbing, cyanosis, or edema  PSYCH: AAOx3  NEUROLOGY: non-focal  SKIN: No rashes or lesions    LABS:                        9.0    12.89 )-----------( 554      ( 15 Mar 2022 06:46 )             29.1     03-14    134<L>  |  103  |  15  ----------------------------<  124<H>  4.5   |  23  |  0.71    Ca    7.8<L>      14 Mar 2022 06:53  Mg     2.0     03-14    TPro  5.8<L>  /  Alb  2.2<L>  /  TBili  0.2  /  DBili  x   /  AST  9<L>  /  ALT  7<L>  /  AlkPhos  96  03-14    PT/INR - ( 14 Mar 2022 06:55 )   PT: 14.6 sec;   INR: 1.27 ratio         PTT - ( 14 Mar 2022 06:55 )  PTT:29.6 sec          RADIOLOGY & ADDITIONAL TESTS:    Imaging Personally Reviewed:    Consultant(s) Notes Reviewed:      Care Discussed with Consultants/Other Providers:  
DATE OF SERVICE: 03-24-22 @ 12:59    Patient is a 76y old  Female who presents with a chief complaint of Lymphedema (23 Mar 2022 18:11)      SUBJECTIVE / OVERNIGHT EVENTS:  No chest pain. No shortness of breath. No complaints. No events overnight.     MEDICATIONS  (STANDING):  enoxaparin Injectable 40 milliGRAM(s) SubCutaneous every 24 hours  losartan 50 milliGRAM(s) Oral daily  multiple electrolytes Injection Type 1 500 milliLiter(s) (2000 mL/Hr) IV Continuous <Continuous>  nystatin Powder 1 Application(s) Topical two times a day  polyethylene glycol 3350 17 Gram(s) Oral daily  senna 2 Tablet(s) Oral at bedtime    MEDICATIONS  (PRN):  acetaminophen     Tablet .. 650 milliGRAM(s) Oral every 6 hours PRN Temp greater or equal to 38C (100.4F), Mild Pain (1 - 3)  oxyCODONE    IR 5 milliGRAM(s) Oral every 4 hours PRN Moderate Pain (4 - 6)      Vital Signs Last 24 Hrs  T(C): 36.7 (24 Mar 2022 11:41), Max: 36.9 (23 Mar 2022 21:45)  T(F): 98 (24 Mar 2022 11:41), Max: 98.5 (23 Mar 2022 21:45)  HR: 74 (24 Mar 2022 11:41) (74 - 80)  BP: 140/79 (24 Mar 2022 11:41) (101/63 - 140/79)  BP(mean): --  RR: 18 (24 Mar 2022 11:41) (18 - 18)  SpO2: 98% (24 Mar 2022 11:41) (98% - 99%)  CAPILLARY BLOOD GLUCOSE        I&O's Summary    23 Mar 2022 07:01  -  24 Mar 2022 07:00  --------------------------------------------------------  IN: 0 mL / OUT: 1400 mL / NET: -1400 mL    24 Mar 2022 07:01  -  24 Mar 2022 12:59  --------------------------------------------------------  IN: 240 mL / OUT: 0 mL / NET: 240 mL        PHYSICAL EXAM:  GENERAL: NAD, well-developed  HEAD:  Atraumatic, Normocephalic  EYES: EOMI, PERRLA, conjunctiva and sclera clear  NECK: Supple, No JVD  CHEST/LUNG: Clear to auscultation bilaterally; No wheeze  HEART: Regular rate and rhythm; No murmurs, rubs, or gallops  ABDOMEN: Soft, Nontender, Nondistended; Bowel sounds present  EXTREMITIES:  2+ Peripheral Pulses, No clubbing, cyanosis, or edema  PSYCH: AAOx3  NEUROLOGY: non-focal  SKIN: No rashes or lesions    LABS:                        8.1    5.90  )-----------( 373      ( 23 Mar 2022 10:04 )             25.6     03-23    137  |  104  |  13  ----------------------------<  168<H>  4.1   |  24  |  0.58    Ca    8.0<L>      23 Mar 2022 10:04                RADIOLOGY & ADDITIONAL TESTS:    Imaging Personally Reviewed:    Consultant(s) Notes Reviewed:      Care Discussed with Consultants/Other Providers:  
DATE OF SERVICE: 22 @ 16:43    Patient is a 76y old  Female who presents with a chief complaint of Lymphedema (08 Mar 2022 17:25)      SUBJECTIVE / OVERNIGHT EVENTS:   No chest pain. No shortness of breath. No complaints. No events overnight.     MEDICATIONS  (STANDING):  cefTRIAXone   IVPB 2000 milliGRAM(s) IV Intermittent every 24 hours  enoxaparin Injectable 40 milliGRAM(s) SubCutaneous every 24 hours  lactated ringers. 1000 milliLiter(s) (75 mL/Hr) IV Continuous <Continuous>  losartan 50 milliGRAM(s) Oral daily  metroNIDAZOLE    Tablet 500 milliGRAM(s) Oral every 8 hours  multiple electrolytes Injection Type 1 500 milliLiter(s) (2000 mL/Hr) IV Continuous <Continuous>  polyethylene glycol 3350 17 Gram(s) Oral daily  senna 2 Tablet(s) Oral at bedtime  vancomycin  IVPB 1500 milliGRAM(s) IV Intermittent every 24 hours    MEDICATIONS  (PRN):  acetaminophen     Tablet .. 650 milliGRAM(s) Oral every 6 hours PRN Temp greater or equal to 38C (100.4F), Mild Pain (1 - 3)  oxyCODONE    IR 5 milliGRAM(s) Oral every 4 hours PRN Moderate Pain (4 - 6)      Vital Signs Last 24 Hrs  T(C): 36.8 (09 Mar 2022 11:22), Max: 37.8 (08 Mar 2022 18:20)  T(F): 98.2 (09 Mar 2022 11:22), Max: 100.1 (08 Mar 2022 18:20)  HR: 85 (09 Mar 2022 11:22) (82 - 91)  BP: 109/69 (09 Mar 2022 11:22) (96/61 - 114/78)  BP(mean): --  RR: 18 (09 Mar 2022 11:22) (18 - 20)  SpO2: 98% (09 Mar 2022 11:22) (98% - 100%)  CAPILLARY BLOOD GLUCOSE        I&O's Summary    08 Mar 2022 07:01  -  09 Mar 2022 07:00  --------------------------------------------------------  IN: 730 mL / OUT: 975 mL / NET: -245 mL        PHYSICAL EXAM:  GENERAL: NAD, well-developed  HEAD:  Atraumatic, Normocephalic  EYES: EOMI, PERRLA, conjunctiva and sclera clear  NECK: Supple, No JVD  CHEST/LUNG: Clear to auscultation bilaterally; No wheeze  HEART: Regular rate and rhythm; No murmurs, rubs, or gallops  ABDOMEN: Soft, Nontender, Nondistended; Bowel sounds present  EXTREMITIES:  2+ Peripheral Pulses, No clubbing, cyanosis, or edema  PSYCH: AAOx3  NEUROLOGY: non-focal  SKIN: No rashes or lesions    LABS:                        7.1    16.49 )-----------( 242      ( 09 Mar 2022 10:52 )             22.1     03-09    133<L>  |  102  |  28<H>  ----------------------------<  161<H>  4.3   |  22  |  1.18    Ca    7.3<L>      09 Mar 2022 10:52            Urinalysis Basic - ( 09 Mar 2022 02:14 )    Color: Yellow / Appearance: Slightly Turbid / S.011 / pH: x  Gluc: x / Ketone: Negative  / Bili: Negative / Urobili: Negative   Blood: x / Protein: Trace / Nitrite: Negative   Leuk Esterase: Negative / RBC: 3 /hpf / WBC 1 /HPF   Sq Epi: x / Non Sq Epi: 1 /hpf / Bacteria: 0.0        RADIOLOGY & ADDITIONAL TESTS:    Imaging Personally Reviewed:    Consultant(s) Notes Reviewed:      Care Discussed with Consultants/Other Providers:

## 2022-03-24 NOTE — PROGRESS NOTE ADULT - NUTRITIONAL ASSESSMENT
This patient has been assessed with a concern for Malnutrition and has been determined to have a diagnosis/diagnoses of Morbid obesity (BMI > 40).    This patient is being managed with:   Diet Regular-  Low Sodium  Liquid Protein Supplement     Qty per Day:  2  Entered: Mar  8 2022 10:23AM    

## 2022-03-24 NOTE — PROGRESS NOTE ADULT - REASON FOR ADMISSION
Lymphedema

## 2022-05-06 NOTE — PATIENT PROFILE ADULT - PATIENT'S GENDER IDENTITY
Render Risk Assessment In Note?: yes Detail Level: Simple Additional Notes: Patient consent was obtained to proceed with the visit and recommended plan of care after discussion of all risks and benefits, including the risks of COVID-19 exposure. Female

## 2022-05-18 PROBLEM — I89.0 LYMPHEDEMA, NOT ELSEWHERE CLASSIFIED: Chronic | Status: ACTIVE | Noted: 2022-02-28

## 2022-05-18 PROBLEM — I10 ESSENTIAL (PRIMARY) HYPERTENSION: Chronic | Status: ACTIVE | Noted: 2022-02-28

## 2022-05-19 ENCOUNTER — OUTPATIENT (OUTPATIENT)
Dept: OUTPATIENT SERVICES | Facility: HOSPITAL | Age: 77
LOS: 1 days | Discharge: HOME | End: 2022-05-19
Payer: MEDICARE

## 2022-05-19 DIAGNOSIS — L89.90 PRESSURE ULCER OF UNSPECIFIED SITE, UNSPECIFIED STAGE: ICD-10-CM

## 2022-05-19 DIAGNOSIS — Z96.653 PRESENCE OF ARTIFICIAL KNEE JOINT, BILATERAL: Chronic | ICD-10-CM

## 2022-05-19 DIAGNOSIS — I89.0 LYMPHEDEMA, NOT ELSEWHERE CLASSIFIED: Chronic | ICD-10-CM

## 2022-05-19 PROCEDURE — 99215 OFFICE O/P EST HI 40 MIN: CPT
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
## 2022-05-20 DIAGNOSIS — R60.9 EDEMA, UNSPECIFIED: ICD-10-CM

## 2022-05-20 DIAGNOSIS — Z91.81 HISTORY OF FALLING: ICD-10-CM

## 2022-05-20 DIAGNOSIS — T81.31XA DISRUPTION OF EXTERNAL OPERATION (SURGICAL) WOUND, NOT ELSEWHERE CLASSIFIED, INITIAL ENCOUNTER: ICD-10-CM
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
## 2022-05-20 DIAGNOSIS — I89.0 LYMPHEDEMA, NOT ELSEWHERE CLASSIFIED: ICD-10-CM

## 2022-05-20 DIAGNOSIS — L97.112 NON-PRESSURE CHRONIC ULCER OF RIGHT THIGH WITH FAT LAYER EXPOSED: ICD-10-CM

## 2022-05-20 DIAGNOSIS — Z82.49 FAMILY HISTORY OF ISCHEMIC HEART DISEASE AND OTHER DISEASES OF THE CIRCULATORY SYSTEM: ICD-10-CM

## 2022-05-20 DIAGNOSIS — I10 ESSENTIAL (PRIMARY) HYPERTENSION: ICD-10-CM

## 2022-05-25 ENCOUNTER — OUTPATIENT (OUTPATIENT)
Dept: OUTPATIENT SERVICES | Facility: HOSPITAL | Age: 77
LOS: 1 days | Discharge: HOME | End: 2022-05-25
Payer: MEDICARE

## 2022-05-25 DIAGNOSIS — Z96.653 PRESENCE OF ARTIFICIAL KNEE JOINT, BILATERAL: Chronic | ICD-10-CM

## 2022-05-25 DIAGNOSIS — L89.90 PRESSURE ULCER OF UNSPECIFIED SITE, UNSPECIFIED STAGE: ICD-10-CM

## 2022-05-25 DIAGNOSIS — I89.0 LYMPHEDEMA, NOT ELSEWHERE CLASSIFIED: Chronic | ICD-10-CM

## 2022-05-25 PROCEDURE — 99213 OFFICE O/P EST LOW 20 MIN: CPT

## 2022-05-26 DIAGNOSIS — L97.112 NON-PRESSURE CHRONIC ULCER OF RIGHT THIGH WITH FAT LAYER EXPOSED: ICD-10-CM

## 2022-05-26 DIAGNOSIS — I89.0 LYMPHEDEMA, NOT ELSEWHERE CLASSIFIED: ICD-10-CM

## 2022-05-26 DIAGNOSIS — I10 ESSENTIAL (PRIMARY) HYPERTENSION: ICD-10-CM

## 2022-05-26 DIAGNOSIS — Z91.81 HISTORY OF FALLING: ICD-10-CM

## 2022-05-26 DIAGNOSIS — R60.9 EDEMA, UNSPECIFIED: ICD-10-CM

## 2022-06-01 ENCOUNTER — OUTPATIENT (OUTPATIENT)
Dept: OUTPATIENT SERVICES | Facility: HOSPITAL | Age: 77
LOS: 1 days | Discharge: HOME | End: 2022-06-01
Payer: MEDICARE

## 2022-06-01 DIAGNOSIS — L97.112 NON-PRESSURE CHRONIC ULCER OF RIGHT THIGH WITH FAT LAYER EXPOSED: ICD-10-CM

## 2022-06-01 DIAGNOSIS — Z96.653 PRESENCE OF ARTIFICIAL KNEE JOINT, BILATERAL: Chronic | ICD-10-CM
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
## 2022-06-01 DIAGNOSIS — I89.0 LYMPHEDEMA, NOT ELSEWHERE CLASSIFIED: ICD-10-CM
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
## 2022-06-01 DIAGNOSIS — I89.0 LYMPHEDEMA, NOT ELSEWHERE CLASSIFIED: Chronic | ICD-10-CM

## 2022-06-01 DIAGNOSIS — Z91.81 HISTORY OF FALLING: ICD-10-CM

## 2022-06-01 DIAGNOSIS — T81.31XA DISRUPTION OF EXTERNAL OPERATION (SURGICAL) WOUND, NOT ELSEWHERE CLASSIFIED, INITIAL ENCOUNTER: ICD-10-CM

## 2022-06-01 DIAGNOSIS — I10 ESSENTIAL (PRIMARY) HYPERTENSION: ICD-10-CM

## 2022-06-01 DIAGNOSIS — L89.90 PRESSURE ULCER OF UNSPECIFIED SITE, UNSPECIFIED STAGE: ICD-10-CM

## 2022-06-01 DIAGNOSIS — R60.9 EDEMA, UNSPECIFIED: ICD-10-CM

## 2022-06-01 PROCEDURE — 99213 OFFICE O/P EST LOW 20 MIN: CPT

## 2022-06-08 ENCOUNTER — OUTPATIENT (OUTPATIENT)
Dept: OUTPATIENT SERVICES | Facility: HOSPITAL | Age: 77
LOS: 1 days | Discharge: HOME | End: 2022-06-08
Payer: MEDICARE

## 2022-06-08 DIAGNOSIS — I89.0 LYMPHEDEMA, NOT ELSEWHERE CLASSIFIED: Chronic | ICD-10-CM

## 2022-06-08 DIAGNOSIS — L89.90 PRESSURE ULCER OF UNSPECIFIED SITE, UNSPECIFIED STAGE: ICD-10-CM

## 2022-06-08 DIAGNOSIS — Z96.653 PRESENCE OF ARTIFICIAL KNEE JOINT, BILATERAL: Chronic | ICD-10-CM

## 2022-06-08 PROCEDURE — 17250 CHEM CAUT OF GRANLTJ TISSUE: CPT | Mod: 59,RT

## 2022-06-08 PROCEDURE — 99213 OFFICE O/P EST LOW 20 MIN: CPT | Mod: 25

## 2022-06-10 DIAGNOSIS — T81.31XA DISRUPTION OF EXTERNAL OPERATION (SURGICAL) WOUND, NOT ELSEWHERE CLASSIFIED, INITIAL ENCOUNTER: ICD-10-CM

## 2022-06-10 DIAGNOSIS — I10 ESSENTIAL (PRIMARY) HYPERTENSION: ICD-10-CM
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
## 2022-06-10 DIAGNOSIS — I89.0 LYMPHEDEMA, NOT ELSEWHERE CLASSIFIED: ICD-10-CM

## 2022-06-10 DIAGNOSIS — Z91.81 HISTORY OF FALLING: ICD-10-CM

## 2022-06-10 DIAGNOSIS — L97.112 NON-PRESSURE CHRONIC ULCER OF RIGHT THIGH WITH FAT LAYER EXPOSED: ICD-10-CM

## 2022-06-10 DIAGNOSIS — R60.9 EDEMA, UNSPECIFIED: ICD-10-CM
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
## 2022-06-15 ENCOUNTER — OUTPATIENT (OUTPATIENT)
Dept: OUTPATIENT SERVICES | Facility: HOSPITAL | Age: 77
LOS: 1 days | Discharge: HOME | End: 2022-06-15
Payer: MEDICARE
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
## 2022-06-15 DIAGNOSIS — I10 ESSENTIAL (PRIMARY) HYPERTENSION: ICD-10-CM

## 2022-06-15 DIAGNOSIS — Z91.81 HISTORY OF FALLING: ICD-10-CM

## 2022-06-15 DIAGNOSIS — I89.0 LYMPHEDEMA, NOT ELSEWHERE CLASSIFIED: Chronic | ICD-10-CM

## 2022-06-15 DIAGNOSIS — Z96.653 PRESENCE OF ARTIFICIAL KNEE JOINT, BILATERAL: Chronic | ICD-10-CM

## 2022-06-15 DIAGNOSIS — L97.112 NON-PRESSURE CHRONIC ULCER OF RIGHT THIGH WITH FAT LAYER EXPOSED: ICD-10-CM

## 2022-06-15 DIAGNOSIS — R60.9 EDEMA, UNSPECIFIED: ICD-10-CM

## 2022-06-15 DIAGNOSIS — I89.0 LYMPHEDEMA, NOT ELSEWHERE CLASSIFIED: ICD-10-CM
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
## 2022-06-15 DIAGNOSIS — T81.31XA DISRUPTION OF EXTERNAL OPERATION (SURGICAL) WOUND, NOT ELSEWHERE CLASSIFIED, INITIAL ENCOUNTER: ICD-10-CM

## 2022-06-15 DIAGNOSIS — L89.90 PRESSURE ULCER OF UNSPECIFIED SITE, UNSPECIFIED STAGE: ICD-10-CM

## 2022-06-15 PROCEDURE — 99213 OFFICE O/P EST LOW 20 MIN: CPT

## 2022-06-22 ENCOUNTER — OUTPATIENT (OUTPATIENT)
Dept: OUTPATIENT SERVICES | Facility: HOSPITAL | Age: 77
LOS: 1 days | Discharge: HOME | End: 2022-06-22
Payer: MEDICARE

## 2022-06-22 DIAGNOSIS — I89.0 LYMPHEDEMA, NOT ELSEWHERE CLASSIFIED: Chronic | ICD-10-CM

## 2022-06-22 DIAGNOSIS — Z96.653 PRESENCE OF ARTIFICIAL KNEE JOINT, BILATERAL: Chronic | ICD-10-CM

## 2022-06-22 DIAGNOSIS — Z01.818 ENCOUNTER FOR OTHER PREPROCEDURAL EXAMINATION: ICD-10-CM

## 2022-06-22 PROCEDURE — 17250 CHEM CAUT OF GRANLTJ TISSUE: CPT | Mod: 59,RT

## 2022-06-22 PROCEDURE — 99212 OFFICE O/P EST SF 10 MIN: CPT | Mod: 25

## 2022-06-23 DIAGNOSIS — I89.0 LYMPHEDEMA, NOT ELSEWHERE CLASSIFIED: ICD-10-CM

## 2022-06-23 DIAGNOSIS — L92.9 GRANULOMATOUS DISORDER OF THE SKIN AND SUBCUTANEOUS TISSUE, UNSPECIFIED: ICD-10-CM

## 2022-06-23 DIAGNOSIS — T81.31XA DISRUPTION OF EXTERNAL OPERATION (SURGICAL) WOUND, NOT ELSEWHERE CLASSIFIED, INITIAL ENCOUNTER: ICD-10-CM

## 2022-06-23 DIAGNOSIS — L97.112 NON-PRESSURE CHRONIC ULCER OF RIGHT THIGH WITH FAT LAYER EXPOSED: ICD-10-CM
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
## 2022-06-23 DIAGNOSIS — R60.9 EDEMA, UNSPECIFIED: ICD-10-CM

## 2022-06-23 DIAGNOSIS — I10 ESSENTIAL (PRIMARY) HYPERTENSION: ICD-10-CM
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
## 2022-06-23 DIAGNOSIS — Z91.81 HISTORY OF FALLING: ICD-10-CM

## 2022-06-29 ENCOUNTER — OUTPATIENT (OUTPATIENT)
Dept: OUTPATIENT SERVICES | Facility: HOSPITAL | Age: 77
LOS: 1 days | Discharge: HOME | End: 2022-06-29

## 2022-06-29 DIAGNOSIS — L89.90 PRESSURE ULCER OF UNSPECIFIED SITE, UNSPECIFIED STAGE: ICD-10-CM

## 2022-06-29 DIAGNOSIS — I89.0 LYMPHEDEMA, NOT ELSEWHERE CLASSIFIED: Chronic | ICD-10-CM

## 2022-06-29 DIAGNOSIS — Z96.653 PRESENCE OF ARTIFICIAL KNEE JOINT, BILATERAL: Chronic | ICD-10-CM

## 2022-06-29 PROCEDURE — 99212 OFFICE O/P EST SF 10 MIN: CPT

## 2022-06-30 DIAGNOSIS — R60.9 EDEMA, UNSPECIFIED: ICD-10-CM

## 2022-06-30 DIAGNOSIS — Z91.81 HISTORY OF FALLING: ICD-10-CM
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
## 2022-06-30 DIAGNOSIS — L97.112 NON-PRESSURE CHRONIC ULCER OF RIGHT THIGH WITH FAT LAYER EXPOSED: ICD-10-CM

## 2022-06-30 DIAGNOSIS — I89.0 LYMPHEDEMA, NOT ELSEWHERE CLASSIFIED: ICD-10-CM
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
## 2022-06-30 DIAGNOSIS — L92.9 GRANULOMATOUS DISORDER OF THE SKIN AND SUBCUTANEOUS TISSUE, UNSPECIFIED: ICD-10-CM

## 2022-06-30 DIAGNOSIS — I10 ESSENTIAL (PRIMARY) HYPERTENSION: ICD-10-CM

## 2022-06-30 DIAGNOSIS — T81.31XA DISRUPTION OF EXTERNAL OPERATION (SURGICAL) WOUND, NOT ELSEWHERE CLASSIFIED, INITIAL ENCOUNTER: ICD-10-CM

## 2022-07-13 ENCOUNTER — OUTPATIENT (OUTPATIENT)
Dept: OUTPATIENT SERVICES | Facility: HOSPITAL | Age: 77
LOS: 1 days | Discharge: HOME | End: 2022-07-13

## 2022-07-13 DIAGNOSIS — Z91.81 HISTORY OF FALLING: ICD-10-CM
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
## 2022-07-13 DIAGNOSIS — L97.112 NON-PRESSURE CHRONIC ULCER OF RIGHT THIGH WITH FAT LAYER EXPOSED: ICD-10-CM

## 2022-07-13 DIAGNOSIS — L89.90 PRESSURE ULCER OF UNSPECIFIED SITE, UNSPECIFIED STAGE: ICD-10-CM

## 2022-07-13 DIAGNOSIS — I89.0 LYMPHEDEMA, NOT ELSEWHERE CLASSIFIED: Chronic | ICD-10-CM

## 2022-07-13 DIAGNOSIS — Z96.653 PRESENCE OF ARTIFICIAL KNEE JOINT, BILATERAL: Chronic | ICD-10-CM

## 2022-07-13 DIAGNOSIS — R60.9 EDEMA, UNSPECIFIED: ICD-10-CM

## 2022-07-13 DIAGNOSIS — L92.9 GRANULOMATOUS DISORDER OF THE SKIN AND SUBCUTANEOUS TISSUE, UNSPECIFIED: ICD-10-CM

## 2022-07-13 DIAGNOSIS — I89.0 LYMPHEDEMA, NOT ELSEWHERE CLASSIFIED: ICD-10-CM

## 2022-07-13 DIAGNOSIS — T81.31XA DISRUPTION OF EXTERNAL OPERATION (SURGICAL) WOUND, NOT ELSEWHERE CLASSIFIED, INITIAL ENCOUNTER: ICD-10-CM

## 2022-07-13 DIAGNOSIS — I10 ESSENTIAL (PRIMARY) HYPERTENSION: ICD-10-CM
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
## 2022-07-13 PROCEDURE — 99213 OFFICE O/P EST LOW 20 MIN: CPT

## 2022-07-20 ENCOUNTER — OUTPATIENT (OUTPATIENT)
Dept: OUTPATIENT SERVICES | Facility: HOSPITAL | Age: 77
LOS: 1 days | Discharge: HOME | End: 2022-07-20

## 2022-07-20 DIAGNOSIS — L89.90 PRESSURE ULCER OF UNSPECIFIED SITE, UNSPECIFIED STAGE: ICD-10-CM

## 2022-07-20 DIAGNOSIS — Z96.653 PRESENCE OF ARTIFICIAL KNEE JOINT, BILATERAL: Chronic | ICD-10-CM

## 2022-07-20 DIAGNOSIS — I89.0 LYMPHEDEMA, NOT ELSEWHERE CLASSIFIED: Chronic | ICD-10-CM

## 2022-07-20 PROCEDURE — 99212 OFFICE O/P EST SF 10 MIN: CPT

## 2022-07-21 DIAGNOSIS — I10 ESSENTIAL (PRIMARY) HYPERTENSION: ICD-10-CM

## 2022-07-21 DIAGNOSIS — Z91.81 HISTORY OF FALLING: ICD-10-CM

## 2022-07-21 DIAGNOSIS — L92.9 GRANULOMATOUS DISORDER OF THE SKIN AND SUBCUTANEOUS TISSUE, UNSPECIFIED: ICD-10-CM
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
## 2022-07-21 DIAGNOSIS — L97.112 NON-PRESSURE CHRONIC ULCER OF RIGHT THIGH WITH FAT LAYER EXPOSED: ICD-10-CM

## 2022-07-21 DIAGNOSIS — I89.0 LYMPHEDEMA, NOT ELSEWHERE CLASSIFIED: ICD-10-CM
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
## 2022-07-21 DIAGNOSIS — R60.9 EDEMA, UNSPECIFIED: ICD-10-CM

## 2022-07-21 DIAGNOSIS — T81.31XA DISRUPTION OF EXTERNAL OPERATION (SURGICAL) WOUND, NOT ELSEWHERE CLASSIFIED, INITIAL ENCOUNTER: ICD-10-CM

## 2022-07-27 ENCOUNTER — OUTPATIENT (OUTPATIENT)
Dept: OUTPATIENT SERVICES | Facility: HOSPITAL | Age: 77
LOS: 1 days | Discharge: HOME | End: 2022-07-27

## 2022-07-27 DIAGNOSIS — I89.0 LYMPHEDEMA, NOT ELSEWHERE CLASSIFIED: Chronic | ICD-10-CM

## 2022-07-27 DIAGNOSIS — L89.90 PRESSURE ULCER OF UNSPECIFIED SITE, UNSPECIFIED STAGE: ICD-10-CM

## 2022-07-27 DIAGNOSIS — Z96.653 PRESENCE OF ARTIFICIAL KNEE JOINT, BILATERAL: Chronic | ICD-10-CM

## 2022-07-27 PROCEDURE — 99213 OFFICE O/P EST LOW 20 MIN: CPT

## 2022-08-01 DIAGNOSIS — T81.31XA DISRUPTION OF EXTERNAL OPERATION (SURGICAL) WOUND, NOT ELSEWHERE CLASSIFIED, INITIAL ENCOUNTER: ICD-10-CM

## 2022-08-01 DIAGNOSIS — L97.112 NON-PRESSURE CHRONIC ULCER OF RIGHT THIGH WITH FAT LAYER EXPOSED: ICD-10-CM
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
## 2022-08-01 DIAGNOSIS — L92.9 GRANULOMATOUS DISORDER OF THE SKIN AND SUBCUTANEOUS TISSUE, UNSPECIFIED: ICD-10-CM

## 2022-08-01 DIAGNOSIS — R60.9 EDEMA, UNSPECIFIED: ICD-10-CM

## 2022-08-01 DIAGNOSIS — Z91.81 HISTORY OF FALLING: ICD-10-CM
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
## 2022-08-01 DIAGNOSIS — I89.0 LYMPHEDEMA, NOT ELSEWHERE CLASSIFIED: ICD-10-CM

## 2022-08-01 DIAGNOSIS — I10 ESSENTIAL (PRIMARY) HYPERTENSION: ICD-10-CM

## 2022-08-03 ENCOUNTER — OUTPATIENT (OUTPATIENT)
Dept: OUTPATIENT SERVICES | Facility: HOSPITAL | Age: 77
LOS: 1 days | Discharge: HOME | End: 2022-08-03

## 2022-08-03 DIAGNOSIS — L89.90 PRESSURE ULCER OF UNSPECIFIED SITE, UNSPECIFIED STAGE: ICD-10-CM

## 2022-08-03 DIAGNOSIS — Z96.653 PRESENCE OF ARTIFICIAL KNEE JOINT, BILATERAL: Chronic | ICD-10-CM

## 2022-08-03 DIAGNOSIS — I89.0 LYMPHEDEMA, NOT ELSEWHERE CLASSIFIED: Chronic | ICD-10-CM

## 2022-08-03 PROCEDURE — 99212 OFFICE O/P EST SF 10 MIN: CPT

## 2022-08-05 DIAGNOSIS — R60.9 EDEMA, UNSPECIFIED: ICD-10-CM

## 2022-08-05 DIAGNOSIS — I10 ESSENTIAL (PRIMARY) HYPERTENSION: ICD-10-CM
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
## 2022-08-05 DIAGNOSIS — L97.112 NON-PRESSURE CHRONIC ULCER OF RIGHT THIGH WITH FAT LAYER EXPOSED: ICD-10-CM

## 2022-08-05 DIAGNOSIS — Z91.81 HISTORY OF FALLING: ICD-10-CM

## 2022-08-05 DIAGNOSIS — I89.0 LYMPHEDEMA, NOT ELSEWHERE CLASSIFIED: ICD-10-CM
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
## 2022-08-05 DIAGNOSIS — T81.31XA DISRUPTION OF EXTERNAL OPERATION (SURGICAL) WOUND, NOT ELSEWHERE CLASSIFIED, INITIAL ENCOUNTER: ICD-10-CM

## 2022-08-05 DIAGNOSIS — L92.9 GRANULOMATOUS DISORDER OF THE SKIN AND SUBCUTANEOUS TISSUE, UNSPECIFIED: ICD-10-CM

## 2022-08-17 ENCOUNTER — OUTPATIENT (OUTPATIENT)
Dept: OUTPATIENT SERVICES | Facility: HOSPITAL | Age: 77
LOS: 1 days | Discharge: HOME | End: 2022-08-17

## 2022-08-17 DIAGNOSIS — L89.90 PRESSURE ULCER OF UNSPECIFIED SITE, UNSPECIFIED STAGE: ICD-10-CM

## 2022-08-17 DIAGNOSIS — Z96.653 PRESENCE OF ARTIFICIAL KNEE JOINT, BILATERAL: Chronic | ICD-10-CM

## 2022-08-17 DIAGNOSIS — I89.0 LYMPHEDEMA, NOT ELSEWHERE CLASSIFIED: Chronic | ICD-10-CM

## 2022-08-17 PROCEDURE — 99213 OFFICE O/P EST LOW 20 MIN: CPT

## 2022-08-18 DIAGNOSIS — L92.9 GRANULOMATOUS DISORDER OF THE SKIN AND SUBCUTANEOUS TISSUE, UNSPECIFIED: ICD-10-CM
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
## 2022-08-18 DIAGNOSIS — I89.0 LYMPHEDEMA, NOT ELSEWHERE CLASSIFIED: ICD-10-CM

## 2022-08-18 DIAGNOSIS — L97.112 NON-PRESSURE CHRONIC ULCER OF RIGHT THIGH WITH FAT LAYER EXPOSED: ICD-10-CM

## 2022-08-18 DIAGNOSIS — E66.9 OBESITY, UNSPECIFIED: ICD-10-CM

## 2022-08-18 DIAGNOSIS — Z91.81 HISTORY OF FALLING: ICD-10-CM

## 2022-08-18 DIAGNOSIS — I10 ESSENTIAL (PRIMARY) HYPERTENSION: ICD-10-CM

## 2022-08-18 DIAGNOSIS — R60.9 EDEMA, UNSPECIFIED: ICD-10-CM

## 2022-08-18 DIAGNOSIS — T81.31XA DISRUPTION OF EXTERNAL OPERATION (SURGICAL) WOUND, NOT ELSEWHERE CLASSIFIED, INITIAL ENCOUNTER: ICD-10-CM
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
## 2022-08-24 ENCOUNTER — OUTPATIENT (OUTPATIENT)
Dept: OUTPATIENT SERVICES | Facility: HOSPITAL | Age: 77
LOS: 1 days | Discharge: HOME | End: 2022-08-24

## 2022-08-24 DIAGNOSIS — Z96.653 PRESENCE OF ARTIFICIAL KNEE JOINT, BILATERAL: Chronic | ICD-10-CM

## 2022-08-24 DIAGNOSIS — L89.899 PRESSURE ULCER OF OTHER SITE, UNSPECIFIED STAGE: ICD-10-CM

## 2022-08-24 DIAGNOSIS — I89.0 LYMPHEDEMA, NOT ELSEWHERE CLASSIFIED: Chronic | ICD-10-CM

## 2022-08-24 PROCEDURE — 99212 OFFICE O/P EST SF 10 MIN: CPT

## 2022-08-25 DIAGNOSIS — Z91.81 HISTORY OF FALLING: ICD-10-CM

## 2022-08-25 DIAGNOSIS — L97.112 NON-PRESSURE CHRONIC ULCER OF RIGHT THIGH WITH FAT LAYER EXPOSED: ICD-10-CM

## 2022-08-25 DIAGNOSIS — I10 ESSENTIAL (PRIMARY) HYPERTENSION: ICD-10-CM

## 2022-08-25 DIAGNOSIS — E66.9 OBESITY, UNSPECIFIED: ICD-10-CM
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
## 2022-08-25 DIAGNOSIS — R60.9 EDEMA, UNSPECIFIED: ICD-10-CM

## 2022-08-25 DIAGNOSIS — L92.9 GRANULOMATOUS DISORDER OF THE SKIN AND SUBCUTANEOUS TISSUE, UNSPECIFIED: ICD-10-CM

## 2022-08-25 DIAGNOSIS — T81.31XA DISRUPTION OF EXTERNAL OPERATION (SURGICAL) WOUND, NOT ELSEWHERE CLASSIFIED, INITIAL ENCOUNTER: ICD-10-CM

## 2022-08-25 DIAGNOSIS — I89.0 LYMPHEDEMA, NOT ELSEWHERE CLASSIFIED: ICD-10-CM
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
## 2022-08-31 ENCOUNTER — OUTPATIENT (OUTPATIENT)
Dept: OUTPATIENT SERVICES | Facility: HOSPITAL | Age: 77
LOS: 1 days | Discharge: HOME | End: 2022-08-31

## 2022-08-31 DIAGNOSIS — Z96.653 PRESENCE OF ARTIFICIAL KNEE JOINT, BILATERAL: Chronic | ICD-10-CM

## 2022-08-31 DIAGNOSIS — I89.0 LYMPHEDEMA, NOT ELSEWHERE CLASSIFIED: Chronic | ICD-10-CM

## 2022-08-31 DIAGNOSIS — L89.90 PRESSURE ULCER OF UNSPECIFIED SITE, UNSPECIFIED STAGE: ICD-10-CM

## 2022-08-31 PROCEDURE — 17250 CHEM CAUT OF GRANLTJ TISSUE: CPT | Mod: 59,RT

## 2022-08-31 PROCEDURE — 99212 OFFICE O/P EST SF 10 MIN: CPT | Mod: 25

## 2022-09-06 DIAGNOSIS — Z91.81 HISTORY OF FALLING: ICD-10-CM

## 2022-09-06 DIAGNOSIS — L97.112 NON-PRESSURE CHRONIC ULCER OF RIGHT THIGH WITH FAT LAYER EXPOSED: ICD-10-CM

## 2022-09-06 DIAGNOSIS — I10 ESSENTIAL (PRIMARY) HYPERTENSION: ICD-10-CM

## 2022-09-06 DIAGNOSIS — R60.9 EDEMA, UNSPECIFIED: ICD-10-CM

## 2022-09-06 DIAGNOSIS — E66.9 OBESITY, UNSPECIFIED: ICD-10-CM

## 2022-09-06 DIAGNOSIS — L92.9 GRANULOMATOUS DISORDER OF THE SKIN AND SUBCUTANEOUS TISSUE, UNSPECIFIED: ICD-10-CM

## 2022-09-06 DIAGNOSIS — I89.0 LYMPHEDEMA, NOT ELSEWHERE CLASSIFIED: ICD-10-CM

## 2022-09-14 ENCOUNTER — OUTPATIENT (OUTPATIENT)
Dept: OUTPATIENT SERVICES | Facility: HOSPITAL | Age: 77
LOS: 1 days | Discharge: HOME | End: 2022-09-14

## 2022-09-14 DIAGNOSIS — L89.90 PRESSURE ULCER OF UNSPECIFIED SITE, UNSPECIFIED STAGE: ICD-10-CM

## 2022-09-14 DIAGNOSIS — I89.0 LYMPHEDEMA, NOT ELSEWHERE CLASSIFIED: Chronic | ICD-10-CM

## 2022-09-14 DIAGNOSIS — Z96.653 PRESENCE OF ARTIFICIAL KNEE JOINT, BILATERAL: Chronic | ICD-10-CM

## 2022-09-14 PROCEDURE — 99212 OFFICE O/P EST SF 10 MIN: CPT

## 2022-09-15 DIAGNOSIS — I89.0 LYMPHEDEMA, NOT ELSEWHERE CLASSIFIED: ICD-10-CM

## 2022-09-15 DIAGNOSIS — L97.112 NON-PRESSURE CHRONIC ULCER OF RIGHT THIGH WITH FAT LAYER EXPOSED: ICD-10-CM

## 2022-09-15 DIAGNOSIS — I10 ESSENTIAL (PRIMARY) HYPERTENSION: ICD-10-CM

## 2022-09-15 DIAGNOSIS — Z91.81 HISTORY OF FALLING: ICD-10-CM

## 2022-09-15 DIAGNOSIS — R60.9 EDEMA, UNSPECIFIED: ICD-10-CM

## 2022-09-15 DIAGNOSIS — L92.9 GRANULOMATOUS DISORDER OF THE SKIN AND SUBCUTANEOUS TISSUE, UNSPECIFIED: ICD-10-CM

## 2022-09-15 DIAGNOSIS — E66.9 OBESITY, UNSPECIFIED: ICD-10-CM

## 2022-09-21 ENCOUNTER — OUTPATIENT (OUTPATIENT)
Dept: OUTPATIENT SERVICES | Facility: HOSPITAL | Age: 77
LOS: 1 days | Discharge: HOME | End: 2022-09-21

## 2022-09-21 DIAGNOSIS — Z96.653 PRESENCE OF ARTIFICIAL KNEE JOINT, BILATERAL: Chronic | ICD-10-CM

## 2022-09-21 DIAGNOSIS — E66.9 OBESITY, UNSPECIFIED: ICD-10-CM

## 2022-09-21 DIAGNOSIS — L97.112 NON-PRESSURE CHRONIC ULCER OF RIGHT THIGH WITH FAT LAYER EXPOSED: ICD-10-CM

## 2022-09-21 DIAGNOSIS — L89.90 PRESSURE ULCER OF UNSPECIFIED SITE, UNSPECIFIED STAGE: ICD-10-CM

## 2022-09-21 DIAGNOSIS — R60.9 EDEMA, UNSPECIFIED: ICD-10-CM

## 2022-09-21 DIAGNOSIS — I89.0 LYMPHEDEMA, NOT ELSEWHERE CLASSIFIED: Chronic | ICD-10-CM

## 2022-09-21 DIAGNOSIS — L92.9 GRANULOMATOUS DISORDER OF THE SKIN AND SUBCUTANEOUS TISSUE, UNSPECIFIED: ICD-10-CM

## 2022-09-21 DIAGNOSIS — Z91.81 HISTORY OF FALLING: ICD-10-CM

## 2022-09-21 DIAGNOSIS — I89.0 LYMPHEDEMA, NOT ELSEWHERE CLASSIFIED: ICD-10-CM

## 2022-09-21 PROCEDURE — 99212 OFFICE O/P EST SF 10 MIN: CPT

## 2022-10-05 ENCOUNTER — OUTPATIENT (OUTPATIENT)
Dept: OUTPATIENT SERVICES | Facility: HOSPITAL | Age: 77
LOS: 1 days | Discharge: HOME | End: 2022-10-05
Payer: MEDICARE

## 2022-10-05 DIAGNOSIS — I89.0 LYMPHEDEMA, NOT ELSEWHERE CLASSIFIED: Chronic | ICD-10-CM

## 2022-10-05 DIAGNOSIS — L89.90 PRESSURE ULCER OF UNSPECIFIED SITE, UNSPECIFIED STAGE: ICD-10-CM

## 2022-10-05 DIAGNOSIS — Z96.653 PRESENCE OF ARTIFICIAL KNEE JOINT, BILATERAL: Chronic | ICD-10-CM

## 2022-10-05 PROCEDURE — 17250 CHEM CAUT OF GRANLTJ TISSUE: CPT | Mod: 59,RT

## 2022-10-05 PROCEDURE — 99212 OFFICE O/P EST SF 10 MIN: CPT | Mod: 25

## 2022-10-06 DIAGNOSIS — Z91.81 HISTORY OF FALLING: ICD-10-CM

## 2022-10-06 DIAGNOSIS — R60.9 EDEMA, UNSPECIFIED: ICD-10-CM

## 2022-10-06 DIAGNOSIS — E66.9 OBESITY, UNSPECIFIED: ICD-10-CM

## 2022-10-06 DIAGNOSIS — I89.0 LYMPHEDEMA, NOT ELSEWHERE CLASSIFIED: ICD-10-CM

## 2022-10-06 DIAGNOSIS — L92.9 GRANULOMATOUS DISORDER OF THE SKIN AND SUBCUTANEOUS TISSUE, UNSPECIFIED: ICD-10-CM

## 2022-10-06 DIAGNOSIS — L97.112 NON-PRESSURE CHRONIC ULCER OF RIGHT THIGH WITH FAT LAYER EXPOSED: ICD-10-CM

## 2022-10-19 ENCOUNTER — OUTPATIENT (OUTPATIENT)
Dept: OUTPATIENT SERVICES | Facility: HOSPITAL | Age: 77
LOS: 1 days | Discharge: HOME | End: 2022-10-19

## 2022-10-19 DIAGNOSIS — L89.90 PRESSURE ULCER OF UNSPECIFIED SITE, UNSPECIFIED STAGE: ICD-10-CM

## 2022-10-19 DIAGNOSIS — I89.0 LYMPHEDEMA, NOT ELSEWHERE CLASSIFIED: Chronic | ICD-10-CM

## 2022-10-19 DIAGNOSIS — Z96.653 PRESENCE OF ARTIFICIAL KNEE JOINT, BILATERAL: Chronic | ICD-10-CM

## 2022-10-19 PROCEDURE — 99212 OFFICE O/P EST SF 10 MIN: CPT

## 2022-10-20 DIAGNOSIS — L97.112 NON-PRESSURE CHRONIC ULCER OF RIGHT THIGH WITH FAT LAYER EXPOSED: ICD-10-CM

## 2022-10-20 DIAGNOSIS — R60.9 EDEMA, UNSPECIFIED: ICD-10-CM

## 2022-10-20 DIAGNOSIS — Z91.81 HISTORY OF FALLING: ICD-10-CM

## 2022-10-20 DIAGNOSIS — E66.9 OBESITY, UNSPECIFIED: ICD-10-CM

## 2022-10-20 DIAGNOSIS — L92.9 GRANULOMATOUS DISORDER OF THE SKIN AND SUBCUTANEOUS TISSUE, UNSPECIFIED: ICD-10-CM

## 2022-10-20 DIAGNOSIS — I89.0 LYMPHEDEMA, NOT ELSEWHERE CLASSIFIED: ICD-10-CM

## 2022-11-02 ENCOUNTER — OUTPATIENT (OUTPATIENT)
Dept: OUTPATIENT SERVICES | Facility: HOSPITAL | Age: 77
LOS: 1 days | Discharge: HOME | End: 2022-11-02

## 2022-11-02 DIAGNOSIS — Z96.653 PRESENCE OF ARTIFICIAL KNEE JOINT, BILATERAL: Chronic | ICD-10-CM

## 2022-11-02 DIAGNOSIS — I89.0 LYMPHEDEMA, NOT ELSEWHERE CLASSIFIED: Chronic | ICD-10-CM

## 2022-11-02 DIAGNOSIS — L89.90 PRESSURE ULCER OF UNSPECIFIED SITE, UNSPECIFIED STAGE: ICD-10-CM

## 2022-11-02 PROCEDURE — 99212 OFFICE O/P EST SF 10 MIN: CPT

## 2022-11-03 DIAGNOSIS — Z91.81 HISTORY OF FALLING: ICD-10-CM

## 2022-11-03 DIAGNOSIS — L97.112 NON-PRESSURE CHRONIC ULCER OF RIGHT THIGH WITH FAT LAYER EXPOSED: ICD-10-CM

## 2022-11-03 DIAGNOSIS — E66.9 OBESITY, UNSPECIFIED: ICD-10-CM

## 2022-11-03 DIAGNOSIS — I89.0 LYMPHEDEMA, NOT ELSEWHERE CLASSIFIED: ICD-10-CM

## 2022-11-03 DIAGNOSIS — R60.9 EDEMA, UNSPECIFIED: ICD-10-CM

## 2022-11-16 ENCOUNTER — OUTPATIENT (OUTPATIENT)
Dept: OUTPATIENT SERVICES | Facility: HOSPITAL | Age: 77
LOS: 1 days | Discharge: HOME | End: 2022-11-16

## 2022-11-16 DIAGNOSIS — L89.90 PRESSURE ULCER OF UNSPECIFIED SITE, UNSPECIFIED STAGE: ICD-10-CM

## 2022-11-16 DIAGNOSIS — I89.0 LYMPHEDEMA, NOT ELSEWHERE CLASSIFIED: Chronic | ICD-10-CM

## 2022-11-16 DIAGNOSIS — Z96.653 PRESENCE OF ARTIFICIAL KNEE JOINT, BILATERAL: Chronic | ICD-10-CM

## 2022-11-16 PROCEDURE — 99213 OFFICE O/P EST LOW 20 MIN: CPT

## 2022-11-17 DIAGNOSIS — R60.9 EDEMA, UNSPECIFIED: ICD-10-CM

## 2022-11-17 DIAGNOSIS — Z91.81 HISTORY OF FALLING: ICD-10-CM

## 2022-11-17 DIAGNOSIS — I89.0 LYMPHEDEMA, NOT ELSEWHERE CLASSIFIED: ICD-10-CM

## 2022-11-17 DIAGNOSIS — L97.112 NON-PRESSURE CHRONIC ULCER OF RIGHT THIGH WITH FAT LAYER EXPOSED: ICD-10-CM

## 2022-11-17 DIAGNOSIS — E11.9 TYPE 2 DIABETES MELLITUS WITHOUT COMPLICATIONS: ICD-10-CM

## 2022-11-30 ENCOUNTER — OUTPATIENT (OUTPATIENT)
Dept: OUTPATIENT SERVICES | Facility: HOSPITAL | Age: 77
LOS: 1 days | Discharge: HOME | End: 2022-11-30

## 2022-11-30 DIAGNOSIS — I89.0 LYMPHEDEMA, NOT ELSEWHERE CLASSIFIED: Chronic | ICD-10-CM

## 2022-11-30 DIAGNOSIS — L89.90 PRESSURE ULCER OF UNSPECIFIED SITE, UNSPECIFIED STAGE: ICD-10-CM

## 2022-11-30 DIAGNOSIS — Z96.653 PRESENCE OF ARTIFICIAL KNEE JOINT, BILATERAL: Chronic | ICD-10-CM

## 2022-11-30 PROCEDURE — 99213 OFFICE O/P EST LOW 20 MIN: CPT

## 2022-12-01 DIAGNOSIS — L97.112 NON-PRESSURE CHRONIC ULCER OF RIGHT THIGH WITH FAT LAYER EXPOSED: ICD-10-CM

## 2022-12-01 DIAGNOSIS — R60.9 EDEMA, UNSPECIFIED: ICD-10-CM

## 2022-12-01 DIAGNOSIS — E66.9 OBESITY, UNSPECIFIED: ICD-10-CM

## 2022-12-01 DIAGNOSIS — Z91.81 HISTORY OF FALLING: ICD-10-CM

## 2022-12-01 DIAGNOSIS — I89.0 LYMPHEDEMA, NOT ELSEWHERE CLASSIFIED: ICD-10-CM

## 2022-12-21 ENCOUNTER — OUTPATIENT (OUTPATIENT)
Dept: OUTPATIENT SERVICES | Facility: HOSPITAL | Age: 77
LOS: 1 days | Discharge: HOME | End: 2022-12-21

## 2022-12-21 DIAGNOSIS — L89.90 PRESSURE ULCER OF UNSPECIFIED SITE, UNSPECIFIED STAGE: ICD-10-CM

## 2022-12-21 DIAGNOSIS — I89.0 LYMPHEDEMA, NOT ELSEWHERE CLASSIFIED: Chronic | ICD-10-CM

## 2022-12-21 DIAGNOSIS — Z96.653 PRESENCE OF ARTIFICIAL KNEE JOINT, BILATERAL: Chronic | ICD-10-CM

## 2022-12-21 PROCEDURE — 99212 OFFICE O/P EST SF 10 MIN: CPT

## 2022-12-27 DIAGNOSIS — Z91.81 HISTORY OF FALLING: ICD-10-CM

## 2022-12-27 DIAGNOSIS — R60.9 EDEMA, UNSPECIFIED: ICD-10-CM

## 2022-12-27 DIAGNOSIS — E66.9 OBESITY, UNSPECIFIED: ICD-10-CM

## 2022-12-27 DIAGNOSIS — I89.0 LYMPHEDEMA, NOT ELSEWHERE CLASSIFIED: ICD-10-CM

## 2022-12-27 DIAGNOSIS — L97.112 NON-PRESSURE CHRONIC ULCER OF RIGHT THIGH WITH FAT LAYER EXPOSED: ICD-10-CM

## 2023-01-04 ENCOUNTER — OUTPATIENT (OUTPATIENT)
Dept: OUTPATIENT SERVICES | Facility: HOSPITAL | Age: 78
LOS: 1 days | Discharge: HOME | End: 2023-01-04
Payer: MEDICARE

## 2023-01-04 DIAGNOSIS — L89.90 PRESSURE ULCER OF UNSPECIFIED SITE, UNSPECIFIED STAGE: ICD-10-CM

## 2023-01-04 DIAGNOSIS — Z96.653 PRESENCE OF ARTIFICIAL KNEE JOINT, BILATERAL: Chronic | ICD-10-CM

## 2023-01-04 DIAGNOSIS — I89.0 LYMPHEDEMA, NOT ELSEWHERE CLASSIFIED: Chronic | ICD-10-CM

## 2023-01-04 PROCEDURE — 17250 CHEM CAUT OF GRANLTJ TISSUE: CPT | Mod: 59,RT

## 2023-01-04 PROCEDURE — 99212 OFFICE O/P EST SF 10 MIN: CPT | Mod: 25

## 2023-01-09 DIAGNOSIS — I89.0 LYMPHEDEMA, NOT ELSEWHERE CLASSIFIED: ICD-10-CM

## 2023-01-09 DIAGNOSIS — L97.112 NON-PRESSURE CHRONIC ULCER OF RIGHT THIGH WITH FAT LAYER EXPOSED: ICD-10-CM

## 2023-01-09 DIAGNOSIS — E66.9 OBESITY, UNSPECIFIED: ICD-10-CM

## 2023-01-09 DIAGNOSIS — Z91.81 HISTORY OF FALLING: ICD-10-CM

## 2023-01-09 DIAGNOSIS — L92.9 GRANULOMATOUS DISORDER OF THE SKIN AND SUBCUTANEOUS TISSUE, UNSPECIFIED: ICD-10-CM

## 2023-01-09 DIAGNOSIS — R60.9 EDEMA, UNSPECIFIED: ICD-10-CM

## 2023-01-13 NOTE — PHYSICAL THERAPY INITIAL EVALUATION ADULT - KINESTHESIA, RUE, OT EVAL
ACUTE/SICK VISIT:    ASSESSMENT/PLAN:  Brock was seen today for vomiting.    Diagnoses and all orders for this visit:    Vomiting in pediatric patient  - COVID/FLU/RSV PANEL- negative  - Discussed supportive measures such as rest and hydration. Encourage smaller sips of liquids in order to meet hydration goal of 13.9 ounces per 8 hour period.  - If fever should develop; Ibuprofen/Tylenol as needed for fever  - If hematemesis occurs, unable to keep down any liquid, signs of dehydration (not meeting fluid goal and less than 1 wet diaper every 8 hours), should [facility].    If no improvement in 2-3 days or worsening or new symptoms develop please contact office. Call with questions or concerns.    JAYSON Barba-S2  1/13/2023      Teaching Attestation:  I have personally interviewed and examined the patient via face-to-face. I confirmed the findings listed below:    Vomiting in pediatric patient  (primary encounter diagnosis)     This was discussed with the student and I agree with the assessment and plan as documented. The plan of care was discussed with the patient and family.    Patient appears well hydrated on exam and tolerating po water intake in clinic.   Cele Garcia PA-C, CAQ-Peds      CC: Vomiting      SUBJECTIVE:  HPI:  Brock Casarez is a 29 month old male who presents with Mother.     Since last night, Brock Casarez has had vomiting. Vomiting began at 7:30 pm last night.  No fevers or diarrhea. Slight congestion. Mom says he has vomited about 10 times since onset. Emesis is nonbloody, nonbilious. Last time was in office today at 0830 after apple juice. He has been sipping on apple juice and water.  He denies a history of any other symptoms.    Appetite has been decreased.  Brock Casarez has been up all night.  Activity is unchanged.    Social Hx:  /School: No: none  Exposure to someone at /school with similar symptoms: No  Exposure to someone else at home with similar  symptoms:  Yes, mom sore throat     REVIEW OF SYSTEMS see HPI; otherwise denies HEENT, NECK, RESP, CARDIAC, GI, , NEURO or PSYCH Sx    Current Outpatient Medications   Medication Sig Dispense Refill   • cetirizine (ZyrTEC [facility] Allergy) 5 MG/5ML solution Take 2.5 mLs by mouth daily as needed (allergy symptoms). 118 mL 1   • hydroCORTisone (CORTIZONE) 2.5 % ointment Apply 1 application topically 2 times daily. Use until rash resolves and then as needed. 28.35 g 1     No current facility-administered medications for this visit.     ALLERGIES:  No Known Allergies    OBJECTIVE:  PHYSICAL EXAM:  Visit Vitals  Pulse 117   Temp 97.3 °F (36.3 °C) (Temporal)   Resp 24   Wt 16.2 kg (35 lb 11.4 oz)   SpO2 100%       General appearance: alert, in no acute distress. Active and interactive. Non-toxic appearing. Afebrile.   HEENT:   eyes:  normal without erythema or drainage   ears:  Right TM:  normal             Left TM:  normal   nose: normal; no rhinorrhea   Oropharynx: clear, moist mucous membranes, without erythema, edema, exudate, or lesions  Neck: no lymphadenopathy   Lungs: clear to auscultation bilaterally. No wheezes or rhonchi. Normal work of breathing.   Heart: RRR. No murmurs, clicks, or gallops  Abdomen: BS normoactive x 4 quadrants, soft, non-distended, no masses. No guarding or rebound tenderness.   Skin:  no rashes or lesions      Cele Garcia PA-C, CAQ-Peds       within normal limits

## 2023-02-01 ENCOUNTER — OUTPATIENT (OUTPATIENT)
Dept: OUTPATIENT SERVICES | Facility: HOSPITAL | Age: 78
LOS: 1 days | Discharge: HOME | End: 2023-02-01
Payer: MEDICARE

## 2023-02-01 DIAGNOSIS — Z96.653 PRESENCE OF ARTIFICIAL KNEE JOINT, BILATERAL: Chronic | ICD-10-CM

## 2023-02-01 DIAGNOSIS — L89.90 PRESSURE ULCER OF UNSPECIFIED SITE, UNSPECIFIED STAGE: ICD-10-CM

## 2023-02-01 DIAGNOSIS — I89.0 LYMPHEDEMA, NOT ELSEWHERE CLASSIFIED: Chronic | ICD-10-CM

## 2023-02-01 PROCEDURE — 99213 OFFICE O/P EST LOW 20 MIN: CPT

## 2023-02-02 DIAGNOSIS — L92.9 GRANULOMATOUS DISORDER OF THE SKIN AND SUBCUTANEOUS TISSUE, UNSPECIFIED: ICD-10-CM

## 2023-02-02 DIAGNOSIS — I89.0 LYMPHEDEMA, NOT ELSEWHERE CLASSIFIED: ICD-10-CM

## 2023-02-02 DIAGNOSIS — Z91.81 HISTORY OF FALLING: ICD-10-CM

## 2023-02-02 DIAGNOSIS — R60.9 EDEMA, UNSPECIFIED: ICD-10-CM

## 2023-02-02 DIAGNOSIS — E66.9 OBESITY, UNSPECIFIED: ICD-10-CM

## 2023-02-02 DIAGNOSIS — L97.112 NON-PRESSURE CHRONIC ULCER OF RIGHT THIGH WITH FAT LAYER EXPOSED: ICD-10-CM

## 2023-02-10 ENCOUNTER — EMERGENCY (EMERGENCY)
Facility: HOSPITAL | Age: 78
LOS: 0 days | Discharge: HOME | End: 2023-02-10
Attending: EMERGENCY MEDICINE
Payer: MEDICARE

## 2023-02-10 VITALS
OXYGEN SATURATION: 97 % | HEIGHT: 63 IN | HEART RATE: 86 BPM | SYSTOLIC BLOOD PRESSURE: 124 MMHG | RESPIRATION RATE: 14 BRPM | DIASTOLIC BLOOD PRESSURE: 72 MMHG | TEMPERATURE: 98 F | WEIGHT: 293 LBS

## 2023-02-10 DIAGNOSIS — M54.31 SCIATICA, RIGHT SIDE: ICD-10-CM

## 2023-02-10 DIAGNOSIS — I89.0 LYMPHEDEMA, NOT ELSEWHERE CLASSIFIED: Chronic | ICD-10-CM

## 2023-02-10 DIAGNOSIS — E66.01 MORBID (SEVERE) OBESITY DUE TO EXCESS CALORIES: ICD-10-CM

## 2023-02-10 DIAGNOSIS — Z96.653 PRESENCE OF ARTIFICIAL KNEE JOINT, BILATERAL: ICD-10-CM

## 2023-02-10 DIAGNOSIS — Z96.653 PRESENCE OF ARTIFICIAL KNEE JOINT, BILATERAL: Chronic | ICD-10-CM

## 2023-02-10 DIAGNOSIS — M54.32 SCIATICA, LEFT SIDE: ICD-10-CM

## 2023-02-10 DIAGNOSIS — M25.551 PAIN IN RIGHT HIP: ICD-10-CM

## 2023-02-10 DIAGNOSIS — M25.552 PAIN IN LEFT HIP: ICD-10-CM

## 2023-02-10 DIAGNOSIS — I10 ESSENTIAL (PRIMARY) HYPERTENSION: ICD-10-CM

## 2023-02-10 DIAGNOSIS — E78.5 HYPERLIPIDEMIA, UNSPECIFIED: ICD-10-CM

## 2023-02-10 PROCEDURE — 99284 EMERGENCY DEPT VISIT MOD MDM: CPT

## 2023-02-10 PROCEDURE — 73521 X-RAY EXAM HIPS BI 2 VIEWS: CPT | Mod: 26

## 2023-02-10 RX ORDER — TRAMADOL HYDROCHLORIDE 50 MG/1
50 TABLET ORAL ONCE
Refills: 0 | Status: DISCONTINUED | OUTPATIENT
Start: 2023-02-10 | End: 2023-02-10

## 2023-02-10 RX ORDER — METHOCARBAMOL 500 MG/1
2 TABLET, FILM COATED ORAL
Qty: 30 | Refills: 0
Start: 2023-02-10 | End: 2023-02-14

## 2023-02-10 RX ORDER — TRAMADOL HYDROCHLORIDE 50 MG/1
1 TABLET ORAL
Qty: 12 | Refills: 0
Start: 2023-02-10 | End: 2023-02-12

## 2023-02-10 RX ORDER — KETOROLAC TROMETHAMINE 30 MG/ML
15 SYRINGE (ML) INJECTION ONCE
Refills: 0 | Status: DISCONTINUED | OUTPATIENT
Start: 2023-02-10 | End: 2023-02-10

## 2023-02-10 RX ORDER — IBUPROFEN 200 MG
1 TABLET ORAL
Qty: 20 | Refills: 0
Start: 2023-02-10 | End: 2023-02-14

## 2023-02-10 RX ORDER — METHOCARBAMOL 500 MG/1
750 TABLET, FILM COATED ORAL ONCE
Refills: 0 | Status: COMPLETED | OUTPATIENT
Start: 2023-02-10 | End: 2023-02-10

## 2023-02-10 RX ADMIN — Medication 15 MILLIGRAM(S): at 18:54

## 2023-02-10 RX ADMIN — TRAMADOL HYDROCHLORIDE 50 MILLIGRAM(S): 50 TABLET ORAL at 18:54

## 2023-02-10 RX ADMIN — METHOCARBAMOL 750 MILLIGRAM(S): 500 TABLET, FILM COATED ORAL at 18:54

## 2023-02-10 NOTE — ED ADULT NURSE NOTE - NS ED NURSE LEVEL OF CONSCIOUSNESS ORIENTATION
Oriented - self; Oriented - place; Oriented - time

## 2023-02-10 NOTE — ED PROVIDER NOTE - OBJECTIVE STATEMENT
77 year old female with PMH of HTN, HLD, lymphedema hx otherwise presenting to ED due to bilateral hip pain - states has been having some pain radiating from bilateral buttocks going down thighs for past day - limiting mobility due to pain. States no recent trauma though had a fall in which she sat down on a chair November last year and did not have any imaging at the time. No back pain or additional leg swelling noted

## 2023-02-10 NOTE — ED PROVIDER NOTE - CLINICAL SUMMARY MEDICAL DECISION MAKING FREE TEXT BOX
pt with bilateral hip pain radiating down thighs - congruent with sciatica and on exam, noted hx of lymphedema of legs - no new leg swelling - otherwise pain improved with tramadol/toradol/robaxin - will dc with meds and PMD follow up.

## 2023-02-10 NOTE — ED ADULT NURSE NOTE - NSIMPLEMENTINTERV_GEN_ALL_ED
Implemented All Fall Risk Interventions:  [place] to call system. Call bell, personal items and telephone within reach. Instruct patient to call for assistance. Room bathroom lighting operational. Non-slip footwear when patient is off stretcher. Physically safe environment: no spills, clutter or unnecessary equipment. Stretcher in lowest position, wheels locked, appropriate side rails in place. Provide visual cue, wrist band, yellow gown, etc. Monitor gait and stability. Monitor for mental status changes and reorient to person, place, and time. Review medications for side effects contributing to fall risk. Reinforce activity limits and safety measures with patient and family.

## 2023-02-10 NOTE — ED PROVIDER NOTE - NSICDXPASTMEDICALHX_GEN_ALL_CORE_FT
PAST MEDICAL HISTORY:  Essential hypertension     Hypertension     Lymphedema     Morbid obesity due to excess calories     Other iron deficiency anemia

## 2023-02-10 NOTE — ED ADULT TRIAGE NOTE - CHIEF COMPLAINT QUOTE
Came in for bilateral hip pain x2 months, started to get worse this week. Been taking tylenol but no relief. Last dose taken at 12 noon. Patient reports she had fallen from chair in November 2022 but has not seen any doctor for that. Denies head trauma or LOC that time. Denies taking blood thinners. PMH HTN.

## 2023-02-10 NOTE — ED PROVIDER NOTE - PATIENT PORTAL LINK FT
You can access the FollowMyHealth Patient Portal offered by [facility] by registering at the following website: [email or web address]. By joining [employer]’s FollowMyHealth portal, you will also be able to view your health information using other applications (apps) compatible with our system.

## 2023-02-10 NOTE — ED ADULT NURSE NOTE - OBJECTIVE STATEMENT
as per patient " In November /2022, went to seat in a chair and the chair broke apart, I ended up on the fall with the cushion still underneath me. The pain started in December to my both hips but today the pain is so bad, I having trouble walking"

## 2023-02-15 ENCOUNTER — OUTPATIENT (OUTPATIENT)
Dept: OUTPATIENT SERVICES | Facility: HOSPITAL | Age: 78
LOS: 1 days | Discharge: HOME | End: 2023-02-15
Payer: MEDICARE

## 2023-02-15 DIAGNOSIS — I89.0 LYMPHEDEMA, NOT ELSEWHERE CLASSIFIED: Chronic | ICD-10-CM

## 2023-02-15 DIAGNOSIS — L89.90 PRESSURE ULCER OF UNSPECIFIED SITE, UNSPECIFIED STAGE: ICD-10-CM

## 2023-02-15 DIAGNOSIS — Z96.653 PRESENCE OF ARTIFICIAL KNEE JOINT, BILATERAL: Chronic | ICD-10-CM

## 2023-02-15 PROCEDURE — 99213 OFFICE O/P EST LOW 20 MIN: CPT

## 2023-02-16 DIAGNOSIS — L97.211 NON-PRESSURE CHRONIC ULCER OF RIGHT CALF LIMITED TO BREAKDOWN OF SKIN: ICD-10-CM

## 2023-02-16 DIAGNOSIS — I89.0 LYMPHEDEMA, NOT ELSEWHERE CLASSIFIED: ICD-10-CM

## 2023-02-16 DIAGNOSIS — E66.9 OBESITY, UNSPECIFIED: ICD-10-CM

## 2023-02-16 DIAGNOSIS — L92.9 GRANULOMATOUS DISORDER OF THE SKIN AND SUBCUTANEOUS TISSUE, UNSPECIFIED: ICD-10-CM

## 2023-02-16 DIAGNOSIS — R60.9 EDEMA, UNSPECIFIED: ICD-10-CM

## 2023-02-16 DIAGNOSIS — Z91.81 HISTORY OF FALLING: ICD-10-CM

## 2023-02-28 NOTE — PATIENT PROFILE ADULT - BRAND OF COVID-19 VACCINATION
Pharmacy is calling regarding [place] [facility] prescription:    Patient asking to speak with nurse regarding how her procedure went on 2/14.     Medication: oxyCODONE-acetaminophen (PERCOCET)  Dose: 5-325 MG  Quantity: 20 tablet   Dosing Instructions: Take 1-2 tablets by mouth daily as needed for Pain.   Refills requested: N/A    Following information being requested:  Refill  Urgency: Routine    Please call pharmacy back at   [id number] [place], [place] - [address] [address]  Phone: [phone] Fax: [phone]      Pharmacy was advised that the Clinic will call them back within 24-72 hours, unless this is a STAT request.       Chirag

## 2023-03-01 ENCOUNTER — OUTPATIENT (OUTPATIENT)
Dept: OUTPATIENT SERVICES | Facility: HOSPITAL | Age: 78
LOS: 1 days | Discharge: HOME | End: 2023-03-01
Payer: MEDICARE

## 2023-03-01 DIAGNOSIS — L89.90 PRESSURE ULCER OF UNSPECIFIED SITE, UNSPECIFIED STAGE: ICD-10-CM

## 2023-03-01 DIAGNOSIS — I89.0 LYMPHEDEMA, NOT ELSEWHERE CLASSIFIED: Chronic | ICD-10-CM

## 2023-03-01 DIAGNOSIS — Z96.653 PRESENCE OF ARTIFICIAL KNEE JOINT, BILATERAL: Chronic | ICD-10-CM

## 2023-03-01 PROCEDURE — 97597 DBRDMT OPN WND 1ST 20 CM/<: CPT | Mod: 59,RT

## 2023-03-01 PROCEDURE — 99212 OFFICE O/P EST SF 10 MIN: CPT | Mod: 25

## 2023-03-02 DIAGNOSIS — E66.9 OBESITY, UNSPECIFIED: ICD-10-CM

## 2023-03-02 DIAGNOSIS — R60.9 EDEMA, UNSPECIFIED: ICD-10-CM

## 2023-03-02 DIAGNOSIS — L97.112 NON-PRESSURE CHRONIC ULCER OF RIGHT THIGH WITH FAT LAYER EXPOSED: ICD-10-CM

## 2023-03-02 DIAGNOSIS — Z91.81 HISTORY OF FALLING: ICD-10-CM

## 2023-03-02 DIAGNOSIS — I89.0 LYMPHEDEMA, NOT ELSEWHERE CLASSIFIED: ICD-10-CM

## 2023-03-02 DIAGNOSIS — L92.9 GRANULOMATOUS DISORDER OF THE SKIN AND SUBCUTANEOUS TISSUE, UNSPECIFIED: ICD-10-CM

## 2023-03-29 ENCOUNTER — OUTPATIENT (OUTPATIENT)
Dept: OUTPATIENT SERVICES | Facility: HOSPITAL | Age: 78
LOS: 1 days | Discharge: HOME | End: 2023-03-29
Payer: MEDICARE

## 2023-03-29 DIAGNOSIS — Z96.653 PRESENCE OF ARTIFICIAL KNEE JOINT, BILATERAL: Chronic | ICD-10-CM

## 2023-03-29 DIAGNOSIS — L89.90 PRESSURE ULCER OF UNSPECIFIED SITE, UNSPECIFIED STAGE: ICD-10-CM

## 2023-03-29 DIAGNOSIS — I89.0 LYMPHEDEMA, NOT ELSEWHERE CLASSIFIED: Chronic | ICD-10-CM

## 2023-03-29 PROCEDURE — 99213 OFFICE O/P EST LOW 20 MIN: CPT

## 2023-03-30 DIAGNOSIS — Z91.81 HISTORY OF FALLING: ICD-10-CM

## 2023-03-30 DIAGNOSIS — L97.112 NON-PRESSURE CHRONIC ULCER OF RIGHT THIGH WITH FAT LAYER EXPOSED: ICD-10-CM

## 2023-03-30 DIAGNOSIS — E66.9 OBESITY, UNSPECIFIED: ICD-10-CM

## 2023-03-30 DIAGNOSIS — I89.0 LYMPHEDEMA, NOT ELSEWHERE CLASSIFIED: ICD-10-CM

## 2023-03-30 DIAGNOSIS — L92.9 GRANULOMATOUS DISORDER OF THE SKIN AND SUBCUTANEOUS TISSUE, UNSPECIFIED: ICD-10-CM

## 2023-03-30 DIAGNOSIS — R60.9 EDEMA, UNSPECIFIED: ICD-10-CM

## 2023-04-04 NOTE — DISCHARGE NOTE PROVIDER - NSFOLLOWUPCLINICSTOKEN_GEN_ALL_ED_FT
Case Management Discharge Planning Note    Patient name Yair Beck  [place] [address]  [phone] MRN [id number]  : 1962 Date 2023       Current Admission Date: 3/23/2023  Current Admission Diagnosis:Small bowel obstruction [facility])   Patient Active Problem List    Diagnosis Date Noted   • Witnessed seizure-like activity ([facility] [facility] ) 2023   • Fall 2023   • Small bowel obstruction ([facility] ) 2023   • Memory loss 2023   • Hemiplegia, post-stroke ([facility] ) 2022   • Anemia 2022   • Status post total knee replacement, left 2022   • Seizure-like activity ([facility] [facility] ) 2022   • Hypokalemia 2022   • Constipation 03/15/2022   • S/P total knee arthroplasty, right 03/10/2022   • CVA (cerebral vascular accident) ([facility] ) 2022   • Preoperative evaluation to rule out surgical contraindication 2022   • Leukopenia 2021   • Mixed hyperlipidemia 2021   • Medical clearance for psychiatric admission 2021   • History of CVA (cerebrovascular accident) 2021   • Encephalopathy 2021   • Nausea 2021   • Multiple closed fractures of metatarsal bone of right foot 2021   • Chronic back pain 2021   • Arthritis of right knee 10/06/2020   • Dysphagia 2020   • Ambulatory dysfunction 2020   • Status post insertion of spinal cord stimulator 2020   • Superficial bacterial infection of skin 2020   • Scar of back 2020   • Impaired skin integrity associated with surgical incision 2020   • Rash 2020   • Primary osteoarthritis of both knees 2020   • Patellofemoral disorder of both knees 2020   • Tardive dyskinesia 2020   • MIMI (obstructive sleep apnea)    • Complaint related to dreams 2020   • Family history of colorectal cancer 2019   • Encounter for long-term use of opiate analgesic 2019   • Post laminectomy syndrome 10/07/2019   • Lumbar disc disease with radiculopathy 02/02/2018   • Spinal stenosis of lumbar region with neurogenic claudication 02/02/2018   • Lumbar radiculopathy 12/08/2017   • Bilateral hip pain 06/19/2017   • Primary localized osteoarthritis of both knees 06/16/2017   • Chronic pain disorder 02/28/2017   • Opioid dependence ([facility] ) 02/28/2017   • Sacroiliitis ([facility] ) 02/28/2017   • Lumbar spondylosis 10/31/2016   • Osteoarthritis of both hips 10/31/2016   • Generalized anxiety disorder 10/26/2016   • Major depressive disorder, recurrent episode, moderate degree ([facility] ) 09/08/2016   • Right knee pain 06/06/2016   • Recent unexplained weight loss 06/06/2016   • Fatigue 10/26/2015   • Bipolar disorder ([facility] ) 06/18/2015   • Panic disorder without agoraphobia 06/18/2015   • Post traumatic stress disorder 06/18/2015   • Left hip pain 07/25/2014   • Intractable low back pain 06/16/2014   • Tremor 06/12/2014   • Migraine 05/27/2014   • Esophageal reflux 05/01/2014   • Cognitive disorder 04/18/2014   • Female pelvic pain 10/30/2013   • Pain in joint, shoulder region 10/28/2013   • Overactive bladder 09/26/2013   • Osteoarthritis of knee 02/20/2013   • Insomnia 01/31/2013   • History of hypokalemia 01/03/2013   • Urinary incontinence 09/24/2012   • Vitamin D deficiency 09/18/2012   • Fibromyalgia 09/14/2012   • Essential hypertension 09/14/2012      LOS (days): 11  Geometric Mean LOS (GMLOS) (days): 3 00  Days to GMLOS:-8 6     OBJECTIVE:  Risk of Unplanned Readmission Score: 28 37         Current admission status: Inpatient   Preferred Pharmacy:   [address] [place] [address]  Phone: [phone] Fax: [phone]    [id number] [address]  [address] [facility] [address]  Phone: [phone] Fax: [place], [address] [place] [address]  [address]  [address] [place], [place] [address]  Phone: [phone] Fax: [phone]    [address] [facility],[address], [address]  [facility]  Phone: [phone] Fax: [address] [facility],[address], [address] [address] [facility] [address]  Phone: [phone] Fax: [phone] [address] [facility] [place] [address] ,  [address] ,  [place] [address] [address] [facility]  Phone: [phone] Fax: [phone]    Primary Care Provider: Roya Solomon DO    Primary Insurance: [id number] [facility] [facility]  Secondary Insurance: [id number] [facility] DETAILS:         Other Referral/Resources/Interventions Provided:  Interventions: Kimmie Burleson    Treatment Team [facility]ation[facility]  Discharge Destination Plan[de-identified] Home with [place] at Discharge : Family, Wheelchair ambrosio     Additional Comments: PT OT recommending home with home health rehab  CM sent referrals in [place] for home care PT OT and nursing  SLIM provided patient's daughter that update  CM will continue to follow for accepting home health agencies and continued discharge planning  [id number]:;

## 2023-04-19 ENCOUNTER — OUTPATIENT (OUTPATIENT)
Dept: OUTPATIENT SERVICES | Facility: HOSPITAL | Age: 78
LOS: 1 days | Discharge: HOME | End: 2023-04-19
Payer: MEDICARE

## 2023-04-19 DIAGNOSIS — Z96.653 PRESENCE OF ARTIFICIAL KNEE JOINT, BILATERAL: Chronic | ICD-10-CM

## 2023-04-19 DIAGNOSIS — I89.0 LYMPHEDEMA, NOT ELSEWHERE CLASSIFIED: Chronic | ICD-10-CM

## 2023-04-19 DIAGNOSIS — L89.90 PRESSURE ULCER OF UNSPECIFIED SITE, UNSPECIFIED STAGE: ICD-10-CM

## 2023-04-19 PROCEDURE — 99213 OFFICE O/P EST LOW 20 MIN: CPT

## 2023-04-20 DIAGNOSIS — Z91.81 HISTORY OF FALLING: ICD-10-CM

## 2023-04-20 DIAGNOSIS — L97.112 NON-PRESSURE CHRONIC ULCER OF RIGHT THIGH WITH FAT LAYER EXPOSED: ICD-10-CM

## 2023-04-20 DIAGNOSIS — L92.9 GRANULOMATOUS DISORDER OF THE SKIN AND SUBCUTANEOUS TISSUE, UNSPECIFIED: ICD-10-CM

## 2023-04-20 DIAGNOSIS — R60.9 EDEMA, UNSPECIFIED: ICD-10-CM

## 2023-04-20 DIAGNOSIS — I89.0 LYMPHEDEMA, NOT ELSEWHERE CLASSIFIED: ICD-10-CM

## 2023-04-20 DIAGNOSIS — E66.01 MORBID (SEVERE) OBESITY DUE TO EXCESS CALORIES: ICD-10-CM

## 2023-05-17 ENCOUNTER — OUTPATIENT (OUTPATIENT)
Dept: OUTPATIENT SERVICES | Facility: HOSPITAL | Age: 78
LOS: 1 days | Discharge: HOME | End: 2023-05-17
Payer: MEDICARE

## 2023-05-17 DIAGNOSIS — I89.0 LYMPHEDEMA, NOT ELSEWHERE CLASSIFIED: Chronic | ICD-10-CM

## 2023-05-17 DIAGNOSIS — L89.90 PRESSURE ULCER OF UNSPECIFIED SITE, UNSPECIFIED STAGE: ICD-10-CM

## 2023-05-17 DIAGNOSIS — Z96.653 PRESENCE OF ARTIFICIAL KNEE JOINT, BILATERAL: Chronic | ICD-10-CM

## 2023-05-17 PROCEDURE — 99213 OFFICE O/P EST LOW 20 MIN: CPT

## 2023-05-18 DIAGNOSIS — R60.9 EDEMA, UNSPECIFIED: ICD-10-CM

## 2023-05-18 DIAGNOSIS — Z91.81 HISTORY OF FALLING: ICD-10-CM

## 2023-05-18 DIAGNOSIS — L89.892 PRESSURE ULCER OF OTHER SITE, STAGE 2: ICD-10-CM

## 2023-05-18 DIAGNOSIS — I89.0 LYMPHEDEMA, NOT ELSEWHERE CLASSIFIED: ICD-10-CM

## 2023-05-18 DIAGNOSIS — E66.01 MORBID (SEVERE) OBESITY DUE TO EXCESS CALORIES: ICD-10-CM

## 2023-05-18 DIAGNOSIS — L97.112 NON-PRESSURE CHRONIC ULCER OF RIGHT THIGH WITH FAT LAYER EXPOSED: ICD-10-CM

## 2023-05-31 ENCOUNTER — OUTPATIENT (OUTPATIENT)
Dept: OUTPATIENT SERVICES | Facility: HOSPITAL | Age: 78
LOS: 1 days | Discharge: HOME | End: 2023-05-31
Payer: MEDICARE

## 2023-05-31 DIAGNOSIS — I89.0 LYMPHEDEMA, NOT ELSEWHERE CLASSIFIED: Chronic | ICD-10-CM

## 2023-05-31 DIAGNOSIS — Z96.653 PRESENCE OF ARTIFICIAL KNEE JOINT, BILATERAL: Chronic | ICD-10-CM

## 2023-05-31 DIAGNOSIS — L89.90 PRESSURE ULCER OF UNSPECIFIED SITE, UNSPECIFIED STAGE: ICD-10-CM

## 2023-05-31 PROCEDURE — 99213 OFFICE O/P EST LOW 20 MIN: CPT

## 2023-06-14 ENCOUNTER — OUTPATIENT (OUTPATIENT)
Dept: OUTPATIENT SERVICES | Facility: HOSPITAL | Age: 78
LOS: 1 days | Discharge: HOME | End: 2023-06-14
Payer: MEDICARE

## 2023-06-14 DIAGNOSIS — I89.0 LYMPHEDEMA, NOT ELSEWHERE CLASSIFIED: ICD-10-CM

## 2023-06-14 DIAGNOSIS — I89.0 LYMPHEDEMA, NOT ELSEWHERE CLASSIFIED: Chronic | ICD-10-CM

## 2023-06-14 DIAGNOSIS — E66.01 MORBID (SEVERE) OBESITY DUE TO EXCESS CALORIES: ICD-10-CM

## 2023-06-14 DIAGNOSIS — R60.9 EDEMA, UNSPECIFIED: ICD-10-CM

## 2023-06-14 DIAGNOSIS — L97.112 NON-PRESSURE CHRONIC ULCER OF RIGHT THIGH WITH FAT LAYER EXPOSED: ICD-10-CM

## 2023-06-14 DIAGNOSIS — Z96.653 PRESENCE OF ARTIFICIAL KNEE JOINT, BILATERAL: Chronic | ICD-10-CM

## 2023-06-14 DIAGNOSIS — Z91.81 HISTORY OF FALLING: ICD-10-CM

## 2023-06-14 DIAGNOSIS — L89.90 PRESSURE ULCER OF UNSPECIFIED SITE, UNSPECIFIED STAGE: ICD-10-CM

## 2023-06-14 DIAGNOSIS — L89.892 PRESSURE ULCER OF OTHER SITE, STAGE 2: ICD-10-CM

## 2023-06-14 PROCEDURE — 99213 OFFICE O/P EST LOW 20 MIN: CPT

## 2023-06-15 DIAGNOSIS — I89.0 LYMPHEDEMA, NOT ELSEWHERE CLASSIFIED: ICD-10-CM

## 2023-06-15 DIAGNOSIS — L89.892 PRESSURE ULCER OF OTHER SITE, STAGE 2: ICD-10-CM

## 2023-06-15 DIAGNOSIS — E66.01 MORBID (SEVERE) OBESITY DUE TO EXCESS CALORIES: ICD-10-CM

## 2023-06-15 DIAGNOSIS — R60.9 EDEMA, UNSPECIFIED: ICD-10-CM

## 2023-06-15 DIAGNOSIS — L97.112 NON-PRESSURE CHRONIC ULCER OF RIGHT THIGH WITH FAT LAYER EXPOSED: ICD-10-CM

## 2023-06-15 DIAGNOSIS — Z91.81 HISTORY OF FALLING: ICD-10-CM

## 2023-07-12 ENCOUNTER — OUTPATIENT (OUTPATIENT)
Dept: OUTPATIENT SERVICES | Facility: HOSPITAL | Age: 78
LOS: 1 days | Discharge: HOME | End: 2023-07-12
Payer: MEDICARE

## 2023-07-12 DIAGNOSIS — L89.90 PRESSURE ULCER OF UNSPECIFIED SITE, UNSPECIFIED STAGE: ICD-10-CM

## 2023-07-12 DIAGNOSIS — Z96.653 PRESENCE OF ARTIFICIAL KNEE JOINT, BILATERAL: Chronic | ICD-10-CM

## 2023-07-12 DIAGNOSIS — I89.0 LYMPHEDEMA, NOT ELSEWHERE CLASSIFIED: Chronic | ICD-10-CM

## 2023-07-12 PROCEDURE — 99213 OFFICE O/P EST LOW 20 MIN: CPT

## 2023-07-13 DIAGNOSIS — R60.9 EDEMA, UNSPECIFIED: ICD-10-CM

## 2023-07-13 DIAGNOSIS — Z91.81 HISTORY OF FALLING: ICD-10-CM

## 2023-07-13 DIAGNOSIS — L97.112 NON-PRESSURE CHRONIC ULCER OF RIGHT THIGH WITH FAT LAYER EXPOSED: ICD-10-CM

## 2023-07-13 DIAGNOSIS — E66.01 MORBID (SEVERE) OBESITY DUE TO EXCESS CALORIES: ICD-10-CM

## 2023-07-13 DIAGNOSIS — L89.892 PRESSURE ULCER OF OTHER SITE, STAGE 2: ICD-10-CM

## 2023-07-13 DIAGNOSIS — I89.0 LYMPHEDEMA, NOT ELSEWHERE CLASSIFIED: ICD-10-CM

## 2023-07-17 NOTE — PATIENT PROFILE ADULT - NSTRANSFERBELONGINGSRESP_GEN_A_NUR
yes Detail Level: Detailed Quality 110: Preventive Care And Screening: Influenza Immunization: Influenza immunization was not ordered or administered, reason not given

## 2023-07-19 ENCOUNTER — OUTPATIENT (OUTPATIENT)
Dept: OUTPATIENT SERVICES | Facility: HOSPITAL | Age: 78
LOS: 1 days | Discharge: HOME | End: 2023-07-19
Payer: MEDICARE

## 2023-07-19 DIAGNOSIS — Z96.653 PRESENCE OF ARTIFICIAL KNEE JOINT, BILATERAL: Chronic | ICD-10-CM

## 2023-07-19 DIAGNOSIS — I89.0 LYMPHEDEMA, NOT ELSEWHERE CLASSIFIED: Chronic | ICD-10-CM

## 2023-07-19 DIAGNOSIS — L89.90 PRESSURE ULCER OF UNSPECIFIED SITE, UNSPECIFIED STAGE: ICD-10-CM

## 2023-07-19 PROCEDURE — 99212 OFFICE O/P EST SF 10 MIN: CPT | Mod: 25

## 2023-07-19 PROCEDURE — 15271 SKIN SUB GRAFT TRNK/ARM/LEG: CPT | Mod: 59,RT

## 2023-07-20 DIAGNOSIS — E66.01 MORBID (SEVERE) OBESITY DUE TO EXCESS CALORIES: ICD-10-CM

## 2023-07-20 DIAGNOSIS — L97.112 NON-PRESSURE CHRONIC ULCER OF RIGHT THIGH WITH FAT LAYER EXPOSED: ICD-10-CM

## 2023-07-20 DIAGNOSIS — Z91.81 HISTORY OF FALLING: ICD-10-CM

## 2023-07-20 DIAGNOSIS — I89.0 LYMPHEDEMA, NOT ELSEWHERE CLASSIFIED: ICD-10-CM

## 2023-07-20 DIAGNOSIS — L89.892 PRESSURE ULCER OF OTHER SITE, STAGE 2: ICD-10-CM

## 2023-07-20 DIAGNOSIS — R60.9 EDEMA, UNSPECIFIED: ICD-10-CM

## 2023-07-26 ENCOUNTER — OUTPATIENT (OUTPATIENT)
Dept: OUTPATIENT SERVICES | Facility: HOSPITAL | Age: 78
LOS: 1 days | Discharge: HOME | End: 2023-07-26
Payer: MEDICARE

## 2023-07-26 DIAGNOSIS — L89.90 PRESSURE ULCER OF UNSPECIFIED SITE, UNSPECIFIED STAGE: ICD-10-CM

## 2023-07-26 DIAGNOSIS — I89.0 LYMPHEDEMA, NOT ELSEWHERE CLASSIFIED: Chronic | ICD-10-CM

## 2023-07-26 DIAGNOSIS — Z96.653 PRESENCE OF ARTIFICIAL KNEE JOINT, BILATERAL: Chronic | ICD-10-CM

## 2023-07-26 PROCEDURE — 15271 SKIN SUB GRAFT TRNK/ARM/LEG: CPT | Mod: 59,RT

## 2023-07-26 PROCEDURE — 99212 OFFICE O/P EST SF 10 MIN: CPT | Mod: 25

## 2023-07-27 DIAGNOSIS — L89.112 PRESSURE ULCER OF RIGHT UPPER BACK, STAGE 2: ICD-10-CM

## 2023-07-27 DIAGNOSIS — Z91.81 HISTORY OF FALLING: ICD-10-CM

## 2023-07-27 DIAGNOSIS — L89.892 PRESSURE ULCER OF OTHER SITE, STAGE 2: ICD-10-CM

## 2023-07-27 DIAGNOSIS — I89.0 LYMPHEDEMA, NOT ELSEWHERE CLASSIFIED: ICD-10-CM

## 2023-07-27 DIAGNOSIS — E66.01 MORBID (SEVERE) OBESITY DUE TO EXCESS CALORIES: ICD-10-CM

## 2023-07-27 DIAGNOSIS — R60.9 EDEMA, UNSPECIFIED: ICD-10-CM

## 2023-08-05 ENCOUNTER — EMERGENCY (EMERGENCY)
Facility: HOSPITAL | Age: 78
LOS: 0 days | Discharge: HOME | End: 2023-08-05
Attending: STUDENT IN AN ORGANIZED HEALTH CARE EDUCATION/TRAINING PROGRAM
Payer: MEDICARE

## 2023-08-05 VITALS
HEIGHT: 63 IN | SYSTOLIC BLOOD PRESSURE: 93 MMHG | DIASTOLIC BLOOD PRESSURE: 61 MMHG | WEIGHT: 293 LBS | RESPIRATION RATE: 16 BRPM | HEART RATE: 78 BPM | TEMPERATURE: 98 F | OXYGEN SATURATION: 100 %

## 2023-08-05 VITALS
SYSTOLIC BLOOD PRESSURE: 132 MMHG | RESPIRATION RATE: 12 BRPM | OXYGEN SATURATION: 98 % | HEART RATE: 74 BPM | DIASTOLIC BLOOD PRESSURE: 66 MMHG

## 2023-08-05 DIAGNOSIS — Z96.653 PRESENCE OF ARTIFICIAL KNEE JOINT, BILATERAL: Chronic | ICD-10-CM

## 2023-08-05 DIAGNOSIS — R58 HEMORRHAGE, NOT ELSEWHERE CLASSIFIED: ICD-10-CM

## 2023-08-05 DIAGNOSIS — I89.0 LYMPHEDEMA, NOT ELSEWHERE CLASSIFIED: Chronic | ICD-10-CM

## 2023-08-05 DIAGNOSIS — R42 DIZZINESS AND GIDDINESS: ICD-10-CM

## 2023-08-05 DIAGNOSIS — Z96.653 PRESENCE OF ARTIFICIAL KNEE JOINT, BILATERAL: ICD-10-CM

## 2023-08-05 DIAGNOSIS — I73.9 PERIPHERAL VASCULAR DISEASE, UNSPECIFIED: ICD-10-CM

## 2023-08-05 DIAGNOSIS — I89.0 LYMPHEDEMA, NOT ELSEWHERE CLASSIFIED: ICD-10-CM

## 2023-08-05 DIAGNOSIS — I10 ESSENTIAL (PRIMARY) HYPERTENSION: ICD-10-CM

## 2023-08-05 LAB
ALBUMIN SERPL ELPH-MCNC: 3 G/DL — LOW (ref 3.3–5)
ALP SERPL-CCNC: 149 U/L — HIGH (ref 40–120)
ALT FLD-CCNC: 69 U/L — SIGNIFICANT CHANGE UP (ref 12–78)
ANION GAP SERPL CALC-SCNC: 3 MMOL/L — LOW (ref 5–17)
APTT BLD: 34.9 SEC — SIGNIFICANT CHANGE UP (ref 24.5–35.6)
AST SERPL-CCNC: 39 U/L — HIGH (ref 15–37)
BASOPHILS # BLD AUTO: 0.06 K/UL — SIGNIFICANT CHANGE UP (ref 0–0.2)
BASOPHILS NFR BLD AUTO: 0.5 % — SIGNIFICANT CHANGE UP (ref 0–2)
BILIRUB SERPL-MCNC: 0.3 MG/DL — SIGNIFICANT CHANGE UP (ref 0.2–1.2)
BUN SERPL-MCNC: 54 MG/DL — HIGH (ref 7–23)
CALCIUM SERPL-MCNC: 8.8 MG/DL — SIGNIFICANT CHANGE UP (ref 8.5–10.1)
CHLORIDE SERPL-SCNC: 112 MMOL/L — HIGH (ref 96–108)
CO2 SERPL-SCNC: 27 MMOL/L — SIGNIFICANT CHANGE UP (ref 22–31)
CREAT SERPL-MCNC: 2.09 MG/DL — HIGH (ref 0.5–1.3)
EGFR: 24 ML/MIN/1.73M2 — LOW
EOSINOPHIL # BLD AUTO: 0.62 K/UL — HIGH (ref 0–0.5)
EOSINOPHIL NFR BLD AUTO: 5.5 % — SIGNIFICANT CHANGE UP (ref 0–6)
GLUCOSE SERPL-MCNC: 138 MG/DL — HIGH (ref 70–99)
HCT VFR BLD CALC: 29.5 % — LOW (ref 34.5–45)
HGB BLD-MCNC: 9.6 G/DL — LOW (ref 11.5–15.5)
IMM GRANULOCYTES NFR BLD AUTO: 1.2 % — HIGH (ref 0–0.9)
INR BLD: 0.91 RATIO — SIGNIFICANT CHANGE UP (ref 0.85–1.18)
LYMPHOCYTES # BLD AUTO: 1.01 K/UL — SIGNIFICANT CHANGE UP (ref 1–3.3)
LYMPHOCYTES # BLD AUTO: 8.9 % — LOW (ref 13–44)
MCHC RBC-ENTMCNC: 28.7 PG — SIGNIFICANT CHANGE UP (ref 27–34)
MCHC RBC-ENTMCNC: 32.5 G/DL — SIGNIFICANT CHANGE UP (ref 32–36)
MCV RBC AUTO: 88.3 FL — SIGNIFICANT CHANGE UP (ref 80–100)
MONOCYTES # BLD AUTO: 0.89 K/UL — SIGNIFICANT CHANGE UP (ref 0–0.9)
MONOCYTES NFR BLD AUTO: 7.8 % — SIGNIFICANT CHANGE UP (ref 2–14)
NEUTROPHILS # BLD AUTO: 8.63 K/UL — HIGH (ref 1.8–7.4)
NEUTROPHILS NFR BLD AUTO: 76.1 % — SIGNIFICANT CHANGE UP (ref 43–77)
NRBC # BLD: 0 /100 WBCS — SIGNIFICANT CHANGE UP (ref 0–0)
PLATELET # BLD AUTO: 272 K/UL — SIGNIFICANT CHANGE UP (ref 150–400)
POTASSIUM SERPL-MCNC: 4 MMOL/L — SIGNIFICANT CHANGE UP (ref 3.5–5.3)
POTASSIUM SERPL-SCNC: 4 MMOL/L — SIGNIFICANT CHANGE UP (ref 3.5–5.3)
PROT SERPL-MCNC: 7.9 GM/DL — SIGNIFICANT CHANGE UP (ref 6–8.3)
PROTHROM AB SERPL-ACNC: 11 SEC — SIGNIFICANT CHANGE UP (ref 9.5–13)
RBC # BLD: 3.34 M/UL — LOW (ref 3.8–5.2)
RBC # FLD: 15.9 % — HIGH (ref 10.3–14.5)
SODIUM SERPL-SCNC: 142 MMOL/L — SIGNIFICANT CHANGE UP (ref 135–145)
WBC # BLD: 11.35 K/UL — HIGH (ref 3.8–10.5)
WBC # FLD AUTO: 11.35 K/UL — HIGH (ref 3.8–10.5)

## 2023-08-05 PROCEDURE — 99285 EMERGENCY DEPT VISIT HI MDM: CPT

## 2023-08-05 NOTE — ED ADULT TRIAGE NOTE - CHIEF COMPLAINT QUOTE
patient states she is having bilateral leg swelling and blood was oozing from the leg . pt states she saw a blood clot coming out from the leg . h/o vascular issues , HTN

## 2023-08-05 NOTE — ED PROVIDER NOTE - NSFOLLOWUPINSTRUCTIONS_ED_ALL_ED_FT
Followup with Toñito Tomlinson in the next 1-10 days.     Return to emergency department if symptoms worsen, worsening bleeding, pain/sensation changes in the lower extremities, chest pain, shortness of breath.

## 2023-08-05 NOTE — ED ADULT NURSE NOTE - OBJECTIVE STATEMENT
Pt is A&OX4, ambulatory. Complaining of b/l leg swelling, more swelling on right leg. States a blood clot came out of her right leg. States she sees a wound doctor every week for that leg. Denies chest pain, SOB, headache. pmh vascular issues , HTN

## 2023-08-05 NOTE — ED PROVIDER NOTE - PROGRESS NOTE DETAILS
spoke to babak vascular PA. signout received from dr. mancilla pending vascular evaluation. per vascular recommendations, can f/u in office, they will dress the area of concern prior to discharge -[place]

## 2023-08-05 NOTE — ED ADULT NURSE NOTE - NSSEPSISSUSPECTED_ED_A_ED
Duration Of Freeze Thaw-Cycle (Seconds): 0 Post-Care Instructions: I reviewed with the patient in detail post-care instructions. Patient is to wear sunprotection, and avoid picking at any of the treated lesions. Pt may apply Vaseline to crusted or scabbing areas. Render Post-Care Instructions In Note?: no Detail Level: Detailed Consent: The patient's consent was obtained including but not limited to risks of crusting, scabbing, blistering, scarring, darker or lighter pigmentary change, recurrence, incomplete removal and infection. No

## 2023-08-05 NOTE — ED PROVIDER NOTE - PATIENT PORTAL LINK FT
You can access the FollowMyHealth Patient Portal offered by [facility] by registering at the following website: [email or web address]. By joining [employer]’s FollowMyHealth portal, you will also be able to view your health information using other applications (apps) compatible with our system.

## 2023-08-05 NOTE — ED ADULT NURSE NOTE - NSFALLUNIVINTERV_ED_ALL_ED
Bed/Stretcher in lowest position, wheels locked, appropriate side rails in place/Call bell, personal items and telephone in reach/Instruct patient to call for assistance before getting out of bed/chair/stretcher/Non-slip footwear applied when patient is off stretcher/[place] to call system/Physically safe environment - no spills, clutter or unnecessary equipment/Purposeful proactive rounding/Room/bathroom lighting operational, light cord in reach

## 2023-08-05 NOTE — ED PROVIDER NOTE - MUSCULOSKELETAL, MLM
Spine appears normal, range of motion is not limited, no muscle or joint tenderness +bilateral LE lymphedema (-) calf tenderness, +small opening noted to the crease of her rle where pt has scarring from prior sugery, no active bleeding or dried blood seen

## 2023-08-05 NOTE — ED PROVIDER NOTE - CLINICAL SUMMARY MEDICAL DECISION MAKING FREE TEXT BOX
pt presents today c/o bleeding from her rle, concerned due to prior vascular surgery, dried blood noted on exam, no active bleeding, surgery consulted, pending eval and dispo

## 2023-08-05 NOTE — ED PROVIDER NOTE - OBJECTIVE STATEMENT
78 year oldl female with h/o HTN, lymphedema, obesity and PVD presents today c/o bleeding from her rigj 78 year oldl female with h/o HTN, lymphedema, obesity and PVD presents today c/o bleeding from her right lower extremity, pt states that she was sitting on her potty this morning at 9:30am when she noticed bleeding from her RLE, pt denies pain or injury, she is currently being seen in wound care every other Wednesday for a wound to the crease of the rlq which has not healed from a prior surgery, pt noted a large blood clot come from her leg and unsure where, she reports feeling lightheaded briefly after but has since resolved (-) chest pain (-) sob (-) weakness

## 2023-08-06 ENCOUNTER — EMERGENCY (EMERGENCY)
Facility: HOSPITAL | Age: 78
LOS: 0 days | Discharge: HOME | End: 2023-08-07
Attending: STUDENT IN AN ORGANIZED HEALTH CARE EDUCATION/TRAINING PROGRAM
Payer: MEDICARE

## 2023-08-06 VITALS
HEART RATE: 89 BPM | OXYGEN SATURATION: 96 % | RESPIRATION RATE: 16 BRPM | DIASTOLIC BLOOD PRESSURE: 73 MMHG | TEMPERATURE: 98 F | SYSTOLIC BLOOD PRESSURE: 110 MMHG | HEIGHT: 63 IN | WEIGHT: 293 LBS

## 2023-08-06 DIAGNOSIS — I10 ESSENTIAL (PRIMARY) HYPERTENSION: ICD-10-CM

## 2023-08-06 DIAGNOSIS — Z96.653 PRESENCE OF ARTIFICIAL KNEE JOINT, BILATERAL: ICD-10-CM

## 2023-08-06 DIAGNOSIS — Z96.653 PRESENCE OF ARTIFICIAL KNEE JOINT, BILATERAL: Chronic | ICD-10-CM

## 2023-08-06 DIAGNOSIS — N93.9 ABNORMAL UTERINE AND VAGINAL BLEEDING, UNSPECIFIED: ICD-10-CM

## 2023-08-06 DIAGNOSIS — Z86.2 PERSONAL HISTORY OF DISEASES OF THE BLOOD AND BLOOD-FORMING ORGANS AND CERTAIN DISORDERS INVOLVING THE IMMUNE MECHANISM: ICD-10-CM

## 2023-08-06 DIAGNOSIS — N95.0 POSTMENOPAUSAL BLEEDING: ICD-10-CM

## 2023-08-06 DIAGNOSIS — I89.0 LYMPHEDEMA, NOT ELSEWHERE CLASSIFIED: Chronic | ICD-10-CM

## 2023-08-06 PROCEDURE — 99284 EMERGENCY DEPT VISIT MOD MDM: CPT

## 2023-08-06 NOTE — ED ADULT NURSE NOTE - NSFALLUNIVINTERV_ED_ALL_ED
Bed/Stretcher in lowest position, wheels locked, appropriate side rails in place/Call bell, personal items and telephone in reach/Instruct patient to call for assistance before getting out of bed/chair/stretcher/Non-slip footwear applied when patient is off stretcher/[place] to call system/Physically safe environment - no spills, clutter or unnecessary equipment/Purposeful proactive rounding/Room/bathroom lighting operational, light cord in reach

## 2023-08-06 NOTE — ED ADULT NURSE NOTE - OBJECTIVE STATEMENT
Patient A&Ox4, post menopausal presents to ED c/o vaginal bleeding. As per patient it feels like something "is about to come out of my vagina." Of note, patient had 4 vaginal births and 1 C section (1977). Patient denies pain, CP, SOB, dysuria, hematuria, f/c, N/V/D, numbness, tingling. PMH HTN. NKA.

## 2023-08-06 NOTE — ED ADULT NURSE NOTE - SUICIDE SCREENING QUESTION 1
Called to f/u with pt, no answer, left voicemail to return my call at her earliest convenience.    No

## 2023-08-06 NOTE — ED ADULT TRIAGE NOTE - CHIEF COMPLAINT QUOTE
Pt  complains of vaginal bleeding x lat night. States anytime she has to pee feels like something is pushing out of her vagina.

## 2023-08-07 VITALS
DIASTOLIC BLOOD PRESSURE: 67 MMHG | TEMPERATURE: 98 F | HEART RATE: 90 BPM | RESPIRATION RATE: 18 BRPM | OXYGEN SATURATION: 97 % | SYSTOLIC BLOOD PRESSURE: 134 MMHG

## 2023-08-07 LAB
ALBUMIN SERPL ELPH-MCNC: 2.9 G/DL — LOW (ref 3.3–5)
ALP SERPL-CCNC: 136 U/L — HIGH (ref 40–120)
ALT FLD-CCNC: 66 U/L — SIGNIFICANT CHANGE UP (ref 12–78)
ANION GAP SERPL CALC-SCNC: 9 MMOL/L — SIGNIFICANT CHANGE UP (ref 5–17)
APTT BLD: 32.7 SEC — SIGNIFICANT CHANGE UP (ref 24.5–35.6)
AST SERPL-CCNC: 29 U/L — SIGNIFICANT CHANGE UP (ref 15–37)
BILIRUB SERPL-MCNC: 0.4 MG/DL — SIGNIFICANT CHANGE UP (ref 0.2–1.2)
BUN SERPL-MCNC: 48 MG/DL — HIGH (ref 7–23)
CALCIUM SERPL-MCNC: 8.7 MG/DL — SIGNIFICANT CHANGE UP (ref 8.5–10.1)
CHLORIDE SERPL-SCNC: 110 MMOL/L — HIGH (ref 96–108)
CO2 SERPL-SCNC: 24 MMOL/L — SIGNIFICANT CHANGE UP (ref 22–31)
CREAT SERPL-MCNC: 1.66 MG/DL — HIGH (ref 0.5–1.3)
EGFR: 32 ML/MIN/1.73M2 — LOW
GLUCOSE SERPL-MCNC: 145 MG/DL — HIGH (ref 70–99)
HCT VFR BLD CALC: 28.6 % — LOW (ref 34.5–45)
HGB BLD-MCNC: 8.8 G/DL — LOW (ref 11.5–15.5)
INR BLD: 0.96 RATIO — SIGNIFICANT CHANGE UP (ref 0.85–1.18)
MCHC RBC-ENTMCNC: 27.4 PG — SIGNIFICANT CHANGE UP (ref 27–34)
MCHC RBC-ENTMCNC: 30.8 G/DL — LOW (ref 32–36)
MCV RBC AUTO: 89.1 FL — SIGNIFICANT CHANGE UP (ref 80–100)
NRBC # BLD: 0 /100 WBCS — SIGNIFICANT CHANGE UP (ref 0–0)
PLATELET # BLD AUTO: 286 K/UL — SIGNIFICANT CHANGE UP (ref 150–400)
POTASSIUM SERPL-MCNC: 3.7 MMOL/L — SIGNIFICANT CHANGE UP (ref 3.5–5.3)
POTASSIUM SERPL-SCNC: 3.7 MMOL/L — SIGNIFICANT CHANGE UP (ref 3.5–5.3)
PROT SERPL-MCNC: 7.9 GM/DL — SIGNIFICANT CHANGE UP (ref 6–8.3)
PROTHROM AB SERPL-ACNC: 11.4 SEC — SIGNIFICANT CHANGE UP (ref 9.5–13)
RBC # BLD: 3.21 M/UL — LOW (ref 3.8–5.2)
RBC # FLD: 15.9 % — HIGH (ref 10.3–14.5)
SODIUM SERPL-SCNC: 143 MMOL/L — SIGNIFICANT CHANGE UP (ref 135–145)
WBC # BLD: 9.8 K/UL — SIGNIFICANT CHANGE UP (ref 3.8–10.5)
WBC # FLD AUTO: 9.8 K/UL — SIGNIFICANT CHANGE UP (ref 3.8–10.5)

## 2023-08-07 NOTE — ED PROVIDER NOTE - CARE PROVIDER_API CALL
[address]  Obstetrics and Gynecology  [address] [id number]  Phone: [phone]  Fax: [phone]  Follow Up Time: Urgent

## 2023-08-07 NOTE — ED PROVIDER NOTE - NS ED ROS FT
General: Denies fever, chills  HEENT: Denies sensory changes, sore throat  Neck: Denies neck pain, neck stiffness  Resp: Denies coughing, SOB  Cardiovascular: Denies CP, palpitations, LE edema  GI: Denies nausea, vomiting, abdominal pain, diarrhea, constipation, blood in stool  : Denies dysuria, hematuria, frequency, incontinence. + vaginal bleeding  MSK: Denies back pain  Neuro: Denies HA, dizziness, numbness, weakness  Skin: Denies rashes.

## 2023-08-07 NOTE — ED PROVIDER NOTE - OBJECTIVE STATEMENT
78 F pmh morbid obesity, lymphedema presenting to the ED for vaginal bleeding. Patient noticed that she had sudden onset vaginal bleeding today. She states that she has not had her menstual in over 20 years. She denies dizziness, abdominal pain, pelvic cramping, nausea, vomiting    no family hx of GYN cancer

## 2023-08-07 NOTE — ED PROVIDER NOTE - NSFOLLOWUPINSTRUCTIONS_ED_ALL_ED_FT
You were seen in the ED for vaginal bleeding. Your symptoms are concerning for possible cancer given your age and risk factors. Please follow-up with the gynecologist as soon as possible so that they may perform an endometrial biopsy    Return to the ED if you have uncontrolled bleeding and you are passing through over 2 pads in one hours

## 2023-08-07 NOTE — ED PROVIDER NOTE - PATIENT PORTAL LINK FT
You can access the FollowMyHealth Patient Portal offered by [facility] by registering at the following website: [email or web address]. By joining [employer]’s FollowMyHealth portal, you will also be able to view your health information using other applications (apps) compatible with our system.

## 2023-08-07 NOTE — ED PROVIDER NOTE - PHYSICAL EXAMINATION
General: Well appearing obese female in no acute distress  HEENT: Normocephalic, atraumatic. Moist mucous membranes. Oropharynx clear.  Eyes: No scleral icterus. EOMI. GIO.  Neck:. Soft and supple. Full ROM without pain. No midline tenderness  Cardiac: Regular rate and regular rhythm. No murmurs, rubs, gallops.  Resp: Lungs CTAB. Speaking in full sentences. No wheezes, rales or rhonchi.  Abd: Soft, non-tender, non-distended. No guarding or rebound. No scars, masses, or lesions.  : minimal blood in vaginal canal, normal external genitalia  Back: Spine midline and non-tender. No CVA tenderness.    Skin: No rashes, abrasions, or lacerations.  Ext: chronic lymphedema  Neuro: AO x 3. Moves all extremities symmetrically. Motor strength and sensation grossly intact.

## 2023-08-07 NOTE — ED PROVIDER NOTE - CLINICAL SUMMARY MEDICAL DECISION MAKING FREE TEXT BOX
78 F pmh morbid obesity, lymphedema presenting to the ED for vaginal bleeding.     no active hemorrhage, bleeding resolved while in ED  concern for post-menopausal bleeding, likely endometrial ca    rapid GYN follow-up

## 2023-08-11 ENCOUNTER — INPATIENT (INPATIENT)
Facility: HOSPITAL | Age: 78
LOS: 3 days | Discharge: NURSING, REHAB OR LONG-TERM CARE | End: 2023-08-15
Attending: HOSPITALIST | Admitting: HOSPITALIST
Payer: MEDICARE

## 2023-08-11 VITALS
SYSTOLIC BLOOD PRESSURE: 121 MMHG | HEART RATE: 90 BPM | OXYGEN SATURATION: 97 % | DIASTOLIC BLOOD PRESSURE: 77 MMHG | TEMPERATURE: 98 F | RESPIRATION RATE: 18 BRPM | HEIGHT: 63 IN

## 2023-08-11 DIAGNOSIS — E66.01 MORBID (SEVERE) OBESITY DUE TO EXCESS CALORIES: ICD-10-CM

## 2023-08-11 DIAGNOSIS — N17.9 ACUTE KIDNEY FAILURE, UNSPECIFIED: ICD-10-CM

## 2023-08-11 DIAGNOSIS — I10 ESSENTIAL (PRIMARY) HYPERTENSION: ICD-10-CM

## 2023-08-11 DIAGNOSIS — Z96.653 PRESENCE OF ARTIFICIAL KNEE JOINT, BILATERAL: Chronic | ICD-10-CM

## 2023-08-11 DIAGNOSIS — L03.115 CELLULITIS OF RIGHT LOWER LIMB: ICD-10-CM

## 2023-08-11 DIAGNOSIS — I89.0 LYMPHEDEMA, NOT ELSEWHERE CLASSIFIED: Chronic | ICD-10-CM

## 2023-08-11 LAB
ALBUMIN SERPL ELPH-MCNC: 2.7 G/DL — LOW (ref 3.3–5)
ALP SERPL-CCNC: 132 U/L — HIGH (ref 40–120)
ALT FLD-CCNC: 47 U/L — SIGNIFICANT CHANGE UP (ref 12–78)
ANION GAP SERPL CALC-SCNC: 6 MMOL/L — SIGNIFICANT CHANGE UP (ref 5–17)
APTT BLD: 32.5 SEC — SIGNIFICANT CHANGE UP (ref 24.5–35.6)
AST SERPL-CCNC: 31 U/L — SIGNIFICANT CHANGE UP (ref 15–37)
BASOPHILS # BLD AUTO: 0.03 K/UL — SIGNIFICANT CHANGE UP (ref 0–0.2)
BASOPHILS NFR BLD AUTO: 0.3 % — SIGNIFICANT CHANGE UP (ref 0–2)
BILIRUB SERPL-MCNC: 0.4 MG/DL — SIGNIFICANT CHANGE UP (ref 0.2–1.2)
BUN SERPL-MCNC: 47 MG/DL — HIGH (ref 7–23)
CALCIUM SERPL-MCNC: 8.6 MG/DL — SIGNIFICANT CHANGE UP (ref 8.5–10.1)
CHLORIDE SERPL-SCNC: 111 MMOL/L — HIGH (ref 96–108)
CO2 SERPL-SCNC: 27 MMOL/L — SIGNIFICANT CHANGE UP (ref 22–31)
CREAT SERPL-MCNC: 1.44 MG/DL — HIGH (ref 0.5–1.3)
CRP SERPL-MCNC: 58 MG/L — HIGH
EGFR: 37 ML/MIN/1.73M2 — LOW
EOSINOPHIL # BLD AUTO: 0.35 K/UL — SIGNIFICANT CHANGE UP (ref 0–0.5)
EOSINOPHIL NFR BLD AUTO: 3.1 % — SIGNIFICANT CHANGE UP (ref 0–6)
GLUCOSE SERPL-MCNC: 150 MG/DL — HIGH (ref 70–99)
HCT VFR BLD CALC: 29.6 % — LOW (ref 34.5–45)
HGB BLD-MCNC: 9.1 G/DL — LOW (ref 11.5–15.5)
IMM GRANULOCYTES NFR BLD AUTO: 0.8 % — SIGNIFICANT CHANGE UP (ref 0–0.9)
INR BLD: 0.95 RATIO — SIGNIFICANT CHANGE UP (ref 0.85–1.18)
LYMPHOCYTES # BLD AUTO: 1.03 K/UL — SIGNIFICANT CHANGE UP (ref 1–3.3)
LYMPHOCYTES # BLD AUTO: 9.2 % — LOW (ref 13–44)
MCHC RBC-ENTMCNC: 27.7 PG — SIGNIFICANT CHANGE UP (ref 27–34)
MCHC RBC-ENTMCNC: 30.7 G/DL — LOW (ref 32–36)
MCV RBC AUTO: 90.2 FL — SIGNIFICANT CHANGE UP (ref 80–100)
MONOCYTES # BLD AUTO: 0.55 K/UL — SIGNIFICANT CHANGE UP (ref 0–0.9)
MONOCYTES NFR BLD AUTO: 4.9 % — SIGNIFICANT CHANGE UP (ref 2–14)
NEUTROPHILS # BLD AUTO: 9.12 K/UL — HIGH (ref 1.8–7.4)
NEUTROPHILS NFR BLD AUTO: 81.7 % — HIGH (ref 43–77)
NRBC # BLD: 0 /100 WBCS — SIGNIFICANT CHANGE UP (ref 0–0)
PLATELET # BLD AUTO: 358 K/UL — SIGNIFICANT CHANGE UP (ref 150–400)
POTASSIUM SERPL-MCNC: 4.4 MMOL/L — SIGNIFICANT CHANGE UP (ref 3.5–5.3)
POTASSIUM SERPL-SCNC: 4.4 MMOL/L — SIGNIFICANT CHANGE UP (ref 3.5–5.3)
PROT SERPL-MCNC: 8.1 GM/DL — SIGNIFICANT CHANGE UP (ref 6–8.3)
PROTHROM AB SERPL-ACNC: 11.4 SEC — SIGNIFICANT CHANGE UP (ref 9.5–13)
RBC # BLD: 3.28 M/UL — LOW (ref 3.8–5.2)
RBC # FLD: 15.7 % — HIGH (ref 10.3–14.5)
SODIUM SERPL-SCNC: 144 MMOL/L — SIGNIFICANT CHANGE UP (ref 135–145)
WBC # BLD: 11.17 K/UL — HIGH (ref 3.8–10.5)
WBC # FLD AUTO: 11.17 K/UL — HIGH (ref 3.8–10.5)

## 2023-08-11 PROCEDURE — 93970 EXTREMITY STUDY: CPT | Mod: 26

## 2023-08-11 PROCEDURE — 99222 1ST HOSP IP/OBS MODERATE 55: CPT

## 2023-08-11 PROCEDURE — 73700 CT LOWER EXTREMITY W/O DYE: CPT | Mod: 26,RT,MA

## 2023-08-11 PROCEDURE — 93010 ELECTROCARDIOGRAM REPORT: CPT

## 2023-08-11 PROCEDURE — 99285 EMERGENCY DEPT VISIT HI MDM: CPT | Mod: FS

## 2023-08-11 RX ORDER — OXYCODONE HYDROCHLORIDE 5 MG/1
5 TABLET ORAL EVERY 6 HOURS
Refills: 0 | Status: DISCONTINUED | OUTPATIENT
Start: 2023-08-11 | End: 2023-08-15

## 2023-08-11 RX ORDER — SODIUM CHLORIDE 9 MG/ML
1000 INJECTION, SOLUTION INTRAVENOUS
Refills: 0 | Status: DISCONTINUED | OUTPATIENT
Start: 2023-08-11 | End: 2023-08-15

## 2023-08-11 RX ORDER — ONDANSETRON 8 MG/1
4 TABLET, FILM COATED ORAL EVERY 8 HOURS
Refills: 0 | Status: DISCONTINUED | OUTPATIENT
Start: 2023-08-11 | End: 2023-08-15

## 2023-08-11 RX ORDER — CEFTRIAXONE 500 MG/1
1000 INJECTION, POWDER, FOR SOLUTION INTRAMUSCULAR; INTRAVENOUS ONCE
Refills: 0 | Status: DISCONTINUED | OUTPATIENT
Start: 2023-08-11 | End: 2023-08-11

## 2023-08-11 RX ORDER — CEFEPIME 1 G/1
1000 INJECTION, POWDER, FOR SOLUTION INTRAMUSCULAR; INTRAVENOUS EVERY 8 HOURS
Refills: 0 | Status: DISCONTINUED | OUTPATIENT
Start: 2023-08-11 | End: 2023-08-15

## 2023-08-11 RX ORDER — VANCOMYCIN HCL 1 G
1500 VIAL (EA) INTRAVENOUS EVERY 24 HOURS
Refills: 0 | Status: DISCONTINUED | OUTPATIENT
Start: 2023-08-11 | End: 2023-08-15

## 2023-08-11 RX ORDER — SENNA PLUS 8.6 MG/1
2 TABLET ORAL AT BEDTIME
Refills: 0 | Status: DISCONTINUED | OUTPATIENT
Start: 2023-08-11 | End: 2023-08-15

## 2023-08-11 RX ORDER — ENOXAPARIN SODIUM 100 MG/ML
40 INJECTION SUBCUTANEOUS EVERY 12 HOURS
Refills: 0 | Status: DISCONTINUED | OUTPATIENT
Start: 2023-08-11 | End: 2023-08-15

## 2023-08-11 RX ORDER — ACETAMINOPHEN 500 MG
650 TABLET ORAL EVERY 6 HOURS
Refills: 0 | Status: DISCONTINUED | OUTPATIENT
Start: 2023-08-11 | End: 2023-08-15

## 2023-08-11 RX ORDER — TETANUS TOXOID, REDUCED DIPHTHERIA TOXOID AND ACELLULAR PERTUSSIS VACCINE, ADSORBED 5; 2.5; 8; 8; 2.5 [IU]/.5ML; [IU]/.5ML; UG/.5ML; UG/.5ML; UG/.5ML
0.5 SUSPENSION INTRAMUSCULAR ONCE
Refills: 0 | Status: COMPLETED | OUTPATIENT
Start: 2023-08-11 | End: 2023-08-11

## 2023-08-11 RX ORDER — POLYETHYLENE GLYCOL 3350 17 G/17G
17 POWDER, FOR SOLUTION ORAL DAILY
Refills: 0 | Status: DISCONTINUED | OUTPATIENT
Start: 2023-08-11 | End: 2023-08-15

## 2023-08-11 RX ORDER — LANOLIN ALCOHOL/MO/W.PET/CERES
3 CREAM (GRAM) TOPICAL AT BEDTIME
Refills: 0 | Status: DISCONTINUED | OUTPATIENT
Start: 2023-08-11 | End: 2023-08-15

## 2023-08-11 RX ADMIN — OXYCODONE HYDROCHLORIDE 5 MILLIGRAM(S): 5 TABLET ORAL at 22:00

## 2023-08-11 RX ADMIN — SODIUM CHLORIDE 100 MILLILITER(S): 9 INJECTION, SOLUTION INTRAVENOUS at 18:31

## 2023-08-11 RX ADMIN — ENOXAPARIN SODIUM 40 MILLIGRAM(S): 100 INJECTION SUBCUTANEOUS at 18:31

## 2023-08-11 RX ADMIN — CEFEPIME 100 MILLIGRAM(S): 1 INJECTION, POWDER, FOR SOLUTION INTRAMUSCULAR; INTRAVENOUS at 21:59

## 2023-08-11 RX ADMIN — OXYCODONE HYDROCHLORIDE 5 MILLIGRAM(S): 5 TABLET ORAL at 22:30

## 2023-08-11 RX ADMIN — Medication 300 MILLIGRAM(S): at 18:36

## 2023-08-11 RX ADMIN — TETANUS TOXOID, REDUCED DIPHTHERIA TOXOID AND ACELLULAR PERTUSSIS VACCINE, ADSORBED 0.5 MILLILITER(S): 5; 2.5; 8; 8; 2.5 SUSPENSION INTRAMUSCULAR at 17:19

## 2023-08-11 NOTE — ED ADULT TRIAGE NOTE - NS ED NURSE BANDS TYPE
Name band; Imiquimod Pregnancy And Lactation Text: This medication is Pregnancy Category C. It is unknown if this medication is excreted in breast milk.

## 2023-08-11 NOTE — H&P ADULT - NSHPPHYSICALEXAM_GEN_ALL_CORE
CONSTITUTIONAL: alert and cooperative, no acute distress.   EYES: PERRL,  no scleral icterus  ENT: Mucosa moist, tongue normal.   NECK: Neck supple, trachea midline, non-tender  CARDIAC: Normal S1 and S2. Regular rate and rhythms. Chronic lymphedema.  LUNGS: Clear to auscultation, equal air entry both lungs. No rales, rhonchi, wheezing  ABDOMEN: Soft, nondistended, nontender. No guarding or rebound tenderness. No hepatomegaly or splenomegaly. Bowel sound normal  MUSCULOSKELETAL: Normocephalic, atraumatic. Chronic LE lymphedema. Right LE color is darker with redness in lower leg. Wound with slight yellowish discharge, erythema/tender/warmth to touch around area of wound.  NEUROLOGICAL: No gross motor or sensory deficits.  PSYCHIATRIC: A&O x 3, appropriate mood and affect.

## 2023-08-11 NOTE — H&P ADULT - PROBLEM SELECTOR PLAN 1
sent in by PCP with concern for RLE cellulitis  Patient had blister, which burst on 8/5/23, gradually have worsening pain/redness/swelling. One episode of fever last week  Right LE color is darker with redness in lower leg. Wound with slight yellowish discharge, erythema/tender/warmth to touch in skin around area of wound  Slight leukocytosis  Right lower extremity CT  ( I personally review) with finding suggestive of cellulitis. No definite fluid collection. LE doppler negative for DVT  Slight leukocytosis  Check CRP, ESR, blood culture  prior Wound culture with carbapenem resistant Pseudomonas  Start Vancomycin, monitor vanco trough. Therapeutic monitoring is necessary with vanco. Check MRSA PCR  ID consult---> ? need Zerbaxa in view of prior culture data  Wound care/vascular consulted--> Dr Montoya sent in by PCP with concern for RLE cellulitis  Patient had blister, which burst on 8/5/23, gradually have worsening pain/redness/swelling. One episode of fever last week  Right LE color is darker with redness in lower leg. Wound with slight yellowish discharge, erythema/tender/warmth to touch in skin around area of wound  Slight leukocytosis  Right lower extremity CT  ( I personally review) with finding suggestive of cellulitis. No definite fluid collection. LE doppler negative for DVT  Slight leukocytosis  Check CRP, ESR, blood culture  prior Wound culture with carbapenem resistant Pseudomonas  Start Vancomycin, monitor vanco trough. Therapeutic monitoring is necessary with vanco. Check MRSA PCR  ID consult---> ? need Zerbaxa in view of prior culture data  Discussed with ID, will continue with vanco and cefepime for now  Wound care/vascular consulted--> Dr Montoya

## 2023-08-11 NOTE — ED ADULT NURSE NOTE - NSFALLUNIVINTERV_ED_ALL_ED
Bed/Stretcher in lowest position, wheels locked, appropriate side rails in place/Call bell, personal items and telephone in reach/Instruct patient to call for assistance before getting out of bed/chair/stretcher/Non-slip footwear applied when patient is off stretcher/[place] to call system/Physically safe environment - no spills, clutter or unnecessary equipment/Purposeful proactive rounding/Room/bathroom lighting operational, light cord in reach

## 2023-08-11 NOTE — H&P ADULT - PROBLEM SELECTOR PLAN 2
Cr 1.44, improving comparing to earlier this month  Hold losartan and NSAIDs  monitor renal function, renally dose medication

## 2023-08-11 NOTE — CONSULT NOTE ADULT - ASSESSMENT
------INCOMPLETE NOTE- RECOMMENDATIONS TO FOLLOW---     Plan:  start vancomycin   send vancomycin trough with target AUC/SENAIT 400-600   (therapeutic drug monitoring required with IV vancomycin)   start cefepime   blood cultures x2   do not swab leg for cultures   leg elevation   trend wbc   gentle IV fluids  send hba1c   give tdap if not up to date     Discussed with Dr. Modesto Barnes, DO  Infectious Disease Attending  Reachable via Microsoft Teams or ID office: [phone]  After 5pm/weekends please call [phone] for all inquiries and new consults  78 years old female with h/o HTN, chronic lymphedema with RLE wound present to with complain of pain and right lower extremity wound. Patient reported seeing a blister on right lower leg > 1 weeks ago, blister burst on 8/5/23. Initially clear liquid discharge but later become yellowish discharge, associated with severe worsening pain for 1 day. Reported one episode of fever 1 week ago. Patient went to PCP and was sent in to ED with concern for infection.   Hemodynamically stable, afebrile, sat well at RA.   WBC 11.17, plt 358, Cr 1.44, glucose 150.   Right lower extremity CT with finding suggestive of cellulitis. No definite fluid collection. LE doppler negative for DVT.         Plan:  start vancomycin   send vancomycin trough with target AUC/SENAIT 400-600   (therapeutic drug monitoring required with IV vancomycin)   start cefepime   blood cultures x2   do not swab leg for cultures   leg elevation   trend wbc   gentle IV fluids  send hba1c   give tdap if not up to date     Discussed with Dr. Modesto Barnes, DO  Infectious Disease Attending  Reachable via Microsoft Teams or ID office: [phone]  After 5pm/weekends please call [phone] for all inquiries and new consults

## 2023-08-11 NOTE — ED PROVIDER NOTE - NS ED ATTENDING STATEMENT MOD
This was a shared visit with the DEE DEE. I reviewed and verified the documentation and independently performed the documented:

## 2023-08-11 NOTE — ED PROVIDER NOTE - PROGRESS NOTE DETAILS
RYLEE Vera: Patient accepted for admission to medicine by Dr. Salvador. RYLEE Vera: Discussed case with pt's PMD who referred her to ED for concern of worsening cellulitis. Patient accepted for admission to medicine by Dr. Salvador.

## 2023-08-11 NOTE — ED PROVIDER NOTE - MUSCULOSKELETAL, MLM
Spine appears normal, range of motion is not limited, no muscle or joint tenderness, no midline spinal tenderness. Spine appears normal, range of motion is not limited, no muscle or joint tenderness, no midline spinal tenderness. Moving all extremities equally, full strength against resistance, 2+ distal pulses, < 2 sec. capillary refill.

## 2023-08-11 NOTE — ED PROVIDER NOTE - CLINICAL SUMMARY MEDICAL DECISION MAKING FREE TEXT BOX
78F with HTN, Lymphedema, PVD who presents to ED with right-sided lower extremity pain x 4 associated with foul-smelling drainage from LE wounds. Patient currently afebrile, hemodynamically stable, spO2 100%. Based on history and physical, differentials include but are not limited to . Plan to assess patient for acute pathology as listed above with . Will administer medications for symptomatic relief, follow-up on results, and reassess. 78F with HTN, Lymphedema, PVD who presents to ED with right-sided lower extremity pain x 4 associated with foul-smelling drainage from LE wounds. Patient currently afebrile, hemodynamically stable, spO2 100%. Based on history and physical, differentials include but are not limited to skin cellulitis, skin ulceration, PVD, PAD, osteomyelitis, DVT, chronic lymphedema. Plan to assess patient for acute pathology as listed above with labs, culture, Duplex US RLE, CT RLE. Will administer medications for symptomatic relief, follow-up on results, and reassess.

## 2023-08-11 NOTE — ED ADULT NURSE NOTE - OBJECTIVE STATEMENT
Pt is A&OX4, complaining of right lower leg swelling, redness, and leaking yellow drainage. Open skin noted to right lower extremity draining yellow fluid. Denies chest pain, SOB, dizziness. Sent from primary care physician to r/o cellulitis. pmh lymphedema, htn.

## 2023-08-11 NOTE — CONSULT NOTE ADULT - SUBJECTIVE AND OBJECTIVE BOX
[facility]  INFECTIOUS DISEASES   [address] [place] [address]  Tel: [phone]     Fax: [phone]  ======================================================  Milian MD Jonathan Garellek, DO Dolores Macias, ELADIA   ======================================================    ALICIA GONZALEZ  MRN-[id number]  Female  78y ([birth year])        Patient is a 78y old  Female who presents with a chief complaint of RLE wound/cellulitis (11 Aug 2023 16:38)      HPI:  78 years old female with h/o HTN, chronic lymphedema with RLE wound present to with complain of pain and right lower extremity wound. Patient reported seeing a blister on right lower leg > 1 weeks ago, blister burst on 8/5/23. Initially clear liquid discharge but later become yellowish discharge, associated with severe worsening pain for 1 day. Reported one episode of fever 1 week ago. Patient went to PCP and was sent in to ED with concern for infection.   Hemodynamically stable, afebrile, sat well at RA. WBC 11.17, plt 358, Cr 1.44, glucose 150. Right lower extremity CT with finding suggestive of cellulitis. No definite fluid collection. LE doppler negative for DVT.     SH: no toxic habits (11 Aug 2023 16:38)      ID consulted for workup and antibiotic management     PAST MEDICAL & SURGICAL HISTORY:  Essential hypertension      Morbid obesity due to excess calories      Other iron deficiency anemia      Hypertension      Lymphedema      H/O total knee replacement, bilateral      Lymphedema          Allergies  No Known Allergies        ANTIMICROBIALS:  vancomycin  IVPB 1500 every 24 hours      MEDICATIONS  (STANDING):        OTHER MEDS: MEDICATIONS  (STANDING):  acetaminophen     Tablet .. 650 every 6 hours PRN  aluminum hydroxide/magnesium hydroxide/simethicone Suspension 30 every 4 hours PRN  enoxaparin Injectable 40 every 12 hours  melatonin 3 at bedtime PRN  ondansetron Injectable 4 every 8 hours PRN  oxyCODONE    IR 5 every 6 hours PRN  polyethylene glycol 3350 17 daily  senna 2 at bedtime      SOCIAL HISTORY:     Smoking Cigarettes [ ]Active [ ] Former [ ]Denies   ETOH [ ]denies [ ]Former [ ]Current Use denies   Drug Use [ ]Never [ ] Former [ ] Active     FAMILY HISTORY:  FH: HTN (hypertension)    No pertinent family history in first degree relatives        REVIEW OF SYSTEMS  [  ] ROS unobtainable because:    [  ] All other systems negative except as noted below:	    Constitutional:  [ ] fever [ ] chills  [ ] weight loss  [ ] weakness  Skin:  [ ] rash [ ] phlebitis	  Eyes: [ ] icterus [ ] pain  [ ] discharge	  ENMT: [ ] sore throat  [ ] thrush [ ] ulcers [ ] exudates  Respiratory: [ ] dyspnea [ ] hemoptysis [ ] cough [ ] sputum	  Cardiovascular:  [ ] chest pain [ ] palpitations [ ] edema	  Gastrointestinal:  [ ] nausea [ ] vomiting [ ] diarrhea [ ] constipation [ ] pain	  Genitourinary:  [ ] dysuria [ ] frequency [ ] hematuria [ ] discharge [ ] flank pain  [ ] incontinence  Musculoskeletal:  [ ] myalgias [ ] arthralgias [ ] arthritis  [ ] back pain  Neurological:  [ ] headache [ ] seizures  [ ] confusion/altered mental status  Psychiatric:  [ ] anxiety [ ] depression	  Hematology/Lymphatics:  [ ] lymphadenopathy  Endocrine:  [ ] adrenal [ ] thyroid  Allergic/Immunologic:	 [ ] transplant [ ] seasonal    Vital Signs Last 24 Hrs  T(F): 97.8 (08-11-23 @ 11:50), Max: 98.2 (08-07-23 @ 05:07)    Vital Signs Last 24 Hrs  HR: 90 (08-11-23 @ 11:50) (90 - 90)  BP: 121/77 (08-11-23 @ 11:50) (121/77 - 121/77)  RR: 18 (08-11-23 @ 11:50)  SpO2: 97% (08-11-23 @ 11:50) (97% - 97%)  Wt(kg): --    PHYSICAL EXAM:  Constitutional: non-toxic, no distress  HEAD/EYES: anicteric, no conjunctival injection  ENT:  supple, no thrush  Cardiovascular:   normal S1, S2, no murmur, no edema  Respiratory:  clear BS bilaterally, no wheezes, no rales  GI:  soft, non-tender, normal bowel sounds  :  no blum, no CVA tenderness  Musculoskeletal:  no synovitis, normal ROM  Neurologic: awake and alert, normal strength, no focal findings  Skin:  no rash, no erythema, no phlebitis  Heme/Onc: no lymphadenopathy   Psychiatric:  awake, alert, appropriate mood          WBC Count: 11.17 K/uL (08-11 @ 12:30)  WBC Count: 9.80 K/uL (08-07 @ 02:54)  WBC Count: 11.35 K/uL (08-05 @ 18:15)      Auto Neutrophil %: 81.7 % *H* (08-11-23 @ 12:30)  Auto Neutrophil #: 9.12 K/uL *H* (08-11-23 @ 12:30)                            9.1    11.17 )-----------( 358      ( 11 Aug 2023 12:30 )             29.6       08-11    144  |  111<H>  |  47<H>  ----------------------------<  150<H>  4.4   |  27  |  1.44<H>    Ca    8.6      11 Aug 2023 12:30    TPro  8.1  /  Alb  2.7<L>  /  TBili  0.4  /  DBili  x   /  AST  31  /  ALT  47  /  AlkPhos  132<H>  08-11      Creatinine Trend: 1.44<--, 1.66<--, 2.09<--                  MICROBIOLOGY:          v                            RADIOLOGY:                    ACC: [id number] EXAM:  US DPLX LWR EXT VEINS COMPL BI   ORDERED BY: CONY GRANT     PROCEDURE DATE:  08/11/2023          INTERPRETATION:  CLINICAL INFORMATION: Lower extremity swelling    COMPARISON: 3/1/2022.    TECHNIQUE: Duplex sonography of the BILATERAL LOWER extremity veins with   color and spectral Doppler, with and without compression.    FINDINGS:    RIGHT:  Normal compressibility of the RIGHT common femoral, femoral and popliteal   veins.  Doppler examination shows normal spontaneous and phasic flow.  Posterior tibial veins are patent. Peroneal veins not visualized due to   large edema.    LEFT:  Normal compressibility of the LEFT common femoral, femoral and popliteal   veins.  Doppler examination shows normal spontaneous and phasic flow.  No LEFT calf vein thrombosis is detected.    IMPRESSION:  No evidence of deep venous thrombosis in either lower extremity.            --- End of Report ---            HAYLEY CRUZ MD; Attending Radiologist  This document has been electronically signed. Aug 11 2023  3:19PM                        I have personally reviewed the above imaging    [facility]  INFECTIOUS DISEASES   [address] [place] [address]  Tel: [phone]     Fax: [phone]  ======================================================  Milian MD Jonathan Garellek, DO Dolores Macias, ELADIA   ======================================================    ALIICA GONZALEZ  MRN-[id number]  Female  78y ([birth year])        Patient is a 78y old  Female who presents with a chief complaint of RLE wound/cellulitis (11 Aug 2023 16:38)      HPI:  78 years old female with h/o HTN, chronic lymphedema with RLE wound present to with complain of pain and right lower extremity wound. Patient reported seeing a blister on right lower leg > 1 weeks ago, blister burst on 8/5/23. Initially clear liquid discharge but later become yellowish discharge, associated with severe worsening pain for 1 day. Reported one episode of fever 1 week ago. Patient went to PCP and was sent in to ED with concern for infection.   Hemodynamically stable, afebrile, sat well at RA. WBC 11.17, plt 358, Cr 1.44, glucose 150. Right lower extremity CT with finding suggestive of cellulitis. No definite fluid collection. LE doppler negative for DVT.     SH: no toxic habits (11 Aug 2023 16:38)    hasnt been on antibiotics in a long time. 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 Ok to treat vancomycin and cefepime for now   ID consulted for workup and antibiotic management     PAST MEDICAL & SURGICAL HISTORY:  Essential hypertension      Morbid obesity due to excess calories      Other iron deficiency anemia      Hypertension      Lymphedema      H/O total knee replacement, bilateral      Lymphedema          Allergies  No Known Allergies        ANTIMICROBIALS:  vancomycin  IVPB 1500 every 24 hours      MEDICATIONS  (STANDING):        OTHER MEDS: MEDICATIONS  (STANDING):  acetaminophen     Tablet .. 650 every 6 hours PRN  aluminum hydroxide/magnesium hydroxide/simethicone Suspension 30 every 4 hours PRN  enoxaparin Injectable 40 every 12 hours  melatonin 3 at bedtime PRN  ondansetron Injectable 4 every 8 hours PRN  oxyCODONE    IR 5 every 6 hours PRN  polyethylene glycol 3350 17 daily  senna 2 at bedtime      SOCIAL HISTORY:     denies toxic habits      FAMILY HISTORY:  FH: HTN (hypertension)    REVIEW OF SYSTEMS  [  ] ROS unobtainable because:    [ x ] All other systems negative except as noted below:	    Constitutional:  [x ] fever [ ] chills  [ ] weight loss  [ ] weakness  Skin:  [x ] rash [ ] phlebitis	  Eyes: [ ] icterus [ ] pain  [ ] discharge	  ENMT: [ ] sore throat  [ ] thrush [ ] ulcers [ ] exudates  Respiratory: [ ] dyspnea [ ] hemoptysis [ ] cough [ ] sputum	  Cardiovascular:  [ ] chest pain [ ] palpitations [ ] edema	  Gastrointestinal:  [ ] nausea [ ] vomiting [ ] diarrhea [ ] constipation [ ] pain	  Genitourinary:  [ ] dysuria [ ] frequency [ ] hematuria [ ] discharge [ ] flank pain  [ ] incontinence  Musculoskeletal:  [ ] myalgias [ ] arthralgias [ ] arthritis  [ ] back pain  Neurological:  [ ] headache [ ] seizures  [ ] confusion/altered mental status  Psychiatric:  [ ] anxiety [ ] depression	  Hematology/Lymphatics:  [ ] lymphadenopathy  Endocrine:  [ ] adrenal [ ] thyroid  Allergic/Immunologic:	 [ ] transplant [ ] seasonal    Vital Signs Last 24 Hrs  T(F): 97.8 (08-11-23 @ 11:50), Max: 98.2 (08-07-23 @ 05:07)    Vital Signs Last 24 Hrs  HR: 90 (08-11-23 @ 11:50) (90 - 90)  BP: 121/77 (08-11-23 @ 11:50) (121/77 - 121/77)  RR: 18 (08-11-23 @ 11:50)  SpO2: 97% (08-11-23 @ 11:50) (97% - 97%)  Wt(kg): --    PHYSICAL EXAM:  Constitutional: non-toxic, no distress  HEAD/EYES: anicteric, no conjunctival injection  ENT:  supple, no thrush  Cardiovascular:   normal S1, S2, no murmur, no edema  Respiratory:  clear BS bilaterally, no wheezes, no rales  GI:  soft, non-tender, normal bowel sounds, obese   :  no blum, no CVA tenderness  Musculoskeletal:  lymphedema of right leg, difficulty for her to raise her legs   Neurologic: awake and alert, normal strength, no focal findings  Skin:  ulcer on the posterior aspect of calf with greenish discharge and some surrounding erythema and calor   Heme/Onc: no lymphadenopathy   Psychiatric:  awake, alert, appropriate mood      WBC Count: 11.17 K/uL (08-11 @ 12:30)  WBC Count: 9.80 K/uL (08-07 @ 02:54)  WBC Count: 11.35 K/uL (08-05 @ 18:15)      Auto Neutrophil %: 81.7 % *H* (08-11-23 @ 12:30)  Auto Neutrophil #: 9.12 K/uL *H* (08-11-23 @ 12:30)                            9.1    11.17 )-----------( 358      ( 11 Aug 2023 12:30 )             29.6       08-11    144  |  111<H>  |  47<H>  ----------------------------<  150<H>  4.4   |  27  |  1.44<H>    Ca    8.6      11 Aug 2023 12:30    TPro  8.1  /  Alb  2.7<L>  /  TBili  0.4  /  DBili  x   /  AST  31  /  ALT  47  /  AlkPhos  132<H>  08-11      Creatinine Trend: 1.44<--, 1.66<--, 2.09<--    MICROBIOLOGY:      RADIOLOGY:  < from: US Duplex Venous Lower Ext Complete, Bilateral (08.11.23 @ 15:09) >    IMPRESSION:  No evidence of deep venous thrombosis in either lower extremity.    < from: CT Lower Extremity No Cont, Right (08.11.23 @ 13:48) >    IMPRESSION:    Findings suggestive of cellulitis throughout the lower extremities   outlined above. Evaluation for fluid collection is noted by lack of   intravenous contrast, however no definite fluid collection is seen.   Redemonstration of confluent subcutaneous edema from the level of the mid   femur to the level of the knee which is grossly similar compared to the   prior examination. This may be related to massive localized lymphedema.   No subcutaneous air. No CT evidence of osteomyelitis.    < end of copied text >                    ACC: [id number] EXAM:  US DPLX LWR EXT VEINS COMPL BI   ORDERED BY: CONY GRANT     PROCEDURE DATE:  08/11/2023          INTERPRETATION:  CLINICAL INFORMATION: Lower extremity swelling    COMPARISON: 3/1/2022.    TECHNIQUE: Duplex sonography of the BILATERAL LOWER extremity veins with   color and spectral Doppler, with and without compression.    FINDINGS:    RIGHT:  Normal compressibility of the RIGHT common femoral, femoral and popliteal   veins.  Doppler examination shows normal spontaneous and phasic flow.  Posterior tibial veins are patent. Peroneal veins not visualized due to   large edema.    LEFT:  Normal compressibility of the LEFT common femoral, femoral and popliteal   veins.  Doppler examination shows normal spontaneous and phasic flow.  No LEFT calf vein thrombosis is detected.    IMPRESSION:  No evidence of deep venous thrombosis in either lower extremity.            --- End of Report ---            HAYLEY CRUZ MD; Attending Radiologist  This document has been electronically signed. Aug 11 2023  3:19PM                        I have personally reviewed the above imaging

## 2023-08-11 NOTE — ED PROVIDER NOTE - SKIN WOUND TYPE
+ Bilateral chronic lymphedema, + RLE erythema/warmth/tenderness with multiple wounds noted, open wound of the RT calf area noted with mild yellow-siobhan discharge.

## 2023-08-11 NOTE — ED ADULT NURSE NOTE - CCCP TRG CHIEF CMPLNT
Dr. Mathias reviewed event showing \"1 EGM is available for episode # 10 which occurred on 7/18/19 @ 2:41am, episode lasted 1 minute 49 seconds, rate approx 200 bpm, EGM shows narrow complex tachycardia. \"     pt to resume sotalol 40mg BID.     Spoke with pt, she v/u. Does not need new rx, has plenty at home.    swelling of lower extremities

## 2023-08-11 NOTE — ED ADULT TRIAGE NOTE - CHIEF COMPLAINT QUOTE
c/o swelling, redness and  leaking yellow drainage for right leg. sent to the ed by pmd to r/o cellulitis. history of lymphedema.

## 2023-08-11 NOTE — ED PROVIDER NOTE - OBJECTIVE STATEMENT
78F with HTN, Lymphedema, PVD who presents to ED with right-sided lower extremity pain x 4 associated with foul-smelling drainage from LE wounds. Denies fever, chills, chest pain, shortness of breath, abdominal pain, N/V/D, dysuria, urinary frequency/urgency, extremity weakness/numbness/tingling, lightheadedness, dizziness, or headaches. 78F with HTN, Chronic Lymphedema, PVD who presents to ED with right-sided lower extremity pain x 4 days associated with foul-smelling drainage from LE wounds. Pt states that about 1 week ago she noted a blister of the affected leg, the blister burst and clear liquid fluid drained from the area, however over the past few days she noted thickened yellow discharge with a foul odor. Pt follows Vascular Dr. Olivares, pt went to PMD who referred her to ED for concern of infection. Denies fever, chills, chest pain, shortness of breath, abdominal pain, N/V/D, dysuria, urinary frequency/urgency, extremity weakness/numbness/tingling, lightheadedness, dizziness, or headaches.

## 2023-08-11 NOTE — H&P ADULT - HISTORY OF PRESENT ILLNESS
78 years old female with h/o HTN, chronic lymphedema with RLE wound present to with complain of pain and right lower extremity wound. Patient reported seeing a blister on right lower leg > 1 weeks ago, blister burst on 8/5/23. Initially clear liquid discharge but later become yellowish discharge, associated with severe worsening pain for 1 day. Reported one episode of fever 1 week ago. Patient went to PCP and was sent in to ED with concern for infection.   Hemodynamically stable, afebrile, sat well at RA. WBC 11.17, plt 358, Cr 1.44, glucose 150. Right lower extremity CT with finding suggestive of cellulitis. No definite fluid collection. LE doppler negative for DVT.     SH: no toxic habits

## 2023-08-11 NOTE — H&P ADULT - ASSESSMENT
78 years old female with h/o HTN, chronic lymphedema with RLE wound present to with complain of pain and right lower extremity wound. Patient reported seeing a blister on right lower leg > 1 weeks ago, blister burst on 8/5/23. Initially clear liquid discharge but later become yellowish discharge, associated with severe worsening pain for 1 day. Reported one episode of fever 1 week ago. Patient went to PCP and was sent in to ED with concern for infection.   Hemodynamically stable, afebrile, sat well at RA. WBC 11.17, plt 358, Cr 1.44, glucose 150. Right lower extremity CT with finding suggestive of cellulitis. No definite fluid collection. LE doppler negative for DVT.

## 2023-08-12 LAB
A1C WITH ESTIMATED AVERAGE GLUCOSE RESULT: 6.4 % — HIGH (ref 4–5.6)
ALBUMIN SERPL ELPH-MCNC: 2.5 G/DL — LOW (ref 3.3–5)
ALP SERPL-CCNC: 102 U/L — SIGNIFICANT CHANGE UP (ref 40–120)
ALT FLD-CCNC: 33 U/L — SIGNIFICANT CHANGE UP (ref 12–78)
ANION GAP SERPL CALC-SCNC: 3 MMOL/L — LOW (ref 5–17)
AST SERPL-CCNC: 14 U/L — LOW (ref 15–37)
BILIRUB SERPL-MCNC: 0.4 MG/DL — SIGNIFICANT CHANGE UP (ref 0.2–1.2)
BUN SERPL-MCNC: 33 MG/DL — HIGH (ref 7–23)
CALCIUM SERPL-MCNC: 8.5 MG/DL — SIGNIFICANT CHANGE UP (ref 8.5–10.1)
CHLORIDE SERPL-SCNC: 110 MMOL/L — HIGH (ref 96–108)
CO2 SERPL-SCNC: 28 MMOL/L — SIGNIFICANT CHANGE UP (ref 22–31)
CREAT SERPL-MCNC: 1.07 MG/DL — SIGNIFICANT CHANGE UP (ref 0.5–1.3)
EGFR: 54 ML/MIN/1.73M2 — LOW
ESTIMATED AVERAGE GLUCOSE: 137 MG/DL — HIGH (ref 68–114)
GLUCOSE SERPL-MCNC: 146 MG/DL — HIGH (ref 70–99)
HCT VFR BLD CALC: 27.5 % — LOW (ref 34.5–45)
HGB BLD-MCNC: 8.8 G/DL — LOW (ref 11.5–15.5)
MAGNESIUM SERPL-MCNC: 2.3 MG/DL — SIGNIFICANT CHANGE UP (ref 1.6–2.6)
MCHC RBC-ENTMCNC: 28.4 PG — SIGNIFICANT CHANGE UP (ref 27–34)
MCHC RBC-ENTMCNC: 32 G/DL — SIGNIFICANT CHANGE UP (ref 32–36)
MCV RBC AUTO: 88.7 FL — SIGNIFICANT CHANGE UP (ref 80–100)
NRBC # BLD: 0 /100 WBCS — SIGNIFICANT CHANGE UP (ref 0–0)
PHOSPHATE SERPL-MCNC: 3.6 MG/DL — SIGNIFICANT CHANGE UP (ref 2.5–4.5)
PLATELET # BLD AUTO: 227 K/UL — SIGNIFICANT CHANGE UP (ref 150–400)
POTASSIUM SERPL-MCNC: 4 MMOL/L — SIGNIFICANT CHANGE UP (ref 3.5–5.3)
POTASSIUM SERPL-SCNC: 4 MMOL/L — SIGNIFICANT CHANGE UP (ref 3.5–5.3)
PROT SERPL-MCNC: 7.4 GM/DL — SIGNIFICANT CHANGE UP (ref 6–8.3)
RBC # BLD: 3.1 M/UL — LOW (ref 3.8–5.2)
RBC # FLD: 15.7 % — HIGH (ref 10.3–14.5)
SODIUM SERPL-SCNC: 141 MMOL/L — SIGNIFICANT CHANGE UP (ref 135–145)
WBC # BLD: 7.97 K/UL — SIGNIFICANT CHANGE UP (ref 3.8–10.5)
WBC # FLD AUTO: 7.97 K/UL — SIGNIFICANT CHANGE UP (ref 3.8–10.5)

## 2023-08-12 PROCEDURE — 99232 SBSQ HOSP IP/OBS MODERATE 35: CPT

## 2023-08-12 PROCEDURE — 99222 1ST HOSP IP/OBS MODERATE 55: CPT | Mod: FS

## 2023-08-12 RX ADMIN — ENOXAPARIN SODIUM 40 MILLIGRAM(S): 100 INJECTION SUBCUTANEOUS at 05:23

## 2023-08-12 RX ADMIN — CEFEPIME 100 MILLIGRAM(S): 1 INJECTION, POWDER, FOR SOLUTION INTRAMUSCULAR; INTRAVENOUS at 23:09

## 2023-08-12 RX ADMIN — SODIUM CHLORIDE 100 MILLILITER(S): 9 INJECTION, SOLUTION INTRAVENOUS at 05:23

## 2023-08-12 RX ADMIN — ENOXAPARIN SODIUM 40 MILLIGRAM(S): 100 INJECTION SUBCUTANEOUS at 17:13

## 2023-08-12 RX ADMIN — Medication 650 MILLIGRAM(S): at 17:15

## 2023-08-12 RX ADMIN — OXYCODONE HYDROCHLORIDE 5 MILLIGRAM(S): 5 TABLET ORAL at 04:26

## 2023-08-12 RX ADMIN — SENNA PLUS 2 TABLET(S): 8.6 TABLET ORAL at 21:38

## 2023-08-12 RX ADMIN — Medication 650 MILLIGRAM(S): at 11:28

## 2023-08-12 RX ADMIN — OXYCODONE HYDROCHLORIDE 5 MILLIGRAM(S): 5 TABLET ORAL at 04:56

## 2023-08-12 RX ADMIN — Medication 650 MILLIGRAM(S): at 18:15

## 2023-08-12 RX ADMIN — CEFEPIME 100 MILLIGRAM(S): 1 INJECTION, POWDER, FOR SOLUTION INTRAMUSCULAR; INTRAVENOUS at 05:23

## 2023-08-12 RX ADMIN — CEFEPIME 100 MILLIGRAM(S): 1 INJECTION, POWDER, FOR SOLUTION INTRAMUSCULAR; INTRAVENOUS at 13:45

## 2023-08-12 RX ADMIN — Medication 300 MILLIGRAM(S): at 16:57

## 2023-08-12 NOTE — PHYSICAL THERAPY INITIAL EVALUATION ADULT - PLANNED THERAPY INTERVENTIONS, PT EVAL
GOAL: NEGATIVE PRESSURE WOUND THERAPY R THIGH/CALF/balance training/bed mobility training/gait training/strengthening/transfer training

## 2023-08-12 NOTE — PHYSICAL THERAPY INITIAL EVALUATION ADULT - ADDITIONAL COMMENTS
as per patient, she lives at home with spouse + granddaughter in a  with 4 stairs with 2 HR's (widely) and 13 stairs with R HR to access bedroom. as per patient, she was independent while using rolling walker and cane.

## 2023-08-12 NOTE — CONSULT NOTE ADULT - SUBJECTIVE AND OBJECTIVE BOX
Vascular Attending:      HPI: 78 years old female with h/o HTN, chronic lymphedema with RLE wound present to with complain of pain and right lower extremity wound. Patient reported seeing a blister on right lower leg > 1 weeks ago, blister burst on 8/5/23. Initially clear liquid discharge but later become yellowish discharge, associated with severe worsening pain for 1 day. Reported one episode of fever 1 week ago. Patient went to PCP and was sent in to ED with concern for infection.  Hemodynamically stable, afebrile, sat well at RA. WBC 11.17, plt 358, Cr 1.44, glucose 150. Right lower extremity CT with finding suggestive of cellulitis. No definite fluid collection. LE doppler negative for DVT.     SH: no toxic habits (11 Aug 2023 16:38)      PAST MEDICAL & SURGICAL HISTORY:  Essential hypertension    Morbid obesity      iron deficiency anemia    Hypertension    Lymphedema    H/O total knee replacement, bilateral      Allergies    No Known Allergies    Vital Signs Last 24 Hrs  T(C): 36.8 (12 Aug 2023 05:14), Max: 36.8 (11 Aug 2023 23:38)  T(F): 98.3 (12 Aug 2023 05:14), Max: 98.3 (12 Aug 2023 05:14)  HR: 77 (12 Aug 2023 05:14) (77 - 90)  BP: 106/73 (12 Aug 2023 05:14) (106/73 - 138/74)  BP(mean): --  RR: 18 (12 Aug 2023 05:14) (18 - 18)  SpO2: 96% (12 Aug 2023 05:14) (96% - 100%)    Parameters below as of 12 Aug 2023 05:14  Patient On (Oxygen Delivery Method): room air        Gen: Awake alert nad  HEENT: ncat  CV: s1 s2  Lung: cta b/l   Abd: soft nt nd  Extremities: Rt knee with chronic wound s/p plastic reconstruction. No evidence of infection. Right ankle region: painful with green malodorous discharge. Area cleaned with saline. Aquacell silver, gauze, abd and latrice applied. Wound is about 70% circumferential measuring [id number] approximately. Small blister opening to posterior thigh approximately 1cm                                                                              LABS:                        8.8    7.97  )-----------( 227      ( 12 Aug 2023 10:05 )             27.5     08-11    144  |  111<H>  |  47<H>  ----------------------------<  150<H>  4.4   |  27  |  1.44<H>    Ca    8.6      11 Aug 2023 12:30    TPro  8.1  /  Alb  2.7<L>  /  TBili  0.4  /  DBili  x   /  AST  31  /  ALT  47  /  AlkPhos  132<H>  08-11    PT/INR - ( 11 Aug 2023 12:30 )   PT: 11.4 sec;   INR: 0.95 ratio         PTT - ( 11 Aug 2023 12:30 )  PTT:32.5 sec  Urinalysis Basic - ( 11 Aug 2023 12:30 )    Color: x / Appearance: x / SG: x / pH: x  Gluc: 150 mg/dL / Ketone: x  / Bili: x / Urobili: x   Blood: x / Protein: x / Nitrite: x   Leuk Esterase: x / RBC: x / WBC x   Sq Epi: x / Non Sq Epi: x / Bacteria: x        Impression and Plan: 78 year old female with chronic RLE wound and new Right ankle wound and severe lymphedema.     seen with Dr. Motnoya  continue with antibiotics as per medical team  recommend for ankle wound and knee wound daily cleaning with saline, aquacell silver cover with gauze and latrice to ankle, allevyn to knee. small allevyn to thigh blister.   dc on po antibiotics when cleared and follow up with Dr. Montoya in wound clinic  also please have patient off load pressure to RLE by frequent mobilization and elevation.   discussed with Dr. Carney

## 2023-08-12 NOTE — PHYSICAL THERAPY INITIAL EVALUATION ADULT - DID THE PATIENT HAVE SURGERY?
This is the hx of SIDDHARTHA. a 79 y/o female patient who was admitted to [facility] due to complications of Cellulitis on R LE affecting medical condition and with subsequent affection on functional mobility./n/a

## 2023-08-12 NOTE — PHYSICAL THERAPY INITIAL EVALUATION ADULT - PERTINENT HX OF CURRENT PROBLEM, REHAB EVAL
This is the hx of ENOCH a 77 y/o female patient who was admitted to [facility] due to complications of Cellulitis on R LE affecting medical condition and with subsequent affection on functional mobility. US Duplex 8/11/23: (-) DVT.

## 2023-08-13 LAB
ANION GAP SERPL CALC-SCNC: 3 MMOL/L — LOW (ref 5–17)
BUN SERPL-MCNC: 27 MG/DL — HIGH (ref 7–23)
CALCIUM SERPL-MCNC: 8.6 MG/DL — SIGNIFICANT CHANGE UP (ref 8.5–10.1)
CHLORIDE SERPL-SCNC: 105 MMOL/L — SIGNIFICANT CHANGE UP (ref 96–108)
CO2 SERPL-SCNC: 29 MMOL/L — SIGNIFICANT CHANGE UP (ref 22–31)
CREAT SERPL-MCNC: 0.93 MG/DL — SIGNIFICANT CHANGE UP (ref 0.5–1.3)
EGFR: 63 ML/MIN/1.73M2 — SIGNIFICANT CHANGE UP
GLUCOSE SERPL-MCNC: 106 MG/DL — HIGH (ref 70–99)
POTASSIUM SERPL-MCNC: 4 MMOL/L — SIGNIFICANT CHANGE UP (ref 3.5–5.3)
POTASSIUM SERPL-SCNC: 4 MMOL/L — SIGNIFICANT CHANGE UP (ref 3.5–5.3)
SODIUM SERPL-SCNC: 137 MMOL/L — SIGNIFICANT CHANGE UP (ref 135–145)
VANCOMYCIN TROUGH SERPL-MCNC: 12.9 UG/ML — SIGNIFICANT CHANGE UP (ref 10–20)

## 2023-08-13 PROCEDURE — 99232 SBSQ HOSP IP/OBS MODERATE 35: CPT

## 2023-08-13 RX ADMIN — OXYCODONE HYDROCHLORIDE 5 MILLIGRAM(S): 5 TABLET ORAL at 10:26

## 2023-08-13 RX ADMIN — OXYCODONE HYDROCHLORIDE 5 MILLIGRAM(S): 5 TABLET ORAL at 21:15

## 2023-08-13 RX ADMIN — OXYCODONE HYDROCHLORIDE 5 MILLIGRAM(S): 5 TABLET ORAL at 20:15

## 2023-08-13 RX ADMIN — ENOXAPARIN SODIUM 40 MILLIGRAM(S): 100 INJECTION SUBCUTANEOUS at 17:48

## 2023-08-13 RX ADMIN — OXYCODONE HYDROCHLORIDE 5 MILLIGRAM(S): 5 TABLET ORAL at 02:14

## 2023-08-13 RX ADMIN — CEFEPIME 100 MILLIGRAM(S): 1 INJECTION, POWDER, FOR SOLUTION INTRAMUSCULAR; INTRAVENOUS at 13:56

## 2023-08-13 RX ADMIN — Medication 300 MILLIGRAM(S): at 17:47

## 2023-08-13 RX ADMIN — CEFEPIME 100 MILLIGRAM(S): 1 INJECTION, POWDER, FOR SOLUTION INTRAMUSCULAR; INTRAVENOUS at 05:04

## 2023-08-13 RX ADMIN — OXYCODONE HYDROCHLORIDE 5 MILLIGRAM(S): 5 TABLET ORAL at 09:26

## 2023-08-13 RX ADMIN — CEFEPIME 100 MILLIGRAM(S): 1 INJECTION, POWDER, FOR SOLUTION INTRAMUSCULAR; INTRAVENOUS at 22:28

## 2023-08-13 RX ADMIN — ENOXAPARIN SODIUM 40 MILLIGRAM(S): 100 INJECTION SUBCUTANEOUS at 05:04

## 2023-08-14 LAB
GLUCOSE BLDC GLUCOMTR-MCNC: 114 MG/DL — HIGH (ref 70–99)
GLUCOSE BLDC GLUCOMTR-MCNC: 127 MG/DL — HIGH (ref 70–99)
VANCOMYCIN TROUGH SERPL-MCNC: 14.8 UG/ML — SIGNIFICANT CHANGE UP (ref 10–20)

## 2023-08-14 PROCEDURE — 99232 SBSQ HOSP IP/OBS MODERATE 35: CPT

## 2023-08-14 RX ADMIN — CEFEPIME 100 MILLIGRAM(S): 1 INJECTION, POWDER, FOR SOLUTION INTRAMUSCULAR; INTRAVENOUS at 21:40

## 2023-08-14 RX ADMIN — CEFEPIME 100 MILLIGRAM(S): 1 INJECTION, POWDER, FOR SOLUTION INTRAMUSCULAR; INTRAVENOUS at 05:19

## 2023-08-14 RX ADMIN — OXYCODONE HYDROCHLORIDE 5 MILLIGRAM(S): 5 TABLET ORAL at 05:21

## 2023-08-14 RX ADMIN — Medication 300 MILLIGRAM(S): at 17:53

## 2023-08-14 RX ADMIN — ENOXAPARIN SODIUM 40 MILLIGRAM(S): 100 INJECTION SUBCUTANEOUS at 17:52

## 2023-08-14 RX ADMIN — Medication 650 MILLIGRAM(S): at 10:00

## 2023-08-14 RX ADMIN — Medication 650 MILLIGRAM(S): at 09:00

## 2023-08-14 RX ADMIN — ENOXAPARIN SODIUM 40 MILLIGRAM(S): 100 INJECTION SUBCUTANEOUS at 05:19

## 2023-08-14 RX ADMIN — OXYCODONE HYDROCHLORIDE 5 MILLIGRAM(S): 5 TABLET ORAL at 13:04

## 2023-08-14 RX ADMIN — OXYCODONE HYDROCHLORIDE 5 MILLIGRAM(S): 5 TABLET ORAL at 12:04

## 2023-08-14 RX ADMIN — OXYCODONE HYDROCHLORIDE 5 MILLIGRAM(S): 5 TABLET ORAL at 19:17

## 2023-08-14 RX ADMIN — OXYCODONE HYDROCHLORIDE 5 MILLIGRAM(S): 5 TABLET ORAL at 06:21

## 2023-08-14 RX ADMIN — CEFEPIME 100 MILLIGRAM(S): 1 INJECTION, POWDER, FOR SOLUTION INTRAMUSCULAR; INTRAVENOUS at 15:12

## 2023-08-14 RX ADMIN — OXYCODONE HYDROCHLORIDE 5 MILLIGRAM(S): 5 TABLET ORAL at 20:03

## 2023-08-14 NOTE — DIETITIAN INITIAL EVALUATION ADULT - PERTINENT LABORATORY DATA
08-13    137  |  105  |  27<H>  ----------------------------<  106<H>  4.0   |  29  |  0.93    Ca    8.6      13 Aug 2023 05:30    POCT Blood Glucose.: 127 mg/dL (08-14-23 @ 11:05)  A1C with Estimated Average Glucose Result: 6.4 % (08-12-23 @ 10:05)

## 2023-08-14 NOTE — DIETITIAN INITIAL EVALUATION ADULT - NS FNS DIET ORDER
Diet, DASH/TLC:   Sodium & Cholesterol Restricted  Consistent Carbohydrate {No Snacks} (08-11-23 @ 16:13) [Active]

## 2023-08-14 NOTE — PROGRESS NOTE ADULT - PROBLEM SELECTOR PLAN 1
Right lower extremity CT with finding suggestive of cellulitis. No definite fluid collection. LE doppler negative for DVT  Check CRP, ESR, blood culture  prior Wound culture with carbapenem resistant Pseudomonas  Discussed with ID, will continue with vanco and cefepime for now  Wound care/vascular consulted--> Dr Montoya  8/12 seen by vascualr and wound evaluated. rec IV abx. f/u vanco trough
Right lower extremity CT with finding suggestive of cellulitis. No definite fluid collection. LE doppler negative for DVT  Check CRP, ESR, blood culture  prior Wound culture with carbapenem resistant Pseudomonas  Discussed with ID, will continue with vanco and cefepime for now  Wound care/vascular consulted--> Dr Montoya  8/12 seen by vascualr and wound evaluated. rec IV abx.   8/13 f/u vanco trough. blood cx neg. pt rec jose. will discuss with pt
Right lower extremity CT with finding suggestive of cellulitis. No definite fluid collection. LE doppler negative for DVT  Check CRP, ESR, blood culture  prior Wound culture with carbapenem resistant Pseudomonas  Discussed with ID, will continue with vanco and cefepime for now  Wound care/vascular consulted--> Dr Montoya  8/12 seen by vascular and wound evaluated. rec IV abx.   8/13 f/u vanco trough. blood cx neg. pt rec jose. will discuss with pt  8/14 wounds showing improvement. possible dc tomorrow on oral abx

## 2023-08-14 NOTE — PROGRESS NOTE ADULT - PROBLEM SELECTOR PLAN 3
BP stable off meds   Hold losartan given CAROL  If BP elevated, will consider BP med
BP stable off meds   Hold losartan given CRAOL  If BP elevated, will consider BP med
BP appear ok  Hold losartan given CAROL  If BP elevated, will consider BP med

## 2023-08-14 NOTE — DIETITIAN INITIAL EVALUATION ADULT - PROBLEM SELECTOR PLAN 1
sent in by PCP with concern for RLE cellulitis  Patient had blister, which burst on 8/5/23, gradually have worsening pain/redness/swelling. One episode of fever last week  Right LE color is darker with redness in lower leg. Wound with slight yellowish discharge, erythema/tender/warmth to touch in skin around area of wound  Slight leukocytosis  Right lower extremity CT  ( I personally review) with finding suggestive of cellulitis. No definite fluid collection. LE doppler negative for DVT  Slight leukocytosis  Check CRP, ESR, blood culture  prior Wound culture with carbapenem resistant Pseudomonas  Start Vancomycin, monitor vanco trough. Therapeutic monitoring is necessary with vanco. Check MRSA PCR  ID consult---> ? need Zerbaxa in view of prior culture data  Discussed with ID, will continue with vanco and cefepime for now  Wound care/vascular consulted--> Dr Montoya

## 2023-08-14 NOTE — PROGRESS NOTE ADULT - SUBJECTIVE AND OBJECTIVE BOX
[facility]  INFECTIOUS DISEASES   [address] [place] [address]  Tel: [phone]     Fax: [phone]  ======================================================  Hugo Gaines,MD Que, DO Dolores Macias, ELADIA   ======================================================    ALICIA GONZALEZ  MRN-[id number]  78y ([birth year])      Interval History: patient seen and examined starting to feel better and less pain, less swelling     ROS:    [ ] Unobtainable because:  [x ] All other systems negative except as noted    Constitutional: no fever, no chills  Head: no trauma  Eyes: no vision changes, no eye pain  ENT:  no sore throat, no rhinorrhea  Cardiovascular:  no chest pain, no palpitation  Respiratory:  no SOB, no cough  GI:  no abd pain, no vomiting, no diarrhea  urinary: no dysuria, no hematuria, no flank pain  musculoskeletal:  no joint pain, no joint swelling  skin: ulcer is painful   neurology:  no headache, no seizure, no change in mental status  psych: no anxiety, no depression         Allergies  No Known Allergies        ANTIMICROBIALS:  cefepime   IVPB 1000 every 8 hours  vancomycin  IVPB 1500 every 24 hours      OTHER MEDS:  acetaminophen     Tablet .. 650 milliGRAM(s) Oral every 6 hours PRN  aluminum hydroxide/magnesium hydroxide/simethicone Suspension 30 milliLiter(s) Oral every 4 hours PRN  enoxaparin Injectable 40 milliGRAM(s) SubCutaneous every 12 hours  lactated ringers. 1000 milliLiter(s) IV Continuous <Continuous>  melatonin 3 milliGRAM(s) Oral at bedtime PRN  ondansetron Injectable 4 milliGRAM(s) IV Push every 8 hours PRN  oxyCODONE    IR 5 milliGRAM(s) Oral every 6 hours PRN  polyethylene glycol 3350 17 Gram(s) Oral daily  senna 2 Tablet(s) Oral at bedtime      Physical Exam:  Vital Signs Last 24 Hrs  T(C): 37.3 (14 Aug 2023 18:23), Max: 37.3 (14 Aug 2023 18:23)  T(F): 99.1 (14 Aug 2023 18:23), Max: 99.1 (14 Aug 2023 18:23)  HR: 75 (14 Aug 2023 18:23) (74 - 78)  BP: 110/60 (14 Aug 2023 18:23) (109/70 - 124/67)  BP(mean): --  RR: 18 (14 Aug 2023 18:23) (17 - 18)  SpO2: 99% (14 Aug 2023 18:23) (95% - 100%)    Parameters below as of 14 Aug 2023 18:23  Patient On (Oxygen Delivery Method): room air        General:    NAD,  non toxic, obese   Head: atraumatic, normocephalic  Eye: normal sclera and conjunctiva  ENT:    no oral lesions, neck supple  Cardio:     regular S1, S2,  no murmur  Respiratory:    clear b/l,    no wheezing  abd:     soft,   BS +,   no tenderness  :   no CVAT,  no suprapubic tenderness,   no  blum  Musculoskeletal:   no joint swelling,   +edema  vascular: no central lines, +PIV   Skin:    +lymphedema of both legs R>L, right lower extremity ulcer on posterior  calf improving, less drainage, epithelial tissue developing   Neurologic:     no focal deficit  psych: normal affect    WBC Count: 7.97 K/uL (08-12 @ 10:05)  WBC Count: 11.17 K/uL (08-11 @ 12:30)          08-13    137  |  105  |  27<H>  ----------------------------<  106<H>  4.0   |  29  |  0.93    Ca    8.6      13 Aug 2023 05:30        Urinalysis Basic - ( 13 Aug 2023 05:30 )    Color: x / Appearance: x / SG: x / pH: x  Gluc: 106 mg/dL / Ketone: x  / Bili: x / Urobili: x   Blood: x / Protein: x / Nitrite: x   Leuk Esterase: x / RBC: x / WBC x   Sq Epi: x / Non Sq Epi: x / Bacteria: x          Creatinine Trend: 0.93<--, 1.07<--, 1.44<--, 1.66<--, 2.09<--      MICROBIOLOGY:  Vancomycin Level, Trough: 14.8 ug/mL (08-14-23 @ 15:47)  v  .Blood Blood-Peripheral  08-11-23   No growth at 48 Hours  --  --      .Blood Blood-Peripheral  08-11-23   No growth at 48 Hours  --  --      C-Reactive Protein, Serum: 58 (08-11)    RADIOLOGY:  < from: US Duplex Venous Lower Ext Complete, Bilateral (08.11.23 @ 15:09) >  IMPRESSION:  No evidence of deep venous thrombosis in either lower extremity.        
Patient is a 78y old  Female who presents with a chief complaint of RLE wound/cellulitis (12 Aug 2023 10:53)      INTERVAL HPI/OVERNIGHT EVENTS: afebrile    MEDICATIONS  (STANDING):  cefepime   IVPB 1000 milliGRAM(s) IV Intermittent every 8 hours  enoxaparin Injectable 40 milliGRAM(s) SubCutaneous every 12 hours  lactated ringers. 1000 milliLiter(s) (100 mL/Hr) IV Continuous <Continuous>  polyethylene glycol 3350 17 Gram(s) Oral daily  senna 2 Tablet(s) Oral at bedtime  vancomycin  IVPB 1500 milliGRAM(s) IV Intermittent every 24 hours    MEDICATIONS  (PRN):  acetaminophen     Tablet .. 650 milliGRAM(s) Oral every 6 hours PRN Temp greater or equal to 38C (100.4F), Mild Pain (1 - 3), Moderate Pain (4 - 6)  aluminum hydroxide/magnesium hydroxide/simethicone Suspension 30 milliLiter(s) Oral every 4 hours PRN Dyspepsia  melatonin 3 milliGRAM(s) Oral at bedtime PRN Insomnia  ondansetron Injectable 4 milliGRAM(s) IV Push every 8 hours PRN Nausea and/or Vomiting  oxyCODONE    IR 5 milliGRAM(s) Oral every 6 hours PRN Severe Pain (7 - 10)      Allergies    No Known Allergies    Intolerances        REVIEW OF SYSTEMS:  CONSTITUTIONAL: No fever, weight loss  EYES: No eye pain, visual disturbances, or discharge  ENMT:  No difficulty hearing, tinnitus, vertigo; No sinus or throat pain  RESPIRATORY: No cough, wheezing, chills or hemoptysis; No shortness of breath  CARDIOVASCULAR: No chest pain, palpitations, dizziness, or leg swelling  GASTROINTESTINAL: No abdominal or epigastric pain. No nausea, vomiting, or hematemesis; No diarrhea or constipation. No melena or hematochezia.  GENITOURINARY: No dysuria, frequency, hematuria, or incontinence  NEUROLOGICAL: No headaches, memory loss, loss of strength, numbness, or tremors    Vital Signs Last 24 Hrs  T(C): 36.9 (14 Aug 2023 11:13), Max: 37.1 (13 Aug 2023 23:58)  T(F): 98.4 (14 Aug 2023 11:13), Max: 98.8 (14 Aug 2023 05:01)  HR: 78 (14 Aug 2023 11:13) (74 - 78)  BP: 124/67 (14 Aug 2023 11:13) (109/70 - 130/78)  BP(mean): --  RR: 17 (14 Aug 2023 11:13) (17 - 18)  SpO2: 96% (14 Aug 2023 11:13) (96% - 100%)    Parameters below as of 14 Aug 2023 05:01  Patient On (Oxygen Delivery Method): room air              PHYSICAL EXAM:  GENERAL: NAD  HEAD:  Atraumatic, Normocephalic  EYES: EOMI, PERRLA, conjunctiva and sclera clear  ENMT: No tonsillar erythema, exudates, or enlargement;   NECK: Supple, Normal thyroid  NERVOUS SYSTEM:  Alert & Oriented X3, Good concentration; Motor Strength 5/5 B/L upper and lower extremities; DTRs 2+ intact and symmetric  CHEST/LUNG: CTABL; No rales, rhonchi, wheezing, or rubs  HEART: Regular rate and rhythm; No murmurs, rubs, or gallops  ABDOMEN: Soft, Nontender, Nondistended; Bowel sounds present  EXTREMITIES:  No clubbing, cyanosis, or edema  SKIN: legs wrapped     LABS:               08-13    137  |  105  |  27<H>  ----------------------------<  106<H>  4.0   |  29  |  0.93    Ca    8.6      13 Aug 2023 05:30        Urinalysis Basic - ( 13 Aug 2023 05:30 )    Color: x / Appearance: x / SG: x / pH: x  Gluc: 106 mg/dL / Ketone: x  / Bili: x / Urobili: x   Blood: x / Protein: x / Nitrite: x   Leuk Esterase: x / RBC: x / WBC x   Sq Epi: x / Non Sq Epi: x / Bacteria: x                  RADIOLOGY & ADDITIONAL TESTS:    Imaging Personally Reviewed:  [ ] YES  [ ] NO    Consultant(s) Notes Reviewed:  [ ] YES  [ ] NO    Care Discussed with Consultants/Other Providers [ ] YES  [ ] NO
Patient is a 78y old  Female who presents with a chief complaint of RLE wound/cellulitis (12 Aug 2023 10:53)      INTERVAL HPI/OVERNIGHT EVENTS: afebrile     MEDICATIONS  (STANDING):  cefepime   IVPB 1000 milliGRAM(s) IV Intermittent every 8 hours  enoxaparin Injectable 40 milliGRAM(s) SubCutaneous every 12 hours  lactated ringers. 1000 milliLiter(s) (100 mL/Hr) IV Continuous <Continuous>  polyethylene glycol 3350 17 Gram(s) Oral daily  senna 2 Tablet(s) Oral at bedtime  vancomycin  IVPB 1500 milliGRAM(s) IV Intermittent every 24 hours    MEDICATIONS  (PRN):  acetaminophen     Tablet .. 650 milliGRAM(s) Oral every 6 hours PRN Temp greater or equal to 38C (100.4F), Mild Pain (1 - 3), Moderate Pain (4 - 6)  aluminum hydroxide/magnesium hydroxide/simethicone Suspension 30 milliLiter(s) Oral every 4 hours PRN Dyspepsia  melatonin 3 milliGRAM(s) Oral at bedtime PRN Insomnia  ondansetron Injectable 4 milliGRAM(s) IV Push every 8 hours PRN Nausea and/or Vomiting  oxyCODONE    IR 5 milliGRAM(s) Oral every 6 hours PRN Severe Pain (7 - 10)      Allergies    No Known Allergies    Intolerances        REVIEW OF SYSTEMS:  CONSTITUTIONAL: No fever, weight loss  EYES: No eye pain, visual disturbances, or discharge  ENMT:  No difficulty hearing, tinnitus, vertigo; No sinus or throat pain  RESPIRATORY: No cough, wheezing, chills or hemoptysis; No shortness of breath  CARDIOVASCULAR: No chest pain, palpitations, dizziness, or leg swelling  GASTROINTESTINAL: No abdominal or epigastric pain. No nausea, vomiting, or hematemesis; No diarrhea or constipation. No melena or hematochezia.  GENITOURINARY: No dysuria, frequency, hematuria, or incontinence  NEUROLOGICAL: No headaches, memory loss, loss of strength, numbness, or tremors    Vital Signs Last 24 Hrs  T(C): 37.1 (13 Aug 2023 07:17), Max: 37.1 (13 Aug 2023 07:17)  T(F): 98.8 (13 Aug 2023 07:17), Max: 98.8 (13 Aug 2023 07:17)  HR: 76 (13 Aug 2023 07:17) (72 - 76)  BP: 123/77 (13 Aug 2023 07:17) (92/53 - 123/77)  BP(mean): --  RR: 17 (13 Aug 2023 07:17) (17 - 18)  SpO2: 96% (13 Aug 2023 07:17) (96% - 98%)    Parameters below as of 13 Aug 2023 07:17  Patient On (Oxygen Delivery Method): room air          PHYSICAL EXAM:  GENERAL: NAD  HEAD:  Atraumatic, Normocephalic  EYES: EOMI, PERRLA, conjunctiva and sclera clear  ENMT: No tonsillar erythema, exudates, or enlargement;   NECK: Supple, Normal thyroid  NERVOUS SYSTEM:  Alert & Oriented X3, Good concentration; Motor Strength 5/5 B/L upper and lower extremities; DTRs 2+ intact and symmetric  CHEST/LUNG: CTABL; No rales, rhonchi, wheezing, or rubs  HEART: Regular rate and rhythm; No murmurs, rubs, or gallops  ABDOMEN: Soft, Nontender, Nondistended; Bowel sounds present  EXTREMITIES:  No clubbing, cyanosis, or edema  SKIN: legs wrapped   LABS:                                   8.8    7.97  )-----------( 227      ( 12 Aug 2023 10:05 )             27.5     08-13    137  |  105  |  27<H>  ----------------------------<  106<H>  4.0   |  29  |  0.93    Ca    8.6      13 Aug 2023 05:30  Phos  3.6     08-12  Mg     2.3     08-12    TPro  7.4  /  Alb  2.5<L>  /  TBili  0.4  /  DBili  x   /  AST  14<L>  /  ALT  33  /  AlkPhos  102  08-12    PT/INR - ( 11 Aug 2023 12:30 )   PT: 11.4 sec;   INR: 0.95 ratio         PTT - ( 11 Aug 2023 12:30 )  PTT:32.5 sec  Urinalysis Basic - ( 13 Aug 2023 05:30 )    Color: x / Appearance: x / SG: x / pH: x  Gluc: 106 mg/dL / Ketone: x  / Bili: x / Urobili: x   Blood: x / Protein: x / Nitrite: x   Leuk Esterase: x / RBC: x / WBC x   Sq Epi: x / Non Sq Epi: x / Bacteria: x            RADIOLOGY & ADDITIONAL TESTS:    Imaging Personally Reviewed:  [ ] YES  [ ] NO    Consultant(s) Notes Reviewed:  [ ] YES  [ ] NO    Care Discussed with Consultants/Other Providers [ ] YES  [ ] NO
Patient is a 78y old  Female who presents with a chief complaint of RLE wound/cellulitis (12 Aug 2023 10:53)      INTERVAL HPI/OVERNIGHT EVENTS: afebrile     MEDICATIONS  (STANDING):  cefepime   IVPB 1000 milliGRAM(s) IV Intermittent every 8 hours  enoxaparin Injectable 40 milliGRAM(s) SubCutaneous every 12 hours  lactated ringers. 1000 milliLiter(s) (100 mL/Hr) IV Continuous <Continuous>  polyethylene glycol 3350 17 Gram(s) Oral daily  senna 2 Tablet(s) Oral at bedtime  vancomycin  IVPB 1500 milliGRAM(s) IV Intermittent every 24 hours    MEDICATIONS  (PRN):  acetaminophen     Tablet .. 650 milliGRAM(s) Oral every 6 hours PRN Temp greater or equal to 38C (100.4F), Mild Pain (1 - 3), Moderate Pain (4 - 6)  aluminum hydroxide/magnesium hydroxide/simethicone Suspension 30 milliLiter(s) Oral every 4 hours PRN Dyspepsia  melatonin 3 milliGRAM(s) Oral at bedtime PRN Insomnia  ondansetron Injectable 4 milliGRAM(s) IV Push every 8 hours PRN Nausea and/or Vomiting  oxyCODONE    IR 5 milliGRAM(s) Oral every 6 hours PRN Severe Pain (7 - 10)      Allergies    No Known Allergies    Intolerances        REVIEW OF SYSTEMS:  CONSTITUTIONAL: No fever, weight loss  EYES: No eye pain, visual disturbances, or discharge  ENMT:  No difficulty hearing, tinnitus, vertigo; No sinus or throat pain  RESPIRATORY: No cough, wheezing, chills or hemoptysis; No shortness of breath  CARDIOVASCULAR: No chest pain, palpitations, dizziness, or leg swelling  GASTROINTESTINAL: No abdominal or epigastric pain. No nausea, vomiting, or hematemesis; No diarrhea or constipation. No melena or hematochezia.  GENITOURINARY: No dysuria, frequency, hematuria, or incontinence  NEUROLOGICAL: No headaches, memory loss, loss of strength, numbness, or tremors    Vital Signs Last 24 Hrs  T(C): 36.7 (12 Aug 2023 11:45), Max: 36.8 (11 Aug 2023 23:38)  T(F): 98.1 (12 Aug 2023 11:45), Max: 98.3 (12 Aug 2023 05:14)  HR: 74 (12 Aug 2023 11:45) (74 - 84)  BP: 101/60 (12 Aug 2023 11:45) (101/60 - 138/74)  BP(mean): --  RR: 18 (12 Aug 2023 11:45) (18 - 18)  SpO2: 97% (12 Aug 2023 11:45) (96% - 100%)    Parameters below as of 12 Aug 2023 11:45  Patient On (Oxygen Delivery Method): room air        PHYSICAL EXAM:  GENERAL: NAD  HEAD:  Atraumatic, Normocephalic  EYES: EOMI, PERRLA, conjunctiva and sclera clear  ENMT: No tonsillar erythema, exudates, or enlargement;   NECK: Supple, Normal thyroid  NERVOUS SYSTEM:  Alert & Oriented X3, Good concentration; Motor Strength 5/5 B/L upper and lower extremities; DTRs 2+ intact and symmetric  CHEST/LUNG: CTABL; No rales, rhonchi, wheezing, or rubs  HEART: Regular rate and rhythm; No murmurs, rubs, or gallops  ABDOMEN: Soft, Nontender, Nondistended; Bowel sounds present  EXTREMITIES:  No clubbing, cyanosis, or edema  SKIN: legs wrapped   LABS:                        8.8    7.97  )-----------( 227      ( 12 Aug 2023 10:05 )             27.5     08-12    141  |  110<H>  |  33<H>  ----------------------------<  146<H>  4.0   |  28  |  1.07    Ca    8.5      12 Aug 2023 10:05  Phos  3.6     08-12  Mg     2.3     08-12    TPro  7.4  /  Alb  2.5<L>  /  TBili  0.4  /  DBili  x   /  AST  14<L>  /  ALT  33  /  AlkPhos  102  08-12    PT/INR - ( 11 Aug 2023 12:30 )   PT: 11.4 sec;   INR: 0.95 ratio         PTT - ( 11 Aug 2023 12:30 )  PTT:32.5 sec  Urinalysis Basic - ( 12 Aug 2023 10:05 )    Color: x / Appearance: x / SG: x / pH: x  Gluc: 146 mg/dL / Ketone: x  / Bili: x / Urobili: x   Blood: x / Protein: x / Nitrite: x   Leuk Esterase: x / RBC: x / WBC x   Sq Epi: x / Non Sq Epi: x / Bacteria: x      CAPILLARY BLOOD GLUCOSE          RADIOLOGY & ADDITIONAL TESTS:    Imaging Personally Reviewed:  [ ] YES  [ ] NO    Consultant(s) Notes Reviewed:  [ ] YES  [ ] NO    Care Discussed with Consultants/Other Providers [ ] YES  [ ] NO

## 2023-08-14 NOTE — DIETITIAN NUTRITION RISK NOTIFICATION - TREATMENT: THE FOLLOWING DIET HAS BEEN RECOMMENDED
Diet, DASH/TLC:   Sodium & Cholesterol Restricted  Consistent Carbohydrate {No Snacks}  Liquid Protein Supplement     Qty per Day:  1 (08-14-23 @ 14:18) [Pending Verification By Attending]  Diet, DASH/TLC:   Sodium & Cholesterol Restricted  Consistent Carbohydrate {No Snacks} (08-11-23 @ 16:13) [Active]

## 2023-08-14 NOTE — DIETITIAN INITIAL EVALUATION ADULT - PERTINENT MEDS FT
MEDICATIONS  (STANDING):  cefepime   IVPB 1000 milliGRAM(s) IV Intermittent every 8 hours  enoxaparin Injectable 40 milliGRAM(s) SubCutaneous every 12 hours  lactated ringers. 1000 milliLiter(s) (100 mL/Hr) IV Continuous <Continuous>  polyethylene glycol 3350 17 Gram(s) Oral daily  senna 2 Tablet(s) Oral at bedtime  vancomycin  IVPB 1500 milliGRAM(s) IV Intermittent every 24 hours    MEDICATIONS  (PRN):  acetaminophen     Tablet .. 650 milliGRAM(s) Oral every 6 hours PRN Temp greater or equal to 38C (100.4F), Mild Pain (1 - 3), Moderate Pain (4 - 6)  aluminum hydroxide/magnesium hydroxide/simethicone Suspension 30 milliLiter(s) Oral every 4 hours PRN Dyspepsia  melatonin 3 milliGRAM(s) Oral at bedtime PRN Insomnia  ondansetron Injectable 4 milliGRAM(s) IV Push every 8 hours PRN Nausea and/or Vomiting  oxyCODONE    IR 5 milliGRAM(s) Oral every 6 hours PRN Severe Pain (7 - 10)

## 2023-08-14 NOTE — DIETITIAN INITIAL EVALUATION ADULT - PERSON TAUGHT/METHOD
Provided [employer] nutrition counseling/education materials, as well as wt loss tips handout. Also discussed importance of adequate protein to promote wound healing./verbal instruction/written material/patient instructed

## 2023-08-14 NOTE — PROGRESS NOTE ADULT - ASSESSMENT
78 years old female with h/o HTN, chronic lymphedema with RLE wound present to with complain of pain and right lower extremity wound. Patient reported seeing a blister on right lower leg > 1 weeks ago, blister burst on 8/5/23. Initially clear liquid discharge but later become yellowish discharge, associated with severe worsening pain for 1 day. Reported one episode of fever 1 week ago. Patient went to PCP and was sent in to ED with concern for infection.   Hemodynamically stable, afebrile, sat well at RA. WBC 11.17, plt 358, Cr 1.44, glucose 150. Right lower extremity CT with finding suggestive of cellulitis. No definite fluid collection. LE doppler negative for DVT. 
78 years old female with h/o HTN, chronic lymphedema with RLE wound present to with complain of pain and right lower extremity wound. Patient reported seeing a blister on right lower leg > 1 weeks ago, blister burst on 8/5/23. Initially clear liquid discharge but later become yellowish discharge, associated with severe worsening pain for 1 day. Reported one episode of fever 1 week ago. Patient went to PCP and was sent in to ED with concern for infection.   Hemodynamically stable, afebrile, sat well at RA.   WBC 11.17, plt 358, Cr 1.44, glucose 150.   Right lower extremity CT with finding suggestive of cellulitis. No definite fluid collection. LE doppler negative for DVT.     8/14: exam is improving, VT appropriate, wbc improved to normal, blood cultures negative, tentative d/c tomorrow      Plan:  continue vancomycin   continue checking vancomycin trough with target AUC/SENAIT 400-600   (therapeutic drug monitoring required with IV vancomycin)   continue cefepime   blood cultures x2 negative   do not swab leg for cultures   leg elevation   gentle IV fluids  hba1c 6.14   tdap given 8/11/23  d/c home on PO antibiotics tomorrow with Doxycycline 100mg 2 times a day and Levaquin 750mg for 5 more days     Discussed with Dr. Rommel Barnes, DO  Infectious Disease Attending  Reachable via Microsoft Teams or ID office: [phone]  After 5pm/weekends please call [phone] for all inquiries and new consults

## 2023-08-14 NOTE — PROGRESS NOTE ADULT - PROBLEM SELECTOR PLAN 4
weight loss and lifestyle modification

## 2023-08-14 NOTE — DIETITIAN INITIAL EVALUATION ADULT - OTHER INFO
Pt reports good appetite and good PO intake PTA. States she tries to limit sodium intake at home. Continues with good appetite and PO intake during admission; consuming % of documented meals as per flow sheets. Denies any chew/swallowing difficulty or any N/V/D/C. Confirms no known food allergies.  Pt denies any hx of DM or pre-DM; A1c=6.4%, which in technically in pre-DM range; will keep pt on consistent carbohydrate diet to keep BG levels within acceptable range.  Pt denies any changes in wt.

## 2023-08-14 NOTE — PROGRESS NOTE ADULT - NUTRITIONAL ASSESSMENT
This patient has been assessed with a concern for Malnutrition and has been determined to have a diagnosis/diagnoses of Morbid obesity (BMI > 40).    This patient is being managed with:   Diet DASH/TLC-  Sodium & Cholesterol Restricted  Consistent Carbohydrate {No Snacks}  Liquid Protein Supplement     Qty per Day:  1  Entered: Aug 14 2023  2:18PM    Diet DASH/TLC-  Sodium & Cholesterol Restricted  Consistent Carbohydrate {No Snacks}  Entered: Aug 11 2023  4:13PM    The following pending diet order is being considered for treatment of Morbid obesity (BMI > 40):null

## 2023-08-15 ENCOUNTER — TRANSCRIPTION ENCOUNTER (OUTPATIENT)
Age: 78
End: 2023-08-15

## 2023-08-15 VITALS
RESPIRATION RATE: 18 BRPM | OXYGEN SATURATION: 99 % | TEMPERATURE: 99 F | HEART RATE: 79 BPM | SYSTOLIC BLOOD PRESSURE: 123 MMHG | DIASTOLIC BLOOD PRESSURE: 79 MMHG

## 2023-08-15 LAB
ANION GAP SERPL CALC-SCNC: 4 MMOL/L — LOW (ref 5–17)
BUN SERPL-MCNC: 21 MG/DL — SIGNIFICANT CHANGE UP (ref 7–23)
CALCIUM SERPL-MCNC: 8.5 MG/DL — SIGNIFICANT CHANGE UP (ref 8.5–10.1)
CHLORIDE SERPL-SCNC: 102 MMOL/L — SIGNIFICANT CHANGE UP (ref 96–108)
CO2 SERPL-SCNC: 30 MMOL/L — SIGNIFICANT CHANGE UP (ref 22–31)
CREAT SERPL-MCNC: 0.84 MG/DL — SIGNIFICANT CHANGE UP (ref 0.5–1.3)
EGFR: 72 ML/MIN/1.73M2 — SIGNIFICANT CHANGE UP
GLUCOSE SERPL-MCNC: 112 MG/DL — HIGH (ref 70–99)
HCT VFR BLD CALC: 27.5 % — LOW (ref 34.5–45)
HGB BLD-MCNC: 8.5 G/DL — LOW (ref 11.5–15.5)
MCHC RBC-ENTMCNC: 27.6 PG — SIGNIFICANT CHANGE UP (ref 27–34)
MCHC RBC-ENTMCNC: 30.9 G/DL — LOW (ref 32–36)
MCV RBC AUTO: 89.3 FL — SIGNIFICANT CHANGE UP (ref 80–100)
NRBC # BLD: 0 /100 WBCS — SIGNIFICANT CHANGE UP (ref 0–0)
PLATELET # BLD AUTO: 298 K/UL — SIGNIFICANT CHANGE UP (ref 150–400)
POTASSIUM SERPL-MCNC: 4.1 MMOL/L — SIGNIFICANT CHANGE UP (ref 3.5–5.3)
POTASSIUM SERPL-SCNC: 4.1 MMOL/L — SIGNIFICANT CHANGE UP (ref 3.5–5.3)
RBC # BLD: 3.08 M/UL — LOW (ref 3.8–5.2)
RBC # FLD: 14.6 % — HIGH (ref 10.3–14.5)
SODIUM SERPL-SCNC: 136 MMOL/L — SIGNIFICANT CHANGE UP (ref 135–145)
WBC # BLD: 6.27 K/UL — SIGNIFICANT CHANGE UP (ref 3.8–10.5)
WBC # FLD AUTO: 6.27 K/UL — SIGNIFICANT CHANGE UP (ref 3.8–10.5)

## 2023-08-15 PROCEDURE — 99239 HOSP IP/OBS DSCHRG MGMT >30: CPT

## 2023-08-15 RX ORDER — LEVOFLOXACIN 5 MG/ML
1 INJECTION, SOLUTION INTRAVENOUS
Qty: 0 | Refills: 0 | DISCHARGE
End: 2023-08-20

## 2023-08-15 RX ADMIN — CEFEPIME 100 MILLIGRAM(S): 1 INJECTION, POWDER, FOR SOLUTION INTRAMUSCULAR; INTRAVENOUS at 05:29

## 2023-08-15 RX ADMIN — ENOXAPARIN SODIUM 40 MILLIGRAM(S): 100 INJECTION SUBCUTANEOUS at 05:28

## 2023-08-15 RX ADMIN — CEFEPIME 100 MILLIGRAM(S): 1 INJECTION, POWDER, FOR SOLUTION INTRAMUSCULAR; INTRAVENOUS at 14:27

## 2023-08-15 RX ADMIN — OXYCODONE HYDROCHLORIDE 5 MILLIGRAM(S): 5 TABLET ORAL at 03:05

## 2023-08-15 RX ADMIN — OXYCODONE HYDROCHLORIDE 5 MILLIGRAM(S): 5 TABLET ORAL at 13:13

## 2023-08-15 RX ADMIN — OXYCODONE HYDROCHLORIDE 5 MILLIGRAM(S): 5 TABLET ORAL at 04:05

## 2023-08-15 NOTE — DISCHARGE NOTE PROVIDER - HOSPITAL COURSE
78 years old female with h/o HTN, chronic lymphedema with RLE wound present to with complain of pain and right lower extremity wound. Patient reported seeing a blister on right lower leg > 1 weeks ago, blister burst on 8/5/23. Initially clear liquid discharge but later become yellowish discharge, associated with severe worsening pain for 1 day. Reported one episode of fever 1 week ago. Patient went to PCP and was sent in to ED with concern for infection.   Hemodynamically stable, afebrile, sat well at RA. WBC 11.17, plt 358, Cr 1.44, glucose 150. Right lower extremity CT with finding suggestive of cellulitis. No definite fluid collection. LE doppler negative for DVT.     ·  Problem: Cellulitis of right lower extremity.   ·  Plan: Right lower extremity CT with finding suggestive of cellulitis. No definite fluid collection. LE doppler negative for DVT  seen by  ID and can change abx to oral doxy and levaquin for 5 more days   follow with  Dr Montoya       Problem/Plan - 2:  ·  Problem: CAROL (acute kidney injury).   ·  Plan: Cr 1.44, improving comparing to earlier this month  Hold losartan and NSAIDs  monitor renal function, renally dose medication.     Problem/Plan - 3:  ·  Problem: Benign essential HTN.   ·  Plan: BP stable off meds   Hold losartan given CAROL  If BP elevated, will consider BP med.

## 2023-08-15 NOTE — DISCHARGE NOTE NURSING/CASE MANAGEMENT/SOCIAL WORK - PATIENT PORTAL LINK FT
You can access the FollowMyHealth Patient Portal offered by [facility] by registering at the following website: [email or web address] By joining [facility]’s FollowMyHealth portal, you will also be able to view your health information using other applications (apps) compatible with our system.

## 2023-08-15 NOTE — DISCHARGE NOTE NURSING/CASE MANAGEMENT/SOCIAL WORK - NSDCPEFALRISK_GEN_ALL_CORE
For information on Fall & Injury Prevention, visit: [email or web address] OR  [email or web address] OR  https://www.cdc.gov/steadi/patient.html

## 2023-08-15 NOTE — DISCHARGE NOTE NURSING/CASE MANAGEMENT/SOCIAL WORK - NSDCVIVACCINE_GEN_ALL_CORE_FT
COVID-19, mRNA, LNP-S, PF, 30 mcg/0.3 mL dose ([employer]); 17-Jan-2022 14:41; Jaja Mike (RN); [employer]; [id number] (Exp. Date: 15-Mar-2022); IntraMuscular; Deltoid Right.; 0.3 milliLiter(s);   Tdap; 11-Aug-2023 17:19; Trinidad Chawla); [employer]; [id number] (Exp. Date: 06-Jun-2025); IntraMuscular; Deltoid Right.; 0.5 milliLiter(s); VIS (VIS Published: 09-May-2013, VIS Presented: 11-Aug-2023);

## 2023-08-15 NOTE — DISCHARGE NOTE PROVIDER - CARE PROVIDER_API CALL
Toñito Montoya  Vascular Surgery  [address]  [place] [address]  Phone: [phone]  Fax: [phone]  Follow Up Time:

## 2023-08-15 NOTE — DISCHARGE NOTE PROVIDER - DETAILS OF MALNUTRITION DIAGNOSIS/DIAGNOSES
This patient has been assessed with a concern for Malnutrition and was treated during this hospitalization for the following Nutrition diagnosis/diagnoses:     -  08/14/2023: Morbid obesity (BMI > 40)

## 2023-08-18 DIAGNOSIS — L03.115 CELLULITIS OF RIGHT LOWER LIMB: ICD-10-CM

## 2023-08-18 DIAGNOSIS — I89.0 LYMPHEDEMA, NOT ELSEWHERE CLASSIFIED: ICD-10-CM

## 2023-08-18 DIAGNOSIS — E66.01 MORBID (SEVERE) OBESITY DUE TO EXCESS CALORIES: ICD-10-CM

## 2023-08-18 DIAGNOSIS — B96.5 PSEUDOMONAS (AERUGINOSA) (MALLEI) (PSEUDOMALLEI) AS THE CAUSE OF DISEASES CLASSIFIED ELSEWHERE: ICD-10-CM

## 2023-08-18 DIAGNOSIS — N17.9 ACUTE KIDNEY FAILURE, UNSPECIFIED: ICD-10-CM

## 2023-08-18 DIAGNOSIS — Z16.39 RESISTANCE TO OTHER SPECIFIED ANTIMICROBIAL DRUG: ICD-10-CM

## 2023-08-18 DIAGNOSIS — I10 ESSENTIAL (PRIMARY) HYPERTENSION: ICD-10-CM

## 2023-08-18 DIAGNOSIS — Z96.653 PRESENCE OF ARTIFICIAL KNEE JOINT, BILATERAL: ICD-10-CM

## 2023-09-13 ENCOUNTER — OUTPATIENT (OUTPATIENT)
Dept: OUTPATIENT SERVICES | Facility: HOSPITAL | Age: 78
LOS: 1 days | Discharge: HOME | End: 2023-09-13
Payer: MEDICARE

## 2023-09-13 DIAGNOSIS — I89.0 LYMPHEDEMA, NOT ELSEWHERE CLASSIFIED: Chronic | ICD-10-CM

## 2023-09-13 DIAGNOSIS — L89.90 PRESSURE ULCER OF UNSPECIFIED SITE, UNSPECIFIED STAGE: ICD-10-CM

## 2023-09-13 DIAGNOSIS — Z96.653 PRESENCE OF ARTIFICIAL KNEE JOINT, BILATERAL: Chronic | ICD-10-CM

## 2023-09-13 PROCEDURE — 99214 OFFICE O/P EST MOD 30 MIN: CPT

## 2023-09-14 DIAGNOSIS — L97.112 NON-PRESSURE CHRONIC ULCER OF RIGHT THIGH WITH FAT LAYER EXPOSED: ICD-10-CM

## 2023-09-14 DIAGNOSIS — L89.892 PRESSURE ULCER OF OTHER SITE, STAGE 2: ICD-10-CM

## 2023-09-14 DIAGNOSIS — I89.0 LYMPHEDEMA, NOT ELSEWHERE CLASSIFIED: ICD-10-CM

## 2023-09-14 DIAGNOSIS — E66.01 MORBID (SEVERE) OBESITY DUE TO EXCESS CALORIES: ICD-10-CM

## 2023-09-14 DIAGNOSIS — Z91.81 HISTORY OF FALLING: ICD-10-CM

## 2023-09-14 DIAGNOSIS — R60.9 EDEMA, UNSPECIFIED: ICD-10-CM

## 2023-09-20 NOTE — PROGRESS NOTE ADULT - PROBLEM/PLAN-4
DISPLAY PLAN FREE TEXT Acitretin Pregnancy And Lactation Text: This medication is Pregnancy Category X and should not be given to women who are pregnant or may become pregnant in the future. This medication is excreted in breast milk.

## 2023-10-11 ENCOUNTER — OUTPATIENT (OUTPATIENT)
Dept: OUTPATIENT SERVICES | Facility: HOSPITAL | Age: 78
LOS: 1 days | Discharge: HOME | End: 2023-10-11
Payer: MEDICARE

## 2023-10-11 DIAGNOSIS — I89.0 LYMPHEDEMA, NOT ELSEWHERE CLASSIFIED: Chronic | ICD-10-CM

## 2023-10-11 DIAGNOSIS — Z96.653 PRESENCE OF ARTIFICIAL KNEE JOINT, BILATERAL: Chronic | ICD-10-CM

## 2023-10-11 DIAGNOSIS — L89.90 PRESSURE ULCER OF UNSPECIFIED SITE, UNSPECIFIED STAGE: ICD-10-CM

## 2023-10-11 PROCEDURE — 99213 OFFICE O/P EST LOW 20 MIN: CPT

## 2023-10-12 DIAGNOSIS — Z91.81 HISTORY OF FALLING: ICD-10-CM

## 2023-10-12 DIAGNOSIS — I89.0 LYMPHEDEMA, NOT ELSEWHERE CLASSIFIED: ICD-10-CM

## 2023-10-12 DIAGNOSIS — L97.112 NON-PRESSURE CHRONIC ULCER OF RIGHT THIGH WITH FAT LAYER EXPOSED: ICD-10-CM

## 2023-10-12 DIAGNOSIS — R60.9 EDEMA, UNSPECIFIED: ICD-10-CM

## 2023-10-12 DIAGNOSIS — L97.812 NON-PRESSURE CHRONIC ULCER OF OTHER PART OF RIGHT LOWER LEG WITH FAT LAYER EXPOSED: ICD-10-CM

## 2023-10-12 DIAGNOSIS — E66.01 MORBID (SEVERE) OBESITY DUE TO EXCESS CALORIES: ICD-10-CM

## 2023-10-12 DIAGNOSIS — L89.892 PRESSURE ULCER OF OTHER SITE, STAGE 2: ICD-10-CM

## 2023-10-18 ENCOUNTER — OUTPATIENT (OUTPATIENT)
Dept: OUTPATIENT SERVICES | Facility: HOSPITAL | Age: 78
LOS: 1 days | Discharge: HOME | End: 2023-10-18
Payer: MEDICARE

## 2023-10-18 DIAGNOSIS — L97.812 NON-PRESSURE CHRONIC ULCER OF OTHER PART OF RIGHT LOWER LEG WITH FAT LAYER EXPOSED: ICD-10-CM

## 2023-10-18 DIAGNOSIS — I89.0 LYMPHEDEMA, NOT ELSEWHERE CLASSIFIED: Chronic | ICD-10-CM

## 2023-10-18 DIAGNOSIS — R60.9 EDEMA, UNSPECIFIED: ICD-10-CM

## 2023-10-18 DIAGNOSIS — L89.892 PRESSURE ULCER OF OTHER SITE, STAGE 2: ICD-10-CM

## 2023-10-18 DIAGNOSIS — I89.0 LYMPHEDEMA, NOT ELSEWHERE CLASSIFIED: ICD-10-CM

## 2023-10-18 DIAGNOSIS — Z91.81 HISTORY OF FALLING: ICD-10-CM

## 2023-10-18 DIAGNOSIS — L89.90 PRESSURE ULCER OF UNSPECIFIED SITE, UNSPECIFIED STAGE: ICD-10-CM

## 2023-10-18 DIAGNOSIS — E66.01 MORBID (SEVERE) OBESITY DUE TO EXCESS CALORIES: ICD-10-CM

## 2023-10-18 DIAGNOSIS — L97.112 NON-PRESSURE CHRONIC ULCER OF RIGHT THIGH WITH FAT LAYER EXPOSED: ICD-10-CM

## 2023-10-18 DIAGNOSIS — Z96.653 PRESENCE OF ARTIFICIAL KNEE JOINT, BILATERAL: Chronic | ICD-10-CM

## 2023-10-18 PROCEDURE — 99213 OFFICE O/P EST LOW 20 MIN: CPT

## 2023-11-01 ENCOUNTER — OUTPATIENT (OUTPATIENT)
Dept: OUTPATIENT SERVICES | Facility: HOSPITAL | Age: 78
LOS: 1 days | Discharge: HOME | End: 2023-11-01
Payer: MEDICARE

## 2023-11-01 DIAGNOSIS — Z96.653 PRESENCE OF ARTIFICIAL KNEE JOINT, BILATERAL: Chronic | ICD-10-CM

## 2023-11-01 DIAGNOSIS — I89.0 LYMPHEDEMA, NOT ELSEWHERE CLASSIFIED: Chronic | ICD-10-CM

## 2023-11-01 DIAGNOSIS — L89.90 PRESSURE ULCER OF UNSPECIFIED SITE, UNSPECIFIED STAGE: ICD-10-CM

## 2023-11-01 PROCEDURE — 99213 OFFICE O/P EST LOW 20 MIN: CPT

## 2023-11-03 DIAGNOSIS — L97.112 NON-PRESSURE CHRONIC ULCER OF RIGHT THIGH WITH FAT LAYER EXPOSED: ICD-10-CM

## 2023-11-03 DIAGNOSIS — R60.9 EDEMA, UNSPECIFIED: ICD-10-CM

## 2023-11-03 DIAGNOSIS — I89.0 LYMPHEDEMA, NOT ELSEWHERE CLASSIFIED: ICD-10-CM

## 2023-11-03 DIAGNOSIS — L97.812 NON-PRESSURE CHRONIC ULCER OF OTHER PART OF RIGHT LOWER LEG WITH FAT LAYER EXPOSED: ICD-10-CM

## 2023-11-03 DIAGNOSIS — E66.01 MORBID (SEVERE) OBESITY DUE TO EXCESS CALORIES: ICD-10-CM

## 2023-11-03 DIAGNOSIS — Z91.81 HISTORY OF FALLING: ICD-10-CM

## 2023-11-29 ENCOUNTER — OUTPATIENT (OUTPATIENT)
Dept: OUTPATIENT SERVICES | Facility: HOSPITAL | Age: 78
LOS: 1 days | Discharge: HOME | End: 2023-11-29
Payer: MEDICARE

## 2023-11-29 DIAGNOSIS — Z96.653 PRESENCE OF ARTIFICIAL KNEE JOINT, BILATERAL: Chronic | ICD-10-CM

## 2023-11-29 DIAGNOSIS — L89.90 PRESSURE ULCER OF UNSPECIFIED SITE, UNSPECIFIED STAGE: ICD-10-CM

## 2023-11-29 DIAGNOSIS — I89.0 LYMPHEDEMA, NOT ELSEWHERE CLASSIFIED: Chronic | ICD-10-CM

## 2023-11-29 PROCEDURE — 99213 OFFICE O/P EST LOW 20 MIN: CPT

## 2023-11-30 DIAGNOSIS — I89.0 LYMPHEDEMA, NOT ELSEWHERE CLASSIFIED: ICD-10-CM

## 2023-11-30 DIAGNOSIS — L97.112 NON-PRESSURE CHRONIC ULCER OF RIGHT THIGH WITH FAT LAYER EXPOSED: ICD-10-CM

## 2023-11-30 DIAGNOSIS — Z91.81 HISTORY OF FALLING: ICD-10-CM

## 2023-11-30 DIAGNOSIS — L97.812 NON-PRESSURE CHRONIC ULCER OF OTHER PART OF RIGHT LOWER LEG WITH FAT LAYER EXPOSED: ICD-10-CM

## 2023-11-30 DIAGNOSIS — R60.9 EDEMA, UNSPECIFIED: ICD-10-CM

## 2023-11-30 DIAGNOSIS — E66.01 MORBID (SEVERE) OBESITY DUE TO EXCESS CALORIES: ICD-10-CM

## 2023-12-04 NOTE — PHYSICAL THERAPY INITIAL EVALUATION ADULT - STANDING BALANCE: DYNAMIC, REHAB EVAL
[facility]    Chief Complaint  Chief Complaint   Patient presents with    Chest Pain       HPI:    Here because of concerns about an episode of chest pain.  Left precordial pain.  She believes it was related to a panic attack.  Occurred while rest.  Active and plays tennis regularly without exertional dyspnea or chest discomfort.  No prior cardiac problems or workup.    Medications  Current Outpatient Medications   Medication Sig Dispense Refill    atorvastatin (LIPITOR) 40 MG tablet TAKE 1 TABLET DAILY 90 tablet 2    fluticasone (FLONASE) 50 mcg/actuation nasal spray 1 spray by Each Nare route once daily.      letrozole (FEMARA) 2.5 mg Tab TAKE 1 TABLET DAILY 30 tablet 11    minoxidiL (LONITEN) 2.5 MG tablet Take 2.5 mg by mouth.      ondansetron (ZOFRAN-ODT) 8 MG TbDL Take 1 tablet (8 mg total) by mouth every 6 (six) hours as needed (nausea). 15 tablet 1    sertraline (ZOLOFT) 100 MG tablet TAKE 1 TABLET DAILY (Patient taking differently: 200 mg.) 90 tablet 2    spironolactone (ALDACTONE) 100 MG tablet Take 100 mg by mouth every morning.      traZODone (DESYREL) 50 MG tablet Take 2 tablets (100 mg total) by mouth every evening. 180 tablet 3    azithromycin (ZITHROMAX) 500 MG tablet Take 1 tablet (500 mg total) by mouth once daily. 3 tablet 0    cetirizine (ZYRTEC) 5 MG tablet Take 5 mg by mouth once daily.      clindamycin-benzoyl peroxide gel APPLY TOPICALLY TO ACNE AREA EVERY DAY AT NIGHT. MAY DISCOLOR SHEETS AND TOWELS      docusate sodium (COLACE) 100 MG capsule Take 1 capsule (100 mg total) by mouth 2 (two) times daily. 60 capsule 3    letrozole (FEMARA) 2.5 mg Tab Take 1 tablet (2.5 mg total) by mouth once daily. 10 tablet 0     No current facility-administered medications for this visit.        History  Past Medical History:   Diagnosis Date    Abnormal Pap smear of cervix     Abnormal Pap smear of vagina     Anxiety     Breast cancer 06/08/2022    Cancer     Constipation     Depression      Hemorrhoids     HLD (hyperlipidemia)     Lung nodule     Multinodular goiter 2010    s/p bx of 2 nodules in 2011 - benign    PONV (postoperative nausea and vomiting)      Past Surgical History:   Procedure Laterality Date    APPENDECTOMY      BILATERAL MASTECTOMY Bilateral 06/17/2022    BILATERAL SALPINGO-OOPHORECTOMY (BSO) N/A 11/21/2022    Procedure: SALPINGO-OOPHORECTOMY, BILATERAL;  Surgeon: Sheree Holden MD;  Location: [facility];  Service: OB/GYN;  Laterality: N/A;    BREAST RECONSTRUCTION Bilateral 08/29/2022    COLONOSCOPY      4 as of 2015 [place] at [facility], next due in 2025    COLONOSCOPY N/A 10/27/2021    Procedure: COLONOSCOPY;  Surgeon: Garland Whalen MD;  Location: [address]);  Service: Colon and Rectal;  Laterality: N/A;  Covid test 10/24 [place], instructions sent to [email or web address]   10/25 arrival time confirmed with pt-rb    COLPOPEXY N/A 11/21/2022    Procedure: UTEROSACRAL LIGAMENT SUSPENSION;  Surgeon: Sheree Holden MD;  Location: [facility];  Service: OB/GYN;  Laterality: N/A;    COLPOSCOPY  11/19/2014    EXCISIONAL HEMORRHOIDECTOMY      INSERTION OF MIDURETHRAL SLING N/A 11/21/2022    Procedure: SLING, MIDURETHRAL;  Surgeon: Sheree Holden MD;  Location: [facility];  Service: OB/GYN;  Laterality: N/A;    LAPAROSCOPIC TOTAL HYSTERECTOMY N/A 11/21/2022    Procedure: HYSTERECTOMY, TOTAL, LAPAROSCOPIC;  Surgeon: Sheree Holden MD;  Location: [facility];  Service: OB/GYN;  Laterality: N/A;    TOTAL REDUCTION MAMMOPLASTY      TOTAL REDUCTION MAMMOPLASTY Bilateral 06/01/2022    Procedure: MAMMOPLASTY, REDUCTION;  Surgeon: Dex Gutierrez MD;  Location: [facility];  Service: Plastics;  Laterality: Bilateral;     Social History     Socioeconomic History    Marital status:     Number of children: 5   Occupational History    Occupation:      Comment:  - not practicing   Tobacco Use    Smoking status: Never    Smokeless tobacco: Never   Substance and Sexual Activity     Alcohol use: Yes     Comment: socially    Drug use: No    Sexual activity: Yes     Partners: Male     Birth control/protection: None   Social History Narrative    Originally from [place]    Living in [place] with family        Getting reg exercise     Social Determinants of Health     Financial Resource Strain: Medium Risk (12/1/2023)    Overall Financial Resource Strain (CARDIA)     Difficulty of Paying Living Expenses: Somewhat hard   Food Insecurity: No Food Insecurity (12/1/2023)    Hunger Vital Sign     Worried About Running Out of Food in the Last Year: Never true     Ran Out of Food in the Last Year: Never true   Transportation Needs: No Transportation Needs (12/1/2023)    PRAPARE - Transportation     Lack of Transportation (Medical): No     Lack of Transportation (Non-Medical): No   Physical Activity: Sufficiently Active (12/1/2023)    Exercise Vital Sign     Days of Exercise per Week: 5 days     Minutes of Exercise per Session: 90 min   Stress: Stress Concern Present (12/1/2023)    [employer] - Occupational Stress Questionnaire     Feeling of Stress : Very much   Social Connections: Unknown (12/1/2023)    Social Connection and Isolation Panel [NHANES]     Frequency of Communication with Friends and Family: More than three times a week     Frequency of Social Gatherings with Friends and Family: More than three times a week     Active Member of Clubs or Organizations: Yes     Attends Club or Organization Meetings: More than 4 times per year     Marital Status:    Housing Stability: Low Risk  (12/1/2023)    Housing Stability Vital Sign     Unable to Pay for Housing in the Last Year: No     Number of Places Lived in the Last Year: 1     Unstable Housing in the Last Year: No     Family History   Problem Relation Age of Onset    Breast cancer Maternal Grandmother 68    Hypertension Mother     Heart attack Mother     Lung cancer Father         lung, + tobacco    No Known Problems  Brother     No Known Problems Daughter     Other Son         lymphedema    No Known Problems Son     No Known Problems Son     No Known Problems Daughter     Ovarian cancer Neg Hx     Colon cancer Neg Hx     Cancer Neg Hx         Allergies  Review of patient's allergies indicates:  No Known Allergies    Review of Systems   Review of Systems   Constitutional: Negative for malaise/fatigue, weight gain and weight loss.   Eyes:  Negative for visual disturbance.   Cardiovascular:  Positive for chest pain. Negative for claudication, cyanosis, dyspnea on exertion, irregular heartbeat, leg swelling, near-syncope, orthopnea, palpitations, paroxysmal nocturnal dyspnea and syncope.   Respiratory:  Negative for cough, hemoptysis, shortness of breath, sleep disturbances due to breathing and wheezing.    Hematologic/Lymphatic: Negative for bleeding problem. Does not bruise/bleed easily.   Skin:  Negative for poor wound healing.   Musculoskeletal:  Negative for muscle cramps and myalgias.   Gastrointestinal:  Negative for abdominal pain, anorexia, diarrhea, heartburn, hematemesis, hematochezia, melena, nausea and vomiting.   Genitourinary:  Negative for hematuria and nocturia.   Neurological:  Negative for excessive daytime sleepiness, dizziness, focal weakness, light-headedness and weakness.       Physical Exam  Vitals:    12/04/23 1057   BP: (!) 158/94   Pulse: 79     Wt Readings from Last 1 Encounters:   12/04/23 65 kg (143 lb 4.8 oz)     Physical Exam  Vitals and nursing note reviewed.   Constitutional:       General: She is not in acute distress.     Appearance: She is not toxic-appearing or diaphoretic.   HENT:      Head: Normocephalic and atraumatic.      Mouth/Throat:      Lips: Pink.      Mouth: Mucous membranes are moist.   Eyes:      General: No scleral icterus.     Conjunctiva/sclera: Conjunctivae normal.   Neck:      Thyroid: No thyromegaly.      Vascular: No carotid bruit, hepatojugular reflux or JVD.      Trachea:  Trachea normal.   Cardiovascular:      Rate and Rhythm: Normal rate and regular rhythm. No extrasystoles are present.     Chest Wall: PMI is not displaced.      Pulses:           Carotid pulses are 2+ on the right side and 2+ on the left side.       Radial pulses are 2+ on the right side and 2+ on the left side.        Dorsalis pedis pulses are 2+ on the right side and 2+ on the left side.        Posterior tibial pulses are 2+ on the right side and 2+ on the left side.      Heart sounds: S1 normal and S2 normal. No murmur heard.     No friction rub. No S3 or S4 sounds.   Pulmonary:      Effort: Pulmonary effort is normal. No accessory muscle usage or respiratory distress.      Breath sounds: Normal breath sounds and air entry. No decreased breath sounds, wheezing, rhonchi or rales.   Abdominal:      General: Bowel sounds are normal. There is no distension or abdominal bruit.      Palpations: Abdomen is soft. There is no hepatomegaly, splenomegaly or pulsatile mass.      Tenderness: There is no abdominal tenderness.   Musculoskeletal:         General: No tenderness or deformity.      Right lower leg: No edema.      Left lower leg: No edema.   Skin:     General: Skin is warm and dry.      Capillary Refill: Capillary refill takes less than 2 seconds.      Coloration: Skin is not cyanotic or pale.      Nails: There is no clubbing.   Neurological:      General: No focal deficit present.      Mental Status: She is alert and oriented to person, place, and time.   Psychiatric:         Attention and Perception: Attention normal.         Mood and Affect: Mood normal.         Speech: Speech normal.         Behavior: Behavior normal. Behavior is cooperative.         Labs  Lab Visit on 11/29/2023   Component Date Value Ref Range Status    TSH 11/29/2023 0.516  0.400 - 4.000 uIU/mL Final    Free T4 11/29/2023 1.05  0.71 - 1.51 ng/dL Final    Ferritin 11/29/2023 104  20.0 - 300.0 ng/mL Final    Iron 11/29/2023 115  30 - 160 ug/dL  Final    Transferrin 11/29/2023 284  200 - 375 mg/dL Final    TIBC 11/29/2023 420  250 - 450 ug/dL Final    Saturated Iron 11/29/2023 27  20 - 50 % Final    WBC 11/29/2023 5.78  3.90 - 12.70 K/uL Final    RBC 11/29/2023 4.19  4.00 - 5.40 M/uL Final    Hemoglobin 11/29/2023 12.6  12.0 - 16.0 g/dL Final    Hematocrit 11/29/2023 37.2  37.0 - 48.5 % Final    MCV 11/29/2023 89  82 - 98 fL Final    MCH 11/29/2023 30.1  27.0 - 31.0 pg Final    MCHC 11/29/2023 33.9  32.0 - 36.0 g/dL Final    RDW 11/29/2023 13.4  11.5 - 14.5 % Final    Platelets 11/29/2023 281  150 - 450 K/uL Final    MPV 11/29/2023 8.8 (L)  9.2 - 12.9 fL Final    Immature Granulocytes 11/29/2023 0.3  0.0 - 0.5 % Final    Gran # (ANC) 11/29/2023 3.9  1.8 - 7.7 K/uL Final    Immature Grans (Abs) 11/29/2023 0.02  0.00 - 0.04 K/uL Final    Comment: Mild elevation in immature granulocytes is non specific and   can be seen in a variety of conditions including stress response,   acute inflammation, trauma and pregnancy. Correlation with other   laboratory and clinical findings is essential.      Lymph # 11/29/2023 1.1  1.0 - 4.8 K/uL Final    Mono # 11/29/2023 0.5  0.3 - 1.0 K/uL Final    Eos # 11/29/2023 0.1  0.0 - 0.5 K/uL Final    Baso # 11/29/2023 0.05  0.00 - 0.20 K/uL Final    nRBC 11/29/2023 0  0 /100 WBC Final    Gran % 11/29/2023 68.2  38.0 - 73.0 % Final    Lymph % 11/29/2023 19.6  18.0 - 48.0 % Final    Mono % 11/29/2023 9.3  4.0 - 15.0 % Final    Eosinophil % 11/29/2023 1.7  0.0 - 8.0 % Final    Basophil % 11/29/2023 0.9  0.0 - 1.9 % Final    Differential Method 11/29/2023 Automated   Final    Sodium 11/29/2023 141  136 - 145 mmol/L Final    Potassium 11/29/2023 4.2  3.5 - 5.1 mmol/L Final    Chloride 11/29/2023 107  95 - 110 mmol/L Final    CO2 11/29/2023 24  23 - 29 mmol/L Final    Glucose 11/29/2023 93  70 - 110 mg/dL Final    BUN 11/29/2023 17  6 - 20 mg/dL Final    Creatinine 11/29/2023 0.8  0.5 - 1.4 mg/dL Final    Calcium 11/29/2023 9.5  8.7 -  10.5 mg/dL Final    Total Protein 11/29/2023 7.9  6.0 - 8.4 g/dL Final    Albumin 11/29/2023 4.2  3.5 - 5.2 g/dL Final    Total Bilirubin 11/29/2023 0.6  0.1 - 1.0 mg/dL Final    Comment: For infants and newborns, interpretation of results should be based  on gestational age, weight and in agreement with clinical  observations.    Premature Infant recommended reference ranges:  Up to 24 hours.............<8.0 mg/dL  Up to 48 hours............<12.0 mg/dL  3-5 days..................<15.0 mg/dL  6-29 days.................<15.0 mg/dL      Alkaline Phosphatase 11/29/2023 81  55 - 135 U/L Final    AST 11/29/2023 25  10 - 40 U/L Final    ALT 11/29/2023 23  10 - 44 U/L Final    eGFR 11/29/2023 >60  >60 mL/min/1.73 m^2 Final    Anion Gap 11/29/2023 10  8 - 16 mmol/L Final    WBC 11/29/2023 5.78  3.90 - 12.70 K/uL Final    RBC 11/29/2023 4.19  4.00 - 5.40 M/uL Final    Hemoglobin 11/29/2023 12.6  12.0 - 16.0 g/dL Final    Hematocrit 11/29/2023 37.2  37.0 - 48.5 % Final    MCV 11/29/2023 89  82 - 98 fL Final    MCH 11/29/2023 30.1  27.0 - 31.0 pg Final    MCHC 11/29/2023 33.9  32.0 - 36.0 g/dL Final    RDW 11/29/2023 13.4  11.5 - 14.5 % Final    Platelets 11/29/2023 281  150 - 450 K/uL Final    MPV 11/29/2023 8.8 (L)  9.2 - 12.9 fL Final    Immature Granulocytes 11/29/2023 0.3  0.0 - 0.5 % Final    Gran # (ANC) 11/29/2023 3.9  1.8 - 7.7 K/uL Final    Immature Grans (Abs) 11/29/2023 0.02  0.00 - 0.04 K/uL Final    Comment: Mild elevation in immature granulocytes is non specific and   can be seen in a variety of conditions including stress response,   acute inflammation, trauma and pregnancy. Correlation with other   laboratory and clinical findings is essential.      Lymph # 11/29/2023 1.1  1.0 - 4.8 K/uL Final    Mono # 11/29/2023 0.5  0.3 - 1.0 K/uL Final    Eos # 11/29/2023 0.1  0.0 - 0.5 K/uL Final    Baso # 11/29/2023 0.05  0.00 - 0.20 K/uL Final    nRBC 11/29/2023 0  0 /100 WBC Final    Gran % 11/29/2023 68.2  38.0 - 73.0  % Final    Lymph % 11/29/2023 19.6  18.0 - 48.0 % Final    Mono % 11/29/2023 9.3  4.0 - 15.0 % Final    Eosinophil % 11/29/2023 1.7  0.0 - 8.0 % Final    Basophil % 11/29/2023 0.9  0.0 - 1.9 % Final    Differential Method 11/29/2023 Automated   Final    Sodium 11/29/2023 141  136 - 145 mmol/L Final    Potassium 11/29/2023 4.2  3.5 - 5.1 mmol/L Final    Chloride 11/29/2023 107  95 - 110 mmol/L Final    CO2 11/29/2023 24  23 - 29 mmol/L Final    Glucose 11/29/2023 93  70 - 110 mg/dL Final    BUN 11/29/2023 17  6 - 20 mg/dL Final    Creatinine 11/29/2023 0.8  0.5 - 1.4 mg/dL Final    Calcium 11/29/2023 9.5  8.7 - 10.5 mg/dL Final    Total Protein 11/29/2023 7.9  6.0 - 8.4 g/dL Final    Albumin 11/29/2023 4.2  3.5 - 5.2 g/dL Final    Total Bilirubin 11/29/2023 0.6  0.1 - 1.0 mg/dL Final    Comment: For infants and newborns, interpretation of results should be based  on gestational age, weight and in agreement with clinical  observations.    Premature Infant recommended reference ranges:  Up to 24 hours.............<8.0 mg/dL  Up to 48 hours............<12.0 mg/dL  3-5 days..................<15.0 mg/dL  6-29 days.................<15.0 mg/dL      Alkaline Phosphatase 11/29/2023 81  55 - 135 U/L Final    AST 11/29/2023 25  10 - 40 U/L Final    ALT 11/29/2023 23  10 - 44 U/L Final    eGFR 11/29/2023 >60  >60 mL/min/1.73 m^2 Final    Anion Gap 11/29/2023 10  8 - 16 mmol/L Final   Lab Visit on 08/22/2023   Component Date Value Ref Range Status    WBC 08/22/2023 7.63  3.90 - 12.70 K/uL Final    RBC 08/22/2023 4.26  4.00 - 5.40 M/uL Final    Hemoglobin 08/22/2023 12.7  12.0 - 16.0 g/dL Final    Hematocrit 08/22/2023 38.1  37.0 - 48.5 % Final    MCV 08/22/2023 89  82 - 98 fL Final    MCH 08/22/2023 29.8  27.0 - 31.0 pg Final    MCHC 08/22/2023 33.3  32.0 - 36.0 g/dL Final    RDW 08/22/2023 13.1  11.5 - 14.5 % Final    Platelets 08/22/2023 356  150 - 450 K/uL Final    MPV 08/22/2023 9.0 (L)  9.2 - 12.9 fL Final    Immature  Granulocytes 08/22/2023 0.4  0.0 - 0.5 % Final    Gran # (ANC) 08/22/2023 4.9  1.8 - 7.7 K/uL Final    Immature Grans (Abs) 08/22/2023 0.03  0.00 - 0.04 K/uL Final    Comment: Mild elevation in immature granulocytes is non specific and   can be seen in a variety of conditions including stress response,   acute inflammation, trauma and pregnancy. Correlation with other   laboratory and clinical findings is essential.      Lymph # 08/22/2023 1.8  1.0 - 4.8 K/uL Final    Mono # 08/22/2023 0.8  0.3 - 1.0 K/uL Final    Eos # 08/22/2023 0.1  0.0 - 0.5 K/uL Final    Baso # 08/22/2023 0.05  0.00 - 0.20 K/uL Final    nRBC 08/22/2023 0  0 /100 WBC Final    Gran % 08/22/2023 63.6  38.0 - 73.0 % Final    Lymph % 08/22/2023 23.2  18.0 - 48.0 % Final    Mono % 08/22/2023 10.9  4.0 - 15.0 % Final    Eosinophil % 08/22/2023 1.2  0.0 - 8.0 % Final    Basophil % 08/22/2023 0.7  0.0 - 1.9 % Final    Differential Method 08/22/2023 Automated   Final   Lab Visit on 08/14/2023   Component Date Value Ref Range Status    WBC 08/14/2023 8.05  3.90 - 12.70 K/uL Final    RBC 08/14/2023 4.74  4.00 - 5.40 M/uL Final    Hemoglobin 08/14/2023 13.9  12.0 - 16.0 g/dL Final    Hematocrit 08/14/2023 42.6  37.0 - 48.5 % Final    MCV 08/14/2023 90  82 - 98 fL Final    MCH 08/14/2023 29.3  27.0 - 31.0 pg Final    MCHC 08/14/2023 32.6  32.0 - 36.0 g/dL Final    RDW 08/14/2023 13.2  11.5 - 14.5 % Final    Platelets 08/14/2023 350  150 - 450 K/uL Final    MPV 08/14/2023 8.6 (L)  9.2 - 12.9 fL Final    Gran # (ANC) 08/14/2023 5.2  1.8 - 7.7 K/uL Final    Comment: The ANC is based on a white cell differential from an   automated cell counter. It has not been microscopically   reviewed for the presence of abnormal cells. Clinical   correlation is required.      Immature Grans (Abs) 08/14/2023 0.02  0.00 - 0.04 K/uL Final    Comment: Mild elevation in immature granulocytes is non specific and   can be seen in a variety of conditions including stress response,    acute inflammation, trauma and pregnancy. Correlation with other   laboratory and clinical findings is essential.      Sodium 08/14/2023 139  136 - 145 mmol/L Final    Potassium 08/14/2023 4.3  3.5 - 5.1 mmol/L Final    Chloride 08/14/2023 104  95 - 110 mmol/L Final    CO2 08/14/2023 24  23 - 29 mmol/L Final    Glucose 08/14/2023 86  70 - 110 mg/dL Final    BUN 08/14/2023 12  6 - 20 mg/dL Final    Creatinine 08/14/2023 0.8  0.5 - 1.4 mg/dL Final    Calcium 08/14/2023 10.9 (H)  8.7 - 10.5 mg/dL Final    Total Protein 08/14/2023 8.3  6.0 - 8.4 g/dL Final    Albumin 08/14/2023 4.2  3.5 - 5.2 g/dL Final    Total Bilirubin 08/14/2023 0.6  0.1 - 1.0 mg/dL Final    Comment: For infants and newborns, interpretation of results should be based  on gestational age, weight and in agreement with clinical  observations.    Premature Infant recommended reference ranges:  Up to 24 hours.............<8.0 mg/dL  Up to 48 hours............<12.0 mg/dL  3-5 days..................<15.0 mg/dL  6-29 days.................<15.0 mg/dL      Alkaline Phosphatase 08/14/2023 106  55 - 135 U/L Final    AST 08/14/2023 28  10 - 40 U/L Final    ALT 08/14/2023 27  10 - 44 U/L Final    eGFR 08/14/2023 >60.0  >60 mL/min/1.73 m^2 Final    Anion Gap 08/14/2023 11  8 - 16 mmol/L Final       Imaging  No results found.    Assessment  1. Chest pain, unspecified type  Likely noncardiac  - Ambulatory referral/consult to Cardiology  - EKG 12-lead  - Exercise Stress - EKG; Future    2. Breast cancer metastasized to axillary lymph node, right  - Ambulatory referral/consult to Cardiology      Plan and Discussion    Today's EKG is unchanged when compared to priors.  Chest discomfort sounds atypical for cardiac etiology.  Will get a stress test.    The 10-year ASCVD risk score (Gavino BERNAL, et al., 2019) is: 3.8%    Values used to calculate the score:      Age: 59 years      Sex: Female      Is Non- : No      Diabetic: No      Tobacco smoker:  with rolling walker/fair balance No      Systolic Blood Pressure: 158 mmHg      Is BP treated: No      HDL Cholesterol: 67 mg/dL      Total Cholesterol: 222 mg/dL     Follow Up  Follow up for test results.      Stephon Guardado MD

## 2023-12-13 ENCOUNTER — OUTPATIENT (OUTPATIENT)
Dept: OUTPATIENT SERVICES | Facility: HOSPITAL | Age: 78
LOS: 1 days | Discharge: HOME | End: 2023-12-13
Payer: MEDICARE

## 2023-12-13 DIAGNOSIS — Z96.653 PRESENCE OF ARTIFICIAL KNEE JOINT, BILATERAL: Chronic | ICD-10-CM

## 2023-12-13 DIAGNOSIS — I89.0 LYMPHEDEMA, NOT ELSEWHERE CLASSIFIED: Chronic | ICD-10-CM

## 2023-12-13 DIAGNOSIS — L89.90 PRESSURE ULCER OF UNSPECIFIED SITE, UNSPECIFIED STAGE: ICD-10-CM

## 2023-12-13 PROCEDURE — 99213 OFFICE O/P EST LOW 20 MIN: CPT

## 2023-12-20 ENCOUNTER — OUTPATIENT (OUTPATIENT)
Dept: OUTPATIENT SERVICES | Facility: HOSPITAL | Age: 78
LOS: 1 days | Discharge: HOME | End: 2023-12-20
Payer: MEDICARE

## 2023-12-20 DIAGNOSIS — L89.90 PRESSURE ULCER OF UNSPECIFIED SITE, UNSPECIFIED STAGE: ICD-10-CM

## 2023-12-20 DIAGNOSIS — I89.0 LYMPHEDEMA, NOT ELSEWHERE CLASSIFIED: Chronic | ICD-10-CM

## 2023-12-20 DIAGNOSIS — Z96.653 PRESENCE OF ARTIFICIAL KNEE JOINT, BILATERAL: Chronic | ICD-10-CM

## 2023-12-20 PROCEDURE — 99212 OFFICE O/P EST SF 10 MIN: CPT

## 2023-12-22 DIAGNOSIS — R60.9 EDEMA, UNSPECIFIED: ICD-10-CM

## 2023-12-22 DIAGNOSIS — L97.112 NON-PRESSURE CHRONIC ULCER OF RIGHT THIGH WITH FAT LAYER EXPOSED: ICD-10-CM

## 2023-12-22 DIAGNOSIS — L97.812 NON-PRESSURE CHRONIC ULCER OF OTHER PART OF RIGHT LOWER LEG WITH FAT LAYER EXPOSED: ICD-10-CM

## 2023-12-22 DIAGNOSIS — E66.01 MORBID (SEVERE) OBESITY DUE TO EXCESS CALORIES: ICD-10-CM

## 2023-12-22 DIAGNOSIS — I89.0 LYMPHEDEMA, NOT ELSEWHERE CLASSIFIED: ICD-10-CM

## 2023-12-22 DIAGNOSIS — Z91.81 HISTORY OF FALLING: ICD-10-CM

## 2023-12-27 DIAGNOSIS — L97.112 NON-PRESSURE CHRONIC ULCER OF RIGHT THIGH WITH FAT LAYER EXPOSED: ICD-10-CM

## 2023-12-27 DIAGNOSIS — R60.9 EDEMA, UNSPECIFIED: ICD-10-CM

## 2023-12-27 DIAGNOSIS — E66.01 MORBID (SEVERE) OBESITY DUE TO EXCESS CALORIES: ICD-10-CM

## 2023-12-27 DIAGNOSIS — I89.0 LYMPHEDEMA, NOT ELSEWHERE CLASSIFIED: ICD-10-CM

## 2023-12-27 DIAGNOSIS — L97.812 NON-PRESSURE CHRONIC ULCER OF OTHER PART OF RIGHT LOWER LEG WITH FAT LAYER EXPOSED: ICD-10-CM

## 2023-12-27 DIAGNOSIS — Z91.81 HISTORY OF FALLING: ICD-10-CM

## 2023-12-28 NOTE — ED ADULT NURSE NOTE - NSSISCREENINGQ5_ED_A_ED
No no back pain/no chills/no congestion/no diaphoresis/no dizziness/no fever/no nausea/no syncope/no vomiting

## 2024-01-03 ENCOUNTER — OUTPATIENT (OUTPATIENT)
Dept: OUTPATIENT SERVICES | Facility: HOSPITAL | Age: 79
LOS: 1 days | Discharge: HOME | End: 2024-01-03
Payer: MEDICARE

## 2024-01-03 DIAGNOSIS — I89.0 LYMPHEDEMA, NOT ELSEWHERE CLASSIFIED: Chronic | ICD-10-CM

## 2024-01-03 DIAGNOSIS — L89.90 PRESSURE ULCER OF UNSPECIFIED SITE, UNSPECIFIED STAGE: ICD-10-CM

## 2024-01-03 DIAGNOSIS — Z96.653 PRESENCE OF ARTIFICIAL KNEE JOINT, BILATERAL: Chronic | ICD-10-CM

## 2024-01-03 PROCEDURE — 99213 OFFICE O/P EST LOW 20 MIN: CPT

## 2024-01-04 DIAGNOSIS — I89.0 LYMPHEDEMA, NOT ELSEWHERE CLASSIFIED: ICD-10-CM

## 2024-01-04 DIAGNOSIS — L97.812 NON-PRESSURE CHRONIC ULCER OF OTHER PART OF RIGHT LOWER LEG WITH FAT LAYER EXPOSED: ICD-10-CM

## 2024-01-04 DIAGNOSIS — L97.112 NON-PRESSURE CHRONIC ULCER OF RIGHT THIGH WITH FAT LAYER EXPOSED: ICD-10-CM

## 2024-01-04 DIAGNOSIS — E66.01 MORBID (SEVERE) OBESITY DUE TO EXCESS CALORIES: ICD-10-CM

## 2024-01-04 DIAGNOSIS — R60.9 EDEMA, UNSPECIFIED: ICD-10-CM

## 2024-01-04 DIAGNOSIS — Z91.81 HISTORY OF FALLING: ICD-10-CM

## 2024-01-24 ENCOUNTER — OUTPATIENT (OUTPATIENT)
Dept: OUTPATIENT SERVICES | Facility: HOSPITAL | Age: 79
LOS: 1 days | Discharge: HOME | End: 2024-01-24
Payer: MEDICARE

## 2024-01-24 DIAGNOSIS — I89.0 LYMPHEDEMA, NOT ELSEWHERE CLASSIFIED: Chronic | ICD-10-CM

## 2024-01-24 DIAGNOSIS — Z96.653 PRESENCE OF ARTIFICIAL KNEE JOINT, BILATERAL: Chronic | ICD-10-CM

## 2024-01-24 DIAGNOSIS — L89.90 PRESSURE ULCER OF UNSPECIFIED SITE, UNSPECIFIED STAGE: ICD-10-CM

## 2024-01-24 PROCEDURE — 99213 OFFICE O/P EST LOW 20 MIN: CPT

## 2024-01-25 DIAGNOSIS — Z91.81 HISTORY OF FALLING: ICD-10-CM

## 2024-01-25 DIAGNOSIS — L97.112 NON-PRESSURE CHRONIC ULCER OF RIGHT THIGH WITH FAT LAYER EXPOSED: ICD-10-CM

## 2024-01-25 DIAGNOSIS — L97.812 NON-PRESSURE CHRONIC ULCER OF OTHER PART OF RIGHT LOWER LEG WITH FAT LAYER EXPOSED: ICD-10-CM

## 2024-01-25 DIAGNOSIS — E66.01 MORBID (SEVERE) OBESITY DUE TO EXCESS CALORIES: ICD-10-CM

## 2024-01-25 DIAGNOSIS — I89.0 LYMPHEDEMA, NOT ELSEWHERE CLASSIFIED: ICD-10-CM

## 2024-01-25 DIAGNOSIS — R60.9 EDEMA, UNSPECIFIED: ICD-10-CM

## 2024-02-07 ENCOUNTER — OUTPATIENT (OUTPATIENT)
Dept: OUTPATIENT SERVICES | Facility: HOSPITAL | Age: 79
LOS: 1 days | Discharge: HOME | End: 2024-02-07
Payer: MEDICARE

## 2024-02-07 DIAGNOSIS — Z96.653 PRESENCE OF ARTIFICIAL KNEE JOINT, BILATERAL: Chronic | ICD-10-CM

## 2024-02-07 DIAGNOSIS — I89.0 LYMPHEDEMA, NOT ELSEWHERE CLASSIFIED: Chronic | ICD-10-CM

## 2024-02-07 DIAGNOSIS — L89.90 PRESSURE ULCER OF UNSPECIFIED SITE, UNSPECIFIED STAGE: ICD-10-CM

## 2024-02-07 PROCEDURE — 99213 OFFICE O/P EST LOW 20 MIN: CPT

## 2024-02-07 NOTE — ED ADULT NURSE NOTE - DRUG PRE-SCREENING (DAST -1)
Patient reports feeling better since arrival.      Stephen Goodman, JASVIR  02/07/24 0919    
Statement Selected

## 2024-02-08 DIAGNOSIS — E66.01 MORBID (SEVERE) OBESITY DUE TO EXCESS CALORIES: ICD-10-CM

## 2024-02-08 DIAGNOSIS — R60.9 EDEMA, UNSPECIFIED: ICD-10-CM

## 2024-02-08 DIAGNOSIS — L97.112 NON-PRESSURE CHRONIC ULCER OF RIGHT THIGH WITH FAT LAYER EXPOSED: ICD-10-CM

## 2024-02-08 DIAGNOSIS — L97.812 NON-PRESSURE CHRONIC ULCER OF OTHER PART OF RIGHT LOWER LEG WITH FAT LAYER EXPOSED: ICD-10-CM

## 2024-02-08 DIAGNOSIS — Z91.81 HISTORY OF FALLING: ICD-10-CM

## 2024-02-08 DIAGNOSIS — I89.0 LYMPHEDEMA, NOT ELSEWHERE CLASSIFIED: ICD-10-CM

## 2024-02-21 ENCOUNTER — OUTPATIENT (OUTPATIENT)
Dept: OUTPATIENT SERVICES | Facility: HOSPITAL | Age: 79
LOS: 1 days | Discharge: HOME | End: 2024-02-21
Payer: MEDICARE

## 2024-02-21 DIAGNOSIS — I89.0 LYMPHEDEMA, NOT ELSEWHERE CLASSIFIED: Chronic | ICD-10-CM

## 2024-02-21 DIAGNOSIS — L89.90 PRESSURE ULCER OF UNSPECIFIED SITE, UNSPECIFIED STAGE: ICD-10-CM

## 2024-02-21 DIAGNOSIS — Z96.653 PRESENCE OF ARTIFICIAL KNEE JOINT, BILATERAL: Chronic | ICD-10-CM

## 2024-02-21 PROCEDURE — 99213 OFFICE O/P EST LOW 20 MIN: CPT

## 2024-02-22 DIAGNOSIS — L97.812 NON-PRESSURE CHRONIC ULCER OF OTHER PART OF RIGHT LOWER LEG WITH FAT LAYER EXPOSED: ICD-10-CM

## 2024-02-22 DIAGNOSIS — R60.9 EDEMA, UNSPECIFIED: ICD-10-CM

## 2024-02-22 DIAGNOSIS — L97.112 NON-PRESSURE CHRONIC ULCER OF RIGHT THIGH WITH FAT LAYER EXPOSED: ICD-10-CM

## 2024-02-22 DIAGNOSIS — Z91.81 HISTORY OF FALLING: ICD-10-CM

## 2024-02-22 DIAGNOSIS — E66.01 MORBID (SEVERE) OBESITY DUE TO EXCESS CALORIES: ICD-10-CM

## 2024-02-22 DIAGNOSIS — I89.0 LYMPHEDEMA, NOT ELSEWHERE CLASSIFIED: ICD-10-CM

## 2024-03-13 ENCOUNTER — OUTPATIENT (OUTPATIENT)
Dept: OUTPATIENT SERVICES | Facility: HOSPITAL | Age: 79
LOS: 1 days | Discharge: HOME | End: 2024-03-13
Payer: MEDICARE

## 2024-03-13 DIAGNOSIS — L89.90 PRESSURE ULCER OF UNSPECIFIED SITE, UNSPECIFIED STAGE: ICD-10-CM

## 2024-03-13 DIAGNOSIS — I89.0 LYMPHEDEMA, NOT ELSEWHERE CLASSIFIED: Chronic | ICD-10-CM

## 2024-03-13 DIAGNOSIS — Z96.653 PRESENCE OF ARTIFICIAL KNEE JOINT, BILATERAL: Chronic | ICD-10-CM

## 2024-03-13 PROCEDURE — 17250 CHEM CAUT OF GRANLTJ TISSUE: CPT | Mod: 59,RT

## 2024-03-13 PROCEDURE — 99213 OFFICE O/P EST LOW 20 MIN: CPT | Mod: 25

## 2024-03-15 DIAGNOSIS — L97.112 NON-PRESSURE CHRONIC ULCER OF RIGHT THIGH WITH FAT LAYER EXPOSED: ICD-10-CM

## 2024-03-15 DIAGNOSIS — Z91.81 HISTORY OF FALLING: ICD-10-CM

## 2024-03-15 DIAGNOSIS — R60.9 EDEMA, UNSPECIFIED: ICD-10-CM

## 2024-03-15 DIAGNOSIS — E66.01 MORBID (SEVERE) OBESITY DUE TO EXCESS CALORIES: ICD-10-CM

## 2024-03-15 DIAGNOSIS — L97.812 NON-PRESSURE CHRONIC ULCER OF OTHER PART OF RIGHT LOWER LEG WITH FAT LAYER EXPOSED: ICD-10-CM

## 2024-03-15 DIAGNOSIS — L92.8 OTHER GRANULOMATOUS DISORDERS OF THE SKIN AND SUBCUTANEOUS TISSUE: ICD-10-CM

## 2024-03-15 DIAGNOSIS — I89.0 LYMPHEDEMA, NOT ELSEWHERE CLASSIFIED: ICD-10-CM

## 2024-04-16 NOTE — PATIENT PROFILE ADULT - NS PRO AD NO ADVANCE DIRECTIVE
----- Message from Debi White MD sent at 4/15/2024  5:07 PM CDT -----  TB test negative, please let patient know  
Message sent via [facility] regarding TB test. Encouraged to call clinic with questions   
No

## 2024-04-17 ENCOUNTER — OUTPATIENT (OUTPATIENT)
Dept: OUTPATIENT SERVICES | Facility: HOSPITAL | Age: 79
LOS: 1 days | Discharge: HOME | End: 2024-04-17
Payer: MEDICARE

## 2024-04-17 DIAGNOSIS — L89.90 PRESSURE ULCER OF UNSPECIFIED SITE, UNSPECIFIED STAGE: ICD-10-CM

## 2024-04-17 DIAGNOSIS — I89.0 LYMPHEDEMA, NOT ELSEWHERE CLASSIFIED: Chronic | ICD-10-CM

## 2024-04-17 DIAGNOSIS — Z96.653 PRESENCE OF ARTIFICIAL KNEE JOINT, BILATERAL: Chronic | ICD-10-CM

## 2024-04-17 PROCEDURE — 99213 OFFICE O/P EST LOW 20 MIN: CPT

## 2024-04-30 DIAGNOSIS — L97.812 NON-PRESSURE CHRONIC ULCER OF OTHER PART OF RIGHT LOWER LEG WITH FAT LAYER EXPOSED: ICD-10-CM

## 2024-04-30 DIAGNOSIS — L97.112 NON-PRESSURE CHRONIC ULCER OF RIGHT THIGH WITH FAT LAYER EXPOSED: ICD-10-CM

## 2024-04-30 DIAGNOSIS — Z91.81 HISTORY OF FALLING: ICD-10-CM

## 2024-04-30 DIAGNOSIS — I89.0 LYMPHEDEMA, NOT ELSEWHERE CLASSIFIED: ICD-10-CM

## 2024-04-30 DIAGNOSIS — E66.01 MORBID (SEVERE) OBESITY DUE TO EXCESS CALORIES: ICD-10-CM

## 2024-04-30 DIAGNOSIS — R60.9 EDEMA, UNSPECIFIED: ICD-10-CM

## 2024-05-15 ENCOUNTER — EMERGENCY (EMERGENCY)
Facility: HOSPITAL | Age: 79
LOS: 0 days | Discharge: HOME | End: 2024-05-15
Payer: MEDICARE

## 2024-05-15 VITALS
RESPIRATION RATE: 18 BRPM | TEMPERATURE: 97 F | DIASTOLIC BLOOD PRESSURE: 82 MMHG | OXYGEN SATURATION: 95 % | HEART RATE: 68 BPM | SYSTOLIC BLOOD PRESSURE: 161 MMHG

## 2024-05-15 VITALS
TEMPERATURE: 98 F | SYSTOLIC BLOOD PRESSURE: 166 MMHG | RESPIRATION RATE: 18 BRPM | HEART RATE: 84 BPM | HEIGHT: 63 IN | DIASTOLIC BLOOD PRESSURE: 108 MMHG | OXYGEN SATURATION: 95 % | WEIGHT: 293 LBS

## 2024-05-15 DIAGNOSIS — I89.0 LYMPHEDEMA, NOT ELSEWHERE CLASSIFIED: ICD-10-CM

## 2024-05-15 DIAGNOSIS — M25.552 PAIN IN LEFT HIP: ICD-10-CM

## 2024-05-15 DIAGNOSIS — N39.0 URINARY TRACT INFECTION, SITE NOT SPECIFIED: ICD-10-CM

## 2024-05-15 DIAGNOSIS — I10 ESSENTIAL (PRIMARY) HYPERTENSION: ICD-10-CM

## 2024-05-15 DIAGNOSIS — N18.9 CHRONIC KIDNEY DISEASE, UNSPECIFIED: ICD-10-CM

## 2024-05-15 DIAGNOSIS — Z96.653 PRESENCE OF ARTIFICIAL KNEE JOINT, BILATERAL: Chronic | ICD-10-CM

## 2024-05-15 DIAGNOSIS — I89.0 LYMPHEDEMA, NOT ELSEWHERE CLASSIFIED: Chronic | ICD-10-CM

## 2024-05-15 DIAGNOSIS — M54.9 DORSALGIA, UNSPECIFIED: ICD-10-CM

## 2024-05-15 DIAGNOSIS — M54.42 LUMBAGO WITH SCIATICA, LEFT SIDE: ICD-10-CM

## 2024-05-15 LAB
APPEARANCE UR: ABNORMAL
BACTERIA # UR AUTO: ABNORMAL /HPF
BILIRUB UR-MCNC: NEGATIVE — SIGNIFICANT CHANGE UP
COLOR SPEC: YELLOW — SIGNIFICANT CHANGE UP
DIFF PNL FLD: ABNORMAL
EPI CELLS # UR: PRESENT
GLUCOSE UR QL: NEGATIVE MG/DL — SIGNIFICANT CHANGE UP
KETONES UR-MCNC: NEGATIVE MG/DL — SIGNIFICANT CHANGE UP
LEUKOCYTE ESTERASE UR-ACNC: ABNORMAL
NITRITE UR-MCNC: NEGATIVE — SIGNIFICANT CHANGE UP
PH UR: 7 — SIGNIFICANT CHANGE UP (ref 5–8)
PROT UR-MCNC: 30 MG/DL
RBC CASTS # UR COMP ASSIST: 10 /HPF — HIGH (ref 0–4)
SP GR SPEC: 1.01 — SIGNIFICANT CHANGE UP (ref 1–1.03)
UROBILINOGEN FLD QL: 0.2 MG/DL — SIGNIFICANT CHANGE UP (ref 0.2–1)
WBC UR QL: 30 /HPF — HIGH (ref 0–5)

## 2024-05-15 PROCEDURE — 74176 CT ABD & PELVIS W/O CONTRAST: CPT | Mod: 26,MC

## 2024-05-15 PROCEDURE — 99284 EMERGENCY DEPT VISIT MOD MDM: CPT

## 2024-05-15 RX ORDER — LIDOCAINE 4 G/100G
1 CREAM TOPICAL ONCE
Refills: 0 | Status: COMPLETED | OUTPATIENT
Start: 2024-05-15 | End: 2024-05-15

## 2024-05-15 RX ORDER — OXYCODONE AND ACETAMINOPHEN 5; 325 MG/1; MG/1
1 TABLET ORAL
Qty: 9 | Refills: 0
Start: 2024-05-15 | End: 2024-05-17

## 2024-05-15 RX ORDER — CEFPODOXIME PROXETIL 100 MG
200 TABLET ORAL ONCE
Refills: 0 | Status: COMPLETED | OUTPATIENT
Start: 2024-05-15 | End: 2024-05-15

## 2024-05-15 RX ORDER — CEFPODOXIME PROXETIL 100 MG
1 TABLET ORAL
Qty: 20 | Refills: 0
Start: 2024-05-15 | End: 2024-05-24

## 2024-05-15 RX ADMIN — Medication 200 MILLIGRAM(S): at 16:25

## 2024-05-15 RX ADMIN — LIDOCAINE 1 PATCH: 4 CREAM TOPICAL at 12:01

## 2024-05-15 NOTE — ED PROVIDER NOTE - PROVIDER TOKENS
FREE:[LAST:[your pmd in 1-3 days],PHONE:[(   )    -],FAX:[(   )    -]],FREE:[LAST:[your gyn in 1-3 days],PHONE:[(   )    -],FAX:[(   )    -]]

## 2024-05-15 NOTE — ED PROVIDER NOTE - CLINICAL SUMMARY MEDICAL DECISION MAKING FREE TEXT BOX
ddx: sciatica, muscle spasm, fx, dislocation, cancer, uti, pyelo  likely msk   better with percocet  +uti, will treat  Reviewed all results and necessity for follow up. Counseled on red flags and to return for them.  Patient appears well on discharge.

## 2024-05-15 NOTE — ED ADULT NURSE NOTE - NS_ED_NURSE_TEACHING_TOPIC_ED_A_ED
insisted on getting into private car to go home, needed assistance of several staff members to go home

## 2024-05-15 NOTE — ED PROVIDER NOTE - NSFOLLOWUPINSTRUCTIONS_ED_ALL_ED_FT
Back Pain    Back pain is very common in adults. The cause of back pain is rarely dangerous and the pain often gets better over time. The cause of your back pain may not be known and may include strain of muscles or ligaments, degeneration of the spinal disks, or arthritis. Occasionally the pain may radiate down your leg(s). Over-the-counter medicines to reduce pain and inflammation are often the most helpful. Stretching and remaining active frequently helps the healing process.     SEEK IMMEDIATE MEDICAL CARE IF YOU HAVE ANY OF THE FOLLOWING SYMPTOMS: bowel or bladder control problems, unusual weakness or numbness in your arms or legs, nausea or vomiting, abdominal pain, fever, dizziness/lightheadedness.     Urinary Tract Infection    A urinary tract infection (UTI) is an infection of any part of the urinary tract, which includes the kidneys, ureters, bladder, and urethra. Risk factors include ignoring your need to urinate, wiping back to front if female, being an uncircumcised male, and having diabetes or a weak immune system. Symptoms include frequent urination, pain or burning with urination, foul smelling urine, cloudy urine, pain in the lower abdomen, blood in the urine, and fever. If you were prescribed an antibiotic medicine, take it as told by your health care provider. Do not stop taking the antibiotic even if you start to feel better.    SEEK IMMEDIATE MEDICAL CARE IF YOU HAVE ANY OF THE FOLLOWING SYMPTOMS: severe back or abdominal pain, fever, inability to keep fluids or medicine down, dizziness/lightheadedness, or a change in mental status.

## 2024-05-15 NOTE — ED PROVIDER NOTE - PATIENT'S PREFERRED PRONOUN
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 Patient reports that she had 4 episodes of seizure including possible grand mal seizures and absence seizure yesterday. Patient reports that this morning she woke up feeling dizzy and lightheaded. She is currently on lamotrigine. Patient indicates that his seizures were well controlled until recently. She reports that she has had multiple episodes of seizures this month. She was evaluated at [facility] ED on 03/24/2021 for similar symptoms. Patient reports that she has pending appointment to see her neurologist in 08/2021. She denies postictal episode after seizure activity yesterday. She is currently at baseline, able to answer questions appropriately. No other complaints at this time. 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          PAST MEDICAL, FAMILY AND SOCIAL HISTORY     Medications:  Current Outpatient Medications   Medication   â¢ alum hydroxide-mag trisilicate (GAVISCON) 52-58 MG Chew Tab   â¢ azithromycin (ZITHROMAX) 250 MG tablet   â¢ cephalexin (KEFLEX) 250 MG capsule   â¢ chlorhexidine " "gluconate (PERIDEX) 0.12 % solution   â¢ HYDROcodone-acetaminophen (NORCO) 5-325 MG per tablet   â¢ ketoconazole (NIZORAL) 2 % cream   â¢ loratadine (CLARITIN) 10 MG tablet   â¢ ondansetron (ZOFRAN) 4 MG tablet   â¢ propranolol (INDERAL) 20 MG tablet   â¢ triamcinolone (ARISTOCORT) 0.1 % cream   â¢ triamcinolone (NASACORT) 55 MCG/ACT nasal inhaler   â¢ diclofenac (VOLTAREN) 1 % gel   â¢ meclizine (ANTIVERT) 25 MG tablet   â¢ mometasone (ELOCON) 0.1 % cream   â¢ Perampanel 2 MG Tab   â¢ levocetirizine (XYZAL) 5 MG tablet   â¢ ammonium lactate (LAC-HYDRIN) 12 % cream   â¢ lamoTRIgine (LAMICTAL) 100 MG tablet   â¢ calcium carbonate (TUMS) 500 MG chewable tablet   â¢ pantoprazole (PROTONIX) 40 MG tablet   â¢ montelukast (SINGULAIR) 10 MG tablet     No current facility-administered medications for this visit.         Allergies:  ALLERGIES:   Allergen Reactions   â¢ Adhesive   (Environmental) RASH   â¢ Aloe Other (See Comments)     Dry skins   â¢ Clindamycin RASH     Itchy, burning throat with bumps   â¢ Contrast Media VOMITING and RASH     Nausea vomiting and ""feels spacey\""  Other reaction(s): Nausea And Vomiting  patient stated she has nausea and vomiting with contrast dye   â¢ Doxycycline RASH   â¢ Gadodiamide Nausea & Vomiting and NAUSEA   â¢ Levofloxacin Other (See Comments)     Mouth sores   â¢ Pollen Extract Other (See Comments)     Sneezing and coughing, itchy eyes   â¢ Prednisone Other (See Comments)     Caused sweatiness   â¢ Pseudoephedrine Hcl SEIZURES     Seizures, upset stomach   â¢ Seasonal      Sneezing and coughing, itchy eyes   â¢ Penicillins RASH     Patient has tolerated cephalexin & cefuroxime       Past Medical  History/Surgeries:  Past Medical History:   Diagnosis Date   â¢ Allergy    â¢ Chronic sinusitis    â¢ Developmental delay    â¢ Seizures (CMS/HCC)    â¢ TBI (traumatic brain injury) (CMS/HCC)        Past Surgical History:   Procedure Laterality Date   â¢ Laparoscopic cholecystectomy  12/31/2020    Had acute gangrenous " cholecystitis       Family History:  No family history on file. Social History:  Social History     Tobacco Use   â¢ Smoking status: Never Smoker   â¢ Smokeless tobacco: Never Used   Substance Use Topics   â¢ Alcohol use: No       REVIEW OF SYSTEMS     Review of Systems   Constitutional: Negative for fatigue and fever. Eyes: Negative for photophobia and visual disturbance. Respiratory: Negative for shortness of breath. Cardiovascular: Negative for chest pain and palpitations. Gastrointestinal: Negative for nausea and vomiting. Neurological: Positive for dizziness, seizures (yesterday) and light-headedness. Negative for syncope, weakness, numbness and headaches. PHYSICAL EXAM     Physical Exam  Constitutional:       Appearance: Normal appearance. HENT:      Head: Normocephalic and atraumatic. Eyes:      Extraocular Movements: Extraocular movements intact. Pupils: Pupils are equal, round, and reactive to light. Neck:      Musculoskeletal: Normal range of motion and neck supple. Cardiovascular:      Rate and Rhythm: Normal rate and regular rhythm. Pulses: Normal pulses. Pulmonary:      Effort: Pulmonary effort is normal.      Breath sounds: Normal breath sounds. Skin:     General: Skin is warm. Neurological:      Mental Status: She is alert and oriented to person, place, and time. Psychiatric:         Mood and Affect: Mood normal.         ASSESSMENT/PLAN     Seizure (CMS/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 She is currently on lamotrigine. Patient reports that she woke up this morning feeling lightheaded and dizziness. She reports the seizures were well controlled until recently she has had increasing episodes of seizure-like activities.   She has been evaluated multiple times in the ED for seizures. She indicated that her neurologist is booked until 08/2021. Given patient's multiple episodes of seizure yesterday and her current symptoms today, I discussed with her that she will benefit further evaluation at the ED. she agrees with transfer and desires to be seen at [facility] to ED in [facility]. I spoke with Octavio WILD  who has accepted the transfer and will resume care upon arrival.  I advised patient that a safe mode of transportation would be a via EMS, she declined, risks were discussed, AMA signed. All questions were answered to patient's stated satisfaction. Please note that patient left the urgent care in stable condition. Her/She

## 2024-05-15 NOTE — ED PROVIDER NOTE - PHYSICAL EXAMINATION
PHYSICAL EXAM:    GENERAL: Alert, appears stated age, well appearing, non-toxic  SKIN: Warm and dry. MMM.   HEAD: NC, AT  EYE: Normal lids/conjunctiva  ENT: Normal hearing, patent oropharynx   NECK: +supple. No meningismus, or JVD  Pulm: Bilateral BS, normal resp effort, no wheezes, stridor, or retractions  CV: RRR, no M/R/G, 2+and = radial pulses  Abd: soft, non-tender, non-distended, no hepatosplenomegaly. no CVA tenderness.   Mskel: no erythema, cyanosis, warmth, discharge. no calf tenderness. no spinal ttp. +R paralumbar ttp.   Neuro: AAOx3,  5/5 strength throughout.

## 2024-05-15 NOTE — ED PROVIDER NOTE - CARE PROVIDER_API CALL
your pmd in 1-3 days,   Phone: (   )    -  Fax: (   )    -  Follow Up Time:     your gyn in 1-3 days,   Phone: (   )    -  Fax: (   )    -  Follow Up Time:

## 2024-05-15 NOTE — ED PROVIDER NOTE - PATIENT PORTAL LINK FT
You can access the FollowMyHealth Patient Portal offered by [facility] by registering at the following website: [email or web address]. By joining [facility]’s FollowMyHealth portal, you will also be able to view your health information using other applications (apps) compatible with our system.

## 2024-05-15 NOTE — ED PROVIDER NOTE - OBJECTIVE STATEMENT
79 y/o F with PMH Remote history of hypertension, CKD, lymphedema, sciatica presents with moderate throbbing non-radiating left lower back pain/hip pain x 4 days.   +much worse with movement, better @ rest.   +increased frequency of urination.  no cp, sob, abd pain, increased leg pain or swelling, rash, fever, fall, trauma.  denies saddle anesthesia, bowel/bladder dysfunction, inability to ambulate, paraesthesias, direct trauma, hx IVDA, recent injections, weakness, hx back surgeries, unexplained wt loss.

## 2024-05-17 LAB
CULTURE RESULTS: SIGNIFICANT CHANGE UP
SPECIMEN SOURCE: SIGNIFICANT CHANGE UP

## 2024-05-17 NOTE — ED POST DISCHARGE NOTE - OTHER
Patient called back stating they received a call to callback the ED with regards to results. Reviewed post-DC note as written by RYLEE Green. Pt on 2 days of antibiotics, states symptoms have been improving, advised to finish entire course of medications and follow-up with PMD.

## 2024-05-22 ENCOUNTER — OUTPATIENT (OUTPATIENT)
Dept: OUTPATIENT SERVICES | Facility: HOSPITAL | Age: 79
LOS: 1 days | Discharge: HOME | End: 2024-05-22
Payer: MEDICARE

## 2024-05-22 DIAGNOSIS — L89.90 PRESSURE ULCER OF UNSPECIFIED SITE, UNSPECIFIED STAGE: ICD-10-CM

## 2024-05-22 DIAGNOSIS — I89.0 LYMPHEDEMA, NOT ELSEWHERE CLASSIFIED: Chronic | ICD-10-CM

## 2024-05-22 DIAGNOSIS — Z96.653 PRESENCE OF ARTIFICIAL KNEE JOINT, BILATERAL: Chronic | ICD-10-CM

## 2024-05-22 PROCEDURE — 99213 OFFICE O/P EST LOW 20 MIN: CPT

## 2024-05-23 DIAGNOSIS — L97.812 NON-PRESSURE CHRONIC ULCER OF OTHER PART OF RIGHT LOWER LEG WITH FAT LAYER EXPOSED: ICD-10-CM

## 2024-05-23 DIAGNOSIS — L97.112 NON-PRESSURE CHRONIC ULCER OF RIGHT THIGH WITH FAT LAYER EXPOSED: ICD-10-CM

## 2024-05-23 DIAGNOSIS — L92.8 OTHER GRANULOMATOUS DISORDERS OF THE SKIN AND SUBCUTANEOUS TISSUE: ICD-10-CM

## 2024-05-23 DIAGNOSIS — I89.0 LYMPHEDEMA, NOT ELSEWHERE CLASSIFIED: ICD-10-CM

## 2024-05-23 DIAGNOSIS — R60.9 EDEMA, UNSPECIFIED: ICD-10-CM

## 2024-05-23 DIAGNOSIS — Z91.81 HISTORY OF FALLING: ICD-10-CM

## 2024-05-23 DIAGNOSIS — E66.01 MORBID (SEVERE) OBESITY DUE TO EXCESS CALORIES: ICD-10-CM

## 2024-06-05 ENCOUNTER — OUTPATIENT (OUTPATIENT)
Dept: OUTPATIENT SERVICES | Facility: HOSPITAL | Age: 79
LOS: 1 days | Discharge: HOME | End: 2024-06-05
Payer: MEDICARE

## 2024-06-05 DIAGNOSIS — L89.90 PRESSURE ULCER OF UNSPECIFIED SITE, UNSPECIFIED STAGE: ICD-10-CM

## 2024-06-05 DIAGNOSIS — Z96.653 PRESENCE OF ARTIFICIAL KNEE JOINT, BILATERAL: Chronic | ICD-10-CM

## 2024-06-05 DIAGNOSIS — I89.0 LYMPHEDEMA, NOT ELSEWHERE CLASSIFIED: Chronic | ICD-10-CM

## 2024-06-05 PROCEDURE — 99213 OFFICE O/P EST LOW 20 MIN: CPT

## 2024-06-06 DIAGNOSIS — Z91.81 HISTORY OF FALLING: ICD-10-CM

## 2024-06-06 DIAGNOSIS — I89.0 LYMPHEDEMA, NOT ELSEWHERE CLASSIFIED: ICD-10-CM

## 2024-06-06 DIAGNOSIS — L97.812 NON-PRESSURE CHRONIC ULCER OF OTHER PART OF RIGHT LOWER LEG WITH FAT LAYER EXPOSED: ICD-10-CM

## 2024-06-06 DIAGNOSIS — L97.112 NON-PRESSURE CHRONIC ULCER OF RIGHT THIGH WITH FAT LAYER EXPOSED: ICD-10-CM

## 2024-06-06 DIAGNOSIS — E66.01 MORBID (SEVERE) OBESITY DUE TO EXCESS CALORIES: ICD-10-CM

## 2024-06-06 DIAGNOSIS — L92.8 OTHER GRANULOMATOUS DISORDERS OF THE SKIN AND SUBCUTANEOUS TISSUE: ICD-10-CM

## 2024-06-06 DIAGNOSIS — R60.9 EDEMA, UNSPECIFIED: ICD-10-CM

## 2024-06-26 ENCOUNTER — OUTPATIENT (OUTPATIENT)
Dept: OUTPATIENT SERVICES | Facility: HOSPITAL | Age: 79
LOS: 1 days | Discharge: HOME | End: 2024-06-26
Payer: MEDICARE

## 2024-06-26 DIAGNOSIS — Z96.653 PRESENCE OF ARTIFICIAL KNEE JOINT, BILATERAL: Chronic | ICD-10-CM

## 2024-06-26 DIAGNOSIS — L97.112 NON-PRESSURE CHRONIC ULCER OF RIGHT THIGH WITH FAT LAYER EXPOSED: ICD-10-CM

## 2024-06-26 DIAGNOSIS — I89.0 LYMPHEDEMA, NOT ELSEWHERE CLASSIFIED: ICD-10-CM

## 2024-06-26 DIAGNOSIS — L89.90 PRESSURE ULCER OF UNSPECIFIED SITE, UNSPECIFIED STAGE: ICD-10-CM

## 2024-06-26 DIAGNOSIS — L92.8 OTHER GRANULOMATOUS DISORDERS OF THE SKIN AND SUBCUTANEOUS TISSUE: ICD-10-CM

## 2024-06-26 DIAGNOSIS — L97.812 NON-PRESSURE CHRONIC ULCER OF OTHER PART OF RIGHT LOWER LEG WITH FAT LAYER EXPOSED: ICD-10-CM

## 2024-06-26 DIAGNOSIS — I89.0 LYMPHEDEMA, NOT ELSEWHERE CLASSIFIED: Chronic | ICD-10-CM

## 2024-06-26 DIAGNOSIS — R60.9 EDEMA, UNSPECIFIED: ICD-10-CM

## 2024-06-26 DIAGNOSIS — Z91.81 HISTORY OF FALLING: ICD-10-CM

## 2024-06-26 DIAGNOSIS — E66.01 MORBID (SEVERE) OBESITY DUE TO EXCESS CALORIES: ICD-10-CM

## 2024-06-26 PROCEDURE — 99183 HYPERBARIC OXYGEN THERAPY: CPT

## 2024-07-10 ENCOUNTER — OUTPATIENT (OUTPATIENT)
Dept: OUTPATIENT SERVICES | Facility: HOSPITAL | Age: 79
LOS: 1 days | Discharge: HOME | End: 2024-07-10
Payer: MEDICARE

## 2024-07-10 DIAGNOSIS — L89.90 PRESSURE ULCER OF UNSPECIFIED SITE, UNSPECIFIED STAGE: ICD-10-CM

## 2024-07-10 DIAGNOSIS — I89.0 LYMPHEDEMA, NOT ELSEWHERE CLASSIFIED: Chronic | ICD-10-CM

## 2024-07-10 DIAGNOSIS — Z96.653 PRESENCE OF ARTIFICIAL KNEE JOINT, BILATERAL: Chronic | ICD-10-CM

## 2024-07-10 PROCEDURE — 99213 OFFICE O/P EST LOW 20 MIN: CPT

## 2024-07-24 DIAGNOSIS — L92.8 OTHER GRANULOMATOUS DISORDERS OF THE SKIN AND SUBCUTANEOUS TISSUE: ICD-10-CM

## 2024-07-24 DIAGNOSIS — Z91.81 HISTORY OF FALLING: ICD-10-CM

## 2024-07-24 DIAGNOSIS — R60.9 EDEMA, UNSPECIFIED: ICD-10-CM

## 2024-07-24 DIAGNOSIS — L97.812 NON-PRESSURE CHRONIC ULCER OF OTHER PART OF RIGHT LOWER LEG WITH FAT LAYER EXPOSED: ICD-10-CM

## 2024-07-24 DIAGNOSIS — E66.01 MORBID (SEVERE) OBESITY DUE TO EXCESS CALORIES: ICD-10-CM

## 2024-07-24 DIAGNOSIS — L97.112 NON-PRESSURE CHRONIC ULCER OF RIGHT THIGH WITH FAT LAYER EXPOSED: ICD-10-CM

## 2024-07-24 DIAGNOSIS — I89.0 LYMPHEDEMA, NOT ELSEWHERE CLASSIFIED: ICD-10-CM

## 2024-07-31 ENCOUNTER — OUTPATIENT (OUTPATIENT)
Dept: OUTPATIENT SERVICES | Facility: HOSPITAL | Age: 79
LOS: 1 days | Discharge: HOME | End: 2024-07-31
Payer: MEDICARE

## 2024-07-31 DIAGNOSIS — Z96.653 PRESENCE OF ARTIFICIAL KNEE JOINT, BILATERAL: Chronic | ICD-10-CM

## 2024-07-31 DIAGNOSIS — L89.90 PRESSURE ULCER OF UNSPECIFIED SITE, UNSPECIFIED STAGE: ICD-10-CM

## 2024-07-31 DIAGNOSIS — I89.0 LYMPHEDEMA, NOT ELSEWHERE CLASSIFIED: Chronic | ICD-10-CM

## 2024-07-31 PROCEDURE — 99213 OFFICE O/P EST LOW 20 MIN: CPT

## 2024-08-01 DIAGNOSIS — L97.812 NON-PRESSURE CHRONIC ULCER OF OTHER PART OF RIGHT LOWER LEG WITH FAT LAYER EXPOSED: ICD-10-CM

## 2024-08-01 DIAGNOSIS — Z91.81 HISTORY OF FALLING: ICD-10-CM

## 2024-08-01 DIAGNOSIS — R60.9 EDEMA, UNSPECIFIED: ICD-10-CM

## 2024-08-01 DIAGNOSIS — L97.112 NON-PRESSURE CHRONIC ULCER OF RIGHT THIGH WITH FAT LAYER EXPOSED: ICD-10-CM

## 2024-08-01 DIAGNOSIS — I89.0 LYMPHEDEMA, NOT ELSEWHERE CLASSIFIED: ICD-10-CM

## 2024-08-01 DIAGNOSIS — E66.01 MORBID (SEVERE) OBESITY DUE TO EXCESS CALORIES: ICD-10-CM

## 2024-08-28 ENCOUNTER — OUTPATIENT (OUTPATIENT)
Dept: OUTPATIENT SERVICES | Facility: HOSPITAL | Age: 79
LOS: 1 days | Discharge: HOME | End: 2024-08-28
Payer: MEDICARE

## 2024-08-28 DIAGNOSIS — I89.0 LYMPHEDEMA, NOT ELSEWHERE CLASSIFIED: Chronic | ICD-10-CM

## 2024-08-28 DIAGNOSIS — Z96.653 PRESENCE OF ARTIFICIAL KNEE JOINT, BILATERAL: Chronic | ICD-10-CM

## 2024-08-28 DIAGNOSIS — L89.90 PRESSURE ULCER OF UNSPECIFIED SITE, UNSPECIFIED STAGE: ICD-10-CM

## 2024-08-28 PROCEDURE — 99183 HYPERBARIC OXYGEN THERAPY: CPT

## 2024-08-30 DIAGNOSIS — L97.812 NON-PRESSURE CHRONIC ULCER OF OTHER PART OF RIGHT LOWER LEG WITH FAT LAYER EXPOSED: ICD-10-CM

## 2024-08-30 DIAGNOSIS — R60.9 EDEMA, UNSPECIFIED: ICD-10-CM

## 2024-08-30 DIAGNOSIS — I89.0 LYMPHEDEMA, NOT ELSEWHERE CLASSIFIED: ICD-10-CM

## 2024-08-30 DIAGNOSIS — Z91.81 HISTORY OF FALLING: ICD-10-CM

## 2024-08-30 DIAGNOSIS — E66.01 MORBID (SEVERE) OBESITY DUE TO EXCESS CALORIES: ICD-10-CM

## 2024-08-30 DIAGNOSIS — L97.112 NON-PRESSURE CHRONIC ULCER OF RIGHT THIGH WITH FAT LAYER EXPOSED: ICD-10-CM

## 2024-09-11 ENCOUNTER — OUTPATIENT (OUTPATIENT)
Dept: OUTPATIENT SERVICES | Facility: HOSPITAL | Age: 79
LOS: 1 days | Discharge: HOME | End: 2024-09-11
Payer: MEDICARE

## 2024-09-11 DIAGNOSIS — I89.0 LYMPHEDEMA, NOT ELSEWHERE CLASSIFIED: Chronic | ICD-10-CM

## 2024-09-11 DIAGNOSIS — Z96.653 PRESENCE OF ARTIFICIAL KNEE JOINT, BILATERAL: Chronic | ICD-10-CM

## 2024-09-11 DIAGNOSIS — L89.90 PRESSURE ULCER OF UNSPECIFIED SITE, UNSPECIFIED STAGE: ICD-10-CM

## 2024-09-11 PROCEDURE — 99213 OFFICE O/P EST LOW 20 MIN: CPT

## 2024-09-12 DIAGNOSIS — Z91.81 HISTORY OF FALLING: ICD-10-CM

## 2024-09-12 DIAGNOSIS — L97.812 NON-PRESSURE CHRONIC ULCER OF OTHER PART OF RIGHT LOWER LEG WITH FAT LAYER EXPOSED: ICD-10-CM

## 2024-09-12 DIAGNOSIS — I89.0 LYMPHEDEMA, NOT ELSEWHERE CLASSIFIED: ICD-10-CM

## 2024-09-12 DIAGNOSIS — E66.01 MORBID (SEVERE) OBESITY DUE TO EXCESS CALORIES: ICD-10-CM

## 2024-09-12 DIAGNOSIS — L97.112 NON-PRESSURE CHRONIC ULCER OF RIGHT THIGH WITH FAT LAYER EXPOSED: ICD-10-CM

## 2024-09-12 DIAGNOSIS — R60.9 EDEMA, UNSPECIFIED: ICD-10-CM

## 2024-09-25 ENCOUNTER — OUTPATIENT (OUTPATIENT)
Dept: OUTPATIENT SERVICES | Facility: HOSPITAL | Age: 79
LOS: 1 days | Discharge: HOME | End: 2024-09-25
Payer: MEDICARE

## 2024-09-25 DIAGNOSIS — L89.90 PRESSURE ULCER OF UNSPECIFIED SITE, UNSPECIFIED STAGE: ICD-10-CM

## 2024-09-25 DIAGNOSIS — Z96.653 PRESENCE OF ARTIFICIAL KNEE JOINT, BILATERAL: Chronic | ICD-10-CM

## 2024-09-25 DIAGNOSIS — I89.0 LYMPHEDEMA, NOT ELSEWHERE CLASSIFIED: Chronic | ICD-10-CM

## 2024-09-25 PROCEDURE — 99213 OFFICE O/P EST LOW 20 MIN: CPT

## 2024-09-26 DIAGNOSIS — L92.8 OTHER GRANULOMATOUS DISORDERS OF THE SKIN AND SUBCUTANEOUS TISSUE: ICD-10-CM

## 2024-09-26 DIAGNOSIS — R60.9 EDEMA, UNSPECIFIED: ICD-10-CM

## 2024-09-26 DIAGNOSIS — Z91.81 HISTORY OF FALLING: ICD-10-CM

## 2024-09-26 DIAGNOSIS — L97.812 NON-PRESSURE CHRONIC ULCER OF OTHER PART OF RIGHT LOWER LEG WITH FAT LAYER EXPOSED: ICD-10-CM

## 2024-09-26 DIAGNOSIS — I89.0 LYMPHEDEMA, NOT ELSEWHERE CLASSIFIED: ICD-10-CM

## 2024-09-26 DIAGNOSIS — L97.112 NON-PRESSURE CHRONIC ULCER OF RIGHT THIGH WITH FAT LAYER EXPOSED: ICD-10-CM

## 2024-09-26 DIAGNOSIS — E66.01 MORBID (SEVERE) OBESITY DUE TO EXCESS CALORIES: ICD-10-CM

## 2024-09-30 NOTE — ED ADULT TRIAGE NOTE - NSPATIENTFLAG_GEN_A_ER
Red S (Sepsis)

## 2024-10-23 ENCOUNTER — OUTPATIENT (OUTPATIENT)
Dept: OUTPATIENT SERVICES | Facility: HOSPITAL | Age: 79
LOS: 1 days | Discharge: HOME | End: 2024-10-23
Payer: MEDICARE

## 2024-10-23 DIAGNOSIS — L89.90 PRESSURE ULCER OF UNSPECIFIED SITE, UNSPECIFIED STAGE: ICD-10-CM

## 2024-10-23 DIAGNOSIS — I89.0 LYMPHEDEMA, NOT ELSEWHERE CLASSIFIED: Chronic | ICD-10-CM

## 2024-10-23 DIAGNOSIS — Z96.653 PRESENCE OF ARTIFICIAL KNEE JOINT, BILATERAL: Chronic | ICD-10-CM

## 2024-10-23 PROCEDURE — 99213 OFFICE O/P EST LOW 20 MIN: CPT

## 2024-10-24 DIAGNOSIS — L97.812 NON-PRESSURE CHRONIC ULCER OF OTHER PART OF RIGHT LOWER LEG WITH FAT LAYER EXPOSED: ICD-10-CM

## 2024-10-24 DIAGNOSIS — R60.9 EDEMA, UNSPECIFIED: ICD-10-CM

## 2024-10-24 DIAGNOSIS — E66.01 MORBID (SEVERE) OBESITY DUE TO EXCESS CALORIES: ICD-10-CM

## 2024-10-24 DIAGNOSIS — L97.112 NON-PRESSURE CHRONIC ULCER OF RIGHT THIGH WITH FAT LAYER EXPOSED: ICD-10-CM

## 2024-10-24 DIAGNOSIS — Z91.81 HISTORY OF FALLING: ICD-10-CM

## 2024-10-24 DIAGNOSIS — I89.0 LYMPHEDEMA, NOT ELSEWHERE CLASSIFIED: ICD-10-CM

## 2024-11-07 NOTE — ED CLERICAL - BED REQUESTED
05-Dec-2020 23:17 Patient Name: Monty Nagel  : 1952    MRN: [id number]                              Today's Date: 3/23/2023       Admit Date: 3/11/2023    Visit Dx:     ICD-10-CM ICD-9-CM   1. Generalized weakness  R53.1 780.79   2. History of alcoholism (HCC)  F10.21 V11.3   3. Inability to walk  R26.2 719.7   4. Confusion  R41.0 298.9   5. Hypoglycemia  E16.2 251.2   6. Poor nutrition  E63.9 269.9   7. Cognitive communication deficit  R41.841 799.52   8. Impaired functional mobility, balance, gait, and endurance  Z74.09 V49.89   9. 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   • Postablative hypothyroidism   • Screen for colon cancer   • Other recurrent depressive disorders (HCC)   • Balance problem   • Generalized weakness   • Atrial fibrillation (HCC)   • Severe malnutrition (HCC)   • Meningioma (HCC)   • Rheumatoid vs Psoriatic arthritis (HCC)     Past Medical History:   Diagnosis Date   • Arthritis    • Hepatitis A    • Hypertension    • Melanoma (HCC) 2017    retina     Past Surgical History:   Procedure Laterality Date   • CRANIOTOMY FOR TUMOR N/A 3/21/2023    Procedure: CRANIOTOMY FOR TUMOR STEREOTACTIC WITH STEALTH;  Surgeon: Marlon Mckee MD;  Location: [facility];  Service: Neurosurgery;  Laterality: N/A;   • EYE SURGERY  2017    EYE CANCER RIGHT EYE   • FINGER SURGERY Left     Pointer finger re-attached in 3rd Grade   • TONSILLECTOMY        General Information     Row Name 23 1052          Physical Therapy Time and Intention    Document Type therapy note (daily note)  -     Mode of Treatment physical therapy  -     Row Name 23 1052          General Information    Patient Profile Reviewed yes  -HP     Existing Precautions/Restrictions fall  impulsive, L sided weakness/inattention, blind R eye baseline  -     Row Name 23 105          Cognition    Orientation  Stop amoxicillin  Start amoxicillin/clavulanate (augmentin) and azithromycin   Status (Cognition) oriented to;person;place;situation  -     Row Name 03/23/23 1052          Safety Issues, Functional Mobility    Impairments Affecting Function (Mobility) balance;cognition;coordination;endurance/activity tolerance;strength;postural/trunk control;motor planning;visual/perceptual;pain  -           User Key  (r) = Recorded By, (t) = Taken By, (c) = Cosigned By    Initials Name Provider Type     Kelly Mckeon PT Physical Therapist               Mobility     Row Name 03/23/23 1053          Bed Mobility    Bed Mobility sit-supine;supine-sit  -     Supine-Sit [place] (Bed Mobility) supervision  -     Sit-Supine [place] (Bed Mobility) supervision  -     Assistive Device (Bed Mobility) bed rails  -     Comment, (Bed Mobility) VC for sequencing  -     Row Name 03/23/23 1053          Transfers    Comment, (Transfers) VC for hand placement on FWW. Pt preferred the AD and demonstrated improved stability with it.  -     Row Name 03/23/23 1053          Sit-Stand Transfer    Sit-Stand [place] (Transfers) contact guard;verbal cues  -     Assistive Device (Sit-Stand Transfers) walker, front-wheeled  -     Row Name 03/23/23 1053          Gait/Stairs (Locomotion)    [place] Level (Gait) minimum assist (75% patient effort)  -     Assistive Device (Gait) walker, front-wheeled  -     Distance in Feet (Gait) 140x2  -     Deviations/Abnormal Patterns (Gait) bilateral deviations;base of support, narrow;dima decreased;festinating/shuffling;stride length decreased  -     Bilateral Gait Deviations forward flexed posture;heel strike decreased  -     Left Sided Gait Deviations foot drop/toe drag  -     Comment, (Gait/Stairs) Pt amb in halls with FWW and Min A for stability. Pt demonstrated step-through gait pattern with wide EL and forward flexed posture. Pt demosntrated minima B foot clearance or functional ankle DF througout swing phase. VC for upright posture.  Activity limited by fatigue. Gait deviations worsened as pt fatigued.  -           User Key  (r) = Recorded By, (t) = Taken By, (c) = Cosigned By    Initials Name Provider Type     Kelly Mckeon PT Physical Therapist               Obj/Interventions     Row Name 03/23/23 1057          Motor Skills    Therapeutic Exercise other (see comments)  pt declined ther-ex  -HP     Row Name 03/23/23 1057          Balance    Balance Assessment sitting static balance;sitting dynamic balance;sit to stand dynamic balance;standing static balance;standing dynamic balance  -     Static Sitting Balance standby assist  -     Dynamic Sitting Balance standby assist  -     Position, Sitting Balance sitting edge of bed  -     Static Standing Balance contact guard  -     Dynamic Standing Balance minimal assist  -     Position/Device Used, Standing Balance supported;walker, rolling  -     Balance Interventions sitting;standing;sit to stand;occupation based/functional task  -     Comment, Balance addressed higher level balance activies by marching place  -           User Key  (r) = Recorded By, (t) = Taken By, (c) = Cosigned By    Initials Name Provider Type     Kelly Mckeon PT Physical Therapist               Goals/Plan    No documentation.                Clinical Impression     Row Name 03/23/23 1125          Pain    Pretreatment Pain Rating 0/10 - no pain  -     Posttreatment Pain Rating 0/10 - no pain  -     Row Name 03/23/23 1125          Plan of Care Review    Plan of Care Reviewed With patient  -     Progress no change  -     Outcome Evaluation Pt amb in mao with FWW and Lynsey. Pt continues to demonstrated decreased safety awareness and balance deficts putting pt at increased risk for falls. Activity limited by fatigue. Continue to recommend d/c to IRF to address functional deficits and promote increased independence with functional mobility prior to returning home with no support.  -     Row Name  03/23/23 1125          Vital Signs    Pre Systolic BP Rehab --  VSS  -HP     Pre Patient Position Supine  -HP     Intra Patient Position Standing  -HP     Post Patient Position Supine  -HP     Row Name 03/23/23 1125          Positioning and Restraints    Pre-Treatment Position in bed  -HP     Post Treatment Position bed  -HP     In Bed notified nsg;fowlers;call light within reach;encouraged to call for assist;exit alarm on;side rails up x2  -HP           User Key  (r) = Recorded By, (t) = Taken By, (c) = Cosigned By    Initials Name Provider Type     Kelly Mckeon PT Physical Therapist               Outcome Measures     Row Name 03/23/23 1127          How much help from another person do you currently need...    Turning from your back to your side while in flat bed without using bedrails? 4  -HP     Moving from lying on back to sitting on the side of a flat bed without bedrails? 4  -HP     Moving to and from a bed to a chair (including a wheelchair)? 3  -HP     Standing up from a chair using your arms (e.g., wheelchair, bedside chair)? 3  -HP     Climbing 3-5 steps with a railing? 2  -HP     To walk in hospital room? 3  -HP     AM-PAC 6 Clicks Score (PT) 19  -HP     Highest level of mobility 6 --> Walked 10 steps or more  -     Row Name 03/23/23 1127          Functional Assessment    Outcome Measure Options AM-PAC 6 Clicks Basic Mobility (PT)  -HP           User Key  (r) = Recorded By, (t) = Taken By, (c) = Cosigned By    Initials Name Provider Type     Kelly Mckeon PT Physical Therapist                             Physical Therapy Education     Title: PT OT SLP Therapies (In Progress)     Topic: Physical Therapy (Done)     Point: Mobility training (Done)     Learning Progress Summary           Patient Acceptance, E,D, VU,NR by HP at 3/23/2023 1127    Acceptance, E,TB, NR by AY at 3/22/2023 1141    Acceptance, E, VU,NR by CM at 3/19/2023 1149    Acceptance, E, VU by CM at 3/17/2023 1517    Acceptance, E,  VU,NR by LH at 3/15/2023 1407    Acceptance, E, NR by KG at 3/14/2023 1428    Acceptance, E, VU,NR by SJ at 3/12/2023 1542                   Point: Home exercise program (Done)     Learning Progress Summary           Patient Acceptance, E,D, VU,NR by HP at 3/23/2023 1127    Acceptance, E,TB, NR by AY at 3/22/2023 1141    Acceptance, E, VU,NR by CM at 3/19/2023 1149    Acceptance, E, VU,NR by LH at 3/15/2023 1407    Acceptance, E, NR by KG at 3/14/2023 1428                   Point: Body mechanics (Done)     Learning Progress Summary           Patient Acceptance, E,D, VU,NR by HP at 3/23/2023 1127    Acceptance, E,TB, NR by AY at 3/22/2023 1141    Acceptance, E, VU,NR by CM at 3/19/2023 1149    Acceptance, E, VU by CM at 3/17/2023 1517    Acceptance, E, VU,NR by LH at 3/15/2023 1407    Acceptance, E, NR by KG at 3/14/2023 1428    Acceptance, E, VU,NR by SJ at 3/12/2023 1542                   Point: Precautions (Done)     Learning Progress Summary           Patient Acceptance, E,D, VU,NR by HP at 3/23/2023 1127    Acceptance, E,TB, NR by AY at 3/22/2023 1141    Acceptance, E, VU,NR by CM at 3/19/2023 1149    Acceptance, E, VU by CM at 3/17/2023 1517    Acceptance, E, VU,NR by LH at 3/15/2023 1407    Acceptance, E, NR by KG at 3/14/2023 1428    Acceptance, E, VU,NR by SJ at 3/12/2023 1542                               User Key     Initials Effective Dates Name Provider Type Discipline     02/03/23 - 03/12/23 Samara Harrison, PT Physical Therapist PT    KG 05/22/20 -  Cherelle Gaitan, PT Physical Therapist PT    AY 11/10/20 -  Sandee Spencer, PT Physical Therapist PT     09/21/21 -  Tristian Ravi, PT Physical Therapist PT    HP 06/01/21 -  Kelly Mckeon, PT Physical Therapist PT    CM 09/22/22 -  Yanet Calvin, PT Physical Therapist PT              PT Recommendation and Plan     Plan of Care Reviewed With: patient  Progress: no change  Outcome Evaluation: Pt amb in mao with ANABELLW and Lynsey. Pt  continues to demonstrated decreased safety awareness and balance deficts putting pt at increased risk for falls. Activity limited by fatigue. Continue to recommend d/c to IRF to address functional deficits and promote increased independence with functional mobility prior to returning home with no support.     Time Calculation:    PT Charges     Row Name 03/23/23 1030             Time Calculation    Start Time 1030  -HP         Timed Charges    78432 - Gait Training Minutes  7  -HP      82585 - PT Therapeutic Activity Minutes 5  -HP         Total Minutes    Timed Charges Total Minutes 12  -HP       Total Minutes 12  -HP            User Key  (r) = Recorded By, (t) = Taken By, (c) = Cosigned By    Initials Name Provider Type    HP Kelly Mckeon PT Physical Therapist              Therapy Charges for Today     Code Description Service Date Service Provider Modifiers Qty    98514103191 HC GAIT TRAINING EA 15 MIN 3/23/2023 Kelly Mckeon PT GP 1          PT G-Codes  Outcome Measure Options: AM-PAC 6 Clicks Basic Mobility (PT)  AM-PAC 6 Clicks Score (PT): 19  AM-PAC 6 Clicks Score (OT): 14       Kelly Mckeon PT  3/23/2023

## 2024-11-13 ENCOUNTER — OUTPATIENT (OUTPATIENT)
Dept: OUTPATIENT SERVICES | Facility: HOSPITAL | Age: 79
LOS: 1 days | Discharge: HOME | End: 2024-11-13
Payer: MEDICARE

## 2024-11-13 DIAGNOSIS — L89.90 PRESSURE ULCER OF UNSPECIFIED SITE, UNSPECIFIED STAGE: ICD-10-CM

## 2024-11-13 DIAGNOSIS — Z96.653 PRESENCE OF ARTIFICIAL KNEE JOINT, BILATERAL: Chronic | ICD-10-CM

## 2024-11-13 DIAGNOSIS — I89.0 LYMPHEDEMA, NOT ELSEWHERE CLASSIFIED: Chronic | ICD-10-CM

## 2024-11-13 PROCEDURE — 99213 OFFICE O/P EST LOW 20 MIN: CPT

## 2024-11-14 DIAGNOSIS — L97.812 NON-PRESSURE CHRONIC ULCER OF OTHER PART OF RIGHT LOWER LEG WITH FAT LAYER EXPOSED: ICD-10-CM

## 2024-11-14 DIAGNOSIS — L97.112 NON-PRESSURE CHRONIC ULCER OF RIGHT THIGH WITH FAT LAYER EXPOSED: ICD-10-CM

## 2024-11-14 DIAGNOSIS — Z91.81 HISTORY OF FALLING: ICD-10-CM

## 2024-11-14 DIAGNOSIS — E66.01 MORBID (SEVERE) OBESITY DUE TO EXCESS CALORIES: ICD-10-CM

## 2024-11-14 DIAGNOSIS — R60.9 EDEMA, UNSPECIFIED: ICD-10-CM

## 2024-11-14 DIAGNOSIS — I89.0 LYMPHEDEMA, NOT ELSEWHERE CLASSIFIED: ICD-10-CM

## 2024-11-19 NOTE — DISCHARGE NOTE PROVIDER - NSDCQMERRANDS_GEN_ALL_CORE
Schedule sleep medicine study, info below if desired    Call if issues with medication ; set up live well lizeth    Return five weeks for follow up    [facility]  [address]  [address] [address] [phone]  
Yes

## 2024-11-26 NOTE — PHYSICAL THERAPY INITIAL EVALUATION ADULT - IMPAIRMENTS FOUND, PT EVAL
Pt axo4. Respirations even and unlabored. C/o headache, dizziness, jaw pain, sob x 1 week. hx of HLD on statin, PTSD/Anxiety on .25 xanax PRN. States she has no history of headaches and this week has been having 8/10 head pain that has been pretty constant. States she also has been experiencing some orthopnea and feels like her peripheral vision is "weird". Denies any palpitations, chest pain.
aerobic capacity/endurance

## 2024-12-18 ENCOUNTER — OUTPATIENT (OUTPATIENT)
Dept: OUTPATIENT SERVICES | Facility: HOSPITAL | Age: 79
LOS: 1 days | Discharge: HOME | End: 2024-12-18
Payer: MEDICARE

## 2024-12-18 DIAGNOSIS — Z96.653 PRESENCE OF ARTIFICIAL KNEE JOINT, BILATERAL: Chronic | ICD-10-CM

## 2024-12-18 DIAGNOSIS — I89.0 LYMPHEDEMA, NOT ELSEWHERE CLASSIFIED: Chronic | ICD-10-CM

## 2024-12-18 DIAGNOSIS — L89.90 PRESSURE ULCER OF UNSPECIFIED SITE, UNSPECIFIED STAGE: ICD-10-CM

## 2024-12-18 PROCEDURE — 99213 OFFICE O/P EST LOW 20 MIN: CPT

## 2024-12-19 DIAGNOSIS — L92.8 OTHER GRANULOMATOUS DISORDERS OF THE SKIN AND SUBCUTANEOUS TISSUE: ICD-10-CM

## 2024-12-19 DIAGNOSIS — I89.0 LYMPHEDEMA, NOT ELSEWHERE CLASSIFIED: ICD-10-CM

## 2024-12-19 DIAGNOSIS — L97.112 NON-PRESSURE CHRONIC ULCER OF RIGHT THIGH WITH FAT LAYER EXPOSED: ICD-10-CM

## 2024-12-19 DIAGNOSIS — L97.812 NON-PRESSURE CHRONIC ULCER OF OTHER PART OF RIGHT LOWER LEG WITH FAT LAYER EXPOSED: ICD-10-CM

## 2024-12-19 DIAGNOSIS — Z91.81 HISTORY OF FALLING: ICD-10-CM

## 2024-12-19 DIAGNOSIS — E66.01 MORBID (SEVERE) OBESITY DUE TO EXCESS CALORIES: ICD-10-CM

## 2024-12-19 DIAGNOSIS — R60.9 EDEMA, UNSPECIFIED: ICD-10-CM

## 2025-01-15 ENCOUNTER — RESULT REVIEW (OUTPATIENT)
Age: 80
End: 2025-01-15

## 2025-01-15 ENCOUNTER — OUTPATIENT (OUTPATIENT)
Dept: OUTPATIENT SERVICES | Facility: HOSPITAL | Age: 80
LOS: 1 days | Discharge: HOME | End: 2025-01-15
Payer: MEDICARE

## 2025-01-15 DIAGNOSIS — L89.90 PRESSURE ULCER OF UNSPECIFIED SITE, UNSPECIFIED STAGE: ICD-10-CM

## 2025-01-15 DIAGNOSIS — I89.0 LYMPHEDEMA, NOT ELSEWHERE CLASSIFIED: Chronic | ICD-10-CM

## 2025-01-15 DIAGNOSIS — Z96.653 PRESENCE OF ARTIFICIAL KNEE JOINT, BILATERAL: Chronic | ICD-10-CM

## 2025-01-15 PROCEDURE — 93970 EXTREMITY STUDY: CPT | Mod: 26

## 2025-01-15 PROCEDURE — 99214 OFFICE O/P EST MOD 30 MIN: CPT

## 2025-01-16 DIAGNOSIS — E66.01 MORBID (SEVERE) OBESITY DUE TO EXCESS CALORIES: ICD-10-CM

## 2025-01-16 DIAGNOSIS — R60.9 EDEMA, UNSPECIFIED: ICD-10-CM

## 2025-01-16 DIAGNOSIS — L97.112 NON-PRESSURE CHRONIC ULCER OF RIGHT THIGH WITH FAT LAYER EXPOSED: ICD-10-CM

## 2025-01-16 DIAGNOSIS — I89.0 LYMPHEDEMA, NOT ELSEWHERE CLASSIFIED: ICD-10-CM

## 2025-01-16 DIAGNOSIS — L97.812 NON-PRESSURE CHRONIC ULCER OF OTHER PART OF RIGHT LOWER LEG WITH FAT LAYER EXPOSED: ICD-10-CM

## 2025-01-16 DIAGNOSIS — Z91.81 HISTORY OF FALLING: ICD-10-CM

## 2025-01-22 ENCOUNTER — APPOINTMENT (OUTPATIENT)
Dept: ULTRASOUND IMAGING | Facility: HOSPITAL | Age: 80
End: 2025-01-22

## 2025-01-29 ENCOUNTER — OUTPATIENT (OUTPATIENT)
Dept: OUTPATIENT SERVICES | Facility: HOSPITAL | Age: 80
LOS: 1 days | Discharge: HOME | End: 2025-01-29
Payer: MEDICARE

## 2025-01-29 DIAGNOSIS — I89.0 LYMPHEDEMA, NOT ELSEWHERE CLASSIFIED: Chronic | ICD-10-CM

## 2025-01-29 DIAGNOSIS — L89.90 PRESSURE ULCER OF UNSPECIFIED SITE, UNSPECIFIED STAGE: ICD-10-CM

## 2025-01-29 DIAGNOSIS — Z96.653 PRESENCE OF ARTIFICIAL KNEE JOINT, BILATERAL: Chronic | ICD-10-CM

## 2025-01-29 PROCEDURE — 99213 OFFICE O/P EST LOW 20 MIN: CPT

## 2025-01-31 ENCOUNTER — APPOINTMENT (OUTPATIENT)
Dept: ULTRASOUND IMAGING | Facility: HOSPITAL | Age: 80
End: 2025-01-31

## 2025-01-31 ENCOUNTER — RESULT REVIEW (OUTPATIENT)
Age: 80
End: 2025-01-31

## 2025-01-31 ENCOUNTER — OUTPATIENT (OUTPATIENT)
Dept: OUTPATIENT SERVICES | Facility: HOSPITAL | Age: 80
LOS: 1 days | Discharge: HOME | End: 2025-01-31
Payer: MEDICARE

## 2025-01-31 DIAGNOSIS — Z96.653 PRESENCE OF ARTIFICIAL KNEE JOINT, BILATERAL: Chronic | ICD-10-CM

## 2025-01-31 DIAGNOSIS — I82.409 ACUTE EMBOLISM AND THROMBOSIS OF UNSPECIFIED DEEP VEINS OF UNSPECIFIED LOWER EXTREMITY: ICD-10-CM

## 2025-01-31 DIAGNOSIS — I89.0 LYMPHEDEMA, NOT ELSEWHERE CLASSIFIED: Chronic | ICD-10-CM

## 2025-01-31 PROCEDURE — 93925 LOWER EXTREMITY STUDY: CPT | Mod: 26

## 2025-02-03 DIAGNOSIS — Z91.81 HISTORY OF FALLING: ICD-10-CM

## 2025-02-03 DIAGNOSIS — R60.9 EDEMA, UNSPECIFIED: ICD-10-CM

## 2025-02-03 DIAGNOSIS — L97.812 NON-PRESSURE CHRONIC ULCER OF OTHER PART OF RIGHT LOWER LEG WITH FAT LAYER EXPOSED: ICD-10-CM

## 2025-02-03 DIAGNOSIS — E66.01 MORBID (SEVERE) OBESITY DUE TO EXCESS CALORIES: ICD-10-CM

## 2025-02-03 DIAGNOSIS — L97.112 NON-PRESSURE CHRONIC ULCER OF RIGHT THIGH WITH FAT LAYER EXPOSED: ICD-10-CM

## 2025-02-03 DIAGNOSIS — I89.0 LYMPHEDEMA, NOT ELSEWHERE CLASSIFIED: ICD-10-CM

## 2025-02-25 NOTE — DISCHARGE NOTE NURSING/CASE MANAGEMENT/SOCIAL WORK - NSPROMEDSBROUGHTTOHOSP_GEN_A_NUR
your Primary Care Physician or go to your preferred emergency room.      Physician Signature:_______________________    Date: ___________ Time:  ____________    Fred Fleming CNP        Electronically signed by DMITRY Colvin CNP on 2/24/2025 at 10:38 PM      
no

## 2025-03-19 ENCOUNTER — INPATIENT (INPATIENT)
Facility: HOSPITAL | Age: 80
LOS: 13 days | Discharge: NURSING, REHAB OR LONG-TERM CARE | End: 2025-04-02
Attending: HOSPITALIST | Admitting: HOSPITALIST
Payer: MEDICARE

## 2025-03-19 ENCOUNTER — OUTPATIENT (OUTPATIENT)
Dept: OUTPATIENT SERVICES | Facility: HOSPITAL | Age: 80
LOS: 1 days | Discharge: HOME | End: 2025-03-19

## 2025-03-19 VITALS
RESPIRATION RATE: 18 BRPM | WEIGHT: 293 LBS | HEART RATE: 78 BPM | SYSTOLIC BLOOD PRESSURE: 174 MMHG | HEIGHT: 63 IN | TEMPERATURE: 98 F | DIASTOLIC BLOOD PRESSURE: 82 MMHG | OXYGEN SATURATION: 90 %

## 2025-03-19 DIAGNOSIS — I10 ESSENTIAL (PRIMARY) HYPERTENSION: ICD-10-CM

## 2025-03-19 DIAGNOSIS — R91.1 SOLITARY PULMONARY NODULE: ICD-10-CM

## 2025-03-19 DIAGNOSIS — I89.0 LYMPHEDEMA, NOT ELSEWHERE CLASSIFIED: Chronic | ICD-10-CM

## 2025-03-19 DIAGNOSIS — Z96.653 PRESENCE OF ARTIFICIAL KNEE JOINT, BILATERAL: Chronic | ICD-10-CM

## 2025-03-19 DIAGNOSIS — R53.81 OTHER MALAISE: ICD-10-CM

## 2025-03-19 DIAGNOSIS — L89.90 PRESSURE ULCER OF UNSPECIFIED SITE, UNSPECIFIED STAGE: ICD-10-CM

## 2025-03-19 DIAGNOSIS — R79.89 OTHER SPECIFIED ABNORMAL FINDINGS OF BLOOD CHEMISTRY: ICD-10-CM

## 2025-03-19 DIAGNOSIS — I89.0 LYMPHEDEMA, NOT ELSEWHERE CLASSIFIED: ICD-10-CM

## 2025-03-19 LAB
ALBUMIN SERPL ELPH-MCNC: 3.7 G/DL — SIGNIFICANT CHANGE UP (ref 3.3–5)
ALP SERPL-CCNC: 84 U/L — SIGNIFICANT CHANGE UP (ref 40–120)
ALT FLD-CCNC: 20 U/L — SIGNIFICANT CHANGE UP (ref 12–78)
ANION GAP SERPL CALC-SCNC: -3 MMOL/L — LOW (ref 5–17)
APTT BLD: 30.9 SEC — SIGNIFICANT CHANGE UP (ref 24.5–35.6)
AST SERPL-CCNC: 16 U/L — SIGNIFICANT CHANGE UP (ref 15–37)
BASE EXCESS BLDV CALC-SCNC: 12.3 MMOL/L — HIGH (ref -2–3)
BASOPHILS # BLD AUTO: 0.02 K/UL — SIGNIFICANT CHANGE UP (ref 0–0.2)
BASOPHILS NFR BLD AUTO: 0.5 % — SIGNIFICANT CHANGE UP (ref 0–2)
BILIRUB SERPL-MCNC: 0.3 MG/DL — SIGNIFICANT CHANGE UP (ref 0.2–1.2)
BLOOD GAS COMMENTS, VENOUS: SIGNIFICANT CHANGE UP
BUN SERPL-MCNC: 14 MG/DL — SIGNIFICANT CHANGE UP (ref 7–23)
CALCIUM SERPL-MCNC: 9 MG/DL — SIGNIFICANT CHANGE UP (ref 8.5–10.1)
CHLORIDE BLDV-SCNC: 101 MMOL/L — SIGNIFICANT CHANGE UP (ref 98–107)
CHLORIDE SERPL-SCNC: 102 MMOL/L — SIGNIFICANT CHANGE UP (ref 96–108)
CK MB BLD-MCNC: 1.5 % — SIGNIFICANT CHANGE UP (ref 0–3.5)
CK MB CFR SERPL CALC: 1.7 NG/ML — SIGNIFICANT CHANGE UP (ref 0.5–3.6)
CK SERPL-CCNC: 112 U/L — SIGNIFICANT CHANGE UP (ref 26–192)
CO2 BLDV-SCNC: 43 MMOL/L — HIGH (ref 22–26)
CO2 SERPL-SCNC: 37 MMOL/L — HIGH (ref 22–31)
CREAT SERPL-MCNC: 0.89 MG/DL — SIGNIFICANT CHANGE UP (ref 0.5–1.3)
EGFR: 66 ML/MIN/1.73M2 — SIGNIFICANT CHANGE UP
EGFR: 66 ML/MIN/1.73M2 — SIGNIFICANT CHANGE UP
EOSINOPHIL # BLD AUTO: 0.11 K/UL — SIGNIFICANT CHANGE UP (ref 0–0.5)
EOSINOPHIL NFR BLD AUTO: 2.9 % — SIGNIFICANT CHANGE UP (ref 0–6)
FLUAV AG NPH QL: SIGNIFICANT CHANGE UP
FLUBV AG NPH QL: SIGNIFICANT CHANGE UP
GAS PNL BLDV: 137 MMOL/L — SIGNIFICANT CHANGE UP (ref 136–145)
GAS PNL BLDV: SIGNIFICANT CHANGE UP
GAS PNL BLDV: SIGNIFICANT CHANGE UP
GLUCOSE BLDV-MCNC: 119 MG/DL — HIGH (ref 65–95)
GLUCOSE SERPL-MCNC: 115 MG/DL — HIGH (ref 70–99)
HCO3 BLDV-SCNC: 41 MMOL/L — HIGH (ref 22–28)
HCT VFR BLD CALC: 37.5 % — SIGNIFICANT CHANGE UP (ref 34.5–45)
HCT VFR BLDA CALC: 35 % — LOW (ref 37–47)
HGB BLD CALC-MCNC: 11.7 G/DL — SIGNIFICANT CHANGE UP (ref 11.7–16.1)
HGB BLD-MCNC: 11.3 G/DL — LOW (ref 11.5–15.5)
IMM GRANULOCYTES NFR BLD AUTO: 0.5 % — SIGNIFICANT CHANGE UP (ref 0–0.9)
INR BLD: 1.1 RATIO — SIGNIFICANT CHANGE UP (ref 0.85–1.16)
LACTATE BLDV-MCNC: 1 MMOL/L — SIGNIFICANT CHANGE UP (ref 0.56–1.39)
LACTATE SERPL-SCNC: 1 MMOL/L — SIGNIFICANT CHANGE UP (ref 0.7–2)
LYMPHOCYTES # BLD AUTO: 0.75 K/UL — LOW (ref 1–3.3)
LYMPHOCYTES # BLD AUTO: 19.9 % — SIGNIFICANT CHANGE UP (ref 13–44)
MCHC RBC-ENTMCNC: 27.6 PG — SIGNIFICANT CHANGE UP (ref 27–34)
MCHC RBC-ENTMCNC: 30.1 G/DL — LOW (ref 32–36)
MCV RBC AUTO: 91.7 FL — SIGNIFICANT CHANGE UP (ref 80–100)
MONOCYTES # BLD AUTO: 0.48 K/UL — SIGNIFICANT CHANGE UP (ref 0–0.9)
MONOCYTES NFR BLD AUTO: 12.8 % — SIGNIFICANT CHANGE UP (ref 2–14)
NEUTROPHILS # BLD AUTO: 2.38 K/UL — SIGNIFICANT CHANGE UP (ref 1.8–7.4)
NEUTROPHILS NFR BLD AUTO: 63.4 % — SIGNIFICANT CHANGE UP (ref 43–77)
NRBC BLD AUTO-RTO: 0 /100 WBCS — SIGNIFICANT CHANGE UP (ref 0–0)
NT-PROBNP SERPL-SCNC: 1820 PG/ML — HIGH (ref 0–450)
PCO2 BLDV: 76 MMHG — CRITICAL HIGH (ref 42–55)
PH BLDV: 7.34 — SIGNIFICANT CHANGE UP (ref 7.32–7.43)
PLATELET # BLD AUTO: 197 K/UL — SIGNIFICANT CHANGE UP (ref 150–400)
PO2 BLDV: 29 MMHG — SIGNIFICANT CHANGE UP (ref 25–45)
POTASSIUM BLDV-SCNC: 4.3 MMOL/L — SIGNIFICANT CHANGE UP (ref 3.5–5.1)
POTASSIUM SERPL-MCNC: 4 MMOL/L — SIGNIFICANT CHANGE UP (ref 3.5–5.3)
POTASSIUM SERPL-SCNC: 4 MMOL/L — SIGNIFICANT CHANGE UP (ref 3.5–5.3)
PROT SERPL-MCNC: 8.2 GM/DL — SIGNIFICANT CHANGE UP (ref 6–8.3)
PROTHROM AB SERPL-ACNC: 12.7 SEC — SIGNIFICANT CHANGE UP (ref 9.9–13.4)
RBC # BLD: 4.09 M/UL — SIGNIFICANT CHANGE UP (ref 3.8–5.2)
RBC # FLD: 15.6 % — HIGH (ref 10.3–14.5)
RSV RNA NPH QL NAA+NON-PROBE: SIGNIFICANT CHANGE UP
SAO2 % BLDV: 48.8 % — LOW (ref 94–98)
SARS-COV-2 RNA SPEC QL NAA+PROBE: SIGNIFICANT CHANGE UP
SODIUM SERPL-SCNC: 136 MMOL/L — SIGNIFICANT CHANGE UP (ref 135–145)
SOURCE RESPIRATORY: SIGNIFICANT CHANGE UP
TROPONIN I, HIGH SENSITIVITY RESULT: 61.1 NG/L — HIGH
TROPONIN I, HIGH SENSITIVITY RESULT: 73.4 NG/L — HIGH
WBC # BLD: 3.76 K/UL — LOW (ref 3.8–10.5)
WBC # FLD AUTO: 3.76 K/UL — LOW (ref 3.8–10.5)

## 2025-03-19 PROCEDURE — 99222 1ST HOSP IP/OBS MODERATE 55: CPT

## 2025-03-19 PROCEDURE — 93010 ELECTROCARDIOGRAM REPORT: CPT

## 2025-03-19 PROCEDURE — 99214 OFFICE O/P EST MOD 30 MIN: CPT

## 2025-03-19 PROCEDURE — 99285 EMERGENCY DEPT VISIT HI MDM: CPT

## 2025-03-19 PROCEDURE — 71045 X-RAY EXAM CHEST 1 VIEW: CPT | Mod: 26

## 2025-03-19 PROCEDURE — 71275 CT ANGIOGRAPHY CHEST: CPT | Mod: 26

## 2025-03-19 RX ORDER — LOSARTAN POTASSIUM 100 MG/1
25 TABLET, FILM COATED ORAL DAILY
Refills: 0 | Status: DISCONTINUED | OUTPATIENT
Start: 2025-03-19 | End: 2025-03-28

## 2025-03-19 RX ORDER — MAGNESIUM, ALUMINUM HYDROXIDE 200-200 MG
30 TABLET,CHEWABLE ORAL EVERY 4 HOURS
Refills: 0 | Status: DISCONTINUED | OUTPATIENT
Start: 2025-03-19 | End: 2025-04-02

## 2025-03-19 RX ORDER — FUROSEMIDE 10 MG/ML
20 INJECTION INTRAMUSCULAR; INTRAVENOUS DAILY
Refills: 0 | Status: DISCONTINUED | OUTPATIENT
Start: 2025-03-19 | End: 2025-03-28

## 2025-03-19 RX ORDER — ONDANSETRON HCL/PF 4 MG/2 ML
4 VIAL (ML) INJECTION EVERY 8 HOURS
Refills: 0 | Status: DISCONTINUED | OUTPATIENT
Start: 2025-03-19 | End: 2025-04-02

## 2025-03-19 RX ORDER — ACETAMINOPHEN 500 MG/5ML
650 LIQUID (ML) ORAL EVERY 6 HOURS
Refills: 0 | Status: DISCONTINUED | OUTPATIENT
Start: 2025-03-19 | End: 2025-04-02

## 2025-03-19 RX ORDER — ENOXAPARIN SODIUM 100 MG/ML
40 INJECTION SUBCUTANEOUS EVERY 12 HOURS
Refills: 0 | Status: DISCONTINUED | OUTPATIENT
Start: 2025-03-19 | End: 2025-04-02

## 2025-03-19 RX ORDER — FUROSEMIDE 10 MG/ML
40 INJECTION INTRAMUSCULAR; INTRAVENOUS DAILY
Refills: 0 | Status: DISCONTINUED | OUTPATIENT
Start: 2025-03-19 | End: 2025-03-19

## 2025-03-19 RX ORDER — MELATONIN 5 MG
3 TABLET ORAL AT BEDTIME
Refills: 0 | Status: DISCONTINUED | OUTPATIENT
Start: 2025-03-19 | End: 2025-04-02

## 2025-03-19 RX ADMIN — ENOXAPARIN SODIUM 40 MILLIGRAM(S): 100 INJECTION SUBCUTANEOUS at 17:49

## 2025-03-19 RX ADMIN — FUROSEMIDE 40 MILLIGRAM(S): 10 INJECTION INTRAMUSCULAR; INTRAVENOUS at 17:49

## 2025-03-19 NOTE — ED ADULT NURSE NOTE - NSFALLRISKINTERV_ED_ALL_ED

## 2025-03-19 NOTE — H&P ADULT - NSHPLABSRESULTS_GEN_ALL_CORE
11.3   3.76  )-----------( 197      ( 19 Mar 2025 12:50 )             37.5     136  |  102  |  14  ----------------------------<  115[H]     03-19  4.0   |  37[H]  |  0.89    Ca    9.0      19 Mar 2025 12:50    TPro  8.2  /  Alb  3.7  /  TBili  0.3  /  DBili  x   /  AST  16  /  ALT  20  /  AlkPhos  84  03-19    PT/INR: 12.7/1.10 (03-19-25 @ 12:50)  PTT: 30.9 (03-19-25 @ 12:50)    12:19 - VBG - pH: 7.34  | pCO2: 76    | pO2: 29    | Lactate: 1.00     Pro-BNP: 1820 (03-19-25 @ 12:50)    Chest x-ray 3/19/25  IMPRESSION:  1. Cardiomegaly with pulmonary venous congestion however may be slightly overestimated in the setting of a shallow inspiration with relatively low lung volumes.  2. In addition there are bulky sheron bilaterally, disproportionate to the central pulmonary venous congestion and raises the possibility of bilateral hilar lymphadenopathy.  3. Consider CT of the chest with contrast for further evaluation.    CTA chest PE protocol with IV contrast 3/19/25  FINDINGS:  LUNGS AND LARGE AIRWAYS: Patent central airways. Right lower lobe subpleural nodule (5-78), 0.4 cm. Minimal bibasilar subsegmental atelectasis.  PLEURA: No pleural effusion.  VESSELS: Coronary artery calcifications. Adequate contrast opacification of the pulmonary arterial tree without evidence of main or lobar pulmonary embolism. Evaluation of many of the segmental and subsegmental arteries is limited secondary to motion artifact, streak artifact, and poor opacification.  HEART: Cardiomegaly. No pericardial effusion.  MEDIASTINUM AND SHERON: No lymphadenopathy.  CHEST WALL AND LOWER NECK: Within normal limits.  VISUALIZED UPPER ABDOMEN: Colonic diverticulosis.  BONES: Degenerative changes.    IMPRESSION:  No main or lobar pulmonary embolism. Limited evaluation of the segmental and subsegmental branches, as detailed above.    Right lower lobe subpleural nodule, 0.4 cm. No follow-up CT is required in a low-risk patient. In patients with a history of smoking or other risk factors, follow-up CT may be performed in one year.

## 2025-03-19 NOTE — PHYSICAL THERAPY INITIAL EVALUATION ADULT - MODALITIES TREATMENT COMMENTS
R Medial Anterior Lower Leg Wound (Wound Fissure): 1.25x5.5x1.0cm, R Posterior Upper Leg (Wound Fissure): 3.5x5.3x0.5cm, Hyper Keratinized R Heel and R Superior Heel Border

## 2025-03-19 NOTE — ED PROVIDER NOTE - QTC
Anesthetic History No history of anesthetic complications Review of Systems / Medical History Patient summary reviewed, nursing notes reviewed and pertinent labs reviewed Pulmonary Smoker Pertinent negatives: No COPD, asthma, recent URI and sleep apnea Comments: Pt did not abstain smoking DOS Neuro/Psych Within defined limits Cardiovascular Hypertension: well controlled Past MI, CAD and cardiac stents Pertinent negatives: No dysrhythmias Exercise tolerance: >4 METS 
  
GI/Hepatic/Renal 
  
GERD: well controlled Renal disease: stones Pertinent negatives: No PUD, hepatitis and liver disease Comments: Food specific GERD Endo/Other Hypothyroidism: well controlled Obesity Pertinent negatives: No diabetes, morbid obesity and blood dyscrasia Other Findings Physical Exam 
 
Airway Mallampati: III 
TM Distance: 4 - 6 cm Neck ROM: normal range of motion Mouth opening: Normal 
 
 Cardiovascular Regular rate and rhythm,  S1 and S2 normal,  no murmur, click, rub, or gallop Dental 
 
 
Comments: Missing many upper/lower teeth, denies loose teeth Pulmonary Breath sounds clear to auscultation Abdominal 
GI exam deferred Other Findings Anesthetic Plan ASA: 3 Anesthesia type: general 
 
 
 
 
Induction: Intravenous Anesthetic plan and risks discussed with: Patient and Family GA/LMA
[id number]

## 2025-03-19 NOTE — ED PROVIDER NOTE - OBJECTIVE STATEMENT
Attending note (Scotty): 79-year-old female with history of HTN and chronic lymphedema morbid obesity and right lower leg wounds who presents from wound care today for worsening shortness of breath.  Patient relates that over the last 2 months she has been increasingly deconditioned and having exacerbations of her chronic back pain (denies fall or trauma) and is now more short of breath despite increased dose of Lasix from her PCP (no history of CHF).  Patient also reports increasing cough over the past 2 weeks productive of small amount of white sputum.  No fever no hemoptysis.

## 2025-03-19 NOTE — PHYSICAL THERAPY INITIAL EVALUATION ADULT - STRENGTHENING, PT EVAL
Pt will improve b/l LE strength to 3/5 MMT grade: enough to improve transfer, stair, and gait efficiency within x4 weeks.

## 2025-03-19 NOTE — ED PROVIDER NOTE - CLINICAL SUMMARY MEDICAL DECISION MAKING FREE TEXT BOX
Attending note (Scotty): 79-year-old female with history of HTN and chronic lymphedema morbid obesity and right lower leg wounds who presents from wound care today for worsening shortness of breath. B/l chronic appearing edema in legs noted.  Noted also to be hypoxic, with grossly clear lungs though very distant lung sounds.  Possible infection (viral vs bacterial/pneumonia) seems possible but less likely; more likely this presents restrictive lung disease due to obesity vs congestive heart failure vs ACS vs PE (largely immobilized for past 2 months per daughter).  Check screening labs: cbc (to evaluate for leukocytosis or anemia), CMP (to evaluate for electrolyte abnormalities or renal/liver dysfunction), troponin, proBNP, pt/inr; obtain CXR to eval for consolidation/effusion/edema, and ct angio chest to evaluate for PE.  Anticipate need for admission. consulting wound care for RLE; does not look like bacterial superinfection present.

## 2025-03-19 NOTE — PHYSICAL THERAPY INITIAL EVALUATION ADULT - GENERAL OBSERVATIONS, REHAB EVAL
Pt encountered semi-fowlers in ED stretcher w/ NAD, AxOx4, +daughter at bedside, +dressings to RLE wounds, +b/l Lipedema (RLE>LLE); reports 3/10 LBP. Pt is anxious and concerned about falling.

## 2025-03-19 NOTE — OCCUPATIONAL THERAPY INITIAL EVALUATION ADULT - PERTINENT HX OF CURRENT PROBLEM, REHAB EVAL
As per ED note; 79-year-old female with history of HTN and chronic lymphedema morbid obesity and right lower leg wounds who presents from wound care today for worsening shortness of breath.  Patient relates that over the last 2 months she has been increasingly deconditioned and having exacerbations of her chronic back pain (denies fall or trauma) and is now more short of breath despite increased dose of Lasix from her PCP (no history of CHF).  Patient also reports increasing cough over the past 2 weeks productive of small amount of white sputum.  No fever no hemoptysis.

## 2025-03-19 NOTE — ED ADULT TRIAGE NOTE - CHIEF COMPLAINT QUOTE
sent to ED by wound care. pt c/o sob, cough with clear phlegm  x 1 month, becoming progressively worse. pt also c/o intermittent back pain x 2 months. pt also c/o swelling x 1 month. + chest soreness started yesterday. hx: HTN

## 2025-03-19 NOTE — ED ADULT NURSE NOTE - OBJECTIVE STATEMENT
79 year old female with PMH of HTN and B/L LE Lipedema. Pt sent to ED by wound care. pt c/o sob, cough with clear phlegm  x 1 month, becoming progressively worse. pt also c/o intermittent back pain x 2 months. pt also c/o swelling x 1 month. + chest soreness started yesterday. hx: HTN

## 2025-03-19 NOTE — ED PROVIDER NOTE - PHYSICAL EXAMINATION
On Physical Exam:  General: well appearing, in NAD, speaking clearly in full sentences and without difficulty; cooperative with exam  HEENT: PERRL, MMM  Neck: no neck tenderness, no nuchal rigidity  Cardiac: normal s1, s2; RRR; no MGR  Lungs: CTABL  Abdomen: soft nontender/nondistended  : no bladder tenderness or distension  Skin: intact, no rash  Extremities: significant b/l LE edema, appears chronic with skin thickening; RLE with chronic apperaring wounds without erythema or purulence; soft compartments throughout b/l LE, dp/pt pulses weakly palpable bilaterally

## 2025-03-19 NOTE — OCCUPATIONAL THERAPY INITIAL EVALUATION ADULT - GENERAL OBSERVATIONS, REHAB EVAL
Pt was encountered supine in bed with PT Dom present and pts family member present; NAD, primafit +, alert, followed commands, cooperative; pt had no c/o pain.

## 2025-03-19 NOTE — OCCUPATIONAL THERAPY INITIAL EVALUATION ADULT - ADDITIONAL COMMENTS
Pt lives with family in a private house with 4 steps with bilateral handrails to enter. Once inside, the pt has 12 steps with a R handrail to reach the main floor where the bedroom and bathroom is. The pt ambulates with rolling walker and owns a cane.

## 2025-03-19 NOTE — H&P ADULT - HISTORY OF PRESENT ILLNESS
Hilda Aquino is a 79 year old female with PMHx of HTN and lymphedema c/b RLE wounds who presented to the ED on 3/19/25 for complaints of difficulty ambulating.    Patient reports she has been following at the wound care clinic here at [facility] every two weeks for the past 7 years regarding her chronic right leg wounds which patient states are due to "moisture build up in between skin folds." Patient has had worsening of bilateral leg swelling for the past couple months. During her last visit with wound care, started on furosemide 20 mg daily which did not improve swelling. Patient missed her last two appointments and went to her regularly scheduled appointment today and was advised to come to the ER for further evaluation. Patient admits that it has been difficult to ambulate due to the swelling and has been walking "baby steps." States she has to take steps with her left foot and then drag her right foot. Denies shortness of breath, orthopnea, paroxysmal nocturnal dyspnea, or bendopnea. Sleeps with three pillows but this is not new. Baseline functional status is ambulates with walker and independent with most ADLs. Requires assistance with showering. Lives at home with . Daughter comes over to help sometimes.    In the ED, VSS except BP as elevated as 179/80 and SpO2 as low as 90% on room air. WBC 3.76K, hgb 11.3. CMP grossly unremarkable. Pro-BNP 1820, troponin 61.1.VBG 7.34 / 76 / 29 / 41. COVID/influenza/RSV negative. CXR with cardiomegaly with pulmonary venous congestion however may be slightly overestimated in the setting of a shallow inspiration with relatively low lung volumes. In addition there are bulky gabbie bilaterally, disproportionate to the central pulmonary venous congestion and raises the possibility of bilateral hilar lymphadenopathy. CTA chest  without main or lobar pulmonary embolism but with limited evaluation of the segmental and subsegmental branches and with right lower lobe subpleural nodule, 0.4 cm. Did not receive any medications. Evaluated by PT/OT who recommends PRASHANT.

## 2025-03-19 NOTE — H&P ADULT - NSHPPHYSICALEXAM_GEN_ALL_CORE
T(C): 36.5 (03-19-25 @ 19:30), Max: 36.6 (03-19-25 @ 11:10)  HR: 73 (03-19-25 @ 19:30) (73 - 78)  BP: 151/84 (03-19-25 @ 19:30) (151/84 - 179/80)  RR: 20 (03-19-25 @ 19:30) (18 - 20)  SpO2: 92% (03-19-25 @ 19:30) (90% - 92%)    CONSTITUTIONAL: Well groomed, no apparent distress, pleasant, conversational, obese  EYES: PERRLA and symmetric, EOMI  ENMT: Oral mucosa with moist membranes  RESP: No respiratory distress, no use of accessory muscles; CTA b/l  CV: RRR  GI: Soft, NT, ND  SKIN: b/l legs swollen and consistent with lymphedema, R. posterior thigh and R. anterior calf with open wounds - both without surrounding erythema or purulent drainage (not new as per pt, follows wound care as an outpatient for past 7 years)

## 2025-03-19 NOTE — OCCUPATIONAL THERAPY INITIAL EVALUATION ADULT - BALANCE TRAINING, PT EVAL
Patient will be able to increase static and dynamic sitting/standing by 1/2 grade in order to participate in self care tasks and functional mobility/transfers within 4 weeks.

## 2025-03-19 NOTE — PHYSICAL THERAPY INITIAL EVALUATION ADULT - GAIT DEVIATIONS NOTED, PT EVAL
decreased vesta/decreased velocity of limb motion/decreased step length/decreased stride length/decreased weight-shifting ability

## 2025-03-19 NOTE — PHYSICAL THERAPY INITIAL EVALUATION ADULT - ADDITIONAL COMMENTS
Pt reports she lives in pvt house w/ 4 TRISHA (no rails). Owns WC, Rollator, SAC, and 3:1 commode. As of 1-2 months ago, pt was modified independent w/ a Rollator, cooking, cleaning, and bathing on her own. More recently (within past month), she has debilitated dramatically. Pt takes 30minutes to negotiate 4 steps out of a house and cannot do so alone. Requires her daughters to check in on her 1x a day to make sure she is fine. Pt cannot bear weight through feet due to weakness and impairments caused by her Lipedema and impaired skin integrity. Pt goes to Outpatient WC clinic at [facility] 1-2x/month. Despite Lasix prescription, pt continues to develop edema in b/l LE (R>L). Now complains of SOB, productive cough, LBP, and occasional chest pain.

## 2025-03-19 NOTE — H&P ADULT - ASSESSMENT
Hilda Aquion is a 79 year old female with PMHx of HTN and lymphedema c/b RLE wounds who presented to the ED on 3/19/25 for complaints of difficulty ambulating and admitted for physical deconditioning.    Physical deconditioning, suspect secondary to lymphedema  Reports difficulty ambulating due to leg swelling x couple months, started on diuretic by wound care physician without improvement in swelling  Has been walking "baby steps," states she has to take steps with her left foot and then drag her right foot  Fall precautions  PT/OT recommending PRASHANT    Elevated pro-BNP  Denies shortness of breath, orthopnea, paroxysmal nocturnal dyspnea, or bendopnea  Pro-BNP 1820 on admission, VBG 7.34 / 76 / 29 / 41, COVID/influenza/RSV negative  CXR with cardiomegaly with pulmonary venous congestion however may be slightly overestimated in the setting of a shallow inspiration with relatively low lung volumes, bulky gabbie b/l, disproportionate to the central pulmonary venous congestion and raises the possibility of b/l hilar lymphadenopathy  CTA chest  without main or lobar pulmonary embolism but with limited evaluation of the segmental and subsegmental branches   Can consider echo; however, will hold off for now given no clinical signs of CHF    Elevated troponin, suspect demand ischemia  EKG without signs of ischemia   Initial troponin 61.1 then 73.4  F/u serial troponins  Can consider echocardiogram if troponins continue to uptrend    RLL subpleural nodule, incidental finding  Measures 0.4 cm on CTA chest  Will not need f/u CT due to low risk patient as per radiologist recommendations      Chronic medical conditions:  HTN, uncontrolled: BP as elevated as 179/80 on admission, PTA losartan 25 mg and furosemide 20 mg    Medication reconciliation completed using med list provided by patient.    Plan of care discussed with daughterCassia at bedside.

## 2025-03-20 DIAGNOSIS — L97.112 NON-PRESSURE CHRONIC ULCER OF RIGHT THIGH WITH FAT LAYER EXPOSED: ICD-10-CM

## 2025-03-20 DIAGNOSIS — E66.01 MORBID (SEVERE) OBESITY DUE TO EXCESS CALORIES: ICD-10-CM

## 2025-03-20 DIAGNOSIS — R06.02 SHORTNESS OF BREATH: ICD-10-CM

## 2025-03-20 DIAGNOSIS — L97.812 NON-PRESSURE CHRONIC ULCER OF OTHER PART OF RIGHT LOWER LEG WITH FAT LAYER EXPOSED: ICD-10-CM

## 2025-03-20 DIAGNOSIS — I89.0 LYMPHEDEMA, NOT ELSEWHERE CLASSIFIED: ICD-10-CM

## 2025-03-20 DIAGNOSIS — Z91.81 HISTORY OF FALLING: ICD-10-CM

## 2025-03-20 DIAGNOSIS — R60.9 EDEMA, UNSPECIFIED: ICD-10-CM

## 2025-03-20 LAB
CK MB BLD-MCNC: 2.8 % — SIGNIFICANT CHANGE UP (ref 0–3.5)
CK MB CFR SERPL CALC: 1.9 NG/ML — SIGNIFICANT CHANGE UP (ref 0.5–3.6)
CK SERPL-CCNC: 69 U/L — SIGNIFICANT CHANGE UP (ref 26–192)
HCT VFR BLD CALC: 36.8 % — SIGNIFICANT CHANGE UP (ref 34.5–45)
HGB BLD-MCNC: 11.3 G/DL — LOW (ref 11.5–15.5)
MCHC RBC-ENTMCNC: 27.5 PG — SIGNIFICANT CHANGE UP (ref 27–34)
MCHC RBC-ENTMCNC: 30.7 G/DL — LOW (ref 32–36)
MCV RBC AUTO: 89.5 FL — SIGNIFICANT CHANGE UP (ref 80–100)
NRBC BLD AUTO-RTO: 0 /100 WBCS — SIGNIFICANT CHANGE UP (ref 0–0)
PHOSPHATE SERPL-MCNC: 4.1 MG/DL — SIGNIFICANT CHANGE UP (ref 2.5–4.5)
PLATELET # BLD AUTO: 191 K/UL — SIGNIFICANT CHANGE UP (ref 150–400)
RBC # BLD: 4.11 M/UL — SIGNIFICANT CHANGE UP (ref 3.8–5.2)
RBC # FLD: 15.4 % — HIGH (ref 10.3–14.5)
TROPONIN I, HIGH SENSITIVITY RESULT: 65.6 NG/L — HIGH
TROPONIN I, HIGH SENSITIVITY RESULT: 69.3 NG/L — HIGH
WBC # BLD: 3.39 K/UL — LOW (ref 3.8–10.5)
WBC # FLD AUTO: 3.39 K/UL — LOW (ref 3.8–10.5)

## 2025-03-20 PROCEDURE — 99232 SBSQ HOSP IP/OBS MODERATE 35: CPT

## 2025-03-20 PROCEDURE — 93970 EXTREMITY STUDY: CPT | Mod: 26

## 2025-03-20 PROCEDURE — 99222 1ST HOSP IP/OBS MODERATE 55: CPT | Mod: FS

## 2025-03-20 RX ORDER — NYSTATIN 100000 [USP'U]/G
1 CREAM TOPICAL
Refills: 0 | Status: COMPLETED | OUTPATIENT
Start: 2025-03-20 | End: 2025-03-29

## 2025-03-20 RX ADMIN — ENOXAPARIN SODIUM 40 MILLIGRAM(S): 100 INJECTION SUBCUTANEOUS at 17:08

## 2025-03-20 RX ADMIN — LOSARTAN POTASSIUM 25 MILLIGRAM(S): 100 TABLET, FILM COATED ORAL at 00:24

## 2025-03-20 RX ADMIN — ENOXAPARIN SODIUM 40 MILLIGRAM(S): 100 INJECTION SUBCUTANEOUS at 05:40

## 2025-03-20 RX ADMIN — NYSTATIN 1 APPLICATION(S): 100000 CREAM TOPICAL at 17:09

## 2025-03-20 RX ADMIN — LOSARTAN POTASSIUM 25 MILLIGRAM(S): 100 TABLET, FILM COATED ORAL at 05:40

## 2025-03-20 RX ADMIN — NYSTATIN 1 APPLICATION(S): 100000 CREAM TOPICAL at 06:42

## 2025-03-20 RX ADMIN — FUROSEMIDE 20 MILLIGRAM(S): 10 INJECTION INTRAMUSCULAR; INTRAVENOUS at 05:40

## 2025-03-20 NOTE — CONSULT NOTE ADULT - ASSESSMENT
79F with wounds x2 to RLE    Continue local wound cover, cleanse daily with saline and cover with dry sterile gauze  Continue management per medical team  Discussed with Dr. Montoya

## 2025-03-20 NOTE — CONSULT NOTE ADULT - SUBJECTIVE AND OBJECTIVE BOX
Patient seen and examined at bedside. Patient is a 79F known to Dr. Montoya from the wound care clinic. Patient has a history of chronic RLE wounds x2.    Chief Complaint:  Patient is a 79y old  Female who presents with a chief complaint of Physical deconditioning (20 Mar 2025 13:18)      HPI:  Hilda Aquino is a 79 year old female with PMHx of HTN and lymphedema c/b RLE wounds who presented to the ED on 3/19/25 for complaints of difficulty ambulating.    Patient reports she has been following at the wound care clinic here at [facility] every two weeks for the past 7 years regarding her chronic right leg wounds which patient states are due to "moisture build up in between skin folds." Patient has had worsening of bilateral leg swelling for the past couple months. During her last visit with wound care, started on furosemide 20 mg daily which did not improve swelling. Patient missed her last two appointments and went to her regularly scheduled appointment today and was advised to come to the ER for further evaluation. Patient admits that it has been difficult to ambulate due to the swelling and has been walking "baby steps." States she has to take steps with her left foot and then drag her right foot. Denies shortness of breath, orthopnea, paroxysmal nocturnal dyspnea, or bendopnea. Sleeps with three pillows but this is not new. Baseline functional status is ambulates with walker and independent with most ADLs. Requires assistance with showering. Lives at home with . Daughter comes over to help sometimes.    In the ED, VSS except BP as elevated as 179/80 and SpO2 as low as 90% on room air. WBC 3.76K, hgb 11.3. CMP grossly unremarkable. Pro-BNP 1820, troponin 61.1.VBG 7.34 / 76 / 29 / 41. COVID/influenza/RSV negative. CXR with cardiomegaly with pulmonary venous congestion however may be slightly overestimated in the setting of a shallow inspiration with relatively low lung volumes. In addition there are bulky gabbie bilaterally, disproportionate to the central pulmonary venous congestion and raises the possibility of bilateral hilar lymphadenopathy. CTA chest  without main or lobar pulmonary embolism but with limited evaluation of the segmental and subsegmental branches and with right lower lobe subpleural nodule, 0.4 cm. Did not receive any medications. Evaluated by PT/OT who recommends PRASHANT. (19 Mar 2025 21:19)      PMH/PSH:PAST MEDICAL & SURGICAL HISTORY:  Hypertension      Lymphedema      H/O total knee replacement, bilateral          Allergies:  No Known Allergies      Medications:  acetaminophen     Tablet .. 650 milliGRAM(s) Oral every 6 hours PRN  aluminum hydroxide/magnesium hydroxide/simethicone Suspension 30 milliLiter(s) Oral every 4 hours PRN  enoxaparin Injectable 40 milliGRAM(s) SubCutaneous every 12 hours  furosemide    Tablet 20 milliGRAM(s) Oral daily  losartan 25 milliGRAM(s) Oral daily  melatonin 3 milliGRAM(s) Oral at bedtime PRN  nystatin Powder 1 Application(s) Topical two times a day  ondansetron Injectable 4 milliGRAM(s) IV Push every 8 hours PRN      REVIEW OF SYSTEMS:  All other review of systems is negative unless indicated above.    Relevant Family History:   FAMILY HISTORY:  FH: HTN (hypertension)        Relevant Social History:  Denies ETOH or tobacco history    Physical Exam:    Vital Signs:  Vital Signs Last 24 Hrs  T(C): 36.6 (20 Mar 2025 17:00), Max: 37.6 (19 Mar 2025 23:45)  T(F): 97.9 (20 Mar 2025 17:00), Max: 99.7 (19 Mar 2025 23:45)  HR: 69 (20 Mar 2025 17:00) (69 - 110)  BP: 111/79 (20 Mar 2025 17:00) (110/70 - 164/74)  RR: 19 (20 Mar 2025 17:00) (19 - 20)  SpO2: 90% (20 Mar 2025 17:00) (90% - 96%)    Parameters below as of 20 Mar 2025 17:00  Patient On (Oxygen Delivery Method): room air      Daily Height in cm: 160.02 (20 Mar 2025 05:25)    Daily     Constitutional: NAD  HEENT: NC/AT, PERRL, EOMI, anicteric sclera, no nasal discharge  Neck: supple; no JVD or thyromegaly  Respiratory: Good inspiratory effort, no respiratory distress  Cardiac: +S1/S2  Gastrointestinal: soft, NT/ND; no rebound or guarding   Extremities: RLE with wounds x2, leg soft compared to as seen in wound care clinic  Neurologic: AAOx3    Laboratory:                          11.3   3.39  )-----------( 191      ( 20 Mar 2025 10:39 )             36.8     03-19    136  |  102  |  14  ----------------------------<  115[H]  4.0   |  37[H]  |  0.89    Ca    9.0      19 Mar 2025 12:50  Phos  4.1     03-20    TPro  8.2  /  Alb  3.7  /  TBili  0.3  /  DBili  x   /  AST  16  /  ALT  20  /  AlkPhos  84  03-19    LIVER FUNCTIONS - ( 19 Mar 2025 12:50 )  Alb: 3.7 g/dL / Pro: 8.2 gm/dL / ALK PHOS: 84 U/L / ALT: 20 U/L / AST: 16 U/L / GGT: x           PT/INR - ( 19 Mar 2025 12:50 )   PT: 12.7 sec;   INR: 1.10 ratio         PTT - ( 19 Mar 2025 12:50 )  PTT:30.9 sec  Urinalysis Basic - ( 19 Mar 2025 12:50 )    Color: x / Appearance: x / SG: x / pH: x  Gluc: 115 mg/dL / Ketone: x  / Bili: x / Urobili: x   Blood: x / Protein: x / Nitrite: x   Leuk Esterase: x / RBC: x / WBC x   Sq Epi: x / Non Sq Epi: x / Bacteria: x            Imaging:    < from: US Duplex Venous Lower Ext Complete, Bilateral (03.20.25 @ 00:00) >    ACC: [id number] EXAM:  US DPLX LWR EXT VEINS COMPL BI   ORDERED BY: MONSERRAT BOGGS     PROCEDURE DATE:  03/20/2025          INTERPRETATION:  CLINICAL INFORMATION: b/l leg swelling, difficulty   ambulating    COMPARISON: 01/15/2025.    TECHNIQUE: Duplex sonography of the BILATERAL LOWER extremity veins with   color and spectral Doppler, with and without compression.    FINDINGS:  Limited study due to poor penetration and edema.  RIGHT:  Normal compressibility of the RIGHT common femoral, femoral and popliteal   veins.  Doppler examination shows normal spontaneous and phasic flow.  No RIGHT calf vein thrombosis is detected.    LEFT:  Normal compressibility of the LEFT common femoral, femoral and popliteal   veins.  Doppler examination shows normal spontaneous and phasic flow.  No LEFT calf vein thrombosis is detected.    IMPRESSION:  No evidence of deep venous thrombosis in either lower extremity.

## 2025-03-20 NOTE — PROGRESS NOTE ADULT - ASSESSMENT
Hilda Aquino is a 79 year old female with PMHx of HTN and lymphedema c/b RLE wounds who presented to the ED on 3/19/25 for complaints of difficulty ambulating and admitted for physical deconditioning.    ##Physical deconditioning  # lymphedema  #Inability to walk  Reports difficulty ambulating due to leg swelling x couple months, started on diuretic by wound care physician without improvement in swelling. Has been walking "baby steps," states she has to take steps with her left foot and then drag her right foot  - Vascular/WC eval -> Discussed with her primary wound care/vascular Dr. Epperson who has evaluated patient this admission.   - PT/OT recommending PRASHANT  - Fall precautions     ##Elevated pro-BNP  #Hypercarbia  Denies shortness of breath, orthopnea, paroxysmal nocturnal dyspnea, or bendopnea  Pro-BNP 1820 on admission, VBG 7.34 / 76 / 29 / 41, COVID/influenza/RSV negative  CXR with cardiomegaly with pulmonary venous congestion however may be slightly overestimated in the setting of a shallow inspiration with relatively low lung volumes, bulky gabbie b/l, disproportionate to the central pulmonary venous congestion and raises the possibility of b/l hilar lymphadenopathy  CTA chest  without main or lobar pulmonary embolism but with limited evaluation of the segmental and subsegmental branches   - Check Echo     ##Elevated troponin, suspect demand ischemia  EKG without signs of ischemia. Troponin flat, No CP.  - Pending echocardiogram     #RLL subpleural nodule, incidental finding  Measures 0.4 cm on CTA chest. Outpatient followup     #HTN, uncontrolled: BP as elevated as 179/80 on admission, PTA losartan 25 mg and furosemide 20 mg    Diet: DASH   DVT prophylaxis: lovenox   Dispo: Admit, will need PRASHANT   Code Status: FC     I have spent a total of 40 minutes to prepare to see the patient, obtaining and reviewing history, physical examination, explaining the diagnosis, prognosis and treatment plan with the patient/family/caregiver. I also have spent the time ordering studies and testing, interpreting results, medicine reconciliation, IDRs, subspecialty consultation and documentation as above.     Hilda Aquino is a 79 year old female with PMHx of HTN and lymphedema c/b RLE wounds who presented to the ED on 3/19/25 for complaints of difficulty ambulating and admitted for physical deconditioning.    ##Physical deconditioning  # lymphedema  #Inability to walk  Reports difficulty ambulating due to leg swelling x couple months, started on diuretic by wound care physician without improvement in swelling. Has been walking "baby steps," states she has to take steps with her left foot and then drag her right foot  - Vascular/WC eval -> Discussed with her primary wound care/vascular Dr. Montoya who has evaluated patient this admission. Pending their recommendations.   - PT/OT recommending PRASHANT  - Fall precautions     ##Elevated pro-BNP  #Hypercarbia  Denies shortness of breath, orthopnea, paroxysmal nocturnal dyspnea, or bendopnea  Pro-BNP 1820 on admission, VBG 7.34 / 76 / 29 / 41, COVID/influenza/RSV negative  CXR with cardiomegaly with pulmonary venous congestion however may be slightly overestimated in the setting of a shallow inspiration with relatively low lung volumes, bulky gabbie b/l, disproportionate to the central pulmonary venous congestion and raises the possibility of b/l hilar lymphadenopathy  CTA chest  without main or lobar pulmonary embolism but with limited evaluation of the segmental and subsegmental branches   - Check Echo     ##Elevated troponin, suspect demand ischemia  EKG without signs of ischemia. Troponin flat, No CP.  - Pending echocardiogram     #RLL subpleural nodule, incidental finding  Measures 0.4 cm on CTA chest. Outpatient followup     #HTN, uncontrolled: BP as elevated as 179/80 on admission, PTA losartan 25 mg and furosemide 20 mg    Diet: DASH   DVT prophylaxis: lovenox   Dispo: Admit, will need PRASHANT   Code Status: FC     I have spent a total of 40 minutes to prepare to see the patient, obtaining and reviewing history, physical examination, explaining the diagnosis, prognosis and treatment plan with the patient/family/caregiver. I also have spent the time ordering studies and testing, interpreting results, medicine reconciliation, IDRs, subspecialty consultation and documentation as above.

## 2025-03-20 NOTE — PROGRESS NOTE ADULT - SUBJECTIVE AND OBJECTIVE BOX
Hospitalist Daily Progress Note     *SUBJECTIVE*    Interval Events:  NAEON, Resting comfortably. HD Stable.      *OBJECTIVE*    PHYSICAL EXAM:  CONSTITUTIONAL: Well groomed, no apparent distress, pleasant, conversational, obese  EYES: PERRLA and symmetric, EOMI  ENMT: Oral mucosa with moist membranes  RESP: No respiratory distress, no use of accessory muscles; CTA b/l  CV: RRR  GI: Soft, NT, ND  SKIN: b/l legs swollen and consistent with lymphedema, R. posterior thigh and R. anterior calf with open wounds - both without surrounding erythema or purulent drainage (not new as per pt, follows wound care as an outpatient for past 7 years)    OBJECTIVE DATA:   Vital Signs Last 24 Hrs  T(C): 36.3 (20 Mar 2025 10:59), Max: 37.6 (19 Mar 2025 23:45)  T(F): 97.4 (20 Mar 2025 10:59), Max: 99.7 (19 Mar 2025 23:45)  HR: 79 (20 Mar 2025 10:59) (73 - 110)  BP: 110/70 (20 Mar 2025 10:59) (110/70 - 179/80)  BP(mean): 106 (19 Mar 2025 19:30) (106 - 113)  RR: 20 (20 Mar 2025 10:59) (20 - 20)  SpO2: 94% (20 Mar 2025 10:59) (92% - 96%)    Parameters below as of 20 Mar 2025 10:59  Patient On (Oxygen Delivery Method): room air               Daily Height in cm: 160.02 (20 Mar 2025 05:25)    Daily     Labs, Interval Radiology studies, Medications reviewed by me

## 2025-03-21 ENCOUNTER — RESULT REVIEW (OUTPATIENT)
Age: 80
End: 2025-03-21

## 2025-03-21 LAB
ANION GAP SERPL CALC-SCNC: 3 MMOL/L — LOW (ref 5–17)
BASOPHILS # BLD AUTO: 0.02 K/UL — SIGNIFICANT CHANGE UP (ref 0–0.2)
BASOPHILS NFR BLD AUTO: 0.5 % — SIGNIFICANT CHANGE UP (ref 0–2)
BUN SERPL-MCNC: 15 MG/DL — SIGNIFICANT CHANGE UP (ref 7–23)
CALCIUM SERPL-MCNC: 8.9 MG/DL — SIGNIFICANT CHANGE UP (ref 8.5–10.1)
CHLORIDE SERPL-SCNC: 98 MMOL/L — SIGNIFICANT CHANGE UP (ref 96–108)
CO2 SERPL-SCNC: 38 MMOL/L — HIGH (ref 22–31)
CREAT SERPL-MCNC: 0.88 MG/DL — SIGNIFICANT CHANGE UP (ref 0.5–1.3)
EGFR: 67 ML/MIN/1.73M2 — SIGNIFICANT CHANGE UP
EGFR: 67 ML/MIN/1.73M2 — SIGNIFICANT CHANGE UP
EOSINOPHIL # BLD AUTO: 0.15 K/UL — SIGNIFICANT CHANGE UP (ref 0–0.5)
EOSINOPHIL NFR BLD AUTO: 4 % — SIGNIFICANT CHANGE UP (ref 0–6)
GLUCOSE SERPL-MCNC: 107 MG/DL — HIGH (ref 70–99)
HCT VFR BLD CALC: 37.6 % — SIGNIFICANT CHANGE UP (ref 34.5–45)
HGB BLD-MCNC: 11.9 G/DL — SIGNIFICANT CHANGE UP (ref 11.5–15.5)
IMM GRANULOCYTES NFR BLD AUTO: 0.3 % — SIGNIFICANT CHANGE UP (ref 0–0.9)
LYMPHOCYTES # BLD AUTO: 0.94 K/UL — LOW (ref 1–3.3)
LYMPHOCYTES # BLD AUTO: 25.3 % — SIGNIFICANT CHANGE UP (ref 13–44)
MAGNESIUM SERPL-MCNC: 2 MG/DL — SIGNIFICANT CHANGE UP (ref 1.6–2.6)
MCHC RBC-ENTMCNC: 28.1 PG — SIGNIFICANT CHANGE UP (ref 27–34)
MCHC RBC-ENTMCNC: 31.6 G/DL — LOW (ref 32–36)
MCV RBC AUTO: 88.9 FL — SIGNIFICANT CHANGE UP (ref 80–100)
MONOCYTES # BLD AUTO: 0.49 K/UL — SIGNIFICANT CHANGE UP (ref 0–0.9)
MONOCYTES NFR BLD AUTO: 13.2 % — SIGNIFICANT CHANGE UP (ref 2–14)
NEUTROPHILS # BLD AUTO: 2.1 K/UL — SIGNIFICANT CHANGE UP (ref 1.8–7.4)
NEUTROPHILS NFR BLD AUTO: 56.7 % — SIGNIFICANT CHANGE UP (ref 43–77)
NRBC BLD AUTO-RTO: 0 /100 WBCS — SIGNIFICANT CHANGE UP (ref 0–0)
PLATELET # BLD AUTO: 203 K/UL — SIGNIFICANT CHANGE UP (ref 150–400)
POTASSIUM SERPL-MCNC: 3.6 MMOL/L — SIGNIFICANT CHANGE UP (ref 3.5–5.3)
POTASSIUM SERPL-SCNC: 3.6 MMOL/L — SIGNIFICANT CHANGE UP (ref 3.5–5.3)
RBC # BLD: 4.23 M/UL — SIGNIFICANT CHANGE UP (ref 3.8–5.2)
RBC # FLD: 15.6 % — HIGH (ref 10.3–14.5)
SODIUM SERPL-SCNC: 139 MMOL/L — SIGNIFICANT CHANGE UP (ref 135–145)
WBC # BLD: 3.71 K/UL — LOW (ref 3.8–10.5)
WBC # FLD AUTO: 3.71 K/UL — LOW (ref 3.8–10.5)

## 2025-03-21 PROCEDURE — 93306 TTE W/DOPPLER COMPLETE: CPT | Mod: 26

## 2025-03-21 PROCEDURE — 99232 SBSQ HOSP IP/OBS MODERATE 35: CPT

## 2025-03-21 PROCEDURE — 99232 SBSQ HOSP IP/OBS MODERATE 35: CPT | Mod: FS

## 2025-03-21 RX ADMIN — ENOXAPARIN SODIUM 40 MILLIGRAM(S): 100 INJECTION SUBCUTANEOUS at 17:40

## 2025-03-21 RX ADMIN — NYSTATIN 1 APPLICATION(S): 100000 CREAM TOPICAL at 05:41

## 2025-03-21 RX ADMIN — Medication 650 MILLIGRAM(S): at 05:28

## 2025-03-21 RX ADMIN — Medication 650 MILLIGRAM(S): at 04:28

## 2025-03-21 RX ADMIN — ENOXAPARIN SODIUM 40 MILLIGRAM(S): 100 INJECTION SUBCUTANEOUS at 05:40

## 2025-03-21 RX ADMIN — FUROSEMIDE 20 MILLIGRAM(S): 10 INJECTION INTRAMUSCULAR; INTRAVENOUS at 05:41

## 2025-03-21 RX ADMIN — LOSARTAN POTASSIUM 25 MILLIGRAM(S): 100 TABLET, FILM COATED ORAL at 05:41

## 2025-03-21 RX ADMIN — NYSTATIN 1 APPLICATION(S): 100000 CREAM TOPICAL at 17:40

## 2025-03-21 NOTE — PROGRESS NOTE ADULT - SUBJECTIVE AND OBJECTIVE BOX
Hospitalist Daily Progress Note     *SUBJECTIVE*    Interval Events:  NAEON, Resting comfortably. HD Stable.      *OBJECTIVE*    PHYSICAL EXAM:  CONSTITUTIONAL: Well groomed, no apparent distress, pleasant, conversational, obese  EYES: PERRLA and symmetric, EOMI  ENMT: Oral mucosa with moist membranes  RESP: No respiratory distress, no use of accessory muscles; CTA b/l  CV: RRR  GI: Soft, NT, ND  SKIN: b/l legs swollen and consistent with lymphedema, R. posterior thigh and R. anterior calf with open wounds - both without surrounding erythema or purulent drainage (not new as per pt, follows wound care as an outpatient for past 7 years)    OBJECTIVE DATA:   Vital Signs Last 24 Hrs  T(C): 36.7 (21 Mar 2025 05:02), Max: 36.7 (21 Mar 2025 05:02)  T(F): 98 (21 Mar 2025 05:02), Max: 98 (21 Mar 2025 05:02)  HR: 76 (21 Mar 2025 05:02) (69 - 79)  BP: 147/83 (21 Mar 2025 05:02) (110/70 - 147/83)  BP(mean): --  RR: 17 (21 Mar 2025 05:02) (17 - 20)  SpO2: 93% (21 Mar 2025 05:02) (90% - 94%)    Parameters below as of 20 Mar 2025 17:00  Patient On (Oxygen Delivery Method): room air               Daily     Daily     Labs, Interval Radiology studies, Medications reviewed by me

## 2025-03-21 NOTE — CHART NOTE - NSCHARTNOTEFT_GEN_A_CORE
Called by Wound PT for podiatry consult for patient with R foot callouses. Discussed with podiatry, patient can follow up outpatient with Dr. Waterhouse if necessary at [address] [place] [id number], [phone].

## 2025-03-21 NOTE — PROGRESS NOTE ADULT - ASSESSMENT
Hilda Aquino is a 79 year old female with PMHx of HTN and lymphedema c/b RLE wounds who presented to the ED on 3/19/25 for complaints of difficulty ambulating and admitted for physical deconditioning.    ##Physical deconditioning  #Lymphedema  #Inability to walk  Reports difficulty ambulating due to leg swelling x couple months, started on diuretic by wound care physician without improvement in swelling. Has been walking "baby steps," states she has to take steps with her left foot and then drag her right foot  - Vascular/WC eval -> Discussed with her primary wound care/vascular Dr. Montoya who has evaluated patient this admission -> local wound cover, cleanse daily with saline and cover with dry sterile gauze  - C/w good wound care   - PT/OT eval -> PRASHANT  - Fall precautions     ##Elevated pro-BNP  #Hypercarbia  Denies shortness of breath, orthopnea, paroxysmal nocturnal dyspnea, or bendopnea  Pro-BNP 1820 on admission, VBG 7.34 / 76 / 29 / 41, COVID/influenza/RSV negative  CXR with cardiomegaly with pulmonary venous congestion however may be slightly overestimated in the setting of a shallow inspiration with relatively low lung volumes, bulky gabbie b/l, disproportionate to the central pulmonary venous congestion and raises the possibility of b/l hilar lymphadenopathy  CTA chest  without main or lobar pulmonary embolism but with limited evaluation of the segmental and subsegmental branches   - Pending Echo     ##Elevated troponin, suspect demand ischemia  EKG without signs of ischemia. Troponin flat, No CP.  - Pending echo     #RLL subpleural nodule, incidental finding  Measures 0.4 cm on CTA chest. Outpatient followup     #HTN, uncontrolled: BP as elevated as 179/80 on admission, PTA losartan 25 mg and furosemide 20 mg    Diet: DASH   DVT prophylaxis: lovenox   Dispo: Admit, Eventual PRASHANT   Code Status: FC     I have spent a total of *** minutes to prepare to see the patient, obtaining and reviewing history, physical examination, explaining the diagnosis, prognosis and treatment plan with the patient/family/caregiver. I also have spent the time ordering studies and testing, interpreting results, medicine reconciliation, IDRs, subspecialty consultation and documentation as above.       Hilda Aquino is a 79 year old female with PMHx of HTN and lymphedema c/b RLE wounds who presented to the ED on 3/19/25 for complaints of difficulty ambulating and admitted for physical deconditioning.    ##Physical deconditioning  #Lymphedema  #Inability to walk  Reports difficulty ambulating due to leg swelling x couple months, started on diuretic by wound care physician without improvement in swelling. Has been walking "baby steps," states she has to take steps with her left foot and then drag her right foot  - Vascular/WC eval -> Discussed with her primary wound care/vascular Dr. Montoya who has evaluated patient this admission -> local wound cover, cleanse daily with saline and cover with dry sterile gauze  - C/w good wound care   - PT/OT eval -> PRASHANT  - Fall precautions     ##Elevated pro-BNP  #Hypercarbia  Denies shortness of breath, orthopnea, paroxysmal nocturnal dyspnea, or bendopnea  Pro-BNP 1820 on admission, VBG 7.34 / 76 / 29 / 41, COVID/influenza/RSV negative  CXR with cardiomegaly with pulmonary venous congestion however may be slightly overestimated in the setting of a shallow inspiration with relatively low lung volumes, bulky gabbie b/l, disproportionate to the central pulmonary venous congestion and raises the possibility of b/l hilar lymphadenopathy  CTA chest  without main or lobar pulmonary embolism but with limited evaluation of the segmental and subsegmental branches   - Pending Echo     ##Elevated troponin, suspect demand ischemia  EKG without signs of ischemia. Troponin flat, No CP.  - Pending echo     #RLL subpleural nodule, incidental finding  Measures 0.4 cm on CTA chest. Outpatient followup     #HTN, uncontrolled: BP as elevated as 179/80 on admission, PTA losartan 25 mg and furosemide 20 mg    Diet: DASH   DVT prophylaxis: lovenox   Dispo: Admit, Eventual PRASHANT   Code Status: FC     I have spent a total of 37 minutes to prepare to see the patient, obtaining and reviewing history, physical examination, explaining the diagnosis, prognosis and treatment plan with the patient/family/caregiver. I also have spent the time ordering studies and testing, interpreting results, medicine reconciliation, IDRs, subspecialty consultation and documentation as above.

## 2025-03-22 PROCEDURE — 99232 SBSQ HOSP IP/OBS MODERATE 35: CPT

## 2025-03-22 RX ORDER — POLYETHYLENE GLYCOL 3350 17 G/17G
17 POWDER, FOR SOLUTION ORAL ONCE
Refills: 0 | Status: COMPLETED | OUTPATIENT
Start: 2025-03-22 | End: 2025-03-22

## 2025-03-22 RX ORDER — SENNA 187 MG
2 TABLET ORAL AT BEDTIME
Refills: 0 | Status: DISCONTINUED | OUTPATIENT
Start: 2025-03-22 | End: 2025-04-02

## 2025-03-22 RX ADMIN — POLYETHYLENE GLYCOL 3350 17 GRAM(S): 17 POWDER, FOR SOLUTION ORAL at 06:08

## 2025-03-22 RX ADMIN — LOSARTAN POTASSIUM 25 MILLIGRAM(S): 100 TABLET, FILM COATED ORAL at 05:40

## 2025-03-22 RX ADMIN — NYSTATIN 1 APPLICATION(S): 100000 CREAM TOPICAL at 05:43

## 2025-03-22 RX ADMIN — FUROSEMIDE 20 MILLIGRAM(S): 10 INJECTION INTRAMUSCULAR; INTRAVENOUS at 05:40

## 2025-03-22 RX ADMIN — Medication 2 TABLET(S): at 21:37

## 2025-03-22 RX ADMIN — ENOXAPARIN SODIUM 40 MILLIGRAM(S): 100 INJECTION SUBCUTANEOUS at 18:08

## 2025-03-22 RX ADMIN — ENOXAPARIN SODIUM 40 MILLIGRAM(S): 100 INJECTION SUBCUTANEOUS at 05:41

## 2025-03-22 RX ADMIN — NYSTATIN 1 APPLICATION(S): 100000 CREAM TOPICAL at 18:08

## 2025-03-22 NOTE — PROGRESS NOTE ADULT - SUBJECTIVE AND OBJECTIVE BOX
Hospitalist Daily Progress Note     *SUBJECTIVE*    Interval Events:  NAEON, Resting comfortably. HD Stable.      *OBJECTIVE*    PHYSICAL EXAM:  CONSTITUTIONAL: Well groomed, no apparent distress, pleasant, conversational, obese  EYES: PERRLA and symmetric, EOMI  ENMT: Oral mucosa with moist membranes  RESP: No respiratory distress, no use of accessory muscles; CTA b/l  CV: RRR  GI: Soft, NT, ND  SKIN: b/l legs swollen and consistent with lymphedema, R. posterior thigh and R. anterior calf with open wounds - both without surrounding erythema or purulent drainage (not new as per pt, follows wound care as an outpatient for past 7 years)    OBJECTIVE DATA:   Vital Signs Last 24 Hrs  T(C): 36.4 (22 Mar 2025 10:54), Max: 36.7 (21 Mar 2025 23:45)  T(F): 97.6 (22 Mar 2025 10:54), Max: 98 (21 Mar 2025 23:45)  HR: 81 (22 Mar 2025 10:54) (71 - 81)  BP: 122/77 (22 Mar 2025 10:54) (122/77 - 145/80)  BP(mean): --  RR: 18 (22 Mar 2025 10:54) (17 - 18)  SpO2: 92% (22 Mar 2025 10:54) (91% - 92%)    Parameters below as of 22 Mar 2025 10:54  Patient On (Oxygen Delivery Method): room air               Daily     Daily     Labs, Interval Radiology studies, Medications reviewed by me

## 2025-03-22 NOTE — PROGRESS NOTE ADULT - ASSESSMENT
Hilda Aquino is a 79 year old female with PMHx of HTN and lymphedema c/b RLE wounds who presented to the ED on 3/19/25 for complaints of difficulty ambulating and admitted for physical deconditioning.    ##Physical deconditioning  #Lymphedema  #Inability to walk  Reports difficulty ambulating due to leg swelling x couple months, started on diuretic by wound care physician without improvement in swelling. Has been walking "baby steps," states she has to take steps with her left foot and then drag her right foot  - Vascular/WC eval -> Discussed with her primary wound care/vascular Dr. Montoya who has evaluated patient this admission -> local wound cover, cleanse daily with saline and cover with dry sterile gauze  - C/w good wound care   - PT/OT eval -> PRASHANT  - Fall precautions     #PulmHTN  ##Elevated pro-BNP  #Hypercarbia  Denies shortness of breath, orthopnea, paroxysmal nocturnal dyspnea, or bendopnea  Pro-BNP 1820 on admission, VBG 7.34 / 76 / 29 / 41, COVID/influenza/RSV negative  CXR with cardiomegaly with pulmonary venous congestion however may be slightly overestimated in the setting of a shallow inspiration with relatively low lung volumes, bulky gabbie b/l, disproportionate to the central pulmonary venous congestion and raises the possibility of b/l hilar lymphadenopathy  CTA chest  without main or lobar pulmonary embolism but with limited evaluation of the segmental and subsegmental branches. Echo reviewed -> PulmHTN, elevated right atrial pressures. Query ASHISH vs OHS etiology.  - Pulm eval     ##Elevated troponin, suspect demand ischemia  EKG without signs of ischemia. Troponin flat, No CP.    #RLL subpleural nodule, incidental finding  Measures 0.4 cm on CTA chest. Outpatient followup     #HTN, uncontrolled: BP as elevated as 179/80 on admission, PTA losartan 25 mg and furosemide 20 mg    Diet: DASH   DVT prophylaxis: lovenox   Dispo: Admit, Eventual PRASHANT   Code Status: FC     I have spent a total of 35 minutes to prepare to see the patient, obtaining and reviewing history, physical examination, explaining the diagnosis, prognosis and treatment plan with the patient/family/caregiver. I also have spent the time ordering studies and testing, interpreting results, medicine reconciliation, IDRs, subspecialty consultation and documentation as above.

## 2025-03-23 PROCEDURE — 99232 SBSQ HOSP IP/OBS MODERATE 35: CPT

## 2025-03-23 RX ORDER — POLYETHYLENE GLYCOL 3350 17 G/17G
17 POWDER, FOR SOLUTION ORAL DAILY
Refills: 0 | Status: DISCONTINUED | OUTPATIENT
Start: 2025-03-23 | End: 2025-04-02

## 2025-03-23 RX ORDER — DEXTROMETHORPHAN HBR, GUAIFENESIN 200 MG/10ML
100 LIQUID ORAL EVERY 6 HOURS
Refills: 0 | Status: DISCONTINUED | OUTPATIENT
Start: 2025-03-23 | End: 2025-04-02

## 2025-03-23 RX ORDER — BISACODYL 5 MG
10 TABLET, DELAYED RELEASE (ENTERIC COATED) ORAL ONCE
Refills: 0 | Status: COMPLETED | OUTPATIENT
Start: 2025-03-23 | End: 2025-03-23

## 2025-03-23 RX ADMIN — DEXTROMETHORPHAN HBR, GUAIFENESIN 100 MILLIGRAM(S): 200 LIQUID ORAL at 23:49

## 2025-03-23 RX ADMIN — NYSTATIN 1 APPLICATION(S): 100000 CREAM TOPICAL at 17:40

## 2025-03-23 RX ADMIN — ENOXAPARIN SODIUM 40 MILLIGRAM(S): 100 INJECTION SUBCUTANEOUS at 17:38

## 2025-03-23 RX ADMIN — FUROSEMIDE 20 MILLIGRAM(S): 10 INJECTION INTRAMUSCULAR; INTRAVENOUS at 06:19

## 2025-03-23 RX ADMIN — Medication 10 MILLIGRAM(S): at 13:57

## 2025-03-23 RX ADMIN — ENOXAPARIN SODIUM 40 MILLIGRAM(S): 100 INJECTION SUBCUTANEOUS at 06:20

## 2025-03-23 RX ADMIN — LOSARTAN POTASSIUM 25 MILLIGRAM(S): 100 TABLET, FILM COATED ORAL at 06:19

## 2025-03-23 RX ADMIN — NYSTATIN 1 APPLICATION(S): 100000 CREAM TOPICAL at 06:22

## 2025-03-23 NOTE — PROGRESS NOTE ADULT - SUBJECTIVE AND OBJECTIVE BOX
Hospitalist Daily Progress Note     *SUBJECTIVE*    Interval Events:  NAEON, Resting comfortably. HD Stable.      *OBJECTIVE*    PHYSICAL EXAM:   CONSTITUTIONAL: Well groomed, no apparent distress, pleasant, conversational, obese  EYES: PERRLA and symmetric, EOMI  ENMT: Oral mucosa with moist membranes  RESP: No respiratory distress, no use of accessory muscles; CTA b/l  CV: RRR  GI: Soft, NT, ND  SKIN: b/l legs swollen and consistent with lymphedema, R. posterior thigh and R. anterior calf with open wounds - both without surrounding erythema or purulent drainage (not new as per pt, follows wound care as an outpatient for past 7 years)    OBJECTIVE DATA:   Vital Signs Last 24 Hrs  T(C): 36.8 (23 Mar 2025 05:18), Max: 37.6 (23 Mar 2025 00:12)  T(F): 98.3 (23 Mar 2025 05:18), Max: 99.7 (23 Mar 2025 00:12)  HR: 73 (23 Mar 2025 05:18) (71 - 81)  BP: 159/81 (23 Mar 2025 05:18) (122/77 - 159/81)  BP(mean): --  RR: 17 (23 Mar 2025 05:18) (17 - 18)  SpO2: 94% (23 Mar 2025 05:18) (92% - 94%)    Parameters below as of 22 Mar 2025 16:57  Patient On (Oxygen Delivery Method): room air               Daily     Daily     Labs, Interval Radiology studies, Medications reviewed by me

## 2025-03-24 PROCEDURE — 99223 1ST HOSP IP/OBS HIGH 75: CPT

## 2025-03-24 PROCEDURE — 99232 SBSQ HOSP IP/OBS MODERATE 35: CPT

## 2025-03-24 RX ADMIN — ENOXAPARIN SODIUM 40 MILLIGRAM(S): 100 INJECTION SUBCUTANEOUS at 17:03

## 2025-03-24 RX ADMIN — Medication 2 TABLET(S): at 21:36

## 2025-03-24 RX ADMIN — NYSTATIN 1 APPLICATION(S): 100000 CREAM TOPICAL at 17:04

## 2025-03-24 RX ADMIN — ENOXAPARIN SODIUM 40 MILLIGRAM(S): 100 INJECTION SUBCUTANEOUS at 05:52

## 2025-03-24 RX ADMIN — DEXTROMETHORPHAN HBR, GUAIFENESIN 100 MILLIGRAM(S): 200 LIQUID ORAL at 05:54

## 2025-03-24 RX ADMIN — DEXTROMETHORPHAN HBR, GUAIFENESIN 100 MILLIGRAM(S): 200 LIQUID ORAL at 17:10

## 2025-03-24 RX ADMIN — NYSTATIN 1 APPLICATION(S): 100000 CREAM TOPICAL at 05:53

## 2025-03-24 RX ADMIN — FUROSEMIDE 20 MILLIGRAM(S): 10 INJECTION INTRAMUSCULAR; INTRAVENOUS at 05:52

## 2025-03-24 RX ADMIN — LOSARTAN POTASSIUM 25 MILLIGRAM(S): 100 TABLET, FILM COATED ORAL at 05:52

## 2025-03-24 NOTE — CONSULT NOTE ADULT - NS ATTEND AMEND GEN_ALL_CORE FT
The patient is seen and examined by me. the right leg edema is improved since admn, feels better, less SOB. The patient had IV lasix and diuresis.  O/E The right leg is softer and no infn. The wounds are clean and o infn and no cellulitis.  plan-- cont the medical management. Follow up in the [facility] upon DC from the hospital as out patient.
79 year old F with PMHx HTN, lymphedema, b/l knee replacements, pulmonary HTN admitted to hospital for RLE wounds and deconditioning. CT Chest significant for "Right lower lobe subpleural nodule, 0.4 cm." Pulmonary consulted for further evaluation.     Dx: subpleural nodule on CT; chronic hypercapnic respiratory failure    Suspect she has underlying ASHISH/OSH, bicarbs in the past have been ~30 now 37, and vbg showing compensated hypercapnia.   HAs developed pulm HTN, possibly this is the new change that has led to worsened edema and MONAE.    Needs management of underlying cause.  I  - Likely it is OHS will need out pt sleep study for now given no acidosis, or lethargy would avoid starting random Bipap on her.  But if worsens or undergoes anesthesia/sedation for any reason would likely need NIV.   - outpatient PFTs, and maybe RH cath  New cough over the last day, states her room mate was coughing and she only started to have URI symptoms over the last 2 days.   - repeat Full RVP, prn nebs   - IF develops airway infection might need Bipap  - Repeat abg if Bicarb continues to rise.

## 2025-03-24 NOTE — CONSULT NOTE ADULT - REASON FOR ADMISSION
Discharge Summary/ labor/Undelivered    Patient Name:  Eden Castro  :      1989  MRN:      [id number]    Admission Date: 10/20/2022  Delivery Date: Undelivered  Gestational Age at Delivery: 33w5d  Discharge Date:10/22/2022    Hospital Course:  33 yo at 33w3 with c/o yeimi every 6 minutes. Found to be dilated to 3 cm. She was placed on Magnesium sulfate and given steroids. GBS came back positive. During this hospitalization, she was noted to have a UTI. She was sent home on Macrobid for a total of 7 days. She will follow-up with Dr. Gonzalez in one week.  labor precautions given.     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)     Elevated TSH     Low back pain     Major depressive disorder, recurrent episode (H)     Methadone use     Bipolar disorder (H)     Missed      Mixed bipolar I disorder (H)     Panic disorder     Restless legs     Risk of  labor     Sleep dysfunction with arousal disturbance     Spontaneous vaginal delivery     Substance abuse in remission (H)     Temporomandibular joint disorder     Uterine contractions     Vitamin D deficiency      labor       Conditions Complicating Pregnancy:  labor    Primary Procedure performed: Tocolysis, Steroids for lung maturity    Other Procedures performed: None    Condition at discharge:  Stable    Discharge Plan:     Follow-up with Primary Obstetrician in 1 week  Instructions:   Nothing per vagina, no heavy lifting, no strenuous exercise   Regular diet   Medications: PNV, Macrobid for UTI    Provider:  Michael Baltazar M.D.    Date:  10/22/2022  Time:  9:43 AM    
Physical deconditioning
Physical deconditioning

## 2025-03-24 NOTE — PROGRESS NOTE ADULT - ASSESSMENT
Hilda Aquino is a 79 year old female with PMHx of HTN and lymphedema c/b RLE wounds who presented to the ED on 3/19/25 for complaints of difficulty ambulating and admitted for physical deconditioning.    ##Physical deconditioning  #Lymphedema  #Inability to walk  Reports difficulty ambulating due to leg swelling x couple months, started on diuretic by wound care physician without improvement in swelling. Has been walking "baby steps," states she has to take steps with her left foot and then drag her right foot  - Vascular/WC eval -> Discussed with her primary wound care/vascular Dr. Montoya who has evaluated patient this admission -> local wound cover, cleanse daily with saline and cover with dry sterile gauze  - C/w good wound care   - PT/OT eval -> PRASHANT  - Fall precautions     #PulmHTN  ##Elevated pro-BNP  #Hypercarbia  Denies shortness of breath, orthopnea, paroxysmal nocturnal dyspnea, or bendopnea  Pro-BNP 1820 on admission, VBG 7.34 / 76 / 29 / 41, COVID/influenza/RSV negative  CXR with cardiomegaly with pulmonary venous congestion however may be slightly overestimated in the setting of a shallow inspiration with relatively low lung volumes, bulky gabbie b/l, disproportionate to the central pulmonary venous congestion and raises the possibility of b/l hilar lymphadenopathy  CTA chest  without main or lobar pulmonary embolism but with limited evaluation of the segmental and subsegmental branches. Echo reviewed -> PulmHTN, elevated right atrial pressures. Query ASHISH vs OHS etiology.  - Pulm eval     ##Elevated troponin, suspect demand ischemia  EKG without signs of ischemia. Troponin flat, No CP.    #RLL subpleural nodule, incidental finding  Measures 0.4 cm on CTA chest. Outpatient followup     #HTN, uncontrolled: BP as elevated as 179/80 on admission, PTA losartan 25 mg and furosemide 20 mg    Diet: DASH   DVT prophylaxis: lovenox   Dispo: Admit, Eventual PRASHANT   Code Status: FC     I have spent a total of 35 minutes to prepare to see the patient, obtaining and reviewing history, physical examination, explaining the diagnosis, prognosis and treatment plan with the patient/family/caregiver. I also have spent the time ordering studies and testing, interpreting results, medicine reconciliation, IDRs, subspecialty consultation and documentation as above.     Hilda Aquino is a 79 year old female with PMHx of HTN and lymphedema c/b RLE wounds who presented to the ED on 3/19/25 for complaints of difficulty ambulating and admitted for physical deconditioning.    ##Physical deconditioning  #Lymphedema  #Inability to walk  Reports difficulty ambulating due to leg swelling x couple months, started on diuretic by wound care physician without improvement in swelling. Has been walking "baby steps," states she has to take steps with her left foot and then drag her right foot  - Vascular/WC eval -> Discussed with her primary wound care/vascular Dr. Montoya who has evaluated patient this admission -> local wound cover, cleanse daily with saline and cover with dry sterile gauze  - C/w good wound care   - PT/OT eval -> PRASHANT  - Fall precautions     #PulmHTN  ##Elevated pro-BNP  #Hypercarbia, likely chronic   Denies shortness of breath, orthopnea, paroxysmal nocturnal dyspnea, or bendopnea  Pro-BNP 1820 on admission, VBG 7.34 / 76 / 29 / 41, COVID/influenza/RSV negative  CXR with cardiomegaly with pulmonary venous congestion however may be slightly overestimated in the setting of a shallow inspiration with relatively low lung volumes, bulky gabbie b/l, disproportionate to the central pulmonary venous congestion and raises the possibility of b/l hilar lymphadenopathy  CTA chest  without main or lobar pulmonary embolism but with limited evaluation of the segmental and subsegmental branches. Echo reviewed -> PulmHTN, elevated right atrial pressures. Query ASHISH vs OHS etiology.  - Pulm eval today     ##Elevated troponin, suspect demand ischemia  EKG without signs of ischemia. Troponin flat, No CP.    #RLL subpleural nodule, incidental finding  Measures 0.4 cm on CTA chest. Outpatient followup     #HTN, uncontrolled: BP as elevated as 179/80 on admission, PTA losartan 25 mg and furosemide 20 mg    Diet: DASH   DVT prophylaxis: lovenox   Dispo: Admit, Eventual [facility]ASHANT   Code Status: FC     I have spent a total of 35 minutes to prepare to see the patient, obtaining and reviewing history, physical examination, explaining the diagnosis, prognosis and treatment plan with the patient/family/caregiver. I also have spent the time ordering studies and testing, interpreting results, medicine reconciliation, IDRs, subspecialty consultation and documentation as above.

## 2025-03-24 NOTE — PROGRESS NOTE ADULT - SUBJECTIVE AND OBJECTIVE BOX
Hospitalist Daily Progress Note     *SUBJECTIVE*    Interval Events:  NAEON, Resting comfortably. HD Stable.      *OBJECTIVE*    PHYSICAL EXAM:  CONSTITUTIONAL: Well groomed, no apparent distress, pleasant, conversational, with obesity   EYES: PERRLA and symmetric, EOMI  ENMT: Oral mucosa with moist membranes  RESP: No respiratory distress, no use of accessory muscles; CTA b/l  CV: RRR  GI: Soft, NT, ND  SKIN: b/l legs swollen and consistent with lymphedema, R. posterior thigh and R. anterior calf with open wounds - both without surrounding erythema or purulent drainage (not new as per pt, follows wound care as an outpatient for past 7 years)      OBJECTIVE DATA:   Vital Signs Last 24 Hrs  T(C): 36.6 (24 Mar 2025 05:06), Max: 36.6 (24 Mar 2025 05:06)  T(F): 97.8 (24 Mar 2025 05:06), Max: 97.8 (24 Mar 2025 05:06)  HR: 73 (24 Mar 2025 05:06) (72 - 73)  BP: 157/80 (24 Mar 2025 05:06) (138/83 - 157/80)  BP(mean): --  RR: 18 (24 Mar 2025 05:06) (18 - 18)  SpO2: 93% (24 Mar 2025 05:06) (91% - 93%)    Parameters below as of 24 Mar 2025 05:06  Patient On (Oxygen Delivery Method): nasal cannula               Daily     Daily     Labs, Interval Radiology studies, Medications reviewed by me

## 2025-03-24 NOTE — CONSULT NOTE ADULT - NS ATTEST RISK PROBLEM GEN_ALL_CORE FT
New onset, pulm HTN, new dx of chronic respiratory failure, with further evaluation and monitoring reccomended.

## 2025-03-24 NOTE — CONSULT NOTE ADULT - SUBJECTIVE AND OBJECTIVE BOX
HPI: 79 year old F with PMHx HTN, lymphedema, b/l knee replacements, pulmonary HTN admitted to hospital for RLE wounds and deconditioning. CT Chest significant for "Right lower lobe subpleural nodule, 0.4 cm." Pulmonary consulted for further evaluation.     Right lower lobe subpleural nodule, 0.4 cm.   PAST MEDICAL & SURGICAL HISTORY:  Hypertension      Lymphedema      H/O total knee replacement, bilateral          FAMILY HISTORY:  FH: HTN (hypertension)        SOCIAL HISTORY:  Smoking: [ ] Never Smoked [ ] Former Smoker (__ packs x ___ years) [ ] Current Smoker  (__ packs x ___ years)  Substance Use: [ ] Never Used [ ] Used ____  EtOH Use:  Marital Status: [ ] Single [ ]  [ ]  [ ]   Sexual History:   Occupation:  Recent Travel:  Country of Birth:  Advance Directives:    Allergies    No Known Allergies    Intolerances        REVIEW OF SYSTEMS:  Constitutional: [ ] negative [ ] fevers [ ] chills [ ] weight loss [ ] weight gain  HEENT: [ ] negative [ ] dry eyes [ ] eye irritation [ ] postnasal drip [ ] nasal congestion  CV: [ ] negative  [ ] chest pain [ ] orthopnea [ ] palpitations [ ] murmur  Resp: [ ] negative [ ] cough [ ] shortness of breath [ ] dyspnea [ ] wheezing [ ] sputum [ ] hemoptysis  GI: [ ] negative [ ] nausea [ ] vomiting [ ] diarrhea [ ] constipation [ ] abd pain [ ] dysphagia   : [ ] negative [ ] dysuria [ ] nocturia [ ] hematuria [ ] increased urinary frequency  Musculoskeletal: [ ] negative [ ] back pain [ ] myalgias [ ] arthralgias [ ] fracture  Skin: [ ] negative [ ] rash [ ] itch  Neurological: [ ] negative [ ] headache [ ] dizziness [ ] syncope [ ] weakness [ ] numbness  Psychiatric: [ ] negative [ ] anxiety [ ] depression  Endocrine: [ ] negative [ ] diabetes [ ] thyroid problem  Hematologic/Lymphatic: [ ] negative [ ] anemia [ ] bleeding problem  Allergic/Immunologic: [ ] negative [ ] itchy eyes [ ] nasal discharge [ ] hives [ ] angioedema  [x] All other systems negative      OBJECTIVE:  ICU Vital Signs Last 24 Hrs  T(C): 36.6 (24 Mar 2025 05:06), Max: 36.6 (24 Mar 2025 05:06)  T(F): 97.8 (24 Mar 2025 05:06), Max: 97.8 (24 Mar 2025 05:06)  HR: 73 (24 Mar 2025 05:06) (72 - 73)  BP: 157/80 (24 Mar 2025 05:06) (138/83 - 157/80)  BP(mean): --  ABP: --  ABP(mean): --  RR: 18 (24 Mar 2025 05:06) (18 - 18)  SpO2: 93% (24 Mar 2025 05:06) (91% - 93%)    O2 Parameters below as of 24 Mar 2025 05:06  Patient On (Oxygen Delivery Method): nasal cannula              03-23 @ 07:01  -  03-24 @ 07:00  --------------------------------------------------------  IN: 1272 mL / OUT: 3000 mL / NET: -1728 mL      CAPILLARY BLOOD GLUCOSE          PHYSICAL EXAM:  GENERAL: NAD, lying in bed comfortably  HEAD:  Atraumatic, normocephalic  EYES: EOMI, PERRLA, conjunctiva and sclera clear  NECK: Supple, trachea midline, no JVD  HEART: Regular rate and rhythm, no murmurs, rubs, or gallops  LUNGS: Unlabored respirations.  Clear to auscultation bilaterally, no crackles, wheezing, or rhonchi  ABDOMEN: Soft, nontender, nondistended, +BS  EXTREMITIES: 2+ peripheral pulses bilaterally. No clubbing, cyanosis, or edema  NERVOUS SYSTEM:  A&Ox3, moving all extremities, no focal deficits   SKIN: No rashes or lesions    HOSPITAL MEDICATIONS:  enoxaparin Injectable 40 milliGRAM(s) SubCutaneous every 12 hours      furosemide    Tablet 20 milliGRAM(s) Oral daily  losartan 25 milliGRAM(s) Oral daily      guaiFENesin Oral Liquid (Sugar-Free) 100 milliGRAM(s) Oral every 6 hours PRN    acetaminophen     Tablet .. 650 milliGRAM(s) Oral every 6 hours PRN  melatonin 3 milliGRAM(s) Oral at bedtime PRN  ondansetron Injectable 4 milliGRAM(s) IV Push every 8 hours PRN    aluminum hydroxide/magnesium hydroxide/simethicone Suspension 30 milliLiter(s) Oral every 4 hours PRN  polyethylene glycol 3350 17 Gram(s) Oral daily  senna 2 Tablet(s) Oral at bedtime            nystatin Powder 1 Application(s) Topical two times a day        LABS:    Hgb Trend: 11.9<--, 11.3<--, 11.3<--        Creatinine Trend: 0.88<--, 0.89<--            MICROBIOLOGY:     RADIOLOGY:  [ ] Reviewed and interpreted by me    PULMONARY FUNCTION TESTS:    EKG:   HPI: 79 year old F with PMHx HTN, lymphedema, b/l knee replacements, pulmonary HTN admitted to hospital for RLE wounds and deconditioning. CT Chest significant for "Right lower lobe subpleural nodule, 0.4 cm." Pulmonary consulted for further evaluation. Patient also with chronic hypercapnia based on VBG. Patient seen & examined at bedside today - awake/alert, in no respiratory distress, on RA. Patient states that she has developed a cough since being in the hospital, otherwise she denies any SOB/CP/wheezing. She states that she does not wear oxygen at home or CPAP at night. She denies any smoking history but had some 2nd hand exposure for 4 years.     PAST MEDICAL & SURGICAL HISTORY:  Hypertension      Lymphedema      H/O total knee replacement, bilateral          FAMILY HISTORY:  FH: HTN (hypertension)        SOCIAL HISTORY:  - never smoker; some 2nd hand exposure   - lives at home     Allergies    No Known Allergies    Intolerances        REVIEW OF SYSTEMS:  Constitutional: [ ] negative [ ] fevers [ ] chills [ ] weight loss [ ] weight gain  HEENT: [ ] negative [ ] dry eyes [ ] eye irritation [ ] postnasal drip [ ] nasal congestion  CV: [ ] negative  [ ] chest pain [ ] orthopnea [ ] palpitations [ ] murmur  Resp: [ ] negative [ ] cough [ ] shortness of breath [ ] dyspnea [ ] wheezing [ ] sputum [ ] hemoptysis  GI: [ ] negative [ ] nausea [ ] vomiting [ ] diarrhea [ ] constipation [ ] abd pain [ ] dysphagia   : [ ] negative [ ] dysuria [ ] nocturia [ ] hematuria [ ] increased urinary frequency  Musculoskeletal: [ ] negative [ ] back pain [ ] myalgias [ ] arthralgias [ ] fracture  Skin: [ ] negative [ ] rash [ ] itch  Neurological: [ ] negative [ ] headache [ ] dizziness [ ] syncope [ ] weakness [ ] numbness  Psychiatric: [ ] negative [ ] anxiety [ ] depression  Endocrine: [ ] negative [ ] diabetes [ ] thyroid problem  Hematologic/Lymphatic: [ ] negative [ ] anemia [ ] bleeding problem  Allergic/Immunologic: [ ] negative [ ] itchy eyes [ ] nasal discharge [ ] hives [ ] angioedema  [x] All other systems negative      OBJECTIVE:  ICU Vital Signs Last 24 Hrs  T(C): 36.6 (24 Mar 2025 05:06), Max: 36.6 (24 Mar 2025 05:06)  T(F): 97.8 (24 Mar 2025 05:06), Max: 97.8 (24 Mar 2025 05:06)  HR: 73 (24 Mar 2025 05:06) (72 - 73)  BP: 157/80 (24 Mar 2025 05:06) (138/83 - 157/80)  BP(mean): --  ABP: --  ABP(mean): --  RR: 18 (24 Mar 2025 05:06) (18 - 18)  SpO2: 93% (24 Mar 2025 05:06) (91% - 93%)    O2 Parameters below as of 24 Mar 2025 05:06  Patient On (Oxygen Delivery Method): nasal cannula              03-23 @ 07:01  -  03-24 @ 07:00  --------------------------------------------------------  IN: 1272 mL / OUT: 3000 mL / NET: -1728 mL      CAPILLARY BLOOD GLUCOSE          PHYSICAL EXAM:  GENERAL: NAD, lying in bed comfortably  HEAD:  Atraumatic, normocephalic  EYES:conjunctiva and sclera clear  NECK: Supple, trachea midline, no JVD  HEART: Regular rate and rhythm, no murmurs, rubs, or gallops  LUNGS: Unlabored respirations. + mild expiratory crackles   ABDOMEN: Soft, nontender, nondistended, +BS  EXTREMITIES: + b/l LE edema; + dependent sacral edema   NERVOUS SYSTEM:  A&Ox3, moving all extremities, no focal deficits   SKIN: No rashes or lesions    HOSPITAL MEDICATIONS:  enoxaparin Injectable 40 milliGRAM(s) SubCutaneous every 12 hours      furosemide    Tablet 20 milliGRAM(s) Oral daily  losartan 25 milliGRAM(s) Oral daily      guaiFENesin Oral Liquid (Sugar-Free) 100 milliGRAM(s) Oral every 6 hours PRN    acetaminophen     Tablet .. 650 milliGRAM(s) Oral every 6 hours PRN  melatonin 3 milliGRAM(s) Oral at bedtime PRN  ondansetron Injectable 4 milliGRAM(s) IV Push every 8 hours PRN    aluminum hydroxide/magnesium hydroxide/simethicone Suspension 30 milliLiter(s) Oral every 4 hours PRN  polyethylene glycol 3350 17 Gram(s) Oral daily  senna 2 Tablet(s) Oral at bedtime            nystatin Powder 1 Application(s) Topical two times a day        LABS:    Hgb Trend: 11.9<--, 11.3<--, 11.3<--        Creatinine Trend: 0.88<--, 0.89<--

## 2025-03-24 NOTE — CONSULT NOTE ADULT - ASSESSMENT
: 79 year old F with PMHx HTN, lymphedema, b/l knee replacements, pulmonary HTN admitted to hospital for RLE wounds and deconditioning. CT Chest significant for "Right lower lobe subpleural nodule, 0.4 cm." Pulmonary consulted for further evaluation.     Dx: subpleural nodule on CT; chronic hypercapnic respiratory failure    Recs:   - on RA with stable Spo2.   - VBG with stable chronic hypercapnia   - afebrile. mild leukopenia.   - rvp negative   - LE venous dopplers negative for DVT.   - TTE: EF 65%; mod AR     NOTE INCOMPLETE  : 79 year old F with PMHx HTN, lymphedema, b/l knee replacements, pulmonary HTN admitted to hospital for RLE wounds and deconditioning. CT Chest significant for "Right lower lobe subpleural nodule, 0.4 cm." Pulmonary consulted for further evaluation.     Dx: subpleural nodule on CT; chronic hypercapnic respiratory failure    Recs:   - on RA with stable Spo2.   - VBG with stable chronic hypercapnia likely in setting of obesity hypoventilation syndrome/ASHISH  - afebrile. mild leukopenia.   - rvp on admission negative. repeat full RVP today as patient has developed cough since being admitted.   - duonebs PRN for cough   - LE venous dopplers negative for DVT.   - will need to follow up outpatient for sleep study likely will need NIV at night.   - no need for home oxygen at this time  patient stable on RA   - subpleural nodule at 0.4cm not of any concern at this time unlikely to be malignancy in a patient without significant risk factors.       Discussed with Dr. Friend  : 79 year old F with PMHx HTN, lymphedema, b/l knee replacements, pulmonary HTN admitted to hospital for RLE wounds and deconditioning. CT Chest significant for "Right lower lobe subpleural nodule, 0.4 cm." Pulmonary consulted for further evaluation.     Dx: subpleural nodule on CT; chronic hypercapnic respiratory failure    Recs:   - on RA with stable Spo2.   - VBG with stable chronic hypercapnia likely in setting of obesity hypoventilation syndrome/ASHISH  - afebrile. mild leukopenia.   - rvp on admission negative. Repeat full RVP today as patient has developed cough since being admitted.   - duonebs PRN for cough   - LE venous dopplers negative for DVT.   - will need to follow up outpatient for sleep study likely will need NIV at night.   - no need for home oxygen at this time  patient stable on RA   - Repeat abg if Bicarb contineus to rise.   - subpleural nodule at 0.4cm not of any concern at this time, does not need specific follow up in this patient without significant risk factors.       Discussed with Dr. Friend

## 2025-03-25 LAB
ALBUMIN SERPL ELPH-MCNC: 2.9 G/DL — LOW (ref 3.3–5)
ALP SERPL-CCNC: 80 U/L — SIGNIFICANT CHANGE UP (ref 40–120)
ALT FLD-CCNC: 22 U/L — SIGNIFICANT CHANGE UP (ref 12–78)
ANION GAP SERPL CALC-SCNC: -1 MMOL/L — LOW (ref 5–17)
AST SERPL-CCNC: 27 U/L — SIGNIFICANT CHANGE UP (ref 15–37)
BASOPHILS # BLD AUTO: 0.03 K/UL — SIGNIFICANT CHANGE UP (ref 0–0.2)
BASOPHILS NFR BLD AUTO: 0.6 % — SIGNIFICANT CHANGE UP (ref 0–2)
BILIRUB DIRECT SERPL-MCNC: 0.1 MG/DL — SIGNIFICANT CHANGE UP (ref 0–0.3)
BILIRUB INDIRECT FLD-MCNC: 0.1 MG/DL — LOW (ref 0.2–1)
BILIRUB SERPL-MCNC: 0.2 MG/DL — SIGNIFICANT CHANGE UP (ref 0.2–1.2)
BUN SERPL-MCNC: 11 MG/DL — SIGNIFICANT CHANGE UP (ref 7–23)
CALCIUM SERPL-MCNC: 8.6 MG/DL — SIGNIFICANT CHANGE UP (ref 8.5–10.1)
CHLORIDE SERPL-SCNC: 98 MMOL/L — SIGNIFICANT CHANGE UP (ref 96–108)
CO2 SERPL-SCNC: 34 MMOL/L — HIGH (ref 22–31)
CREAT SERPL-MCNC: 0.78 MG/DL — SIGNIFICANT CHANGE UP (ref 0.5–1.3)
CREAT SERPL-MCNC: 0.78 MG/DL — SIGNIFICANT CHANGE UP (ref 0.5–1.3)
EGFR: 78 ML/MIN/1.73M2 — SIGNIFICANT CHANGE UP
EOSINOPHIL # BLD AUTO: 0.07 K/UL — SIGNIFICANT CHANGE UP (ref 0–0.5)
EOSINOPHIL NFR BLD AUTO: 1.3 % — SIGNIFICANT CHANGE UP (ref 0–6)
GLUCOSE SERPL-MCNC: 109 MG/DL — HIGH (ref 70–99)
HCT VFR BLD CALC: 40.8 % — SIGNIFICANT CHANGE UP (ref 34.5–45)
HGB BLD-MCNC: 12.3 G/DL — SIGNIFICANT CHANGE UP (ref 11.5–15.5)
IMM GRANULOCYTES NFR BLD AUTO: 0.8 % — SIGNIFICANT CHANGE UP (ref 0–0.9)
LYMPHOCYTES # BLD AUTO: 0.72 K/UL — LOW (ref 1–3.3)
LYMPHOCYTES # BLD AUTO: 13.5 % — SIGNIFICANT CHANGE UP (ref 13–44)
MAGNESIUM SERPL-MCNC: 2.1 MG/DL — SIGNIFICANT CHANGE UP (ref 1.6–2.6)
MCHC RBC-ENTMCNC: 27.9 PG — SIGNIFICANT CHANGE UP (ref 27–34)
MCHC RBC-ENTMCNC: 30.1 G/DL — LOW (ref 32–36)
MCV RBC AUTO: 92.5 FL — SIGNIFICANT CHANGE UP (ref 80–100)
MONOCYTES # BLD AUTO: 0.82 K/UL — SIGNIFICANT CHANGE UP (ref 0–0.9)
MONOCYTES NFR BLD AUTO: 15.4 % — HIGH (ref 2–14)
NEUTROPHILS # BLD AUTO: 3.64 K/UL — SIGNIFICANT CHANGE UP (ref 1.8–7.4)
NEUTROPHILS NFR BLD AUTO: 68.4 % — SIGNIFICANT CHANGE UP (ref 43–77)
NRBC BLD AUTO-RTO: 0 /100 WBCS — SIGNIFICANT CHANGE UP (ref 0–0)
PLATELET # BLD AUTO: 128 K/UL — LOW (ref 150–400)
POTASSIUM SERPL-MCNC: 4.3 MMOL/L — SIGNIFICANT CHANGE UP (ref 3.5–5.3)
POTASSIUM SERPL-SCNC: 4.3 MMOL/L — SIGNIFICANT CHANGE UP (ref 3.5–5.3)
PROT SERPL-MCNC: 7.6 GM/DL — SIGNIFICANT CHANGE UP (ref 6–8.3)
RAPID RVP RESULT: DETECTED
RBC # BLD: 4.41 M/UL — SIGNIFICANT CHANGE UP (ref 3.8–5.2)
RBC # FLD: 15.4 % — HIGH (ref 10.3–14.5)
SARS-COV-2 RNA SPEC QL NAA+PROBE: DETECTED
SODIUM SERPL-SCNC: 131 MMOL/L — LOW (ref 135–145)
WBC # BLD: 5.32 K/UL — SIGNIFICANT CHANGE UP (ref 3.8–10.5)
WBC # FLD AUTO: 5.32 K/UL — SIGNIFICANT CHANGE UP (ref 3.8–10.5)

## 2025-03-25 PROCEDURE — 99232 SBSQ HOSP IP/OBS MODERATE 35: CPT

## 2025-03-25 PROCEDURE — 71045 X-RAY EXAM CHEST 1 VIEW: CPT | Mod: 26

## 2025-03-25 RX ORDER — REMDESIVIR 5 MG/ML
200 INJECTION INTRAVENOUS EVERY 24 HOURS
Refills: 0 | Status: COMPLETED | OUTPATIENT
Start: 2025-03-25 | End: 2025-03-25

## 2025-03-25 RX ORDER — REMDESIVIR 5 MG/ML
INJECTION INTRAVENOUS
Refills: 0 | Status: COMPLETED | OUTPATIENT
Start: 2025-03-25 | End: 2025-03-29

## 2025-03-25 RX ORDER — REMDESIVIR 5 MG/ML
100 INJECTION INTRAVENOUS EVERY 24 HOURS
Refills: 0 | Status: COMPLETED | OUTPATIENT
Start: 2025-03-26 | End: 2025-03-29

## 2025-03-25 RX ORDER — DEXAMETHASONE 0.5 MG/1
6 TABLET ORAL DAILY
Refills: 0 | Status: DISCONTINUED | OUTPATIENT
Start: 2025-03-25 | End: 2025-04-02

## 2025-03-25 RX ADMIN — NYSTATIN 1 APPLICATION(S): 100000 CREAM TOPICAL at 17:31

## 2025-03-25 RX ADMIN — Medication 2 TABLET(S): at 22:53

## 2025-03-25 RX ADMIN — NYSTATIN 1 APPLICATION(S): 100000 CREAM TOPICAL at 05:24

## 2025-03-25 RX ADMIN — Medication 650 MILLIGRAM(S): at 06:23

## 2025-03-25 RX ADMIN — FUROSEMIDE 20 MILLIGRAM(S): 10 INJECTION INTRAMUSCULAR; INTRAVENOUS at 05:21

## 2025-03-25 RX ADMIN — Medication 650 MILLIGRAM(S): at 05:23

## 2025-03-25 RX ADMIN — LOSARTAN POTASSIUM 25 MILLIGRAM(S): 100 TABLET, FILM COATED ORAL at 05:21

## 2025-03-25 RX ADMIN — DEXAMETHASONE 6 MILLIGRAM(S): 0.5 TABLET ORAL at 19:51

## 2025-03-25 RX ADMIN — ENOXAPARIN SODIUM 40 MILLIGRAM(S): 100 INJECTION SUBCUTANEOUS at 17:12

## 2025-03-25 RX ADMIN — REMDESIVIR 200 MILLIGRAM(S): 5 INJECTION INTRAVENOUS at 19:51

## 2025-03-25 RX ADMIN — ENOXAPARIN SODIUM 40 MILLIGRAM(S): 100 INJECTION SUBCUTANEOUS at 05:23

## 2025-03-25 NOTE — CHART NOTE - NSCHARTNOTEFT_GEN_A_CORE
Patient with new hypoxic, cough. RVP positive for COVID. Likely from previous neighbor who is covid+. Complaining of SOB and now requiring persistent 2LNC.     - Check Labs  - Check CXR   - Start Remdesivir  - Persistently hypoxic -> will start IV dexamethasone    - Low threshold for Bipap   - Patient updated. Patient with new hypoxic, cough. RVP positive for COVID. Likely from previous neighbor who is covid+. Complaining of SOB and now requiring persistent 2LNC.     - Check Labs, AM VBG  - Check CXR   - Start Remdesivir  - Persistently hypoxic -> will start IV dexamethasone    - Low threshold for Bipap   - Patient updated.

## 2025-03-25 NOTE — PROGRESS NOTE ADULT - ASSESSMENT
Hilda Aquino is a 79 year old female with PMHx of HTN and lymphedema c/b RLE wounds who presented to the ED on 3/19/25 for complaints of difficulty ambulating and admitted for physical deconditioning.    ##Physical deconditioning  #Lymphedema  #Inability to walk  Reports difficulty ambulating due to leg swelling x couple months, started on diuretic by wound care physician without improvement in swelling. Has been walking "baby steps," states she has to take steps with her left foot and then drag her right foot  - Vascular/WC eval -> Discussed with her primary wound care/vascular Dr. Montoya who has evaluated patient this admission -> local wound cover, cleanse daily with saline and cover with dry sterile gauze  - C/w good wound care   - PT/OT eval -> PRASHANT  - Fall precautions     #PulmHTN  ##Elevated pro-BNP  #Hypercarbia, likely chronic   Denies shortness of breath, orthopnea, paroxysmal nocturnal dyspnea, or bendopnea  Pro-BNP 1820 on admission, VBG 7.34 / 76 / 29 / 41, COVID/influenza/RSV negative  CXR with cardiomegaly with pulmonary venous congestion however may be slightly overestimated in the setting of a shallow inspiration with relatively low lung volumes, bulky gabbie b/l, disproportionate to the central pulmonary venous congestion and raises the possibility of b/l hilar lymphadenopathy  CTA chest  without main or lobar pulmonary embolism but with limited evaluation of the segmental and subsegmental branches. Echo reviewed -> PulmHTN, elevated right atrial pressures. Query ASHISH vs OHS etiology.  - Outpatient Pulm followup for PFTs, Sleep study. Patient aware.     #Elevated troponin, suspect demand ischemia  EKG without signs of ischemia. Troponin flat, No CP.    #RLL subpleural nodule, incidental finding  Measures 0.4 cm on CTA chest. Outpatient followup     #HTN, uncontrolled: BP as elevated as 179/80 on admission, PTA losartan 25 mg and furosemide 20 mg    Diet: DASH   DVT prophylaxis: lovenox   Dispo: Admit, Eventual PRASHANT   Code Status: FC     I have spent a total of *** minutes to prepare to see the patient, obtaining and reviewing history, physical examination, explaining the diagnosis, prognosis and treatment plan with the patient/family/caregiver. I also have spent the time ordering studies and testing, interpreting results, medicine reconciliation, IDRs, subspecialty consultation and documentation as above.         Hilda Aquino is a 79 year old female with PMHx of HTN and lymphedema c/b RLE wounds who presented to the ED on 3/19/25 for complaints of difficulty ambulating and admitted for physical deconditioning.    ##Physical deconditioning  #Lymphedema  #Inability to walk  Reports difficulty ambulating due to leg swelling x couple months, started on diuretic by wound care physician without improvement in swelling. Has been walking "baby steps," states she has to take steps with her left foot and then drag her right foot  - Vascular/WC eval -> Discussed with her primary wound care/vascular Dr. Montoya who has evaluated patient this admission -> local wound cover, cleanse daily with saline and cover with dry sterile gauze  - C/w good wound care   - PT/OT eval -> PRASAHNT  - Fall precautions     ##Acute hypoxic resp failure   #PulmHTN  ##Elevated pro-BNP  #Hypercarbia, likely chronic   Denies shortness of breath, orthopnea, paroxysmal nocturnal dyspnea, or bendopnea  Pro-BNP 1820 on admission, VBG 7.34 / 76 / 29 / 41, COVID/influenza/RSV negative  CXR with cardiomegaly with pulmonary venous congestion however may be slightly overestimated in the setting of a shallow inspiration with relatively low lung volumes, bulky gabbie b/l, disproportionate to the central pulmonary venous congestion and raises the possibility of b/l hilar lymphadenopathy  CTA chest  without main or lobar pulmonary embolism but with limited evaluation of the segmental and subsegmental branches. Echo reviewed -> PulmHTN, elevated right atrial pressures. Query ASHISH vs OHS etiology. 03/25-> Hypoxic and coughing this morning.   - Check RVP  - Titrate o2 as tolerated   - Outpatient Pulm followup for Pulm HTNPFTs, Sleep study. Patient aware.     #Elevated troponin, suspect demand ischemia  EKG without signs of ischemia. Troponin flat, No CP.     #RLL subpleural nodule, incidental finding  Measures 0.4 cm on CTA chest. Outpatient followup     #HTN, uncontrolled: BP as elevated as 179/80 on admission, PTA losartan 25 mg and furosemide 20 mg    Diet: DASH   DVT prophylaxis: lovenox   Dispo: Admit, Eventual PRASHANT   Code Status: FC     I have spent a total of 38 minutes to prepare to see the patient, obtaining and reviewing history, physical examination, explaining the diagnosis, prognosis and treatment plan with the patient/family/caregiver. I also have spent the time ordering studies and testing, interpreting results, medicine reconciliation, IDRs, subspecialty consultation and documentation as above.

## 2025-03-25 NOTE — PROGRESS NOTE ADULT - SUBJECTIVE AND OBJECTIVE BOX
Hospitalist Daily Progress Note     *SUBJECTIVE*    Interval Events:  NAEON, Resting comfortably. HD Stable.      *OBJECTIVE*    PHYSICAL EXAM:  CONSTITUTIONAL: Well groomed, no apparent distress, pleasant, conversational, with obesity   EYES: PERRLA and symmetric, EOMI  ENMT: Oral mucosa with moist membranes  RESP: No respiratory distress, no use of accessory muscles; CTA b/l  CV: RRR  GI: Soft, NT, ND  SKIN: b/l legs swollen and consistent with lymphedema, R. posterior thigh and R. anterior calf with open wounds - both without surrounding erythema or purulent drainage (not new as per pt, follows wound care as an outpatient for past 7 years)    OBJECTIVE DATA:   Vital Signs Last 24 Hrs  T(C): 36.6 (25 Mar 2025 05:11), Max: 37 (24 Mar 2025 17:01)  T(F): 97.9 (25 Mar 2025 05:11), Max: 98.6 (24 Mar 2025 17:01)  HR: 78 (25 Mar 2025 05:11) (77 - 83)  BP: 132/75 (25 Mar 2025 05:11) (121/62 - 170/80)  BP(mean): --  RR: 17 (25 Mar 2025 05:11) (17 - 22)  SpO2: 96% (25 Mar 2025 05:20) (88% - 98%)    Parameters below as of 25 Mar 2025 05:20  Patient On (Oxygen Delivery Method): nasal cannula  O2 Flow (L/min): 2             Daily     Daily     Labs, Interval Radiology studies, Medications reviewed by me

## 2025-03-26 LAB
ANION GAP SERPL CALC-SCNC: 1 MMOL/L — LOW (ref 5–17)
BUN SERPL-MCNC: 17 MG/DL — SIGNIFICANT CHANGE UP (ref 7–23)
CALCIUM SERPL-MCNC: 8.3 MG/DL — LOW (ref 8.5–10.1)
CHLORIDE SERPL-SCNC: 96 MMOL/L — SIGNIFICANT CHANGE UP (ref 96–108)
CO2 SERPL-SCNC: 36 MMOL/L — HIGH (ref 22–31)
CREAT SERPL-MCNC: 0.85 MG/DL — SIGNIFICANT CHANGE UP (ref 0.5–1.3)
EGFR: 70 ML/MIN/1.73M2 — SIGNIFICANT CHANGE UP
EGFR: 70 ML/MIN/1.73M2 — SIGNIFICANT CHANGE UP
GLUCOSE SERPL-MCNC: 155 MG/DL — HIGH (ref 70–99)
POTASSIUM SERPL-MCNC: 4.5 MMOL/L — SIGNIFICANT CHANGE UP (ref 3.5–5.3)
POTASSIUM SERPL-SCNC: 4.5 MMOL/L — SIGNIFICANT CHANGE UP (ref 3.5–5.3)
SODIUM SERPL-SCNC: 133 MMOL/L — LOW (ref 135–145)

## 2025-03-26 PROCEDURE — 99233 SBSQ HOSP IP/OBS HIGH 50: CPT

## 2025-03-26 PROCEDURE — 99232 SBSQ HOSP IP/OBS MODERATE 35: CPT

## 2025-03-26 RX ADMIN — NYSTATIN 1 APPLICATION(S): 100000 CREAM TOPICAL at 18:51

## 2025-03-26 RX ADMIN — ENOXAPARIN SODIUM 40 MILLIGRAM(S): 100 INJECTION SUBCUTANEOUS at 06:06

## 2025-03-26 RX ADMIN — REMDESIVIR 200 MILLIGRAM(S): 5 INJECTION INTRAVENOUS at 20:27

## 2025-03-26 RX ADMIN — DEXAMETHASONE 6 MILLIGRAM(S): 0.5 TABLET ORAL at 06:36

## 2025-03-26 RX ADMIN — NYSTATIN 1 APPLICATION(S): 100000 CREAM TOPICAL at 06:06

## 2025-03-26 RX ADMIN — FUROSEMIDE 20 MILLIGRAM(S): 10 INJECTION INTRAMUSCULAR; INTRAVENOUS at 06:07

## 2025-03-26 RX ADMIN — LOSARTAN POTASSIUM 25 MILLIGRAM(S): 100 TABLET, FILM COATED ORAL at 06:07

## 2025-03-26 RX ADMIN — ENOXAPARIN SODIUM 40 MILLIGRAM(S): 100 INJECTION SUBCUTANEOUS at 18:50

## 2025-03-26 NOTE — DIETITIAN INITIAL EVALUATION ADULT - OTHER INFO
Per chart pt with PMH of HTN, lymphedema complicated by RLE wounds, presented with SOB and difficulty ambulating and admitted for physical deconditioning suspect 2/2 lymphedema. PT/OT recommending [facility]. Tested + for COVID-19 on 03/25, likely from previous neighbor who COVID-19+; on isolation precautions. complaining of SOB and now requiring persistent 2L NC. PO intake mostly % for documented meals initially, however now mostly 26-75% for meals likely due to now COVID-19+; initiated on Remdesivir and Decadron. No reports of any chew/swallowing difficulty or any GI distress(nausea, vomiting, diarrhea, or constipation).

## 2025-03-26 NOTE — DIETITIAN INITIAL EVALUATION ADULT - NS FNS DIET ORDER
Diet, Regular:   DASH/TLC {Sodium & Cholesterol Restricted}  2000mL Fluid Restriction ([id number]) (03-19-25 @ 17:26) [Active]

## 2025-03-26 NOTE — PROGRESS NOTE ADULT - ASSESSMENT
Hilda Aquino is a 79 year old female with PMHx of HTN and lymphedema c/b RLE wounds who presented to the ED on 3/19/25 for complaints of difficulty ambulating and admitted for physical deconditioning.    + COVID 3/25/25  continue with remdesivir and decadron   Precautions per protocol, ideally airborne and contact in negative pressure room. Supplemental oxygen as required to maintain adequate saturation. Vigilance for secondary infection and other superimposed events. Monitor inflammatory markers and lab data. DVT prophylaxis per protocol. Prognosis guarded. Encourage use of incentive spirometer.  proning as tolerated     ##Physical deconditioning  #Lymphedema  #Inability to walk  Reports difficulty ambulating due to leg swelling x couple months, started on diuretic by wound care physician without improvement in swelling. Has been walking "baby steps," states she has to take steps with her left foot and then drag her right foot  - Vascular/WC eval -> Discussed with her primary wound care/vascular Dr. Montoya who has evaluated patient this admission -> local wound cover, cleanse daily with saline and cover with dry sterile gauze  - C/w good wound care   - PT/OT eval -> PRASHANT  - Fall precautions     ##Acute hypoxic resp failure   #PulmHTN  ##Elevated pro-BNP  #Hypercarbia, likely chronic   Denies shortness of breath, orthopnea, paroxysmal nocturnal dyspnea, or bendopnea  Pro-BNP 1820 on admission, VBG 7.34 / 76 / 29 / 41, COVID/influenza/RSV negative  CXR with cardiomegaly with pulmonary venous congestion however may be slightly overestimated in the setting of a shallow inspiration with relatively low lung volumes, bulky gabbie b/l, disproportionate to the central pulmonary venous congestion and raises the possibility of b/l hilar lymphadenopathy  CTA chest  without main or lobar pulmonary embolism but with limited evaluation of the segmental and subsegmental branches. Echo reviewed -> PulmHTN, elevated right atrial pressures. Query ASHISH vs OHS etiology. 03/25-> Hypoxic and coughing this morning.   - Titrate o2 as tolerated   - Outpatient Pulm followup for Pulm HTNPFTs, Sleep study. Patient aware.     #Elevated troponin, suspect demand ischemia  EKG without signs of ischemia. Troponin flat, No CP.     #RLL subpleural nodule, incidental finding  Measures 0.4 cm on CTA chest. Outpatient followup     #HTN, uncontrolled: BP as elevated as 179/80 on admission, PTA losartan 25 mg and furosemide 20 mg    Preventative Measures   DVT prophylaxis: lovenox   Dispo:  [facility]   Code Status: FC

## 2025-03-26 NOTE — PROGRESS NOTE ADULT - SUBJECTIVE AND OBJECTIVE BOX
Hospitalist Daily Progress Note     *SUBJECTIVE*    Patient seen and examined bedside.   no overnight events     REVIEW OF SYSTEMS: remaining ROS negative     *OBJECTIVE*    PHYSICAL EXAM:  Vital Signs Last 24 Hrs  T(C): 36.3 (26 Mar 2025 11:03), Max: 36.8 (25 Mar 2025 17:04)  T(F): 97.4 (26 Mar 2025 11:03), Max: 98.2 (25 Mar 2025 17:04)  HR: 78 (26 Mar 2025 11:03) (76 - 86)  BP: 95/53 (26 Mar 2025 11:03) (95/53 - 142/79)  BP(mean): --  RR: 17 (26 Mar 2025 11:03) (17 - 19)  SpO2: 95% (26 Mar 2025 11:03) (93% - 97%)    Parameters below as of 26 Mar 2025 11:03  Patient On (Oxygen Delivery Method): nasal cannula  O2 Flow (L/min): 2    GENERAL: NAD , no increased WOB, speaking in full sentences   HEAD:  Atraumatic, Normocephalic  EYES: EOMI, PERRLA, conjunctiva and sclera clear  ENMT: No tonsillar erythema, exudates, or enlargement;   NECK: Supple, No JVD  NERVOUS SYSTEM:  Alert & Oriented X3, Good concentration; nonfocal   CHEST/LUNG: CTAB;  No rales, rhonchi, wheezing, or rubs  HEART: Regular rate and rhythm; No murmurs, rubs, or gallops  ABDOMEN: Soft, Nontender, Nondistended; Bowel sounds present  EXTREMITIES:  2+ Peripheral Pulses b/l,  b/l legs swollen and consistent with lymphedema, R. posterior thigh and R. anterior calf with open wounds - both without surrounding erythema or purulent drainage (not new as per pt, follows wound care as an outpatient for past 7 years)    OBJECTIVE DATA:                             12.3   5.32  )-----------( 128      ( 25 Mar 2025 20:03 )             40.8   03-26    133[L]  |  96  |  17  ----------------------------<  155[H]  4.5   |  36[H]  |  0.85    Ca    8.3[L]      26 Mar 2025 10:10  Mg     2.1     03-25    TPro  7.6  /  Alb  2.9[L]  /  TBili  0.2  /  DBili  0.1  /  AST  27  /  ALT  22  /  AlkPhos  80  03-25      Consultant(s) Notes Reveiwed [x ] Yes     Care Discussed with [x ] Consultants  [x ] Patient  [ ] Family  [ x] /   [ x] Other; RN  Care plan and all findings were discussed in detail with patient.  All questions and concerns addressed

## 2025-03-26 NOTE — PROGRESS NOTE ADULT - NS ATTEST RISK PROBLEM GEN_ALL_CORE FT
New onset, pulm HTN, new dx of chronic respiratory failure, with further evaluation and monitoring recommended.

## 2025-03-26 NOTE — PROGRESS NOTE ADULT - SUBJECTIVE AND OBJECTIVE BOX
Interval Events: NAEO. On 2L NC. Still has cough but no CP/wheezing/SOB at this time.     REVIEW OF SYSTEMS:  Constitutional: [ ] negative [ ] fevers [ ] chills [ ] weight loss [ ] weight gain  HEENT: [ ] negative [ ] dry eyes [ ] eye irritation [ ] postnasal drip [ ] nasal congestion  CV: [ ] negative  [ ] chest pain [ ] orthopnea [ ] palpitations [ ] murmur  Resp: [ ] negative [ ] cough [ ] shortness of breath [ ] dyspnea [ ] wheezing [ ] sputum [ ] hemoptysis  GI: [ ] negative [ ] nausea [ ] vomiting [ ] diarrhea [ ] constipation [ ] abd pain [ ] dysphagia   : [ ] negative [ ] dysuria [ ] nocturia [ ] hematuria [ ] increased urinary frequency  Musculoskeletal: [ ] negative [ ] back pain [ ] myalgias [ ] arthralgias [ ] fracture  Skin: [ ] negative [ ] rash [ ] itch  Neurological: [ ] negative [ ] headache [ ] dizziness [ ] syncope [ ] weakness [ ] numbness  Psychiatric: [ ] negative [ ] anxiety [ ] depression  Endocrine: [ ] negative [ ] diabetes [ ] thyroid problem  Hematologic/Lymphatic: [ ] negative [ ] anemia [ ] bleeding problem  Allergic/Immunologic: [ ] negative [ ] itchy eyes [ ] nasal discharge [ ] hives [ ] angioedema  [x ] All other systems negative  [ ] Unable to assess ROS because ________    OBJECTIVE:  ICU Vital Signs Last 24 Hrs  T(C): 36.3 (26 Mar 2025 11:03), Max: 36.8 (25 Mar 2025 17:04)  T(F): 97.4 (26 Mar 2025 11:03), Max: 98.2 (25 Mar 2025 17:04)  HR: 78 (26 Mar 2025 11:03) (76 - 86)  BP: 95/53 (26 Mar 2025 11:03) (95/53 - 142/79)  BP(mean): --  ABP: --  ABP(mean): --  RR: 17 (26 Mar 2025 11:03) (17 - 19)  SpO2: 95% (26 Mar 2025 11:03) (93% - 97%)    O2 Parameters below as of 26 Mar 2025 11:03  Patient On (Oxygen Delivery Method): nasal cannula  O2 Flow (L/min): 2            03-25 @ 07:01  -  03-26 @ 07:00  --------------------------------------------------------  IN: 120 mL / OUT: 3300 mL / NET: -3180 mL      CAPILLARY BLOOD GLUCOSE          PHYSICAL EXAM:  GENERAL: NAD, lying in bed comfortably  HEART: Regular rate and rhythm, no murmurs, rubs, or gallops  LUNGS: Unlabored respirations. + mild expiratory crackles   ABDOMEN: Soft, nontender, nondistended  EXTREMITIES: + b/l LE edema; + dependent sacral edema   NERVOUS SYSTEM:  A&Ox3, moving all extremities, no focal deficits   SKIN: No rashes or lesions      HOSPITAL MEDICATIONS:  enoxaparin Injectable 40 milliGRAM(s) SubCutaneous every 12 hours    remdesivir  IVPB   IV Intermittent   remdesivir  IVPB 100 milliGRAM(s) IV Intermittent every 24 hours    furosemide    Tablet 20 milliGRAM(s) Oral daily  losartan 25 milliGRAM(s) Oral daily    dexAMETHasone  Injectable 6 milliGRAM(s) IV Push daily    guaiFENesin Oral Liquid (Sugar-Free) 100 milliGRAM(s) Oral every 6 hours PRN    acetaminophen     Tablet .. 650 milliGRAM(s) Oral every 6 hours PRN  melatonin 3 milliGRAM(s) Oral at bedtime PRN  ondansetron Injectable 4 milliGRAM(s) IV Push every 8 hours PRN    aluminum hydroxide/magnesium hydroxide/simethicone Suspension 30 milliLiter(s) Oral every 4 hours PRN  polyethylene glycol 3350 17 Gram(s) Oral daily  senna 2 Tablet(s) Oral at bedtime            nystatin Powder 1 Application(s) Topical two times a day        LABS:                        12.3   5.32  )-----------( 128      ( 25 Mar 2025 20:03 )             40.8     Hgb Trend: 12.3<--, 11.9<--, 11.3<--, 11.3<--  03-26    133[L]  |  96  |  17  ----------------------------<  155[H]  4.5   |  36[H]  |  0.85    Ca    8.3[L]      26 Mar 2025 10:10  Mg     2.1     03-25    TPro  7.6  /  Alb  2.9[L]  /  TBili  0.2  /  DBili  0.1  /  AST  27  /  ALT  22  /  AlkPhos  80  03-25    Creatinine Trend: 0.85<--, 0.78<--, 0.88<--, 0.89<--    Urinalysis Basic - ( 26 Mar 2025 10:10 )    Color: x / Appearance: x / SG: x / pH: x  Gluc: 155 mg/dL / Ketone: x  / Bili: x / Urobili: x   Blood: x / Protein: x / Nitrite: x   Leuk Esterase: x / RBC: x / WBC x   Sq Epi: x / Non Sq Epi: x / Bacteria: x            MICROBIOLOGY:     RADIOLOGY:  [ ] Reviewed and interpreted by me    PULMONARY FUNCTION TESTS:    EKG:

## 2025-03-26 NOTE — DIETITIAN INITIAL EVALUATION ADULT - PERTINENT MEDS FT
MEDICATIONS  (STANDING):  dexAMETHasone  Injectable 6 milliGRAM(s) IV Push daily  enoxaparin Injectable 40 milliGRAM(s) SubCutaneous every 12 hours  furosemide    Tablet 20 milliGRAM(s) Oral daily  losartan 25 milliGRAM(s) Oral daily  nystatin Powder 1 Application(s) Topical two times a day  polyethylene glycol 3350 17 Gram(s) Oral daily  remdesivir  IVPB   IV Intermittent   remdesivir  IVPB 100 milliGRAM(s) IV Intermittent every 24 hours  senna 2 Tablet(s) Oral at bedtime    MEDICATIONS  (PRN):  acetaminophen     Tablet .. 650 milliGRAM(s) Oral every 6 hours PRN Mild Pain (1 - 3), Moderate Pain (4 - 6)  aluminum hydroxide/magnesium hydroxide/simethicone Suspension 30 milliLiter(s) Oral every 4 hours PRN Dyspepsia  guaiFENesin Oral Liquid (Sugar-Free) 100 milliGRAM(s) Oral every 6 hours PRN Cough  melatonin 3 milliGRAM(s) Oral at bedtime PRN Insomnia  ondansetron Injectable 4 milliGRAM(s) IV Push every 8 hours PRN Nausea and/or Vomiting

## 2025-03-26 NOTE — DIETITIAN INITIAL EVALUATION ADULT - PERTINENT LABORATORY DATA
03-26    133[L]  |  96  |  17  ----------------------------<  155[H]  4.5   |  36[H]  |  0.85    Ca    8.3[L]      26 Mar 2025 10:10  Mg     2.1     03-25    TPro  7.6  /  Alb  2.9[L]  /  TBili  0.2  /  DBili  0.1  /  AST  27  /  ALT  22  /  AlkPhos  80  03-25

## 2025-03-26 NOTE — DIETITIAN NUTRITION RISK NOTIFICATION - TREATMENT: THE FOLLOWING DIET HAS BEEN RECOMMENDED
Diet, Regular:   DASH/TLC {Sodium & Cholesterol Restricted}  2000mL Fluid Restriction ([id number]) (03-19-25 @ 17:26) [Active]

## 2025-03-26 NOTE — DIETITIAN INITIAL EVALUATION ADULT - NSPROEDAREADYLEARN_GEN_A_NUR
ENDOCRINE MEDICAL ASSISTANT ROOMING NOTE  Is the patient here for diabetes mellitus: NO     Rooming documentation of social history includes tobacco screening only.  Medication list reviewed and updated.    PCP: Clara Calles MD    Patient would like communication of their results via: [facility]    Refills Needed: Doreen Garza RN   acuteness of illness

## 2025-03-26 NOTE — PROGRESS NOTE ADULT - ASSESSMENT
: 79 year old F with PMHx HTN, lymphedema, b/l knee replacements, pulmonary HTN admitted to hospital for RLE wounds and deconditioning. CT Chest significant for "Right lower lobe subpleural nodule, 0.4 cm." Pulmonary consulted for further evaluation.     Dx: subpleural nodule on CT; chronic hypercapnic respiratory failure    Recs:   - on 2L NC with stable Spo2.   - + COVID - remdesivir/decadron   - cough suppressant prn   - duonebs PRN for cough   - - VBG with stable chronic hypercapnia likely in setting of obesity hypoventilation syndrome/ASHISH  - will need to follow up outpatient for sleep study likely will need NIV at night.   - subpleural nodule at 0.4cm not of any concern at this time, does not need specific follow up in this patient without significant risk factors.       Discussed with Dr. Friend

## 2025-03-27 LAB
ALBUMIN SERPL ELPH-MCNC: 2.6 G/DL — LOW (ref 3.3–5)
ALP SERPL-CCNC: 69 U/L — SIGNIFICANT CHANGE UP (ref 40–120)
ALT FLD-CCNC: 21 U/L — SIGNIFICANT CHANGE UP (ref 12–78)
ANION GAP SERPL CALC-SCNC: 2 MMOL/L — LOW (ref 5–17)
AST SERPL-CCNC: 17 U/L — SIGNIFICANT CHANGE UP (ref 15–37)
BASE EXCESS BLDA CALC-SCNC: 6.2 MMOL/L — HIGH (ref -2–3)
BASE EXCESS BLDA CALC-SCNC: 9.7 MMOL/L — HIGH (ref -2–3)
BILIRUB SERPL-MCNC: 0.2 MG/DL — SIGNIFICANT CHANGE UP (ref 0.2–1.2)
BLOOD GAS COMMENTS ARTERIAL: SIGNIFICANT CHANGE UP
BLOOD GAS COMMENTS ARTERIAL: SIGNIFICANT CHANGE UP
BUN SERPL-MCNC: 36 MG/DL — HIGH (ref 7–23)
CALCIUM SERPL-MCNC: 8.2 MG/DL — LOW (ref 8.5–10.1)
CHLORIDE SERPL-SCNC: 99 MMOL/L — SIGNIFICANT CHANGE UP (ref 96–108)
CO2 BLDA-SCNC: 40 MMOL/L — HIGH (ref 19–24)
CO2 BLDA-SCNC: 43 MMOL/L — HIGH (ref 19–24)
CO2 SERPL-SCNC: 35 MMOL/L — HIGH (ref 22–31)
CREAT SERPL-MCNC: 1.84 MG/DL — HIGH (ref 0.5–1.3)
EGFR: 28 ML/MIN/1.73M2 — LOW
EGFR: 28 ML/MIN/1.73M2 — LOW
GAS PNL BLDA: SIGNIFICANT CHANGE UP
GAS PNL BLDA: SIGNIFICANT CHANGE UP
GLUCOSE SERPL-MCNC: 114 MG/DL — HIGH (ref 70–99)
HCO3 BLDA-SCNC: 38 MMOL/L — HIGH (ref 21–28)
HCO3 BLDA-SCNC: 40 MMOL/L — HIGH (ref 21–28)
HCT VFR BLD CALC: 37.8 % — SIGNIFICANT CHANGE UP (ref 34.5–45)
HGB BLD-MCNC: 11 G/DL — LOW (ref 11.5–15.5)
HOROWITZ INDEX BLDA+IHG-RTO: 25 — SIGNIFICANT CHANGE UP
HOROWITZ INDEX BLDA+IHG-RTO: 28 — SIGNIFICANT CHANGE UP
MAGNESIUM SERPL-MCNC: 2.3 MG/DL — SIGNIFICANT CHANGE UP (ref 1.6–2.6)
MCHC RBC-ENTMCNC: 27.5 PG — SIGNIFICANT CHANGE UP (ref 27–34)
MCHC RBC-ENTMCNC: 29.1 G/DL — LOW (ref 32–36)
MCV RBC AUTO: 94.5 FL — SIGNIFICANT CHANGE UP (ref 80–100)
NRBC BLD AUTO-RTO: 0 /100 WBCS — SIGNIFICANT CHANGE UP (ref 0–0)
PCO2 BLDA: 116 MMHG — SIGNIFICANT CHANGE UP (ref 32–46)
PCO2 BLDA: 67 MMHG — HIGH (ref 32–46)
PH BLDA: 7.14 — CRITICAL LOW (ref 7.35–7.45)
PH BLDA: 7.36 — SIGNIFICANT CHANGE UP (ref 7.35–7.45)
PHOSPHATE SERPL-MCNC: 5.7 MG/DL — HIGH (ref 2.5–4.5)
PLATELET # BLD AUTO: 172 K/UL — SIGNIFICANT CHANGE UP (ref 150–400)
PO2 BLDA: 103 MMHG — SIGNIFICANT CHANGE UP (ref 83–108)
PO2 BLDA: 110 MMHG — HIGH (ref 83–108)
POTASSIUM SERPL-MCNC: 4.9 MMOL/L — SIGNIFICANT CHANGE UP (ref 3.5–5.3)
POTASSIUM SERPL-SCNC: 4.9 MMOL/L — SIGNIFICANT CHANGE UP (ref 3.5–5.3)
PROT SERPL-MCNC: 7 GM/DL — SIGNIFICANT CHANGE UP (ref 6–8.3)
RBC # BLD: 4 M/UL — SIGNIFICANT CHANGE UP (ref 3.8–5.2)
RBC # FLD: 15.3 % — HIGH (ref 10.3–14.5)
SAO2 % BLDA: 99.7 % — HIGH (ref 94–98)
SAO2 % BLDA: 99.8 % — HIGH (ref 94–98)
SODIUM SERPL-SCNC: 136 MMOL/L — SIGNIFICANT CHANGE UP (ref 135–145)
WBC # BLD: 3.77 K/UL — LOW (ref 3.8–10.5)
WBC # FLD AUTO: 3.77 K/UL — LOW (ref 3.8–10.5)

## 2025-03-27 PROCEDURE — 99497 ADVNCD CARE PLAN 30 MIN: CPT | Mod: 25

## 2025-03-27 PROCEDURE — 99232 SBSQ HOSP IP/OBS MODERATE 35: CPT

## 2025-03-27 PROCEDURE — 99233 SBSQ HOSP IP/OBS HIGH 50: CPT

## 2025-03-27 RX ADMIN — NYSTATIN 1 APPLICATION(S): 100000 CREAM TOPICAL at 18:39

## 2025-03-27 RX ADMIN — REMDESIVIR 200 MILLIGRAM(S): 5 INJECTION INTRAVENOUS at 20:31

## 2025-03-27 RX ADMIN — LOSARTAN POTASSIUM 25 MILLIGRAM(S): 100 TABLET, FILM COATED ORAL at 06:47

## 2025-03-27 RX ADMIN — DEXAMETHASONE 6 MILLIGRAM(S): 0.5 TABLET ORAL at 06:47

## 2025-03-27 RX ADMIN — FUROSEMIDE 20 MILLIGRAM(S): 10 INJECTION INTRAMUSCULAR; INTRAVENOUS at 06:47

## 2025-03-27 RX ADMIN — NYSTATIN 1 APPLICATION(S): 100000 CREAM TOPICAL at 05:11

## 2025-03-27 RX ADMIN — ENOXAPARIN SODIUM 40 MILLIGRAM(S): 100 INJECTION SUBCUTANEOUS at 06:47

## 2025-03-27 RX ADMIN — ENOXAPARIN SODIUM 40 MILLIGRAM(S): 100 INJECTION SUBCUTANEOUS at 18:39

## 2025-03-27 NOTE — PROGRESS NOTE ADULT - SUBJECTIVE AND OBJECTIVE BOX
Hospitalist Daily Progress Note     *SUBJECTIVE*    Patient seen and examined bedside.   no overnight events   SpO2 3L 96%     REVIEW OF SYSTEMS: remaining ROS negative     *OBJECTIVE*    PHYSICAL EXAM:  Vital Signs Last 24 Hrs  T(C): 36.6 (27 Mar 2025 10:51), Max: 36.6 (27 Mar 2025 00:11)  T(F): 97.9 (27 Mar 2025 10:51), Max: 97.9 (27 Mar 2025 00:11)  HR: 76 (27 Mar 2025 12:05) (72 - 82)  BP: 102/63 (27 Mar 2025 12:05) (98/65 - 108/69)  BP(mean): --  RR: 18 (27 Mar 2025 10:51) (17 - 19)  SpO2: 94% (27 Mar 2025 12:05) (93% - 96%)  Parameters below as of 27 Mar 2025 12:05  Patient On (Oxygen Delivery Method): nasal cannula  O2 Flow (L/min): 2    GENERAL: NAD , no increased WOB, speaking in full sentences , clinically nontoxic appearing   HEAD:  Atraumatic, Normocephalic  EYES: EOMI, PERRLA, conjunctiva and sclera clear  ENMT: No tonsillar erythema, exudates, or enlargement;   NECK: Supple, No JVD  NERVOUS SYSTEM:  Alert & Oriented X3, Good concentration; nonfocal   CHEST/LUNG: CTAB;  No rales, rhonchi, wheezing, or rubs  HEART: Regular rate and rhythm; No murmurs, rubs, or gallops  ABDOMEN: Soft, Nontender, Nondistended; Bowel sounds present  EXTREMITIES:  2+ Peripheral Pulses b/l,  b/l legs swollen and consistent with lymphedema, R. posterior thigh and R. anterior calf with open wounds - both without surrounding erythema or purulent drainage (not new as per pt, follows wound care as an outpatient for past 7 years)    OBJECTIVE DATA:                                        12.3   5.32  )-----------( 128      ( 25 Mar 2025 20:03 )             40.8   03-26    133[L]  |  96  |  17  ----------------------------<  155[H]  4.5   |  36[H]  |  0.85    Ca    8.3[L]      26 Mar 2025 10:10  Mg     2.1     03-25    TPro  7.6  /  Alb  2.9[L]  /  TBili  0.2  /  DBili  0.1  /  AST  27  /  ALT  22  /  AlkPhos  80  03-25        Consultant(s) Notes Reviewed [x ] Yes     Care Discussed with [x ] Consultants  [x ] Patient  [ ] Family  [ x] /   [ x] Other; RN  Care plan and all findings were discussed in detail with patient.  All questions and concerns addressed

## 2025-03-27 NOTE — PROGRESS NOTE ADULT - SUBJECTIVE AND OBJECTIVE BOX
Interval Events: NAEO. Today patient increasingly more lethargic. ABG with CO2 116 placed on AVAPS.     REVIEW OF SYSTEMS:  Constitutional: [ ] negative [ ] fevers [ ] chills [ ] weight loss [ ] weight gain  HEENT: [ ] negative [ ] dry eyes [ ] eye irritation [ ] postnasal drip [ ] nasal congestion  CV: [ ] negative  [ ] chest pain [ ] orthopnea [ ] palpitations [ ] murmur  Resp: [ ] negative [ ] cough [ ] shortness of breath [ ] dyspnea [ ] wheezing [ ] sputum [ ] hemoptysis  GI: [ ] negative [ ] nausea [ ] vomiting [ ] diarrhea [ ] constipation [ ] abd pain [ ] dysphagia   : [ ] negative [ ] dysuria [ ] nocturia [ ] hematuria [ ] increased urinary frequency  Musculoskeletal: [ ] negative [ ] back pain [ ] myalgias [ ] arthralgias [ ] fracture  Skin: [ ] negative [ ] rash [ ] itch  Neurological: [ ] negative [ ] headache [ ] dizziness [ ] syncope [ ] weakness [ ] numbness  Psychiatric: [ ] negative [ ] anxiety [ ] depression  Endocrine: [ ] negative [ ] diabetes [ ] thyroid problem  Hematologic/Lymphatic: [ ] negative [ ] anemia [ ] bleeding problem  Allergic/Immunologic: [ ] negative [ ] itchy eyes [ ] nasal discharge [ ] hives [ ] angioedema  [] All other systems negative  [ x] Unable to assess ROS because AMS    OBJECTIVE:  ICU Vital Signs Last 24 Hrs  T(C): 36.8 (27 Mar 2025 16:11), Max: 36.8 (27 Mar 2025 16:11)  T(F): 98.3 (27 Mar 2025 16:11), Max: 98.3 (27 Mar 2025 16:11)  HR: 83 (27 Mar 2025 16:11) (72 - 83)  BP: 122/69 (27 Mar 2025 16:11) (98/65 - 122/69)  BP(mean): --  ABP: --  ABP(mean): --  RR: 18 (27 Mar 2025 16:11) (17 - 19)  SpO2: 95% (27 Mar 2025 16:11) (93% - 96%)    O2 Parameters below as of 27 Mar 2025 16:11  Patient On (Oxygen Delivery Method): nasal cannula  O2 Flow (L/min): 2            03-26 @ 07:01  -  03-27 @ 07:00  --------------------------------------------------------  IN: 0 mL / OUT: 1300 mL / NET: -1300 mL      CAPILLARY BLOOD GLUCOSE          PHYSICAL EXAM:  GENERAL: NAD, lying in bed comfortably, arousable to voice, answers some questions   HEART: Regular rate and rhythm, no murmurs, rubs, or gallops  LUNGS: Unlabored respirations.  Clear to auscultation bilaterally, no crackles, wheezing, or rhonchi; on bipap  ABDOMEN: Soft, nontender, nondistended,  EXTREMITIES:  + b/l [facility] MEDICATIONS:  enoxaparin Injectable 40 milliGRAM(s) SubCutaneous every 12 hours    remdesivir  IVPB   IV Intermittent   remdesivir  IVPB 100 milliGRAM(s) IV Intermittent every 24 hours    furosemide    Tablet 20 milliGRAM(s) Oral daily  losartan 25 milliGRAM(s) Oral daily    dexAMETHasone  Injectable 6 milliGRAM(s) IV Push daily    guaiFENesin Oral Liquid (Sugar-Free) 100 milliGRAM(s) Oral every 6 hours PRN    acetaminophen     Tablet .. 650 milliGRAM(s) Oral every 6 hours PRN  melatonin 3 milliGRAM(s) Oral at bedtime PRN  ondansetron Injectable 4 milliGRAM(s) IV Push every 8 hours PRN    aluminum hydroxide/magnesium hydroxide/simethicone Suspension 30 milliLiter(s) Oral every 4 hours PRN  polyethylene glycol 3350 17 Gram(s) Oral daily  senna 2 Tablet(s) Oral at bedtime            nystatin Powder 1 Application(s) Topical two times a day        LABS:                        12.3   5.32  )-----------( 128      ( 25 Mar 2025 20:03 )             40.8     Hgb Trend: 12.3<--, 11.9<--  03-26    133[L]  |  96  |  17  ----------------------------<  155[H]  4.5   |  36[H]  |  0.85    Ca    8.3[L]      26 Mar 2025 10:10  Mg     2.1     03-25    TPro  7.6  /  Alb  2.9[L]  /  TBili  0.2  /  DBili  0.1  /  AST  27  /  ALT  22  /  AlkPhos  80  03-25    Creatinine Trend: 0.85<--, 0.78<--, 0.88<--, 0.89<--    Urinalysis Basic - ( 26 Mar 2025 10:10 )    Color: x / Appearance: x / SG: x / pH: x  Gluc: 155 mg/dL / Ketone: x  / Bili: x / Urobili: x   Blood: x / Protein: x / Nitrite: x   Leuk Esterase: x / RBC: x / WBC x   Sq Epi: x / Non Sq Epi: x / Bacteria: x      Arterial Blood Gas:  03-27 @ 16:37  7.14/116/103/40/99.8/6.2  ABG lactate: --             Interval Events: NAEO. Today patient increasingly more lethargic. ABG with CO2 116 placed on AVAPS.     REVIEW OF SYSTEMS:  Constitutional: [ ] negative [ ] fevers [ ] chills [ ] weight loss [ ] weight gain  HEENT: [ ] negative [ ] dry eyes [ ] eye irritation [ ] postnasal drip [ ] nasal congestion  CV: [ ] negative  [ ] chest pain [ ] orthopnea [ ] palpitations [ ] murmur  Resp: [ ] negative [ ] cough [ ] shortness of breath [ ] dyspnea [ ] wheezing [ ] sputum [ ] hemoptysis  GI: [ ] negative [ ] nausea [ ] vomiting [ ] diarrhea [ ] constipation [ ] abd pain [ ] dysphagia   : [ ] negative [ ] dysuria [ ] nocturia [ ] hematuria [ ] increased urinary frequency  Musculoskeletal: [ ] negative [ ] back pain [ ] myalgias [ ] arthralgias [ ] fracture  Skin: [ ] negative [ ] rash [ ] itch  Neurological: [ ] negative [ ] headache [ ] dizziness [ ] syncope [ ] weakness [ ] numbness  Psychiatric: [ ] negative [ ] anxiety [ ] depression  Endocrine: [ ] negative [ ] diabetes [ ] thyroid problem  Hematologic/Lymphatic: [ ] negative [ ] anemia [ ] bleeding problem  Allergic/Immunologic: [ ] negative [ ] itchy eyes [ ] nasal discharge [ ] hives [ ] angioedema  [] All other systems negative  [ x] Unable to assess ROS because AMS    OBJECTIVE:  Vital Signs Last 24 Hrs  T(C): 36.8 (27 Mar 2025 16:11), Max: 36.8 (27 Mar 2025 16:11)  T(F): 98.3 (27 Mar 2025 16:11), Max: 98.3 (27 Mar 2025 16:11)  HR: 83 (27 Mar 2025 16:11) (72 - 83)  BP: 122/69 (27 Mar 2025 16:11) (98/65 - 122/69)  RR: 18 (27 Mar 2025 16:11) (17 - 19)  SpO2: 95% (27 Mar 2025 16:11) (93% - 96%)    O2 Parameters below as of 27 Mar 2025 16:11  Patient On (Oxygen Delivery Method): nasal cannula  O2 Flow (L/min): 2            03-26 @ 07:01  -  03-27 @ 07:00  --------------------------------------------------------  IN: 0 mL / OUT: 1300 mL / NET: -1300 mL            PHYSICAL EXAM:  GENERAL: NAD, obese, lethargic, lying in bed comfortably, arousable to voice, answers some questions   HEART: Regular rate and rhythm, no murmurs, rubs, or gallops  LUNGS: Unlabored respirations.  Clear to auscultation bilaterally, no crackles, wheezing, or rhonchi  ABDOMEN: Soft, nontender, nondistended,  EXTREMITIES:  + b/l LE edema  NEURO: lethargic but arousable    HOSPITAL MEDICATIONS:  enoxaparin Injectable 40 milliGRAM(s) SubCutaneous every 12 hours    remdesivir  IVPB   IV Intermittent   remdesivir  IVPB 100 milliGRAM(s) IV Intermittent every 24 hours    furosemide    Tablet 20 milliGRAM(s) Oral daily  losartan 25 milliGRAM(s) Oral daily    dexAMETHasone  Injectable 6 milliGRAM(s) IV Push daily    guaiFENesin Oral Liquid (Sugar-Free) 100 milliGRAM(s) Oral every 6 hours PRN    acetaminophen     Tablet .. 650 milliGRAM(s) Oral every 6 hours PRN  melatonin 3 milliGRAM(s) Oral at bedtime PRN  ondansetron Injectable 4 milliGRAM(s) IV Push every 8 hours PRN    aluminum hydroxide/magnesium hydroxide/simethicone Suspension 30 milliLiter(s) Oral every 4 hours PRN  polyethylene glycol 3350 17 Gram(s) Oral daily  senna 2 Tablet(s) Oral at bedtime            nystatin Powder 1 Application(s) Topical two times a day          LABS:                        12.3   5.32  )-----------( 128      ( 25 Mar 2025 20:03 )             40.8     Hgb Trend: 12.3<--, 11.9<--  03-26    133[L]  |  96  |  17  ----------------------------<  155[H]  4.5   |  36[H]  |  0.85    Ca    8.3[L]      26 Mar 2025 10:10  Mg     2.1     03-25    TPro  7.6  /  Alb  2.9[L]  /  TBili  0.2  /  DBili  0.1  /  AST  27  /  ALT  22  /  AlkPhos  80  03-25    Creatinine Trend: 0.85<--, 0.78<--, 0.88<--, 0.89<--          Arterial Blood Gas:  03-27 @ 16:37  7.14/116/103/40/99.8/6.2

## 2025-03-27 NOTE — PROGRESS NOTE ADULT - ASSESSMENT
: 79 year old F with PMHx HTN, lymphedema, b/l knee replacements, pulmonary HTN admitted to hospital for RLE wounds and deconditioning. CT Chest significant for "Right lower lobe subpleural nodule, 0.4 cm." Pulmonary consulted for further evaluation.     Dx: subpleural nodule on CT; chronic hypercapnic respiratory failure    Recs:   - placed on AVAPs for hypercapnic respiratory failure   - ABG 7.14/116/103/40  - lethargic but arousable to voice and answers some questions at this time   - would get repeat ABG in 1-2 hours on AVAPs and reassess mental status for improvement.   - spoke with daughter at bedside - ok with intubation if needed.   + COVID - remdesivir/decadron   - cough suppressant prn   - duonebs PRN for cough   - - VBG with stable chronic hypercapnia likely in setting of obesity hypoventilation syndrome/ASHISH  - will need to follow up outpatient for sleep study likely will need NIV at night.   - subpleural nodule at 0.4cm not of any concern at this time, does not need specific follow up in this patient without significant risk factors.       Discussed with Dr. Friend

## 2025-03-27 NOTE — PROGRESS NOTE ADULT - ASSESSMENT
79 year old female with PMHx of HTN and lymphedema c/b RLE wounds who presented to the ED on 3/19/25 for complaints of difficulty ambulating and admitted for physical deconditioning.    + COVID 3/25/25  continue with remdesivir and decadron   Precautions per protocol, ideally airborne and contact in negative pressure room. Supplemental oxygen as required to maintain adequate saturation. Vigilance for secondary infection and other superimposed events. Monitor inflammatory markers and lab data. DVT prophylaxis per protocol. Prognosis guarded. Encourage use of incentive spirometer.  proning as tolerated     ##Physical deconditioning  #Lymphedema  #Inability to walk  Reports difficulty ambulating due to leg swelling x couple months, started on diuretic by wound care physician without improvement in swelling. Has been walking "baby steps," states she has to take steps with her left foot and then drag her right foot  - Vascular/WC eval -> Discussed with her primary wound care/vascular Dr. Montoya who has evaluated patient this admission -> local wound cover, cleanse daily with saline and cover with dry sterile gauze  - C/w good wound care   - PT/OT eval -> [facility]  - Fall precautions     ##Acute hypoxic resp failure   #PulmHTN  ##Elevated pro-BNP  #Hypercarbia, likely chronic   Denies shortness of breath, orthopnea, paroxysmal nocturnal dyspnea, or bendopnea  Pro-BNP 1820 on admission, VBG 7.34 / 76 / 29 / 41, COVID/influenza/RSV negative  CXR with cardiomegaly with pulmonary venous congestion however may be slightly overestimated in the setting of a shallow inspiration with relatively low lung volumes, bulky gabbie b/l, disproportionate to the central pulmonary venous congestion and raises the possibility of b/l hilar lymphadenopathy  CTA chest  without main or lobar pulmonary embolism but with limited evaluation of the segmental and subsegmental branches. Echo reviewed -> PulmHTN, elevated right atrial pressures. Query ASHISH vs OHS etiology. 03/25-> Hypoxic and coughing this morning.   - Titrate o2 as tolerated   - Outpatient Pulm followup for Pulm HTNPFTs, Sleep study. Patient aware.     #Elevated troponin, suspect demand ischemia  EKG without signs of ischemia. Troponin flat, No CP.     #RLL subpleural nodule, incidental finding  Measures 0.4 cm on CTA chest. Outpatient followup     #HTN, uncontrolled: BP as elevated as 179/80 on admission, PTA losartan 25 mg and furosemide 20 mg    Preventative Measures   DVT prophylaxis: lovenox   Dispo:  [facility]   Code Status: FC

## 2025-03-27 NOTE — CHART NOTE - NSCHARTNOTEFT_GEN_A_CORE
Notified by RN that patient's daughter is reporting new change in mental status. Patient evaluated at beside in NAD, A&Ox1 to self. Per daughter, patient was A&Ox3 prior, unsure when mental status changed as she just arrived to hospital to see patient.    Vital Signs Last 24 Hrs  T(C): 36.8 (27 Mar 2025 16:11), Max: 36.8 (27 Mar 2025 16:11)  T(F): 98.3 (27 Mar 2025 16:11), Max: 98.3 (27 Mar 2025 16:11)  HR: 83 (27 Mar 2025 16:11) (72 - 83)  BP: 122/69 (27 Mar 2025 16:11) (98/65 - 122/69)  BP(mean): --  RR: 18 (27 Mar 2025 16:11) (17 - 19)  SpO2: 95% (27 Mar 2025 16:11) (93% - 96%)    Parameters below as of 27 Mar 2025 16:11  Patient On (Oxygen Delivery Method): nasal cannula  O2 Flow (L/min): 2    Neuro exam showing smile midline, no tongue deviation, B/l UE strength 5/5, b/l LE strength 5/5, sensation intact.    Plan:   - Stat CBC, CMP, mag, phos, abg  - CT Head  - Plan discussed with Dr. Roldan Notified by RN that patient's daughter is reporting new change in mental status. Patient evaluated at beside in NAD, A&Ox1 to self. Per daughter, patient was A&Ox3 prior, unsure when mental status changed as she just arrived to hospital to see patient.    Vital Signs Last 24 Hrs  T(C): 36.8 (27 Mar 2025 16:11), Max: 36.8 (27 Mar 2025 16:11)  T(F): 98.3 (27 Mar 2025 16:11), Max: 98.3 (27 Mar 2025 16:11)  HR: 83 (27 Mar 2025 16:11) (72 - 83)  BP: 122/69 (27 Mar 2025 16:11) (98/65 - 122/69)  BP(mean): --  RR: 18 (27 Mar 2025 16:11) (17 - 19)  SpO2: 95% (27 Mar 2025 16:11) (93% - 96%)    Parameters below as of 27 Mar 2025 16:11  Patient On (Oxygen Delivery Method): nasal cannula  O2 Flow (L/min): 2    Neuro exam showing smile midline, no tongue deviation, B/l UE strength 5/5, b/l LE strength 5/5, sensation intact.    Plan:   - Stat CBC, CMP, mag, phos, abg  - CT Head  - Plan discussed with Dr. Roldan      Addendum:  Follow up abg showing respiratory acidosis with pH 7.14 and pCO2 116, pO2 103, HCO3 39.5. Bipap initiated, npo while on bipap, transfer to [facility] for continuos pulse ox monitoring. Pulm notified, will evaluate patient.  Per attending, defer CT Head at this time. If no improvement after bipap, will obtain CT Head.

## 2025-03-27 NOTE — PROGRESS NOTE ADULT - SUBJECTIVE AND OBJECTIVE BOX
Interval Events: NAEO. Remains on 2L NC.     REVIEW OF SYSTEMS:  Constitutional: [ ] negative [ ] fevers [ ] chills [ ] weight loss [ ] weight gain  HEENT: [ ] negative [ ] dry eyes [ ] eye irritation [ ] postnasal drip [ ] nasal congestion  CV: [ ] negative  [ ] chest pain [ ] orthopnea [ ] palpitations [ ] murmur  Resp: [ ] negative [ ] cough [ ] shortness of breath [ ] dyspnea [ ] wheezing [ ] sputum [ ] hemoptysis  GI: [ ] negative [ ] nausea [ ] vomiting [ ] diarrhea [ ] constipation [ ] abd pain [ ] dysphagia   : [ ] negative [ ] dysuria [ ] nocturia [ ] hematuria [ ] increased urinary frequency  Musculoskeletal: [ ] negative [ ] back pain [ ] myalgias [ ] arthralgias [ ] fracture  Skin: [ ] negative [ ] rash [ ] itch  Neurological: [ ] negative [ ] headache [ ] dizziness [ ] syncope [ ] weakness [ ] numbness  Psychiatric: [ ] negative [ ] anxiety [ ] depression  Endocrine: [ ] negative [ ] diabetes [ ] thyroid problem  Hematologic/Lymphatic: [ ] negative [ ] anemia [ ] bleeding problem  Allergic/Immunologic: [ ] negative [ ] itchy eyes [ ] nasal discharge [ ] hives [ ] angioedema  [x] All other systems negative  [ ] Unable to assess ROS because ________    OBJECTIVE:  ICU Vital Signs Last 24 Hrs  T(C): 36.5 (27 Mar 2025 05:25), Max: 36.6 (27 Mar 2025 00:11)  T(F): 97.7 (27 Mar 2025 05:25), Max: 97.9 (27 Mar 2025 00:11)  HR: 72 (27 Mar 2025 05:25) (72 - 82)  BP: 103/58 (27 Mar 2025 05:25) (95/53 - 108/69)  BP(mean): --  ABP: --  ABP(mean): --  RR: 17 (27 Mar 2025 05:25) (17 - 19)  SpO2: 96% (27 Mar 2025 05:25) (93% - 96%)    O2 Parameters below as of 27 Mar 2025 05:25  Patient On (Oxygen Delivery Method): nasal cannula  O2 Flow (L/min): 2            03-26 @ 07:01  -  03-27 @ 07:00  --------------------------------------------------------  IN: 0 mL / OUT: 1300 mL / NET: -1300 mL      CAPILLARY BLOOD GLUCOSE          PHYSICAL EXAM:  GENERAL: NAD, lying in bed comfortably  HEART: Regular rate and rhythm, no murmurs, rubs, or gallops  LUNGS: Unlabored respirations. + mild expiratory crackles   ABDOMEN: Soft, nontender, nondistended  EXTREMITIES: + b/l LE edema; + dependent sacral edema   NERVOUS SYSTEM:  A&Ox3, moving all extremities, no focal deficits   SKIN: No rashes or lesions    HOSPITAL MEDICATIONS:  enoxaparin Injectable 40 milliGRAM(s) SubCutaneous every 12 hours    remdesivir  IVPB   IV Intermittent   remdesivir  IVPB 100 milliGRAM(s) IV Intermittent every 24 hours    furosemide    Tablet 20 milliGRAM(s) Oral daily  losartan 25 milliGRAM(s) Oral daily    dexAMETHasone  Injectable 6 milliGRAM(s) IV Push daily    guaiFENesin Oral Liquid (Sugar-Free) 100 milliGRAM(s) Oral every 6 hours PRN    acetaminophen     Tablet .. 650 milliGRAM(s) Oral every 6 hours PRN  melatonin 3 milliGRAM(s) Oral at bedtime PRN  ondansetron Injectable 4 milliGRAM(s) IV Push every 8 hours PRN    aluminum hydroxide/magnesium hydroxide/simethicone Suspension 30 milliLiter(s) Oral every 4 hours PRN  polyethylene glycol 3350 17 Gram(s) Oral daily  senna 2 Tablet(s) Oral at bedtime            nystatin Powder 1 Application(s) Topical two times a day        LABS:                        12.3   5.32  )-----------( 128      ( 25 Mar 2025 20:03 )             40.8     Hgb Trend: 12.3<--, 11.9<--, 11.3<--  03-26    133[L]  |  96  |  17  ----------------------------<  155[H]  4.5   |  36[H]  |  0.85    Ca    8.3[L]      26 Mar 2025 10:10  Mg     2.1     03-25    TPro  7.6  /  Alb  2.9[L]  /  TBili  0.2  /  DBili  0.1  /  AST  27  /  ALT  22  /  AlkPhos  80  03-25    Creatinine Trend: 0.85<--, 0.78<--, 0.88<--, 0.89<--    Urinalysis Basic - ( 26 Mar 2025 10:10 )    Color: x / Appearance: x / SG: x / pH: x  Gluc: 155 mg/dL / Ketone: x  / Bili: x / Urobili: x   Blood: x / Protein: x / Nitrite: x   Leuk Esterase: x / RBC: x / WBC x   Sq Epi: x / Non Sq Epi: x / Bacteria: x

## 2025-03-27 NOTE — PROGRESS NOTE ADULT - CONVERSATION DETAILS
pt with worsening hypercapneic respiratory failure with change in mental status, increased lethargy    d/w daughter at bedside advanced directives    pt remains full code    trial of NIV and if no improvement in mental status or respiratory acidosis family amendable for intubation if needed    pt is full code

## 2025-03-28 LAB
BASE EXCESS BLDA CALC-SCNC: 10.9 MMOL/L — HIGH (ref -2–3)
BLOOD GAS COMMENTS ARTERIAL: SIGNIFICANT CHANGE UP
CO2 BLDA-SCNC: 42 MMOL/L — HIGH (ref 19–24)
GAS PNL BLDA: SIGNIFICANT CHANGE UP
HCO3 BLDA-SCNC: 40 MMOL/L — HIGH (ref 21–28)
HOROWITZ INDEX BLDA+IHG-RTO: 25 — SIGNIFICANT CHANGE UP
PCO2 BLDA: 74 MMHG — CRITICAL HIGH (ref 32–46)
PH BLDA: 7.34 — LOW (ref 7.35–7.45)
PO2 BLDA: 67 MMHG — LOW (ref 83–108)
SAO2 % BLDA: 95.6 % — SIGNIFICANT CHANGE UP (ref 94–98)

## 2025-03-28 PROCEDURE — 99497 ADVNCD CARE PLAN 30 MIN: CPT

## 2025-03-28 PROCEDURE — 70450 CT HEAD/BRAIN W/O DYE: CPT | Mod: 26

## 2025-03-28 PROCEDURE — 99233 SBSQ HOSP IP/OBS HIGH 50: CPT

## 2025-03-28 PROCEDURE — 99232 SBSQ HOSP IP/OBS MODERATE 35: CPT

## 2025-03-28 RX ORDER — SODIUM CHLORIDE 9 G/1000ML
1000 INJECTION, SOLUTION INTRAVENOUS
Refills: 0 | Status: DISCONTINUED | OUTPATIENT
Start: 2025-03-28 | End: 2025-04-01

## 2025-03-28 RX ADMIN — DEXAMETHASONE 6 MILLIGRAM(S): 0.5 TABLET ORAL at 06:05

## 2025-03-28 RX ADMIN — Medication 650 MILLIGRAM(S): at 12:38

## 2025-03-28 RX ADMIN — Medication 2 TABLET(S): at 21:11

## 2025-03-28 RX ADMIN — Medication 650 MILLIGRAM(S): at 13:30

## 2025-03-28 RX ADMIN — ENOXAPARIN SODIUM 40 MILLIGRAM(S): 100 INJECTION SUBCUTANEOUS at 06:05

## 2025-03-28 RX ADMIN — NYSTATIN 1 APPLICATION(S): 100000 CREAM TOPICAL at 18:24

## 2025-03-28 RX ADMIN — POLYETHYLENE GLYCOL 3350 17 GRAM(S): 17 POWDER, FOR SOLUTION ORAL at 11:12

## 2025-03-28 RX ADMIN — NYSTATIN 1 APPLICATION(S): 100000 CREAM TOPICAL at 06:05

## 2025-03-28 RX ADMIN — ENOXAPARIN SODIUM 40 MILLIGRAM(S): 100 INJECTION SUBCUTANEOUS at 18:24

## 2025-03-28 RX ADMIN — SODIUM CHLORIDE 65 MILLILITER(S): 9 INJECTION, SOLUTION INTRAVENOUS at 19:48

## 2025-03-28 RX ADMIN — SODIUM CHLORIDE 65 MILLILITER(S): 9 INJECTION, SOLUTION INTRAVENOUS at 11:09

## 2025-03-28 RX ADMIN — REMDESIVIR 200 MILLIGRAM(S): 5 INJECTION INTRAVENOUS at 19:44

## 2025-03-28 NOTE — GOALS OF CARE CONVERSATION - ADVANCED CARE PLANNING - CONVERSATION DETAILS
Code status is full code. Would want chest compressions and intubation if needed. Wants all life-sustaining measures. No limitations on care at this time. . Would want daughter, Cassia Alonzo ([phone]) to make her medical decisions for her if she were to become unable to do so on her own.
Spoke with patient and miranda Antony regarding HCP and plan of care given improvement of mental status following NIV overnight. Cassia says her Mom isnt quite at her normal baseline mental status but is much more like her self. We discussed who Hilda would want to make decisions if something like this were to happen again, and Hilda confirmed she would want Cassia to be her HCP and be primary decision maker. I also reaffirmed discussion that was had last night regarding airway options such as intubation in the event her breathing were to decompensate and require use of a ventilator. Both patient and Cassia confirmed they would be in agreement with intubation and would want all measures done to keep mom alive, but would have to have further discussions about long term care and/or other invasive measures to keep pt alive (trach, peg, etc).

## 2025-03-28 NOTE — PROGRESS NOTE ADULT - SUBJECTIVE AND OBJECTIVE BOX
Hospitalist Daily Progress Note     *SUBJECTIVE*    Patient seen and examined bedside.   no overnight events        REVIEW OF SYSTEMS: remaining ROS negative     *OBJECTIVE*    PHYSICAL EXAM:  Vital Signs Last 24 Hrs  T(C): 36.8 (28 Mar 2025 10:44), Max: 37.2 (27 Mar 2025 20:37)  T(F): 98.2 (28 Mar 2025 10:44), Max: 98.9 (27 Mar 2025 20:37)  HR: 65 (28 Mar 2025 16:22) (64 - 80)  BP: 118/76 (28 Mar 2025 10:44) (107/63 - 118/76)  BP(mean): 60 (28 Mar 2025 04:51) (60 - 60)  RR: 19 (28 Mar 2025 10:44) (19 - 19)  SpO2: 97% (28 Mar 2025 16:22) (93% - 98%)    Parameters below as of 28 Mar 2025 04:51  Patient On (Oxygen Delivery Method): BiPAP/CPAP        GENERAL: NAD , no increased WOB, speaking in full sentences , clinically nontoxic appearing   HEAD:  Atraumatic, Normocephalic  EYES: EOMI, PERRLA, conjunctiva and sclera clear  ENMT: No tonsillar erythema, exudates, or enlargement;   NECK: Supple, No JVD  NERVOUS SYSTEM:  Alert & Oriented X3, Good concentration; nonfocal   CHEST/LUNG: CTAB;  No rales, rhonchi, wheezing, or rubs  HEART: Regular rate and rhythm; No murmurs, rubs, or gallops  ABDOMEN: Soft, Nontender, Nondistended; Bowel sounds present  EXTREMITIES:  2+ Peripheral Pulses b/l,  b/l legs swollen and consistent with lymphedema, R. posterior thigh and R. anterior calf with open wounds - both without surrounding erythema or purulent drainage (not new as per pt, follows wound care as an outpatient for past 7 years)    OBJECTIVE DATA:                                                   11.0   3.77  )-----------( 172      ( 27 Mar 2025 21:00 )             37.8   03-27    136  |  99  |  36[H]  ----------------------------<  114[H]  4.9   |  35[H]  |  1.84[H]    Ca    8.2[L]      27 Mar 2025 21:00  Phos  5.7     03-27  Mg     2.3     03-27    TPro  7.0  /  Alb  2.6[L]  /  TBili  0.2  /  DBili  x   /  AST  17  /  ALT  21  /  AlkPhos  69  03-27          Consultant(s) Notes Reviewed [x ] Yes     Care Discussed with [x ] Consultants  [x ] Patient  [ ] Family  [ x] /   [ x] Other; RN  Care plan and all findings were discussed in detail with patient.  All questions and concerns addressed

## 2025-03-28 NOTE — PROGRESS NOTE ADULT - ASSESSMENT
: 79 year old F with PMHx HTN, lymphedema, b/l knee replacements, pulmonary HTN admitted to hospital for RLE wounds and deconditioning. CT Chest significant for "Right lower lobe subpleural nodule, 0.4 cm." Pulmonary consulted for further evaluation.     Dx: subpleural nodule on CT; chronic hypercapnic respiratory failure    Recs:    Due to covid developed acute on chronic component requiring NOcturnal and PRN NIV.    - placed on AVAPs for hypercapnic respiratory failure   - ABG 7.14/116/103/40 now back to baseline ~7.3 and ~70.   Please record somewhere in the chart the total PIP required to achive her tidal volume when asleep as it may be hard to obtain AVAPS on d/c and would like to see if can be replicated with bipap.  - Seeting adjusted for now to rr 16, epap 10 and i time increased to 0.9sec  + COVID - remdesivir/decadron   - cough suppressant prn   - duonebs PRN for cough   - repeat abg with any change in mental status.

## 2025-03-28 NOTE — PROGRESS NOTE ADULT - SUBJECTIVE AND OBJECTIVE BOX
CHIEF COMPLAINT:    Interval Events:  Started on avaps, improved mental status back to baseline.     REVIEW OF SYSTEMS: Feels much better, no SOB at rest, still stuffy nose. but other wise no complaints. improving cough.   [ x] All other systems negative  [ ] Unable to assess ROS because ________    OBJECTIVE:  ICU Vital Signs Last 24 Hrs  T(C): 36.8 (28 Mar 2025 10:44), Max: 37.2 (27 Mar 2025 20:37)  T(F): 98.2 (28 Mar 2025 10:44), Max: 98.9 (27 Mar 2025 20:37)  HR: 76 (28 Mar 2025 10:44) (64 - 83)  BP: 118/76 (28 Mar 2025 10:44) (107/63 - 122/69)  BP(mean): 60 (28 Mar 2025 04:51) (60 - 60)  ABP: --  ABP(mean): --  RR: 19 (28 Mar 2025 10:44) (18 - 19)  SpO2: 98% (28 Mar 2025 10:44) (93% - 98%)    O2 Parameters below as of 28 Mar 2025 04:51  Patient On (Oxygen Delivery Method): BiPAP/CPAP              03-27 @ 07:01  -  03-28 @ 07:00  --------------------------------------------------------  IN: 0 mL / OUT: 350 mL / NET: -350 mL      CAPILLARY BLOOD GLUCOSE          PHYSICAL EXAM:  GENERAL: NAD, lying in bed comfortably  HEART: Regular rate and rhythm, no murmurs, rubs, or gallops  LUNGS: Unlabored respirations. + mild expiratory crackles   ABDOMEN: Soft, nontender, nondistended  EXTREMITIES: + b/l LE edema; + dependent sacral edema   NERVOUS SYSTEM:  A&Ox3, moving all extremities, no focal deficits   SKIN: No rashes or lesions    HOSPITAL MEDICATIONS:  Standing Meds:  dexAMETHasone  Injectable 6 milliGRAM(s) IV Push daily  enoxaparin Injectable 40 milliGRAM(s) SubCutaneous every 12 hours  lactated ringers. 1000 milliLiter(s) IV Continuous <Continuous>  nystatin Powder 1 Application(s) Topical two times a day  polyethylene glycol 3350 17 Gram(s) Oral daily  remdesivir  IVPB   IV Intermittent   remdesivir  IVPB 100 milliGRAM(s) IV Intermittent every 24 hours  senna 2 Tablet(s) Oral at bedtime      PRN Meds:  acetaminophen     Tablet .. 650 milliGRAM(s) Oral every 6 hours PRN  aluminum hydroxide/magnesium hydroxide/simethicone Suspension 30 milliLiter(s) Oral every 4 hours PRN  guaiFENesin Oral Liquid (Sugar-Free) 100 milliGRAM(s) Oral every 6 hours PRN  melatonin 3 milliGRAM(s) Oral at bedtime PRN  ondansetron Injectable 4 milliGRAM(s) IV Push every 8 hours PRN      LABS:                        11.0   3.77  )-----------( 172      ( 27 Mar 2025 21:00 )             37.8     Hgb Trend: 11.0<--, 12.3<--, 11.9<--  03-27    136  |  99  |  36[H]  ----------------------------<  114[H]  4.9   |  35[H]  |  1.84[H]    Ca    8.2[L]      27 Mar 2025 21:00  Phos  5.7     03-27  Mg     2.3     03-27    TPro  7.0  /  Alb  2.6[L]  /  TBili  0.2  /  DBili  x   /  AST  17  /  ALT  21  /  AlkPhos  69  03-27    Creatinine Trend: 1.84<--, 0.85<--, 0.78<--, 0.88<--, 0.89<--    Urinalysis Basic - ( 27 Mar 2025 21:00 )    Color: x / Appearance: x / SG: x / pH: x  Gluc: 114 mg/dL / Ketone: x  / Bili: x / Urobili: x   Blood: x / Protein: x / Nitrite: x   Leuk Esterase: x / RBC: x / WBC x   Sq Epi: x / Non Sq Epi: x / Bacteria: x      Arterial Blood Gas:  03-28 @ 01:31  7.34/74/67/40/95.6/10.9  ABG lactate: --  Arterial Blood Gas:  03-27 @ 19:50  7.36/67/110/38/99.7/9.7  ABG lactate: --  Arterial Blood Gas:  03-27 @ 16:37  7.14/116/103/40/99.8/6.2  ABG lactate: --        MICROBIOLOGY:     RADIOLOGY:  [ ] Reviewed and interpreted by me    PULMONARY FUNCTION TESTS:    EKG:

## 2025-03-29 LAB
ANION GAP SERPL CALC-SCNC: -1 MMOL/L — LOW (ref 5–17)
BUN SERPL-MCNC: 41 MG/DL — HIGH (ref 7–23)
CALCIUM SERPL-MCNC: 8.2 MG/DL — LOW (ref 8.5–10.1)
CHLORIDE SERPL-SCNC: 102 MMOL/L — SIGNIFICANT CHANGE UP (ref 96–108)
CO2 SERPL-SCNC: 38 MMOL/L — HIGH (ref 22–31)
CREAT SERPL-MCNC: 0.86 MG/DL — SIGNIFICANT CHANGE UP (ref 0.5–1.3)
EGFR: 69 ML/MIN/1.73M2 — SIGNIFICANT CHANGE UP
EGFR: 69 ML/MIN/1.73M2 — SIGNIFICANT CHANGE UP
GLUCOSE SERPL-MCNC: 148 MG/DL — HIGH (ref 70–99)
POTASSIUM SERPL-MCNC: 4.8 MMOL/L — SIGNIFICANT CHANGE UP (ref 3.5–5.3)
POTASSIUM SERPL-SCNC: 4.8 MMOL/L — SIGNIFICANT CHANGE UP (ref 3.5–5.3)
SODIUM SERPL-SCNC: 139 MMOL/L — SIGNIFICANT CHANGE UP (ref 135–145)

## 2025-03-29 PROCEDURE — 99232 SBSQ HOSP IP/OBS MODERATE 35: CPT

## 2025-03-29 RX ADMIN — ENOXAPARIN SODIUM 40 MILLIGRAM(S): 100 INJECTION SUBCUTANEOUS at 17:38

## 2025-03-29 RX ADMIN — ENOXAPARIN SODIUM 40 MILLIGRAM(S): 100 INJECTION SUBCUTANEOUS at 05:06

## 2025-03-29 RX ADMIN — POLYETHYLENE GLYCOL 3350 17 GRAM(S): 17 POWDER, FOR SOLUTION ORAL at 11:24

## 2025-03-29 RX ADMIN — NYSTATIN 1 APPLICATION(S): 100000 CREAM TOPICAL at 05:06

## 2025-03-29 RX ADMIN — Medication 650 MILLIGRAM(S): at 15:12

## 2025-03-29 RX ADMIN — NYSTATIN 1 APPLICATION(S): 100000 CREAM TOPICAL at 17:47

## 2025-03-29 RX ADMIN — Medication 2 TABLET(S): at 21:46

## 2025-03-29 RX ADMIN — SODIUM CHLORIDE 65 MILLILITER(S): 9 INJECTION, SOLUTION INTRAVENOUS at 05:05

## 2025-03-29 RX ADMIN — REMDESIVIR 200 MILLIGRAM(S): 5 INJECTION INTRAVENOUS at 17:50

## 2025-03-29 RX ADMIN — Medication 650 MILLIGRAM(S): at 16:11

## 2025-03-29 RX ADMIN — DEXAMETHASONE 6 MILLIGRAM(S): 0.5 TABLET ORAL at 05:05

## 2025-03-29 RX ADMIN — SODIUM CHLORIDE 65 MILLILITER(S): 9 INJECTION, SOLUTION INTRAVENOUS at 17:43

## 2025-03-29 NOTE — PROGRESS NOTE ADULT - SUBJECTIVE AND OBJECTIVE BOX
Hospitalist Daily Progress Note     *SUBJECTIVE*    Patient seen and examined bedside.   no overnight events        REVIEW OF SYSTEMS: remaining ROS negative     *OBJECTIVE*    PHYSICAL EXAM:  Vital Signs Last 24 Hrs  T(C): 37.2 (29 Mar 2025 16:20), Max: 37.2 (29 Mar 2025 16:20)  T(F): 98.9 (29 Mar 2025 16:20), Max: 98.9 (29 Mar 2025 16:20)  HR: 70 (29 Mar 2025 16:32) (64 - 72)  BP: 114/63 (29 Mar 2025 16:20) (104/63 - 117/76)  BP(mean): --  RR: 18 (29 Mar 2025 16:20) (18 - 19)  SpO2: 98% (29 Mar 2025 16:32) (96% - 98%)    Parameters below as of 29 Mar 2025 16:20  Patient On (Oxygen Delivery Method): nasal cannula  O2 Flow (L/min): 4          GENERAL: NAD , no increased WOB, speaking in full sentences , clinically nontoxic appearing   HEAD:  Atraumatic, Normocephalic  EYES: EOMI, PERRLA, conjunctiva and sclera clear  ENMT: No tonsillar erythema, exudates, or enlargement;   NECK: Supple, No JVD  NERVOUS SYSTEM:  Alert & Oriented X3, Good concentration; nonfocal   CHEST/LUNG: CTAB;  No rales, rhonchi, wheezing, or rubs  HEART: Regular rate and rhythm; No murmurs, rubs, or gallops  ABDOMEN: Soft, Nontender, Nondistended; Bowel sounds present  EXTREMITIES:  2+ Peripheral Pulses b/l,  b/l legs swollen and consistent with lymphedema, R. posterior thigh and R. anterior calf with open wounds - both without surrounding erythema or purulent drainage (not new as per pt, follows wound care as an outpatient for past 7 years)    OBJECTIVE DATA:                                        03-29    139  |  102  |  41[H]  ----------------------------<  148[H]  4.8   |  38[H]  |  0.86    Ca    8.2[L]      29 Mar 2025 16:42          Consultant(s) Notes Reviewed [x ] Yes     Care Discussed with [x ] Consultants  [x ] Patient  [ ] Family  [ x] /   [ x] Other; RN  Care plan and all findings were discussed in detail with patient.  All questions and concerns addressed

## 2025-03-29 NOTE — PROGRESS NOTE ADULT - ASSESSMENT
79 year old female with PMHx of HTN and lymphedema c/b RLE wounds who presented to the ED on 3/19/25 for complaints of difficulty ambulating and admitted for physical deconditioning.    + COVID 3/25/25  completed  remdesivir   can continue decadron for  now   Precautions per protocol, ideally airborne and contact in negative pressure room. Supplemental oxygen as required to maintain adequate saturation. Vigilance for secondary infection and other superimposed events. Monitor inflammatory markers and lab data. DVT prophylaxis per protocol. Prognosis guarded. Encourage use of incentive spirometer.  proning as tolerated     ##Physical deconditioning  #Lymphedema  #Inability to walk  Reports difficulty ambulating due to leg swelling x couple months, started on diuretic by wound care physician without improvement in swelling. Has been walking "baby steps," states she has to take steps with her left foot and then drag her right foot  - Vascular/WC eval -> Discussed with her primary wound care/vascular Dr. Montoya who has evaluated patient this admission -> local wound cover, cleanse daily with saline and cover with dry sterile gauze  - C/w good wound care   - PT/OT eval -> [facility]  - Fall precautions     ##Acute hypoxic resp failure   #PulmHTN  ##Elevated pro-BNP  #Hypercarbia, likely chronic   Denies shortness of breath, orthopnea, paroxysmal nocturnal dyspnea, or bendopnea  Pro-BNP 1820 on admission, VBG 7.34 / 76 / 29 / 41, COVID/influenza/RSV negative  CXR with cardiomegaly with pulmonary venous congestion however may be slightly overestimated in the setting of a shallow inspiration with relatively low lung volumes, bulky gabbie b/l, disproportionate to the central pulmonary venous congestion and raises the possibility of b/l hilar lymphadenopathy  CTA chest  without main or lobar pulmonary embolism but with limited evaluation of the segmental and subsegmental branches. Echo reviewed -> PulmHTN, elevated right atrial pressures. Query ASHISH vs OHS etiology. 03/25-> Hypoxic and coughing this morning.   - Titrate o2 as tolerated   - Outpatient Pulm followup for Pulm HTNPFTs, Sleep study. Patient aware.     #Elevated troponin, suspect demand ischemia  EKG without signs of ischemia. Troponin flat, No CP.     #RLL subpleural nodule, incidental finding  Measures 0.4 cm on CTA chest. Outpatient followup     #HTN, uncontrolled: BP as elevated as 179/80 on admission, PTA losartan 25 mg and furosemide 20 mg    Preventative Measures   DVT prophylaxis: lovenox   Dispo:  [facility]   Code Status: FC

## 2025-03-30 LAB
ANION GAP SERPL CALC-SCNC: -3 MMOL/L — LOW (ref 5–17)
BUN SERPL-MCNC: 39 MG/DL — HIGH (ref 7–23)
CALCIUM SERPL-MCNC: 8.2 MG/DL — LOW (ref 8.5–10.1)
CHLORIDE SERPL-SCNC: 103 MMOL/L — SIGNIFICANT CHANGE UP (ref 96–108)
CO2 SERPL-SCNC: 39 MMOL/L — HIGH (ref 22–31)
CREAT SERPL-MCNC: 0.76 MG/DL — SIGNIFICANT CHANGE UP (ref 0.5–1.3)
EGFR: 80 ML/MIN/1.73M2 — SIGNIFICANT CHANGE UP
EGFR: 80 ML/MIN/1.73M2 — SIGNIFICANT CHANGE UP
GLUCOSE SERPL-MCNC: 103 MG/DL — HIGH (ref 70–99)
HCT VFR BLD CALC: 41 % — SIGNIFICANT CHANGE UP (ref 34.5–45)
HGB BLD-MCNC: 12.3 G/DL — SIGNIFICANT CHANGE UP (ref 11.5–15.5)
MCHC RBC-ENTMCNC: 27.8 PG — SIGNIFICANT CHANGE UP (ref 27–34)
MCHC RBC-ENTMCNC: 30 G/DL — LOW (ref 32–36)
MCV RBC AUTO: 92.6 FL — SIGNIFICANT CHANGE UP (ref 80–100)
NRBC BLD AUTO-RTO: 0 /100 WBCS — SIGNIFICANT CHANGE UP (ref 0–0)
PLATELET # BLD AUTO: 166 K/UL — SIGNIFICANT CHANGE UP (ref 150–400)
POTASSIUM SERPL-MCNC: 5.1 MMOL/L — SIGNIFICANT CHANGE UP (ref 3.5–5.3)
POTASSIUM SERPL-SCNC: 5.1 MMOL/L — SIGNIFICANT CHANGE UP (ref 3.5–5.3)
RBC # BLD: 4.43 M/UL — SIGNIFICANT CHANGE UP (ref 3.8–5.2)
RBC # FLD: 15.2 % — HIGH (ref 10.3–14.5)
SODIUM SERPL-SCNC: 139 MMOL/L — SIGNIFICANT CHANGE UP (ref 135–145)
WBC # BLD: 4.01 K/UL — SIGNIFICANT CHANGE UP (ref 3.8–10.5)
WBC # FLD AUTO: 4.01 K/UL — SIGNIFICANT CHANGE UP (ref 3.8–10.5)

## 2025-03-30 PROCEDURE — 99232 SBSQ HOSP IP/OBS MODERATE 35: CPT

## 2025-03-30 RX ADMIN — Medication 650 MILLIGRAM(S): at 09:24

## 2025-03-30 RX ADMIN — ENOXAPARIN SODIUM 40 MILLIGRAM(S): 100 INJECTION SUBCUTANEOUS at 05:22

## 2025-03-30 RX ADMIN — ENOXAPARIN SODIUM 40 MILLIGRAM(S): 100 INJECTION SUBCUTANEOUS at 17:18

## 2025-03-30 RX ADMIN — Medication 650 MILLIGRAM(S): at 10:11

## 2025-03-30 RX ADMIN — POLYETHYLENE GLYCOL 3350 17 GRAM(S): 17 POWDER, FOR SOLUTION ORAL at 13:06

## 2025-03-30 RX ADMIN — DEXAMETHASONE 6 MILLIGRAM(S): 0.5 TABLET ORAL at 05:22

## 2025-03-30 RX ADMIN — Medication 650 MILLIGRAM(S): at 23:18

## 2025-03-30 RX ADMIN — Medication 2 TABLET(S): at 21:52

## 2025-03-31 LAB
ANION GAP SERPL CALC-SCNC: 4 MMOL/L — LOW (ref 5–17)
BUN SERPL-MCNC: 31 MG/DL — HIGH (ref 7–23)
CALCIUM SERPL-MCNC: 8.5 MG/DL — SIGNIFICANT CHANGE UP (ref 8.5–10.1)
CHLORIDE SERPL-SCNC: 101 MMOL/L — SIGNIFICANT CHANGE UP (ref 96–108)
CO2 SERPL-SCNC: 34 MMOL/L — HIGH (ref 22–31)
CREAT SERPL-MCNC: 0.93 MG/DL — SIGNIFICANT CHANGE UP (ref 0.5–1.3)
EGFR: 63 ML/MIN/1.73M2 — SIGNIFICANT CHANGE UP
EGFR: 63 ML/MIN/1.73M2 — SIGNIFICANT CHANGE UP
GLUCOSE SERPL-MCNC: 204 MG/DL — HIGH (ref 70–99)
POTASSIUM SERPL-MCNC: 4.9 MMOL/L — SIGNIFICANT CHANGE UP (ref 3.5–5.3)
POTASSIUM SERPL-SCNC: 4.9 MMOL/L — SIGNIFICANT CHANGE UP (ref 3.5–5.3)
SODIUM SERPL-SCNC: 139 MMOL/L — SIGNIFICANT CHANGE UP (ref 135–145)

## 2025-03-31 PROCEDURE — 99232 SBSQ HOSP IP/OBS MODERATE 35: CPT

## 2025-03-31 PROCEDURE — 99233 SBSQ HOSP IP/OBS HIGH 50: CPT

## 2025-03-31 RX ADMIN — Medication 2 TABLET(S): at 21:39

## 2025-03-31 RX ADMIN — ENOXAPARIN SODIUM 40 MILLIGRAM(S): 100 INJECTION SUBCUTANEOUS at 17:11

## 2025-03-31 RX ADMIN — ENOXAPARIN SODIUM 40 MILLIGRAM(S): 100 INJECTION SUBCUTANEOUS at 07:05

## 2025-03-31 RX ADMIN — DEXAMETHASONE 6 MILLIGRAM(S): 0.5 TABLET ORAL at 07:05

## 2025-03-31 NOTE — PROGRESS NOTE ADULT - SUBJECTIVE AND OBJECTIVE BOX
24 hr events:  no acute events  awake and alert  reports breathing is "better and improving"    ## ROS:  [ ] unable to obtain  CONSTITUTIONAL: No fever, weight loss, or fatigue  EYES: No eye pain, visual disturbances, or discharge  ENMT:  No difficulty hearing, tinnitus, vertigo; No sinus or throat pain  NECK: No pain or stiffness  RESPIRATORY: No cough, wheezing, chills or hemoptysis; No shortness of breath  CARDIOVASCULAR: No chest pain, palpitations, dizziness, or leg swelling  GASTROINTESTINAL: No abdominal or epigastric pain. No nausea, vomiting, or hematemesis; No diarrhea or constipation. No melena or hematochezia.  GENITOURINARY: No dysuria, frequency, hematuria, or incontinence  NEUROLOGICAL: No headaches, memory loss, loss of strength, numbness, or tremors  SKIN: No itching, burning, rashes, or lesions   LYMPH NODES: No enlarged glands  ENDOCRINE: No heat or cold intolerance; No hair loss  MUSCULOSKELETAL: No joint pain or swelling; No muscle, back, or extremity pain  PSYCHIATRIC: No depression, anxiety, mood swings, or difficulty sleeping  HEME/LYMPH: No easy bruising, or bleeding gums  ALLERGY AND IMMUNOLOGIC: No hives or eczema    ## Labs:  CBC:                        12.3   4.01  )-----------( 166      ( 30 Mar 2025 10:15 )             41.0     Chem:  03-31    139  |  101  |  31[H]  ----------------------------<  204[H]  4.9   |  34[H]  |  0.93    Ca    8.5      31 Mar 2025 15:35      Coags:          ## Imaging:    ## Medications:      guaiFENesin Oral Liquid (Sugar-Free) 100 milliGRAM(s) Oral every 6 hours PRN    dexAMETHasone  Injectable 6 milliGRAM(s) IV Push daily    enoxaparin Injectable 40 milliGRAM(s) SubCutaneous every 12 hours    aluminum hydroxide/magnesium hydroxide/simethicone Suspension 30 milliLiter(s) Oral every 4 hours PRN  polyethylene glycol 3350 17 Gram(s) Oral daily  senna 2 Tablet(s) Oral at bedtime    acetaminophen     Tablet .. 650 milliGRAM(s) Oral every 6 hours PRN  melatonin 3 milliGRAM(s) Oral at bedtime PRN  ondansetron Injectable 4 milliGRAM(s) IV Push every 8 hours PRN      ## Vitals:  T(C): 36.9 (03-31-25 @ 17:09), Max: 36.9 (03-31-25 @ 17:09)  HR: 80 (03-31-25 @ 20:44) (62 - 89)  BP: 152/84 (03-31-25 @ 17:09) (114/64 - 154/75)  RR: 18 (03-31-25 @ 17:09) (18 - 20)  SpO2: 98% (03-31-25 @ 20:44) (91% - 100%)        03-30 @ 07:01  -  03-31 @ 07:00  --------------------------------------------------------  IN: 895 mL / OUT: 1100 mL / NET: -205 mL    03-31 @ 07:01  -  03-31 @ 20:55  --------------------------------------------------------  IN: 0 mL / OUT: 1000 mL / NET: -1000 mL          ## P/E:  Gen: lying comfortably in bed in no apparent distress  HEENT: PERRL, EOMI  Resp: CTA B/L no c/r/w  CVS: S1S2 no m/r/g  Abd: soft NT/ND +BS  Ext: no c/c/e  Neuro: A&Ox3    CENTRAL LINE: [ ] YES [ ] NO  LOCATION:   DATE INSERTED:  REMOVE: [ ] YES [ ] NO      HAYDEN: [ ] YES [ ] NO    DATE INSERTED:  REMOVE:  [ ] YES [ ] NO      A-LINE:  [ ] YES [ ] NO  LOCATION:   DATE INSERTED:  REMOVE:  [ ] YES [ ] NO  EXPLAIN:    GLOBAL ISSUE/BEST PRACTICE:  Analgesia:  Sedation:  HOB elevation: yes  Stress ulcer prophylaxis:  VTE prophylaxis:  Oral Care:  Glycemic control:  Nutrition:    CODE STATUS: [ ] full code  [ ] DNR  [ ] DNI  [ ] [facility]  Goals of care discussion: [ ] yes  24 hr events:  no acute events  awake and alert  reports breathing is "better and improving"  on RA  compliant with AVAPS use at night    ## ROS:  no fever, no chills  no HA, no dizziness  no visual changes, no auditory changes  no sore throat, no sinus congestion  improved SOB, no cough  no chest pain, no palpitations  no abdominal pain, no N/V/D  no dysuria, no hematuria  no myalgias, no arthralgias  + R chronic leg wound (follows at wound clinic)        ## Labs:  CBC:                        12.3   4.01  )-----------( 166      ( 30 Mar 2025 10:15 )             41.0     Chem:  03-31    139  |  101  |  31[H]  ----------------------------<  204[H]  4.9   |  34[H]  |  0.93    Ca    8.5      31 Mar 2025 15:35      Pro-Brain Natriuretic Peptide (03.19.25 @ 12:50)    Pro-Brain Natriuretic Peptide: 1820 pg/mL      Respiratory Viral Panel with COVID-19 by DANIELLE (03.25.25 @ 11:30)    Rapid RVP Result: Detected   SARS-CoV-2: Detected          ## Imaging:  < from: CT Angio Chest PE Protocol w/ IV Cont (03.19.25 @ 15:12) >  LUNGS AND LARGE AIRWAYS: Patent central airways. Right lower lobe   subpleural nodule (5-78), 0.4 cm. Minimal bibasilar subsegmental   atelectasis.  PLEURA: No pleural effusion.  VESSELS: Coronary artery calcifications. Adequate contrast opacification   of the pulmonary arterial tree without evidence of main or lobar   pulmonary embolism. Evaluation of many of the segmental and subsegmental   arteries is limited secondary to motion artifact, streak artifact, and   poor opacification.  HEART: Cardiomegaly. No pericardial effusion.  MEDIASTINUM AND SHERON: No lymphadenopathy.  CHEST WALL AND LOWER NECK: Within normal limits.  VISUALIZED UPPER ABDOMEN: Colonic diverticulosis.  BONES: Degenerative changes.    IMPRESSION:  No main or lobar pulmonary embolism. Limited evaluation of the segmental   and subsegmental branches, as detailed above.    Right lower lobe subpleural nodule, 0.4 cm. No follow-up CT is required   in a low-risk patient. In patients with a history of smoking or other   risk factors, follow-up CT may be performed in one year.        ## Medications:  guaiFENesin Oral Liquid (Sugar-Free) 100 milliGRAM(s) Oral every 6 hours PRN    dexAMETHasone  Injectable 6 milliGRAM(s) IV Push daily    enoxaparin Injectable 40 milliGRAM(s) SubCutaneous every 12 hours    aluminum hydroxide/magnesium hydroxide/simethicone Suspension 30 milliLiter(s) Oral every 4 hours PRN  polyethylene glycol 3350 17 Gram(s) Oral daily  senna 2 Tablet(s) Oral at bedtime    acetaminophen     Tablet .. 650 milliGRAM(s) Oral every 6 hours PRN  melatonin 3 milliGRAM(s) Oral at bedtime PRN  ondansetron Injectable 4 milliGRAM(s) IV Push every 8 hours PRN      ## Vitals:  T(C): 36.9 (03-31-25 @ 17:09), Max: 36.9 (03-31-25 @ 17:09)  HR: 80 (03-31-25 @ 20:44) (62 - 89)  BP: 152/84 (03-31-25 @ 17:09) (114/64 - 154/75)  RR: 18 (03-31-25 @ 17:09) (18 - 20)  SpO2: 98% (03-31-25 @ 20:44) (91% - 100%)        03-30 @ 07:01  -  03-31 @ 07:00  --------------------------------------------------------  IN: 895 mL / OUT: 1100 mL / NET: -205 mL    03-31 @ 07:01  -  03-31 @ 20:55  --------------------------------------------------------  IN: 0 mL / OUT: 1000 mL / NET: -1000 mL          ## P/E:  Gen: obese female, lying comfortably in bed in no apparent distress  HEENT: NC/AT, EOMI, sclera white  Resp: CTA B/L, no wheeze, no rhonchi, no rales  CVS: RRR  Abd: soft NT/ND +BS  Ext: chronic R lower extremity wound (clean, no drainage)  Neuro: A&Ox3

## 2025-03-31 NOTE — PROGRESS NOTE ADULT - ASSESSMENT
79 year old female with PMHx of HTN and lymphedema c/b RLE wounds who presented to the ED on 3/19/25 for complaints of difficulty ambulating and admitted for physical deconditioning.    + COVID 3/25/25  completed  remdesivir   can continue decadron for  now   Precautions per protocol, ideally airborne and contact in negative pressure room. Supplemental oxygen as required to maintain adequate saturation. Vigilance for secondary infection and other superimposed events. Monitor inflammatory markers and lab data. DVT prophylaxis per protocol. Prognosis guarded. Encourage use of incentive spirometer.  proning as tolerated     ##Physical deconditioning  #Lymphedema  #Inability to walk  Reports difficulty ambulating due to leg swelling x couple months, started on diuretic by wound care physician without improvement in swelling. Has been walking "baby steps," states she has to take steps with her left foot and then drag her right foot  - Vascular/WC eval -> Discussed with her primary wound care/vascular Dr. Montoya who has evaluated patient this admission -> local wound cover, cleanse daily with saline and cover with dry sterile gauze  - C/w good wound care   - PT/OT eval -> PRASHANT  - Fall precautions     ##Acute hypoxic resp failure   #PulmHTN  ##Elevated pro-BNP  #Hypercarbia, likely chronic   Denies shortness of breath, orthopnea, paroxysmal nocturnal dyspnea, or bendopnea  Pro-BNP 1820 on admission, VBG 7.34 / 76 / 29 / 41, COVID/influenza/RSV negative  CXR with cardiomegaly with pulmonary venous congestion however may be slightly overestimated in the setting of a shallow inspiration with relatively low lung volumes, bulky gabbie b/l, disproportionate to the central pulmonary venous congestion and raises the possibility of b/l hilar lymphadenopathy  CTA chest  without main or lobar pulmonary embolism but with limited evaluation of the segmental and subsegmental branches. Echo reviewed -> PulmHTN, elevated right atrial pressures. Query ASHISH vs OHS etiology. 03/25-> Hypoxic and coughing this morning.   - Titrate o2 as tolerated   - Outpatient Pulm followup for Pulm HTNPFTs, Sleep study. Patient aware.     #Elevated troponin, suspect demand ischemia  EKG without signs of ischemia. Troponin flat, No CP/SOB/palpitations   no e/o ACS     #RLL subpleural nodule, incidental finding  Measures 0.4 cm on CTA chest. Outpatient followup     #HTN, uncontrolled: PTA losartan 25 mg and furosemide 20 mg    Preventative Measures   DVT prophylaxis: lovenox   Dispo:  [facility]   Code Status: FC

## 2025-03-31 NOTE — PROGRESS NOTE ADULT - SUBJECTIVE AND OBJECTIVE BOX
Hospitalist Daily Progress Note     *SUBJECTIVE*    Patient seen and examined bedside.   no overnight events        REVIEW OF SYSTEMS: remaining ROS negative     *OBJECTIVE*    PHYSICAL EXAM:  Vital Signs Last 24 Hrs  T(C): 36.7 (31 Mar 2025 11:04), Max: 36.8 (31 Mar 2025 00:12)  T(F): 98 (31 Mar 2025 11:04), Max: 98.3 (31 Mar 2025 00:12)  HR: 72 (31 Mar 2025 11:04) (62 - 83)  BP: 121/60 (31 Mar 2025 11:04) (114/64 - 154/75)  BP(mean): --  RR: 18 (31 Mar 2025 11:04) (18 - 20)  SpO2: 91% (31 Mar 2025 11:04) (91% - 100%)    Parameters below as of 31 Mar 2025 07:01  Patient On (Oxygen Delivery Method): room air          GENERAL: NAD , no increased WOB, speaking in full sentences , clinically nontoxic appearing   HEAD:  Atraumatic, Normocephalic  EYES: EOMI, PERRLA, conjunctiva and sclera clear  ENMT: No tonsillar erythema, exudates, or enlargement;   NECK: Supple, No JVD  NERVOUS SYSTEM:  Alert & Oriented X3, Good concentration; nonfocal   CHEST/LUNG: CTAB;  No rales, rhonchi, wheezing, or rubs  HEART: Regular rate and rhythm; No murmurs, rubs, or gallops  ABDOMEN: Soft, Nontender, Nondistended; Bowel sounds present  EXTREMITIES:  2+ Peripheral Pulses b/l,  b/l legs swollen and consistent with lymphedema, R. posterior thigh and R. anterior calf with open wounds - both without surrounding erythema or purulent drainage (not new as per pt, follows wound care as an outpatient for past 7 years)  wounds are clean , not infected , not draining/erythema/pain, dressing c/d/i                                                     12.3   4.01  )-----------( 166      ( 30 Mar 2025 10:15 )             41.0   03-30    139  |  103  |  39[H]  ----------------------------<  103[H]  5.1   |  39[H]  |  0.76    Ca    8.2[L]      30 Mar 2025 03:50              Consultant(s) Notes Reviewed [x ] Yes     Care Discussed with [x ] Consultants  [x ] Patient  [ ] Family  [ x] /   [ x] Other; RN  Care plan and all findings were discussed in detail with patient.  All questions and concerns addressed

## 2025-03-31 NOTE — PROGRESS NOTE ADULT - ASSESSMENT
79F PMH obesity, HTN, lymphedema, b/l knee replacements, pulmonary HTN admitted to hospital for RLE wounds and deconditioning. CT Chest significant for "Right lower lobe subpleural nodule, 0.4 cm." Pulmonary consulted for further evaluation. Hospital course with in hospital COVID conversion and AMS/lethargy due to worsening hypercapnia requiring AVAPS.     Dx: acute on chronic hypercarbic respiratory failure, acute hypoxic respiratory failure, COVID infection, subpleural nodule on CT, acute metabolic encephalopathy    - completed course of remdesivir for COVID infection  - on dexamethasone to complete 10 day course  - due to covid developed acute on chronic hypercarbia requiring NIV.    - cont nocturnal AVAPS and prn during the day  - on RA during the day currently oxygenating well  - mental status back to baseline: awake and alert  - duonebs prn  - may require change to BiPAP if d/c planning is for rehab  - will need to arrange for home NIV  - needs outpatient follow up for subpleural lung nodule  - outpatient sleep study and PFTs  - suspect underlying ASHISH  - remainder of care per primary team     79F PMH obesity, HTN, lymphedema, b/l knee replacements, pulmonary HTN admitted to hospital for RLE wounds and deconditioning. CT Chest significant for "Right lower lobe subpleural nodule, 0.4 cm." Pulmonary consulted for further evaluation. Hospital course with in hospital COVID conversion and AMS/lethargy due to worsening hypercapnia requiring AVAPS.     Dx: acute on chronic hypercarbic respiratory failure, acute hypoxic respiratory failure, COVID infection, subpleural nodule on CT, acute metabolic encephalopathy    - completed course of remdesivir for COVID infection  - cont on dexamethasone to complete 10 day course  - due to covid developed acute on chronic hypercarbia requiring NIV    - cont nocturnal AVAPS and prn during the day  - on RA during the day currently oxygenating well  - mental status back to baseline: awake and alert  - duonebs prn  - may require change to BiPAP if d/c planning is for rehab as most facilities will not take pt on AVAPS  - will need to arrange for home NIV  - needs outpatient follow up for subpleural lung nodule  - needs outpatient sleep study and PFTs  - suspect underlying ASHISH  - remainder of care per primary team

## 2025-04-01 PROCEDURE — 99233 SBSQ HOSP IP/OBS HIGH 50: CPT

## 2025-04-01 PROCEDURE — 99232 SBSQ HOSP IP/OBS MODERATE 35: CPT

## 2025-04-01 RX ORDER — FUROSEMIDE 10 MG/ML
20 INJECTION INTRAMUSCULAR; INTRAVENOUS
Refills: 0 | Status: DISCONTINUED | OUTPATIENT
Start: 2025-04-01 | End: 2025-04-02

## 2025-04-01 RX ORDER — ATORVASTATIN CALCIUM 80 MG/1
20 TABLET, FILM COATED ORAL AT BEDTIME
Refills: 0 | Status: DISCONTINUED | OUTPATIENT
Start: 2025-04-01 | End: 2025-04-02

## 2025-04-01 RX ORDER — ASPIRIN 325 MG
81 TABLET ORAL DAILY
Refills: 0 | Status: DISCONTINUED | OUTPATIENT
Start: 2025-04-01 | End: 2025-04-02

## 2025-04-01 RX ORDER — IPRATROPIUM BROMIDE AND ALBUTEROL SULFATE .5; 2.5 MG/3ML; MG/3ML
3 SOLUTION RESPIRATORY (INHALATION) EVERY 6 HOURS
Refills: 0 | Status: DISCONTINUED | OUTPATIENT
Start: 2025-04-01 | End: 2025-04-02

## 2025-04-01 RX ORDER — LOSARTAN POTASSIUM 100 MG/1
25 TABLET, FILM COATED ORAL DAILY
Refills: 0 | Status: DISCONTINUED | OUTPATIENT
Start: 2025-04-01 | End: 2025-04-02

## 2025-04-01 RX ADMIN — DEXAMETHASONE 6 MILLIGRAM(S): 0.5 TABLET ORAL at 05:05

## 2025-04-01 RX ADMIN — ENOXAPARIN SODIUM 40 MILLIGRAM(S): 100 INJECTION SUBCUTANEOUS at 05:05

## 2025-04-01 RX ADMIN — ENOXAPARIN SODIUM 40 MILLIGRAM(S): 100 INJECTION SUBCUTANEOUS at 17:25

## 2025-04-01 RX ADMIN — ATORVASTATIN CALCIUM 20 MILLIGRAM(S): 80 TABLET, FILM COATED ORAL at 21:16

## 2025-04-01 RX ADMIN — Medication 650 MILLIGRAM(S): at 05:04

## 2025-04-01 RX ADMIN — Medication 650 MILLIGRAM(S): at 06:04

## 2025-04-01 RX ADMIN — Medication 650 MILLIGRAM(S): at 20:33

## 2025-04-01 RX ADMIN — Medication 650 MILLIGRAM(S): at 21:30

## 2025-04-01 NOTE — PROGRESS NOTE ADULT - ASSESSMENT
79F PMH obesity, HTN, lymphedema, b/l knee replacements, pulmonary HTN admitted to hospital for RLE wounds and deconditioning. CT Chest significant for "Right lower lobe subpleural nodule, 0.4 cm." Pulmonary consulted for further evaluation. Hospital course with in hospital COVID conversion and AMS/lethargy due to worsening hypercapnia requiring AVAPS.     Assessment and Plan:     + COVID 3/25/25  completed  remdesivir   can continue decadron for  now to finish 10 day course   on RA during the day currently oxygenating well, can continue supplemental O2 PRN to maintain O2 sat >92%   - duonebs prn      ##Physical deconditioning  #Lymphedema  #Inability to walk  Reports difficulty ambulating due to leg swelling x couple months, started on diuretic by wound care physician without improvement in swelling. Has been walking "baby steps," states she has to take steps with her left foot and then drag her right foot  - b/l LE duplex neg for DVT   - Vascular/WC eval -> Discussed with her primary wound care/vascular Dr. Montoya who has evaluated patient this admission -> local wound cover, cleanse daily with saline and cover with dry sterile gauze  - C/w good wound care   - PT/OT eval -> PRASHANT  -4/1 resumed lasix 20mg three x per week for lymphedema     ##Acute on chronic hypoxic and hypercarbic resp failure   #PulmHTN  #ASHISH/OHS   Denies shortness of breath, orthopnea, paroxysmal nocturnal dyspnea, or bendopnea  Pro-BNP 1820 on admission, VBG 7.34 / 76 / 29 / 41, COVID/influenza/RSV negative  CXR with cardiomegaly with pulmonary venous congestion however may be slightly overestimated in the setting of a shallow inspiration with relatively low lung volumes, bulky gabbie b/l, disproportionate to the central pulmonary venous congestion and raises the possibility of b/l hilar lymphadenopathy  CTA chest  without main or lobar pulmonary embolism but with limited evaluation of the segmental and subsegmental branches. Echo reviewed -> PulmHTN, elevated right atrial pressures. Query ASHISH vs OHS etiology.  ECHO 3/21/25: pEF, mild-mod AR, mod TR, mild [place]   4/1 per pulmonary will trial patient tonight on bipap (was previously on AVAPs) and get ABG in AM to help determine home bipap settings for when patient is ready for d/c.  patient will need bipap at home prior to discharge.   - Outpatient Pulm followup for Pulm HTNPFTs, Sleep study. Patient aware.     #Acute metabolic encephalopathy sec to above   -CT H unremarkable   now at baseline MS AOx3     #Elevated troponin, suspect demand ischemia  EKG without signs of ischemia. Troponin flat, No CP/SOB/palpitations   no e/o ACS     #RLL subpleural nodule, incidental finding  Measures 0.4 cm on CTA chest. Outpatient followup     #HTN, uncontrolled:   resumed losartan 25mg daily     CAROL , resolved   bladder scan PVR 138cc , patient is asymptomatic     Morbid obesity BIM >64  pt counselled on diet and lifestyle modification, gradual weight loss, and activity.   nutrition recs appreciated     Preventative Measures   DVT prophylaxis: lovenox SQ  Dispo:  [facility]   Code Status: FC   fall precautions

## 2025-04-01 NOTE — PROGRESS NOTE ADULT - ASSESSMENT
79F PMH obesity, HTN, lymphedema, b/l knee replacements, pulmonary HTN admitted to hospital for RLE wounds and deconditioning. CT Chest significant for "Right lower lobe subpleural nodule, 0.4 cm." Pulmonary consulted for further evaluation. Hospital course with in hospital COVID conversion and AMS/lethargy due to worsening hypercapnia requiring AVAPS.     Dx: acute on chronic hypercarbic respiratory failure, acute hypoxic respiratory failure, COVID infection, subpleural nodule on CT, acute metabolic encephalopathy    - completed course of remdesivir for COVID infection  - on dexamethasone to complete 10 day course  - cont nocturnal AVAPS and prn during the day  - on RA during the day currently oxygenating well  - mental status back to baseline: awake and alert  - duonebs prn  - will need to arrange for home NIV  - needs outpatient follow up for subpleural lung nodule  - outpatient sleep study and PFTs  - remainder of care per primary team     79F PMH obesity, HTN, lymphedema, b/l knee replacements, pulmonary HTN admitted to hospital for RLE wounds and deconditioning. CT Chest significant for "Right lower lobe subpleural nodule, 0.4 cm." Pulmonary consulted for further evaluation. Hospital course with in hospital COVID conversion and AMS/lethargy due to worsening hypercapnia requiring AVAPS.     Dx: acute on chronic hypercarbic respiratory failure, acute hypoxic respiratory failure, COVID infection, subpleural nodule on CT, acute metabolic encephalopathy    - will trial patient tonight on bipap (was previously on AVAPs) and get ABG in AM to help determine home bipap settings for when patient is ready for d/c.  patient will need bipap at home prior to discharge.   - completed course of remdesivir for COVID infection  - on dexamethasone to complete 10 day course  - on RA during the day currently oxygenating well  - mental status back to baseline: awake and alert  - duonebs prn  - needs outpatient follow up for subpleural lung nodule  - outpatient sleep study and PFTs  - remainder of care per primary team    Discussed with Dr. Hammond.

## 2025-04-01 NOTE — PROGRESS NOTE ADULT - SUBJECTIVE AND OBJECTIVE BOX
Hospitalist Daily Progress Note     *SUBJECTIVE*    Patient seen and examined bedside.   no overnight events      - on RA during the day currently oxygenating well    REVIEW OF SYSTEMS: remaining ROS negative     *OBJECTIVE*    PHYSICAL EXAM:  Vital Signs Last 24 Hrs  T(C): 36.9 (01 Apr 2025 16:04), Max: 36.9 (01 Apr 2025 16:04)  T(F): 98.5 (01 Apr 2025 16:04), Max: 98.5 (01 Apr 2025 16:04)  HR: 65 (01 Apr 2025 16:04) (65 - 89)  BP: 158/80 (01 Apr 2025 16:04) (148/76 - 158/80)  BP(mean): --  RR: 18 (01 Apr 2025 16:04) (18 - 18)  SpO2: 97% (01 Apr 2025 16:04) (91% - 99%)    Parameters below as of 01 Apr 2025 13:02  Patient On (Oxygen Delivery Method): room air              GENERAL: NAD , no increased WOB, speaking in full sentences , clinically nontoxic appearing   HEAD:  Atraumatic, Normocephalic  EYES: EOMI, PERRLA, conjunctiva and sclera clear  ENMT: No tonsillar erythema, exudates, or enlargement;   NECK: Supple, No JVD  NERVOUS SYSTEM:  Alert & Oriented X3, Good concentration; nonfocal   CHEST/LUNG: CTAB;  No rales, rhonchi, wheezing, or rubs  HEART: Regular rate and rhythm; No murmurs, rubs, or gallops  ABDOMEN: Soft, Nontender, Nondistended; Bowel sounds present  EXTREMITIES:  2+ Peripheral Pulses b/l,  b/l legs swollen and consistent with lymphedema, R. posterior thigh and R. anterior calf with open wounds - both without surrounding erythema or purulent drainage (not new as per pt, follows wound care as an outpatient for past 7 years)  wounds are clean , not infected , not draining/erythema/pain, dressing c/d/i         03-31    139  |  101  |  31[H]  ----------------------------<  204[H]  4.9   |  34[H]  |  0.93    Ca    8.5      31 Mar 2025 15:35        Consultant(s) Notes Reviewed [x ] Yes     Care Discussed with [x ] Consultants  [x ] Patient  [ ] Family  [ x] /   [ x] Other; RN  Care plan and all findings were discussed in detail with patient.  All questions and concerns addressed

## 2025-04-01 NOTE — PROGRESS NOTE ADULT - SUBJECTIVE AND OBJECTIVE BOX
Interval Events: NAEO. On RA.     REVIEW OF SYSTEMS:  Constitutional: [ ] negative [ ] fevers [ ] chills [ ] weight loss [ ] weight gain  HEENT: [ ] negative [ ] dry eyes [ ] eye irritation [ ] postnasal drip [ ] nasal congestion  CV: [ ] negative  [ ] chest pain [ ] orthopnea [ ] palpitations [ ] murmur  Resp: [ ] negative [ ] cough [ ] shortness of breath [ ] dyspnea [ ] wheezing [ ] sputum [ ] hemoptysis  GI: [ ] negative [ ] nausea [ ] vomiting [ ] diarrhea [ ] constipation [ ] abd pain [ ] dysphagia   : [ ] negative [ ] dysuria [ ] nocturia [ ] hematuria [ ] increased urinary frequency  Musculoskeletal: [ ] negative [ ] back pain [ ] myalgias [ ] arthralgias [ ] fracture  Skin: [ ] negative [ ] rash [ ] itch  Neurological: [ ] negative [ ] headache [ ] dizziness [ ] syncope [ ] weakness [ ] numbness  Psychiatric: [ ] negative [ ] anxiety [ ] depression  Endocrine: [ ] negative [ ] diabetes [ ] thyroid problem  Hematologic/Lymphatic: [ ] negative [ ] anemia [ ] bleeding problem  Allergic/Immunologic: [ ] negative [ ] itchy eyes [ ] nasal discharge [ ] hives [ ] angioedema  [x] All other systems negative  [ ] Unable to assess ROS because ________    OBJECTIVE:  ICU Vital Signs Last 24 Hrs  T(C): 36.8 (01 Apr 2025 05:12), Max: 36.9 (31 Mar 2025 17:09)  T(F): 98.3 (01 Apr 2025 05:12), Max: 98.5 (31 Mar 2025 17:09)  HR: 66 (01 Apr 2025 05:12) (62 - 89)  BP: 155/81 (01 Apr 2025 05:12) (121/60 - 155/81)  BP(mean): --  ABP: --  ABP(mean): --  RR: 18 (01 Apr 2025 05:12) (18 - 18)  SpO2: 95% (01 Apr 2025 05:12) (91% - 99%)    O2 Parameters below as of 31 Mar 2025 13:34  Patient On (Oxygen Delivery Method): room air              03-31 @ 07:01  -  04-01 @ 07:00  --------------------------------------------------------  IN: 0 mL / OUT: 2700 mL / NET: -2700 mL      CAPILLARY BLOOD GLUCOSE          PHYSICAL EXAM:  Gen: lying comfortably in bed in no apparent distress  HEENT: PERRL, EOMI  Resp: CTA B/L no c/r/w  CVS: S1S2 no m/r/g  Abd: soft NT/ND +BS  Ext: no c/c/e  Neuro: A&[address] MEDICATIONS:  enoxaparin Injectable 40 milliGRAM(s) SubCutaneous every 12 hours        dexAMETHasone  Injectable 6 milliGRAM(s) IV Push daily    guaiFENesin Oral Liquid (Sugar-Free) 100 milliGRAM(s) Oral every 6 hours PRN    acetaminophen     Tablet .. 650 milliGRAM(s) Oral every 6 hours PRN  melatonin 3 milliGRAM(s) Oral at bedtime PRN  ondansetron Injectable 4 milliGRAM(s) IV Push every 8 hours PRN    aluminum hydroxide/magnesium hydroxide/simethicone Suspension 30 milliLiter(s) Oral every 4 hours PRN  polyethylene glycol 3350 17 Gram(s) Oral daily  senna 2 Tablet(s) Oral at bedtime        lactated ringers. 1000 milliLiter(s) IV Continuous <Continuous>            LABS:                        12.3   4.01  )-----------( 166      ( 30 Mar 2025 10:15 )             41.0     Hgb Trend: 12.3<--, 11.0<--, 12.3<--  03-31    139  |  101  |  31[H]  ----------------------------<  204[H]  4.9   |  34[H]  |  0.93    Ca    8.5      31 Mar 2025 15:35      Creatinine Trend: 0.93<--, 0.76<--, 0.86<--, 1.84<--, 0.85<--, 0.78<--    Urinalysis Basic - ( 31 Mar 2025 15:35 )    Color: x / Appearance: x / SG: x / pH: x  Gluc: 204 mg/dL / Ketone: x  / Bili: x / Urobili: x   Blood: x / Protein: x / Nitrite: x   Leuk Esterase: x / RBC: x / WBC x   Sq Epi: x / Non Sq Epi: x / Bacteria: x                 Interval Events: NAEO. On RA. Breathing is better per patient.     REVIEW OF SYSTEMS:  Constitutional: [ ] negative [ ] fevers [ ] chills [ ] weight loss [ ] weight gain  HEENT: [ ] negative [ ] dry eyes [ ] eye irritation [ ] postnasal drip [ ] nasal congestion  CV: [ ] negative  [ ] chest pain [ ] orthopnea [ ] palpitations [ ] murmur  Resp: [ ] negative [ ] cough [ ] shortness of breath [ ] dyspnea [ ] wheezing [ ] sputum [ ] hemoptysis  GI: [ ] negative [ ] nausea [ ] vomiting [ ] diarrhea [ ] constipation [ ] abd pain [ ] dysphagia   : [ ] negative [ ] dysuria [ ] nocturia [ ] hematuria [ ] increased urinary frequency  Musculoskeletal: [ ] negative [ ] back pain [ ] myalgias [ ] arthralgias [ ] fracture  Skin: [ ] negative [ ] rash [ ] itch  Neurological: [ ] negative [ ] headache [ ] dizziness [ ] syncope [ ] weakness [ ] numbness  Psychiatric: [ ] negative [ ] anxiety [ ] depression  Endocrine: [ ] negative [ ] diabetes [ ] thyroid problem  Hematologic/Lymphatic: [ ] negative [ ] anemia [ ] bleeding problem  Allergic/Immunologic: [ ] negative [ ] itchy eyes [ ] nasal discharge [ ] hives [ ] angioedema  [x] All other systems negative  [ ] Unable to assess ROS because ________    OBJECTIVE:  ICU Vital Signs Last 24 Hrs  T(C): 36.8 (01 Apr 2025 05:12), Max: 36.9 (31 Mar 2025 17:09)  T(F): 98.3 (01 Apr 2025 05:12), Max: 98.5 (31 Mar 2025 17:09)  HR: 66 (01 Apr 2025 05:12) (62 - 89)  BP: 155/81 (01 Apr 2025 05:12) (121/60 - 155/81)  BP(mean): --  ABP: --  ABP(mean): --  RR: 18 (01 Apr 2025 05:12) (18 - 18)  SpO2: 95% (01 Apr 2025 05:12) (91% - 99%)    O2 Parameters below as of 31 Mar 2025 13:34  Patient On (Oxygen Delivery Method): room air              03-31 @ 07:01  -  04-01 @ 07:00  --------------------------------------------------------  IN: 0 mL / OUT: 2700 mL / NET: -2700 mL      CAPILLARY BLOOD GLUCOSE          PHYSICAL EXAM:  Gen: lying comfortably in bed in no apparent distress  HEENT: PERRL, EOMI  Resp: CTA B/L no c/r/w  CVS: S1S2 no m/r/g  Abd: soft NT/ND +BS  Ext: no c/c/e  Neuro: A&Ox3    [facility] MEDICATIONS:  enoxaparin Injectable 40 milliGRAM(s) SubCutaneous every 12 hours        dexAMETHasone  Injectable 6 milliGRAM(s) IV Push daily    guaiFENesin Oral Liquid (Sugar-Free) 100 milliGRAM(s) Oral every 6 hours PRN    acetaminophen     Tablet .. 650 milliGRAM(s) Oral every 6 hours PRN  melatonin 3 milliGRAM(s) Oral at bedtime PRN  ondansetron Injectable 4 milliGRAM(s) IV Push every 8 hours PRN    aluminum hydroxide/magnesium hydroxide/simethicone Suspension 30 milliLiter(s) Oral every 4 hours PRN  polyethylene glycol 3350 17 Gram(s) Oral daily  senna 2 Tablet(s) Oral at bedtime        lactated ringers. 1000 milliLiter(s) IV Continuous <Continuous>            LABS:                        12.3   4.01  )-----------( 166      ( 30 Mar 2025 10:15 )             41.0     Hgb Trend: 12.3<--, 11.0<--, 12.3<--  03-31    139  |  101  |  31[H]  ----------------------------<  204[H]  4.9   |  34[H]  |  0.93    Ca    8.5      31 Mar 2025 15:35      Creatinine Trend: 0.93<--, 0.76<--, 0.86<--, 1.84<--, 0.85<--, 0.78<--    Urinalysis Basic - ( 31 Mar 2025 15:35 )    Color: x / Appearance: x / SG: x / pH: x  Gluc: 204 mg/dL / Ketone: x  / Bili: x / Urobili: x   Blood: x / Protein: x / Nitrite: x   Leuk Esterase: x / RBC: x / WBC x   Sq Epi: x / Non Sq Epi: x / Bacteria: x                 Interval Events: NAEO. On RA. Breathing is better per patient.     REVIEW OF SYSTEMS:  Constitutional: [ ] negative [ ] fevers [ ] chills [ ] weight loss [ ] weight gain  HEENT: [ ] negative [ ] dry eyes [ ] eye irritation [ ] postnasal drip [ ] nasal congestion  CV: [ ] negative  [ ] chest pain [ ] orthopnea [ ] palpitations [ ] murmur  Resp: [ ] negative [ ] cough [ ] shortness of breath [ ] dyspnea [ ] wheezing [ ] sputum [ ] hemoptysis  GI: [ ] negative [ ] nausea [ ] vomiting [ ] diarrhea [ ] constipation [ ] abd pain [ ] dysphagia   : [ ] negative [ ] dysuria [ ] nocturia [ ] hematuria [ ] increased urinary frequency  Musculoskeletal: [ ] negative [ ] back pain [ ] myalgias [ ] arthralgias [ ] fracture  Skin: [ ] negative [ ] rash [ ] itch  Neurological: [ ] negative [ ] headache [ ] dizziness [ ] syncope [ ] weakness [ ] numbness  Psychiatric: [ ] negative [ ] anxiety [ ] depression  Endocrine: [ ] negative [ ] diabetes [ ] thyroid problem  Hematologic/Lymphatic: [ ] negative [ ] anemia [ ] bleeding problem  Allergic/Immunologic: [ ] negative [ ] itchy eyes [ ] nasal discharge [ ] hives [ ] angioedema  [x] All other systems negative      OBJECTIVE:  Vital Signs Last 24 Hrs  T(C): 36.8 (01 Apr 2025 05:12), Max: 36.9 (31 Mar 2025 17:09)  T(F): 98.3 (01 Apr 2025 05:12), Max: 98.5 (31 Mar 2025 17:09)  HR: 66 (01 Apr 2025 05:12) (62 - 89)  BP: 155/81 (01 Apr 2025 05:12) (121/60 - 155/81)  RR: 18 (01 Apr 2025 05:12) (18 - 18)  SpO2: 95% (01 Apr 2025 05:12) (91% - 99%)    O2 Parameters below as of 31 Mar 2025 13:34  Patient On (Oxygen Delivery Method): room air              03-31 @ 07:01  -  04-01 @ 07:00  --------------------------------------------------------  IN: 0 mL / OUT: 2700 mL / NET: -2700 mL            PHYSICAL EXAM:  Gen: obese female, lying comfortably in bed in no apparent distress  HEENT: NC/AT, EOMI, sclera white  Resp: CTA B/L , no wheeze, no rhonchi  CVS: S1S2 RRR  Abd: soft NT/ND +BS  Ext: R lower extremity wound dressing clean/dry/intact  Neuro: A&Ox[address] [place] [address] MEDICATIONS:  enoxaparin Injectable 40 milliGRAM(s) SubCutaneous every 12 hours        dexAMETHasone  Injectable 6 milliGRAM(s) IV Push daily    guaiFENesin Oral Liquid (Sugar-Free) 100 milliGRAM(s) Oral every 6 hours PRN    acetaminophen     Tablet .. 650 milliGRAM(s) Oral every 6 hours PRN  melatonin 3 milliGRAM(s) Oral at bedtime PRN  ondansetron Injectable 4 milliGRAM(s) IV Push every 8 hours PRN    aluminum hydroxide/magnesium hydroxide/simethicone Suspension 30 milliLiter(s) Oral every 4 hours PRN  polyethylene glycol 3350 17 Gram(s) Oral daily  senna 2 Tablet(s) Oral at bedtime        lactated ringers. 1000 milliLiter(s) IV Continuous <Continuous>            LABS:                        12.3   4.01  )-----------( 166      ( 30 Mar 2025 10:15 )             41.0     Hgb Trend: 12.3<--, 11.0<--, 12.3<--    03-31    139  |  101  |  31[H]  ----------------------------<  204[H]  4.9   |  34[H]  |  0.93    Ca    8.5      31 Mar 2025 15:35      Creatinine Trend: 0.93<--, 0.76<--, 0.86<--, 1.84<--, 0.85<--, 0.78<--    Respiratory Viral Panel with COVID-19 by DANIELLE (03.25.25 @ 11:30)    Rapid RVP Result: Detected   SARS-CoV-2: Detected      RADIOLOGY  < from: CT Angio Chest PE Protocol w/ IV Cont (03.19.25 @ 15:12) >    LUNGS AND LARGE AIRWAYS: Patent central airways. Right lower lobe   subpleural nodule (5-78), 0.4 cm. Minimal bibasilar subsegmental   atelectasis.  PLEURA: No pleural effusion.  VESSELS: Coronary artery calcifications. Adequate contrast opacification   of the pulmonary arterial tree without evidence of main or lobar   pulmonary embolism. Evaluation of many of the segmental and subsegmental   arteries is limited secondary to motion artifact, streak artifact, and   poor opacification.  HEART: Cardiomegaly. No pericardial effusion.  MEDIASTINUM AND SEHRON: No lymphadenopathy.  CHEST WALL AND LOWER NECK: Within normal limits.  VISUALIZED UPPER ABDOMEN: Colonic diverticulosis.  BONES: Degenerative changes.    IMPRESSION:  No main or lobar pulmonary embolism. Limited evaluation of the segmental   and subsegmental branches, as detailed above.    Right lower lobe subpleural nodule, 0.4 cm. No follow-up CT is required   in a low-risk patient. In patients with a history of smoking or other   risk factors, follow-up CT may be performed in one year.    --------------  < from: Xray Chest 1 View- PORTABLE-Routine (Xray Chest 1 View- PORTABLE-Routine .) (03.25.25 @ 16:02) >    No focal consolidation.  Redemonstration of pulmonary venous congestion.    -------------  < from: TTE Echo Complete w/o Contrast w/ Doppler (03.21.25 @ 10:30) >  CONCLUSIONS:     1. Left ventricular systolic function is normal with an ejection   fraction of 65 % by Carolina's method of disks.   2. Mild to moderate aortic regurgitation.   3. Normal left and right atrial size.   4. Moderate tricuspid regurgitation.   5. Estimated pulmonary artery systolic pressure is 64 mmHg.   6. No pericardial effusion seen.   7. The inferior vena cava is dilated measuring 2.50 cm in diameter,   (dilated >2.1cm) with abnormal inspiratory collapse (abnormal <50%)   consistent with elevated right atrial pressure (15, range 10-20mmHg).

## 2025-04-02 ENCOUNTER — TRANSCRIPTION ENCOUNTER (OUTPATIENT)
Age: 80
End: 2025-04-02

## 2025-04-02 VITALS — RESPIRATION RATE: 12 BRPM

## 2025-04-02 LAB
BASE EXCESS BLDA CALC-SCNC: 13.6 MMOL/L — HIGH (ref -2–3)
BLOOD GAS COMMENTS ARTERIAL: SIGNIFICANT CHANGE UP
CO2 BLDA-SCNC: 42 MMOL/L — HIGH (ref 19–24)
GAS PNL BLDA: SIGNIFICANT CHANGE UP
HCO3 BLDA-SCNC: 40 MMOL/L — HIGH (ref 21–28)
HOROWITZ INDEX BLDA+IHG-RTO: 30 — SIGNIFICANT CHANGE UP
PCO2 BLDA: 58 MMHG — HIGH (ref 32–46)
PH BLDA: 7.45 — SIGNIFICANT CHANGE UP (ref 7.35–7.45)
PO2 BLDA: 142 MMHG — HIGH (ref 83–108)
SAO2 % BLDA: 100 % — HIGH (ref 94–98)

## 2025-04-02 PROCEDURE — 99232 SBSQ HOSP IP/OBS MODERATE 35: CPT

## 2025-04-02 PROCEDURE — 99239 HOSP IP/OBS DSCHRG MGMT >30: CPT

## 2025-04-02 RX ORDER — LOSARTAN POTASSIUM 100 MG/1
1 TABLET, FILM COATED ORAL
Qty: 0 | Refills: 0 | DISCHARGE
Start: 2025-04-02

## 2025-04-02 RX ORDER — POLYETHYLENE GLYCOL 3350 17 G/17G
17 POWDER, FOR SOLUTION ORAL
Qty: 0 | Refills: 0 | DISCHARGE
Start: 2025-04-02

## 2025-04-02 RX ORDER — ASPIRIN 325 MG
1 TABLET ORAL
Qty: 0 | Refills: 0 | DISCHARGE
Start: 2025-04-02

## 2025-04-02 RX ORDER — FUROSEMIDE 10 MG/ML
1 INJECTION INTRAMUSCULAR; INTRAVENOUS
Refills: 0 | DISCHARGE

## 2025-04-02 RX ORDER — ATORVASTATIN CALCIUM 80 MG/1
1 TABLET, FILM COATED ORAL
Qty: 0 | Refills: 0 | DISCHARGE
Start: 2025-04-02

## 2025-04-02 RX ORDER — FUROSEMIDE 10 MG/ML
1 INJECTION INTRAMUSCULAR; INTRAVENOUS
Qty: 0 | Refills: 0 | DISCHARGE
Start: 2025-04-02

## 2025-04-02 RX ORDER — LOSARTAN POTASSIUM 100 MG/1
1 TABLET, FILM COATED ORAL
Refills: 0 | DISCHARGE

## 2025-04-02 RX ORDER — CYST/ALA/Q10/PHOS.SER/DHA/BROC 100-20-50
1 POWDER (GRAM) ORAL DAILY
Refills: 0 | Status: DISCONTINUED | OUTPATIENT
Start: 2025-04-02 | End: 2025-04-02

## 2025-04-02 RX ORDER — IPRATROPIUM BROMIDE AND ALBUTEROL SULFATE .5; 2.5 MG/3ML; MG/3ML
3 SOLUTION RESPIRATORY (INHALATION)
Qty: 0 | Refills: 0 | DISCHARGE
Start: 2025-04-02

## 2025-04-02 RX ADMIN — LOSARTAN POTASSIUM 25 MILLIGRAM(S): 100 TABLET, FILM COATED ORAL at 05:17

## 2025-04-02 RX ADMIN — Medication 81 MILLIGRAM(S): at 11:10

## 2025-04-02 RX ADMIN — ENOXAPARIN SODIUM 40 MILLIGRAM(S): 100 INJECTION SUBCUTANEOUS at 17:17

## 2025-04-02 RX ADMIN — DEXAMETHASONE 6 MILLIGRAM(S): 0.5 TABLET ORAL at 05:17

## 2025-04-02 RX ADMIN — ENOXAPARIN SODIUM 40 MILLIGRAM(S): 100 INJECTION SUBCUTANEOUS at 05:17

## 2025-04-02 NOTE — PROGRESS NOTE ADULT - NS ATTEND AMEND GEN_ALL_CORE FT
79 year old F with PMHx HTN, lymphedema, b/l knee replacements, pulmonary HTN admitted to hospital for RLE wounds and deconditioning. CT Chest significant for "Right lower lobe subpleural nodule, 0.4 cm." Pulmonary consulted for further evaluation.     Dx: subpleural nodule on CT; chronic hypercapnic respiratory failure    Suspect she has underlying ASHISH/OSH, bicarbs in the past have been ~30 now 37 and back to 34.  With out signs of letargy. , and vbg showing compensated hypercapnia.   Has developed pulm HTN, possibly this is the new change that has led to worsened edema and MONAE.    Needs management of underlying cause and eventually a RHC though no rush to do this in the acute setting.   - New covid with mild hypoxia agree with remdesivir and Dex.   - With likely OHS and hypercapnia threshold to start NIV if worsens in the setting of COVID.   - outpatient PFTs, and sleep study  -Requires outpatient pulmonary FUP with PFTs and a sleep study. Please call [phone] or email  [email or web address] for an appointment at the Pulmonary office ([address], [address], [place], [place]).
pt seen and examined at bedside with NP    doing well on RA  switched from AVAPS to BIPAP  tolerated BiPAP well and slept well yesterday night  ABG today on biPAP with IPAP 15 and EPAP 5:  7.45/58/142/40/100.0   feeling better  pending d/c to rehab      79F PMH obesity, HTN, lymphedema, b/l knee replacements, pulmonary HTN admitted to hospital for RLE wounds and deconditioning. CT Chest significant for "Right lower lobe subpleural nodule, 0.4 cm." Pulmonary consulted for further evaluation. Hospital course with in hospital COVID conversion and AMS/lethargy due to worsening hypercapnia requiring AVAPS. Transitioned to BiPAP at night with anticipation for d/c to rehab.     Dx: acute on chronic hypercarbic respiratory failure, acute hypoxic respiratory failure, COVID infection, subpleural nodule on CT, acute metabolic encephalopathy    - completed 5 day course of remdesivir for COVID infection  - on dexamethasone to complete 10 day course (day 9)  - due to covid developed acute on chronic hypercarbia requiring NIV    - had been on nocturnal AVAPS and prn during the day  - as plan is for d/c to rehab pt switched to BiPAP with IPAP of 15 and EPAP of 5  - ABG on BiPAP 7.45/58/142/40/100.0  - will cont on bipap at current setting at night  - on RA during the day, oxygenating well  - mental status back to baseline: awake and alert  - duonebs prn  - pt would benefit from home NIV at night  - needs outpatient follow up for subpleural lung nodule seen on CT chest  - needs outpatient sleep study and PFTs. can do so at [address] [place] [address] Rd [address] [place] Tel [phone]  - suspect underlying ASHISH  - remainder of care per primary team  - d/c planning for [facility]  - d/w hospitalist
pt seen and examined at bedside    lethargic but arousable to voice and tactile stimuli  easily dozes off to sleep in conversation  ABG 7.14/116/103/40/99.8  pt placed on BiPAP however not taking adequate tidal volumes with TV in the low 200s on BiPAP  switched pt to AVAPS       79F PMH obesity, HTN, lymphedema, b/l knee replacements, pulmonary HTN admitted to hospital for RLE wounds and deconditioning. CT Chest significant for "Right lower lobe subpleural nodule, 0.4 cm." Pulmonary consulted for further evaluation. Hospital course with in hospital COVID conversion and today with AMS/lethargy due to worsening hypercapnia requiring AVAPS.     Dx: acute on chronic hypercarbic respiratory failure, acute hypoxic respiratory failure, COVID infection, respiratory acidosis, subpleural nodule on CT, acute metabolic encephalopathy    - remdesivir and dexamethasone for COVID infection  - acute on chronic hypercarbic likely in setting of COVID infection  - pH 7.145, pCO2: 116  - initiated onl AVAPS   - check ABG post AVAPS  - would cont AVAPS through the night and wean off in AM if able to  - goals of care discussion with daughter at bedside: family would want intubation if NIV fails to correct hypercarbic respiratory failure  - duonebs prn  - needs outpatient follow up for subpleural lung nodule noted on CT scan  - needs outpatient sleep study and PFTs  - suspect underlying ASHISH  - remainder of care per primary team  - full code
pt seen and examined at bedside with PA    awake, alert, oriented  speaking full sentences  no SOB  reports breathing significantly improved  on RA  using nocturnal AVAPS      79F PMH obesity, HTN, lymphedema, b/l knee replacements, pulmonary HTN admitted to hospital for RLE wounds and deconditioning. CT Chest significant for "Right lower lobe subpleural nodule, 0.4 cm." Pulmonary consulted for further evaluation. Hospital course with in hospital COVID conversion and AMS/lethargy due to worsening hypercapnia requiring AVAPS.     Dx: acute on chronic hypercarbic respiratory failure, acute hypoxic respiratory failure, COVID infection, subpleural nodule on CT, acute metabolic encephalopathy    - completed 5 day course of remdesivir for COVID infection  - on dexamethasone to complete 10 day course (day 8)  - due to covid developed acute on chronic hypercarbia requiring NIV.    - has been on nocturnal AVAPS and prn during the day  - as plan is for d/c to rehab will switch pt to BiPAP tonight  - trialed bipap at bedside with IPAP 15 and EPAP 5 and pt taking TV ranging between 390 to 470 cc  - will trial this setting and get ABG in AM to assess for hypercarbia  - on RA during the day currently oxygenating well  - mental status back to baseline: awake and alert  - duonebs prn  - pt would benefit from home NIV at night  - needs outpatient follow up for subpleural lung nodule  - needs outpatient sleep study and PFTs  - suspect underlying ASHISH  - remainder of care per primary team

## 2025-04-02 NOTE — PROGRESS NOTE ADULT - ASSESSMENT
79F PMH obesity, HTN, lymphedema, b/l knee replacements, pulmonary HTN admitted to hospital for RLE wounds and deconditioning. CT Chest significant for "Right lower lobe subpleural nodule, 0.4 cm." Pulmonary consulted for further evaluation. Hospital course with in hospital COVID conversion and AMS/lethargy due to worsening hypercapnia requiring AVAPS.     Dx: acute on chronic hypercarbic respiratory failure, acute hypoxic respiratory failure, COVID infection, subpleural nodule on CT, acute metabolic encephalopathy    - will trial patient tonight on bipap (was previously on AVAPs)   -  when patient is ready for d/c.  patient will need bipap at home prior to discharge.   - completed course of remdesivir for COVID infection  - on dexamethasone to complete 10 day course  - on RA during the day currently oxygenating well  - mental status back to baseline: awake and alert  - duonebs prn  - needs outpatient follow up for subpleural lung nodule  - outpatient sleep study and PFTs  - remainder of care per primary team    Discussed with Dr. Hammond.

## 2025-04-02 NOTE — DISCHARGE NOTE PROVIDER - NSDCFUADDINST_GEN_ALL_CORE_FT
Please see your primary care provider within the next 1-2 weeks for a post hospital follow up appointment   Please talk to your doctor about getting a sleep study and PFTs

## 2025-04-02 NOTE — DISCHARGE NOTE NURSING/CASE MANAGEMENT/SOCIAL WORK - NSDCVIVACCINE_GEN_ALL_CORE_FT
COVID-19, mRNA, LNP-S, PF, 30 mcg/0.3 mL dose (Pfizer); 17-Jan-2022 14:41; Jaja Mike (RN); [employer]; [id number] (Exp. Date: 15-Mar-2022); IntraMuscular; Deltoid Right.; 0.3 milliLiter(s);   Tdap; 11-Aug-2023 17:19; Trinidad Chawla); [employer]; [id number] (Exp. Date: 06-Jun-2025); IntraMuscular; Deltoid Right.; 0.5 milliLiter(s); VIS (VIS Published: 09-May-2013, VIS Presented: 11-Aug-2023);

## 2025-04-02 NOTE — PROGRESS NOTE ADULT - TIME BILLING
min spent reviewing patient's chart,  discussing in IDR, examining patient, discussing plan with patient and family and staff, reviewing consultant recommendations/communicating with consultants, writing progress note and placing orders.
review of chart, records, blood work, vitals, medications, imaging, direct patient care. Time not inclusive of procedures performed on same day.
review of chart, blood work, vitals, medications, imaging, direct patient care. Time not inclusive of procedures performed on same day.

## 2025-04-02 NOTE — DISCHARGE NOTE NURSING/CASE MANAGEMENT/SOCIAL WORK - PATIENT PORTAL LINK FT
You can access the FollowMyHealth Patient Portal offered by [facility] by registering at the following website: [email or web address]. By joining [facility]’s FollowMyHealth portal, you will also be able to view your health information using other applications (apps) compatible with our system.

## 2025-04-02 NOTE — PROGRESS NOTE ADULT - SUBJECTIVE AND OBJECTIVE BOX
Interval Events:        OBJECTIVE:  ICU Vital Signs Last 24 Hrs  T(C): 36.4 (02 Apr 2025 10:30), Max: 36.9 (01 Apr 2025 16:04)  T(F): 97.6 (02 Apr 2025 10:30), Max: 98.5 (01 Apr 2025 16:04)  HR: 70 (02 Apr 2025 10:30) (64 - 75)  BP: 128/68 (02 Apr 2025 10:30) (128/68 - 158/80)  RR: 17 (02 Apr 2025 10:30) (15 - 18)  SpO2: 96% (02 Apr 2025 10:30) (94% - 100%)    O2 Parameters below as of 02 Apr 2025 10:30  Patient On (Oxygen Delivery Method): room air              04-01 @ 07:01  -  04-02 @ 07:00  --------------------------------------------------------  IN: 740 mL / OUT: 2800 mL / NET: -2060 mL    04-02 @ 07:01  -  04-02 @ 11:18  --------------------------------------------------------  IN: 420 mL / OUT: 1000 mL / NET: -580 mL      CAPILLARY BLOOD GLUCOSE      PHYSICAL EXAM:  General: well, no distress   HEENT: Sclera clear, EOMI   Neck: supple no JVD  Respiratory: CTA B/L no wheezing Cough stridor  Cardiovascular: S1S2 RRR  Abdomen: soft + BS   Extremities: no edema, PARTH   Skin: no rash / breakdown  Neurological: no focal deficits   Psychiatry: appropriate     HOSPITAL MEDICATIONS:  aspirin enteric coated 81 milliGRAM(s) Oral daily  enoxaparin Injectable 40 milliGRAM(s) SubCutaneous every 12 hours  furosemide    Tablet 20 milliGRAM(s) Oral <User Schedule>  losartan 25 milliGRAM(s) Oral daily  atorvastatin 20 milliGRAM(s) Oral at bedtime  dexAMETHasone  Injectable 6 milliGRAM(s) IV Push daily  albuterol/ipratropium for Nebulization 3 milliLiter(s) Nebulizer every 6 hours PRN  guaiFENesin Oral Liquid (Sugar-Free) 100 milliGRAM(s) Oral every 6 hours PRN  acetaminophen     Tablet .. 650 milliGRAM(s) Oral every 6 hours PRN  melatonin 3 milliGRAM(s) Oral at bedtime PRN  ondansetron Injectable 4 milliGRAM(s) IV Push every 8 hours PRN  aluminum hydroxide/magnesium hydroxide/simethicone Suspension 30 milliLiter(s) Oral every 4 hours PRN  polyethylene glycol 3350 17 Gram(s) Oral daily  senna 2 Tablet(s) Oral at bedtime              LABS:    03-31    139  |  101  |  31[H]  ----------------------------<  204[H]  4.9   |  34[H]  |  0.93    Ca    8.5      31 Mar 2025 15:35      ABG - ( 02 Apr 2025 05:05 )  pH, Arterial: 7.45  pH, Blood: x     /  pCO2: 58    /  pO2: 142   / HCO3: 40    / Base Excess: 13.6  /  SaO2: 100.0       MICROBIOLOGY:         Urinalysis Basic - ( 31 Mar 2025 15:35 )    Color: x / Appearance: x / SG: x / pH: x  Gluc: 204 mg/dL / Ketone: x  / Bili: x / Urobili: x   Blood: x / Protein: x / Nitrite: x   Leuk Esterase: x / RBC: x / WBC x   Sq Epi: x / Non Sq Epi: x / Bacteria: x        Rapid RVP Result: Detected (03-25-25 @ 11:30)    RADIOLOGY:        EKG/ECHO:  CONCLUSIONS:     1. Left ventricular systolic function is normal with an ejection   fraction of 65 % by Carolina's method of disks.   2. Mild to moderate aortic regurgitation.   3. Normal left and right atrial size.   4. Moderate tricuspid regurgitation.   5. Estimated pulmonary artery systolic pressure is 64 mmHg.   6. No pericardial effusion seen.   7. The inferior vena cava is dilated measuring 2.50 cm in diameter,   (dilated >2.1cm) with abnormal inspiratory collapse (abnormal <50%)   consistent with elevated right atrial pressure (  15, range 10-20mmHg).   Interval Events:  no acute events  tolerated biPAP well overnight  on RA  no SOB      OBJECTIVE:  Vital Signs Last 24 Hrs  T(C): 36.4 (02 Apr 2025 10:30), Max: 36.9 (01 Apr 2025 16:04)  T(F): 97.6 (02 Apr 2025 10:30), Max: 98.5 (01 Apr 2025 16:04)  HR: 70 (02 Apr 2025 10:30) (64 - 75)  BP: 128/68 (02 Apr 2025 10:30) (128/68 - 158/80)  RR: 17 (02 Apr 2025 10:30) (15 - 18)  SpO2: 96% (02 Apr 2025 10:30) (94% - 100%)    O2 Parameters below as of 02 Apr 2025 10:30  Patient On (Oxygen Delivery Method): room air              04-01 @ 07:01  -  04-02 @ 07:00  --------------------------------------------------------  IN: 740 mL / OUT: 2800 mL / NET: -2060 mL    04-02 @ 07:01  -  04-02 @ 11:18  --------------------------------------------------------  IN: 420 mL / OUT: 1000 mL / NET: -580 mL            PHYSICAL EXAM:  General: obese, no distress   HEENT: Sclera clear, EOMI   Neck: supple no JVD  Respiratory: CTA B/L no wheezing Cough stridor  Cardiovascular: S1S2 RRR  Abdomen: soft + BS   Extremities: lower extremity lymphedema, R lower extremity chronic wound (clean, without drainage)  Skin: warm, dry, as above  Neurological: no focal deficits   Psychiatry: appropriate     HOSPITAL MEDICATIONS:  aspirin enteric coated 81 milliGRAM(s) Oral daily  enoxaparin Injectable 40 milliGRAM(s) SubCutaneous every 12 hours  furosemide    Tablet 20 milliGRAM(s) Oral <User Schedule>  losartan 25 milliGRAM(s) Oral daily  atorvastatin 20 milliGRAM(s) Oral at bedtime  dexAMETHasone  Injectable 6 milliGRAM(s) IV Push daily  albuterol/ipratropium for Nebulization 3 milliLiter(s) Nebulizer every 6 hours PRN  guaiFENesin Oral Liquid (Sugar-Free) 100 milliGRAM(s) Oral every 6 hours PRN  acetaminophen     Tablet .. 650 milliGRAM(s) Oral every 6 hours PRN  melatonin 3 milliGRAM(s) Oral at bedtime PRN  ondansetron Injectable 4 milliGRAM(s) IV Push every 8 hours PRN  aluminum hydroxide/magnesium hydroxide/simethicone Suspension 30 milliLiter(s) Oral every 4 hours PRN  polyethylene glycol 3350 17 Gram(s) Oral daily  senna 2 Tablet(s) Oral at bedtime              LABS:    03-31    139  |  101  |  31[H]  ----------------------------<  204[H]  4.9   |  34[H]  |  0.93    Ca    8.5      31 Mar 2025 15:35      ABG - ( 02 Apr 2025 05:05 )  pH, Arterial: 7.45   /  pCO2: 58    /  pO2: 142   / HCO3: 40    / Base Excess: 13.6  /  SaO2: 100.0       MICROBIOLOGY:   Respiratory Viral Panel with COVID-19 by DANIELLE (03.25.25 @ 11:30)    Rapid RVP Result: Detected    SARS-CoV-2: Detected            RADIOLOGY:  < from: CT Angio Chest PE Protocol w/ IV Cont (03.19.25 @ 15:12) >    LUNGS AND LARGE AIRWAYS: Patent central airways. Right lower lobe   subpleural nodule (5-78), 0.4 cm. Minimal bibasilar subsegmental   atelectasis.  PLEURA: No pleural effusion.  VESSELS: Coronary artery calcifications. Adequate contrast opacification   of the pulmonary arterial tree without evidence of main or lobar   pulmonary embolism. Evaluation of many of the segmental and subsegmental   arteries is limited secondary to motion artifact, streak artifact, and   poor opacification.  HEART: Cardiomegaly. No pericardial effusion.  MEDIASTINUM AND SHERON: No lymphadenopathy.  CHEST WALL AND LOWER NECK: Within normal limits.  VISUALIZED UPPER ABDOMEN: Colonic diverticulosis.  BONES: Degenerative changes.    IMPRESSION:  No main or lobar pulmonary embolism. Limited evaluation of the segmental   and subsegmental branches, as detailed above.    Right lower lobe subpleural nodule, 0.4 cm. No follow-up CT is required   in a low-risk patient. In patients with a history of smoking or other   risk factors, follow-up CT may be performed in one year.            EKG/ECHO:  CONCLUSIONS:     1. Left ventricular systolic function is normal with an ejection   fraction of 65 % by Carolina's method of disks.   2. Mild to moderate aortic regurgitation.   3. Normal left and right atrial size.   4. Moderate tricuspid regurgitation.   5. Estimated pulmonary artery systolic pressure is 64 mmHg.   6. No pericardial effusion seen.   7. The inferior vena cava is dilated measuring 2.50 cm in diameter,   (dilated >2.1cm) with abnormal inspiratory collapse (abnormal <50%)   consistent with elevated right atrial pressure (  15, range 10-20mmHg).

## 2025-04-02 NOTE — DISCHARGE NOTE PROVIDER - ATTENDING DISCHARGE PHYSICAL EXAMINATION:
Vital Signs Last 24 Hrs  T(C): 36.4 (02 Apr 2025 10:30), Max: 36.9 (01 Apr 2025 16:04)  T(F): 97.6 (02 Apr 2025 10:30), Max: 98.5 (01 Apr 2025 16:04)  HR: 70 (02 Apr 2025 10:30) (64 - 75)  BP: 128/68 (02 Apr 2025 10:30) (128/68 - 158/80)  BP(mean): --  RR: 17 (02 Apr 2025 10:30) (15 - 18)  SpO2: 96% (02 Apr 2025 10:30) (94% - 100%)    Parameters below as of 02 Apr 2025 10:30  Patient On (Oxygen Delivery Method): room air    GENERAL: NAD, well-groomed, well-developed  HEAD:  Atraumatic, Normocephalic  EYES: EOMI, sclera non-icteric  ENMT:  Moist mucous membranes, Good dentition,  NERVOUS SYSTEM:  Alert & Oriented X3, Good concentration;  CHEST/LUNG: Limited due to body habitus Clear to percussion bilaterally;   HEART: Regular rate and rhythm; No murmurs, rubs, or gallops  ABDOMEN: Soft, Nontender, Nondistended;  EXTREMITIES:  2+ peripheral pulses, non-pitting LE edema   SKIN: RLE wound wrapped, dressing c/d/i

## 2025-04-02 NOTE — PROGRESS NOTE ADULT - PROVIDER SPECIALTY LIST ADULT
Hospitalist
Pulmonology
Pulmonology
Hospitalist
Hospitalist
Pulmonology
Pulmonology
Hospitalist
Pulmonology
Hospitalist
Pulmonology
Pulmonology
Hospitalist

## 2025-04-02 NOTE — DISCHARGE NOTE PROVIDER - DETAILS OF MALNUTRITION DIAGNOSIS/DIAGNOSES
This patient has been assessed with a concern for Malnutrition and was treated during this hospitalization for the following Nutrition diagnosis/diagnoses:     -  03/26/2025: Morbid obesity (BMI > 40)

## 2025-04-02 NOTE — DISCHARGE NOTE NURSING/CASE MANAGEMENT/SOCIAL WORK - FINANCIAL ASSISTANCE
[facility] provides services at a reduced cost to those who are determined to be eligible through [facility]’s financial assistance program. Information regarding [facility]’s financial assistance program can be found by going to [email or web address] or by calling [phone].

## 2025-04-02 NOTE — DISCHARGE NOTE PROVIDER - HOSPITAL COURSE
HPI:  Hilda Aquino is a 79 year old female with PMHx of HTN and lymphedema c/b RLE wounds who presented to the ED on 3/19/25 for complaints of difficulty ambulating.    Patient reports she has been following at the wound care clinic here at [facility] every two weeks for the past 7 years regarding her chronic right leg wounds which patient states are due to "moisture build up in between skin folds." Patient has had worsening of bilateral leg swelling for the past couple months. During her last visit with wound care, started on furosemide 20 mg daily which did not improve swelling. Patient missed her last two appointments and went to her regularly scheduled appointment today and was advised to come to the ER for further evaluation. Patient admits that it has been difficult to ambulate due to the swelling and has been walking "baby steps." States she has to take steps with her left foot and then drag her right foot. Denies shortness of breath, orthopnea, paroxysmal nocturnal dyspnea, or bendopnea. Sleeps with three pillows but this is not new. Baseline functional status is ambulates with walker and independent with most ADLs. Requires assistance with showering. Lives at home with . Daughter comes over to help sometimes.    In the ED, VSS except BP as elevated as 179/80 and SpO2 as low as 90% on room air. WBC 3.76K, hgb 11.3. CMP grossly unremarkable. Pro-BNP 1820, troponin 61.1.VBG 7.34 / 76 / 29 / 41. COVID/influenza/RSV negative. CXR with cardiomegaly with pulmonary venous congestion however may be slightly overestimated in the setting of a shallow inspiration with relatively low lung volumes. In addition there are bulky gabbie bilaterally, disproportionate to the central pulmonary venous congestion and raises the possibility of bilateral hilar lymphadenopathy. CTA chest  without main or lobar pulmonary embolism but with limited evaluation of the segmental and subsegmental branches and with right lower lobe subpleural nodule, 0.4 cm. Did not receive any medications. Evaluated by PT/OT who recommends PRASHANT.    Hospital Course:  The patient presented with acute on chronic hypoxic and hypercarbic respiratory failure. She was treated with supplemental oxygen as needed to maintain oxygen saturation >92%. A trial of BiPAP was initiated for suspected ASHISH/OHS, with plans to adjust settings based on morning ABG results to optimize home BiPAP settings prior to discharge. Pro-BNP was elevated at 1820 on admission. CXR showed cardiomegaly with pulmonary venous congestion, bulky gabbie, and a possible RLL subpleural nodule. CTA chest was negative for pulmonary embolism. Echocardiogram showed preserved ejection fraction, mild-moderate aortic regurgitation, moderate tricuspid regurgitation, and mild pulmonary regurgitation, consistent with pulmonary hypertension. Pulmonary function tests and a sleep study were ordered for outpatient follow-up.  Cardiovascular: The patient experienced a transient elevation in troponin, likely due to demand ischemia. EKG showed no signs of acute ischemia and the patient remained asymptomatic. Her hypertension was managed with the resumption of losartan 25mg daily.  Infectious Disease: The patient completed a course of remdesivir for COVID-19 infection. She will continue a 10-day course of dexamethasone.  Neurological: The patient presented with acute metabolic encephalopathy secondary to respiratory failure, which resolved with treatment. She returned to her baseline mental status.  Extremities: Bilateral lower extremity swelling was evaluated. Duplex ultrasound was negative for deep vein thrombosis. The patient was restarted on Lasix 20mg three times per week for lymphedema management. Wound care was consulted and recommended continued local wound care with saline cleansing and dry sterile gauze dressings. Physical and occupational therapy evaluations were completed.  Renal: The patient experienced an episode of acute kidney injury, which resolved. A post-void residual of 138cc was noted, but the patient was asymptomatic. HPI:  Hilda Aquino is a 79 year old female with PMHx of HTN and lymphedema c/b RLE wounds who presented to the ED on 3/19/25 for complaints of difficulty ambulating.    Patient reports she has been following at the wound care clinic here at [facility] every two weeks for the past 7 years regarding her chronic right leg wounds which patient states are due to "moisture build up in between skin folds." Patient has had worsening of bilateral leg swelling for the past couple months. During her last visit with wound care, started on furosemide 20 mg daily which did not improve swelling. Patient missed her last two appointments and went to her regularly scheduled appointment today and was advised to come to the ER for further evaluation. Patient admits that it has been difficult to ambulate due to the swelling and has been walking "baby steps." States she has to take steps with her left foot and then drag her right foot. Denies shortness of breath, orthopnea, paroxysmal nocturnal dyspnea, or bendopnea. Sleeps with three pillows but this is not new. Baseline functional status is ambulates with walker and independent with most ADLs. Requires assistance with showering. Lives at home with . Daughter comes over to help sometimes.    In the ED, VSS except BP as elevated as 179/80 and SpO2 as low as 90% on room air. WBC 3.76K, hgb 11.3. CMP grossly unremarkable. Pro-BNP 1820, troponin 61.1.VBG 7.34 / 76 / 29 / 41. COVID/influenza/RSV negative. CXR with cardiomegaly with pulmonary venous congestion however may be slightly overestimated in the setting of a shallow inspiration with relatively low lung volumes. In addition there are bulky gabbie bilaterally, disproportionate to the central pulmonary venous congestion and raises the possibility of bilateral hilar lymphadenopathy. CTA chest  without main or lobar pulmonary embolism but with limited evaluation of the segmental and subsegmental branches and with right lower lobe subpleural nodule, 0.4 cm. Did not receive any medications. Evaluated by PT/OT who recommends PRASHANT.    Hospital Course:  The patient presented with ambulatory dysfunction. Hospital course complicated by acute on chronic hypoxic and hypercarbic respiratory failure. RVP + Covid. She completed a course of remdesivir and 8 days of decadron which was not continued at time of discharge as patient was asymptomatic. Patient was afebrile on RA prior to discharge.   She was treated with supplemental oxygen as needed to maintain oxygen saturation >92%. A trial of BiPAP was initiated for suspected ASHISH/OHS, with plans to adjust settings based on morning ABG results to optimize home BiPAP settings prior to discharge. Pro-BNP was elevated at 1820 on admission. CXR showed cardiomegaly with pulmonary venous congestion, bulky gabbie, and a possible RLL subpleural nodule. CTA chest was negative for pulmonary embolism. Echocardiogram showed preserved ejection fraction, mild-moderate aortic regurgitation, moderate tricuspid regurgitation, and mild pulmonary regurgitation, consistent with pulmonary hypertension. Pulm followed during admission, recommended nocturnal bipap upon discharge. Also recommended outpatient PFTs and sleep study.     The patient experienced a transient elevation in troponin, likely due to demand ischemia. EKG showed no signs of acute ischemia and the patient remained asymptomatic. Her hypertension was managed with the resumption of losartan 25mg daily.    During the hospital stay she experienced acute encephalopathy due to hypercarbic respiratory failure that resolved with NIV. CTH negative for acute findings.  She returned to her baseline mental status prior to discharge.     Bilateral lower extremity swelling was evaluated. Duplex ultrasound was negative for deep vein thrombosis. The patient was restarted on Lasix 20mg three times per week for lymphedema management. Wound care was consulted and recommended continued local wound care with saline cleansing and dry sterile gauze dressings. Physical and occupational therapy evaluations were completed, recommending discharge to [facility].     The patient experienced an episode of acute kidney injury, which resolved. Cr normalized prior to discharge.     She was afebrile and hemodynamically stable at time of discharge.      COVID 3/25/25  completed  remdesivir   completed 8 days decadron but asymptomatic at time of discharge will stop steroids upon dc   on RA during the day currently oxygenating well, can continue supplemental O2 PRN to maintain O2 sat >92%   - duonebs prn    ##Physical deconditioning  #Lymphedema  #Inability to walk  Reports difficulty ambulating due to leg swelling x couple months, started on diuretic by wound care physician without improvement in swelling. Has been walking "baby steps," states she has to take steps with her left foot and then drag her right foot  - b/l LE duplex neg for DVT   - Vascular/WC eval -> Discussed with her primary wound care/vascular Dr. Montoya who has evaluated patient this admission -> local wound cover, cleanse daily with saline and cover with dry sterile gauze  - C/w good wound care   - PT/OT eval -> PRASHANT  -4/1 resumed lasix 20mg three x per week for lymphedema     ##Acute on chronic hypoxic and hypercarbic resp failure   #PulmHTN  #ASHISH/OHS   Denies shortness of breath, orthopnea, paroxysmal nocturnal dyspnea, or bendopnea  Pro-BNP 1820 on admission, VBG 7.34 / 76 / 29 / 41, COVID/influenza/RSV negative  CXR with cardiomegaly with pulmonary venous congestion however may be slightly overestimated in the setting of a shallow inspiration with relatively low lung volumes, bulky gabbie b/l, disproportionate to the central pulmonary venous congestion and raises the possibility of b/l hilar lymphadenopathy  CTA chest  without main or lobar pulmonary embolism but with limited evaluation of the segmental and subsegmental branches. Echo reviewed -> PulmHTN, elevated right atrial pressures. Query ASHISH vs OHS etiology.  ECHO 3/21/25: pEF, mild-mod AR, mod TR, mild MA   4/1 per pulmonary will trial patient tonight on bipap (was previously on AVAPs) and get ABG in AM to help determine home bipap settings for when patient is ready for d/c.  patient will need bipap at home prior to discharge.   - Outpatient Pulm followup for Pulm HTNPFTs, Sleep study. Patient aware.     #Acute metabolic encephalopathy due to hypercarbic resp failure   -CT H unremarkable   now at baseline MS AOx3     #Elevated troponin, suspect demand ischemia  EKG without signs of ischemia. Troponin flat, No CP/SOB/palpitations   no e/o ACS     #RLL subpleural nodule, incidental finding  Measures 0.4 cm on CTA chest. Outpatient followup     #HTN, uncontrolled:   resumed losartan 25mg daily. BP well controlled with losartan.     CAROL , resolved   Cr normalized prior to discharge     Morbid obesity BIM >64  pt counselled on diet and lifestyle modification, gradual weight loss, and activity.   nutrition recs appreciated      HPI:  Hilda Aquino is a 79 year old female with PMHx of HTN and lymphedema c/b RLE wounds who presented to the ED on 3/19/25 for complaints of difficulty ambulating.    Patient reports she has been following at the wound care clinic here at [facility] every two weeks for the past 7 years regarding her chronic right leg wounds which patient states are due to "moisture build up in between skin folds." Patient has had worsening of bilateral leg swelling for the past couple months. During her last visit with wound care, started on furosemide 20 mg daily which did not improve swelling. Patient missed her last two appointments and went to her regularly scheduled appointment today and was advised to come to the ER for further evaluation. Patient admits that it has been difficult to ambulate due to the swelling and has been walking "baby steps." States she has to take steps with her left foot and then drag her right foot. Denies shortness of breath, orthopnea, paroxysmal nocturnal dyspnea, or bendopnea. Sleeps with three pillows but this is not new. Baseline functional status is ambulates with walker and independent with most ADLs. Requires assistance with showering. Lives at home with . Daughter comes over to help sometimes.    In the ED, VSS except BP as elevated as 179/80 and SpO2 as low as 90% on room air. WBC 3.76K, hgb 11.3. CMP grossly unremarkable. Pro-BNP 1820, troponin 61.1.VBG 7.34 / 76 / 29 / 41. COVID/influenza/RSV negative. CXR with cardiomegaly with pulmonary venous congestion however may be slightly overestimated in the setting of a shallow inspiration with relatively low lung volumes. In addition there are bulky gabbie bilaterally, disproportionate to the central pulmonary venous congestion and raises the possibility of bilateral hilar lymphadenopathy. CTA chest  without main or lobar pulmonary embolism but with limited evaluation of the segmental and subsegmental branches and with right lower lobe subpleural nodule, 0.4 cm. Did not receive any medications. Evaluated by PT/OT who recommends PRASHANT.    Hospital Course:  The patient presented with ambulatory dysfunction. Hospital course complicated by acute on chronic hypoxic and hypercarbic respiratory failure. RVP + Covid. She completed a course of remdesivir and 8 days of decadron which was not continued at time of discharge as patient was asymptomatic. Patient was afebrile on RA prior to discharge.   She was treated with supplemental oxygen as needed to maintain oxygen saturation >92%. A trial of BiPAP was initiated for suspected ASHISH/OHS, with plans to adjust settings based on morning ABG results to optimize home BiPAP settings prior to discharge. Pro-BNP was elevated at 1820 on admission. CXR showed cardiomegaly with pulmonary venous congestion, bulky gabbie, and a possible RLL subpleural nodule. CTA chest was negative for pulmonary embolism. Echocardiogram showed preserved ejection fraction, mild-moderate aortic regurgitation, moderate tricuspid regurgitation, and mild pulmonary regurgitation, consistent with pulmonary hypertension. Pulm followed during admission, recommended nocturnal bipap upon discharge. Also recommended outpatient PFTs and sleep study.     The patient experienced a transient elevation in troponin, likely due to demand ischemia. EKG showed no signs of acute ischemia and the patient remained asymptomatic. Her hypertension was managed with the resumption of losartan 25mg daily.    During the hospital stay she experienced acute encephalopathy due to hypercarbic respiratory failure that resolved with NIV. CTH negative for acute findings.  She returned to her baseline mental status prior to discharge.     Bilateral lower extremity swelling was evaluated. Duplex ultrasound was negative for deep vein thrombosis. The patient was restarted on Lasix 20mg three times per week for lymphedema management. Wound care was consulted and recommended continued local wound care with saline cleansing and dry sterile gauze dressings. Physical and occupational therapy evaluations were completed, recommending discharge to [facility]     The patient experienced an episode of acute kidney injury, which resolved. Cr normalized prior to discharge.     She was afebrile and hemodynamically stable at time of discharge.      COVID 3/25/25  completed  remdesivir   completed 8 days decadron but asymptomatic at time of discharge will stop steroids upon dc   on RA during the day currently oxygenating well, can continue supplemental O2 PRN to maintain O2 sat >92%   - duonebs prn    ##Physical deconditioning  #Lymphedema  #Inability to walk  Reports difficulty ambulating due to leg swelling x couple months, started on diuretic by wound care physician without improvement in swelling. Has been walking "baby steps," states she has to take steps with her left foot and then drag her right foot  - b/l LE duplex neg for DVT   - Vascular/WC eval -> Discussed with her primary wound care/vascular Dr. Montoya who has evaluated patient this admission -> local wound cover, cleanse daily with saline and cover with dry sterile gauze  - C/w good wound care   - PT/OT eval -> PRASHANT  -4/1 resumed lasix 20mg three x per week for lymphedema     ##Acute on chronic hypoxic and hypercarbic resp failure   #PulmHTN  #ASHISH/OHS   Denies shortness of breath, orthopnea, paroxysmal nocturnal dyspnea, or bendopnea  Pro-BNP 1820 on admission, VBG 7.34 / 76 / 29 / 41, COVID/influenza/RSV negative  CXR with cardiomegaly with pulmonary venous congestion however may be slightly overestimated in the setting of a shallow inspiration with relatively low lung volumes, bulky gabbie b/l, disproportionate to the central pulmonary venous congestion and raises the possibility of b/l hilar lymphadenopathy  CTA chest  without main or lobar pulmonary embolism but with limited evaluation of the segmental and subsegmental branches. Echo reviewed -> PulmHTN, elevated right atrial pressures. Query ASHISH vs OHS etiology.  ECHO 3/21/25: pEF, mild-mod AR, mod TR, mild MS   Spoke with Dr Hammond, based on ABG results patient would benefit from home BIPAP. SW to make PRASHANT aware. No official dx of ASHISH or OHS will need outpatient work up.   - Outpatient Pulm followup for Pulm HTNPFTs, Sleep study. Patient aware.     #Acute metabolic encephalopathy due to hypercarbic resp failure   -CT H unremarkable   now at baseline MS AOx3     #Elevated troponin, suspect demand ischemia  EKG without signs of ischemia. Troponin flat, No CP/SOB/palpitations   no e/o ACS     #RLL subpleural nodule, incidental finding  Measures 0.4 cm on CTA chest. Outpatient followup     #HTN, uncontrolled:   resumed losartan 25mg daily. BP well controlled with losartan.     CAROL , resolved   Cr normalized prior to discharge     Morbid obesity BIM >64  pt counselled on diet and lifestyle modification, gradual weight loss, and activity.   nutrition recs appreciated

## 2025-04-02 NOTE — PROGRESS NOTE ADULT - REASON FOR ADMISSION
Physical deconditioning

## 2025-04-02 NOTE — PROGRESS NOTE ADULT - NUTRITIONAL ASSESSMENT
This patient has been assessed with a concern for Malnutrition and has been determined to have a diagnosis/diagnoses of Morbid obesity (BMI > 40).    This patient is being managed with:   Diet Regular-  DASH/TLC {Sodium & Cholesterol Restricted}  2000mL Fluid Restriction ([id number])  Entered: Mar 28 2025  9:55AM  
This patient has been assessed with a concern for Malnutrition and has been determined to have a diagnosis/diagnoses of Morbid obesity (BMI > 40).    This patient is being managed with:   Diet Regular-  DASH/TLC {Sodium & Cholesterol Restricted}  2000mL Fluid Restriction ([id number])  Entered: Mar 28 2025  9:55AM  
This patient has been assessed with a concern for Malnutrition and has been determined to have a diagnosis/diagnoses of Morbid obesity (BMI > 40).    This patient is being managed with:   Diet Regular-  DASH/TLC {Sodium & Cholesterol Restricted}  2000mL Fluid Restriction ([id number])  Entered: Mar 28 2025  9:55AM  
This patient has been assessed with a concern for Malnutrition and has been determined to have a diagnosis/diagnoses of Morbid obesity (BMI > 40).    This patient is being managed with:   Diet Regular-  DASH/TLC {Sodium & Cholesterol Restricted}  2000mL Fluid Restriction ([id number])  Entered: Mar 28 2025  9:55AM  
This patient has been assessed with a concern for Malnutrition and has been determined to have a diagnosis/diagnoses of Morbid obesity (BMI > 40).    This patient is being managed with:   Diet Regular-  DASH/TLC {Sodium & Cholesterol Restricted}  2000mL Fluid Restriction ([id number])  Entered: Mar 28 2025  9:55AM  
This patient has been assessed with a concern for Malnutrition and has been determined to have a diagnosis/diagnoses of Morbid obesity (BMI > 40).    This patient is being managed with:   Diet Regular-  DASH/TLC {Sodium & Cholesterol Restricted}  2000mL Fluid Restriction ([id number])  Entered: Mar 19 2025  5:23PM  
This patient has been assessed with a concern for Malnutrition and has been determined to have a diagnosis/diagnoses of Morbid obesity (BMI > 40).    This patient is being managed with:   Diet Regular-  DASH/TLC {Sodium & Cholesterol Restricted}  2000mL Fluid Restriction ([id number])  Entered: Mar 19 2025  5:23PM  
This patient has been assessed with a concern for Malnutrition and has been determined to have a diagnosis/diagnoses of Morbid obesity (BMI > 40).    This patient is being managed with:   Diet Regular-  DASH/TLC {Sodium & Cholesterol Restricted}  2000mL Fluid Restriction ([id number])  Entered: Mar 28 2025  9:55AM  
This patient has been assessed with a concern for Malnutrition and has been determined to have a diagnosis/diagnoses of Morbid obesity (BMI > 40).    This patient is being managed with:   Diet Regular-  DASH/TLC {Sodium & Cholesterol Restricted}  2000mL Fluid Restriction ([id number])  Entered: Mar 28 2025  9:55AM  
This patient has been assessed with a concern for Malnutrition and has been determined to have a diagnosis/diagnoses of Morbid obesity (BMI > 40).    This patient is being managed with:   Diet Regular-  DASH/TLC {Sodium & Cholesterol Restricted}  2000mL Fluid Restriction ([id number])  Entered: Mar 28 2025  9:55AM  
This patient has been assessed with a concern for Malnutrition and has been determined to have a diagnosis/diagnoses of Morbid obesity (BMI > 40).    This patient is being managed with:   Diet Regular-  DASH/TLC {Sodium & Cholesterol Restricted}  2000mL Fluid Restriction ([id number])  Entered: Mar 28 2025  9:55AM

## 2025-04-02 NOTE — CHART NOTE - NSCHARTNOTESELECT_GEN_ALL_CORE
COVID/Event Note
Event Note
Nutrition Services
Podiatry Consult/Event Note
[FreeTextEntry1] : Medications renewed.  MARRY will f/u with blood work.  Rx: MRI w/wo contrast.

## 2025-04-02 NOTE — DISCHARGE NOTE PROVIDER - NSDCMRMEDTOKEN_GEN_ALL_CORE_FT
furosemide 20 mg oral tablet: 1 tab(s) orally once a day  losartan 25 mg oral tablet: 1 tab(s) orally once a day   aspirin 81 mg oral delayed release tablet: 1 tab(s) orally once a day  atorvastatin 20 mg oral tablet: 1 tab(s) orally once a day (at bedtime)  furosemide 20 mg oral tablet: 1 tab(s) orally 3 times a week take three times a week for lower extremity swelling  ipratropium-albuterol 0.5 mg-2.5 mg/3 mL inhalation solution: 3 milliliter(s) inhaled every 6 hours As needed Shortness of Breath and/or Wheezing  losartan 25 mg oral tablet: 1 tab(s) orally once a day  polyethylene glycol 3350 oral powder for reconstitution: 17 gram(s) orally once a day

## 2025-04-02 NOTE — CHART NOTE - NSCHARTNOTEFT_GEN_A_CORE
Assessment:  Pt is alert, oriented, seen on COVID isolation, was eating her lunch meal, reports good appetite.  Diet PTA: was eating take out foods as she had difficulty moving & was unable to prepare meals.  Per pt she realizes that she has to make diet/lifestyle changes once she gets home.  Pt without report of chewing/swallowing difficulty/food allergies/intolerances.      Per chart review. pt adm c diagnosis of SOB, RLE wounds and deconditioning.  Pt c acute on chronic respiratory failure, COVID, s/p AMS/lethargy due to worsening hypercapnia,  physical deconditioning, unable to walk, CAROL resolved, D/C plans is for [facility].  PMH include obesity, lymphedema, HTN, bilateral knee replacements, pulmonary HTN.     Factors impacting intake: [ ] none [ ] nausea  [ ] vomiting [ ] diarrhea [ ] constipation  [ ]chewing problems [ ] swallowing issues  [x ] other: acute illness    Diet Prescription: Diet, Regular:   DASH/TLC {Sodium & Cholesterol Restricted}  2000mL Fluid Restriction ([id number]) (03-28-25 @ 09:55)  Pt accepted written and verbal education for above diet, tips to support wt. loss provided, Directory of ambulatory nutrition services provided.  Encouraged adequate protein intake.    Intake: >75% of meals    Current Weight: 03/29, 159 kg(daily wt.)/BMI=64.1 , 03/20, 166 kg(drug dose/adm wt.), wt. loss of 7 kg since adm noted, some wt. loss likely from fluid loss(diuretic therapy noted)  % Weight Change: 4.2%  Edema 1+(trace) dependent, 4+(severe) edema of legs, feet, ankles noted  Reported height: 5'3"    Pertinent Medications: MEDICATIONS  (STANDING):  aspirin enteric coated 81 milliGRAM(s) Oral daily  atorvastatin 20 milliGRAM(s) Oral at bedtime  dexAMETHasone  Injectable 6 milliGRAM(s) IV Push daily  enoxaparin Injectable 40 milliGRAM(s) SubCutaneous every 12 hours  furosemide    Tablet 20 milliGRAM(s) Oral <User Schedule>  losartan 25 milliGRAM(s) Oral daily  polyethylene glycol 3350 17 Gram(s) Oral daily  senna 2 Tablet(s) Oral at bedtime    MEDICATIONS  (PRN):  acetaminophen     Tablet .. 650 milliGRAM(s) Oral every 6 hours PRN Mild Pain (1 - 3), Moderate Pain (4 - 6)  albuterol/ipratropium for Nebulization 3 milliLiter(s) Nebulizer every 6 hours PRN Shortness of Breath and/or Wheezing  aluminum hydroxide/magnesium hydroxide/simethicone Suspension 30 milliLiter(s) Oral every 4 hours PRN Dyspepsia  guaiFENesin Oral Liquid (Sugar-Free) 100 milliGRAM(s) Oral every 6 hours PRN Cough  melatonin 3 milliGRAM(s) Oral at bedtime PRN Insomnia  ondansetron Injectable 4 milliGRAM(s) IV Push every 8 hours PRN Nausea and/or Vomiting    Pertinent Labs: 03-31 Na139 mmol/L Glu 204 mg/dL[H] K+ 4.9 mmol/L Cr  0.93 mg/dL BUN 31 mg/dL[H] 03-27 Phos 5.7 mg/dL[H] 03-27 Alb 2.6 g/dL[L]03-27 ALT 21 U/L AST 17 U/L Alkaline Phosphatase 69 U/L   CAPILLARY BLOOD GLUCOSE      Skin:   Pressure ulcer x 1  1.04/01, left hip; stage not noted  Wounds x 2, anterior right lower leg, posterior right lower leg noted    Estimated Needs:   [ x] no change since previous assessment(03/26, except for protein as CAROL resolved & c pressure ulcer & wounds)  [x ] recalculated:   IBW: 57.3 kg/126.5 LBS( IBW:115+10% for obesity)  Estimated Protein Needs: 69-80 grams protein(1.2-1.4 gram protein kg IBW)      Previous Nutrition Diagnosis: (03/26)  Overweight/Obesity  Etiology: Prolonged hx of excessive energy intake  Signs/Symptoms: BMI=64.8  addendum; BMI>40,   ? current BMI=62.1 due to edema, pt is morbidly obese  Goal/Expected Outcome: Pt to consume portion-controlled meals during LOS; met  New Goal: pt to verbalize understanding of diet/lifestyle modification once acute illness is resolved; met  Nutrition Diagnosis is [x ] ongoing  [ ] resolved [ ] not applicable       New Nutrition Diagnosis: (04/02)  [x ] Increased Nutrient Needs(protein)  Related to: increased demand for nutrient   As evidenced by: pressure ulcer, wounds  Goal: pt to consume >75% meals; met  Nutrition Diagnosis is [x ] ongoing  [ ] resolved [ ] not applicable       Interventions:   Recommend  [x] Continue c current diet regimen   [ ] Change Diet To:  [ ] Nutrition Supplement  [ ] Nutrition Support  [x] multivitamin c minerals daily   [ x] Other: out-patient referral for weight management once pt recovers from acute illness    Monitoring and Evaluation:   [x ] PO intake [ x ] Tolerance to diet prescription [ x ] weights [ x ] labs; Glucose, A1C%, thyroid functions  [ x ] follow up per protocol  [ ] other

## 2025-04-07 DIAGNOSIS — I27.20 PULMONARY HYPERTENSION, UNSPECIFIED: ICD-10-CM

## 2025-04-07 DIAGNOSIS — E87.29 OTHER ACIDOSIS: ICD-10-CM

## 2025-04-07 DIAGNOSIS — N17.9 ACUTE KIDNEY FAILURE, UNSPECIFIED: ICD-10-CM

## 2025-04-07 DIAGNOSIS — E66.01 MORBID (SEVERE) OBESITY DUE TO EXCESS CALORIES: ICD-10-CM

## 2025-04-07 DIAGNOSIS — L84 CORNS AND CALLOSITIES: ICD-10-CM

## 2025-04-07 DIAGNOSIS — G93.41 METABOLIC ENCEPHALOPATHY: ICD-10-CM

## 2025-04-07 DIAGNOSIS — I08.2 RHEUMATIC DISORDERS OF BOTH AORTIC AND TRICUSPID VALVES: ICD-10-CM
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
## 2025-04-07 DIAGNOSIS — S81.802A UNSPECIFIED OPEN WOUND, LEFT LOWER LEG, INITIAL ENCOUNTER: ICD-10-CM

## 2025-04-07 DIAGNOSIS — G47.33 OBSTRUCTIVE SLEEP APNEA (ADULT) (PEDIATRIC): ICD-10-CM

## 2025-04-07 DIAGNOSIS — R91.1 SOLITARY PULMONARY NODULE: ICD-10-CM

## 2025-04-07 DIAGNOSIS — Z96.653 PRESENCE OF ARTIFICIAL KNEE JOINT, BILATERAL: ICD-10-CM

## 2025-04-07 DIAGNOSIS — I89.0 LYMPHEDEMA, NOT ELSEWHERE CLASSIFIED: ICD-10-CM

## 2025-04-07 DIAGNOSIS — I10 ESSENTIAL (PRIMARY) HYPERTENSION: ICD-10-CM

## 2025-04-07 DIAGNOSIS — G89.29 OTHER CHRONIC PAIN: ICD-10-CM

## 2025-04-07 DIAGNOSIS — Z71.3 DIETARY COUNSELING AND SURVEILLANCE: ICD-10-CM

## 2025-04-07 DIAGNOSIS — M54.9 DORSALGIA, UNSPECIFIED: ICD-10-CM

## 2025-04-07 DIAGNOSIS — J96.22 ACUTE AND CHRONIC RESPIRATORY FAILURE WITH HYPERCAPNIA: ICD-10-CM

## 2025-04-07 DIAGNOSIS — J96.21 ACUTE AND CHRONIC RESPIRATORY FAILURE WITH HYPOXIA: ICD-10-CM

## 2025-04-07 DIAGNOSIS — U07.1 COVID-19: ICD-10-CM

## 2025-04-07 DIAGNOSIS — Z11.52 ENCOUNTER FOR SCREENING FOR COVID-19: ICD-10-CM

## 2025-04-07 DIAGNOSIS — I24.89 OTHER FORMS OF ACUTE ISCHEMIC HEART DISEASE: ICD-10-CM

## 2025-04-07 DIAGNOSIS — R26.2 DIFFICULTY IN WALKING, NOT ELSEWHERE CLASSIFIED: ICD-10-CM

## 2025-04-11 NOTE — PHYSICAL THERAPY INITIAL EVALUATION ADULT - ACTIVE RANGE OF MOTION EXAMINATION, REHAB EVAL
Patient
bilateral upper extremity Active ROM was WFL (within functional limits)/bilateral  lower extremity Active ROM was WFL (within functional limits)

## 2025-07-14 NOTE — ED PROVIDER NOTE - INTERNATIONAL TRAVEL
Patient Instructions:  -Please bring a list of your medications to every visit.  -If you have any questions or concerns, please contact the [facility] office at [phone], option [phone] or the direct line at [phone]. Please state that you are an [facility] patient. If it is after hours and it is an emergency, please call the emergency [facility] line at [phone]  - Continue current medications with the exception of:   -- continue doxycycline  -- continue taking lasix daily  - Labwork:   - Imaging/Procedures:   - Referrals: cardiac rehab - you can go when your grandson returns home   - Followup:  October Dr. Tabor.     We will call you if the culture swab comes back with results showing infection.    No
